# Patient Record
Sex: MALE | Race: BLACK OR AFRICAN AMERICAN | Employment: OTHER | URBAN - METROPOLITAN AREA
[De-identification: names, ages, dates, MRNs, and addresses within clinical notes are randomized per-mention and may not be internally consistent; named-entity substitution may affect disease eponyms.]

---

## 2017-01-03 ENCOUNTER — OFFICE VISIT (OUTPATIENT)
Dept: CARDIOLOGY CLINIC | Age: 72
End: 2017-01-03

## 2017-01-03 ENCOUNTER — ANTI-COAG VISIT (OUTPATIENT)
Dept: CARDIOLOGY CLINIC | Age: 72
End: 2017-01-03

## 2017-01-03 VITALS
HEIGHT: 73 IN | WEIGHT: 223 LBS | DIASTOLIC BLOOD PRESSURE: 90 MMHG | SYSTOLIC BLOOD PRESSURE: 130 MMHG | HEART RATE: 69 BPM | OXYGEN SATURATION: 96 % | BODY MASS INDEX: 29.55 KG/M2

## 2017-01-03 DIAGNOSIS — I48.91 ATRIAL FIBRILLATION, UNSPECIFIED TYPE (HCC): ICD-10-CM

## 2017-01-03 DIAGNOSIS — I42.8 CARDIOMYOPATHY, NONISCHEMIC (HCC): Primary | ICD-10-CM

## 2017-01-03 DIAGNOSIS — C49.21 SOFT TISSUE SARCOMA OF RIGHT THIGH (HCC): ICD-10-CM

## 2017-01-03 DIAGNOSIS — I27.20 PULMONARY HYPERTENSION, MODERATE TO SEVERE (HCC): ICD-10-CM

## 2017-01-03 DIAGNOSIS — Z79.01 LONG TERM CURRENT USE OF ANTICOAGULANT THERAPY: ICD-10-CM

## 2017-01-03 DIAGNOSIS — I48.19 PERSISTENT ATRIAL FIBRILLATION (HCC): ICD-10-CM

## 2017-01-03 DIAGNOSIS — I34.0 NON-RHEUMATIC MITRAL REGURGITATION: ICD-10-CM

## 2017-01-03 DIAGNOSIS — I10 ESSENTIAL HYPERTENSION: ICD-10-CM

## 2017-01-03 LAB
INR BLD: 2.4
PT POC: NORMAL SEC
VALID INTERNAL CONTROL?: YES

## 2017-01-03 NOTE — PROGRESS NOTES
The INR is stable and therapeutic. Continue same dose of coumadin and recheck in 1 month.   Continue Coumadin 2.5mg daily except 1.25 mg on Tuesday (takes Coumadin in the morning)  Verbal order and read back per Ken Jasso NP

## 2017-01-03 NOTE — PROGRESS NOTES
HISTORY OF PRESENT ILLNESS  Jose C Rivas is a 80 y.o. male. ASSESSMENT and PLAN    Mr. Ronan Bolivar has history of nonischemic cardiomyopathy as well as severe pulmonary hypertension with moderate mitral regurgitation. Because of severe LV dysfunction, he has AICD in place for primary prevention of sudden cardiac death. He also has persistent atrial fibrillation. 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 He will have this performed up in [place]. He has lost significant amount of weight. Since October of 2015, he has lost over 30 pounds. He had weighed 193 pounds back in October 2015. His last MUGA scan in November of 2015 showed ejection fraction of 45%. His echocardiogram in September of 2015 showed ejection fraction of 45-50% with pulmonary hypertension with PA pressure of 45 mmHg. Right thigh sarcoma has been successfully removed. His understanding is that the edges were clear.  CAD:   He has no documented history of CAD.  Atrial fibrillation: He remains on Coumadin. His rate is controlled.  BP:   Well-controlled.  HR:    Stable.  CHF:   Currently, there is no evidence of decompensated CHF noted.  Weight:   Stable. I did advise him not to lose any further weight after his sarcoma resection of his right thigh.  Cholesterol:   Target LDL <110.  Anti-platelet:   Remains on Coumadin. I will see him back in 8-9 months. Thank you. Encounter Diagnoses   Name Primary?    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 Rosalba Jensen has no complaints of chest pains, increased shortness of breath or decreased exercise capacity. He denies any orthopnea or PND. He denies any palpitations or dizziness. Review of Systems   Respiratory: Positive for shortness of breath and wheezing. Cardiovascular: Positive for palpitations and leg swelling. Negative for chest pain, orthopnea, claudication and PND. All other systems reviewed and are negative. Physical Exam   Constitutional: He is oriented to person, place, and time. He appears well-developed and well-nourished. HENT:   Head: Normocephalic. Eyes: Conjunctivae are normal.   Neck: Neck supple. No JVD present. No thyromegaly present. Cardiovascular: Normal rate and regular rhythm. Pulmonary/Chest: Breath sounds normal.   Abdominal: Bowel sounds are normal.   Musculoskeletal: He exhibits deformity (Right thigh resection from his sarcoma). Neurological: He is alert and oriented to person, place, and time. Skin: Skin is warm and dry. Nursing note and vitals reviewed. PCP: Neela Church MD    Past Medical History   Diagnosis Date    AICD (automatic cardioverter/defibrillator) present     Atrial fibrillation ([address] 6/8/2010    Cancer ([facility]     Cardiac cath 02/11/2009     Patent coronary arteries. LVEDP 28.  EF 35%. Global hyk. Mod MR.  Cardiac echocardiogram 03/01/2016     LVE. EF 35-40% (prev 45-50% on study of 9/14/15). Diffuse hypk. Indeterminate diastolic fx. Mild MR. Mod AI. Mild TR.  Cardiac MUGA scan 11/02/2015     At most mild LVE. EF 45-46%.  Cardiac nuclear imaging test, abnormal 02/03/2009     Mild basal/mid inferior, inferoapical, inferoseptal infarct w/mild ischemia in RCA (possibly partially artifact). Anterior, anteroseptal, anterapical ischemia w/o infarct. (Mid & distal LAD.)  LVE. EF 29%. Mod global hyk.       Cardiovascular renal duplex 06/07/2010     No evidence of renal artery stenosis >60%. Patent SMA and celiac artery.  HTN (hypertension) 6/8/2010    Hypertensive cardiovascular disease     MR (mitral regurgitation)     Nonischemic cardiomyopathy      3/11 echo EF 25%    Pulmonary hypertension, moderate to severe ([address] 6/8/2010     90-100mmHg; repeat echo in 3/11 showed 70mmHg       Past Surgical History   Procedure Laterality Date    Hx hernia repair  2011    Hx heart valve surgery  2011       Current Outpatient Prescriptions   Medication Sig Dispense Refill    carvedilol (COREG) 25 mg tablet TAKE 1 TABLET BY MOUTH TWICE A DAY WITH MEALS 60 Tab 11    lisinopril (PRINIVIL, ZESTRIL) 20 mg tablet TAKE ONE TABLET BY MOUTH TWICE DAILY 60 Tab 8    magnesium oxide (MAG-OX) 400 mg tablet Take 1 Tab by mouth daily. 60 Tab 6    furosemide (LASIX) 40 mg tablet Take 1 Tab by mouth daily. Or directed 1 Tab 0    potassium chloride (K-DUR, KLOR-CON) 20 mEq tablet Take 1 Tab by mouth daily. Or as directed 1 Tab 0    metolazone (ZAROXOLYN) 2.5 mg tablet Take 1 Tab by mouth as needed. 1 Tab 0    cloNIDine HCl (CATAPRES) 0.2 mg tablet TAKE 1 TABLET BY MOUTH 3 TIMES A  Tab 2    digoxin (LANOXIN) 0.125 mg tablet TAKE 1 TABLET BY MOUTH EVERY DAY 90 Tab 3    dronabinol (MARINOL) 5 mg capsule Take 1 Cap by mouth two (2) times a day. Max Daily Amount: 10 mg. 60 Cap 3    oxyCODONE-acetaminophen (PERCOCET) 5-325 mg per tablet Take 1 Tab by mouth every four (4) hours as needed for Pain. Max Daily Amount: 6 Tabs. 180 Tab 0    warfarin (COUMADIN) 2.5 mg tablet Take 1 Tab by mouth daily. Or as directed by our office 45 Tab 6    sertraline (ZOLOFT) 25 mg tablet Take  by mouth daily.  allopurinol (ZYLOPRIM) 300 mg tablet Take  by mouth daily.  FERROUS SULFATE, DRIED (IRON, DRIED, PO) Take 325 mg by mouth daily.  aspirin delayed-release (ASPIR-81) 81 mg tablet Take 81 mg by mouth daily.          The patient has a family history of    Social History   Substance Use Topics    Smoking status: Never Smoker    Smokeless tobacco: Never Used    Alcohol use Yes      Comment: occasionally. Lab Results   Component Value Date/Time    Cholesterol, total 85 01/23/2016 05:30 AM    HDL Cholesterol 31 01/23/2016 05:30 AM    LDL, calculated 34 01/23/2016 05:30 AM    Triglyceride 100 01/23/2016 05:30 AM    CHOL/HDL Ratio 2.7 01/23/2016 05:30 AM        BP Readings from Last 3 Encounters:   01/03/17 130/90   12/21/16 134/85   11/30/16 (!) 147/95        Pulse Readings from Last 3 Encounters:   01/03/17 69   12/21/16 67   11/30/16 90       Wt Readings from Last 3 Encounters:   01/03/17 101.2 kg (223 lb)   10/18/16 79.9 kg (176 lb 3.2 oz)   10/07/16 93.4 kg (206 lb)         EKG: Not done today.

## 2017-01-03 NOTE — MR AVS SNAPSHOT
Visit Information Date & Time Provider Department Dept. Phone Encounter #  
 1/3/2017  1:00 PM Senait Walsh MD [facility] [phone] Your Appointments 1/4/2017  3:20 PM  
CARELINK with Pacer Hv Csi [facility] ([facility]) Appt Note: Jose Summers[address]ldharmesh [address] [place] [id number] [phone] [phone]  
  
    
 1/31/2017  8:00 AM  
ANTICOAG NURSE with Pt Inr Hv Csi [facility] ([facility]) Appt Note: 4 weeks [place] [id number] [phone] [address] [facility] [address] [address]  
  
    
 8/22/2017  8:20 AM  
Follow Up with Senait Walsh MD  
[facility] ([facility] Appt Note: 6-9 month f/up [place] [id number] [phone] [address] [facility] [address] Upcoming Health Maintenance Date Due Hepatitis C Screening 1945 DTaP/Tdap/Td series (1 - Tdap) 8/21/1967 FOBT Q 1 YEAR AGE 50-75 8/21/1996 ZOSTER VACCINE AGE 60> 8/21/2006 GLAUCOMA SCREENING Q2Y 8/21/2011 MEDICARE YEARLY EXAM 8/21/2011 Allergies as of 1/3/2017  Review Complete On: 1/3/2017 By: Senait Walsh MD  
 No Known Allergies Current Immunizations  Reviewed on 12/21/2016 Name Date Influenza Vaccine 11/9/2016, 10/2/2015 Pneumococcal Conjugate (PCV-13) 9/28/2015 Pneumococcal Polysaccharide (PPSV-23) 8/13/2012 Not reviewed this visit You Were Diagnosed With   
  
 Codes Comments Cardiomyopathy, nonischemic ([facility]    -  Primary ICD-10-CM: I42.9 ICD-9-CM: 425.4 Non-rheumatic mitral regurgitation     ICD-10-CM: I34.0 ICD-9-CM: 424.0 Essential hypertension     ICD-10-CM: I10 
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  
 130/90 (BP 1 Location: Left arm, BP Patient Position: Sitting) 69 6' 1\" (1.854 m) 223 lb (101.2 kg) 96% 29.42 kg/m2 Smoking Status Never Smoker BMI and BSA Data Body Mass Index Body Surface Area  
 29.42 kg/m 2 2.28 m 2 Preferred Pharmacy Pharmacy Name Phone [facility][id number]- [address] Your Updated Medication List  
  
   
This list is accurate as of: 1/3/17  1:54 PM.  Always use your most recent med list.  
  
  
  
  
 allopurinol 300 mg tablet Commonly known as:  Jacquelin Lb Take  by mouth daily. ASPIR-81 81 mg tablet Generic drug:  aspirin delayed-release Take 81 mg by mouth daily. carvedilol 25 mg tablet Commonly known as:  COREG  
TAKE 1 TABLET BY MOUTH TWICE A DAY WITH MEALS  
  
 cloNIDine HCl 0.2 mg tablet Commonly known as:  CATAPRES  
TAKE 1 TABLET BY MOUTH 3 TIMES A DAY  
  
 digoxin 0.125 mg tablet Commonly known as:  LANOXIN  
TAKE 1 TABLET BY MOUTH EVERY DAY  
  
 dronabinol 5 mg capsule Commonly known as:  Lorre Mourning Take 1 Cap by mouth two (2) times a day. Max Daily Amount: 10 mg.  
  
 furosemide 40 mg tablet Commonly known as:  LASIX Take 1 Tab by mouth daily. Or directed IRON (DRIED) PO Take 325 mg by mouth daily. lisinopril 20 mg tablet Commonly known as:  PRINIVIL, ZESTRIL  
TAKE ONE TABLET BY MOUTH TWICE DAILY  
  
 magnesium oxide 400 mg tablet Commonly known as:  MAG-OX Take 1 Tab by mouth daily. metOLazone 2.5 mg tablet Commonly known as:  Yoli Buster Take 1 Tab by mouth as needed. oxyCODONE-acetaminophen 5-325 mg per tablet Commonly known as:  PERCOCET Take 1 Tab by mouth every four (4) hours as needed for Pain. Max Daily Amount: 6 Tabs. potassium chloride 20 mEq tablet Commonly known as:  K-DUR, KLOR-CON Take 1 Tab by mouth daily. Or as directed  
  
 warfarin 2.5 mg tablet Commonly known as:  COUMADIN Take 1 Tab by mouth daily. Or as directed by our office ZOLOFT 25 mg tablet Generic drug:  sertraline Take  by mouth daily. To-Do List   
 01/11/2017 8:30 AM  
  Appointment with [facility] FAST TRACK NURSE at [facility]  
  
 02/01/2017 8:30 AM  
  Appointment with [facility] FAST TRACK NURSE at [facility]  
  
 02/22/2017 8:30 AM  
  Appointment with [facility] FAST TRACK NURSE at [facility] Introducing [facility] SERVICES! [facility] introduces [employer] patient portal. Now you can access parts of your medical record, email your doctor's office, and request medication refills online. 1. In your internet browser, go to [email or web address] 2. Click on the First Time User? Click Here link in the Sign In box. You will see the New Member Sign Up page. 3. Enter your [employer] Access Code exactly as it appears below. You will not need to use this code after youve completed the sign-up process. If you do not sign up before the expiration date, you must request a new code. · [employer] Access Code: [email or web address] Expires: 2/28/2017  9:08 AM 
 
4. Enter the last four digits of your Social Security Number (xxxx) and Date of Birth (mm/dd/yyyy) as indicated and click Submit. You will be taken to the next sign-up page. 5. Create a [employer] ID. This will be your [employer] login ID and cannot be changed, so think of one that is secure and easy to remember. 6. Create a [employer] password. You can change your password at any time. 7. Enter your Password Reset Question and Answer. This can be used at a later time if you forget your password. 8. Enter your e-mail address. You will receive e-mail notification when new information is available in [address] 9. Click Sign Up. You can now view and download portions of your medical record. 10. Click the Download Summary menu link to download a portable copy of your medical information. If you have questions, please visit the Frequently Asked Questions section of the [employer] website. Remember, [employer] is NOT to be used for urgent needs. For medical emergencies, dial 911. Now available from your iPhone and Android! Please provide this summary of care documentation to your next provider. Your primary care clinician is listed as Nicole Wei. If you have any questions after today's visit, please call [phone].

## 2017-01-11 ENCOUNTER — HOSPITAL ENCOUNTER (OUTPATIENT)
Dept: INFUSION THERAPY | Age: 72
Discharge: HOME | End: 2017-01-11
Payer: MEDICARE

## 2017-01-11 VITALS
HEART RATE: 72 BPM | OXYGEN SATURATION: 99 % | SYSTOLIC BLOOD PRESSURE: 129 MMHG | DIASTOLIC BLOOD PRESSURE: 81 MMHG | RESPIRATION RATE: 18 BRPM

## 2017-01-11 LAB
BASO+EOS+MONOS # BLD AUTO: 0.4 K/UL (ref 0–2.3)
BASO+EOS+MONOS # BLD AUTO: 7 % (ref 0.1–17)
DIFFERENTIAL METHOD BLD: ABNORMAL
ERYTHROCYTE [DISTWIDTH] IN BLOOD BY AUTOMATED COUNT: 19.5 % (ref 11.5–14.5)
HCT VFR BLD AUTO: 38.1 % (ref 36–48)
HGB BLD-MCNC: 12.7 G/DL (ref 12–16)
LYMPHOCYTES # BLD AUTO: 18 % (ref 14–44)
LYMPHOCYTES # BLD: 1 K/UL (ref 1.1–5.9)
MCH RBC QN AUTO: 30.2 PG (ref 25–35)
MCHC RBC AUTO-ENTMCNC: 33.3 G/DL (ref 31–37)
MCV RBC AUTO: 90.7 FL (ref 78–102)
NEUTS SEG # BLD: 4.2 K/UL (ref 1.8–9.5)
NEUTS SEG NFR BLD AUTO: 75 % (ref 40–70)
PLATELET # BLD AUTO: 137 K/UL (ref 140–440)
RBC # BLD AUTO: 4.2 M/UL (ref 4.1–5.1)
WBC # BLD AUTO: 5.6 K/UL (ref 4.5–13)

## 2017-01-11 PROCEDURE — 74011250636 HC RX REV CODE- 250/636: Performed by: INTERNAL MEDICINE

## 2017-01-11 PROCEDURE — 85025 COMPLETE CBC W/AUTO DIFF WBC: CPT | Performed by: INTERNAL MEDICINE

## 2017-01-11 PROCEDURE — 36415 COLL VENOUS BLD VENIPUNCTURE: CPT

## 2017-01-11 PROCEDURE — 77030012965 HC NDL HUBR BBMI -A

## 2017-01-11 RX ORDER — SODIUM CHLORIDE 0.9 % (FLUSH) 0.9 %
10-40 SYRINGE (ML) INJECTION AS NEEDED
Status: DISPENSED | OUTPATIENT
Start: 2017-01-11 | End: 2017-01-11

## 2017-01-11 RX ORDER — HEPARIN SODIUM (PORCINE) LOCK FLUSH IV SOLN 100 UNIT/ML 100 UNIT/ML
SOLUTION INTRAVENOUS
Status: DISPENSED
Start: 2017-01-11 | End: 2017-01-11

## 2017-01-11 RX ORDER — HEPARIN SODIUM (PORCINE) LOCK FLUSH IV SOLN 100 UNIT/ML 100 UNIT/ML
500 SOLUTION INTRAVENOUS AS NEEDED
Status: ACTIVE | OUTPATIENT
Start: 2017-01-11 | End: 2017-01-12

## 2017-01-11 RX ADMIN — HEPARIN SODIUM (PORCINE) LOCK FLUSH IV SOLN 100 UNIT/ML 500 UNITS: 100 SOLUTION at 08:36

## 2017-01-11 RX ADMIN — Medication 30 ML: at 08:36

## 2017-01-11 NOTE — PROGRESS NOTES
[facility] Progress Note    Date: 2017    Name: Alexandria Crook    MRN: [id number]         : 1945     [facility] flush/Procrit    Mr. Avelina eHrring to [facility], ambulatory at 0830. Pt was assessed and education was provided. Mr. Jaleel Tracy vitals were reviewed and patient was observed for 5 minutes prior to treatment. Visit Vitals    /81 (BP 1 Location: Left arm, BP Patient Position: Sitting)    Pulse 72    Resp 18    SpO2 99%     Right chest mediport accessed with 20G 1 inch Giron needle. Flushed easily and had brisk blood return. Blood drawn off for CBC after 10 mL of waste. Port flushed with 20 mL NS and 500 units of heparin before de-accessing. No irritation or bleeding. Band-aid applied. Recent Results (from the past 12 hour(s))   CBC WITH 3 PART DIFF    Collection Time: 17  8:41 AM   Result Value Ref Range    WBC 5.6 4.5 - 13.0 K/uL    RBC 4.20 4. 10 - 5.10 M/uL    HGB 12.7 12.0 - 16 g/dL    HCT 38.1 36 - 48 %    MCV 90.7 78 - 102 FL    MCH 30.2 25.0 - 35.0 PG    MCHC 33.3 31 - 37 g/dL    RDW 19.5 (H) 11.5 - 14.5 %    PLATELET 012 (L) 118 - 440 K/uL    NEUTROPHILS 75 (H) 40 - 70 %    MIXED CELLS 7 0.1 - 17 %    LYMPHOCYTES 18 14 - 44 %    ABS. NEUTROPHILS 4.2 1.8 - 9.5 K/UL    ABS. MIXED CELLS 0.4 0.0 - 2.3 K/uL    ABS. LYMPHOCYTES 1.0 (L) 1.1 - 5.9 K/UL    DF AUTOMATED       Results within ordered parameters to HOLD procrit today. Patient arm band removed and shredded. He is to return on 2017 at 0830 for his next procrit appointment.      Ricardo Tidwell RN  2017

## 2017-01-31 ENCOUNTER — ANTI-COAG VISIT (OUTPATIENT)
Dept: CARDIOLOGY CLINIC | Age: 72
End: 2017-01-31

## 2017-01-31 DIAGNOSIS — I48.19 PERSISTENT ATRIAL FIBRILLATION (HCC): ICD-10-CM

## 2017-01-31 DIAGNOSIS — Z79.01 LONG TERM CURRENT USE OF ANTICOAGULANT THERAPY: Primary | ICD-10-CM

## 2017-01-31 LAB
INR BLD: 2.9
PT POC: 34.6 SEC
VALID INTERNAL CONTROL?: YES

## 2017-01-31 NOTE — PROGRESS NOTES
Verbal order and read back per Holden Nguyễn NP  The INR is stable and therapeutic. Continue same dose of coumadin and recheck in 1 month.   Continue Coumadin 2.5mg daily except 1.25 mg on Tuesday  Patient informed of instructions, read back and verbalized understanding Justine Lopez LPN

## 2017-02-01 ENCOUNTER — HOSPITAL ENCOUNTER (OUTPATIENT)
Dept: INFUSION THERAPY | Age: 72
Discharge: HOME | End: 2017-02-01
Payer: MEDICARE

## 2017-02-01 VITALS
RESPIRATION RATE: 18 BRPM | TEMPERATURE: 98.3 F | HEART RATE: 86 BPM | SYSTOLIC BLOOD PRESSURE: 125 MMHG | OXYGEN SATURATION: 99 % | DIASTOLIC BLOOD PRESSURE: 83 MMHG

## 2017-02-01 LAB
BASO+EOS+MONOS # BLD AUTO: 0.5 K/UL (ref 0–2.3)
BASO+EOS+MONOS # BLD AUTO: 9 % (ref 0.1–17)
DIFFERENTIAL METHOD BLD: ABNORMAL
ERYTHROCYTE [DISTWIDTH] IN BLOOD BY AUTOMATED COUNT: 19.2 % (ref 11.5–14.5)
HCT VFR BLD AUTO: 38.5 % (ref 36–48)
HGB BLD-MCNC: 12.8 G/DL (ref 12–16)
LYMPHOCYTES # BLD AUTO: 15 % (ref 14–44)
LYMPHOCYTES # BLD: 0.8 K/UL (ref 1.1–5.9)
MCH RBC QN AUTO: 30 PG (ref 25–35)
MCHC RBC AUTO-ENTMCNC: 33.2 G/DL (ref 31–37)
MCV RBC AUTO: 90.2 FL (ref 78–102)
NEUTS SEG # BLD: 4.3 K/UL (ref 1.8–9.5)
NEUTS SEG NFR BLD AUTO: 77 % (ref 40–70)
PLATELET # BLD AUTO: 143 K/UL (ref 140–440)
RBC # BLD AUTO: 4.27 M/UL (ref 4.1–5.1)
WBC # BLD AUTO: 5.6 K/UL (ref 4.5–13)

## 2017-02-01 PROCEDURE — 85025 COMPLETE CBC W/AUTO DIFF WBC: CPT | Performed by: INTERNAL MEDICINE

## 2017-02-01 PROCEDURE — 74011250636 HC RX REV CODE- 250/636: Performed by: INTERNAL MEDICINE

## 2017-02-01 PROCEDURE — 36591 DRAW BLOOD OFF VENOUS DEVICE: CPT

## 2017-02-01 RX ORDER — HEPARIN SODIUM (PORCINE) LOCK FLUSH IV SOLN 100 UNIT/ML 100 UNIT/ML
SOLUTION INTRAVENOUS
Status: DISPENSED
Start: 2017-02-01 | End: 2017-02-01

## 2017-02-01 RX ORDER — SODIUM CHLORIDE 0.9 % (FLUSH) 0.9 %
10-40 SYRINGE (ML) INJECTION AS NEEDED
Status: DISCONTINUED | OUTPATIENT
Start: 2017-02-01 | End: 2017-02-05 | Stop reason: HOSPADM

## 2017-02-01 RX ORDER — HEPARIN SODIUM (PORCINE) LOCK FLUSH IV SOLN 100 UNIT/ML 100 UNIT/ML
500 SOLUTION INTRAVENOUS AS NEEDED
Status: ACTIVE | OUTPATIENT
Start: 2017-02-01 | End: 2017-02-02

## 2017-02-01 RX ADMIN — HEPARIN SODIUM (PORCINE) LOCK FLUSH IV SOLN 100 UNIT/ML 500 UNITS: 100 SOLUTION at 08:35

## 2017-02-01 RX ADMIN — Medication 20 ML: at 08:35

## 2017-02-01 NOTE — PROGRESS NOTES
[facility] Progress Note    Date: 2017    Name: Garland Russell    MRN: [id number]         : 1945     Procrit    [facility]. Sonal Day to [facility], ambulatory at 0830 accompanied by his wife. Pt was assessed and education was provided. Mr. Angella Stevens vitals were reviewed and patient was observed for 5 minutes prior to treatment. Visit Vitals    /83 (BP 1 Location: Left arm, BP Patient Position: Sitting)    Pulse 86    Temp 98.3 °F (36.8 °C)    Resp 18    SpO2 99%     Per patient request, labs drawn from port today. Right chest mediport accessed with 20G 1 inch Giron needle. Flushed easily and had brisk blood return. Blood drawn off for CBC after 10 mL of waste. Port flushed with 20 mL NS and 500 units of heparin before de-accessing. No irritation or bleeding. Band-aid applied. Recent Results (from the past 12 hour(s))   CBC WITH 3 PART DIFF    Collection Time: 17  8:42 AM   Result Value Ref Range    WBC 5.6 4.5 - 13.0 K/uL    RBC 4.27 4. 10 - 5.10 M/uL    HGB 12.8 12.0 - 16 g/dL    HCT 38.5 36 - 48 %    MCV 90.2 78 - 102 FL    MCH 30.0 25.0 - 35.0 PG    MCHC 33.2 31 - 37 g/dL    RDW 19.2 (H) 11.5 - 14.5 %    PLATELET 626 132 - 239 K/uL    NEUTROPHILS 77 (H) 40 - 70 %    MIXED CELLS 9 0.1 - 17 %    LYMPHOCYTES 15 14 - 44 %    ABS. NEUTROPHILS 4.3 1.8 - 9.5 K/UL    ABS. MIXED CELLS 0.5 0.0 - 2.3 K/uL    ABS. LYMPHOCYTES 0.8 (L) 1.1 - 5.9 K/UL    DF AUTOMATED       Results within ordered parameters to HOLD procrit today. Patient arm band removed and shredded. He is to return on 2017 at 0830 for his next procrit appointment.      Jamie Cabello RN  2017

## 2017-02-02 ENCOUNTER — HOSPITAL ENCOUNTER (OUTPATIENT)
Dept: LAB | Age: 72
Discharge: HOME | End: 2017-02-02
Payer: MEDICARE

## 2017-02-02 ENCOUNTER — OFFICE VISIT (OUTPATIENT)
Dept: CARDIOLOGY CLINIC | Age: 72
End: 2017-02-02

## 2017-02-02 VITALS
WEIGHT: 234 LBS | HEART RATE: 59 BPM | SYSTOLIC BLOOD PRESSURE: 122 MMHG | DIASTOLIC BLOOD PRESSURE: 80 MMHG | HEIGHT: 73 IN | OXYGEN SATURATION: 98 % | BODY MASS INDEX: 31.01 KG/M2

## 2017-02-02 DIAGNOSIS — I42.8 CARDIOMYOPATHY, NONISCHEMIC (HCC): ICD-10-CM

## 2017-02-02 DIAGNOSIS — I10 ESSENTIAL HYPERTENSION: ICD-10-CM

## 2017-02-02 DIAGNOSIS — I50.23 ACUTE ON CHRONIC SYSTOLIC CONGESTIVE HEART FAILURE (HCC): ICD-10-CM

## 2017-02-02 DIAGNOSIS — R06.02 SHORTNESS OF BREATH: Primary | ICD-10-CM

## 2017-02-02 LAB
ANION GAP BLD CALC-SCNC: 9 MMOL/L (ref 3–18)
BNP SERPL-MCNC: ABNORMAL PG/ML (ref 0–900)
BUN SERPL-MCNC: 17 MG/DL (ref 7–18)
BUN/CREAT SERPL: 16 (ref 12–20)
CALCIUM SERPL-MCNC: 8.5 MG/DL (ref 8.5–10.1)
CHLORIDE SERPL-SCNC: 101 MMOL/L (ref 100–108)
CO2 SERPL-SCNC: 29 MMOL/L (ref 21–32)
CREAT SERPL-MCNC: 1.04 MG/DL (ref 0.6–1.3)
GLUCOSE SERPL-MCNC: 103 MG/DL (ref 74–99)
POTASSIUM SERPL-SCNC: 4 MMOL/L (ref 3.5–5.5)
SODIUM SERPL-SCNC: 139 MMOL/L (ref 136–145)

## 2017-02-02 PROCEDURE — 80048 BASIC METABOLIC PNL TOTAL CA: CPT | Performed by: NURSE PRACTITIONER

## 2017-02-02 PROCEDURE — 36415 COLL VENOUS BLD VENIPUNCTURE: CPT | Performed by: NURSE PRACTITIONER

## 2017-02-02 PROCEDURE — 83880 ASSAY OF NATRIURETIC PEPTIDE: CPT | Performed by: NURSE PRACTITIONER

## 2017-02-02 RX ORDER — METOLAZONE 2.5 MG/1
2.5 TABLET ORAL DAILY
Qty: 15 TAB | Refills: 2 | Status: SHIPPED | OUTPATIENT
Start: 2017-02-02 | End: 2017-02-09 | Stop reason: ALTCHOICE

## 2017-02-02 RX ORDER — FUROSEMIDE 40 MG/1
40 TABLET ORAL 2 TIMES DAILY
Qty: 1 TAB | Refills: 0
Start: 2017-02-02 | End: 2017-09-07 | Stop reason: SDUPTHER

## 2017-02-02 RX ORDER — POTASSIUM CHLORIDE 20 MEQ/1
20 TABLET, EXTENDED RELEASE ORAL 2 TIMES DAILY
Qty: 1 TAB | Refills: 0
Start: 2017-02-02 | End: 2017-02-09 | Stop reason: SDUPTHER

## 2017-02-02 NOTE — PATIENT INSTRUCTIONS
Lasix 80mg (all at one time) for 3 days only and then go back to 40mg twice a day  Increase potassium chloride 20meq- take 2 tablets twice a day for 3 days only and then back to 20meq (1 tablet) twice a day  Metolazone 2.5mg - take 1 tablet 30 minutes prior to the lasix 80mg for 3 days only and then as directed only  Follow-up with Martin Gonsalez in one week  BMP and NT pro-BNP today  BMP and NT pro-BNP 1 day prior to returning for follow-up   Weigh daily and record  Limit sodium intake to 2000mg per day  Limit fluid intake to no more than  6, eight ounce glasses of any type of fluids per day  Call if you notice sudden or progressive weight gain (3-5 pounds in 2-3 days), increasing shortness of breath, abdominal bloating, increasing lower extremity edema, inability to lie flat or on your normal number of pillows, having to sit up to catch your breath, fatigue, increased somnolence (sleeping more), poor appetite

## 2017-02-02 NOTE — PROGRESS NOTES
Elena Ma presents today for evaluation of complaints of shortness of breath and lower extremity edema. He was last seen by Dr. Gabriel Espinosa on January 3, 2017. It appears that during that visit, he was doing well. His weight at that time was 223 pounds. In October 2016, I had treated him for decompensated congestive heart failure and he diuresed a total of 54 pounds over several weeks. He was given several doses (total of 6 doses) of metolazone 2.5 mg over a 2 week period of time. His weight during his October 18, 2016 visit was down to 176 pounds and he was very comfortable at that weight. It is unclear whether or not he continued daily weights as it appears that his weight has been increasing progressively as has his lower extremity edema but it does not appear that he mentioned this to Dr. Gabriel Espinosa when he was seen for follow-up in early January. He is a 80year old male with history of atrial fibrillation, pulmonary hypertension, hypertension, and non-ischemic cardiomyopathy (s/p AICD for primary prevention of sudden cardiac death). 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 It was successfully removed in April 2016 and his understanding is that the edges were clear. He states that he has not recovered fully from this as he has not been able to put his full weight on the right leg. When seen by Dr. Gabriel Espinosa on 9/28/16, he exhibited signs of heart failure as evidenced by generalized edema in both upper and lower extremities and scrotal edema. He also complained of dyspnea with any exertion. He was instructed to take Lasix 80mg BID and potassium 40meq daily for 3 days and then return for follow-up. Because of his marked edema, non-invasive duplex studies were requested of the upper and lower extremities and an echo was also requested. The lower extremity duplex study was completed and it was negative for DVT.   The echo was completed on 9/30/16, and it showed that his EF is down to 15%, severe tricuspid regurgitation, and estimated peak pressure was in the range of 80 mmHg to 85 mmHg. He had a limited echo done in March of this year and at that time, his EF was 35-40%. His last full echo was done in Sept. 2015, and his EF at that time was 45-50% and his RVSP was 45-50 mmHg. He had a MUGA scan done on 11/2/15 and it showed an EF of 45 to 46% and no wall motion abnormalities. Today, he states that he feels okay. He complains of increasing shortness of breath and lower extremity edema. He also states that the scrotal edema has returned. He denies chest pain, tightness, heaviness, and palpitations. He denies shortness of breath at rest, admits to SHRESTHA, denies orthopnea and PND. He admits to occasional abdominal bloating. He denies lightheadedness, dizziness, and syncope. He denies claudication. Denies nausea, vomiting, diarrhea, fever, chills. Admits to scrotal edema. PMH:  Past Medical History   Diagnosis Date    AICD (automatic cardioverter/defibrillator) present     Atrial fibrillation ([address] 6/8/2010    Cancer [facility] [address]     Cardiac cath 02/11/2009     Patent coronary arteries. LVEDP 28.  EF 35%. Global hyk. Mod MR.  Cardiac echocardiogram 03/01/2016     LVE. EF 35-40% (prev 45-50% on study of 9/14/15). Diffuse hypk. Indeterminate diastolic fx. Mild MR. Mod AI. Mild TR.  Cardiac MUGA scan 11/02/2015     At most mild LVE. EF 45-46%.  Cardiac nuclear imaging test, abnormal 02/03/2009     Mild basal/mid inferior, inferoapical, inferoseptal infarct w/mild ischemia in RCA (possibly partially artifact). Anterior, anteroseptal, anterapical ischemia w/o infarct. (Mid & distal LAD.)  LVE. EF 29%. Mod global hyk.  Cardiovascular renal duplex 06/07/2010     No evidence of renal artery stenosis >60%. Patent SMA and celiac artery.     HTN (hypertension) 6/8/2010    Hypertensive cardiovascular disease     MR (mitral regurgitation)  Nonischemic cardiomyopathy      3/11 echo EF 25%    Pulmonary hypertension, moderate to severe ([address] 6/8/2010     90-100mmHg; repeat echo in 3/11 showed 70mmHg       PSH:  Past Surgical History   Procedure Laterality Date    Hx hernia repair  2011    Hx heart valve surgery  2011       MEDS:  Current Outpatient Prescriptions   Medication Sig    furosemide (LASIX) 40 mg tablet Take 1 Tab by mouth two (2) times a day. Or directed    potassium chloride (K-DUR, KLOR-CON) 20 mEq tablet Take 1 Tab by mouth two (2) times a day. Or as directed    metOLazone (ZAROXOLYN) 2.5 mg tablet Take 1 Tab by mouth daily. For 3 days and then as directed    carvedilol (COREG) 25 mg tablet TAKE 1 TABLET BY MOUTH TWICE A DAY WITH MEALS    lisinopril (PRINIVIL, ZESTRIL) 20 mg tablet TAKE ONE TABLET BY MOUTH TWICE DAILY    magnesium oxide (MAG-OX) 400 mg tablet Take 1 Tab by mouth daily.  cloNIDine HCl (CATAPRES) 0.2 mg tablet TAKE 1 TABLET BY MOUTH 3 TIMES A DAY    digoxin (LANOXIN) 0.125 mg tablet TAKE 1 TABLET BY MOUTH EVERY DAY    dronabinol (MARINOL) 5 mg capsule Take 1 Cap by mouth two (2) times a day. Max Daily Amount: 10 mg.    oxyCODONE-acetaminophen (PERCOCET) 5-325 mg per tablet Take 1 Tab by mouth every four (4) hours as needed for Pain. Max Daily Amount: 6 Tabs.  warfarin (COUMADIN) 2.5 mg tablet Take 1 Tab by mouth daily. Or as directed by our office    sertraline (ZOLOFT) 25 mg tablet Take  by mouth daily.  allopurinol (ZYLOPRIM) 300 mg tablet Take  by mouth daily.  FERROUS SULFATE, DRIED (IRON, DRIED, PO) Take 325 mg by mouth daily.  aspirin delayed-release (ASPIR-81) 81 mg tablet Take 81 mg by mouth daily. No current facility-administered medications for this visit.       Facility-Administered Medications Ordered in Other Visits   Medication Dose Route Frequency    sodium chloride (NS) flush 10-40 mL  10-40 mL IntraVENous PRN       Allergies and Sensitivities:  No Known Allergies    Family History:  Family History   Problem Relation Age of Onset    Hypertension Mother        Social History:  He  reports that he has never smoked. He has never used smokeless tobacco.  He  reports that he drinks alcohol. Physical:  Visit Vitals    /80    Pulse (!) 59    Ht 6' 1\" (1.854 m)    Wt 106.1 kg (234 lb)    SpO2 98%    BMI 30.87 kg/m2         He is up 11 pounds since his last appointment (on 1/3/17). Exam:  Neck:  Supple, no JVD, no carotid bruits  CV:  Normal S1 and  S2, grade I/VI soft JACQUIE noted, no rubs or gallops noted. Irregularly, irregular rhythm  Lungs:  Clear to ausculation throughout, no wheezes or rales  Abd:  Soft, non-tender, distended with good bowel sounds. No hepatosplenomegaly  :  Scrotal edema present  Extremities:  2+ pitting lower extremity edema      Data:  EKG:  Not done today      LABS:  Lab Results   Component Value Date/Time    Sodium 138 10/17/2016 10:15 AM    Potassium 3.4 10/17/2016 10:15 AM    Chloride 98 10/17/2016 10:15 AM    CO2 33 10/17/2016 10:15 AM    Glucose 104 10/17/2016 10:15 AM    BUN 18 10/17/2016 10:15 AM    Creatinine 0.88 10/17/2016 10:15 AM     Lab Results   Component Value Date/Time    Cholesterol, total 85 01/23/2016 05:30 AM    HDL Cholesterol 31 01/23/2016 05:30 AM    LDL, calculated 34 01/23/2016 05:30 AM    Triglyceride 100 01/23/2016 05:30 AM    CHOL/HDL Ratio 2.7 01/23/2016 05:30 AM     Lab Results   Component Value Date/Time    ALT (SGPT) 27 07/27/2016 10:49 AM       Impression / Plan:  1. Atrial fibrillation, rate controlled  2. Hypertension, blood pressure well-controlled  3. Pulmonary hypertension, RVSP 80-85 mmHg (by echo Sept. 2016)  4. Non-ischemic cardiomyopathy, EF 45% (by MUGA scan), s/p AICD implant   5. 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   Acute on chronic systolic heart failure    Mr. Israel Lane was seen today for evaluation of complaints of shortness of breath and increasing lower extremity edema and scrotal edema. He was seen by Dr. Jimmie Alicea in early January 2017 and it does not appear that he reported any of these symptoms during that visit. His office weight is up 11 pounds since that time. He admits to dyspnea on exertion but no orthopnea or PND. His symptoms are similar to the ones he complained about when I saw him in early October 2016 and for 2 weeks in that month, he was given metolazone 2.5 mg 3 times a week along with Lasix 80 mg daily. He diuresed a total of 54 pounds at that time and he was feeling quite well. On October 18, 2016, his weight was 176 pounds and he was comfortable at this weight. He had no edema, no shortness of breath, and his scrotal edema had resolved. He was instructed to take metolazone 2.5 mg daily for 3 days. He is to take each dose 30 minutes before his morning dose of Lasix 80 mg daily. After the 3 days is complete, he will resume his previous dose of 40 mg of Lasix twice daily. He was also instructed to increase his potassium supplementation to 40 mEq twice daily for 3 days and then afterwards resume 20 mEq twice daily. He will return for follow-up in 1 week with me. I asked that he have a BMP and NT proBNP done today and he will have the same labs done one day prior to returning for follow-up. His blood pressure and heart rate are stable. His lungs are clear but he does exhibit at least 2+ lower extremity edema. Congestive heart failure teaching reinforced today. Advised to limit sodium intake to no more than 2000mg per day and to also watch fluid intake. Advised to weigh daily every morning and record weights. Instructed to call our office if progressive weight gain is noted over a 2 to 3 day period of time, shortness of breath increases, or if abdominal bloating, nausea, fatigue, or increased lower extremity edema is noted.   Patient education material re:  CHF and sodium/fluid restrictions attached to his after visit summary. Greater than 50% of a 40 minute visit spent counseling and answering questions. His last echocardiogram was done in September 2016. His EF had markedly diminished since the limited echo done in March 2016. His EF was down to 15%, from 35-40%. Could possibly be chemotherapy induced. His RSVP is much more elevated at 80-85 mmHg. He will follow-up with Dr. Mary Ann Pierre as scheduled and PRN. He will follow-up in the device clinic and Coumadin clinic as scheduled.         Zack Hobbs MSN, FNP-BC

## 2017-02-02 NOTE — PROGRESS NOTES
1. Have you been to the ER, urgent care clinic since your last visit? Hospitalized since your last visit? No    2. Have you seen or consulted any other health care providers outside of the [address] [place] [address] since your last visit? Include any pap smears or colon screening.  No

## 2017-02-02 NOTE — MR AVS SNAPSHOT
Visit Information Date & Time Provider Department Dept. Phone Encounter #  
 2/2/2017  2:30 PM Gene Woodard NP [facility] [id number] Your Appointments 2/9/2017 11:30 AM  
Follow Up with Gene Woodard NP [facility] ([facility]) Appt Note: 1 week f/u CHF  
 [facility] [address] [address] [id number] [phone] [place] [phone]  
  
    
 3/1/2017  8:00 AM  
ANTICOAG NURSE with Pt Inr Hv Csi [facility] ([facility]) Appt Note: 4 week INR  
 [facility] [address] [address] [id number] [phone] [address] [facility] [address] 4/12/2017  1:20 PM  
CARELINK with Pacer Hv Csi [facility] ([facility] Appt Note: 2; r/s appt. ..sb  
 [facility] [address] [address] [phone] [address] [facility] [address] [address]  
  
    
 8/22/2017  8:20 AM  
Follow Up with Vero Tellez MD  
[facility] ([facility] Appt Note: 6-9 month f/up [facility] [address] [address] [phone] [address] Upcoming Health Maintenance Date Due Hepatitis C Screening 1945 DTaP/Tdap/Td series (1 - Tdap) 8/21/1967 FOBT Q 1 YEAR AGE 50-75 8/21/1996 ZOSTER VACCINE AGE 60> 8/21/2006 GLAUCOMA SCREENING Q2Y 8/21/2011 MEDICARE YEARLY EXAM 8/21/2011 Allergies as of 2/2/2017  Review Complete On: 2/2/2017 By: Gene Woodard NP No Known Allergies Current Immunizations  Reviewed on 12/21/2016 Name Date Influenza Vaccine 11/9/2016, 10/2/2015 Pneumococcal Conjugate (PCV-13) 9/28/2015 Pneumococcal Polysaccharide (PPSV-23) 8/13/2012 Not reviewed this visit You Were Diagnosed With   
  
 Codes Comments Shortness of breath    -  Primary ICD-10-CM: R06.02 
ICD-9-CM: 786.05 Cardiomyopathy, nonischemic ([facility] [address]     ICD-10-CM: I42.9 ICD-9-CM: 425.4 Essential hypertension     ICD-10-CM: I10 
ICD-9-CM: 401.9 Acute on chronic systolic congestive heart failure (HCC)     ICD-10-CM: D45.18 ICD-9-CM: 428.23, 428.0 Vitals BP Pulse Height(growth percentile) Weight(growth percentile) SpO2 BMI  
 122/80 (!) 59 6' 1\" (1.854 m) 234 lb (106.1 kg) 98% 30.87 kg/m2 Smoking Status Never Smoker Vitals History BMI and BSA Data Body Mass Index Body Surface Area  
 30.87 kg/m 2 2.34 m 2 Preferred Pharmacy Pharmacy Name Phone [id number]- [address] Your Updated Medication List  
  
   
This list is accurate as of: 2/2/17  3:23 PM.  Always use your most recent med list.  
  
  
  
  
 allopurinol 300 mg tablet Commonly known as:  Cletaniya Marcus Take  by mouth daily. ASPIR-81 81 mg tablet Generic drug:  aspirin delayed-release Take 81 mg by mouth daily. carvedilol 25 mg tablet Commonly known as:  COREG  
TAKE 1 TABLET BY MOUTH TWICE A DAY WITH MEALS  
  
 cloNIDine HCl 0.2 mg tablet Commonly known as:  CATAPRES  
TAKE 1 TABLET BY MOUTH 3 TIMES A DAY  
  
 digoxin 0.125 mg tablet Commonly known as:  LANOXIN  
TAKE 1 TABLET BY MOUTH EVERY DAY  
  
 dronabinol 5 mg capsule Commonly known as:  Lenon Hunter Take 1 Cap by mouth two (2) times a day. Max Daily Amount: 10 mg.  
  
 furosemide 40 mg tablet Commonly known as:  LASIX Take 1 Tab by mouth two (2) times a day. Or directed IRON (DRIED) PO Take 325 mg by mouth daily. lisinopril 20 mg tablet Commonly known as:  PRINIVIL, ZESTRIL  
TAKE ONE TABLET BY MOUTH TWICE DAILY magnesium oxide 400 mg tablet Commonly known as:  MAG-OX Take 1 Tab by mouth daily. metOLazone 2.5 mg tablet Commonly known as:  Iesha Pierson Take 1 Tab by mouth as needed. oxyCODONE-acetaminophen 5-325 mg per tablet Commonly known as:  PERCOCET Take 1 Tab by mouth every four (4) hours as needed for Pain. Max Daily Amount: 6 Tabs. potassium chloride 20 mEq tablet Commonly known as:  K-DUR, KLOR-CON Take 1 Tab by mouth two (2) times a day. Or as directed  
  
 warfarin 2.5 mg tablet Commonly known as:  COUMADIN Take 1 Tab by mouth daily. Or as directed by our office ZOLOFT 25 mg tablet Generic drug:  sertraline Take  by mouth daily. To-Do List   
 Around 02/02/2017 Lab:  METABOLIC PANEL, BASIC Around 02/02/2017 Lab:  PRO-BNP Around 02/08/2017 Lab:  METABOLIC PANEL, BASIC Around 02/08/2017 Lab:  PRO-BNP   
  
 02/22/2017 8:30 AM  
  Appointment with [facility] FAST TRACK NURSE at [facility] OP INFUSION Patient Instructions Lasix 80mg (all at one time) for 3 days only and then go back to 40mg twice a day Increase potassium chloride 20meq- take 2 tablets twice a day for 3 days only and then back to 20meq (1 tablet) twice a day Metolazone 2.5mg - take 1 tablet 30 minutes prior to the lasix 80mg for 3 days only and then as directed only Follow-up with Lisette Garcia in one week BMP and NT pro-BNP today BMP and NT pro-BNP 1 day prior to returning for follow-up Weigh daily and record Limit sodium intake to 2000mg per day Limit fluid intake to no more than  6, eight ounce glasses of any type of fluids per day Call if you notice sudden or progressive weight gain (3-5 pounds in 2-3 days), increasing shortness of breath, abdominal bloating, increasing lower extremity edema, inability to lie flat or on your normal number of pillows, having to sit up to catch your breath, fatigue, increased somnolence (sleeping more), poor appetite Introducing [facility]! Radha Youngblood introduces [employer] patient portal. Now you can access parts of your medical record, email your doctor's office, and request medication refills online. 1. In your internet browser, go to [email or web address] 2. Click on the First Time User? Click Here link in the Sign In box. You will see the New Member Sign Up page. 3. Enter your [employer] Access Code exactly as it appears below. You will not need to use this code after youve completed the sign-up process. If you do not sign up before the expiration date, you must request a new code. · [employer] Access Code: [email or web address] Expires: 2/28/2017  9:08 AM 
 
4. Enter the last four digits of your Social Security Number (xxxx) and Date of Birth (mm/dd/yyyy) as indicated and click Submit. You will be taken to the next sign-up page. 5. Create a [employer] ID. This will be your [employer] login ID and cannot be changed, so think of one that is secure and easy to remember. 6. Create a [employer] password. You can change your password at any time. 7. Enter your Password Reset Question and Answer. This can be used at a later time if you forget your password. 8. Enter your e-mail address. You will receive e-mail notification when new information is available in [address] 9. Click Sign Up. You can now view and download portions of your medical record. 10. Click the Download Summary menu link to download a portable copy of your medical information. If you have questions, please visit the Frequently Asked Questions section of the [employer] website. Remember, [employer] is NOT to be used for urgent needs. For medical emergencies, dial 911. Now available from your iPhone and Android! Please provide this summary of care documentation to your next provider. Your primary care clinician is listed as Doris Pastor. If you have any questions after today's visit, please call [phone].

## 2017-02-08 ENCOUNTER — HOSPITAL ENCOUNTER (OUTPATIENT)
Dept: LAB | Age: 72
Discharge: HOME | End: 2017-02-08
Payer: MEDICARE

## 2017-02-08 LAB
ANION GAP BLD CALC-SCNC: 6 MMOL/L (ref 3–18)
BNP SERPL-MCNC: ABNORMAL PG/ML (ref 0–900)
BUN SERPL-MCNC: 18 MG/DL (ref 7–18)
BUN/CREAT SERPL: 18 (ref 12–20)
CALCIUM SERPL-MCNC: 8.7 MG/DL (ref 8.5–10.1)
CHLORIDE SERPL-SCNC: 98 MMOL/L (ref 100–108)
CO2 SERPL-SCNC: 35 MMOL/L (ref 21–32)
CREAT SERPL-MCNC: 0.99 MG/DL (ref 0.6–1.3)
GLUCOSE SERPL-MCNC: 113 MG/DL (ref 74–99)
POTASSIUM SERPL-SCNC: 3.6 MMOL/L (ref 3.5–5.5)
SODIUM SERPL-SCNC: 139 MMOL/L (ref 136–145)

## 2017-02-08 PROCEDURE — 83880 ASSAY OF NATRIURETIC PEPTIDE: CPT | Performed by: NURSE PRACTITIONER

## 2017-02-08 PROCEDURE — 80048 BASIC METABOLIC PNL TOTAL CA: CPT | Performed by: NURSE PRACTITIONER

## 2017-02-09 ENCOUNTER — OFFICE VISIT (OUTPATIENT)
Dept: CARDIOLOGY CLINIC | Age: 72
End: 2017-02-09

## 2017-02-09 VITALS
HEART RATE: 67 BPM | DIASTOLIC BLOOD PRESSURE: 78 MMHG | OXYGEN SATURATION: 99 % | BODY MASS INDEX: 28.49 KG/M2 | HEIGHT: 73 IN | WEIGHT: 215 LBS | SYSTOLIC BLOOD PRESSURE: 122 MMHG

## 2017-02-09 DIAGNOSIS — I42.8 CARDIOMYOPATHY, NONISCHEMIC (HCC): ICD-10-CM

## 2017-02-09 DIAGNOSIS — I10 ESSENTIAL HYPERTENSION: ICD-10-CM

## 2017-02-09 DIAGNOSIS — I48.0 PAROXYSMAL ATRIAL FIBRILLATION (HCC): ICD-10-CM

## 2017-02-09 DIAGNOSIS — R06.02 SHORTNESS OF BREATH: ICD-10-CM

## 2017-02-09 DIAGNOSIS — I50.23 ACUTE ON CHRONIC SYSTOLIC CONGESTIVE HEART FAILURE (HCC): Primary | ICD-10-CM

## 2017-02-09 RX ORDER — POTASSIUM CHLORIDE 20 MEQ/1
20 TABLET, EXTENDED RELEASE ORAL 2 TIMES DAILY
Qty: 1 TAB | Refills: 0
Start: 2017-02-09 | End: 2017-03-03 | Stop reason: SDUPTHER

## 2017-02-09 RX ORDER — METOLAZONE 2.5 MG/1
2.5 TABLET ORAL
COMMUNITY
End: 2017-03-03 | Stop reason: SDUPTHER

## 2017-02-09 RX ORDER — POLYETHYLENE GLYCOL 3350 17 G/17G
17 POWDER, FOR SOLUTION ORAL DAILY
COMMUNITY
End: 2018-07-06

## 2017-02-09 NOTE — PROGRESS NOTES
1. Have you been to the ER, urgent care clinic since your last visit? Hospitalized since your last visit? No    2. Have you seen or consulted any other health care providers outside of the [address] [place] [address] since your last visit? Include any pap smears or colon screening.  No

## 2017-02-09 NOTE — PATIENT INSTRUCTIONS
Take Metolazone 2.5mg - 30 minutes prior to morning dose of lasix on Mon-Wed-Fri  On the days that you take metolazone, take 2 tablets of potassium twice a day, on the other days take 1 tablet twice a day  All other medications to remain the same  BMP and NT pro-BNP one day prior to follow-up  Follow-up in one week with Zenobia Rock  Follow-up with Dr. Pruett Her as scheduled and as needed  Weigh daily and record  Limit sodium intake to 2000mg per day  Limit fluid intake to no more than  6, eight ounce glasses of any type of fluids per day  Call if you notice sudden or progressive weight gain (3-5 pounds in 2-3 days), increasing shortness of breath, abdominal bloating, increasing lower extremity edema, inability to lie flat or on your normal number of pillows, having to sit up to catch your breath, fatigue, increased somnolence (sleeping more), poor appetite

## 2017-02-09 NOTE — MR AVS SNAPSHOT
Visit Information Date & Time Provider Department Dept. Phone Encounter #  
 2/9/2017 11:30 AM Ernesto Castelan NP [facility] [id number] Your Appointments 2/16/2017  9:00 AM  
Follow Up with Ernesto Castelan NP [facility] ([address] [place] [address]) Appt Note: 1 week f/u CHF  
 [facility] [address] [address] [id number] [phone] [phone]  
  
    
 3/1/2017  8:00 AM  
ANTICOAG NURSE with Pt Inr Hv Csi [facility] ([address] [place] [address]) Appt Note: 4 week INR  
 [facility] [address] [address] [address] [phone] 2300 [facility] [address] 4/12/2017  1:20 PM  
CARELINK with Pacer Hv Csi [facility] 1 ([address] [place] [address]) Appt Note: 2; r/s appt. ..sb  
 [facility] [address] [address] [address]  
[phone] 2300 [facility] [address] [address]  
  
    
 8/22/2017  8:20 AM  
Follow Up with Dianne Vargas MD  
[facility] [place] [address] ([address] [place] [address]) Appt Note: 6-9 month f/up [facility] [address] [address] [address]  
[phone] 2300 [facility] [address] Upcoming Health Maintenance Date Due Hepatitis C Screening 1945 DTaP/Tdap/Td series (1 - Tdap) 8/21/1967 FOBT Q 1 YEAR AGE 50-75 8/21/1996 ZOSTER VACCINE AGE 60> 8/21/2006 GLAUCOMA SCREENING Q2Y 8/21/2011 MEDICARE YEARLY EXAM 8/21/2011 Allergies as of 2/9/2017  Review Complete On: 2/9/2017 By: Ernesto Castelan NP No Known Allergies Current Immunizations  Reviewed on 12/21/2016 Name Date Influenza Vaccine 11/9/2016, 10/2/2015 Pneumococcal Conjugate (PCV-13) 9/28/2015 Pneumococcal Polysaccharide (PPSV-23) 8/13/2012 Not reviewed this visit You Were Diagnosed With   
  
 Codes Comments Acute on chronic systolic congestive heart failure ([facility] [address]    -  Primary ICD-10-CM: F27.09 ICD-9-CM: 428.23, 428.0 Essential hypertension     ICD-10-CM: I10 
ICD-9-CM: 401.9 Cardiomyopathy, nonischemic ([facility] [address]     ICD-10-CM: I42.9 ICD-9-CM: 425.4 Paroxysmal atrial fibrillation (HCC)     ICD-10-CM: I48.0 ICD-9-CM: 427.31 Shortness of breath     ICD-10-CM: R06.02 
ICD-9-CM: 786.05 Vitals BP Pulse Height(growth percentile) Weight(growth percentile) SpO2 BMI  
 122/78 67 6' 1\" (1.854 m) 215 lb (97.5 kg) 99% 28.37 kg/m2 Smoking Status Never Smoker Vitals History BMI and BSA Data Body Mass Index Body Surface Area  
 28.37 kg/m 2 2.24 m 2 Preferred Pharmacy Pharmacy Name Phone [id number]- [facility], [address] [place] [address] Your Updated Medication List  
  
   
This list is accurate as of: 2/9/17 12:21 PM.  Always use your most recent med list.  
  
  
  
  
 allopurinol 300 mg tablet Commonly known as:  Erendira Segura Take  by mouth daily. ASPIR-81 81 mg tablet Generic drug:  aspirin delayed-release Take 81 mg by mouth daily. carvedilol 25 mg tablet Commonly known as:  COREG  
TAKE 1 TABLET BY MOUTH TWICE A DAY WITH MEALS  
  
 cloNIDine HCl 0.2 mg tablet Commonly known as:  CATAPRES  
TAKE 1 TABLET BY MOUTH 3 TIMES A DAY  
  
 digoxin 0.125 mg tablet Commonly known as:  LANOXIN  
TAKE 1 TABLET BY MOUTH EVERY DAY  
  
 furosemide 40 mg tablet Commonly known as:  LASIX Take 1 Tab by mouth two (2) times a day. Or directed IRON (DRIED) PO Take 325 mg by mouth daily. lisinopril 20 mg tablet Commonly known as:  PRINIVIL, ZESTRIL  
TAKE ONE TABLET BY MOUTH TWICE DAILY  
  
 magnesium oxide 400 mg tablet Commonly known as:  MAG-OX Take 1 Tab by mouth daily. metOLazone 2.5 mg tablet Commonly known as:  Rashi Ivy Take 2.5 mg by mouth every Monday, Wednesday, Friday. Take 30 minutes prior to morning Lasix MIRALAX 17 gram packet Generic drug:  polyethylene glycol Take 17 g by mouth daily. oxyCODONE-acetaminophen 5-325 mg per tablet Commonly known as:  PERCOCET Take 1 Tab by mouth every four (4) hours as needed for Pain. Max Daily Amount: 6 Tabs. potassium chloride 20 mEq tablet Commonly known as:  K-DUR, KLOR-CON Take 1 Tab by mouth two (2) times a day. Or as directed  
  
 warfarin 2.5 mg tablet Commonly known as:  COUMADIN Take 1 Tab by mouth daily. Or as directed by our office ZOLOFT 25 mg tablet Generic drug:  sertraline Take  by mouth daily. To-Do List   
 Around 02/15/2017 Lab:  METABOLIC PANEL, BASIC Around 02/15/2017 Lab:  PRO-BNP   
  
 02/22/2017 8:30 AM  
  [facility] Patient Instructions Take Metolazone 2.5mg - 30 minutes prior to morning dose of lasix on Mon-Wed-Fri On the days that you take metolazone, take 2 tablets of potassium twice a day, on the other days take 1 tablet twice a day All other medications to remain the same BMP and NT pro-BNP one day prior to follow-up Follow-up in one week with Micheline Mina Follow-up with Dr. Leida Hernandez as scheduled and as needed Weigh daily and record Limit sodium intake to 2000mg per day Limit fluid intake to no more than  6, eight ounce glasses of any type of fluids per day Call if you notice sudden or progressive weight gain (3-5 pounds in 2-3 days), increasing shortness of breath, abdominal bloating, increasing lower extremity edema, inability to lie flat or on your normal number of pillows, having to sit up to catch your breath, fatigue, increased somnolence (sleeping more), poor appetite Introducing [facility]! [facility] introduces [employer] patient portal. Now you can access parts of your medical record, email your doctor's office, and request medication refills online. 1. In your internet browser, go to [email or web address] 2. Click on the First Time User? Click Here link in the Sign In box. You will see the New Member Sign Up page. 3. Enter your [employer] Access Code exactly as it appears below. You will not need to use this code after youve completed the sign-up process. If you do not sign up before the expiration date, you must request a new code. · [employer] Access Code: [email or web address] Expires: 2/28/2017  9:08 AM 
 
4. Enter the last four digits of your Social Security Number (xxxx) and Date of Birth (mm/dd/yyyy) as indicated and click Submit. You will be taken to the next sign-up page. 5. Create a [employer] ID. This will be your [employer] login ID and cannot be changed, so think of one that is secure and easy to remember. 6. Create a [employer] password. You can change your password at any time. 7. Enter your Password Reset Question and Answer. This can be used at a later time if you forget your password. 8. Enter your e-mail address. You will receive e-mail notification when new information is available in [address] 9. Click Sign Up. You can now view and download portions of your medical record. 10. Click the Download Summary menu link to download a portable copy of your medical information. If you have questions, please visit the Frequently Asked Questions section of the [employer] website. Remember, [employer] is NOT to be used for urgent needs. For medical emergencies, dial 911. Now available from your iPhone and Android! Please provide this summary of care documentation to your next provider. Your primary care clinician is listed as Marshal Ceja. If you have any questions after today's visit, please call [phone].

## 2017-02-09 NOTE — PROGRESS NOTES
Jean Pierre Ontiveros presents today for follow-up complaints of shortness of breath and lower extremity edema. When seen last week, he was noted to be up 11 pounds since his previous visit with Dr. Ying Lee on January 3, 2017. He was instructed to take metolazone 2.5 mg daily for 3 days. He is to take each dose 30 minutes before his morning dose of Lasix 80 mg daily. After the 3 days, he was to resume his previous dose of 40 mg of Lasix twice daily. He was also instructed to increase his potassium supplementation to 40 mEq twice daily for 3 days and then afterwards resume 20 mEq twice daily. His NT proBNP done on that day was 19,896, BUN 17, creatinine 1.0, potassium 4.0. He was last seen by Dr. Ying Lee on January 3, 2017. It appears that during that visit, he was doing well. His weight at that time was 223 pounds. In October 2016, I had treated him for decompensated congestive heart failure and he diuresed a total of 54 pounds over several weeks. He was given several doses (total of 6 doses) of metolazone 2.5 mg over a 2 week period of time. His weight during his October 18, 2016 visit was down to 176 pounds and he was very comfortable at that weight. It is unclear whether or not he continued daily weights as it appears that his weight has been increasing progressively as has his lower extremity edema but it does not appear that he mentioned this to Dr. Ying Lee when he was seen for follow-up in early January. He is a 80year old male with history of atrial fibrillation, pulmonary hypertension, hypertension, and non-ischemic cardiomyopathy (s/p AICD for primary prevention of sudden cardiac death). 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 It was successfully removed in April 2016 and his understanding is that the edges were clear. He states that he has not recovered fully from this as he has not been able to put his full weight on the right leg.    When seen by  Jimmie Alicea on 9/28/16, he exhibited signs of heart failure as evidenced by generalized edema in both upper and lower extremities and scrotal edema. He also complained of dyspnea with any exertion. He was instructed to take Lasix 80mg BID and potassium 40meq daily for 3 days and then return for follow-up. Because of his marked edema, non-invasive duplex studies were requested of the upper and lower extremities and an echo was also requested. The lower extremity duplex study was completed and it was negative for DVT. The echo was completed on 9/30/16, and it showed that his EF is down to 15%, severe tricuspid regurgitation, and estimated peak pressure was in the range of 80 mmHg to 85 mmHg. He had a limited echo done in March of this year and at that time, his EF was 35-40%. His last full echo was done in Sept. 2015, and his EF at that time was 45-50% and his RVSP was 45-50 mmHg. He had a MUGA scan done on 11/2/15 and it showed an EF of 45 to 46% and no wall motion abnormalities. Today, he states that he is feeling markedly better. The shortness of breath has resolved and the lower extremity edema is improving. He states that the scrotal edema has markedly improved. He denies chest pain, tightness, heaviness, and palpitations. He denies shortness of breath at rest, SHRESTHA, denies orthopnea and PND. He denies abdominal bloating. He denies lightheadedness, dizziness, and syncope. He denies claudication. Denies nausea, vomiting, diarrhea, fever, chills. Admits to scrotal edema. PMH:  Past Medical History   Diagnosis Date    AICD (automatic cardioverter/defibrillator) present     Atrial fibrillation ([address] 6/8/2010    Cancer ([facility] [address]     Cardiac cath 02/11/2009     Patent coronary arteries. LVEDP 28.  EF 35%. Global hyk. Mod MR.  Cardiac echocardiogram 03/01/2016     LVE. EF 35-40% (prev 45-50% on study of 9/14/15). Diffuse hypk. Indeterminate diastolic fx. Mild MR. Mod AI. Mild TR.       Cardiac MUGA scan 11/02/2015     At most mild LVE. EF 45-46%.  Cardiac nuclear imaging test, abnormal 02/03/2009     Mild basal/mid inferior, inferoapical, inferoseptal infarct w/mild ischemia in RCA (possibly partially artifact). Anterior, anteroseptal, anterapical ischemia w/o infarct. (Mid & distal LAD.)  LVE. EF 29%. Mod global hyk.  Cardiovascular renal duplex 06/07/2010     No evidence of renal artery stenosis >60%. Patent SMA and celiac artery.  HTN (hypertension) 6/8/2010    Hypertensive cardiovascular disease     MR (mitral regurgitation)     Nonischemic cardiomyopathy      3/11 echo EF 25%    Pulmonary hypertension, moderate to severe ([address] 6/8/2010     90-100mmHg; repeat echo in 3/11 showed 70mmHg       PSH:  Past Surgical History   Procedure Laterality Date    Hx hernia repair  2011    Hx heart valve surgery  2011       MEDS:  Current Outpatient Prescriptions   Medication Sig    furosemide (LASIX) 40 mg tablet Take 1 Tab by mouth two (2) times a day. Or directed    potassium chloride (K-DUR, KLOR-CON) 20 mEq tablet Take 1 Tab by mouth two (2) times a day. Or as directed    metOLazone (ZAROXOLYN) 2.5 mg tablet Take 1 Tab by mouth daily. For 3 days and then as directed    carvedilol (COREG) 25 mg tablet TAKE 1 TABLET BY MOUTH TWICE A DAY WITH MEALS    lisinopril (PRINIVIL, ZESTRIL) 20 mg tablet TAKE ONE TABLET BY MOUTH TWICE DAILY    magnesium oxide (MAG-OX) 400 mg tablet Take 1 Tab by mouth daily.  cloNIDine HCl (CATAPRES) 0.2 mg tablet TAKE 1 TABLET BY MOUTH 3 TIMES A DAY    digoxin (LANOXIN) 0.125 mg tablet TAKE 1 TABLET BY MOUTH EVERY DAY    dronabinol (MARINOL) 5 mg capsule Take 1 Cap by mouth two (2) times a day. Max Daily Amount: 10 mg.    oxyCODONE-acetaminophen (PERCOCET) 5-325 mg per tablet Take 1 Tab by mouth every four (4) hours as needed for Pain. Max Daily Amount: 6 Tabs.  warfarin (COUMADIN) 2.5 mg tablet Take 1 Tab by mouth daily.  Or as directed by our office    sertraline (ZOLOFT) 25 mg tablet Take  by mouth daily.  allopurinol (ZYLOPRIM) 300 mg tablet Take  by mouth daily.  FERROUS SULFATE, DRIED (IRON, DRIED, PO) Take 325 mg by mouth daily.  aspirin delayed-release (ASPIR-81) 81 mg tablet Take 81 mg by mouth daily. No current facility-administered medications for this visit. Allergies and Sensitivities:  No Known Allergies    Family History:  Family History   Problem Relation Age of Onset    Hypertension Mother        Social History:  He  reports that he has never smoked. He has never used smokeless tobacco.  He  reports that he drinks alcohol. Physical:  Visit Vitals    /78    Pulse 67    Ht 6' 1\" (1.854 m)    Wt 97.5 kg (215 lb)    SpO2 99%    BMI 28.37 kg/m2       He is down 19 pounds since his last appointment (on 2/2/17). Exam:  Neck:  Supple, no JVD, no carotid bruits  CV:  Normal S1 and  S2, grade I/VI soft JACQUIE noted, no rubs or gallops noted. Irregularly, irregular rhythm  Lungs:  Clear to ausculation throughout, no wheezes or rales  Abd:  Soft, non-tender, distended with good bowel sounds. No hepatosplenomegaly  Extremities:  2+ pitting lower extremity edema from his thighs to his feet      Data:  EKG:  Not done today      LABS:  Lab Results   Component Value Date/Time    Sodium 139 02/08/2017 08:26 AM    Potassium 3.6 02/08/2017 08:26 AM    Chloride 98 02/08/2017 08:26 AM    CO2 35 02/08/2017 08:26 AM    Glucose 113 02/08/2017 08:26 AM    BUN 18 02/08/2017 08:26 AM    Creatinine 0.99 02/08/2017 08:26 AM     Lab Results   Component Value Date/Time    Cholesterol, total 85 01/23/2016 05:30 AM    HDL Cholesterol 31 01/23/2016 05:30 AM    LDL, calculated 34 01/23/2016 05:30 AM    Triglyceride 100 01/23/2016 05:30 AM    CHOL/HDL Ratio 2.7 01/23/2016 05:30 AM     Lab Results   Component Value Date/Time    ALT (SGPT) 27 07/27/2016 10:49 AM       Impression / Plan:  1.   Atrial fibrillation, rate controlled and anticoagulated with Coumadin  2. Hypertension, blood pressure well-controlled  3. Pulmonary hypertension, RVSP 80-85 mmHg (by echo Sept. 2016)  4. Non-ischemic cardiomyopathy, EF 45% (by MUGA scan), s/p AICD implant   5. 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 Acute on chronic systolic heart failure, improving    Mr. Gualberto Mora was seen today for follow-up after being instructed to take metolazone 2.5 mg daily for 3 days, 30 minutes prior to taking Lasix 80 mg daily. After the 3 days is complete, he was to resume his previous dose of 40 mg of Lasix twice daily. He was also instructed to increase his potassium supplementation to 40 mEq twice daily for 3 days and then afterwards resume 20 mEq twice daily. He had a BMP and NT proBNP done that day and his NT pro-BNP was 19,896, renal function and electrolytes were stable and within normal limits. He had a repeat BMP and NT pro-BNP done yesterday and the NT pro-BNP was down to 11,413 and renal function and electrolytes remain stable and within normal limits. His blood pressure and heart rate are stable. His lungs are clear and he continues to exhibit at lower extremity edema but this has improved. He still exhibits edema from his thighs to his feet. He states that the abdominal bloating and shortness of breath have resolved. The lower extremity edema and scrotal edema are improving. His weight is down 19 pounds since his last visit. He was instructed to take metolazone 2.5 mg, 30 minutes before his morning dose of Lasix on Mondays Wednesdays and Fridays. On the days that he takes metolazone, he is to take 40 mEq of potassium twice a day. On all other days, he is to take Lasix 40 mg twice daily potassium chloride 20 mEq twice daily. He will return for follow-up with me in 1 week and he was given another lab requisition to have another BMP and NT proBNP done today prior to returning for follow-up.      Congestive heart failure teaching reinforced today. Advised to limit sodium intake to no more than 2000mg per day and to also watch fluid intake. Advised to weigh daily every morning and record weights. Instructed to call our office if progressive weight gain is noted over a 2 to 3 day period of time, shortness of breath increases, or if abdominal bloating, nausea, fatigue, or increased lower extremity edema is noted. His last echocardiogram was done in September 2016. His EF had markedly diminished since the limited echo done in March 2016. His EF was down to 15%, from 35-40%. His RVSP is much more elevated at 80-85 mmHg. He will follow-up with Dr. Mariah Hull as scheduled and PRN. He will follow-up in the device clinic and Coumadin clinic as scheduled.         Cami Keen MSN, FNP-BC

## 2017-02-15 ENCOUNTER — HOSPITAL ENCOUNTER (OUTPATIENT)
Dept: LAB | Age: 72
Discharge: HOME | End: 2017-02-15
Payer: MEDICARE

## 2017-02-15 DIAGNOSIS — R06.02 SHORTNESS OF BREATH: ICD-10-CM

## 2017-02-15 DIAGNOSIS — I10 ESSENTIAL HYPERTENSION: ICD-10-CM

## 2017-02-15 DIAGNOSIS — I50.23 ACUTE ON CHRONIC SYSTOLIC CONGESTIVE HEART FAILURE (HCC): ICD-10-CM

## 2017-02-15 DIAGNOSIS — I42.8 CARDIOMYOPATHY, NONISCHEMIC (HCC): ICD-10-CM

## 2017-02-15 LAB
ANION GAP BLD CALC-SCNC: 8 MMOL/L (ref 3–18)
BNP SERPL-MCNC: ABNORMAL PG/ML (ref 0–900)
BUN SERPL-MCNC: 20 MG/DL (ref 7–18)
BUN/CREAT SERPL: 21 (ref 12–20)
CALCIUM SERPL-MCNC: 9 MG/DL (ref 8.5–10.1)
CHLORIDE SERPL-SCNC: 98 MMOL/L (ref 100–108)
CO2 SERPL-SCNC: 32 MMOL/L (ref 21–32)
CREAT SERPL-MCNC: 0.97 MG/DL (ref 0.6–1.3)
GLUCOSE SERPL-MCNC: 100 MG/DL (ref 74–99)
POTASSIUM SERPL-SCNC: 3.6 MMOL/L (ref 3.5–5.5)
SODIUM SERPL-SCNC: 138 MMOL/L (ref 136–145)

## 2017-02-15 PROCEDURE — 80048 BASIC METABOLIC PNL TOTAL CA: CPT | Performed by: NURSE PRACTITIONER

## 2017-02-15 PROCEDURE — 36415 COLL VENOUS BLD VENIPUNCTURE: CPT | Performed by: NURSE PRACTITIONER

## 2017-02-15 PROCEDURE — 83880 ASSAY OF NATRIURETIC PEPTIDE: CPT | Performed by: NURSE PRACTITIONER

## 2017-02-16 ENCOUNTER — OFFICE VISIT (OUTPATIENT)
Dept: CARDIOLOGY CLINIC | Age: 72
End: 2017-02-16

## 2017-02-16 VITALS
SYSTOLIC BLOOD PRESSURE: 130 MMHG | BODY MASS INDEX: 26.9 KG/M2 | HEIGHT: 73 IN | OXYGEN SATURATION: 98 % | WEIGHT: 203 LBS | HEART RATE: 64 BPM | DIASTOLIC BLOOD PRESSURE: 84 MMHG

## 2017-02-16 DIAGNOSIS — Z79.01 LONG TERM CURRENT USE OF ANTICOAGULANT THERAPY: ICD-10-CM

## 2017-02-16 DIAGNOSIS — I10 ESSENTIAL HYPERTENSION: ICD-10-CM

## 2017-02-16 DIAGNOSIS — I50.23 ACUTE ON CHRONIC SYSTOLIC CONGESTIVE HEART FAILURE (HCC): Primary | ICD-10-CM

## 2017-02-16 DIAGNOSIS — I48.19 PERSISTENT ATRIAL FIBRILLATION (HCC): ICD-10-CM

## 2017-02-16 DIAGNOSIS — R06.02 SHORTNESS OF BREATH: ICD-10-CM

## 2017-02-16 DIAGNOSIS — I42.8 CARDIOMYOPATHY, NONISCHEMIC (HCC): ICD-10-CM

## 2017-02-16 LAB
INR BLD: 2
PT POC: 23.4 SEC
VALID INTERNAL CONTROL?: YES

## 2017-02-16 NOTE — PATIENT INSTRUCTIONS
Continue metolazone 2.5mg, 3 times a week on M-W-F  Continue potassium chloride 40meq twice a day on M-W-F  Continue furosemide 40mg twice a day  Continue potassium chloride 20meq twice a day on all other days  Follow-up with Wilberto Bates in 2 weeks  BMP and NT pro-BNP to be done prior to returning for follow-up  INR to be done with next follow-up  Weigh daily and record  Limit sodium intake to 2000mg per day  Limit fluid intake to no more than  6, eight ounce glasses of any type of fluids per day  Call if you notice sudden or progressive weight gain (3-5 pounds in 2-3 days), increasing shortness of breath, abdominal bloating, increasing lower extremity edema, inability to lie flat or on your normal number of pillows, having to sit up to catch your breath, fatigue, increased somnolence (sleeping more), poor appetite

## 2017-02-16 NOTE — PROGRESS NOTES
1. Have you been to the ER, urgent care clinic since your last visit? Hospitalized since your last visit? No    2. Have you seen or consulted any other health care providers outside of the [address] [place] [address] since your last visit? Include any pap smears or colon screening.  No

## 2017-02-16 NOTE — MR AVS SNAPSHOT
Visit Information Date & Time Provider Department Dept. Phone Encounter #  
 2/16/2017  9:00 AM Coral Calderon NP [facility] [address] [id number] Your Appointments 3/3/2017  9:00 AM  
Follow Up with Coral Calderon NP [facility] ([address] [place] [address]) Appt Note: 2 week f/u CHF; also needs INR  
 [address] [place] [address] [address] [address] [phone] 2300 [facility] [address] 4/12/2017  1:20 PM  
CARELINK with Krista Harding Csi [facility] ([address] [place] [address]) Appt Note: 2; r/s appt. ..sb  
 [place] [address] [address] [phone] 2300 [facility] [address] [address]  
  
    
 8/22/2017  8:20 AM  
Follow Up with Grey Serrano MD  
[facility] ([address] [place] [address]) Appt Note: 6-9 month f/up [place] [address] [address] [address] [phone] 2300 [facility] [address] Upcoming Health Maintenance Date Due Hepatitis C Screening 1945 DTaP/Tdap/Td series (1 - Tdap) 8/21/1967 FOBT Q 1 YEAR AGE 50-75 8/21/1996 ZOSTER VACCINE AGE 60> 8/21/2006 GLAUCOMA SCREENING Q2Y 8/21/2011 MEDICARE YEARLY EXAM 8/21/2011 Allergies as of 2/16/2017  Review Complete On: 2/16/2017 By: Corla Calderon NP No Known Allergies Current Immunizations  Reviewed on 12/21/2016 Name Date Influenza Vaccine 11/9/2016, 10/2/2015 Pneumococcal Conjugate (PCV-13) 9/28/2015 Pneumococcal Polysaccharide (PPSV-23) 8/13/2012 Not reviewed this visit You Were Diagnosed With   
  
 Codes Comments Acute on chronic systolic congestive heart failure ([facility] [address]    -  Primary ICD-10-CM: S86.37 ICD-9-CM: 428.23, 428.0 Persistent atrial fibrillation (HCC)     ICD-10-CM: I48.1 ICD-9-CM: 427.31 Essential hypertension     ICD-10-CM: I10 
ICD-9-CM: 401.9 Cardiomyopathy, nonischemic ([facility] [address]     ICD-10-CM: I42.9 ICD-9-CM: 425.4 Long term current use of anticoagulant therapy     ICD-10-CM: Z79.01 
ICD-9-CM: V58.61 Shortness of breath     ICD-10-CM: R06.02 
ICD-9-CM: 786.05 Vitals BP Height(growth percentile) Weight(growth percentile) SpO2 BMI Smoking Status 130/84 6' 1\" (1.854 m) 203 lb (92.1 kg) 98% 26.78 kg/m2 Never Smoker BMI and BSA Data Body Mass Index Body Surface Area  
 26.78 kg/m 2 2.18 m 2 Preferred Pharmacy Pharmacy Name Phone [id number] Your Updated Medication List  
  
   
This list is accurate as of: 2/16/17  9:15 AM.  Always use your most recent med list.  
  
  
  
  
 allopurinol 300 mg tablet Commonly known as:  Gaylan Elders Take  by mouth daily. ASPIR-81 81 mg tablet Generic drug:  aspirin delayed-release Take 81 mg by mouth daily. carvedilol 25 mg tablet Commonly known as:  COREG  
TAKE 1 TABLET BY MOUTH TWICE A DAY WITH MEALS  
  
 cloNIDine HCl 0.2 mg tablet Commonly known as:  CATAPRES  
TAKE 1 TABLET BY MOUTH 3 TIMES A DAY  
  
 digoxin 0.125 mg tablet Commonly known as:  LANOXIN  
TAKE 1 TABLET BY MOUTH EVERY DAY  
  
 furosemide 40 mg tablet Commonly known as:  LASIX Take 1 Tab by mouth two (2) times a day. Or directed IRON (DRIED) PO Take 325 mg by mouth daily. lisinopril 20 mg tablet Commonly known as:  PRINIVIL, ZESTRIL  
TAKE ONE TABLET BY MOUTH TWICE DAILY  
  
 magnesium oxide 400 mg tablet Commonly known as:  MAG-OX Take 1 Tab by mouth daily. metOLazone 2.5 mg tablet Commonly known as:  Kris Ghee Take 2.5 mg by mouth every Monday, Wednesday, Friday. Take 30 minutes prior to morning Lasix MIRALAX 17 gram packet Generic drug:  polyethylene glycol Take 17 g by mouth daily. oxyCODONE-acetaminophen 5-325 mg per tablet Commonly known as:  PERCOCET Take 1 Tab by mouth every four (4) hours as needed for Pain. Max Daily Amount: 6 Tabs. potassium chloride 20 mEq tablet Commonly known as:  K-DUR, KLOR-CON Take 1 Tab by mouth two (2) times a day. And 2 tabs twice a day on metolazone days (M-W-F)  
  
 warfarin 2.5 mg tablet Commonly known as:  COUMADIN Take 1 Tab by mouth daily. Or as directed by our office ZOLOFT 25 mg tablet Generic drug:  sertraline Take  by mouth daily. Description Continue Coumadin 2.5mg daily except 1.25 mg on Tuesday (takes Coumadin in the morning) February 2017 Details Sun Mon Tue Wed Thu Fri Sat  
     1  
  
  
  
   2  
  
  
  
   3  
  
  
  
   4  
  
  
  
  
  5  
  
  
  
   6  
  
  
  
   7  
  
  
  
   8  
  
  
  
   9  
  
  
  
   10  
  
  
  
   11  
  
  
  
  
  12  
  
  
  
   13  
  
  
  
   14  
  
  
  
   15  
  
  
  
   16  
2.0  
2.5 mg  
See details 17  
  
2.5 mg  
  
   18  
  
2.5 mg  
  
  
  19  
  
2.5 mg  
  
   20  
  
2.5 mg  
  
   21  
  
1.25 mg  
  
   22  
  
2.5 mg  
  
   23  
  
2.5 mg  
  
   24  
  
2.5 mg  
  
   25  
  
2.5 mg  
  
  
  26  
  
2.5 mg  
  
   27  
  
2.5 mg  
  
   28  
  
1.25 mg Date Details 02/16 This INR check INR: 2.0 How to take your warfarin dose To take:  1.25 mg Take 0.5 of a 2.5 mg tablet. To take:  2.5 mg Take 1 of the 2.5 mg tablets. March 2017 [facility] Thu Fri Sat  
     1  
  
2.5 mg  
  
   2  
  
2.5 mg  
  
   3  
  
  
  
   4  
  
  
  
  
  5  
  
  
  
   6  
  
  
  
   7  
  
  
  
   8  
  
  
  
   9  
  
  
  
   10  
  
  
  
   11  
  
  
  
  
  12  
  
  
  
   13  
  
  
  
   14  
  
  
  
   15  
  
  
  
   16  
  
  
  
   17  
  
  
  
   18  
  
  
  
  
  19  
  
  
  
 20  
  
  
  
   21  
  
  
  
   22  
  
  
  
   23  
  
  
  
   24  
  
  
  
   25  
  
  
  
  
  26  
  
  
  
   27  
  
  
  
   28  
  
  
  
   29  
  
  
  
   30  
  
  
  
   31  
  
  
  
   
 Date Details No additional details Date of next INR:  3/2/2017 How to take your warfarin dose To take:  2.5 mg Take 1 of the 2.5 mg tablets. We Performed the Following AMB POC PT/INR [96364 CPT(R)] To-Do List   
 02/22/2017 8:30 AM  
  Appointment with [facility] FAST TRACK NURSE at [facility] OP INFUSION Around 03/02/2017 Lab:  METABOLIC PANEL, BASIC Around 03/02/2017 Lab:  PRO-BNP Patient Instructions Continue metolazone 2.5mg, 3 times a week on M-W-F Continue potassium chloride 40meq twice a day on M-W-F Continue furosemide 40mg twice a day Continue potassium chloride 20meq twice a day on all other days Follow-up with Jeronimo Mercado in 2 weeks BMP and NT pro-BNP to be done prior to returning for follow-up INR to be done with next follow-up Weigh daily and record Limit sodium intake to 2000mg per day Limit fluid intake to no more than  6, eight ounce glasses of any type of fluids per day Call if you notice sudden or progressive weight gain (3-5 pounds in 2-3 days), increasing shortness of breath, abdominal bloating, increasing lower extremity edema, inability to lie flat or on your normal number of pillows, having to sit up to catch your breath, fatigue, increased somnolence (sleeping more), poor appetite Introducing [facility]! Alana Vanessa introduces [employer] patient portal. Now you can access parts of your medical record, email your doctor's office, and request medication refills online. 1. In your internet browser, go to [email or web address] Shareholder. [employer]/Mobile Shareholder 2. Click on the First Time User? Click Here link in the Sign In box. You will see the New Member Sign Up page. 3. Enter your [employer] Access Code exactly as it appears below. You will not need to use this code after youve completed the sign-up process. If you do not sign up before the expiration date, you must request a new code. · [employer] Access Code: [email or web address] Expires: 2/28/2017  9:08 AM 
 
4. Enter the last four digits of your Social Security Number (xxxx) and Date of Birth (mm/dd/yyyy) as indicated and click Submit. You will be taken to the next sign-up page. 5. Create a [employer]ricity ID. This will be your [employer] login ID and cannot be changed, so think of one that is secure and easy to remember. 6. Create a [employer] password. You can change your password at any time. 7. Enter your Password Reset Question and Answer. This can be used at a later time if you forget your password. 8. Enter your e-mail address. You will receive e-mail notification when new information is available in [address] 9. Click Sign Up. You can now view and download portions of your medical record. 10. Click the Download Summary menu link to download a portable copy of your medical information. If you have questions, please visit the Frequently Asked Questions section of the [employer] website. Remember, [employer] is NOT to be used for urgent needs. For medical emergencies, dial 911. Now available from your iPhone and Android! Please provide this summary of care documentation to your next provider. Your primary care clinician is listed as Wilfred Barillas. If you have any questions after today's visit, please call [phone].

## 2017-02-16 NOTE — PROGRESS NOTES
Samantha Mcwilliams presents today for CHF follow-up after being instructed to take metolazone 2.5 mg, 30 minutes before his morning dose of Lasix on Mondays Wednesdays and Fridays. On the days that he takes metolazone, he is to take 40 mEq of potassium twice a day. On all other days, he is to take Lasix 40 mg twice daily potassium chloride 20 mEq twice daily. He was seen last week for CHF follow-up after being given 3 doses of metolazone for acute on chronic systolic heart failure. During that visit, he was down 19 pounds and had marked improvement of his symptoms. He is a 80year old male with history of atrial fibrillation, pulmonary hypertension, hypertension, and non-ischemic cardiomyopathy (s/p AICD for primary prevention of sudden cardiac death). 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 It was successfully removed in April 2016 and his understanding is that the edges were clear. He states that he has not recovered fully from this as he has not been able to put his full weight on the right leg. When seen by Dr. David Escalante on 9/28/16, he exhibited signs of heart failure as evidenced by generalized edema in both upper and lower extremities and scrotal edema. He also complained of dyspnea with any exertion. He was instructed to take Lasix 80mg BID and potassium 40meq daily for 3 days and then return for follow-up. Because of his marked edema, non-invasive duplex studies were requested of the upper and lower extremities and an echo was also requested. The lower extremity duplex study was completed and it was negative for DVT. The echo was completed on 9/30/16, and it showed that his EF is down to 15%, severe tricuspid regurgitation, and estimated peak pressure was in the range of 80 mmHg to 85 mmHg. He had a limited echo done in March of this year and at that time, his EF was 35-40%.   His last full echo was done in Sept. 2015, and his EF at that time was 45-50% and his RVSP was 45-50 mmHg. He had a MUGA scan done on 11/2/15 and it showed an EF of 45 to 46% and no wall motion abnormalities. Today, he states that he continues to feel well. The shortness of breath has resolved and the lower extremity edema continues to improve. He states that the scrotal edema has resolved. He denies chest pain, tightness, heaviness, and palpitations. He denies shortness of breath at rest, SHRESTHA, denies orthopnea and PND. He denies abdominal bloating. He denies lightheadedness, dizziness, and syncope. He denies claudication. Denies nausea, vomiting, diarrhea, fever, chills. Admits to scrotal edema. PMH:  Past Medical History   Diagnosis Date    AICD (automatic cardioverter/defibrillator) present     Atrial fibrillation ([facility] [address] 6/8/2010    Cancer ([facility] [address]     Cardiac cath 02/11/2009     Patent coronary arteries. LVEDP 28.  EF 35%. Global hyk. Mod MR.  Cardiac echocardiogram 09/29/2016     LVE. EF 15% at most.  Indeterminate LV diastolic fx.  RVE. Reduced RV systolic fx. RVSP 80-85 mmHg. LAE.  LUISA. Mod-severe MR. Mild AI. Severe TR.  Cardiac MUGA scan 11/02/2015     At most mild LVE. EF 45-46%.  Cardiac nuclear imaging test, abnormal 02/03/2009     Mild basal/mid inferior, inferoapical, inferoseptal infarct w/mild ischemia in RCA (possibly partially artifact). Anterior, anteroseptal, anterapical ischemia w/o infarct. (Mid & distal LAD.)  LVE. EF 29%. Mod global hyk.  Cardiovascular LE venous duplex 09/29/2016     Tech difficult. No DVT bilaterally.  Cardiovascular renal duplex 06/07/2010     No evidence of renal artery stenosis >60%. Patent SMA and celiac artery.  Cardiovascular UE venous duplex 10/04/2016     No DVT bilaterally.     HTN (hypertension) 6/8/2010    Hypertensive cardiovascular disease     MR (mitral regurgitation)     Nonischemic cardiomyopathy      3/11 echo EF 25%    Pulmonary hypertension, moderate to severe (HCC) 6/8/2010     90-100mmHg; repeat echo in 3/11 showed 70mmHg       PSH:  Past Surgical History   Procedure Laterality Date    Hx hernia repair  2011    Hx heart valve surgery  2011       MEDS:  Current Outpatient Prescriptions   Medication Sig    polyethylene glycol (MIRALAX) 17 gram packet Take 17 g by mouth daily.  metOLazone (ZAROXOLYN) 2.5 mg tablet Take 2.5 mg by mouth every Monday, Wednesday, Friday. Take 30 minutes prior to morning Lasix    potassium chloride (K-DUR, KLOR-CON) 20 mEq tablet Take 1 Tab by mouth two (2) times a day. And 2 tabs twice a day on metolazone days (M-W-F)    furosemide (LASIX) 40 mg tablet Take 1 Tab by mouth two (2) times a day. Or directed    carvedilol (COREG) 25 mg tablet TAKE 1 TABLET BY MOUTH TWICE A DAY WITH MEALS    lisinopril (PRINIVIL, ZESTRIL) 20 mg tablet TAKE ONE TABLET BY MOUTH TWICE DAILY    magnesium oxide (MAG-OX) 400 mg tablet Take 1 Tab by mouth daily.  cloNIDine HCl (CATAPRES) 0.2 mg tablet TAKE 1 TABLET BY MOUTH 3 TIMES A DAY    digoxin (LANOXIN) 0.125 mg tablet TAKE 1 TABLET BY MOUTH EVERY DAY    oxyCODONE-acetaminophen (PERCOCET) 5-325 mg per tablet Take 1 Tab by mouth every four (4) hours as needed for Pain. Max Daily Amount: 6 Tabs.  warfarin (COUMADIN) 2.5 mg tablet Take 1 Tab by mouth daily. Or as directed by our office    sertraline (ZOLOFT) 25 mg tablet Take  by mouth daily.  allopurinol (ZYLOPRIM) 300 mg tablet Take  by mouth daily.  FERROUS SULFATE, DRIED (IRON, DRIED, PO) Take 325 mg by mouth daily.  aspirin delayed-release (ASPIR-81) 81 mg tablet Take 81 mg by mouth daily. No current facility-administered medications for this visit. Allergies and Sensitivities:  No Known Allergies    Family History:  Family History   Problem Relation Age of Onset    Hypertension Mother        Social History:  He  reports that he has never smoked.  He has never used smokeless tobacco.  He  reports that he drinks alcohol. Physical:  Visit Vitals    /84    Pulse 64    Ht 6' 1\" (1.854 m)    Wt 92.1 kg (203 lb)    SpO2 98%    BMI 26.78 kg/m2       He is down 12 pounds since his last appointment (on 2/9/17). Exam:  Neck:  Supple, no JVD, no carotid bruits  CV:  Normal S1 and  S2, grade I/VI soft JACQUIE noted, no rubs or gallops noted. Irregularly, irregular rhythm  Lungs:  Clear to ausculation throughout, no wheezes or rales  Abd:  Soft, non-tender, distended with good bowel sounds. No hepatosplenomegaly  Extremities:  2+ pitting lower extremity edema from his knees to his feet (left greater than right)      Data:  EKG:  Not done today      LABS:  Lab Results   Component Value Date/Time    Sodium 138 02/15/2017 08:05 AM    Potassium 3.6 02/15/2017 08:05 AM    Chloride 98 02/15/2017 08:05 AM    CO2 32 02/15/2017 08:05 AM    Glucose 100 02/15/2017 08:05 AM    BUN 20 02/15/2017 08:05 AM    Creatinine 0.97 02/15/2017 08:05 AM     Lab Results   Component Value Date/Time    Cholesterol, total 85 01/23/2016 05:30 AM    HDL Cholesterol 31 01/23/2016 05:30 AM    LDL, calculated 34 01/23/2016 05:30 AM    Triglyceride 100 01/23/2016 05:30 AM    CHOL/HDL Ratio 2.7 01/23/2016 05:30 AM     Lab Results   Component Value Date/Time    ALT (SGPT) 27 07/27/2016 10:49 AM       Impression / Plan:  1. Atrial fibrillation, rate controlled and anticoagulated with Coumadin  2. Hypertension, blood pressure well-controlled  3. Pulmonary hypertension, RVSP 80-85 mmHg (by echo Sept. 2016)  4. Non-ischemic cardiomyopathy, EF 45% (by MUGA scan), s/p AICD implant   5. 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 Acute on chronic systolic heart failure, continues to improve    Mr. Brenda Diaz was seen today for follow-up after being instructed to take metolazone 2.5 mg, 30 minutes before his morning dose of Lasix on Mondays Wednesdays and Fridays.   On the days that he takes metolazone, he is to take 40 mEq of potassium twice a day.  On all other days, he is to take Lasix 40 mg twice daily potassium chloride 20 mEq twice daily. He had a repeat BMP and NT pro-BNP done yesterday and the NT pro-BNP was 11,500 and renal function and electrolytes remain stable and within normal limits. His blood pressure and heart rate are stable. His lungs are clear and he continues to exhibit at lower extremity edema but this has improved. The edema is from his knees to his feet, left greater than right. He states that the abdominal bloating, shortness of breath, and scrotal edema have resolved. His weight is down 12 pounds since his last visit. He was instructed to continue taking metolazone 2.5 mg, 30 minutes before his morning dose of Lasix on Mondays Wednesdays and Fridays. On the days that he takes metolazone, he is to take 40 mEq of potassium twice a day. On all other days, he is to take Lasix 40 mg twice daily potassium chloride 20 mEq twice daily. He will return for follow-up with me in 2 weeks and he was given another lab requisition to have another BMP and NT proBNP done today prior to returning for follow-up. Congestive heart failure teaching reinforced today. Advised to limit sodium intake to no more than 2000mg per day and to also watch fluid intake. Advised to weigh daily every morning and record weights. Instructed to call our office if progressive weight gain is noted over a 2 to 3 day period of time, shortness of breath increases, or if abdominal bloating, nausea, fatigue, or increased lower extremity edema is noted. His last echocardiogram was done in September 2016. His EF had markedly diminished since the limited echo done in March 2016. His EF was down to 15%, from 35-40%. His RVSP is much more elevated at 80-85 mmHg. He will follow-up with Dr. Rob Ledezma as scheduled and PRN. He will follow-up in the device clinic and Coumadin clinic as scheduled. His INR was checked today and it was 2.0.   No changes were made to his Coumadin dosing regimen. His INR will be rechecked when he returns for follow-up.       Crystal Wellington MSN, FNP-BC

## 2017-02-22 ENCOUNTER — HOSPITAL ENCOUNTER (OUTPATIENT)
Dept: INFUSION THERAPY | Age: 72
Discharge: HOME | End: 2017-02-22
Payer: MEDICARE

## 2017-02-22 VITALS
DIASTOLIC BLOOD PRESSURE: 76 MMHG | HEART RATE: 79 BPM | SYSTOLIC BLOOD PRESSURE: 120 MMHG | RESPIRATION RATE: 18 BRPM | OXYGEN SATURATION: 100 % | TEMPERATURE: 97.9 F

## 2017-02-22 LAB
BASO+EOS+MONOS # BLD AUTO: 0.4 K/UL (ref 0–2.3)
BASO+EOS+MONOS # BLD AUTO: 8 % (ref 0.1–17)
DIFFERENTIAL METHOD BLD: ABNORMAL
ERYTHROCYTE [DISTWIDTH] IN BLOOD BY AUTOMATED COUNT: 18.9 % (ref 11.5–14.5)
HCT VFR BLD AUTO: 35.3 % (ref 36–48)
HGB BLD-MCNC: 11.9 G/DL (ref 12–16)
LYMPHOCYTES # BLD AUTO: 19 % (ref 14–44)
LYMPHOCYTES # BLD: 0.9 K/UL (ref 1.1–5.9)
MCH RBC QN AUTO: 29.8 PG (ref 25–35)
MCHC RBC AUTO-ENTMCNC: 33.7 G/DL (ref 31–37)
MCV RBC AUTO: 88.3 FL (ref 78–102)
NEUTS SEG # BLD: 3.4 K/UL (ref 1.8–9.5)
NEUTS SEG NFR BLD AUTO: 73 % (ref 40–70)
PLATELET # BLD AUTO: 164 K/UL (ref 140–440)
RBC # BLD AUTO: 4 M/UL (ref 4.1–5.1)
WBC # BLD AUTO: 4.7 K/UL (ref 4.5–13)

## 2017-02-22 PROCEDURE — 85025 COMPLETE CBC W/AUTO DIFF WBC: CPT | Performed by: INTERNAL MEDICINE

## 2017-02-22 PROCEDURE — 74011250636 HC RX REV CODE- 250/636

## 2017-02-22 PROCEDURE — 36591 DRAW BLOOD OFF VENOUS DEVICE: CPT

## 2017-02-22 RX ORDER — HEPARIN SODIUM (PORCINE) LOCK FLUSH IV SOLN 100 UNIT/ML 100 UNIT/ML
SOLUTION INTRAVENOUS
Status: COMPLETED
Start: 2017-02-22 | End: 2017-02-22

## 2017-02-22 RX ORDER — SODIUM CHLORIDE 0.9 % (FLUSH) 0.9 %
10-40 SYRINGE (ML) INJECTION AS NEEDED
Status: DISCONTINUED | OUTPATIENT
Start: 2017-02-22 | End: 2017-02-26 | Stop reason: HOSPADM

## 2017-02-22 RX ORDER — HEPARIN SODIUM (PORCINE) LOCK FLUSH IV SOLN 100 UNIT/ML 100 UNIT/ML
500 SOLUTION INTRAVENOUS AS NEEDED
Status: ACTIVE | OUTPATIENT
Start: 2017-02-22 | End: 2017-02-23

## 2017-02-22 RX ADMIN — Medication 20 ML: at 08:31

## 2017-02-22 RX ADMIN — HEPARIN SODIUM (PORCINE) LOCK FLUSH IV SOLN 100 UNIT/ML 500 UNITS: 100 SOLUTION at 08:31

## 2017-02-22 NOTE — PROGRESS NOTES
[facility] OPI Progress Note    Date: 2017    Name: Be Gresham    MRN: [id number]         : 1945     Procrit    Mr. Emma Chou to [facility], ambulatory at [address] [place] [address] accompanied by his wife. Pt was assessed and education was provided. Mr. Maxine Polo vitals were reviewed and patient was observed for 5 minutes prior to treatment. Visit Vitals    /76 (BP 1 Location: Left arm, BP Patient Position: Sitting)    Pulse 79    Temp 97.9 °F (36.6 °C)    Resp 18    SpO2 100%     Per patient request, labs drawn from port today. Right chest mediport accessed with 20G 1 inch Giron needle. Flushed easily and had brisk blood return. Blood drawn off for CBC after 10 mL of waste. Port flushed with 20 mL NS and 500 units of heparin before de-accessing. No irritation or bleeding. Band-aid applied. Recent Results (from the past 12 hour(s))   CBC WITH 3 PART DIFF    Collection Time: 17  8:40 AM   Result Value Ref Range    WBC 4.7 4.5 - 13.0 K/uL    RBC 4.00 (L) 4.10 - 5.10 M/uL    HGB 11.9 (L) 12.0 - 16 g/dL    HCT 35.3 (L) 36 - 48 %    MCV 88.3 78 - 102 FL    MCH 29.8 25.0 - 35.0 PG    MCHC 33.7 31 - 37 g/dL    RDW 18.9 (H) 11.5 - 14.5 %    PLATELET 382 378 - 851 K/uL    NEUTROPHILS 73 (H) 40 - 70 %    MIXED CELLS 8 0.1 - 17 %    LYMPHOCYTES 19 14 - 44 %    ABS. NEUTROPHILS 3.4 1.8 - 9.5 K/UL    ABS. MIXED CELLS 0.4 0.0 - 2.3 K/uL    ABS. LYMPHOCYTES 0.9 (L) 1.1 - 5.9 K/UL    DF AUTOMATED       Results within ordered parameters to HOLD procrit today. Patient arm band removed and shredded. He is to return on 03/15/2017 at 0830 for his next procrit appointment.      Marybeth Ca RN  2017

## 2017-03-02 ENCOUNTER — HOSPITAL ENCOUNTER (OUTPATIENT)
Dept: LAB | Age: 72
Discharge: HOME | End: 2017-03-02
Payer: MEDICARE

## 2017-03-02 DIAGNOSIS — I10 ESSENTIAL HYPERTENSION: ICD-10-CM

## 2017-03-02 DIAGNOSIS — R06.02 SHORTNESS OF BREATH: ICD-10-CM

## 2017-03-02 DIAGNOSIS — I50.23 ACUTE ON CHRONIC SYSTOLIC CONGESTIVE HEART FAILURE (HCC): ICD-10-CM

## 2017-03-02 DIAGNOSIS — I42.8 CARDIOMYOPATHY, NONISCHEMIC (HCC): ICD-10-CM

## 2017-03-02 LAB
ANION GAP BLD CALC-SCNC: 4 MMOL/L (ref 3–18)
BNP SERPL-MCNC: 9316 PG/ML (ref 0–900)
BUN SERPL-MCNC: 32 MG/DL (ref 7–18)
BUN/CREAT SERPL: 32 (ref 12–20)
CALCIUM SERPL-MCNC: 9.4 MG/DL (ref 8.5–10.1)
CHLORIDE SERPL-SCNC: 99 MMOL/L (ref 100–108)
CO2 SERPL-SCNC: 35 MMOL/L (ref 21–32)
CREAT SERPL-MCNC: 1.01 MG/DL (ref 0.6–1.3)
GLUCOSE SERPL-MCNC: 92 MG/DL (ref 74–99)
POTASSIUM SERPL-SCNC: 3.9 MMOL/L (ref 3.5–5.5)
SODIUM SERPL-SCNC: 138 MMOL/L (ref 136–145)

## 2017-03-02 PROCEDURE — 36415 COLL VENOUS BLD VENIPUNCTURE: CPT | Performed by: NURSE PRACTITIONER

## 2017-03-02 PROCEDURE — 83880 ASSAY OF NATRIURETIC PEPTIDE: CPT | Performed by: NURSE PRACTITIONER

## 2017-03-02 PROCEDURE — 80048 BASIC METABOLIC PNL TOTAL CA: CPT | Performed by: NURSE PRACTITIONER

## 2017-03-03 ENCOUNTER — OFFICE VISIT (OUTPATIENT)
Dept: CARDIOLOGY CLINIC | Age: 72
End: 2017-03-03

## 2017-03-03 VITALS
SYSTOLIC BLOOD PRESSURE: 120 MMHG | DIASTOLIC BLOOD PRESSURE: 64 MMHG | BODY MASS INDEX: 23.46 KG/M2 | WEIGHT: 177 LBS | HEART RATE: 66 BPM | HEIGHT: 73 IN | OXYGEN SATURATION: 98 %

## 2017-03-03 DIAGNOSIS — I48.19 PERSISTENT ATRIAL FIBRILLATION (HCC): ICD-10-CM

## 2017-03-03 DIAGNOSIS — I50.22 SYSTOLIC CHF, CHRONIC (HCC): ICD-10-CM

## 2017-03-03 DIAGNOSIS — I42.8 CARDIOMYOPATHY, NONISCHEMIC (HCC): Primary | ICD-10-CM

## 2017-03-03 DIAGNOSIS — I10 ESSENTIAL HYPERTENSION: ICD-10-CM

## 2017-03-03 DIAGNOSIS — Z79.01 LONG TERM CURRENT USE OF ANTICOAGULANT THERAPY: ICD-10-CM

## 2017-03-03 LAB
INR BLD: 2.1
PT POC: 25.6 SEC
VALID INTERNAL CONTROL?: YES

## 2017-03-03 RX ORDER — METOLAZONE 2.5 MG/1
2.5 TABLET ORAL AS NEEDED
Qty: 1 TAB | Refills: 0
Start: 2017-03-03 | End: 2017-07-25 | Stop reason: SDUPTHER

## 2017-03-03 RX ORDER — POTASSIUM CHLORIDE 20 MEQ/1
20 TABLET, EXTENDED RELEASE ORAL 2 TIMES DAILY
Qty: 1 TAB | Refills: 0
Start: 2017-03-03 | End: 2017-07-27 | Stop reason: SDUPTHER

## 2017-03-03 NOTE — PROGRESS NOTES
1. Have you been to the ER, urgent care clinic since your last visit? Hospitalized since your last visit? No    2. Have you seen or consulted any other health care providers outside of the [address] [place] [address] since your last visit? Include any pap smears or colon screening.  No

## 2017-03-03 NOTE — MR AVS SNAPSHOT
Visit Information Date & Time Provider Department Dept. Phone Encounter #  
 3/3/2017  9:00 AM Librado Roque NP [facility] [address] [id number] Your Appointments 3/30/2017  8:00 AM  
ANTICOAG NURSE with Pt Inr Hv [facility] [place] [facility]"" ([facility]) Appt Note: 4 week INR  
 [place] [id number]  
[phone] [address] [facility] [address] 4/12/2017  1:20 PM  
CARELINK with Pacer Hv [facility] [place] [facility]\"" ([facility]) Appt Note: 2; r/s appt. ..sb  
 [place] [phone]  
[phone] [address] [facility] [address] [address]  
  
    
 8/22/2017  8:20 AM  
Follow Up with Abena Hunt MD  
[facility] [place] [facility]"" ([facility]) Appt Note: 6-9 month f/up [place] [phone]  
[phone] [address] [facility] [address] Upcoming Health Maintenance Date Due Hepatitis C Screening 1945 DTaP/Tdap/Td series (1 - Tdap) 8/21/1967 FOBT Q 1 YEAR AGE 50-75 8/21/1996 ZOSTER VACCINE AGE 60> 8/21/2006 GLAUCOMA SCREENING Q2Y 8/21/2011 MEDICARE YEARLY EXAM 8/21/2011 Allergies as of 3/3/2017  Review Complete On: 3/3/2017 By: Librado Roque NP No Known Allergies Current Immunizations  Reviewed on 12/21/2016 Name Date Influenza Vaccine 11/9/2016, 10/2/2015 Pneumococcal Conjugate (PCV-13) 9/28/2015 Pneumococcal Polysaccharide (PPSV-23) 8/13/2012 Not reviewed this visit You Were Diagnosed With   
  
 Codes Comments Cardiomyopathy, nonischemic ([facility] [address]    -  Primary ICD-10-CM: I42.9 ICD-9-CM: 425.4 Systolic CHF, chronic (HCC)     ICD-10-CM: I50.22 ICD-9-CM: 428.22, 428.0 Essential hypertension     ICD-10-CM: I10 
ICD-9-CM: 401.9 Long term current use of anticoagulant therapy     ICD-10-CM: Z79.01 
ICD-9-CM: V58.61 Persistent atrial fibrillation (HCC)     ICD-10-CM: I48.1 ICD-9-CM: 427.31 Vitals Smoking Status Never Smoker Preferred Pharmacy Pharmacy Name Phone [id number] [place], [address] [place] [address] Your Updated Medication List  
  
   
This list is accurate as of: 3/3/17  9:17 AM.  Always use your most recent med list.  
  
  
  
  
 allopurinol 300 mg tablet Commonly known as:  Cherelle Bolus Take  by mouth daily. ASPIR-81 81 mg tablet Generic drug:  aspirin delayed-release Take 81 mg by mouth daily. carvedilol 25 mg tablet Commonly known as:  COREG  
TAKE 1 TABLET BY MOUTH TWICE A DAY WITH MEALS  
  
 cloNIDine HCl 0.2 mg tablet Commonly known as:  CATAPRES  
TAKE 1 TABLET BY MOUTH 3 TIMES A DAY  
  
 digoxin 0.125 mg tablet Commonly known as:  LANOXIN  
TAKE 1 TABLET BY MOUTH EVERY DAY  
  
 furosemide 40 mg tablet Commonly known as:  LASIX Take 1 Tab by mouth two (2) times a day. Or directed IRON (DRIED) PO Take 325 mg by mouth daily. lisinopril 20 mg tablet Commonly known as:  PRINIVIL, ZESTRIL  
TAKE ONE TABLET BY MOUTH TWICE DAILY  
  
 magnesium oxide 400 mg tablet Commonly known as:  MAG-OX Take 1 Tab by mouth daily. metOLazone 2.5 mg tablet Commonly known as:  Aubrey Dries Take 2.5 mg by mouth every Monday, Wednesday, Friday. Take 30 minutes prior to morning Lasix MIRALAX 17 gram packet Generic drug:  polyethylene glycol Take 17 g by mouth daily. oxyCODONE-acetaminophen 5-325 mg per tablet Commonly known as:  PERCOCET Take 1 Tab by mouth every four (4) hours as needed for Pain. Max Daily Amount: 6 Tabs. potassium chloride 20 mEq tablet Commonly known as:  K-BRIAN DUNCAN-CON  
 Take 1 Tab by mouth two (2) times a day. And 2 tabs twice a day on metolazone days (M-W-F)  
  
 warfarin 2.5 mg tablet Commonly known as:  COUMADIN Take 1 Tab by mouth daily. Or as directed by our office ZOLOFT 25 mg tablet Generic drug:  sertraline Take  by mouth daily. Description Continue Coumadin 2.5mg daily except 1.25 mg on Tuesday (takes Coumadin in the morning) March 2017 Details Arlyss Feast Tue Wed Thu Fri Sat  
     1  
  
  
  
   2  
  
  
  
   3  
2.1  
2.5 mg  
See details 4  
  
2.5 mg  
  
  
  5  
  
2.5 mg  
  
   6  
  
2.5 mg  
  
   7  
  
1.25 mg  
  
   8  
  
2.5 mg  
  
   9  
  
2.5 mg  
  
   10  
  
2.5 mg  
  
   11  
  
2.5 mg  
  
  
  12  
  
2.5 mg  
  
   13  
  
2.5 mg  
  
   14  
  
1.25 mg  
  
   15  
  
2.5 mg  
  
   16  
  
2.5 mg  
  
   17  
  
2.5 mg  
  
   18  
  
2.5 mg  
  
  
  19  
  
2.5 mg  
  
   20  
  
2.5 mg  
  
   21  
  
1.25 mg  
  
   22  
  
2.5 mg  
  
   23  
  
2.5 mg  
  
   24  
  
2.5 mg  
  
   25  
  
2.5 mg  
  
  
  26  
  
2.5 mg  
  
   27  
  
2.5 mg  
  
   28  
  
1.25 mg  
  
   29  
  
2.5 mg  
  
   30  
  
2.5 mg  
  
   31 Date Details 03/03 This INR check INR: 2.1 Date of next INR:  3/30/2017 How to take your warfarin dose To take:  1.25 mg Take 0.5 of a 2.5 mg tablet. To take:  2.5 mg Take 1 of the 2.5 mg tablets. We Performed the Following AMB POC PT/INR [29613 CPT(R)] To-Do List   
 03/15/2017 8:30 AM  
  Appointment with [facility] NURSE at [facility] ([phone]) Around 03/17/2017   Lab:  METABOLIC PANEL, BASIC   
  
 04/05/2017 8:30 AM  
  Appointment with [facility] TRACK NURSE at [facility] ([phone])  
  
 04/26/2017 8:30 AM  
  Appointment with [facility] TR[facility] NURSE at [facility] ([phone])  
  
 05/17/2017 8:30 AM  
 Appointment with [facility] TRACK NURSE at [facility] ([phone]) Patient Instructions Hold metolazone for now Weigh daily and record. If weight progressively increases over a 3 day period of time, please call the office for instructions regarding metolazone. Continue lasix 40mg twice a day Continue potassium 20meq twice a day. Will use increased dose of potassium when metolazone is used. Coumadin as directed on calendar Protime as scheduled Follow-up with Dr. Christopher Kapadia as scheduled and as needed BMP in 2 weeks Introducing [facility]! [facility] introduces [email or web address] patient portal. Now you can access parts of your medical record, email your doctor's office, and request medication refills online. 1. In your internet browser, go to [email or web address] 2. Click on the First Time User? Click Here link in the Sign In box. You will see the New Member Sign Up page. 3. Enter your [email or web address] Access Code exactly as it appears below. You will not need to use this code after youve completed the sign-up process. If you do not sign up before the expiration date, you must request a new code. · [email or web address] Access Code: [email or web address] Expires: 5/31/2017  7:49 AM 
 
4. Enter the last four digits of your Social Security Number (xxxx) and Date of Birth (mm/dd/yyyy) as indicated and click Submit. You will be taken to the next sign-up page. 5. Create a [email or web address] ID. This will be your [email or web address] login ID and cannot be changed, so think of one that is secure and easy to remember. 6. Create a [email or web address] password. You can change your password at any time. 7. Enter your Password Reset Question and Answer. This can be used at a later time if you forget your password. 8. Enter your e-mail address. You will receive e-mail notification when new information is available in [address] 9. Click Sign Up. You can now view and download portions of your medical record. 10. Click the Download Summary menu link to download a portable copy of your medical information. If you have questions, please visit the Frequently Asked Questions section of the [employer]Who website. Remember, [employer]Who is NOT to be used for urgent needs. For medical emergencies, dial 911. Now available from your iPhone and Android! Please provide this summary of care documentation to your next provider. Your primary care clinician is listed as Wilfred Barillas. If you have any questions after today's visit, please call [phone].

## 2017-03-03 NOTE — PROGRESS NOTES
Spero Curling presents today for a 2 week CHF follow-up after being instructed to continue taking metolazone 2.5 mg, 30 minutes before his morning dose of Lasix on Mondays Wednesdays and Fridays. On the days that he takes metolazone, he is to take 40 mEq of potassium twice a day. On all other days, he is to take Lasix 40 mg twice daily potassium chloride 20 mEq twice daily. During his last visit, he was noted to have progressive improvement in his CHF symptoms. His NT pro-BNP was 11,500 at that time. He is a 80year old male with history of atrial fibrillation, pulmonary hypertension, hypertension, and non-ischemic cardiomyopathy (s/p AICD for primary prevention of sudden cardiac death). 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 It was successfully removed in April 2016 and his understanding is that the edges were clear. He states that he has not recovered fully from this as he has not been able to put his full weight on the right leg. When seen by Dr. Moises Duffy on 9/28/16, he exhibited signs of heart failure as evidenced by generalized edema in both upper and lower extremities and scrotal edema. He also complained of dyspnea with any exertion. He was instructed to take Lasix 80mg BID and potassium 40meq daily for 3 days and then return for follow-up. Because of his marked edema, non-invasive duplex studies were requested of the upper and lower extremities and an echo was also requested. The lower extremity duplex study was completed and it was negative for DVT. The echo was completed on 9/30/16, and it showed that his EF is down to 15%, severe tricuspid regurgitation, and estimated peak pressure was in the range of 80 mmHg to 85 mmHg. He had a limited echo done in March of this year and at that time, his EF was 35-40%. His last full echo was done in Sept. 2015, and his EF at that time was 45-50% and his RVSP was 45-50 mmHg.   He had a MUGA scan done on 11/2/15 and it showed an EF of 45 to 46% and no wall motion abnormalities. Today, he states that he feels \"great. \"  The shortness of breath and the lower extremity edema have resolved. He denies chest pain, tightness, heaviness, and palpitations. He denies shortness of breath at rest, SHRESTHA, denies orthopnea and PND. He denies abdominal bloating. He denies lightheadedness, dizziness, and syncope. He denies claudication. Denies nausea, vomiting, diarrhea, fever, chills. PMH:  Past Medical History:   Diagnosis Date    AICD (automatic cardioverter/defibrillator) present     Atrial fibrillation ([facility] [address] 6/8/2010    Cancer ([facility] [address]     Cardiac cath 02/11/2009    Patent coronary arteries. LVEDP 28.  EF 35%. Global hyk. Mod MR.  Cardiac echocardiogram 09/29/2016    LVE. EF 15% at most.  Indeterminate LV diastolic fx.  RVE. Reduced RV systolic fx. RVSP 80-85 mmHg. LAE.  LUISA. Mod-severe MR. Mild AI. Severe TR.  Cardiac MUGA scan 11/02/2015    At most mild LVE. EF 45-46%.  Cardiac nuclear imaging test, abnormal 02/03/2009    Mild basal/mid inferior, inferoapical, inferoseptal infarct w/mild ischemia in RCA (possibly partially artifact). Anterior, anteroseptal, anterapical ischemia w/o infarct. (Mid & distal LAD.)  LVE. EF 29%. Mod global hyk.  Cardiovascular LE venous duplex 09/29/2016    Tech difficult. No DVT bilaterally.  Cardiovascular renal duplex 06/07/2010    No evidence of renal artery stenosis >60%. Patent SMA and celiac artery.  Cardiovascular UE venous duplex 10/04/2016    No DVT bilaterally.     HTN (hypertension) 6/8/2010    Hypertensive cardiovascular disease     MR (mitral regurgitation)     Nonischemic cardiomyopathy     3/11 echo EF 25%    Pulmonary hypertension, moderate to severe ([address] 6/8/2010    90-100mmHg; repeat echo in 3/11 showed 70mmHg       PSH:  Past Surgical History:   Procedure Laterality Date    HX HEART VALVE SURGERY  2011    HX HERNIA REPAIR  2011       MEDS:  Current Outpatient Prescriptions   Medication Sig    polyethylene glycol (MIRALAX) 17 gram packet Take 17 g by mouth daily.  metOLazone (ZAROXOLYN) 2.5 mg tablet Take 2.5 mg by mouth every Monday, Wednesday, Friday. Take 30 minutes prior to morning Lasix    potassium chloride (K-DUR, KLOR-CON) 20 mEq tablet Take 1 Tab by mouth two (2) times a day. And 2 tabs twice a day on metolazone days (M-W-F)    furosemide (LASIX) 40 mg tablet Take 1 Tab by mouth two (2) times a day. Or directed    carvedilol (COREG) 25 mg tablet TAKE 1 TABLET BY MOUTH TWICE A DAY WITH MEALS    lisinopril (PRINIVIL, ZESTRIL) 20 mg tablet TAKE ONE TABLET BY MOUTH TWICE DAILY    magnesium oxide (MAG-OX) 400 mg tablet Take 1 Tab by mouth daily.  cloNIDine HCl (CATAPRES) 0.2 mg tablet TAKE 1 TABLET BY MOUTH 3 TIMES A DAY    digoxin (LANOXIN) 0.125 mg tablet TAKE 1 TABLET BY MOUTH EVERY DAY    oxyCODONE-acetaminophen (PERCOCET) 5-325 mg per tablet Take 1 Tab by mouth every four (4) hours as needed for Pain. Max Daily Amount: 6 Tabs.  warfarin (COUMADIN) 2.5 mg tablet Take 1 Tab by mouth daily. Or as directed by our office    sertraline (ZOLOFT) 25 mg tablet Take  by mouth daily.  allopurinol (ZYLOPRIM) 300 mg tablet Take  by mouth daily.  FERROUS SULFATE, DRIED (IRON, DRIED, PO) Take 325 mg by mouth daily.  aspirin delayed-release (ASPIR-81) 81 mg tablet Take 81 mg by mouth daily. No current facility-administered medications for this visit. Allergies and Sensitivities:  No Known Allergies    Family History:  Family History   Problem Relation Age of Onset    Hypertension Mother        Social History:  He  reports that he has never smoked. He has never used smokeless tobacco.  He  reports that he drinks alcohol.       Physical:  Visit Vitals    /64    Pulse 66    Ht 6' 1\" (1.854 m)    Wt 80.3 kg (177 lb)    SpO2 98%    BMI 23.35 kg/m2       He is down 26 pounds since his last appointment (on 2/16/17). Exam:  Neck:  Supple, no JVD, no carotid bruits  CV:  Normal S1 and  S2, grade I/VI soft JACQUIE noted, no rubs or gallops noted. Irregularly, irregular rhythm  Lungs:  Clear to ausculation throughout, no wheezes or rales  Abd:  Soft, non-tender, distended with good bowel sounds. No hepatosplenomegaly  Extremities:  trace lower extremity edema       Data:  EKG:  Not done today      LABS:  Lab Results   Component Value Date/Time    Sodium 138 03/02/2017 08:00 AM    Potassium 3.9 03/02/2017 08:00 AM    Chloride 99 03/02/2017 08:00 AM    CO2 35 03/02/2017 08:00 AM    Glucose 92 03/02/2017 08:00 AM    BUN 32 03/02/2017 08:00 AM    Creatinine 1.01 03/02/2017 08:00 AM     Lab Results   Component Value Date/Time    Cholesterol, total 85 01/23/2016 05:30 AM    HDL Cholesterol 31 01/23/2016 05:30 AM    LDL, calculated 34 01/23/2016 05:30 AM    Triglyceride 100 01/23/2016 05:30 AM    CHOL/HDL Ratio 2.7 01/23/2016 05:30 AM     Lab Results   Component Value Date/Time    ALT (SGPT) 27 07/27/2016 10:49 AM       Impression / Plan:  1. Atrial fibrillation, rate controlled and anticoagulated with Coumadin  2. Hypertension, blood pressure well-controlled  3. Pulmonary hypertension, RVSP 80-85 mmHg (by echo Sept. 2016)  4. Non-ischemic cardiomyopathy, EF 45% (by MUGA scan), s/p AICD implant   5. 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 Chronic systolic heart failure, now compensated    Mr. Rosalba Jensen was seen today for follow-up after being instructed to continue taking metolazone 2.5 mg, 30 minutes before his morning dose of Lasix on Mondays, Wednesdays, and Fridays over the past 2 weeks. On the days that he takes metolazone, he is to take 40 mEq of potassium twice a day. On all other days, he is to take Lasix 40 mg twice daily potassium chloride 20 mEq twice daily.    He had a repeat BMP and NT pro-BNP done yesterday and the NT pro-BNP was down to 9316 (from 11,500 2 weeks ago).  BUN was slightly elevated at 32 and creatinine was 1.0. Results were reviewed with him and his wife. His blood pressure and heart rate are stable. His lungs are clear and the lower extremity edema is now trace. He states that the abdominal bloating, shortness of breath, and scrotal edema have resolved. His weight is down 26 pounds since his last visit. He was instructed to was instructed to hold the metolazone and increased dose of potassium (that he takes on metolazone days) for now. We will use the metolazone as needed only. He was instructed to remain on lasix 40mg BID and potassium 20meq BID. He was given another lab requisition to have another BMP done in 2 weeks. Congestive heart failure teaching reinforced today. Advised to limit sodium intake to no more than 2000mg per day and to also watch fluid intake. Advised to weigh daily every morning and record weights. Instructed to call our office if progressive weight gain is noted over a 2 to 3 day period of time, shortness of breath increases, or if abdominal bloating, nausea, fatigue, or increased lower extremity edema is noted. The importance of daily weights stressed and the importance of reporting progressive weight gain immediately. His last echocardiogram was done in September 2016. His EF had markedly diminished since the limited echo done in March 2016. His EF was down to 15%, from 35-40%. His RVSP is much more elevated at 80-85 mmHg. He will follow-up with Dr. Danish Mane as scheduled and PRN. He will follow-up in the device clinic and Coumadin clinic as scheduled. His INR was checked today and it was 2.1. No changes were made to his Coumadin dosing regimen. His INR will be rechecked in 1 month.       Garland Sahu MSN, FNP-BC

## 2017-03-03 NOTE — PATIENT INSTRUCTIONS
Hold metolazone for now  Weigh daily and record. If weight progressively increases over a 3 day period of time, please call the office for instructions regarding metolazone. Continue lasix 40mg twice a day  Continue potassium 20meq twice a day. Will use increased dose of potassium when metolazone is used.   Coumadin as directed on calendar  Protime as scheduled  Follow-up with Dr. David Escalante as scheduled and as needed  BMP in 2 weeks

## 2017-03-15 ENCOUNTER — HOSPITAL ENCOUNTER (OUTPATIENT)
Dept: INFUSION THERAPY | Age: 72
Discharge: HOME | End: 2017-03-15
Payer: MEDICARE

## 2017-03-15 VITALS
SYSTOLIC BLOOD PRESSURE: 132 MMHG | TEMPERATURE: 97.9 F | RESPIRATION RATE: 16 BRPM | OXYGEN SATURATION: 100 % | DIASTOLIC BLOOD PRESSURE: 78 MMHG | HEART RATE: 62 BPM

## 2017-03-15 LAB
BASO+EOS+MONOS # BLD AUTO: 0.6 K/UL (ref 0–2.3)
BASO+EOS+MONOS # BLD AUTO: 9 % (ref 0.1–17)
DIFFERENTIAL METHOD BLD: ABNORMAL
ERYTHROCYTE [DISTWIDTH] IN BLOOD BY AUTOMATED COUNT: 20.2 % (ref 11.5–14.5)
HCT VFR BLD AUTO: 32.3 % (ref 36–48)
HGB BLD-MCNC: 11 G/DL (ref 12–16)
LYMPHOCYTES # BLD AUTO: 15 % (ref 14–44)
LYMPHOCYTES # BLD: 1 K/UL (ref 1.1–5.9)
MCH RBC QN AUTO: 30.3 PG (ref 25–35)
MCHC RBC AUTO-ENTMCNC: 34.1 G/DL (ref 31–37)
MCV RBC AUTO: 89 FL (ref 78–102)
NEUTS SEG # BLD: 5.3 K/UL (ref 1.8–9.5)
NEUTS SEG NFR BLD AUTO: 76 % (ref 40–70)
PLATELET # BLD AUTO: 143 K/UL (ref 140–440)
RBC # BLD AUTO: 3.63 M/UL (ref 4.1–5.1)
WBC # BLD AUTO: 6.9 K/UL (ref 4.5–13)

## 2017-03-15 PROCEDURE — 77030012965 HC NDL HUBR BBMI -A

## 2017-03-15 PROCEDURE — 85025 COMPLETE CBC W/AUTO DIFF WBC: CPT | Performed by: INTERNAL MEDICINE

## 2017-03-15 PROCEDURE — 36591 DRAW BLOOD OFF VENOUS DEVICE: CPT

## 2017-03-15 RX ORDER — LANOLIN ALCOHOL/MO/W.PET/CERES
CREAM (GRAM) TOPICAL
Qty: 90 TAB | Refills: 3 | Status: SHIPPED | OUTPATIENT
Start: 2017-03-15

## 2017-03-15 RX ORDER — HEPARIN SODIUM (PORCINE) LOCK FLUSH IV SOLN 100 UNIT/ML 100 UNIT/ML
500 SOLUTION INTRAVENOUS AS NEEDED
Status: ACTIVE | OUTPATIENT
Start: 2017-03-15 | End: 2017-03-16

## 2017-03-15 RX ORDER — HEPARIN SODIUM (PORCINE) LOCK FLUSH IV SOLN 100 UNIT/ML 100 UNIT/ML
SOLUTION INTRAVENOUS
Status: DISPENSED
Start: 2017-03-15 | End: 2017-03-15

## 2017-03-15 RX ORDER — SODIUM CHLORIDE 0.9 % (FLUSH) 0.9 %
10-40 SYRINGE (ML) INJECTION AS NEEDED
Status: DISCONTINUED | OUTPATIENT
Start: 2017-03-15 | End: 2017-03-19 | Stop reason: HOSPADM

## 2017-03-15 RX ADMIN — Medication 20 ML: at 08:51

## 2017-03-15 NOTE — PROGRESS NOTES
[facility] Progress Note    Date: March 15, 2017    Name: Lourdes Saucedo    MRN: [id number]         : 1945      Mr. Jesenia Agosto was assessed and education was provided. Mr. Nicholas Weir vitals were reviewed and patient was observed for 5 minutes prior to treatment. Visit Vitals    /78 (BP 1 Location: Left arm, BP Patient Position: At rest;Sitting)    Pulse 62    Temp 97.9 °F (36.6 °C)    Resp 16    SpO2 100%       Lab results were obtained and reviewed. Recent Results (from the past 12 hour(s))   CBC WITH 3 PART DIFF    Collection Time: 03/15/17  9:10 AM   Result Value Ref Range    WBC 6.9 4.5 - 13.0 K/uL    RBC 3.63 (L) 4.10 - 5.10 M/uL    HGB 11.0 (L) 12.0 - 16 g/dL    HCT 32.3 (L) 36 - 48 %    MCV 89.0 78 - 102 FL    MCH 30.3 25.0 - 35.0 PG    MCHC 34.1 31 - 37 g/dL    RDW 20.2 (H) 11.5 - 14.5 %    PLATELET 487 918 - 398 K/uL    NEUTROPHILS 76 (H) 40 - 70 %    MIXED CELLS 9 0.1 - 17 %    LYMPHOCYTES 15 14 - 44 %    ABS. NEUTROPHILS 5.3 1.8 - 9.5 K/UL    ABS. MIXED CELLS 0.6 0.0 - 2.3 K/uL    ABS. LYMPHOCYTES 1.0 (L) 1.1 - 5.9 K/UL    DF AUTOMATED         Monthly port flush done with brisk blood return obtained. CBC drawn from port. Procrit held for H&H 11.0/32. 3. Mr. Jesenia Agosto tolerated well, and had no complaints. Patient armband removed and shredded. Mr. Jesenia Agosto was discharged from [place] [address] in stable condition at [facility] 10. He is to return on 17 at 0830 for his next appointment for CBC/Procrit and monthly port flush.     Nader Aviles RN  March 15, 2017  9:20 AM

## 2017-03-17 ENCOUNTER — HOSPITAL ENCOUNTER (OUTPATIENT)
Dept: LAB | Age: 72
Discharge: HOME | End: 2017-03-17
Payer: MEDICARE

## 2017-03-17 DIAGNOSIS — I10 ESSENTIAL HYPERTENSION: ICD-10-CM

## 2017-03-17 DIAGNOSIS — I50.22 SYSTOLIC CHF, CHRONIC (HCC): ICD-10-CM

## 2017-03-17 DIAGNOSIS — I42.8 CARDIOMYOPATHY, NONISCHEMIC (HCC): ICD-10-CM

## 2017-03-17 LAB
ANION GAP BLD CALC-SCNC: 7 MMOL/L (ref 3–18)
BUN SERPL-MCNC: 23 MG/DL (ref 7–18)
BUN/CREAT SERPL: 22 (ref 12–20)
CALCIUM SERPL-MCNC: 8.7 MG/DL (ref 8.5–10.1)
CHLORIDE SERPL-SCNC: 102 MMOL/L (ref 100–108)
CO2 SERPL-SCNC: 31 MMOL/L (ref 21–32)
CREAT SERPL-MCNC: 1.03 MG/DL (ref 0.6–1.3)
GLUCOSE SERPL-MCNC: 104 MG/DL (ref 74–99)
POTASSIUM SERPL-SCNC: 3.9 MMOL/L (ref 3.5–5.5)
SODIUM SERPL-SCNC: 140 MMOL/L (ref 136–145)

## 2017-03-17 PROCEDURE — 80048 BASIC METABOLIC PNL TOTAL CA: CPT | Performed by: NURSE PRACTITIONER

## 2017-03-17 PROCEDURE — 36415 COLL VENOUS BLD VENIPUNCTURE: CPT | Performed by: NURSE PRACTITIONER

## 2017-03-30 ENCOUNTER — ANTI-COAG VISIT (OUTPATIENT)
Dept: CARDIOLOGY CLINIC | Age: 72
End: 2017-03-30

## 2017-03-30 DIAGNOSIS — I48.19 PERSISTENT ATRIAL FIBRILLATION (HCC): ICD-10-CM

## 2017-03-30 DIAGNOSIS — Z79.01 LONG TERM CURRENT USE OF ANTICOAGULANT THERAPY: Primary | ICD-10-CM

## 2017-03-30 LAB
INR BLD: 1.8
PT POC: 21.7 SEC
VALID INTERNAL CONTROL?: YES

## 2017-03-30 NOTE — PROGRESS NOTES
Verbal order and read back per Jeronimo Mercado NP  The INR is below the therapeutic range. Please make the following adjustments to Coumadin dosing: Take 5 mg X 1 then Continue Coumadin 2.5mg daily except 1.25 mg on Tuesday  Repeat the INR in 1 month. Patient informed of instructions, read back and verbalized understanding. Dosing calendar also provided. Sky Reynolds LPN    Patient interested in INR home monitoring.  I will contact [facility] to initiate process Sky Reynolds LPN

## 2017-03-31 RX ORDER — WARFARIN 2.5 MG/1
TABLET ORAL
Qty: 45 TAB | Refills: 0 | Status: SHIPPED | OUTPATIENT
Start: 2017-03-31 | End: 2017-05-15 | Stop reason: SDUPTHER

## 2017-04-05 ENCOUNTER — HOSPITAL ENCOUNTER (OUTPATIENT)
Dept: INFUSION THERAPY | Age: 72
Discharge: HOME | End: 2017-04-05
Payer: MEDICARE

## 2017-04-05 VITALS
SYSTOLIC BLOOD PRESSURE: 119 MMHG | DIASTOLIC BLOOD PRESSURE: 70 MMHG | OXYGEN SATURATION: 98 % | TEMPERATURE: 99 F | RESPIRATION RATE: 16 BRPM | HEART RATE: 74 BPM

## 2017-04-05 LAB
BASO+EOS+MONOS # BLD AUTO: 0.6 K/UL (ref 0–2.3)
BASO+EOS+MONOS # BLD AUTO: 9 % (ref 0.1–17)
DIFFERENTIAL METHOD BLD: ABNORMAL
ERYTHROCYTE [DISTWIDTH] IN BLOOD BY AUTOMATED COUNT: 19.5 % (ref 11.5–14.5)
HCT VFR BLD AUTO: 32.7 % (ref 36–48)
HGB BLD-MCNC: 11 G/DL (ref 12–16)
LYMPHOCYTES # BLD AUTO: 21 % (ref 14–44)
LYMPHOCYTES # BLD: 1.3 K/UL (ref 1.1–5.9)
MCH RBC QN AUTO: 30.5 PG (ref 25–35)
MCHC RBC AUTO-ENTMCNC: 33.6 G/DL (ref 31–37)
MCV RBC AUTO: 90.6 FL (ref 78–102)
NEUTS SEG # BLD: 4.2 K/UL (ref 1.8–9.5)
NEUTS SEG NFR BLD AUTO: 70 % (ref 40–70)
PLATELET # BLD AUTO: 160 K/UL (ref 140–440)
RBC # BLD AUTO: 3.61 M/UL (ref 4.1–5.1)
WBC # BLD AUTO: 6.1 K/UL (ref 4.5–13)

## 2017-04-05 PROCEDURE — 36591 DRAW BLOOD OFF VENOUS DEVICE: CPT

## 2017-04-05 PROCEDURE — 77030012965 HC NDL HUBR BBMI -A

## 2017-04-05 PROCEDURE — 85025 COMPLETE CBC W/AUTO DIFF WBC: CPT | Performed by: INTERNAL MEDICINE

## 2017-04-05 PROCEDURE — 74011250636 HC RX REV CODE- 250/636

## 2017-04-05 RX ORDER — HEPARIN SODIUM (PORCINE) LOCK FLUSH IV SOLN 100 UNIT/ML 100 UNIT/ML
SOLUTION INTRAVENOUS
Status: COMPLETED
Start: 2017-04-05 | End: 2017-04-05

## 2017-04-05 RX ORDER — SODIUM CHLORIDE 0.9 % (FLUSH) 0.9 %
10-40 SYRINGE (ML) INJECTION AS NEEDED
Status: DISCONTINUED | OUTPATIENT
Start: 2017-04-05 | End: 2017-04-09 | Stop reason: HOSPADM

## 2017-04-05 RX ORDER — HEPARIN SODIUM (PORCINE) LOCK FLUSH IV SOLN 100 UNIT/ML 100 UNIT/ML
500 SOLUTION INTRAVENOUS AS NEEDED
Status: ACTIVE | OUTPATIENT
Start: 2017-04-05 | End: 2017-04-06

## 2017-04-05 RX ADMIN — Medication 20 ML: at 08:55

## 2017-04-05 RX ADMIN — HEPARIN SODIUM (PORCINE) LOCK FLUSH IV SOLN 100 UNIT/ML 500 UNITS: 100 SOLUTION at 08:56

## 2017-04-05 RX ADMIN — Medication 10 ML: at 08:45

## 2017-04-05 NOTE — PROGRESS NOTES
[facility] Progress Note    Date: 2017    Name: Veronica Barajas    MRN: [id number]         : 1945     Procrit    Mr. Nettie García to [facility], ambulatory at 0830 accompanied by his wife. Pt was assessed and education was provided. Mr. Margret Washington vitals were reviewed and patient was observed for 5 minutes prior to treatment. Visit Vitals    /70 (BP 1 Location: Left arm, BP Patient Position: Sitting)    Pulse 74    Temp 99 °F (37.2 °C)    Resp 16    SpO2 98%     Labs drawn from port today. Right chest mediport accessed with 20G 1 inch Giron needle. Flushed easily and had brisk blood return. Blood drawn off for CBC after 10 mL of waste. Port flushed with 20 mL NS and 500 units of heparin before de-accessing. No irritation or bleeding. Band-aid applied. Recent Results (from the past 12 hour(s))   CBC WITH 3 PART DIFF    Collection Time: 17  8:54 AM   Result Value Ref Range    WBC 6.1 4.5 - 13.0 K/uL    RBC 3.61 (L) 4.10 - 5.10 M/uL    HGB 11.0 (L) 12.0 - 16 g/dL    HCT 32.7 (L) 36 - 48 %    MCV 90.6 78 - 102 FL    MCH 30.5 25.0 - 35.0 PG    MCHC 33.6 31 - 37 g/dL    RDW 19.5 (H) 11.5 - 14.5 %    PLATELET 071 086 - 039 K/uL    NEUTROPHILS 70 40 - 70 %    MIXED CELLS 9 0.1 - 17 %    LYMPHOCYTES 21 14 - 44 %    ABS. NEUTROPHILS 4.2 1.8 - 9.5 K/UL    ABS. MIXED CELLS 0.6 0.0 - 2.3 K/uL    ABS. LYMPHOCYTES 1.3 1.1 - 5.9 K/UL    DF AUTOMATED         HGB= 11.0 and HCT- 32.7. Results within ordered parameters to HOLD procrit today. Patient arm band removed and shredded. He is to return on 2017 at 0830 for his next procrit appointment.      Karina Dangelo RN  2017

## 2017-04-12 ENCOUNTER — OFFICE VISIT (OUTPATIENT)
Dept: CARDIOLOGY CLINIC | Age: 72
End: 2017-04-12

## 2017-04-12 DIAGNOSIS — Z95.810 AUTOMATIC IMPLANTABLE CARDIOVERTER-DEFIBRILLATOR IN SITU: ICD-10-CM

## 2017-04-12 DIAGNOSIS — I42.8 CARDIOMYOPATHY, NONISCHEMIC (HCC): ICD-10-CM

## 2017-04-12 DIAGNOSIS — I48.91 ATRIAL FIBRILLATION, UNSPECIFIED TYPE (HCC): Primary | ICD-10-CM

## 2017-04-13 ENCOUNTER — ANTI-COAG VISIT (OUTPATIENT)
Dept: CARDIOLOGY CLINIC | Age: 72
End: 2017-04-13

## 2017-04-13 ENCOUNTER — TELEPHONE (OUTPATIENT)
Dept: CARDIOLOGY CLINIC | Age: 72
End: 2017-04-13

## 2017-04-13 DIAGNOSIS — Z79.01 LONG TERM CURRENT USE OF ANTICOAGULANT THERAPY: Primary | ICD-10-CM

## 2017-04-13 DIAGNOSIS — I48.19 PERSISTENT ATRIAL FIBRILLATION (HCC): ICD-10-CM

## 2017-04-13 LAB
INR BLD: 1.9
PT POC: 22.4 SEC
VALID INTERNAL CONTROL?: YES

## 2017-04-13 RX ORDER — CIPROFLOXACIN 500 MG/1
TABLET ORAL 2 TIMES DAILY
COMMUNITY
End: 2017-05-01 | Stop reason: ALTCHOICE

## 2017-04-13 RX ORDER — PHENAZOPYRIDINE HYDROCHLORIDE 100 MG/1
TABLET, FILM COATED ORAL
COMMUNITY
End: 2017-05-01 | Stop reason: ALTCHOICE

## 2017-04-13 NOTE — PROGRESS NOTES
Verbal order and read back per Magda Qureshi NP  The INR is slightly below the therapeutic range. Please make the following adjustments to Coumadin dosing: Continue Coumadin 2.5mg daily except 1.25 mg on Tuesday  Repeat the INR in 2 weeks. PCP's office was contacted for appointment, patient leaving this office and headed to Dr. Izquierdo Pap office for appointment.    Patient informed of instructions, read back and verbalized understanding YAIMA Louis LPN

## 2017-04-13 NOTE — TELEPHONE ENCOUNTER
Patient called to report that he was started on Ciprofloxacin 500 mg twice a day and phenazopyridine daily for 2 days. Verbal order and read back per Nicole Stephens NP Change Coumadin to 1.25 mg on Friday and Sun, 2.5 mg other days while on Cipro. Recheck INR on Monday. Patient informed of instructions, read back and verbalized understanding Tesfaye Frye LPN

## 2017-04-17 ENCOUNTER — ANTI-COAG VISIT (OUTPATIENT)
Dept: CARDIOLOGY CLINIC | Age: 72
End: 2017-04-17

## 2017-04-17 DIAGNOSIS — I48.19 PERSISTENT ATRIAL FIBRILLATION (HCC): ICD-10-CM

## 2017-04-17 DIAGNOSIS — Z79.01 LONG TERM CURRENT USE OF ANTICOAGULANT THERAPY: Primary | ICD-10-CM

## 2017-04-17 LAB
INR BLD: 1.8
PT POC: 21.6 SEC
VALID INTERNAL CONTROL?: YES

## 2017-04-17 NOTE — PROGRESS NOTES
Verbal order and read back per Maty Betancourt NP  The INR is below the therapeutic range. Please make the following adjustments to Coumadin dosing:Resume normal dose except 1.25 mg on Thursday this week. Recheck INR in 2 weeks. Patient informed of instructions, read back and verbalized understanding. Dosing calendar also provided.  Alka Tee LPN

## 2017-04-26 ENCOUNTER — HOSPITAL ENCOUNTER (OUTPATIENT)
Dept: INFUSION THERAPY | Age: 72
Discharge: HOME | End: 2017-04-26
Payer: MEDICARE

## 2017-04-26 VITALS
DIASTOLIC BLOOD PRESSURE: 69 MMHG | SYSTOLIC BLOOD PRESSURE: 111 MMHG | OXYGEN SATURATION: 97 % | RESPIRATION RATE: 16 BRPM | TEMPERATURE: 98.6 F

## 2017-04-26 LAB
BASO+EOS+MONOS # BLD AUTO: 0.3 K/UL (ref 0–2.3)
BASO+EOS+MONOS # BLD AUTO: 5 % (ref 0.1–17)
DIFFERENTIAL METHOD BLD: ABNORMAL
ERYTHROCYTE [DISTWIDTH] IN BLOOD BY AUTOMATED COUNT: 18.6 % (ref 11.5–14.5)
HCT VFR BLD AUTO: 34.5 % (ref 36–48)
HGB BLD-MCNC: 11.7 G/DL (ref 12–16)
LYMPHOCYTES # BLD AUTO: 22 % (ref 14–44)
LYMPHOCYTES # BLD: 1.3 K/UL (ref 1.1–5.9)
MCH RBC QN AUTO: 30.7 PG (ref 25–35)
MCHC RBC AUTO-ENTMCNC: 33.9 G/DL (ref 31–37)
MCV RBC AUTO: 90.6 FL (ref 78–102)
NEUTS SEG # BLD: 4.6 K/UL (ref 1.8–9.5)
NEUTS SEG NFR BLD AUTO: 73 % (ref 40–70)
PLATELET # BLD AUTO: 155 K/UL (ref 140–440)
RBC # BLD AUTO: 3.81 M/UL (ref 4.1–5.1)
WBC # BLD AUTO: 6.2 K/UL (ref 4.5–13)

## 2017-04-26 PROCEDURE — 77030012965 HC NDL HUBR BBMI -A

## 2017-04-26 PROCEDURE — 74011250636 HC RX REV CODE- 250/636

## 2017-04-26 PROCEDURE — 85025 COMPLETE CBC W/AUTO DIFF WBC: CPT | Performed by: INTERNAL MEDICINE

## 2017-04-26 PROCEDURE — 36591 DRAW BLOOD OFF VENOUS DEVICE: CPT

## 2017-04-26 RX ORDER — HEPARIN SODIUM (PORCINE) LOCK FLUSH IV SOLN 100 UNIT/ML 100 UNIT/ML
500 SOLUTION INTRAVENOUS AS NEEDED
Status: ACTIVE | OUTPATIENT
Start: 2017-04-26 | End: 2017-04-27

## 2017-04-26 RX ORDER — SODIUM CHLORIDE 0.9 % (FLUSH) 0.9 %
10-40 SYRINGE (ML) INJECTION AS NEEDED
Status: DISPENSED | OUTPATIENT
Start: 2017-04-26 | End: 2017-04-26

## 2017-04-26 RX ORDER — HEPARIN SODIUM (PORCINE) LOCK FLUSH IV SOLN 100 UNIT/ML 100 UNIT/ML
SOLUTION INTRAVENOUS
Status: COMPLETED
Start: 2017-04-26 | End: 2017-04-26

## 2017-04-26 RX ADMIN — Medication 20 ML: at 08:41

## 2017-04-26 RX ADMIN — HEPARIN SODIUM (PORCINE) LOCK FLUSH IV SOLN 100 UNIT/ML 500 UNITS: 100 SOLUTION at 08:40

## 2017-05-01 ENCOUNTER — ANTI-COAG VISIT (OUTPATIENT)
Dept: CARDIOLOGY CLINIC | Age: 72
End: 2017-05-01

## 2017-05-01 DIAGNOSIS — I48.91 ATRIAL FIBRILLATION, UNSPECIFIED TYPE (HCC): ICD-10-CM

## 2017-05-01 DIAGNOSIS — Z79.01 LONG TERM CURRENT USE OF ANTICOAGULANT THERAPY: Primary | ICD-10-CM

## 2017-05-01 LAB
INR BLD: 1.8
PT POC: 21.6 SEC
VALID INTERNAL CONTROL?: YES

## 2017-05-01 NOTE — PROGRESS NOTES
Verbal order and read back per Maty Betancourt NP  The INR is stable and therapeutic.  Continue same dose of coumadin and recheck in 2 weeks  Change Coumadin to 2.5 mg daily  Patient informed of instructions, read back and verbalized understanding Alka Tee LPN

## 2017-05-15 ENCOUNTER — ANTI-COAG VISIT (OUTPATIENT)
Dept: CARDIOLOGY CLINIC | Age: 72
End: 2017-05-15

## 2017-05-15 DIAGNOSIS — Z79.01 LONG TERM CURRENT USE OF ANTICOAGULANT THERAPY: Primary | ICD-10-CM

## 2017-05-15 DIAGNOSIS — I48.91 ATRIAL FIBRILLATION, UNSPECIFIED TYPE (HCC): ICD-10-CM

## 2017-05-15 LAB
INR BLD: 1.6
PT POC: 19.7 SEC
VALID INTERNAL CONTROL?: YES

## 2017-05-15 RX ORDER — WARFARIN 2.5 MG/1
TABLET ORAL
Qty: 45 TAB | Refills: 0 | Status: SHIPPED | OUTPATIENT
Start: 2017-05-15 | End: 2017-06-23 | Stop reason: SDUPTHER

## 2017-05-15 NOTE — PROGRESS NOTES
Verbal order and read back per Oleg Elaine NP  The INR is below the therapeutic range. Please make the following adjustments to Coumadin dosing: Take 5 mg X 1 then Change Coumadin to 2.5 mg daily except 3.75 mg on Mon  Repeat the INR in 2 weeks. Patient informed of instructions, read back and verbalized understanding. Dosing calendar also provided.  Florentino Nicolas LPN

## 2017-05-17 ENCOUNTER — HOSPITAL ENCOUNTER (OUTPATIENT)
Dept: INFUSION THERAPY | Age: 72
Discharge: HOME | End: 2017-05-17
Payer: MEDICARE

## 2017-05-17 VITALS
RESPIRATION RATE: 16 BRPM | OXYGEN SATURATION: 98 % | DIASTOLIC BLOOD PRESSURE: 72 MMHG | TEMPERATURE: 98.5 F | SYSTOLIC BLOOD PRESSURE: 125 MMHG

## 2017-05-17 LAB
BASO+EOS+MONOS # BLD AUTO: 0.3 K/UL (ref 0–2.3)
BASO+EOS+MONOS # BLD AUTO: 6 % (ref 0.1–17)
DIFFERENTIAL METHOD BLD: ABNORMAL
ERYTHROCYTE [DISTWIDTH] IN BLOOD BY AUTOMATED COUNT: 17.8 % (ref 11.5–14.5)
HCT VFR BLD AUTO: 35.4 % (ref 36–48)
HGB BLD-MCNC: 11.7 G/DL (ref 12–16)
LYMPHOCYTES # BLD AUTO: 26 % (ref 14–44)
LYMPHOCYTES # BLD: 1.5 K/UL (ref 1.1–5.9)
MCH RBC QN AUTO: 30.1 PG (ref 25–35)
MCHC RBC AUTO-ENTMCNC: 33.1 G/DL (ref 31–37)
MCV RBC AUTO: 91 FL (ref 78–102)
NEUTS SEG # BLD: 3.9 K/UL (ref 1.8–9.5)
NEUTS SEG NFR BLD AUTO: 69 % (ref 40–70)
PLATELET # BLD AUTO: 144 K/UL (ref 140–440)
RBC # BLD AUTO: 3.89 M/UL (ref 4.1–5.1)
WBC # BLD AUTO: 5.7 K/UL (ref 4.5–13)

## 2017-05-17 PROCEDURE — 85025 COMPLETE CBC W/AUTO DIFF WBC: CPT | Performed by: INTERNAL MEDICINE

## 2017-05-17 PROCEDURE — 96523 IRRIG DRUG DELIVERY DEVICE: CPT

## 2017-05-17 PROCEDURE — 77030012965 HC NDL HUBR BBMI -A

## 2017-05-17 RX ORDER — HEPARIN SODIUM (PORCINE) LOCK FLUSH IV SOLN 100 UNIT/ML 100 UNIT/ML
SOLUTION INTRAVENOUS
Status: DISPENSED
Start: 2017-05-17 | End: 2017-05-17

## 2017-05-17 RX ORDER — HEPARIN SODIUM (PORCINE) LOCK FLUSH IV SOLN 100 UNIT/ML 100 UNIT/ML
500 SOLUTION INTRAVENOUS AS NEEDED
Status: ACTIVE | OUTPATIENT
Start: 2017-05-17 | End: 2017-05-18

## 2017-05-17 RX ORDER — SODIUM CHLORIDE 0.9 % (FLUSH) 0.9 %
10-40 SYRINGE (ML) INJECTION AS NEEDED
Status: DISCONTINUED | OUTPATIENT
Start: 2017-05-17 | End: 2017-05-21 | Stop reason: HOSPADM

## 2017-05-17 NOTE — PROGRESS NOTES
[facility] Progress Note    Date: May 17, 2017    Name: Nabeel Leon    MRN: [id number]         : 1945     Procrit    Mr. Valente Johnson to [facility], ambulatory at [address] [place] [address] accompanied by his wife. Pt was assessed and education was provided. Mr. Maribel Carrera vitals were reviewed and patient was observed for 5 minutes prior to treatment. Visit Vitals    /72 (BP 1 Location: Left arm, BP Patient Position: Sitting)    Temp 98.5 °F (36.9 °C)    Resp 16    SpO2 98%     Per patient request, labs drawn from port today. Right chest mediport accessed with 20G 1 inch Giron needle. Flushed easily and had brisk blood return. Blood drawn off for CBC after 10 mL of waste. Port flushed with 20 mL NS and 500 units of heparin before de-accessing. No irritation or bleeding. Band-aid applied. Recent Results (from the past 12 hour(s))   CBC WITH 3 PART DIFF    Collection Time: 17  8:34 AM   Result Value Ref Range    WBC 5.7 4.5 - 13.0 K/uL    RBC 3.89 (L) 4.10 - 5.10 M/uL    HGB 11.7 (L) 12.0 - 16 g/dL    HCT 35.4 (L) 36 - 48 %    MCV 91.0 78 - 102 FL    MCH 30.1 25.0 - 35.0 PG    MCHC 33.1 31 - 37 g/dL    RDW 17.8 (H) 11.5 - 14.5 %    PLATELET 398 971 - 561 K/uL    NEUTROPHILS 69 40 - 70 %    MIXED CELLS 6 0.1 - 17 %    LYMPHOCYTES 26 14 - 44 %    ABS. NEUTROPHILS 3.9 1.8 - 9.5 K/UL    ABS. MIXED CELLS 0.3 0.0 - 2.3 K/uL    ABS. LYMPHOCYTES 1.5 1.1 - 5.9 K/UL    DF AUTOMATED         Results within ordered parameters to HOLD procrit today. Patient arm band removed and shredded. He is to return on 2017 at 0830 for his next procrit appointment.      Allyson Eddy RN  May 17, 2017

## 2017-05-31 ENCOUNTER — ANTI-COAG VISIT (OUTPATIENT)
Dept: CARDIOLOGY CLINIC | Age: 72
End: 2017-05-31

## 2017-05-31 DIAGNOSIS — I48.91 ATRIAL FIBRILLATION, UNSPECIFIED TYPE (HCC): ICD-10-CM

## 2017-05-31 DIAGNOSIS — Z79.01 LONG TERM CURRENT USE OF ANTICOAGULANT THERAPY: Primary | ICD-10-CM

## 2017-05-31 LAB
INR BLD: 2.2
PT POC: 25.9 SECONDS
VALID INTERNAL CONTROL?: YES

## 2017-06-05 ENCOUNTER — TELEPHONE (OUTPATIENT)
Dept: CARDIOLOGY CLINIC | Age: 72
End: 2017-06-05

## 2017-06-05 NOTE — TELEPHONE ENCOUNTER
Pt called wanting to know if he could pressure wash his home. Per [employer] he can pressure wash his home.

## 2017-06-07 ENCOUNTER — HOSPITAL ENCOUNTER (OUTPATIENT)
Dept: INFUSION THERAPY | Age: 72
Discharge: HOME | End: 2017-06-07
Payer: MEDICARE

## 2017-06-07 VITALS
TEMPERATURE: 98.2 F | OXYGEN SATURATION: 99 % | DIASTOLIC BLOOD PRESSURE: 79 MMHG | SYSTOLIC BLOOD PRESSURE: 139 MMHG | HEART RATE: 69 BPM | RESPIRATION RATE: 18 BRPM

## 2017-06-07 LAB
BASO+EOS+MONOS # BLD AUTO: 0.7 K/UL (ref 0–2.3)
BASO+EOS+MONOS # BLD AUTO: 12 % (ref 0.1–17)
DIFFERENTIAL METHOD BLD: ABNORMAL
ERYTHROCYTE [DISTWIDTH] IN BLOOD BY AUTOMATED COUNT: 16.8 % (ref 11.5–14.5)
HCT VFR BLD AUTO: 37.3 % (ref 36–48)
HGB BLD-MCNC: 12.6 G/DL (ref 12–16)
LYMPHOCYTES # BLD AUTO: 24 % (ref 14–44)
LYMPHOCYTES # BLD: 1.4 K/UL (ref 1.1–5.9)
MCH RBC QN AUTO: 30.8 PG (ref 25–35)
MCHC RBC AUTO-ENTMCNC: 33.8 G/DL (ref 31–37)
MCV RBC AUTO: 91.2 FL (ref 78–102)
NEUTS SEG # BLD: 3.7 K/UL (ref 1.8–9.5)
NEUTS SEG NFR BLD AUTO: 64 % (ref 40–70)
PLATELET # BLD AUTO: 157 K/UL (ref 140–440)
RBC # BLD AUTO: 4.09 M/UL (ref 4.1–5.1)
WBC # BLD AUTO: 5.8 K/UL (ref 4.5–13)

## 2017-06-07 PROCEDURE — 74011250636 HC RX REV CODE- 250/636

## 2017-06-07 PROCEDURE — 77030012965 HC NDL HUBR BBMI -A

## 2017-06-07 PROCEDURE — 36591 DRAW BLOOD OFF VENOUS DEVICE: CPT

## 2017-06-07 PROCEDURE — 85025 COMPLETE CBC W/AUTO DIFF WBC: CPT | Performed by: INTERNAL MEDICINE

## 2017-06-07 RX ORDER — HEPARIN SODIUM (PORCINE) LOCK FLUSH IV SOLN 100 UNIT/ML 100 UNIT/ML
500 SOLUTION INTRAVENOUS AS NEEDED
Status: ACTIVE | OUTPATIENT
Start: 2017-06-07 | End: 2017-06-08

## 2017-06-07 RX ORDER — SODIUM CHLORIDE 0.9 % (FLUSH) 0.9 %
10-40 SYRINGE (ML) INJECTION AS NEEDED
Status: DISPENSED | OUTPATIENT
Start: 2017-06-07 | End: 2017-06-07

## 2017-06-07 RX ORDER — HEPARIN SODIUM (PORCINE) LOCK FLUSH IV SOLN 100 UNIT/ML 100 UNIT/ML
SOLUTION INTRAVENOUS
Status: COMPLETED
Start: 2017-06-07 | End: 2017-06-07

## 2017-06-07 RX ADMIN — HEPARIN SODIUM (PORCINE) LOCK FLUSH IV SOLN 100 UNIT/ML 500 UNITS: 100 SOLUTION at 08:29

## 2017-06-07 RX ADMIN — Medication 20 ML: at 08:29

## 2017-06-12 RX ORDER — CLONIDINE HYDROCHLORIDE 0.2 MG/1
TABLET ORAL
Qty: 270 TAB | Refills: 2 | Status: SHIPPED | OUTPATIENT
Start: 2017-06-12 | End: 2017-06-14 | Stop reason: SDUPTHER

## 2017-06-14 RX ORDER — CLONIDINE HYDROCHLORIDE 0.2 MG/1
TABLET ORAL
Qty: 270 TAB | Refills: 2 | Status: SHIPPED | OUTPATIENT
Start: 2017-06-14 | End: 2017-10-17 | Stop reason: SDUPTHER

## 2017-06-21 ENCOUNTER — ANTI-COAG VISIT (OUTPATIENT)
Dept: CARDIOLOGY CLINIC | Age: 72
End: 2017-06-21

## 2017-06-21 DIAGNOSIS — Z79.01 LONG TERM CURRENT USE OF ANTICOAGULANT THERAPY: Primary | ICD-10-CM

## 2017-06-21 DIAGNOSIS — I48.91 ATRIAL FIBRILLATION, UNSPECIFIED TYPE (HCC): ICD-10-CM

## 2017-06-21 LAB
INR BLD: 2
PT POC: 24 SECONDS
VALID INTERNAL CONTROL?: YES

## 2017-06-26 RX ORDER — WARFARIN 2.5 MG/1
TABLET ORAL
Qty: 45 TAB | Refills: 0 | Status: SHIPPED | OUTPATIENT
Start: 2017-06-26 | End: 2017-08-22 | Stop reason: SDUPTHER

## 2017-06-26 RX ORDER — WARFARIN 2.5 MG/1
TABLET ORAL
Qty: 45 TAB | Refills: 0 | Status: SHIPPED | OUTPATIENT
Start: 2017-06-26 | End: 2018-07-15 | Stop reason: SDUPTHER

## 2017-06-27 RX ORDER — HEPARIN SODIUM (PORCINE) LOCK FLUSH IV SOLN 100 UNIT/ML 100 UNIT/ML
500 SOLUTION INTRAVENOUS AS NEEDED
Status: DISCONTINUED | OUTPATIENT
Start: 2017-06-28 | End: 2017-07-01 | Stop reason: HOSPADM

## 2017-06-27 RX ORDER — SODIUM CHLORIDE 0.9 % (FLUSH) 0.9 %
10-40 SYRINGE (ML) INJECTION AS NEEDED
Status: DISCONTINUED | OUTPATIENT
Start: 2017-06-28 | End: 2017-07-01 | Stop reason: HOSPADM

## 2017-06-28 ENCOUNTER — HOSPITAL ENCOUNTER (OUTPATIENT)
Dept: INFUSION THERAPY | Age: 72
Discharge: HOME | End: 2017-06-28
Payer: MEDICARE

## 2017-06-28 VITALS
TEMPERATURE: 98 F | SYSTOLIC BLOOD PRESSURE: 130 MMHG | DIASTOLIC BLOOD PRESSURE: 75 MMHG | HEART RATE: 76 BPM | RESPIRATION RATE: 16 BRPM

## 2017-06-28 LAB
BASO+EOS+MONOS # BLD AUTO: 0.7 K/UL (ref 0–2.3)
BASO+EOS+MONOS # BLD AUTO: 10 % (ref 0.1–17)
DIFFERENTIAL METHOD BLD: ABNORMAL
ERYTHROCYTE [DISTWIDTH] IN BLOOD BY AUTOMATED COUNT: 16.8 % (ref 11.5–14.5)
HCT VFR BLD AUTO: 37.6 % (ref 36–48)
HGB BLD-MCNC: 12.5 G/DL (ref 12–16)
LYMPHOCYTES # BLD AUTO: 20 % (ref 14–44)
LYMPHOCYTES # BLD: 1.5 K/UL (ref 1.1–5.9)
MCH RBC QN AUTO: 30.5 PG (ref 25–35)
MCHC RBC AUTO-ENTMCNC: 33.2 G/DL (ref 31–37)
MCV RBC AUTO: 91.7 FL (ref 78–102)
NEUTS SEG # BLD: 5.1 K/UL (ref 1.8–9.5)
NEUTS SEG NFR BLD AUTO: 70 % (ref 40–70)
PLATELET # BLD AUTO: 137 K/UL (ref 140–440)
RBC # BLD AUTO: 4.1 M/UL (ref 4.1–5.1)
WBC # BLD AUTO: 7.3 K/UL (ref 4.5–13)

## 2017-06-28 PROCEDURE — 74011250636 HC RX REV CODE- 250/636

## 2017-06-28 PROCEDURE — 85025 COMPLETE CBC W/AUTO DIFF WBC: CPT | Performed by: INTERNAL MEDICINE

## 2017-06-28 PROCEDURE — 36591 DRAW BLOOD OFF VENOUS DEVICE: CPT

## 2017-06-28 PROCEDURE — 77030012965 HC NDL HUBR BBMI -A

## 2017-06-28 RX ORDER — HEPARIN SODIUM (PORCINE) LOCK FLUSH IV SOLN 100 UNIT/ML 100 UNIT/ML
SOLUTION INTRAVENOUS
Status: COMPLETED
Start: 2017-06-28 | End: 2017-06-28

## 2017-06-28 RX ADMIN — HEPARIN SODIUM (PORCINE) LOCK FLUSH IV SOLN 100 UNIT/ML 500 UNITS: 100 SOLUTION at 09:15

## 2017-06-28 RX ADMIN — Medication 30 ML: at 09:15

## 2017-07-13 RX ORDER — DIGOXIN 125 MCG
TABLET ORAL
Qty: 90 TAB | Refills: 3 | Status: SHIPPED | OUTPATIENT
Start: 2017-07-13 | End: 2018-07-10 | Stop reason: SDUPTHER

## 2017-07-19 ENCOUNTER — HOSPITAL ENCOUNTER (OUTPATIENT)
Dept: INFUSION THERAPY | Age: 72
Discharge: HOME | End: 2017-07-19
Payer: MEDICARE

## 2017-07-19 ENCOUNTER — OFFICE VISIT (OUTPATIENT)
Dept: CARDIOLOGY CLINIC | Age: 72
End: 2017-07-19

## 2017-07-19 ENCOUNTER — ANTI-COAG VISIT (OUTPATIENT)
Dept: CARDIOLOGY CLINIC | Age: 72
End: 2017-07-19

## 2017-07-19 VITALS
DIASTOLIC BLOOD PRESSURE: 80 MMHG | OXYGEN SATURATION: 98 % | RESPIRATION RATE: 18 BRPM | HEART RATE: 80 BPM | SYSTOLIC BLOOD PRESSURE: 132 MMHG | TEMPERATURE: 97.6 F

## 2017-07-19 DIAGNOSIS — I48.91 ATRIAL FIBRILLATION, UNSPECIFIED TYPE (HCC): ICD-10-CM

## 2017-07-19 DIAGNOSIS — Z95.810 AICD (AUTOMATIC CARDIOVERTER/DEFIBRILLATOR) PRESENT: ICD-10-CM

## 2017-07-19 DIAGNOSIS — Z79.01 LONG TERM CURRENT USE OF ANTICOAGULANT THERAPY: Primary | ICD-10-CM

## 2017-07-19 DIAGNOSIS — I42.8 CARDIOMYOPATHY, NONISCHEMIC (HCC): Primary | ICD-10-CM

## 2017-07-19 LAB
BASO+EOS+MONOS # BLD AUTO: 0.7 K/UL (ref 0–2.3)
BASO+EOS+MONOS # BLD AUTO: 11 % (ref 0.1–17)
DIFFERENTIAL METHOD BLD: ABNORMAL
ERYTHROCYTE [DISTWIDTH] IN BLOOD BY AUTOMATED COUNT: 16.5 % (ref 11.5–14.5)
HCT VFR BLD AUTO: 40.3 % (ref 36–48)
HGB BLD-MCNC: 13.7 G/DL (ref 12–16)
INR BLD: 3.5
LYMPHOCYTES # BLD AUTO: 24 % (ref 14–44)
LYMPHOCYTES # BLD: 1.5 K/UL (ref 1.1–5.9)
MCH RBC QN AUTO: 30.9 PG (ref 25–35)
MCHC RBC AUTO-ENTMCNC: 34 G/DL (ref 31–37)
MCV RBC AUTO: 91 FL (ref 78–102)
NEUTS SEG # BLD: 4.1 K/UL (ref 1.8–9.5)
NEUTS SEG NFR BLD AUTO: 65 % (ref 40–70)
PLATELET # BLD AUTO: 157 K/UL (ref 140–440)
PT POC: 41.5 SECONDS
RBC # BLD AUTO: 4.43 M/UL (ref 4.1–5.1)
VALID INTERNAL CONTROL?: YES
WBC # BLD AUTO: 6.3 K/UL (ref 4.5–13)

## 2017-07-19 PROCEDURE — 36591 DRAW BLOOD OFF VENOUS DEVICE: CPT

## 2017-07-19 PROCEDURE — 74011250636 HC RX REV CODE- 250/636

## 2017-07-19 PROCEDURE — 85025 COMPLETE CBC W/AUTO DIFF WBC: CPT | Performed by: INTERNAL MEDICINE

## 2017-07-19 RX ORDER — HEPARIN SODIUM (PORCINE) LOCK FLUSH IV SOLN 100 UNIT/ML 100 UNIT/ML
SOLUTION INTRAVENOUS
Status: COMPLETED
Start: 2017-07-19 | End: 2017-07-19

## 2017-07-19 RX ORDER — SODIUM CHLORIDE 0.9 % (FLUSH) 0.9 %
10-40 SYRINGE (ML) INJECTION AS NEEDED
Status: DISCONTINUED | OUTPATIENT
Start: 2017-07-19 | End: 2017-07-22 | Stop reason: HOSPADM

## 2017-07-19 RX ORDER — HEPARIN SODIUM (PORCINE) LOCK FLUSH IV SOLN 100 UNIT/ML 100 UNIT/ML
500 SOLUTION INTRAVENOUS AS NEEDED
Status: ACTIVE | OUTPATIENT
Start: 2017-07-19 | End: 2017-07-20

## 2017-07-19 RX ADMIN — HEPARIN SODIUM (PORCINE) LOCK FLUSH IV SOLN 100 UNIT/ML 500 UNITS: 100 SOLUTION at 08:24

## 2017-07-19 RX ADMIN — Medication 30 ML: at 08:24

## 2017-07-19 NOTE — PROGRESS NOTES
[address] [facility] Progress Note    Date: 2017    Name: Kandy Sewell    MRN: [id number]         : 1945     Procrit    Mr. Teja Kunz to [address], ambulatory at [address] [place] [address] accompanied by his wife. Pt was assessed and education was provided. Mr. Damaris Ernandez vitals were reviewed and patient was observed for 5 minutes prior to treatment. Visit Vitals    /80 (BP 1 Location: Left arm, BP Patient Position: Sitting)    Pulse 80    Temp 97.6 °F (36.4 °C)    Resp 18    SpO2 98%     Right chest mediport accessed with 20G 1 inch Giron needle. Flushed easily and had brisk blood return. Blood drawn off for CBC after 10 mL of waste. Port flushed with 20 mL NS and 500 units of heparin before de-accessing. No irritation or bleeding. Band-aid applied. Recent Results (from the past 12 hour(s))   CBC WITH 3 PART DIFF    Collection Time: 17  8:20 AM   Result Value Ref Range    WBC 6.3 4.5 - 13.0 K/uL    RBC 4.43 4.10 - 5.10 M/uL    HGB 13.7 12.0 - 16 g/dL    HCT 40.3 36 - 48 %    MCV 91.0 78 - 102 FL    MCH 30.9 25.0 - 35.0 PG    MCHC 34.0 31 - 37 g/dL    RDW 16.5 (H) 11.5 - 14.5 %    PLATELET 816 194 - 314 K/uL    NEUTROPHILS 65 40 - 70 %    MIXED CELLS 11 0.1 - 17 %    LYMPHOCYTES 24 14 - 44 %    ABS. NEUTROPHILS 4.1 1.8 - 9.5 K/UL    ABS. MIXED CELLS 0.7 0.0 - 2.3 K/uL    ABS. LYMPHOCYTES 1.5 1.1 - 5.9 K/UL    DF AUTOMATED       Results within ordered parameters to HOLD procrit today. Patient arm band removed and shredded. He is to return on 2017 at 0800 for his next procrit appointment.      Jayjay Segura RN  2017

## 2017-07-19 NOTE — PROGRESS NOTES
The INR is above the therapeutic range. Ask the patient about bleeding complications.   Please make the following adjustments to Coumadin dosing: hold today then continue Coumadin to 2.5 mg daily except 3.75 mg on Mon  Repeat the INR in 5 weeks with appt

## 2017-07-25 RX ORDER — METOLAZONE 2.5 MG/1
2.5 TABLET ORAL AS NEEDED
Qty: 15 TAB | Refills: 1 | Status: SHIPPED | OUTPATIENT
Start: 2017-07-25 | End: 2017-11-22 | Stop reason: SDUPTHER

## 2017-07-27 RX ORDER — POTASSIUM CHLORIDE 20 MEQ/1
20 TABLET, EXTENDED RELEASE ORAL 2 TIMES DAILY
Qty: 180 TAB | Refills: 3 | Status: SHIPPED | OUTPATIENT
Start: 2017-07-27 | End: 2018-07-24 | Stop reason: SDUPTHER

## 2017-07-31 NOTE — PROGRESS NOTES
I have personally seen and evaluated the device findings. Interrogation reviewed and I agree with assessment.     Amy Laurent

## 2017-08-09 ENCOUNTER — HOSPITAL ENCOUNTER (OUTPATIENT)
Dept: INFUSION THERAPY | Age: 72
Discharge: HOME | End: 2017-08-09
Payer: MEDICARE

## 2017-08-09 VITALS
HEART RATE: 83 BPM | OXYGEN SATURATION: 99 % | TEMPERATURE: 98.5 F | DIASTOLIC BLOOD PRESSURE: 75 MMHG | SYSTOLIC BLOOD PRESSURE: 129 MMHG | RESPIRATION RATE: 18 BRPM

## 2017-08-09 LAB
BASO+EOS+MONOS # BLD AUTO: 0.9 K/UL (ref 0–2.3)
BASO+EOS+MONOS # BLD AUTO: 12 % (ref 0.1–17)
DIFFERENTIAL METHOD BLD: ABNORMAL
ERYTHROCYTE [DISTWIDTH] IN BLOOD BY AUTOMATED COUNT: 16.3 % (ref 11.5–14.5)
HCT VFR BLD AUTO: 39.7 % (ref 36–48)
HGB BLD-MCNC: 13.5 G/DL (ref 12–16)
LYMPHOCYTES # BLD AUTO: 23 % (ref 14–44)
LYMPHOCYTES # BLD: 1.6 K/UL (ref 1.1–5.9)
MCH RBC QN AUTO: 30.9 PG (ref 25–35)
MCHC RBC AUTO-ENTMCNC: 34 G/DL (ref 31–37)
MCV RBC AUTO: 90.8 FL (ref 78–102)
NEUTS SEG # BLD: 4.7 K/UL (ref 1.8–9.5)
NEUTS SEG NFR BLD AUTO: 65 % (ref 40–70)
PLATELET # BLD AUTO: 158 K/UL (ref 140–440)
RBC # BLD AUTO: 4.37 M/UL (ref 4.1–5.1)
WBC # BLD AUTO: 7.2 K/UL (ref 4.5–13)

## 2017-08-09 PROCEDURE — 36415 COLL VENOUS BLD VENIPUNCTURE: CPT

## 2017-08-09 PROCEDURE — 85025 COMPLETE CBC W/AUTO DIFF WBC: CPT | Performed by: INTERNAL MEDICINE

## 2017-08-09 PROCEDURE — 74011250636 HC RX REV CODE- 250/636: Performed by: INTERNAL MEDICINE

## 2017-08-09 RX ORDER — HEPARIN SODIUM (PORCINE) LOCK FLUSH IV SOLN 100 UNIT/ML 100 UNIT/ML
500 SOLUTION INTRAVENOUS AS NEEDED
Status: DISPENSED | OUTPATIENT
Start: 2017-08-09 | End: 2017-08-10

## 2017-08-09 RX ORDER — SODIUM CHLORIDE 0.9 % (FLUSH) 0.9 %
10-40 SYRINGE (ML) INJECTION AS NEEDED
Status: DISCONTINUED | OUTPATIENT
Start: 2017-08-09 | End: 2017-08-13 | Stop reason: HOSPADM

## 2017-08-09 RX ADMIN — Medication 20 ML: at 08:13

## 2017-08-09 RX ADMIN — HEPARIN SODIUM (PORCINE) LOCK FLUSH IV SOLN 100 UNIT/ML 500 UNITS: 100 SOLUTION at 08:13

## 2017-08-09 NOTE — PROGRESS NOTES
[facility] Progress Note    Date: 2017    Name: Frances Patricia    MRN: [id number]         : 1945     Procrit    Mr. Yung Kojo to [facility], ambulatory at [address] [place] [address] accompanied by his wife. Pt was assessed and education was provided. Mr. Katy Tovar vitals were reviewed and patient was observed for 5 minutes prior to treatment. Visit Vitals    /75 (BP 1 Location: Left arm, BP Patient Position: At rest)    Pulse 83    Temp 98.5 °F (36.9 °C)    Resp 18    SpO2 99%     Right chest mediport accessed with 20G 1 inch Giron needle. Flushed easily and had brisk blood return. Blood drawn off for CBC after 10 mL of waste. Port flushed with 20 mL NS and 500 units of heparin before de-accessing. No irritation or bleeding. Band-aid applied. Recent Results (from the past 12 hour(s))   CBC WITH 3 PART DIFF    Collection Time: 17  8:20 AM   Result Value Ref Range    WBC 7.2 4.5 - 13.0 K/uL    RBC 4.37 4.10 - 5.10 M/uL    HGB 13.5 12.0 - 16 g/dL    HCT 39.7 36 - 48 %    MCV 90.8 78 - 102 FL    MCH 30.9 25.0 - 35.0 PG    MCHC 34.0 31 - 37 g/dL    RDW 16.3 (H) 11.5 - 14.5 %    PLATELET 212 719 - 632 K/uL    NEUTROPHILS 65 40 - 70 %    MIXED CELLS 12 0.1 - 17 %    LYMPHOCYTES 23 14 - 44 %    ABS. NEUTROPHILS 4.7 1.8 - 9.5 K/UL    ABS. MIXED CELLS 0.9 0.0 - 2.3 K/uL    ABS. LYMPHOCYTES 1.6 1.1 - 5.9 K/UL    DF AUTOMATED       Results within ordered parameters to HOLD procrit today. Patient arm band removed and shredded. He is to return on 2017 at 0800 for his next procrit appointment.      Sharron Arzate RN  2017

## 2017-08-22 ENCOUNTER — OFFICE VISIT (OUTPATIENT)
Dept: CARDIOLOGY CLINIC | Age: 72
End: 2017-08-22

## 2017-08-22 ENCOUNTER — ANTI-COAG VISIT (OUTPATIENT)
Dept: CARDIOLOGY CLINIC | Age: 72
End: 2017-08-22

## 2017-08-22 VITALS
HEART RATE: 55 BPM | DIASTOLIC BLOOD PRESSURE: 74 MMHG | OXYGEN SATURATION: 97 % | RESPIRATION RATE: 16 BRPM | WEIGHT: 194 LBS | HEIGHT: 73 IN | SYSTOLIC BLOOD PRESSURE: 128 MMHG | BODY MASS INDEX: 25.71 KG/M2

## 2017-08-22 DIAGNOSIS — I42.8 CARDIOMYOPATHY, NONISCHEMIC (HCC): Primary | ICD-10-CM

## 2017-08-22 DIAGNOSIS — I10 ESSENTIAL HYPERTENSION: ICD-10-CM

## 2017-08-22 DIAGNOSIS — I48.91 ATRIAL FIBRILLATION, UNSPECIFIED TYPE (HCC): ICD-10-CM

## 2017-08-22 DIAGNOSIS — Z79.01 LONG TERM CURRENT USE OF ANTICOAGULANT THERAPY: Primary | ICD-10-CM

## 2017-08-22 DIAGNOSIS — Z95.810 AUTOMATIC IMPLANTABLE CARDIOVERTER-DEFIBRILLATOR IN SITU: ICD-10-CM

## 2017-08-22 DIAGNOSIS — C49.21 SOFT TISSUE SARCOMA OF RIGHT THIGH (HCC): ICD-10-CM

## 2017-08-22 DIAGNOSIS — I34.0 NON-RHEUMATIC MITRAL REGURGITATION: ICD-10-CM

## 2017-08-22 LAB
INR BLD: 2.4
PT POC: 29.1 SECONDS
VALID INTERNAL CONTROL?: YES

## 2017-08-22 NOTE — PATIENT INSTRUCTIONS
Continue current medications.   If you have any further questions or concerns, please contact our office. [phone]

## 2017-08-22 NOTE — MR AVS SNAPSHOT
Visit Information Date & Time Provider Department Dept. Phone Encounter #  
 8/22/2017  8:20 AM Piper Fry MD [facility] [address] [id number] Your Appointments 9/20/2017  8:10 AM  
ANTICOAG NURSE with Pt Inr [facility] ([facility] [address]) Appt Note: 4 week INR  
 [place] [address] [address] [address] [phone] Umer Montgomeryca  
  
    
 10/26/2017 10:15 AM  
PROCEDURE with Pacer [facility] ([facility] [address]) Appt Note: 1 year check [facility] [address] [address] [address] [phone] [address] [address]  
  
    
 5/22/2018  8:40 AM  
Follow Up with Piper Fry MD  
[facility] ([address] [place] [address] Road) Appt Note: 9 month follow up [facility] [address] [address] [id number] [phone] 2300 [facility] [address] Upcoming Health Maintenance Date Due Hepatitis C Screening 1945 DTaP/Tdap/Td series (1 - Tdap) 8/21/1967 FOBT Q 1 YEAR AGE 50-75 8/21/1996 ZOSTER VACCINE AGE 60> 6/21/2006 GLAUCOMA SCREENING Q2Y 8/21/2011 MEDICARE YEARLY EXAM 8/21/2011 INFLUENZA AGE 9 TO ADULT 8/1/2017 Allergies as of 8/22/2017  Review Complete On: 8/9/2017 By: Obed Sandoval RN No Known Allergies Current Immunizations  Reviewed on 7/19/2017 Name Date Influenza Vaccine 11/9/2016, 10/2/2015, 9/28/2015 12:00 AM  
 Pneumococcal Conjugate (PCV-13) 9/28/2015 Pneumococcal Polysaccharide (PPSV-23) 8/13/2012 Not reviewed this visit You Were Diagnosed With   
  
 Codes Comments Atrial fibrillation, unspecified type ([facility] [address]    -  Primary ICD-10-CM: I48.91 
ICD-9-CM: 427.31 Vitals BP Pulse Resp Height(growth percentile) Weight(growth percentile) SpO2  
 128/74 (BP 1 Location: Right arm, BP Patient Position: Sitting) (!) 55 16 6' 0.99\" (1.854 m) 194 lb (88 kg) 97% BMI Smoking Status 25.6 kg/m2 Never Smoker BMI and BSA Data Body Mass Index Body Surface Area  
 25.6 kg/m 2 2.13 m 2 Preferred Pharmacy Pharmacy Name Phone [id number]- [address] Your Updated Medication List  
  
   
This list is accurate as of: 8/22/17  8:50 AM.  Always use your most recent med list.  
  
  
  
  
 allopurinol 300 mg tablet Commonly known as:  Ardeen Shaw Take  by mouth daily. ASPIR-81 81 mg tablet Generic drug:  aspirin delayed-release Take 81 mg by mouth daily. carvedilol 25 mg tablet Commonly known as:  COREG  
TAKE 1 TABLET BY MOUTH TWICE A DAY WITH MEALS  
  
 cloNIDine HCl 0.2 mg tablet Commonly known as:  CATAPRES  
TAKE 1 TABLET BY MOUTH 3 TIMES A DAY  
  
 digoxin 0.125 mg tablet Commonly known as:  LANOXIN  
TAKE 1 TABLET BY MOUTH EVERY DAY  
  
 furosemide 40 mg tablet Commonly known as:  LASIX Take 1 Tab by mouth two (2) times a day. Or directed IRON (DRIED) PO Take 325 mg by mouth daily. lisinopril 20 mg tablet Commonly known as:  PRINIVIL, ZESTRIL  
TAKE ONE TABLET BY MOUTH TWICE DAILY  
  
 magnesium oxide 400 mg tablet Commonly known as:  MAG-OX  
TAKE 1 TABLET BY MOUTH DAILY. metOLazone 2.5 mg tablet Commonly known as:  Luvenia Bunde Take 1 Tab by mouth as needed. Take 30 minutes prior to morning Lasix as directed only MIRALAX 17 gram packet Generic drug:  polyethylene glycol Take 17 g by mouth daily. oxyCODONE-acetaminophen 5-325 mg per tablet Commonly known as:  PERCOCET Take 1 Tab by mouth every four (4) hours as needed for Pain. Max Daily Amount: 6 Tabs. potassium chloride 20 mEq tablet Commonly known as:  K-DUR, KLOR-CON Take 1 Tab by mouth two (2) times a day. warfarin 2.5 mg tablet Commonly known as:  COUMADIN  
TAKE ONE TABLET BY MOUTH ONCE DAILY OR  AS  DIRECTED  BY  YOUR  DOCTOR  
  
 ZOLOFT 25 mg tablet Generic drug:  sertraline Take  by mouth daily. We Performed the Following AMB POC EKG ROUTINE W/ 12 LEADS, INTER & REP [38411 CPT(R)] To-Do List   
 08/30/2017 8:00 AM  
  Appointment with [facility] FA[facility] TRACK NURSE at [facility] ([phone])  
  
 09/20/2017 8:00 AM  
  Appointment with [facility] FAST TRACK NURSE at [facility] ([phone]) Patient Instructions Continue current medications. If you have any further questions or concerns, please contact our office. [phone] Introducing [facility]! Lizeth Franklin introduces [employer] patient portal. Now you can access parts of your medical record, email your doctor's office, and request medication refills online. 1. In your internet browser, go to [email or web address] 2. Click on the First Time User? Click Here link in the Sign In box. You will see the New Member Sign Up page. 3. Enter your [employer] Access Code exactly as it appears below. You will not need to use this code after youve completed the sign-up process. If you do not sign up before the expiration date, you must request a new code. · [employer] Access Code: [email or web address] Expires: 9/4/2017  7:53 AM 
 
4. Enter the last four digits of your Social Security Number (xxxx) and Date of Birth (mm/dd/yyyy) as indicated and click Submit. You will be taken to the next sign-up page. 5. Create a [employer] ID. This will be your [employer] login ID and cannot be changed, so think of one that is secure and easy to remember. 6. Create a [employer] password. You can change your password at any time. 7. Enter your Password Reset Question and Answer.  This can be used at a later time if you forget your password. 8. Enter your e-mail address. You will receive e-mail notification when new information is available in [address] 9. Click Sign Up. You can now view and download portions of your medical record. 10. Click the Download Summary menu link to download a portable copy of your medical information. If you have questions, please visit the Frequently Asked Questions section of the [employer] website. Remember, [employer] is NOT to be used for urgent needs. For medical emergencies, dial 911. Now available from your iPhone and Android! Please provide this summary of care documentation to your next provider. Your primary care clinician is listed as Ashley Haley. If you have any questions after today's visit, please call [phone].

## 2017-08-22 NOTE — PROGRESS NOTES
HISTORY OF PRESENT ILLNESS  Xander Ivy is a 71 y.o. male. ASSESSMENT and PLAN    Mr. Riya Gross has history of nonischemic cardiomyopathy as well as severe pulmonary hypertension with moderate mitral regurgitation. Because of severe LV dysfunction, he has AICD in place for primary prevention of sudden cardiac death. He also has persistent atrial fibrillation. 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 His understanding is that the edges were clear. He had lost significant amount of weight, over 30 pounds. He had weighed 193 pounds back in October 2015. He is weight is now back to baseline at 194 pounds. His last MUGA scan in November of 2015 showed ejection fraction of 45%. His echocardiogram in September of 2015 showed ejection fraction of 45-50% with pulmonary hypertension with PA pressure of 45 mmHg.  CAD:   He has no documented history of CAD.  NIDCM: His EF has remained 45-50%.  CAF: He is ventricular paced.  BP:   Well-controlled.  HR:    Ventricular paced.  CHF:   There is no evidence of decompensated CHF noted.  Weight:   His weight is 194 pounds. This is his baseline.  Cholesterol:   Target LDL <110.  Anti-platelet:   Remains on Coumadin. I will see him back in 8-9 months. Thank you. Encounter Diagnoses   Name Primary?    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 Dionne Estes has no complaints of chest pains, increased shortness of breath or decreased exercise capacity. He has regained some of the weight that he had lost previously. His weight is back to baseline. He is in good spirits. He is accompanied by his wife. His diet has returned to baseline. He denies any orthopnea or PND. He denies any palpitations or dizziness. Review of Systems   Respiratory: Negative for shortness of breath. Cardiovascular: Negative for chest pain, palpitations, orthopnea, claudication, leg swelling and PND. All other systems reviewed and are negative. Physical Exam   Constitutional: He is oriented to person, place, and time. He appears well-developed and well-nourished. HENT:   Head: Normocephalic. Eyes: Conjunctivae are normal.   Neck: Neck supple. No JVD present. Carotid bruit is not present. No thyromegaly present. Cardiovascular: Regular rhythm. Bradycardia present. Pulmonary/Chest: Breath sounds normal.   Abdominal: Bowel sounds are normal.   Musculoskeletal: He exhibits no edema. Neurological: He is alert and oriented to person, place, and time. Skin: Skin is warm and dry. Nursing note and vitals reviewed. PCP: Jonny Corral MD    Past Medical History:   Diagnosis Date    AICD (automatic cardioverter/defibrillator) present     Atrial fibrillation ([facility] [address] 6/8/2010    Cancer ([facility] [address]     Cardiac cath 02/11/2009    Patent coronary arteries. LVEDP 28.  EF 35%. Global hyk. Mod MR.  Cardiac echocardiogram 09/29/2016    LVE. EF 15% at most.  Indeterminate LV diastolic fx.  RVE. Reduced RV systolic fx. RVSP 80-85 mmHg. LAE.  LUISA. Mod-severe MR. Mild AI. Severe TR.  Cardiac MUGA scan 11/02/2015    At most mild LVE. EF 45-46%.  Cardiac nuclear imaging test, abnormal 02/03/2009    Mild basal/mid inferior, inferoapical, inferoseptal infarct w/mild ischemia in RCA (possibly partially artifact). Anterior, anteroseptal, anterapical ischemia w/o infarct. (Mid & distal LAD.)  LVE. EF 29%. Mod global hyk.  Cardiovascular LE venous duplex 09/29/2016    Tech difficult. No DVT bilaterally.  Cardiovascular renal duplex 06/07/2010    No evidence of renal artery stenosis >60%. Patent SMA and celiac artery.  Cardiovascular UE venous duplex 10/04/2016    No DVT bilaterally.  HTN (hypertension) 6/8/2010    Hypertensive cardiovascular disease     MR (mitral regurgitation)     Nonischemic cardiomyopathy     3/11 echo EF 25%    Pulmonary hypertension, moderate to severe ([address] 6/8/2010    90-100mmHg; repeat echo in 3/11 showed 70mmHg       Past Surgical History:   Procedure Laterality Date    HX HEART VALVE SURGERY  2011    HX HERNIA REPAIR  2011       Current Outpatient Prescriptions   Medication Sig Dispense Refill    potassium chloride (K-DUR, KLOR-CON) 20 mEq tablet Take 1 Tab by mouth two (2) times a day. (Patient taking differently: Take 20 mEq by mouth two (2) times a day. 2 tabs extra on the days on Metalazone) 180 Tab 3    metOLazone (ZAROXOLYN) 2.5 mg tablet Take 1 Tab by mouth as needed. Take 30 minutes prior to morning Lasix as directed only (Patient taking differently: Take 2.5 mg by mouth as needed. Take 30 minutes prior to morning Lasix as directed only. Takes it Tyrell and Mon (as of 8/22/17)) 15 Tab 1    digoxin (LANOXIN) 0.125 mg tablet TAKE 1 TABLET BY MOUTH EVERY DAY 90 Tab 3    warfarin (COUMADIN) 2.5 mg tablet TAKE ONE TABLET BY MOUTH ONCE DAILY OR  AS  DIRECTED  BY  YOUR  DOCTOR 45 Tab 0    cloNIDine HCl (CATAPRES) 0.2 mg tablet TAKE 1 TABLET BY MOUTH 3 TIMES A  Tab 2    magnesium oxide (MAG-OX) 400 mg tablet TAKE 1 TABLET BY MOUTH DAILY. 90 Tab 3    polyethylene glycol (MIRALAX) 17 gram packet Take 17 g by mouth daily.  furosemide (LASIX) 40 mg tablet Take 1 Tab by mouth two (2) times a day.  Or directed 1 Tab 0    carvedilol (COREG) 25 mg tablet TAKE 1 TABLET BY MOUTH TWICE A DAY WITH MEALS 60 Tab 11    lisinopril (PRINIVIL, ZESTRIL) 20 mg tablet TAKE ONE TABLET BY MOUTH TWICE DAILY 60 Tab 8    sertraline (ZOLOFT) 25 mg tablet Take  by mouth daily.  allopurinol (ZYLOPRIM) 300 mg tablet Take  by mouth daily.  FERROUS SULFATE, DRIED (IRON, DRIED, PO) Take 325 mg by mouth daily.  aspirin delayed-release (ASPIR-81) 81 mg tablet Take 81 mg by mouth daily.  oxyCODONE-acetaminophen (PERCOCET) 5-325 mg per tablet Take 1 Tab by mouth every four (4) hours as needed for Pain. Max Daily Amount: 6 Tabs. 180 Tab 0       The patient has a family history of    Social History   Substance Use Topics    Smoking status: Never Smoker    Smokeless tobacco: Never Used    Alcohol use Yes      Comment: occasionally. Lab Results   Component Value Date/Time    Cholesterol, total 85 01/23/2016 05:30 AM    HDL Cholesterol 31 01/23/2016 05:30 AM    LDL, calculated 34 01/23/2016 05:30 AM    Triglyceride 100 01/23/2016 05:30 AM    CHOL/HDL Ratio 2.7 01/23/2016 05:30 AM        BP Readings from Last 3 Encounters:   08/22/17 128/74   08/09/17 129/75   07/19/17 132/80        Pulse Readings from Last 3 Encounters:   08/22/17 (!) 55   08/09/17 83   07/19/17 80       Wt Readings from Last 3 Encounters:   08/22/17 88 kg (194 lb)   03/03/17 80.3 kg (177 lb)   02/16/17 92.1 kg (203 lb)         EKG: unchanged from previous tracings, ventricular paced, rare PVC.

## 2017-08-22 NOTE — PROGRESS NOTES
Renard Puente is a 71 y.o. male    Chief Complaint   Patient presents with    Irregular Heart Beat     follow up. states no new symptoms, states been feeling good. States he is complaint with medication regiment. Patient don't need refills currently         1. Have you been to the ER, urgent care clinic since your last visit? Hospitalized since your last visit? no  2. Have you seen or consulted any other health care providers outside of the [address] [place] [address] since your last visit? Include any pap smears or colon screening.  no

## 2017-08-22 NOTE — PROGRESS NOTES
Verbal order and read back per Lauren Jarvis NP  The INR is stable and therapeutic. Continue same dose of coumadin and recheck in 1 month.   Continue Coumadin to 2.5 mg daily except 3.75 mg on Mon  Patient informed of instructions, read back and verbalized understanding Cristy Arnold LPN

## 2017-08-30 ENCOUNTER — HOSPITAL ENCOUNTER (OUTPATIENT)
Dept: INFUSION THERAPY | Age: 72
Discharge: HOME | End: 2017-08-30
Payer: MEDICARE

## 2017-08-30 VITALS
HEART RATE: 56 BPM | OXYGEN SATURATION: 100 % | RESPIRATION RATE: 18 BRPM | DIASTOLIC BLOOD PRESSURE: 82 MMHG | TEMPERATURE: 97.9 F | SYSTOLIC BLOOD PRESSURE: 123 MMHG

## 2017-08-30 LAB
BASO+EOS+MONOS # BLD AUTO: 0.2 K/UL (ref 0–2.3)
BASO+EOS+MONOS # BLD AUTO: 3 % (ref 0.1–17)
DIFFERENTIAL METHOD BLD: ABNORMAL
ERYTHROCYTE [DISTWIDTH] IN BLOOD BY AUTOMATED COUNT: 16 % (ref 11.5–14.5)
HCT VFR BLD AUTO: 39.4 % (ref 36–48)
HGB BLD-MCNC: 13.3 G/DL (ref 12–16)
LYMPHOCYTES # BLD: 1.4 K/UL (ref 1.1–5.9)
LYMPHOCYTES NFR BLD: 23 % (ref 14–44)
MCH RBC QN AUTO: 30.9 PG (ref 25–35)
MCHC RBC AUTO-ENTMCNC: 33.8 G/DL (ref 31–37)
MCV RBC AUTO: 91.4 FL (ref 78–102)
NEUTS SEG # BLD: 4.6 K/UL (ref 1.8–9.5)
NEUTS SEG NFR BLD: 74 % (ref 40–70)
PLATELET # BLD AUTO: 158 K/UL (ref 140–440)
RBC # BLD AUTO: 4.31 M/UL (ref 4.1–5.1)
WBC # BLD AUTO: 6.2 K/UL (ref 4.5–13)

## 2017-08-30 PROCEDURE — 77030012965 HC NDL HUBR BBMI -A

## 2017-08-30 PROCEDURE — 85025 COMPLETE CBC W/AUTO DIFF WBC: CPT | Performed by: INTERNAL MEDICINE

## 2017-08-30 PROCEDURE — 74011250636 HC RX REV CODE- 250/636

## 2017-08-30 PROCEDURE — 36591 DRAW BLOOD OFF VENOUS DEVICE: CPT

## 2017-08-30 RX ORDER — HEPARIN SODIUM (PORCINE) LOCK FLUSH IV SOLN 100 UNIT/ML 100 UNIT/ML
500 SOLUTION INTRAVENOUS AS NEEDED
Status: DISCONTINUED | OUTPATIENT
Start: 2017-08-30 | End: 2017-09-03 | Stop reason: HOSPADM

## 2017-08-30 RX ORDER — SODIUM CHLORIDE 0.9 % (FLUSH) 0.9 %
10-40 SYRINGE (ML) INJECTION AS NEEDED
Status: DISCONTINUED | OUTPATIENT
Start: 2017-08-30 | End: 2017-09-03 | Stop reason: HOSPADM

## 2017-08-30 RX ORDER — HEPARIN SODIUM (PORCINE) LOCK FLUSH IV SOLN 100 UNIT/ML 100 UNIT/ML
SOLUTION INTRAVENOUS
Status: COMPLETED
Start: 2017-08-30 | End: 2017-08-30

## 2017-08-30 RX ADMIN — Medication 30 ML: at 08:15

## 2017-08-30 RX ADMIN — HEPARIN SODIUM (PORCINE) LOCK FLUSH IV SOLN 100 UNIT/ML 500 UNITS: 100 SOLUTION at 08:16

## 2017-08-30 NOTE — PROGRESS NOTES
[facility] Progress Note    Date: 2017    Name: Nabeel Leon    MRN: [id number]         : 1945      Mr. Valente Johnson arrived to [facility] at 56. No complaints or concerns voiced    Mr. Valente Johnson was assessed and education was provided. Mr. Maribel Carrera vitals were reviewed. Visit Vitals    /82 (BP 1 Location: Left arm, BP Patient Position: Sitting)    Pulse (!) 56    Temp 97.9 °F (36.6 °C)    Resp 18    SpO2 100%       Patient's RIGHT upper chest port accessed. Blood return visualized. Blood sample obtained for cbc. Flushed with normal saline and heparin per protocol. De-accessed. Site s signs of infection or tenderness. Band-aid applied to site. CBC WITH 3 PART DIFF    Collection Time: 17  8:15 AM   Result Value Ref Range    WBC 6.2 4.5 - 13.0 K/uL    RBC 4.31 4.10 - 5.10 M/uL    HGB 13.3 12.0 - 16 g/dL    HCT 39.4 36 - 48 %    MCV 91.4 78 - 102 FL    MCH 30.9 25.0 - 35.0 PG    MCHC 33.8 31 - 37 g/dL    RDW 16.0 (H) 11.5 - 14.5 %    PLATELET 893 744 - 289 K/uL    NEUTROPHILS 74 (H) 40 - 70 %    MIXED CELLS 3 0.1 - 17 %    LYMPHOCYTES 23 14 - 44 %    ABS. NEUTROPHILS 4.6 1.8 - 9.5 K/UL    ABS. MIXED CELLS 0.2 0.0 - 2.3 K/uL    ABS. LYMPHOCYTES 1.4 1.1 - 5.9 K/UL    DF AUTOMATED         Procrit held per parameters      Mr. Valente Johnson tolerated well without complaints. Mr. Valenet Johnson was discharged from [place] [address] in stable condition at 0830. He is to return on 17 at 0800 for his next appointment.     Sofia Toledo RN  2017  9:40 AM

## 2017-09-07 RX ORDER — FUROSEMIDE 40 MG/1
40 TABLET ORAL 2 TIMES DAILY
Qty: 60 TAB | Refills: 6 | Status: SHIPPED | OUTPATIENT
Start: 2017-09-07 | End: 2018-04-10 | Stop reason: SDUPTHER

## 2017-09-13 RX ORDER — LISINOPRIL 20 MG/1
TABLET ORAL
Qty: 60 TAB | Refills: 8 | Status: SHIPPED | OUTPATIENT
Start: 2017-09-13 | End: 2018-02-27 | Stop reason: SDUPTHER

## 2017-09-20 ENCOUNTER — HOSPITAL ENCOUNTER (OUTPATIENT)
Dept: INFUSION THERAPY | Age: 72
Discharge: HOME | End: 2017-09-20
Payer: MEDICARE

## 2017-09-20 VITALS
OXYGEN SATURATION: 98 % | SYSTOLIC BLOOD PRESSURE: 109 MMHG | HEART RATE: 96 BPM | TEMPERATURE: 98.4 F | DIASTOLIC BLOOD PRESSURE: 64 MMHG | RESPIRATION RATE: 18 BRPM

## 2017-09-20 LAB
BASOPHILS # BLD: 0 K/UL (ref 0–0.06)
BASOPHILS NFR BLD: 0 % (ref 0–2)
DIFFERENTIAL METHOD BLD: ABNORMAL
EOSINOPHIL # BLD: 0.2 K/UL (ref 0–0.4)
EOSINOPHIL NFR BLD: 3 % (ref 0–5)
ERYTHROCYTE [DISTWIDTH] IN BLOOD BY AUTOMATED COUNT: 15.7 % (ref 11.6–14.5)
HCT VFR BLD AUTO: 39 % (ref 36–48)
HGB BLD-MCNC: 13.2 G/DL (ref 13–16)
LYMPHOCYTES # BLD: 1.6 K/UL (ref 0.9–3.6)
LYMPHOCYTES NFR BLD: 23 % (ref 21–52)
MCH RBC QN AUTO: 30.8 PG (ref 24–34)
MCHC RBC AUTO-ENTMCNC: 33.8 G/DL (ref 31–37)
MCV RBC AUTO: 91.1 FL (ref 74–97)
MONOCYTES # BLD: 0.7 K/UL (ref 0.05–1.2)
MONOCYTES NFR BLD: 10 % (ref 3–10)
NEUTS SEG # BLD: 4.6 K/UL (ref 1.8–8)
NEUTS SEG NFR BLD: 64 % (ref 40–73)
PLATELET # BLD AUTO: 166 K/UL (ref 135–420)
PMV BLD AUTO: 11.8 FL (ref 9.2–11.8)
RBC # BLD AUTO: 4.28 M/UL (ref 4.7–5.5)
WBC # BLD AUTO: 7.1 K/UL (ref 4.6–13.2)

## 2017-09-20 PROCEDURE — 85025 COMPLETE CBC W/AUTO DIFF WBC: CPT | Performed by: INTERNAL MEDICINE

## 2017-09-20 PROCEDURE — 77030012965 HC NDL HUBR BBMI -A

## 2017-09-20 PROCEDURE — 74011250636 HC RX REV CODE- 250/636

## 2017-09-20 PROCEDURE — 85025 COMPLETE CBC W/AUTO DIFF WBC: CPT | Performed by: NURSE PRACTITIONER

## 2017-09-20 PROCEDURE — 36591 DRAW BLOOD OFF VENOUS DEVICE: CPT

## 2017-09-20 RX ORDER — HEPARIN SODIUM (PORCINE) LOCK FLUSH IV SOLN 100 UNIT/ML 100 UNIT/ML
500 SOLUTION INTRAVENOUS AS NEEDED
Status: ACTIVE | OUTPATIENT
Start: 2017-09-20 | End: 2017-09-21

## 2017-09-20 RX ORDER — SODIUM CHLORIDE 0.9 % (FLUSH) 0.9 %
10-40 SYRINGE (ML) INJECTION AS NEEDED
Status: DISCONTINUED | OUTPATIENT
Start: 2017-09-20 | End: 2017-09-24 | Stop reason: HOSPADM

## 2017-09-20 RX ORDER — HEPARIN SODIUM (PORCINE) LOCK FLUSH IV SOLN 100 UNIT/ML 100 UNIT/ML
SOLUTION INTRAVENOUS
Status: COMPLETED
Start: 2017-09-20 | End: 2017-09-20

## 2017-09-20 RX ADMIN — HEPARIN SODIUM (PORCINE) LOCK FLUSH IV SOLN 100 UNIT/ML 500 UNITS: 100 SOLUTION at 08:20

## 2017-09-20 RX ADMIN — Medication 30 ML: at 08:20

## 2017-09-20 NOTE — PROGRESS NOTES
[facility] Progress Note    Date: 2017    Name: Dajuan Ernandez    MRN: [id number]         : 1945      Mr. Rocío Hart arrived to [facility] at 65. Mr. Rocío Hart was assessed and education was provided. Mr. Alethea Garland vitals were reviewed. Visit Vitals    /64 (BP 1 Location: Left arm, BP Patient Position: Sitting)    Pulse 96    Temp 98.4 °F (36.9 °C)    Resp 18    SpO2 98%       Pt was observed for 5 minutes after obtaining vital signs prior to initiating treatment. Pt refusing to have blood drawn peripherally (order states q6 weeks w/ last port flush being 3 weeks ago). Patient's right upper chest port accessed. Blood drawn for labs per order (CBC). Flushed with normal saline and heparin per order. De-accessed and band-aid applied to site. Mr. Rocío Hart tolerated well without complaints. Preliminary lab results obtained and reviewed. (Scanned into media tab). Hgb 13.6 and Hct 39.6. Procrit held at this time per order. Mr. Rocío Hart was discharged from [place] [address] in stable condition at [address] [facility]. He is to return on 10/11/17 at 0800 for his next appointment.     Suze Hernandez RN  2017

## 2017-09-21 ENCOUNTER — ANTI-COAG VISIT (OUTPATIENT)
Dept: CARDIOLOGY CLINIC | Age: 72
End: 2017-09-21

## 2017-09-21 DIAGNOSIS — I48.91 ATRIAL FIBRILLATION, UNSPECIFIED TYPE (HCC): ICD-10-CM

## 2017-09-21 DIAGNOSIS — Z79.01 LONG TERM CURRENT USE OF ANTICOAGULANT THERAPY: Primary | ICD-10-CM

## 2017-09-21 LAB
INR BLD: 2.5
PT POC: NORMAL SECONDS
VALID INTERNAL CONTROL?: YES

## 2017-09-21 NOTE — PROGRESS NOTES
The INR is stable and therapeutic. Continue Coumadin to 2.5 mg daily except 3.75 mg on Mon and recheck in 4 weeks. Verbal order and read back per Dr Gilmer Ruelas.

## 2017-10-11 ENCOUNTER — HOSPITAL ENCOUNTER (OUTPATIENT)
Dept: INFUSION THERAPY | Age: 72
Discharge: HOME | End: 2017-10-11
Payer: MEDICARE

## 2017-10-11 VITALS
SYSTOLIC BLOOD PRESSURE: 116 MMHG | OXYGEN SATURATION: 99 % | RESPIRATION RATE: 18 BRPM | TEMPERATURE: 98.6 F | DIASTOLIC BLOOD PRESSURE: 68 MMHG | HEART RATE: 79 BPM

## 2017-10-11 LAB
BASOPHILS # BLD: 0 K/UL (ref 0–0.1)
BASOPHILS NFR BLD: 0 % (ref 0–2)
DIFFERENTIAL METHOD BLD: ABNORMAL
EOSINOPHIL # BLD: 0.2 K/UL (ref 0–0.4)
EOSINOPHIL NFR BLD: 3 % (ref 0–5)
ERYTHROCYTE [DISTWIDTH] IN BLOOD BY AUTOMATED COUNT: 15.4 % (ref 11.6–14.5)
HCT VFR BLD AUTO: 38.6 % (ref 36–48)
HGB BLD-MCNC: 13.4 G/DL (ref 13–16)
LYMPHOCYTES # BLD: 1.8 K/UL (ref 0.9–3.6)
LYMPHOCYTES NFR BLD: 26 % (ref 21–52)
MCH RBC QN AUTO: 31.2 PG (ref 24–34)
MCHC RBC AUTO-ENTMCNC: 34.7 G/DL (ref 31–37)
MCV RBC AUTO: 90 FL (ref 74–97)
MONOCYTES # BLD: 0.5 K/UL (ref 0.05–1.2)
MONOCYTES NFR BLD: 8 % (ref 3–10)
NEUTS SEG # BLD: 4.3 K/UL (ref 1.8–8)
NEUTS SEG NFR BLD: 63 % (ref 40–73)
PLATELET # BLD AUTO: 173 K/UL (ref 135–420)
PMV BLD AUTO: 11.8 FL (ref 9.2–11.8)
RBC # BLD AUTO: 4.29 M/UL (ref 4.7–5.5)
WBC # BLD AUTO: 6.8 K/UL (ref 4.6–13.2)

## 2017-10-11 PROCEDURE — 74011250636 HC RX REV CODE- 250/636

## 2017-10-11 PROCEDURE — 36591 DRAW BLOOD OFF VENOUS DEVICE: CPT

## 2017-10-11 PROCEDURE — 77030012965 HC NDL HUBR BBMI -A

## 2017-10-11 PROCEDURE — 85025 COMPLETE CBC W/AUTO DIFF WBC: CPT | Performed by: INTERNAL MEDICINE

## 2017-10-11 RX ORDER — SODIUM CHLORIDE 0.9 % (FLUSH) 0.9 %
10-40 SYRINGE (ML) INJECTION AS NEEDED
Status: DISCONTINUED | OUTPATIENT
Start: 2017-10-11 | End: 2017-10-15 | Stop reason: HOSPADM

## 2017-10-11 RX ORDER — HEPARIN SODIUM (PORCINE) LOCK FLUSH IV SOLN 100 UNIT/ML 100 UNIT/ML
SOLUTION INTRAVENOUS
Status: COMPLETED
Start: 2017-10-11 | End: 2017-10-11

## 2017-10-11 RX ORDER — HEPARIN SODIUM (PORCINE) LOCK FLUSH IV SOLN 100 UNIT/ML 100 UNIT/ML
500 SOLUTION INTRAVENOUS AS NEEDED
Status: ACTIVE | OUTPATIENT
Start: 2017-10-11 | End: 2017-10-12

## 2017-10-11 RX ADMIN — HEPARIN SODIUM (PORCINE) LOCK FLUSH IV SOLN 100 UNIT/ML 500 UNITS: 100 SOLUTION at 08:36

## 2017-10-17 RX ORDER — CLONIDINE HYDROCHLORIDE 0.2 MG/1
TABLET ORAL
Qty: 270 TAB | Refills: 2 | Status: SHIPPED | OUTPATIENT
Start: 2017-10-17 | End: 2018-07-31 | Stop reason: SDUPTHER

## 2017-10-19 ENCOUNTER — ANTI-COAG VISIT (OUTPATIENT)
Dept: CARDIOLOGY CLINIC | Age: 72
End: 2017-10-19

## 2017-10-19 DIAGNOSIS — Z79.01 LONG TERM CURRENT USE OF ANTICOAGULANT THERAPY: Primary | ICD-10-CM

## 2017-10-19 DIAGNOSIS — I48.91 ATRIAL FIBRILLATION, UNSPECIFIED TYPE (HCC): ICD-10-CM

## 2017-10-19 LAB
INR BLD: 2.8
PT POC: 33.5 SECONDS
VALID INTERNAL CONTROL?: YES

## 2017-10-19 NOTE — PROGRESS NOTES
The INR is stable and therapeutic.    Continue Coumadin to 2.5 mg daily except 3.75 mg on Mon  Recheck INR in 4 weeks

## 2017-11-01 ENCOUNTER — HOSPITAL ENCOUNTER (OUTPATIENT)
Dept: INFUSION THERAPY | Age: 72
Discharge: HOME | End: 2017-11-01
Payer: MEDICARE

## 2017-11-01 VITALS
HEART RATE: 72 BPM | OXYGEN SATURATION: 95 % | TEMPERATURE: 98.6 F | RESPIRATION RATE: 18 BRPM | SYSTOLIC BLOOD PRESSURE: 132 MMHG | DIASTOLIC BLOOD PRESSURE: 78 MMHG

## 2017-11-01 LAB
BASO+EOS+MONOS # BLD AUTO: 0.9 K/UL (ref 0–2.3)
BASO+EOS+MONOS # BLD AUTO: 11 % (ref 0.1–17)
DIFFERENTIAL METHOD BLD: ABNORMAL
ERYTHROCYTE [DISTWIDTH] IN BLOOD BY AUTOMATED COUNT: 15.9 % (ref 11.5–14.5)
HCT VFR BLD AUTO: 40.3 % (ref 36–48)
HGB BLD-MCNC: 13.6 G/DL (ref 12–16)
LYMPHOCYTES # BLD: 1.7 K/UL (ref 1.1–5.9)
LYMPHOCYTES NFR BLD: 22 % (ref 14–44)
MCH RBC QN AUTO: 31.9 PG (ref 25–35)
MCHC RBC AUTO-ENTMCNC: 33.7 G/DL (ref 31–37)
MCV RBC AUTO: 94.6 FL (ref 78–102)
NEUTS SEG # BLD: 5.3 K/UL (ref 1.8–9.5)
NEUTS SEG NFR BLD: 67 % (ref 40–70)
PLATELET # BLD AUTO: 152 K/UL (ref 140–440)
RBC # BLD AUTO: 4.26 M/UL (ref 4.1–5.1)
WBC # BLD AUTO: 7.9 K/UL (ref 4.5–13)

## 2017-11-01 PROCEDURE — 85025 COMPLETE CBC W/AUTO DIFF WBC: CPT | Performed by: INTERNAL MEDICINE

## 2017-11-01 PROCEDURE — 36415 COLL VENOUS BLD VENIPUNCTURE: CPT

## 2017-11-01 RX ORDER — SODIUM CHLORIDE 0.9 % (FLUSH) 0.9 %
10-40 SYRINGE (ML) INJECTION AS NEEDED
Status: DISCONTINUED | OUTPATIENT
Start: 2017-11-01 | End: 2017-11-01

## 2017-11-01 RX ORDER — HEPARIN SODIUM (PORCINE) LOCK FLUSH IV SOLN 100 UNIT/ML 100 UNIT/ML
SOLUTION INTRAVENOUS
Status: DISCONTINUED
Start: 2017-11-01 | End: 2017-11-01

## 2017-11-01 RX ORDER — HEPARIN SODIUM (PORCINE) LOCK FLUSH IV SOLN 100 UNIT/ML 100 UNIT/ML
500 SOLUTION INTRAVENOUS AS NEEDED
Status: DISCONTINUED | OUTPATIENT
Start: 2017-11-01 | End: 2017-11-01

## 2017-11-01 NOTE — PROGRESS NOTES
[facility] Progress Note    Date: 2017    Name: Alexandria Crook    MRN: [id number]         : 1945     Procrit    Mr. Avelina Herring to [facility], ambulatory at [address] [place] [address]. Pt was assessed and education was provided. Mr. Jaleel Tracy vitals were reviewed and patient was observed for 5 minutes prior to treatment. Visit Vitals    /78 (BP 1 Location: Left arm, BP Patient Position: Sitting)    Pulse 72    Temp 98.6 °F (37 °C)    Resp 18    SpO2 95%     Peripheral stick to left ac x 1 attempt for CBC. Patient tolerated well. Gauze and tape applied to site. Recent Results (from the past 12 hour(s))   CBC WITH 3 PART DIFF    Collection Time: 17  8:20 AM   Result Value Ref Range    WBC 7.9 4.5 - 13.0 K/uL    RBC 4.26 4.10 - 5.10 M/uL    HGB 13.6 12.0 - 16 g/dL    HCT 40.3 36 - 48 %    MCV 94.6 78 - 102 FL    MCH 31.9 25.0 - 35.0 PG    MCHC 33.7 31 - 37 g/dL    RDW 15.9 (H) 11.5 - 14.5 %    PLATELET 670 759 - 347 K/uL    NEUTROPHILS 67 40 - 70 %    MIXED CELLS 11 0.1 - 17 %    LYMPHOCYTES 22 14 - 44 %    ABS. NEUTROPHILS 5.3 1.8 - 9.5 K/UL    ABS. MIXED CELLS 0.9 0.0 - 2.3 K/uL    ABS. LYMPHOCYTES 1.7 1.1 - 5.9 K/UL    DF AUTOMATED       Results within ordered parameters to HOLD procrit today. Patient arm band removed and shredded. Patient was discharged from [facility] at 0830. He is to return on 2017 at 0800 for his next procrit/port flush appointment.      Kiana Alvarez RN  2017

## 2017-11-16 ENCOUNTER — ANTI-COAG VISIT (OUTPATIENT)
Dept: CARDIOLOGY CLINIC | Age: 72
End: 2017-11-16

## 2017-11-16 DIAGNOSIS — Z79.01 LONG TERM CURRENT USE OF ANTICOAGULANT THERAPY: ICD-10-CM

## 2017-11-16 DIAGNOSIS — I48.91 ATRIAL FIBRILLATION, UNSPECIFIED TYPE (HCC): Primary | ICD-10-CM

## 2017-11-16 LAB
INR BLD: 2.2
PT POC: 26.8 SECONDS
VALID INTERNAL CONTROL?: YES

## 2017-11-16 NOTE — PROGRESS NOTES
Verbal order and read back per Holden Nguyễn NP  The INR is stable and therapeutic. Continue same dose of coumadin and recheck in 1 month.   Continue Coumadin to 2.5 mg daily except 3.75 mg on Mon  Patient informed of instructions, read back and verbalized understanding Justine Lopez LPN

## 2017-11-21 ENCOUNTER — APPOINTMENT (OUTPATIENT)
Dept: INFUSION THERAPY | Age: 72
End: 2017-11-21
Payer: MEDICARE

## 2017-11-21 RX ORDER — SODIUM CHLORIDE 0.9 % (FLUSH) 0.9 %
10-40 SYRINGE (ML) INJECTION AS NEEDED
Status: DISCONTINUED | OUTPATIENT
Start: 2017-11-22 | End: 2017-11-26 | Stop reason: HOSPADM

## 2017-11-21 RX ORDER — CARVEDILOL 25 MG/1
25 TABLET ORAL 2 TIMES DAILY WITH MEALS
Qty: 180 TAB | Refills: 3 | Status: SHIPPED | OUTPATIENT
Start: 2017-11-21 | End: 2018-07-27 | Stop reason: SDUPTHER

## 2017-11-21 RX ORDER — HEPARIN SODIUM (PORCINE) LOCK FLUSH IV SOLN 100 UNIT/ML 100 UNIT/ML
500 SOLUTION INTRAVENOUS AS NEEDED
Status: DISCONTINUED | OUTPATIENT
Start: 2017-11-22 | End: 2017-11-26 | Stop reason: HOSPADM

## 2017-11-22 ENCOUNTER — HOSPITAL ENCOUNTER (OUTPATIENT)
Dept: INFUSION THERAPY | Age: 72
Discharge: HOME | End: 2017-11-22
Payer: MEDICARE

## 2017-11-22 VITALS
HEART RATE: 72 BPM | RESPIRATION RATE: 18 BRPM | DIASTOLIC BLOOD PRESSURE: 78 MMHG | OXYGEN SATURATION: 100 % | TEMPERATURE: 98.1 F | SYSTOLIC BLOOD PRESSURE: 131 MMHG

## 2017-11-22 LAB
BASO+EOS+MONOS # BLD AUTO: 0.5 K/UL (ref 0–2.3)
BASO+EOS+MONOS # BLD AUTO: 7 % (ref 0.1–17)
DIFFERENTIAL METHOD BLD: ABNORMAL
ERYTHROCYTE [DISTWIDTH] IN BLOOD BY AUTOMATED COUNT: 15.8 % (ref 11.5–14.5)
HCT VFR BLD AUTO: 40.1 % (ref 36–48)
HGB BLD-MCNC: 13.5 G/DL (ref 12–16)
LYMPHOCYTES # BLD: 2.1 K/UL (ref 1.1–5.9)
LYMPHOCYTES NFR BLD: 25 % (ref 14–44)
MCH RBC QN AUTO: 31.8 PG (ref 25–35)
MCHC RBC AUTO-ENTMCNC: 33.7 G/DL (ref 31–37)
MCV RBC AUTO: 94.6 FL (ref 78–102)
NEUTS SEG # BLD: 5.5 K/UL (ref 1.8–9.5)
NEUTS SEG NFR BLD: 68 % (ref 40–70)
PLATELET # BLD AUTO: 159 K/UL (ref 140–440)
RBC # BLD AUTO: 4.24 M/UL (ref 4.1–5.1)
WBC # BLD AUTO: 8.1 K/UL (ref 4.5–13)

## 2017-11-22 PROCEDURE — 36416 COLLJ CAPILLARY BLOOD SPEC: CPT

## 2017-11-22 PROCEDURE — 77030012965 HC NDL HUBR BBMI -A

## 2017-11-22 PROCEDURE — 36591 DRAW BLOOD OFF VENOUS DEVICE: CPT

## 2017-11-22 PROCEDURE — 74011250636 HC RX REV CODE- 250/636

## 2017-11-22 PROCEDURE — 85025 COMPLETE CBC W/AUTO DIFF WBC: CPT | Performed by: INTERNAL MEDICINE

## 2017-11-22 RX ORDER — METOLAZONE 2.5 MG/1
2.5 TABLET ORAL AS NEEDED
Qty: 15 TAB | Refills: 1 | Status: SHIPPED | OUTPATIENT
Start: 2017-11-22 | End: 2018-02-10 | Stop reason: SDUPTHER

## 2017-11-22 RX ORDER — HEPARIN SODIUM (PORCINE) LOCK FLUSH IV SOLN 100 UNIT/ML 100 UNIT/ML
SOLUTION INTRAVENOUS
Status: COMPLETED
Start: 2017-11-22 | End: 2017-11-22

## 2017-11-22 RX ADMIN — HEPARIN SODIUM (PORCINE) LOCK FLUSH IV SOLN 100 UNIT/ML 500 UNITS: 100 SOLUTION at 08:09

## 2017-11-22 RX ADMIN — Medication 30 ML: at 08:08

## 2017-11-22 NOTE — PROGRESS NOTES
[facility] Progress Note    Date: 2017    Name: Arianna Radford    MRN: [id number]         : 1945      Mr. Jumana Ness arrived to [facility] at 0800. No complaints or concerns voiced    Mr. Jumana Ness was assessed and education was provided. Mr. Antonella Manning vitals were reviewed. Visit Vitals    /78 (BP 1 Location: Left arm, BP Patient Position: Sitting)    Pulse 72    Temp 98.1 °F (36.7 °C)    Resp 18    SpO2 100%       Patient's RIGHT upper chest port accessed. Blood return visualized. Blood sample obtained for cbc. Flushed with normal saline and heparin per protocol. De-accessed. Site s signs of infection or tenderness. Band-aid applied to site. CBC WITH 3 PART DIFF    Collection Time: 17  8:06 AM   Result Value Ref Range    WBC 8.1 4.5 - 13.0 K/uL    RBC 4.24 4.10 - 5.10 M/uL    HGB 13.5 12.0 - 16 g/dL    HCT 40.1 36 - 48 %    MCV 94.6 78 - 102 FL    MCH 31.8 25.0 - 35.0 PG    MCHC 33.7 31 - 37 g/dL    RDW 15.8 (H) 11.5 - 14.5 %    PLATELET 900 500 - 054 K/uL    NEUTROPHILS 68 40 - 70 %    MIXED CELLS 7 0.1 - 17 %    LYMPHOCYTES 25 14 - 44 %    ABS. NEUTROPHILS 5.5 1.8 - 9.5 K/UL    ABS. MIXED CELLS 0.5 0.0 - 2.3 K/uL    ABS. LYMPHOCYTES 2.1 1.1 - 5.9 K/UL    DF AUTOMATED         Procrit held per parameters      Mr. Jumana Ness tolerated well without complaints. Mr. Jumana Ness was discharged from [place] [address] in stable condition at [address] [place] [address]. He is to return on 17 at 0830 for his next appointment.     Jase Finch RN  2017  9:40 AM

## 2017-11-29 ENCOUNTER — TELEPHONE (OUTPATIENT)
Dept: ONCOLOGY | Age: 72
End: 2017-11-29

## 2017-12-13 ENCOUNTER — HOSPITAL ENCOUNTER (OUTPATIENT)
Dept: INFUSION THERAPY | Age: 72
Discharge: HOME | End: 2017-12-13
Payer: MEDICARE

## 2017-12-13 VITALS
RESPIRATION RATE: 18 BRPM | SYSTOLIC BLOOD PRESSURE: 135 MMHG | TEMPERATURE: 98.2 F | DIASTOLIC BLOOD PRESSURE: 77 MMHG | OXYGEN SATURATION: 98 % | HEART RATE: 70 BPM

## 2017-12-13 LAB
BASO+EOS+MONOS # BLD AUTO: 0.5 K/UL (ref 0–2.3)
BASO+EOS+MONOS # BLD AUTO: 5 % (ref 0.1–17)
DIFFERENTIAL METHOD BLD: ABNORMAL
ERYTHROCYTE [DISTWIDTH] IN BLOOD BY AUTOMATED COUNT: 15.4 % (ref 11.5–14.5)
HCT VFR BLD AUTO: 42.2 % (ref 36–48)
HGB BLD-MCNC: 13.9 G/DL (ref 12–16)
LYMPHOCYTES # BLD: 2 K/UL (ref 1.1–5.9)
LYMPHOCYTES NFR BLD: 22 % (ref 14–44)
MCH RBC QN AUTO: 32 PG (ref 25–35)
MCHC RBC AUTO-ENTMCNC: 32.9 G/DL (ref 31–37)
MCV RBC AUTO: 97 FL (ref 78–102)
NEUTS SEG # BLD: 6.9 K/UL (ref 1.8–9.5)
NEUTS SEG NFR BLD: 74 % (ref 40–70)
PLATELET # BLD AUTO: 156 K/UL (ref 140–440)
RBC # BLD AUTO: 4.35 M/UL (ref 4.1–5.1)
WBC # BLD AUTO: 9.4 K/UL (ref 4.5–13)

## 2017-12-13 PROCEDURE — 85025 COMPLETE CBC W/AUTO DIFF WBC: CPT | Performed by: INTERNAL MEDICINE

## 2017-12-13 PROCEDURE — 36415 COLL VENOUS BLD VENIPUNCTURE: CPT

## 2017-12-13 RX ORDER — HEPARIN SODIUM (PORCINE) LOCK FLUSH IV SOLN 100 UNIT/ML 100 UNIT/ML
SOLUTION INTRAVENOUS
Status: DISPENSED
Start: 2017-12-13 | End: 2017-12-13

## 2017-12-13 RX ORDER — SODIUM CHLORIDE 0.9 % (FLUSH) 0.9 %
10-40 SYRINGE (ML) INJECTION AS NEEDED
Status: DISCONTINUED | OUTPATIENT
Start: 2017-12-13 | End: 2017-12-17 | Stop reason: HOSPADM

## 2017-12-13 RX ORDER — HEPARIN SODIUM (PORCINE) LOCK FLUSH IV SOLN 100 UNIT/ML 100 UNIT/ML
500 SOLUTION INTRAVENOUS AS NEEDED
Status: ACTIVE | OUTPATIENT
Start: 2017-12-13 | End: 2017-12-14

## 2017-12-13 NOTE — PROGRESS NOTES
[facility] Progress Note    Date: 2017    Name: Lashonda Hernandez    MRN: [id number]         : 1945     Procrit    Mr. Asuncion Tee to [facility], ambulatory at 0830. Pt was assessed and education was provided. Mr. Andrea Yeager vitals were reviewed and patient was observed for 5 minutes prior to treatment. Visit Vitals    /77 (BP 1 Location: Left arm, BP Patient Position: Sitting)    Pulse 70    Temp 98.2 °F (36.8 °C)    Resp 18    SpO2 98%     Blood drawn from the left AC x 1 attempt and sent to lab for CBC. Gauze and tape applied. Recent Results (from the past 12 hour(s))   CBC WITH 3 PART DIFF    Collection Time: 17  8:36 AM   Result Value Ref Range    WBC 9.4 4.5 - 13.0 K/uL    RBC 4.35 4.10 - 5.10 M/uL    HGB 13.9 12.0 - 16 g/dL    HCT 42.2 36 - 48 %    MCV 97.0 78 - 102 FL    MCH 32.0 25.0 - 35.0 PG    MCHC 32.9 31 - 37 g/dL    RDW 15.4 (H) 11.5 - 14.5 %    PLATELET 708 431 - 298 K/uL    NEUTROPHILS 74 (H) 40 - 70 %    MIXED CELLS 5 0.1 - 17 %    LYMPHOCYTES 22 14 - 44 %    ABS. NEUTROPHILS 6.9 1.8 - 9.5 K/UL    ABS. MIXED CELLS 0.5 0.0 - 2.3 K/uL    ABS. LYMPHOCYTES 2.0 1.1 - 5.9 K/UL    DF AUTOMATED       Results within ordered parameters to HOLD procrit today. Patient arm band removed and shredded. He is to return on 2017 at 0830 for his next procrit appointment.      Jim Le RN  2017

## 2017-12-14 ENCOUNTER — ANTI-COAG VISIT (OUTPATIENT)
Dept: CARDIOLOGY CLINIC | Age: 72
End: 2017-12-14

## 2017-12-14 DIAGNOSIS — Z79.01 LONG TERM CURRENT USE OF ANTICOAGULANT THERAPY: ICD-10-CM

## 2017-12-14 DIAGNOSIS — I48.91 ATRIAL FIBRILLATION, UNSPECIFIED TYPE (HCC): Primary | ICD-10-CM

## 2017-12-14 LAB
INR BLD: 2.5
PT POC: 29.9 SECONDS
VALID INTERNAL CONTROL?: YES

## 2017-12-14 NOTE — PROGRESS NOTES
Verbal order and read back per Paulie Ruffin NP  The INR is stable and therapeutic. Continue same dose of coumadin and recheck in 1 month.   Continue Coumadin to 2.5 mg daily except 3.75 mg on Mon  Patient informed of instructions, read back and verbalized understanding Jodi Easton LPN

## 2018-01-03 ENCOUNTER — HOSPITAL ENCOUNTER (OUTPATIENT)
Dept: INFUSION THERAPY | Age: 73
End: 2018-01-03
Payer: MEDICARE

## 2018-01-10 ENCOUNTER — HOSPITAL ENCOUNTER (OUTPATIENT)
Dept: INFUSION THERAPY | Age: 73
End: 2018-01-10
Payer: MEDICARE

## 2018-01-15 ENCOUNTER — ANTI-COAG VISIT (OUTPATIENT)
Dept: CARDIOLOGY CLINIC | Age: 73
End: 2018-01-15

## 2018-01-15 DIAGNOSIS — I48.91 ATRIAL FIBRILLATION, UNSPECIFIED TYPE (HCC): ICD-10-CM

## 2018-01-15 DIAGNOSIS — Z79.01 LONG TERM CURRENT USE OF ANTICOAGULANT THERAPY: Primary | ICD-10-CM

## 2018-01-15 LAB
INR BLD: 2.6
PT POC: 30.9 SECONDS
VALID INTERNAL CONTROL?: YES

## 2018-01-15 NOTE — PROGRESS NOTES
Verbal order and read back per Dr. Hardin Sessions  The INR is stable and therapeutic. Continue same dose of coumadin and recheck in 1 month.   Continue Coumadin to 2.5 mg daily except 3.75 mg on Mon  Patient informed of instructions, read back and verbalized understanding Marta Ho LPN

## 2018-01-25 ENCOUNTER — HOSPITAL ENCOUNTER (OUTPATIENT)
Dept: INFUSION THERAPY | Age: 73
Discharge: HOME | End: 2018-01-25
Payer: MEDICARE

## 2018-01-25 VITALS
DIASTOLIC BLOOD PRESSURE: 78 MMHG | SYSTOLIC BLOOD PRESSURE: 122 MMHG | OXYGEN SATURATION: 99 % | RESPIRATION RATE: 18 BRPM | HEART RATE: 68 BPM | TEMPERATURE: 98.3 F

## 2018-01-25 LAB
BASO+EOS+MONOS # BLD AUTO: 0.8 K/UL (ref 0–2.3)
BASO+EOS+MONOS # BLD AUTO: 10 % (ref 0.1–17)
DIFFERENTIAL METHOD BLD: ABNORMAL
ERYTHROCYTE [DISTWIDTH] IN BLOOD BY AUTOMATED COUNT: 14.4 % (ref 11.5–14.5)
HCT VFR BLD AUTO: 43.2 % (ref 36–48)
HGB BLD-MCNC: 14.6 G/DL (ref 12–16)
LYMPHOCYTES # BLD: 1.4 K/UL (ref 1.1–5.9)
LYMPHOCYTES NFR BLD: 18 % (ref 14–44)
MCH RBC QN AUTO: 31.2 PG (ref 25–35)
MCHC RBC AUTO-ENTMCNC: 33.8 G/DL (ref 31–37)
MCV RBC AUTO: 92.3 FL (ref 78–102)
NEUTS SEG # BLD: 5.9 K/UL (ref 1.8–9.5)
NEUTS SEG NFR BLD: 72 % (ref 40–70)
PLATELET # BLD AUTO: 155 K/UL (ref 140–440)
RBC # BLD AUTO: 4.68 M/UL (ref 4.1–5.1)
WBC # BLD AUTO: 8.1 K/UL (ref 4.5–13)

## 2018-01-25 PROCEDURE — 74011250636 HC RX REV CODE- 250/636

## 2018-01-25 PROCEDURE — 36591 DRAW BLOOD OFF VENOUS DEVICE: CPT

## 2018-01-25 PROCEDURE — 77030012965 HC NDL HUBR BBMI -A

## 2018-01-25 PROCEDURE — 85025 COMPLETE CBC W/AUTO DIFF WBC: CPT | Performed by: INTERNAL MEDICINE

## 2018-01-25 RX ORDER — SODIUM CHLORIDE 0.9 % (FLUSH) 0.9 %
10-40 SYRINGE (ML) INJECTION AS NEEDED
Status: DISCONTINUED | OUTPATIENT
Start: 2018-01-25 | End: 2018-01-29 | Stop reason: HOSPADM

## 2018-01-25 RX ORDER — HEPARIN SODIUM (PORCINE) LOCK FLUSH IV SOLN 100 UNIT/ML 100 UNIT/ML
500 SOLUTION INTRAVENOUS AS NEEDED
Status: DISCONTINUED | OUTPATIENT
Start: 2018-01-25 | End: 2018-01-29 | Stop reason: HOSPADM

## 2018-01-25 RX ORDER — HEPARIN SODIUM (PORCINE) LOCK FLUSH IV SOLN 100 UNIT/ML 100 UNIT/ML
SOLUTION INTRAVENOUS
Status: COMPLETED
Start: 2018-01-25 | End: 2018-01-25

## 2018-01-25 RX ADMIN — Medication 30 ML: at 10:10

## 2018-01-25 RX ADMIN — HEPARIN SODIUM (PORCINE) LOCK FLUSH IV SOLN 100 UNIT/ML 500 UNITS: 100 SOLUTION at 10:12

## 2018-01-31 ENCOUNTER — APPOINTMENT (OUTPATIENT)
Dept: INFUSION THERAPY | Age: 73
End: 2018-01-31
Payer: MEDICARE

## 2018-02-10 RX ORDER — METOLAZONE 2.5 MG/1
TABLET ORAL
Qty: 15 TAB | Refills: 1 | Status: SHIPPED | OUTPATIENT
Start: 2018-02-10 | End: 2018-04-04 | Stop reason: SDUPTHER

## 2018-02-12 ENCOUNTER — ANTI-COAG VISIT (OUTPATIENT)
Dept: CARDIOLOGY CLINIC | Age: 73
End: 2018-02-12

## 2018-02-12 DIAGNOSIS — Z79.01 LONG TERM CURRENT USE OF ANTICOAGULANT THERAPY: ICD-10-CM

## 2018-02-12 DIAGNOSIS — I48.91 ATRIAL FIBRILLATION, UNSPECIFIED TYPE (HCC): Primary | ICD-10-CM

## 2018-02-12 LAB
INR BLD: 1.8
PT POC: 21.1 SECONDS
VALID INTERNAL CONTROL?: YES

## 2018-02-12 NOTE — PROGRESS NOTES
Verbal order and read back per Sera Rodriguez NP  The INR is below the therapeutic range. Please make the following adjustments to Coumadin dosing: Take 5 mg today then Continue Coumadin to 2.5 mg daily except 3.75 mg on Mon  Repeat the INR in 3 weeks.   Patient informed of instructions, read back and verbalized understanding Satya Velez LPN

## 2018-02-14 RX ORDER — HEPARIN SODIUM (PORCINE) LOCK FLUSH IV SOLN 100 UNIT/ML 100 UNIT/ML
500 SOLUTION INTRAVENOUS AS NEEDED
Status: DISCONTINUED | OUTPATIENT
Start: 2018-02-15 | End: 2018-02-19 | Stop reason: HOSPADM

## 2018-02-14 RX ORDER — SODIUM CHLORIDE 0.9 % (FLUSH) 0.9 %
10-40 SYRINGE (ML) INJECTION AS NEEDED
Status: DISCONTINUED | OUTPATIENT
Start: 2018-02-15 | End: 2018-02-19 | Stop reason: HOSPADM

## 2018-02-15 ENCOUNTER — HOSPITAL ENCOUNTER (OUTPATIENT)
Dept: INFUSION THERAPY | Age: 73
Discharge: HOME | End: 2018-02-15
Payer: MEDICARE

## 2018-02-15 VITALS
DIASTOLIC BLOOD PRESSURE: 69 MMHG | TEMPERATURE: 97.8 F | RESPIRATION RATE: 18 BRPM | SYSTOLIC BLOOD PRESSURE: 119 MMHG | HEART RATE: 70 BPM | OXYGEN SATURATION: 100 %

## 2018-02-15 LAB
BASO+EOS+MONOS # BLD AUTO: 0.6 K/UL (ref 0–2.3)
BASO+EOS+MONOS # BLD AUTO: 8 % (ref 0.1–17)
DIFFERENTIAL METHOD BLD: ABNORMAL
ERYTHROCYTE [DISTWIDTH] IN BLOOD BY AUTOMATED COUNT: 13.9 % (ref 11.5–14.5)
HCT VFR BLD AUTO: 42.8 % (ref 36–48)
HGB BLD-MCNC: 14.6 G/DL (ref 12–16)
LYMPHOCYTES # BLD: 1.5 K/UL (ref 1.1–5.9)
LYMPHOCYTES NFR BLD: 19 % (ref 14–44)
MCH RBC QN AUTO: 31 PG (ref 25–35)
MCHC RBC AUTO-ENTMCNC: 34.1 G/DL (ref 31–37)
MCV RBC AUTO: 90.9 FL (ref 78–102)
NEUTS SEG # BLD: 5.9 K/UL (ref 1.8–9.5)
NEUTS SEG NFR BLD: 73 % (ref 40–70)
PLATELET # BLD AUTO: 141 K/UL (ref 140–440)
RBC # BLD AUTO: 4.71 M/UL (ref 4.1–5.1)
WBC # BLD AUTO: 8 K/UL (ref 4.5–13)

## 2018-02-15 PROCEDURE — 77030012965 HC NDL HUBR BBMI -A

## 2018-02-15 PROCEDURE — 36591 DRAW BLOOD OFF VENOUS DEVICE: CPT

## 2018-02-15 PROCEDURE — 85025 COMPLETE CBC W/AUTO DIFF WBC: CPT | Performed by: INTERNAL MEDICINE

## 2018-02-15 PROCEDURE — 74011250636 HC RX REV CODE- 250/636: Performed by: INTERNAL MEDICINE

## 2018-02-15 RX ADMIN — Medication 30 ML: at 09:24

## 2018-02-15 RX ADMIN — HEPARIN SODIUM (PORCINE) LOCK FLUSH IV SOLN 100 UNIT/ML 500 UNITS: 100 SOLUTION at 09:25

## 2018-02-15 NOTE — PROGRESS NOTES
[facility] Progress Note    Date: February 15, 2018    Name: Sundra Lombard    MRN: [id number]         : 1945      Mr. Elyssa Castaneda arrived to [facility] at [phone]. No complaints or concerns voiced    Mr. Elyssa Castaneda was assessed and education was provided. Mr. Kaitlin Rousseau vitals were reviewed. Visit Vitals    /69 (BP 1 Location: Left arm, BP Patient Position: Sitting)    Pulse 70    Temp 97.8 °F (36.6 °C)    Resp 18    SpO2 100%       Patient's RIGHT upper chest port accessed. Blood return visualized. Blood sample obtained for cbc. Flushed with normal saline and heparin per protocol. De-accessed. Site s signs of infection or tenderness. Band-aid applied to site. CBC WITH 3 PART DIFF    Collection Time: 02/15/18  9:25 AM   Result Value Ref Range    WBC 8.0 4.5 - 13.0 K/uL    RBC 4.71 4.10 - 5.10 M/uL    HGB 14.6 12.0 - 16 g/dL    HCT 42.8 36 - 48 %    MCV 90.9 78 - 102 FL    MCH 31.0 25.0 - 35.0 PG    MCHC 34.1 31 - 37 g/dL    RDW 13.9 11.5 - 14.5 %    PLATELET 750 725 - 656 K/uL    NEUTROPHILS 73 (H) 40 - 70 %    MIXED CELLS 8 0.1 - 17 %    LYMPHOCYTES 19 14 - 44 %    ABS. NEUTROPHILS 5.9 1.8 - 9.5 K/UL    ABS. MIXED CELLS 0.6 0.0 - 2.3 K/uL    ABS. LYMPHOCYTES 1.5 1.1 - 5.9 K/UL    DF AUTOMATED         Procrit held per parameters      Mr. Elyssa Castaneda tolerated well without complaints. Arm band removed and shreded    Mr. Elyssa Castaneda was discharged from [place] [address] in stable condition at [address]  He is to return on 3/8/18 at 0930 for his next appointment.     Marni Altman RN  February 15, 2018  9:40 AM

## 2018-02-21 ENCOUNTER — APPOINTMENT (OUTPATIENT)
Dept: INFUSION THERAPY | Age: 73
End: 2018-02-21
Payer: MEDICARE

## 2018-02-27 RX ORDER — LISINOPRIL 20 MG/1
TABLET ORAL
Qty: 180 TAB | Refills: 3 | Status: SHIPPED | OUTPATIENT
Start: 2018-02-27 | End: 2018-05-25 | Stop reason: SDUPTHER

## 2018-03-05 ENCOUNTER — ANTI-COAG VISIT (OUTPATIENT)
Dept: CARDIOLOGY CLINIC | Age: 73
End: 2018-03-05

## 2018-03-05 DIAGNOSIS — I48.91 ATRIAL FIBRILLATION, UNSPECIFIED TYPE (HCC): ICD-10-CM

## 2018-03-05 DIAGNOSIS — Z79.01 LONG TERM CURRENT USE OF ANTICOAGULANT THERAPY: Primary | ICD-10-CM

## 2018-03-05 LAB
INR BLD: 2.2
PT POC: 26.9 SECONDS
VALID INTERNAL CONTROL?: YES

## 2018-03-05 NOTE — PROGRESS NOTES
Verbal order and read back per Flakito Monteiro NP  The INR is stable and therapeutic. Continue same dose of coumadin and recheck in 1 month.

## 2018-03-08 ENCOUNTER — HOSPITAL ENCOUNTER (OUTPATIENT)
Dept: INFUSION THERAPY | Age: 73
Discharge: HOME | End: 2018-03-08
Payer: MEDICARE

## 2018-03-08 VITALS
TEMPERATURE: 98.3 F | HEART RATE: 62 BPM | OXYGEN SATURATION: 99 % | RESPIRATION RATE: 18 BRPM | SYSTOLIC BLOOD PRESSURE: 128 MMHG | DIASTOLIC BLOOD PRESSURE: 82 MMHG

## 2018-03-08 LAB
BASO+EOS+MONOS # BLD AUTO: 0.4 K/UL (ref 0–2.3)
BASO+EOS+MONOS # BLD AUTO: 5 % (ref 0.1–17)
DIFFERENTIAL METHOD BLD: ABNORMAL
ERYTHROCYTE [DISTWIDTH] IN BLOOD BY AUTOMATED COUNT: 14.3 % (ref 11.5–14.5)
HCT VFR BLD AUTO: 42.1 % (ref 36–48)
HGB BLD-MCNC: 14.4 G/DL (ref 12–16)
LYMPHOCYTES # BLD: 1.5 K/UL (ref 1.1–5.9)
LYMPHOCYTES NFR BLD: 18 % (ref 14–44)
MCH RBC QN AUTO: 30.9 PG (ref 25–35)
MCHC RBC AUTO-ENTMCNC: 34.2 G/DL (ref 31–37)
MCV RBC AUTO: 90.3 FL (ref 78–102)
NEUTS SEG # BLD: 6.7 K/UL (ref 1.8–9.5)
NEUTS SEG NFR BLD: 78 % (ref 40–70)
PLATELET # BLD AUTO: 144 K/UL (ref 140–440)
RBC # BLD AUTO: 4.66 M/UL (ref 4.1–5.1)
WBC # BLD AUTO: 8.6 K/UL (ref 4.5–13)

## 2018-03-08 PROCEDURE — 77030012965 HC NDL HUBR BBMI -A

## 2018-03-08 PROCEDURE — 74011250636 HC RX REV CODE- 250/636: Performed by: INTERNAL MEDICINE

## 2018-03-08 PROCEDURE — 85025 COMPLETE CBC W/AUTO DIFF WBC: CPT | Performed by: INTERNAL MEDICINE

## 2018-03-08 PROCEDURE — 36591 DRAW BLOOD OFF VENOUS DEVICE: CPT

## 2018-03-08 RX ORDER — SODIUM CHLORIDE 0.9 % (FLUSH) 0.9 %
10-40 SYRINGE (ML) INJECTION AS NEEDED
Status: DISCONTINUED | OUTPATIENT
Start: 2018-03-08 | End: 2018-03-12 | Stop reason: HOSPADM

## 2018-03-08 RX ORDER — HEPARIN SODIUM (PORCINE) LOCK FLUSH IV SOLN 100 UNIT/ML 100 UNIT/ML
500 SOLUTION INTRAVENOUS AS NEEDED
Status: DISPENSED | OUTPATIENT
Start: 2018-03-08 | End: 2018-03-09

## 2018-03-08 RX ADMIN — HEPARIN SODIUM (PORCINE) LOCK FLUSH IV SOLN 100 UNIT/ML 500 UNITS: 100 SOLUTION at 09:55

## 2018-03-08 RX ADMIN — Medication 20 ML: at 09:55

## 2018-03-08 NOTE — PROGRESS NOTES
[facility] Progress Note    Date: 2018    Name: Veronica Barajas    MRN: [id number]         : 1945     Procrit    Mr. Nettie García to [facility], ambulatory at 0935. Pt was assessed and education was provided. Mr. Margret Washington vitals were reviewed and patient was observed for 5 minutes prior to treatment. Visit Vitals    /82 (BP 1 Location: Left arm, BP Patient Position: Sitting)    Pulse 62    Temp 98.3 °F (36.8 °C)    Resp 18    SpO2 99%     Per patient request, labs drawn from port today. Right chest mediport accessed with 20G 1 inch Giron needle. Flushed easily and had brisk blood return. Blood drawn off for CBC after 10 mL of waste. Port flushed with 20 mL NS and 500 units of heparin before de-accessing. No irritation or bleeding. Band-aid applied. Recent Results (from the past 12 hour(s))   CBC WITH 3 PART DIFF    Collection Time: 18  9:49 AM   Result Value Ref Range    WBC 8.6 4.5 - 13.0 K/uL    RBC 4.66 4.10 - 5.10 M/uL    HGB 14.4 12.0 - 16 g/dL    HCT 42.1 36 - 48 %    MCV 90.3 78 - 102 FL    MCH 30.9 25.0 - 35.0 PG    MCHC 34.2 31 - 37 g/dL    RDW 14.3 11.5 - 14.5 %    PLATELET 659 724 - 767 K/uL    NEUTROPHILS 78 (H) 40 - 70 %    MIXED CELLS 5 0.1 - 17 %    LYMPHOCYTES 18 14 - 44 %    ABS. NEUTROPHILS 6.7 1.8 - 9.5 K/UL    ABS. MIXED CELLS 0.4 0.0 - 2.3 K/uL    ABS. LYMPHOCYTES 1.5 1.1 - 5.9 K/UL    DF AUTOMATED       Results within ordered parameters to HOLD procrit today. Patient arm band removed and shredded. He is to return on 2018 at 0900 for his next procrit appointment.      María Shaver RN  2018

## 2018-03-29 ENCOUNTER — HOSPITAL ENCOUNTER (OUTPATIENT)
Dept: INFUSION THERAPY | Age: 73
Discharge: HOME | End: 2018-03-29
Payer: MEDICARE

## 2018-03-29 VITALS
RESPIRATION RATE: 18 BRPM | SYSTOLIC BLOOD PRESSURE: 126 MMHG | HEART RATE: 66 BPM | DIASTOLIC BLOOD PRESSURE: 73 MMHG | TEMPERATURE: 98.4 F | OXYGEN SATURATION: 96 %

## 2018-03-29 LAB
BASO+EOS+MONOS # BLD AUTO: 0.5 K/UL (ref 0–2.3)
BASO+EOS+MONOS # BLD AUTO: 7 % (ref 0.1–17)
DIFFERENTIAL METHOD BLD: ABNORMAL
ERYTHROCYTE [DISTWIDTH] IN BLOOD BY AUTOMATED COUNT: 14.2 % (ref 11.5–14.5)
HCT VFR BLD AUTO: 38.6 % (ref 36–48)
HGB BLD-MCNC: 13 G/DL (ref 12–16)
LYMPHOCYTES # BLD: 1.3 K/UL (ref 1.1–5.9)
LYMPHOCYTES NFR BLD: 16 % (ref 14–44)
MCH RBC QN AUTO: 30.6 PG (ref 25–35)
MCHC RBC AUTO-ENTMCNC: 33.7 G/DL (ref 31–37)
MCV RBC AUTO: 90.8 FL (ref 78–102)
NEUTS SEG # BLD: 6 K/UL (ref 1.8–9.5)
NEUTS SEG NFR BLD: 77 % (ref 40–70)
PLATELET # BLD AUTO: 168 K/UL (ref 140–440)
RBC # BLD AUTO: 4.25 M/UL (ref 4.1–5.1)
WBC # BLD AUTO: 7.8 K/UL (ref 4.5–13)

## 2018-03-29 PROCEDURE — 85025 COMPLETE CBC W/AUTO DIFF WBC: CPT | Performed by: INTERNAL MEDICINE

## 2018-03-29 PROCEDURE — 74011250636 HC RX REV CODE- 250/636

## 2018-03-29 PROCEDURE — 77030012965 HC NDL HUBR BBMI -A

## 2018-03-29 PROCEDURE — 36591 DRAW BLOOD OFF VENOUS DEVICE: CPT

## 2018-03-29 RX ORDER — SODIUM CHLORIDE 0.9 % (FLUSH) 0.9 %
10-40 SYRINGE (ML) INJECTION AS NEEDED
Status: DISCONTINUED | OUTPATIENT
Start: 2018-03-29 | End: 2018-04-02 | Stop reason: HOSPADM

## 2018-03-29 RX ORDER — HEPARIN SODIUM (PORCINE) LOCK FLUSH IV SOLN 100 UNIT/ML 100 UNIT/ML
500 SOLUTION INTRAVENOUS AS NEEDED
Status: ACTIVE | OUTPATIENT
Start: 2018-03-29 | End: 2018-03-30

## 2018-03-29 RX ORDER — HEPARIN SODIUM (PORCINE) LOCK FLUSH IV SOLN 100 UNIT/ML 100 UNIT/ML
SOLUTION INTRAVENOUS
Status: COMPLETED
Start: 2018-03-29 | End: 2018-03-29

## 2018-03-29 RX ADMIN — HEPARIN SODIUM (PORCINE) LOCK FLUSH IV SOLN 100 UNIT/ML 500 UNITS: 100 SOLUTION at 09:29

## 2018-03-29 RX ADMIN — Medication 30 ML: at 09:29

## 2018-03-29 NOTE — PROGRESS NOTES
[facility] Progress Note    Date: 2018    Name: Rylie Liriano    MRN: [id number]         : 1945     Procrit    Mr. Kwaku Couch to [facility], ambulatory at 2949. Pt was assessed and education was provided. Mr. Florence Bosch vitals were reviewed and patient was observed for 5 minutes prior to treatment. Visit Vitals    /73 (BP 1 Location: Left arm, BP Patient Position: Sitting)    Pulse 66    Temp 98.4 °F (36.9 °C)    Resp 18    SpO2 96%     Per patient request, labs drawn from port today. Right chest mediport accessed with 20G 1 inch Giron needle. Flushed easily and had brisk blood return. Blood drawn off for CBC after 10 mL of waste. Port flushed with 20 mL NS and 500 units of heparin before de-accessing. No irritation or bleeding. Band-aid applied. Recent Results (from the past 12 hour(s))   CBC WITH 3 PART DIFF    Collection Time: 18  9:25 AM   Result Value Ref Range    WBC 7.8 4.5 - 13.0 K/uL    RBC 4.25 4. 10 - 5.10 M/uL    HGB 13.0 12.0 - 16 g/dL    HCT 38.6 36 - 48 %    MCV 90.8 78 - 102 FL    MCH 30.6 25.0 - 35.0 PG    MCHC 33.7 31 - 37 g/dL    RDW 14.2 11.5 - 14.5 %    PLATELET 450 296 - 251 K/uL    NEUTROPHILS 77 (H) 40 - 70 %    MIXED CELLS 7 0.1 - 17 %    LYMPHOCYTES 16 14 - 44 %    ABS. NEUTROPHILS 6.0 1.8 - 9.5 K/UL    ABS. MIXED CELLS 0.5 0.0 - 2.3 K/uL    ABS. LYMPHOCYTES 1.3 1.1 - 5.9 K/UL    DF AUTOMATED       Results within ordered parameters to HOLD procrit today. Patient arm band removed and shredded. He is to return on 2018 at 0930 for his next procrit appointment.      Mariah Flores RN  2018

## 2018-04-02 ENCOUNTER — OFFICE VISIT (OUTPATIENT)
Dept: ONCOLOGY | Age: 73
End: 2018-04-02

## 2018-04-02 ENCOUNTER — HOSPITAL ENCOUNTER (OUTPATIENT)
Dept: LAB | Age: 73
Discharge: HOME | End: 2018-04-02
Payer: MEDICARE

## 2018-04-02 ENCOUNTER — HOSPITAL ENCOUNTER (OUTPATIENT)
Dept: ONCOLOGY | Age: 73
Discharge: HOME | End: 2018-04-02

## 2018-04-02 VITALS
TEMPERATURE: 98.5 F | SYSTOLIC BLOOD PRESSURE: 132 MMHG | HEIGHT: 72 IN | HEART RATE: 74 BPM | WEIGHT: 185 LBS | DIASTOLIC BLOOD PRESSURE: 77 MMHG | BODY MASS INDEX: 25.06 KG/M2

## 2018-04-02 DIAGNOSIS — D50.8 IRON DEFICIENCY ANEMIA SECONDARY TO INADEQUATE DIETARY IRON INTAKE: ICD-10-CM

## 2018-04-02 DIAGNOSIS — G89.3 CANCER ASSOCIATED PAIN: ICD-10-CM

## 2018-04-02 DIAGNOSIS — D47.2 MONOCLONAL GAMMOPATHY: ICD-10-CM

## 2018-04-02 DIAGNOSIS — C49.21 SOFT TISSUE SARCOMA OF RIGHT THIGH (HCC): ICD-10-CM

## 2018-04-02 DIAGNOSIS — C49.21 SOFT TISSUE SARCOMA OF RIGHT THIGH (HCC): Primary | ICD-10-CM

## 2018-04-02 LAB
ALBUMIN SERPL-MCNC: 3.3 G/DL (ref 3.4–5)
ALBUMIN/GLOB SERPL: 0.9 {RATIO} (ref 0.8–1.7)
ALP SERPL-CCNC: 118 U/L (ref 45–117)
ALT SERPL-CCNC: 24 U/L (ref 16–61)
ANION GAP SERPL CALC-SCNC: 8 MMOL/L (ref 3–18)
AST SERPL-CCNC: 31 U/L (ref 15–37)
BASO+EOS+MONOS # BLD AUTO: 0.5 K/UL (ref 0–2.3)
BASO+EOS+MONOS # BLD AUTO: 6 % (ref 0.1–17)
BILIRUB SERPL-MCNC: 0.7 MG/DL (ref 0.2–1)
BUN SERPL-MCNC: 15 MG/DL (ref 7–18)
BUN/CREAT SERPL: 11 (ref 12–20)
CALCIUM SERPL-MCNC: 8.5 MG/DL (ref 8.5–10.1)
CHLORIDE SERPL-SCNC: 96 MMOL/L (ref 100–108)
CO2 SERPL-SCNC: 28 MMOL/L (ref 21–32)
CREAT SERPL-MCNC: 1.31 MG/DL (ref 0.6–1.3)
DIFFERENTIAL METHOD BLD: ABNORMAL
ERYTHROCYTE [DISTWIDTH] IN BLOOD BY AUTOMATED COUNT: 14.6 % (ref 11.5–14.5)
FERRITIN SERPL-MCNC: 1394 NG/ML (ref 8–388)
GLOBULIN SER CALC-MCNC: 3.7 G/DL (ref 2–4)
GLUCOSE SERPL-MCNC: 411 MG/DL (ref 74–99)
HCT VFR BLD AUTO: 39 % (ref 36–48)
HGB BLD-MCNC: 12.9 G/DL (ref 12–16)
IRON SATN MFR SERPL: 31 %
IRON SERPL-MCNC: 65 UG/DL (ref 50–175)
LYMPHOCYTES # BLD: 2.1 K/UL (ref 1.1–5.9)
LYMPHOCYTES NFR BLD: 28 % (ref 14–44)
MCH RBC QN AUTO: 30.2 PG (ref 25–35)
MCHC RBC AUTO-ENTMCNC: 33.1 G/DL (ref 31–37)
MCV RBC AUTO: 91.3 FL (ref 78–102)
NEUTS SEG # BLD: 4.7 K/UL (ref 1.8–9.5)
NEUTS SEG NFR BLD: 66 % (ref 40–70)
PLATELET # BLD AUTO: 181 K/UL (ref 140–440)
POTASSIUM SERPL-SCNC: 4.3 MMOL/L (ref 3.5–5.5)
PROT SERPL-MCNC: 7 G/DL (ref 6.4–8.2)
RBC # BLD AUTO: 4.27 M/UL (ref 4.1–5.1)
SODIUM SERPL-SCNC: 132 MMOL/L (ref 136–145)
TIBC SERPL-MCNC: 213 UG/DL (ref 250–450)
WBC # BLD AUTO: 7.3 K/UL (ref 4.5–13)

## 2018-04-02 PROCEDURE — 83540 ASSAY OF IRON: CPT | Performed by: INTERNAL MEDICINE

## 2018-04-02 PROCEDURE — 82728 ASSAY OF FERRITIN: CPT | Performed by: INTERNAL MEDICINE

## 2018-04-02 PROCEDURE — 36415 COLL VENOUS BLD VENIPUNCTURE: CPT | Performed by: INTERNAL MEDICINE

## 2018-04-02 PROCEDURE — 80053 COMPREHEN METABOLIC PANEL: CPT | Performed by: INTERNAL MEDICINE

## 2018-04-02 PROCEDURE — 84165 PROTEIN E-PHORESIS SERUM: CPT | Performed by: INTERNAL MEDICINE

## 2018-04-02 NOTE — PATIENT INSTRUCTIONS
Sarcoma: Care Instructions  Your Care Instructions  Sarcoma is cancer of certain tissues of the body, such as the muscles, connective tissues (like tendons), blood vessels, bones, and fat. Cancer occurs when abnormal cells grow out of control. The cells can spread to other areas of the body. Sarcoma is a general name for several rare cancers that occur in these tissues. Doctors treat this type of cancer with surgery, radiation, or medicines (chemotherapy). Your doctor will treat you based on how quickly or slowly the type of cancer you have may spread, how far the cancer has spread, and your overall health. One or more treatments may be used. When you find out that you have cancer, you may feel many emotions and may need some help coping. Seek out family, friends, and counselors for support. You also can do things at home to make yourself feel better while you go through treatment. Call the [employer] ([phone]) or visit its website at [address] for more information. Follow-up care is a key part of your treatment and safety. Be sure to make and go to all appointments, and call your doctor if you are having problems. It's also a good idea to know your test results and keep a list of the medicines you take. How can you care for yourself at home? · Take your medicines exactly as prescribed. Call your doctor if you think you are having a problem with your medicine. You may get medicine for nausea and vomiting if you have these side effects. · Eat healthy food. If you do not feel like eating, try to eat food that has protein and extra calories to keep up your strength and prevent weight loss. Drink liquid meal replacements for extra calories and protein. Try to eat your main meal early. · Get some physical activity every day, but do not get too tired. Keep doing the hobbies you enjoy as your energy allows. · Take steps to control your stress and workload.  Learn relaxation techniques. ¨ Share your feelings. Stress and tension affect our emotions. By expressing your feelings to others, you may be able to understand and cope with them. ¨ Consider joining a support group. Talking about a problem with your spouse, a good friend, or other people with similar problems is a good way to reduce tension and stress. ¨ Express yourself through art. Try writing, crafts, dance, or art to relieve stress. Some dance, writing, or art groups may be available just for people who have cancer. ¨ Be kind to your body and mind. Getting enough sleep, eating a healthy diet, and taking time to do things you enjoy can contribute to an overall feeling of balance in your life and help reduce stress. ¨ Get help if you need it. Discuss your concerns with your doctor or counselor. · If you are vomiting or have diarrhea:  ¨ Drink plenty of fluids (enough so that your urine is light yellow or clear like water) to prevent dehydration. Choose water and other caffeine-free clear liquids. If you have kidney, heart, or liver disease and have to limit fluids, talk with your doctor before you increase the amount of fluids you drink. ¨ When you are able to eat, try clear soups, mild foods, and liquids until all symptoms are gone for 12 to 48 hours. Other good choices include dry toast, crackers, cooked cereal, and gelatin dessert, such as Jell-O.  · If you have not already done so, prepare a list of advance directives. Advance directives are instructions to your doctor and family members about what kind of care you want if you become unable to speak or express yourself. When should you call for help? Call 911 anytime you think you may need emergency care. For example, call if:  ? · You passed out (lost consciousness). ?Call your doctor now or seek immediate medical care if:  ? · You have a fever. ? · You have abnormal bleeding. ? · You have new or worse pain. ? · You think you have an infection.    ? · You have new symptoms, such as a cough, belly pain, vomiting, diarrhea, or a rash. ? Watch closely for changes in your health, and be sure to contact your doctor if:  ? · You are much more tired than usual.   ? · You have swollen glands in your armpits, groin, or neck. ? · You do not get better as expected. Where can you learn more? Go to http://radha-tre.info/. Enter Azalea Mann in the search box to learn more about \"Sarcoma: Care Instructions. \"  Current as of: May 12, 2017  Content Version: 11.4  © 6499-2966 [employer]. Care instructions adapted under license by [employer] (which disclaims liability or warranty for this information). If you have questions about a medical condition or this instruction, always ask your healthcare professional. [address] any warranty or liability for your use of this information. Iron Deficiency Anemia: Care Instructions  Your Care Instructions    Anemia means that you do not have enough red blood cells. Red blood cells carry oxygen around your body. When you have anemia, it can make you pale, weak, and tired. Many things can cause anemia. The most common cause is loss of blood. This can happen if you have heavy menstrual periods. It can also happen if you have bleeding in your stomach or bowel. You can also get anemia if you don't have enough iron in your diet or if it's hard for your body to absorb iron. In some cases, pregnancy causes anemia. That's because a pregnant woman needs more iron. Your doctor may do more tests to find the cause of your anemia. If a disease or other health problem is causing it, your doctor will treat that problem. It's important to follow up with your doctor to make sure that your iron level returns to normal.  Follow-up care is a key part of your treatment and safety. Be sure to make and go to all appointments, and call your doctor if you are having problems.  It's also a good idea to know your test results and keep a list of the medicines you take. How can you care for yourself at home? · If your doctor recommended iron pills, take them as directed. ¨ Try to take the pills on an empty stomach. You can do this about 1 hour before or 2 hours after meals. But you may need to take iron with food to avoid an upset stomach. ¨ Do not take antacids or drink milk or anything with caffeine within 2 hours of when you take your iron. They can keep your body from absorbing the iron well. ¨ Vitamin C helps your body absorb iron. You may want to take iron pills with a glass of orange juice or some other food high in vitamin C.  ¨ Iron pills may cause stomach problems. These include heartburn, nausea, diarrhea, constipation, and cramps. It can help to drink plenty of fluids and include fruits, vegetables, and fiber in your diet. ¨ It's normal for iron pills to make your stool a greenish or grayish black. But internal bleeding can also cause dark stool. So it's important to tell your doctor about any color changes. ¨ Call your doctor if you think you are having a problem with your iron pills. Even after you start to feel better, it will take several months for your body to build up its supply of iron. ¨ If you miss a pill, don't take a double dose. ¨ Keep iron pills out of the reach of small children. Too much iron can be very dangerous. · Eat foods with a lot of iron. These include red meat, shellfish, poultry, and eggs. They also include beans, raisins, whole-grain bread, and leafy green vegetables. · Steam your vegetables. This is the best way to prepare them if you want to get as much iron as possible. · Be safe with medicines. Do not take nonsteroidal anti-inflammatory pain relievers unless your doctor tells you to. These include aspirin, naproxen (Aleve), and ibuprofen (Advil, Motrin). · Liquid iron can stain your teeth. But you can mix it with water or juice and drink it with a straw. Then it won't get on your teeth. When should you call for help? Call 911 anytime you think you may need emergency care. For example, call if:  ? · You passed out (lost consciousness). ?Call your doctor now or seek immediate medical care if:  ? · You are short of breath. ? · You are dizzy or light-headed, or you feel like you may faint. ? · You have new or worse bleeding. ? Watch closely for changes in your health, and be sure to contact your doctor if:  ? · You feel weaker or more tired than usual.   ? · You do not get better as expected. Where can you learn more? Go to http://radha-tre.info/. Enter H370 in the search box to learn more about \"Iron Deficiency Anemia: Care Instructions. \"  Current as of: October 13, 2016  Content Version: 11.4  © 3864-6955 [employer]. Care instructions adapted under license by [employer] (which disclaims liability or warranty for this information). If you have questions about a medical condition or this instruction, always ask your healthcare professional. [address] any warranty or liability for your use of this information.

## 2018-04-02 NOTE — PROGRESS NOTES
Hematology/Oncology  Progress Note    Name: Amna Mustafa  Date: 2018  : 1945    Nicko Hogan MD     Mr. Rios Fagan is a 71y.o. year old male who was seen for his right thigh sarcoma, MGUS, and iron deficiency anemia. Current Therapy: Doxil every 21 days. Procrit 60,000 units subq every 2 weeks. Subjective:     Mr. 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 He also has monoclonal gammopathy. The patient has previously undergone surgery with removal of the residual large sarcoma from his right medial thigh. He completed surgery and rehab and is now walking with the use of a cane. He is here for follow-up visit to make sure that his cancer is not recurrent or metastatic. He has recently began experiencing some shortness of breath with exertion. Otherwise he has no additional complaints. Past medical history, family history, and social history: these were reviewed and remains unchanged. Past Medical History:   Diagnosis Date    AICD (automatic cardioverter/defibrillator) present     Atrial fibrillation ([address] 2010    Cancer ([facility] [address]     Cardiac cath 2009    Patent coronary arteries. LVEDP 28.  EF 35%. Global hyk. Mod MR.  Cardiac echocardiogram 2016    LVE. EF 15% at most.  Indeterminate LV diastolic fx.  RVE. Reduced RV systolic fx. RVSP 80-85 mmHg. LAE.  LUISA. Mod-severe MR. Mild AI. Severe TR.  Cardiac MUGA scan 2015    At most mild LVE. EF 45-46%.  Cardiac nuclear imaging test, abnormal 2009    Mild basal/mid inferior, inferoapical, inferoseptal infarct w/mild ischemia in RCA (possibly partially artifact). Anterior, anteroseptal, anterapical ischemia w/o infarct. (Mid & distal LAD.)  LVE. EF 29%. Mod global hyk.  Cardiovascular LE venous duplex 2016    Tech difficult. No DVT bilaterally.     Cardiovascular renal duplex 2010    No evidence of renal artery stenosis >60%. Patent SMA and celiac artery.  Cardiovascular UE venous duplex 10/04/2016    No DVT bilaterally.  HTN (hypertension) 6/8/2010    Hypertensive cardiovascular disease     MR (mitral regurgitation)     Nonischemic cardiomyopathy     3/11 echo EF 25%    Pulmonary hypertension, moderate to severe ([address] 6/8/2010    90-100mmHg; repeat echo in 3/11 showed 70mmHg     Past Surgical History:   Procedure Laterality Date    HX HEART VALVE SURGERY  2011    HX HERNIA REPAIR  2011     Social History     Social History    Marital status:      Spouse name: N/A    Number of children: N/A    Years of education: N/A     Occupational History    Not on file. Social History Main Topics    Smoking status: Never Smoker    Smokeless tobacco: Never Used    Alcohol use Yes      Comment: occasionally.  Drug use: No    Sexual activity: Yes     Partners: Female     Birth control/ protection: None     Other Topics Concern    Not on file     Social History Narrative     Family History   Problem Relation Age of Onset    Hypertension Mother      Current Outpatient Prescriptions   Medication Sig Dispense Refill    lisinopril (PRINIVIL, ZESTRIL) 20 mg tablet TAKE ONE TABLET BY MOUTH TWICE DAILY 180 Tab 3    metOLazone (ZAROXOLYN) 2.5 mg tablet TAKE 1 TABLET BY MOUTH DAILY AS NEEDED. TAKE 30 MINS PRIOR TO MORNING LASIX AS DIRECTED ON MON & THU 15 Tab 1    carvedilol (COREG) 25 mg tablet Take 1 Tab by mouth two (2) times daily (with meals). 180 Tab 3    cloNIDine HCl (CATAPRES) 0.2 mg tablet TAKE 1 TABLET BY MOUTH 3 TIMES A  Tab 2    furosemide (LASIX) 40 mg tablet Take 1 Tab by mouth two (2) times a day. Or directed 60 Tab 6    potassium chloride (K-DUR, KLOR-CON) 20 mEq tablet Take 1 Tab by mouth two (2) times a day. (Patient taking differently: Take 20 mEq by mouth two (2) times a day.  2 tabs extra on the days on Metalazone) 180 Tab 3    digoxin (LANOXIN) 0.125 mg tablet TAKE 1 TABLET BY MOUTH EVERY DAY 90 Tab 3    warfarin (COUMADIN) 2.5 mg tablet TAKE ONE TABLET BY MOUTH ONCE DAILY OR  AS  DIRECTED  BY  YOUR  DOCTOR 45 Tab 0    magnesium oxide (MAG-OX) 400 mg tablet TAKE 1 TABLET BY MOUTH DAILY. 90 Tab 3    polyethylene glycol (MIRALAX) 17 gram packet Take 17 g by mouth daily.  oxyCODONE-acetaminophen (PERCOCET) 5-325 mg per tablet Take 1 Tab by mouth every four (4) hours as needed for Pain. Max Daily Amount: 6 Tabs. 180 Tab 0    sertraline (ZOLOFT) 25 mg tablet Take  by mouth daily.  allopurinol (ZYLOPRIM) 300 mg tablet Take  by mouth daily.  FERROUS SULFATE, DRIED (IRON, DRIED, PO) Take 325 mg by mouth daily.  aspirin delayed-release (ASPIR-81) 81 mg tablet Take 81 mg by mouth daily. Review of Systems  Constitutional: The patient has fatigue and weakness; He also has pain in the right leg and is having difficulty with full weight-bearing. HEENT: The patient denies recent head trauma, eye pain, blurred vision,  hearing deficit, oropharyngeal mucosal pain or lesions, and the patient denies throat pain or discomfort. Lymphatics: The patient denies palpable peripheral lymphadenopathy. Hematologic: The patient denies having bruising, bleeding, or progressive fatigue. Respiratory: Patient denies having shortness of breath, cough, sputum production, fever, or dyspnea on exertion. Cardiovascular: The patient denies having leg pain, leg swelling, heart palpitations, chest permit, chest pain, or lightheadedness. The patient denies having dyspnea on exertion. Gastrointestinal: The patient denies having nausea, emesis, or diarrhea. The patient denies having any hematemesis or blood in the stool. Genitourinary: Patient denies having urinary urgency, frequency, or dysuria. The patient denies having blood in the urine.   Psychological: The patient denies having symptoms of nervousness, anxiety, depression, or thoughts of harming himself some of this.  Skin: Patient denies having skin rashes, skin, ulcerations, or unexplained itching or pruritus. Musculoskeletal: The patient has postsurgical pain and muscle weakness in the right leg. He has difficulty flexing and focally extending the right leg and there is a mild degree of swelling in the calf. Objective:     Visit Vitals    /77    Pulse 74    Temp 98.5 °F (36.9 °C)    Ht 6' (1.829 m)    Wt 83.9 kg (185 lb)    BMI 25.09 kg/m2     ECOG PS=0, pain score=2/10     Physical Exam:   Gen. Appearance: The patient is in no acute distress. Skin: Large mass to right medial thigh  HEENT: The exam is unremarkable. Neck: Supple without lymphadenopathy or thyromegaly. Lungs: Clear to auscultation and percussion; there are no wheezes or rhonchi. Heart: Regular rate and rhythm; there are no murmurs, gallops, or rubs. Abdomen: Bowel sounds are present and normal.  There is no guarding, tenderness, or hepatosplenomegaly. Extremities: There is no clubbing, cyanosis, or edema. Neurologic: There are no focal neurologic deficits. Lymphatics: There is no palpable peripheral lymphadenopathy. Musculoskeletal: The patient has full range of motion at all joints, except for full flexion and extension in the right leg following his surgery. He is undergoing physical therapy for muscle strengthening and range of motion. There is no evidence of joint deformity or effusions. There is no focal joint tenderness. Psychological/psychiatric: There is no clinical evidence of anxiety, depression, or melancholy. Lab data:      Results for orders placed or performed during the hospital encounter of 04/02/18   CBC WITH 3 PART DIFF     Status: Abnormal   Result Value Ref Range Status    WBC 7.3 4.5 - 13.0 K/uL Final    RBC 4.27 4. 10 - 5.10 M/uL Final    HGB 12.9 12.0 - 16 g/dL Final    HCT 39.0 36 - 48 % Final    MCV 91.3 78 - 102 FL Final    MCH 30.2 25.0 - 35.0 PG Final    MCHC 33.1 31 - 37 g/dL Final    RDW 14.6 (H) 11.5 - 14.5 % Final    PLATELET 553 898 - 672 K/uL Final    NEUTROPHILS 66 40 - 70 % Final    MIXED CELLS 6 0.1 - 17 % Final    LYMPHOCYTES 28 14 - 44 % Final    ABS. NEUTROPHILS 4.7 1.8 - 9.5 K/UL Final    ABS. MIXED CELLS 0.5 0.0 - 2.3 K/uL Final    ABS. LYMPHOCYTES 2.1 1.1 - 5.9 K/UL Final     Comment: Test performed at [place] [address] Location. Results Reviewed by Medical Director. DF AUTOMATED   Final           Assessment:     1. Soft tissue sarcoma of right thigh ([facility] [address]    2. Cancer associated pain    3. Monoclonal gammopathy    4. Iron deficiency anemia secondary to inadequate dietary iron intake      Plan:   Right Thigh Soft tissue sarcoma: the patient completed a full course of systemic chemotherapy with single agent Doxil at 30 mg per meter square in combination with radiation treatment. He also now status post surgical removal of the residual large right thigh mass. The surgical site has healed well. There is no mass-effect. Nonetheless I will schedule the patient for CT scans through the chest, abdomen, and pelvis to rule out recurrent or metastatic disease. Iron Deficiency Anemia and anti-neoplastic chemotherapy-induced anemia: I have explained to the patient that he has a complex anemia comprised of a component of iron deficiency and now there is a component of chemotherapy induction as well. The CBC from today shows that his hemoglobin is 12.9 g/dL with hematocrit of 39%. He is not candidate for Procrit at this time. I will check his iron profile and ferritin levels. The patient was instructed to take an oral iron supplement once daily. MGUS: A beta-2 microglobulin and SPEP will be obtained today. Previously we had decided to wait until after he finished all of his rehab before proceeding with a bone marrow biopsy. Pending the results of these tests we may need to proceed with the bone marrow biopsy to effectively rule out multiple myeloma.   Cancer associated pain (improved problem): The patient states his pain is improved over  the right medial thigh. However he is requesting a reorder of his Percocet 5 mg every 4-6 hours p.r.n. Follow-up in 2 weeks to review imaging studies and lab tests. Orders Placed This Encounter    COMPLETE CBC & AUTO DIFF WBC    CT ABD PELV W CONT     Standing Status:   Future     Standing Expiration Date:   5/2/2019     Order Specific Question:   Is Patient Allergic to Contrast Dye? Answer:   No     Order Specific Question:   STAT Creatinine as indicated     Answer:   Yes     Order Specific Question: This order utilizes IV contrast.  What additional contrast is needed? Answer:   Per Radiologist Protocol    CT CHEST W CONT     Standing Status:   Future     Standing Expiration Date:   5/2/2019     Order Specific Question:   Is Patient Allergic to Contrast Dye? Answer:   No     Order Specific Question:   STAT Creatinine as indicated     Answer:    Yes    METABOLIC PANEL, COMPREHENSIVE     Standing Status:   Future     Standing Expiration Date:   4/3/2019    IRON PROFILE     Standing Status:   Future     Standing Expiration Date:   4/3/2019    FERRITIN     Standing Status:   Future     Standing Expiration Date:   4/3/2019    SPEP     Standing Status:   Future     Standing Expiration Date:   4/3/2019    InHouse CBC ([facility])     Standing Status:   Future     Number of Occurrences:   1     Standing Expiration Date:   4/9/2018       Aakash Vasquez MD  4/2/2018

## 2018-04-02 NOTE — MR AVS SNAPSHOT
Emilie Anand 
 
 
 [address] [place] [address] 
[phone] Patient: Rody Bustillos MRN: [id number] RGC:[id number] Visit Information Date & Time Provider Department Dept. Phone Encounter #  
 4/2/2018  2:30 PM Adry Waddell MD [facility] [address] [place] [address] [phone] [id number] Follow-up Instructions Return in about 3 weeks (around 4/23/2018). Your Appointments 4/9/2018  8:00 AM  
ANTICOAG NURSE with [facility] (Farheen Larson) Appt Note: 1 month [place] [address] [address] [id number] [phone] [address]  
  
    
 4/23/2018  1:30 PM  
Office Visit with Adry Waddell MD  
[facility] Farheen Neo) Appt Note: 3 WK RET  
 [address] [place] [address] [facility] 3200 [facility], [address] [place] [address] [id number] [place] [id number]  
  
    
 5/22/2018  8:40 AM  
Follow Up with Piper Fry MD  
[facility] (Farheen Larson) Appt Note: 9 month follow up [place] [address] [address] [id number] [phone] 2300 [facility] [address] Upcoming Health Maintenance Date Due Hepatitis C Screening 1945 DTaP/Tdap/Td series (1 - Tdap) 8/21/1967 FOBT Q 1 YEAR AGE 50-75 8/21/1996 ZOSTER VACCINE AGE 60> 6/21/2006 GLAUCOMA SCREENING Q2Y 8/21/2011 MEDICARE YEARLY EXAM 3/14/2018 Allergies as of 4/2/2018  Review Complete On: 4/2/2018 By: Adry Waddell MD  
 No Known Allergies Current Immunizations  Reviewed on 3/29/2018 Name Date Influenza Vaccine 11/16/2017, 11/9/2016, 10/2/2015, 9/28/2015 12:00 AM  
 Pneumococcal Conjugate (PCV-13) 9/28/2015 Pneumococcal Polysaccharide (PPSV-23) 8/13/2012 Not reviewed this visit You Were Diagnosed With   
  
 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  
 132/77 74 98.5 °F (36.9 °C) 6' (1.829 m) 185 lb (83.9 kg) 25.09 kg/m2 Smoking Status Never Smoker BMI and BSA Data Body Mass Index Body Surface Area 25.09 kg/m 2 2.06 m 2 Preferred Pharmacy Pharmacy Name Phone [facility] [address] [phone] Your Updated Medication List  
  
   
This list is accurate as of 4/2/18  3:25 PM.  Always use your most recent med list.  
  
  
  
  
 allopurinol 300 mg tablet Commonly known as:  Lata Dawkins Take  by mouth daily. ASPIR-81 81 mg tablet Generic drug:  aspirin delayed-release Take 81 mg by mouth daily. carvedilol 25 mg tablet Commonly known as:  Cam Fritter Take 1 Tab by mouth two (2) times daily (with meals). cloNIDine HCl 0.2 mg tablet Commonly known as:  CATAPRES  
TAKE 1 TABLET BY MOUTH 3 TIMES A DAY  
  
 digoxin 0.125 mg tablet Commonly known as:  LANOXIN  
TAKE 1 TABLET BY MOUTH EVERY DAY  
  
 furosemide 40 mg tablet Commonly known as:  LASIX Take 1 Tab by mouth two (2) times a day. Or directed IRON (DRIED) PO Take 325 mg by mouth daily. lisinopril 20 mg tablet Commonly known as:  PRINIVIL, ZESTRIL  
TAKE ONE TABLET BY MOUTH TWICE DAILY  
  
 magnesium oxide 400 mg tablet Commonly known as:  MAG-OX  
TAKE 1 TABLET BY MOUTH DAILY. metOLazone 2.5 mg tablet Commonly known as:  ZAROXOLYN  
TAKE 1 TABLET BY MOUTH DAILY AS NEEDED. TAKE 30 MINS PRIOR TO MORNING LASIX AS DIRECTED ON MON & THU MIRALAX 17 gram packet Generic drug:  polyethylene glycol Take 17 g by mouth daily. oxyCODONE-acetaminophen 5-325 mg per tablet Commonly known as:  PERCOCET Take 1 Tab by mouth every four (4) hours as needed for Pain. Max Daily Amount: 6 Tabs. potassium chloride 20 mEq tablet Commonly known as:  K-DUR, KLOR-CON Take 1 Tab by mouth two (2) times a day. warfarin 2.5 mg tablet Commonly known as:  COUMADIN  
TAKE ONE TABLET BY MOUTH ONCE DAILY OR  AS  DIRECTED  BY  YOUR  DOCTOR  
  
 ZOLOFT 25 mg tablet Generic drug:  sertraline Take  by mouth daily. We Performed the Following COMPLETE CBC & AUTO DIFF WBC [08362 CPT(R)] Follow-up Instructions Return in about 3 weeks (around 4/23/2018). To-Do List   
 04/02/2018 Lab:  CBC WITH 3 PART DIFF   
  
 04/19/2018 9:30 AM  
  Appointment with [facility] TRACK NURSE at [facility] ([phone])  
  
 05/10/2018 9:00 AM  
  Appointment with [facility] FA[facility] TRACK NURSE at [facility] ([phone]) Patient Instructions Sarcoma: Care Instructions Your Care Instructions Sarcoma is cancer of certain tissues of the body, such as the muscles, connective tissues (like tendons), blood vessels, bones, and fat. Cancer occurs when abnormal cells grow out of control. The cells can spread to other areas of the body. Sarcoma is a general name for several rare cancers that occur in these tissues. Doctors treat this type of cancer with surgery, radiation, or medicines (chemotherapy). Your doctor will treat you based on how quickly or slowly the type of cancer you have may spread, how far the cancer has spread, and your overall health. One or more treatments may be used.  
When you find out that you have cancer, you may feel many emotions and may need some help coping. Seek out family, friends, and counselors for support. You also can do things at home to make yourself feel better while you go through treatment. Call the [facility] ([phone]) or visit its website at [address] [email or web address]. [employer] for more information. Follow-up care is a key part of your treatment and safety. Be sure to make and go to all appointments, and call your doctor if you are having problems. It's also a good idea to know your test results and keep a list of the medicines you take. How can you care for yourself at home? · Take your medicines exactly as prescribed. Call your doctor if you think you are having a problem with your medicine. You may get medicine for nausea and vomiting if you have these side effects. · Eat healthy food. If you do not feel like eating, try to eat food that has protein and extra calories to keep up your strength and prevent weight loss. Drink liquid meal replacements for extra calories and protein. Try to eat your main meal early. · Get some physical activity every day, but do not get too tired. Keep doing the hobbies you enjoy as your energy allows. · Take steps to control your stress and workload. Learn relaxation techniques. ¨ Share your feelings. Stress and tension affect our emotions. By expressing your feelings to others, you may be able to understand and cope with them. ¨ Consider joining a support group. Talking about a problem with your spouse, a good friend, or other people with similar problems is a good way to reduce tension and stress. ¨ Express yourself through art. Try writing, crafts, dance, or art to relieve stress. Some dance, writing, or art groups may be available just for people who have cancer. ¨ Be kind to your body and mind. Getting enough sleep, eating a healthy diet, and taking time to do things you enjoy can contribute to an overall feeling of balance in your life and help reduce stress. ¨ Get help if you need it. Discuss your concerns with your doctor or counselor. · If you are vomiting or have diarrhea: ¨ Drink plenty of fluids (enough so that your urine is light yellow or clear like water) to prevent dehydration. Choose water and other caffeine-free clear liquids. If you have kidney, heart, or liver disease and have to limit fluids, talk with your doctor before you increase the amount of fluids you drink. ¨ When you are able to eat, try clear soups, mild foods, and liquids until all symptoms are gone for 12 to 48 hours. Other good choices include dry toast, crackers, cooked cereal, and gelatin dessert, such as Jell-O. · If you have not already done so, prepare a list of advance directives. Advance directives are instructions to your doctor and family members about what kind of care you want if you become unable to speak or express yourself. When should you call for help? Call 911 anytime you think you may need emergency care. For example, call if: 
? · You passed out (lost consciousness). ?Call your doctor now or seek immediate medical care if: 
? · You have a fever. ? · You have abnormal bleeding. ? · You have new or worse pain. ? · You think you have an infection. ? · You have new symptoms, such as a cough, belly pain, vomiting, diarrhea, or a rash. ? Watch closely for changes in your health, and be sure to contact your doctor if: 
? · You are much more tired than usual.  
? · You have swollen glands in your armpits, groin, or neck. ? · You do not get better as expected. Where can you learn more? Go to http://radha-tre.info/. Enter Fleet Messenger in the search box to learn more about \"Sarcoma: Care Instructions. \" Current as of: May 12, 2017 Content Version: 11.4 © 8357-3801 [employer]. [employer].  Care instructions adapted under license by [employer]works (which disclaims liability or warranty for this information). If you have questions about a medical condition or this instruction, always ask your healthcare professional. [address] any warranty or liability for your use of this information. Iron Deficiency Anemia: Care Instructions Your Care Instructions Anemia means that you do not have enough red blood cells. Red blood cells carry oxygen around your body. When you have anemia, it can make you pale, weak, and tired. Many things can cause anemia. The most common cause is loss of blood. This can happen if you have heavy menstrual periods. It can also happen if you have bleeding in your stomach or bowel. You can also get anemia if you don't have enough iron in your diet or if it's hard for your body to absorb iron. In some cases, pregnancy causes anemia. That's because a pregnant woman needs more iron. Your doctor may do more tests to find the cause of your anemia. If a disease or other health problem is causing it, your doctor will treat that problem. It's important to follow up with your doctor to make sure that your iron level returns to normal. 
Follow-up care is a key part of your treatment and safety. Be sure to make and go to all appointments, and call your doctor if you are having problems. It's also a good idea to know your test results and keep a list of the medicines you take. How can you care for yourself at home? · If your doctor recommended iron pills, take them as directed. ¨ Try to take the pills on an empty stomach. You can do this about 1 hour before or 2 hours after meals. But you may need to take iron with food to avoid an upset stomach. ¨ Do not take antacids or drink milk or anything with caffeine within 2 hours of when you take your iron. They can keep your body from absorbing the iron well. ¨ Vitamin C helps your body absorb iron.  You may want to take iron pills with a glass of orange juice or some other food high in vitamin C. 
 ¨ Iron pills may cause stomach problems. These include heartburn, nausea, diarrhea, constipation, and cramps. It can help to drink plenty of fluids and include fruits, vegetables, and fiber in your diet. ¨ It's normal for iron pills to make your stool a greenish or grayish black. But internal bleeding can also cause dark stool. So it's important to tell your doctor about any color changes. ¨ Call your doctor if you think you are having a problem with your iron pills. Even after you start to feel better, it will take several months for your body to build up its supply of iron. ¨ If you miss a pill, don't take a double dose. ¨ Keep iron pills out of the reach of small children. Too much iron can be very dangerous. · Eat foods with a lot of iron. These include red meat, shellfish, poultry, and eggs. They also include beans, raisins, whole-grain bread, and leafy green vegetables. · Steam your vegetables. This is the best way to prepare them if you want to get as much iron as possible. · Be safe with medicines. Do not take nonsteroidal anti-inflammatory pain relievers unless your doctor tells you to. These include aspirin, naproxen (Aleve), and ibuprofen (Advil, Motrin). · Liquid iron can stain your teeth. But you can mix it with water or juice and drink it with a straw. Then it won't get on your teeth. When should you call for help? Call 911 anytime you think you may need emergency care. For example, call if: 
? · You passed out (lost consciousness). ?Call your doctor now or seek immediate medical care if: 
? · You are short of breath. ? · You are dizzy or light-headed, or you feel like you may faint. ? · You have new or worse bleeding. ? Watch closely for changes in your health, and be sure to contact your doctor if: 
? · You feel weaker or more tired than usual.  
? · You do not get better as expected. Where can you learn more? Go to http://radha-tre.info/. Enter S871 in the search box to learn more about \"Iron Deficiency Anemia: Care Instructions. \" Current as of: October 13, 2016 Content Version: 11.4 © 1163-1503 [employer]. Care instructions adapted under license by firstSTREET [employer] (which disclaims liability or warranty for this information). If you have questions about a medical condition or this instruction, always ask your healthcare professional. [address] any warranty or liability for your use of this information. Introducing [facility]! [place] introduces [employer] patient portal. Now you can access parts of your medical record, email your doctor's office, and request medication refills online. 1. In your internet browser, go to [email or web address] 2. Click on the First Time User? Click Here link in the Sign In box. You will see the New Member Sign Up page. 3. Enter your [employer] Access Code exactly as it appears below. You will not need to use this code after youve completed the sign-up process. If you do not sign up before the expiration date, you must request a new code. · [employer] Access Code: [email or web address] Expires: 6/20/2018 11:25 AM 
 
4. Enter the last four digits of your Social Security Number (xxxx) and Date of Birth (mm/dd/yyyy) as indicated and click Submit. You will be taken to the next sign-up page. 5. Create a [employer] ID. This will be your [employer] login ID and cannot be changed, so think of one that is secure and easy to remember. 6. Create a [employer] password. You can change your password at any time. 7. Enter your Password Reset Question and Answer. This can be used at a later time if you forget your password. 8. Enter your e-mail address. You will receive e-mail notification when new information is available in [address] 9. Click Sign Up. You can now view and download portions of your medical record. 10. Click the Download Summary menu link to download a portable copy of your medical information. If you have questions, please visit the Frequently Asked Questions section of the [employer] website. Remember, [employer] is NOT to be used for urgent needs. For medical emergencies, dial 911. Now available from your iPhone and Android! Please provide this summary of care documentation to your next provider. Your primary care clinician is listed as Mila Barthel. If you have any questions after today's visit, please call [phone].

## 2018-04-04 ENCOUNTER — DOCUMENTATION ONLY (OUTPATIENT)
Dept: ONCOLOGY | Age: 73
End: 2018-04-04

## 2018-04-04 LAB
ALBUMIN SERPL ELPH-MCNC: 3.2 G/DL (ref 2.9–4.4)
ALBUMIN/GLOB SERPL: 0.9 {RATIO} (ref 0.7–1.7)
ALPHA1 GLOB SERPL ELPH-MCNC: 0.2 G/DL (ref 0–0.4)
ALPHA2 GLOB SERPL ELPH-MCNC: 1.2 G/DL (ref 0.4–1)
B-GLOBULIN SERPL ELPH-MCNC: 0.8 G/DL (ref 0.7–1.3)
GAMMA GLOB SERPL ELPH-MCNC: 1.3 G/DL (ref 0.4–1.8)
GLOBULIN SER CALC-MCNC: 3.6 G/DL (ref 2.2–3.9)
M PROTEIN SERPL ELPH-MCNC: ABNORMAL G/DL
PROT SERPL-MCNC: 6.8 G/DL (ref 6–8.5)

## 2018-04-04 RX ORDER — METOLAZONE 2.5 MG/1
TABLET ORAL
Qty: 15 TAB | Refills: 1 | Status: SHIPPED | OUTPATIENT
Start: 2018-04-04 | End: 2018-07-31 | Stop reason: SDUPTHER

## 2018-04-04 NOTE — PROGRESS NOTES
Left message for the patient to call our office. The call is regarding the patients elevated blood glucose of 411 on 4/2. We should confirm that the patient did not have any recent steroid treatment. In addition, assess the patient for symptoms of hyperglycemia ( weakness, palpitations,polyuria, polydypsia) and notify the patient to contact her PCP for follow-up or go to the nearest ER if needed.

## 2018-04-09 ENCOUNTER — ANTI-COAG VISIT (OUTPATIENT)
Dept: CARDIOLOGY CLINIC | Age: 73
End: 2018-04-09

## 2018-04-09 DIAGNOSIS — I48.91 ATRIAL FIBRILLATION, UNSPECIFIED TYPE (HCC): ICD-10-CM

## 2018-04-09 DIAGNOSIS — Z79.01 LONG TERM CURRENT USE OF ANTICOAGULANT THERAPY: Primary | ICD-10-CM

## 2018-04-09 LAB
INR BLD: 1.9
PT POC: 22.9 SECONDS
VALID INTERNAL CONTROL?: YES

## 2018-04-09 NOTE — PROGRESS NOTES
Verbal order and read back per Kajal Dixon NP  Patient is not with therapeutic range  Continue Coumadin to 2.5 mg daily except 3.75 mg on Mon  Recheck in 4 weeks  This has been fully explained to the patient, who indicates understanding.

## 2018-04-10 RX ORDER — FUROSEMIDE 40 MG/1
40 TABLET ORAL 2 TIMES DAILY
Qty: 60 TAB | Refills: 6 | Status: SHIPPED | OUTPATIENT
Start: 2018-04-10 | End: 2018-09-19 | Stop reason: SDUPTHER

## 2018-04-12 ENCOUNTER — HOSPITAL ENCOUNTER (OUTPATIENT)
Dept: CT IMAGING | Age: 73
Discharge: HOME | End: 2018-04-12
Attending: INTERNAL MEDICINE
Payer: MEDICARE

## 2018-04-12 DIAGNOSIS — C49.21 SOFT TISSUE SARCOMA OF RIGHT THIGH (HCC): ICD-10-CM

## 2018-04-12 PROCEDURE — 74011636320 HC RX REV CODE- 636/320: Performed by: INTERNAL MEDICINE

## 2018-04-12 PROCEDURE — 71260 CT THORAX DX C+: CPT

## 2018-04-12 RX ADMIN — IOPAMIDOL 100 ML: 612 INJECTION, SOLUTION INTRAVENOUS at 09:02

## 2018-04-17 ENCOUNTER — TELEPHONE (OUTPATIENT)
Dept: ONCOLOGY | Age: 73
End: 2018-04-17

## 2018-04-17 NOTE — TELEPHONE ENCOUNTER
Дмитрий Castillo called again and wanted me to add this info:  She needs updated orders for procrit and for port flush  [facility]  said no need to call her, just fax the updated orders.

## 2018-04-18 ENCOUNTER — DOCUMENTATION ONLY (OUTPATIENT)
Dept: ONCOLOGY | Age: 73
End: 2018-04-18

## 2018-04-19 ENCOUNTER — HOSPITAL ENCOUNTER (OUTPATIENT)
Dept: INFUSION THERAPY | Age: 73
Discharge: HOME | End: 2018-04-19
Payer: MEDICARE

## 2018-04-19 VITALS
TEMPERATURE: 97.9 F | DIASTOLIC BLOOD PRESSURE: 72 MMHG | SYSTOLIC BLOOD PRESSURE: 117 MMHG | HEART RATE: 62 BPM | RESPIRATION RATE: 18 BRPM | OXYGEN SATURATION: 98 %

## 2018-04-19 LAB
BASO+EOS+MONOS # BLD AUTO: 0.4 K/UL (ref 0–2.3)
BASO+EOS+MONOS # BLD AUTO: 5 % (ref 0.1–17)
DIFFERENTIAL METHOD BLD: ABNORMAL
ERYTHROCYTE [DISTWIDTH] IN BLOOD BY AUTOMATED COUNT: 14.4 % (ref 11.5–14.5)
HCT VFR BLD AUTO: 40.5 % (ref 36–48)
HGB BLD-MCNC: 13.4 G/DL (ref 12–16)
LYMPHOCYTES # BLD: 1.5 K/UL (ref 1.1–5.9)
LYMPHOCYTES NFR BLD: 19 % (ref 14–44)
MCH RBC QN AUTO: 30 PG (ref 25–35)
MCHC RBC AUTO-ENTMCNC: 33.1 G/DL (ref 31–37)
MCV RBC AUTO: 90.8 FL (ref 78–102)
NEUTS SEG # BLD: 6.1 K/UL (ref 1.8–9.5)
NEUTS SEG NFR BLD: 76 % (ref 40–70)
PLATELET # BLD AUTO: 176 K/UL (ref 140–440)
RBC # BLD AUTO: 4.46 M/UL (ref 4.1–5.1)
WBC # BLD AUTO: 8 K/UL (ref 4.5–13)

## 2018-04-19 PROCEDURE — 85025 COMPLETE CBC W/AUTO DIFF WBC: CPT | Performed by: INTERNAL MEDICINE

## 2018-04-19 PROCEDURE — 36591 DRAW BLOOD OFF VENOUS DEVICE: CPT

## 2018-04-19 PROCEDURE — 74011250636 HC RX REV CODE- 250/636

## 2018-04-19 PROCEDURE — 77030012965 HC NDL HUBR BBMI -A

## 2018-04-19 RX ORDER — HEPARIN SODIUM (PORCINE) LOCK FLUSH IV SOLN 100 UNIT/ML 100 UNIT/ML
SOLUTION INTRAVENOUS
Status: COMPLETED
Start: 2018-04-19 | End: 2018-04-19

## 2018-04-19 RX ORDER — SODIUM CHLORIDE 0.9 % (FLUSH) 0.9 %
10-40 SYRINGE (ML) INJECTION AS NEEDED
Status: DISCONTINUED | OUTPATIENT
Start: 2018-04-19 | End: 2018-04-23 | Stop reason: HOSPADM

## 2018-04-19 RX ORDER — HEPARIN SODIUM (PORCINE) LOCK FLUSH IV SOLN 100 UNIT/ML 100 UNIT/ML
500 SOLUTION INTRAVENOUS AS NEEDED
Status: ACTIVE | OUTPATIENT
Start: 2018-04-19 | End: 2018-04-20

## 2018-04-19 RX ADMIN — Medication 20 ML: at 09:39

## 2018-04-19 RX ADMIN — HEPARIN SODIUM (PORCINE) LOCK FLUSH IV SOLN 100 UNIT/ML 500 UNITS: 100 SOLUTION at 09:39

## 2018-04-19 NOTE — PROGRESS NOTES
[facility] Progress Note    Date: 2018    Name: Lashonda Hernandez    MRN: [id number]         : 1945     Procrit    Mr. Asuncion Tee to [facility], ambulatory at 0930. Pt was assessed and education was provided. Mr. Andrea Yeager vitals were reviewed and patient was observed for 5 minutes prior to treatment. Visit Vitals    /72 (BP 1 Location: Left arm, BP Patient Position: Sitting)    Pulse 62    Temp 97.9 °F (36.6 °C)    Resp 18    SpO2 98%     Per patient request, labs drawn from port today. Right chest mediport accessed with 20G 1 inch Giron needle. Flushed easily and had brisk blood return. Blood drawn off for CBC after 10 mL of waste. Port flushed with 20 mL NS and 500 units of heparin before de-accessing. No irritation or bleeding. Band-aid applied. Results within ordered parameters to HOLD procrit today. Recent Results (from the past 12 hour(s))   CBC WITH 3 PART DIFF    Collection Time: 18  9:44 AM   Result Value Ref Range    WBC 8.0 4.5 - 13.0 K/uL    RBC 4.46 4.10 - 5.10 M/uL    HGB 13.4 12.0 - 16 g/dL    HCT 40.5 36 - 48 %    MCV 90.8 78 - 102 FL    MCH 30.0 25.0 - 35.0 PG    MCHC 33.1 31 - 37 g/dL    RDW 14.4 11.5 - 14.5 %    PLATELET 239 527 - 104 K/uL    NEUTROPHILS 76 (H) 40 - 70 %    MIXED CELLS 5 0.1 - 17 %    LYMPHOCYTES 19 14 - 44 %    ABS. NEUTROPHILS 6.1 1.8 - 9.5 K/UL    ABS. MIXED CELLS 0.4 0.0 - 2.3 K/uL    ABS. LYMPHOCYTES 1.5 1.1 - 5.9 K/UL    DF AUTOMATED       Patient arm band removed and shredded. He is to return on 05/10/2018 at 0900 for his next procrit appointment.      Jim Le RN  2018

## 2018-04-23 ENCOUNTER — OFFICE VISIT (OUTPATIENT)
Dept: ONCOLOGY | Age: 73
End: 2018-04-23

## 2018-04-23 ENCOUNTER — HOSPITAL ENCOUNTER (OUTPATIENT)
Dept: ONCOLOGY | Age: 73
Discharge: HOME | End: 2018-04-23

## 2018-04-23 ENCOUNTER — OFFICE VISIT (OUTPATIENT)
Dept: CARDIOLOGY CLINIC | Age: 73
End: 2018-04-23

## 2018-04-23 VITALS
TEMPERATURE: 97.1 F | SYSTOLIC BLOOD PRESSURE: 137 MMHG | BODY MASS INDEX: 24.5 KG/M2 | WEIGHT: 180.9 LBS | HEIGHT: 72 IN | HEART RATE: 66 BPM | DIASTOLIC BLOOD PRESSURE: 78 MMHG

## 2018-04-23 DIAGNOSIS — C49.21 SOFT TISSUE SARCOMA OF RIGHT THIGH (HCC): Primary | ICD-10-CM

## 2018-04-23 DIAGNOSIS — I42.8 CARDIOMYOPATHY, NONISCHEMIC (HCC): Primary | ICD-10-CM

## 2018-04-23 DIAGNOSIS — Z45.02 AICD AT END OF BATTERY LIFE: ICD-10-CM

## 2018-04-23 DIAGNOSIS — C49.21 SOFT TISSUE SARCOMA OF RIGHT THIGH (HCC): ICD-10-CM

## 2018-04-23 LAB
BASO+EOS+MONOS # BLD AUTO: 0.5 K/UL (ref 0–2.3)
BASO+EOS+MONOS # BLD AUTO: 5 % (ref 0.1–17)
DIFFERENTIAL METHOD BLD: ABNORMAL
ERYTHROCYTE [DISTWIDTH] IN BLOOD BY AUTOMATED COUNT: 14 % (ref 11.5–14.5)
HCT VFR BLD AUTO: 41.4 % (ref 36–48)
HGB BLD-MCNC: 13.7 G/DL (ref 12–16)
LYMPHOCYTES # BLD: 1.7 K/UL (ref 1.1–5.9)
LYMPHOCYTES NFR BLD: 16 % (ref 14–44)
MCH RBC QN AUTO: 30.1 PG (ref 25–35)
MCHC RBC AUTO-ENTMCNC: 33.1 G/DL (ref 31–37)
MCV RBC AUTO: 91 FL (ref 78–102)
NEUTS SEG # BLD: 8.3 K/UL (ref 1.8–9.5)
NEUTS SEG NFR BLD: 79 % (ref 40–70)
PLATELET # BLD AUTO: 195 K/UL (ref 140–440)
RBC # BLD AUTO: 4.55 M/UL (ref 4.1–5.1)
WBC # BLD AUTO: 10.5 K/UL (ref 4.5–13)

## 2018-04-23 NOTE — PROGRESS NOTES
Hematology/medical oncology progress note    4/23/2018  Tenisha Enrique  YOB: 1945    PCP: Dr. 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 Mr. Amberly Serrano returns to clinic today to review the results of his recent CT scans. I have explained to the patient that in the right inguinal area there is an enlarging lymph node that has remained relatively stable but has shown a slight increase in size since his last imaging study. He also has evidence of scar tissue from his prior surgical site. However there was no evidence of metastatic disease. We will continue to monitor the patient at 2-3 month intervals with imaging studies. I will plan to repeat his CT scan in about 2-3 months and if there is progression of the lymphadenopathy then we will consider a PET scan for further assessment. The patient had his questions answered to his satisfaction. Total time 30 minutes, greater than 50% of the time was in counseling and coordination of care. Milad Redd MD, [address]

## 2018-04-23 NOTE — MR AVS SNAPSHOT
[address] [place] [address] [address] [place] [address] 
[phone] Patient: Christiano Arellano MRN: [id number] Visit Information Date & Time Provider Department Dept. Phone Encounter #  
 4/23/2018  1:30 PM Dilshad Silver MD [address] Oncology [phone] [id number] Follow-up Instructions Return in about 3 months (around 7/23/2018). Your Appointments 4/24/2018 10:40 AM  
Follow Up with Queta Sawyer MD  
[facility]"" ([facility]) Appt Note: .h and p for gen change [place] [address] [address] [address] [phone] [phone]  
  
    
 5/7/2018  8:00 AM  
ANTICOAG NURSE with Pt In[facility]\"" ([facility]) Appt Note: 4 week follow up INR  
 [place] [address] [address] [address] [phone] [address] [facility] [address] [address]  
  
    
 5/22/2018  8:40 AM  
Follow Up with Queta Sawyer MD  
[facility]\"" ([facility]) Appt Note: 9 month follow up [place] [address] [address] [address] [phone]  
  
    
 7/23/2018 10:30 AM  
Office Visit with Dilshad Silver MD  
[facility]) Appt Note: 3 MO RET  
 [address] [place], [place] [address] [facility] [address] [facility], [address] [place] [address] Upcoming Health Maintenance Date Due Hepatitis C Screening 1945 DTaP/Tdap/Td series (1 - Tdap) 8/21/1967 FOBT Q 1 YEAR AGE 50-75 8/21/1996 ZOSTER VACCINE AGE 60> 6/21/2006 GLAUCOMA SCREENING Q2Y 8/21/2011 MEDICARE YEARLY EXAM 3/14/2018 Allergies as of 4/23/2018  Review Complete On: 4/23/2018 By: William Cadena Tulio Hagan MD  
 No Known Allergies Current Immunizations  Reviewed on 3/29/2018 Name Date Influenza Vaccine 11/16/2017, 11/9/2016, 10/2/2015, 9/28/2015 12:00 AM  
 Pneumococcal Conjugate (PCV-13) 9/28/2015 Pneumococcal Polysaccharide (PPSV-23) 8/13/2012 Not reviewed this visit You Were Diagnosed With   
  
 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  
 137/78 66 97.1 °F (36.2 °C) 6' (1.829 m) 180 lb 14.4 oz (82.1 kg) 24.53 kg/m2 Smoking Status Never Smoker BMI and BSA Data Body Mass Index Body Surface Area 24.53 kg/m 2 2.04 m 2 Preferred Pharmacy Pharmacy Name Phone [id number] [address] Your Updated Medication List  
  
   
This list is accurate as of 4/23/18  1:42 PM.  Always use your most recent med list.  
  
  
  
  
 allopurinol 300 mg tablet Commonly known as:  Acquanetta Highman Take  by mouth daily. ASPIR-81 81 mg tablet Generic drug:  aspirin delayed-release Take 81 mg by mouth daily. carvedilol 25 mg tablet Commonly known as:  [place] Fritter Take 1 Tab by mouth two (2) times daily (with meals). cloNIDine HCl 0.2 mg tablet Commonly known as:  CATAPRES  
TAKE 1 TABLET BY MOUTH 3 TIMES A DAY  
  
 digoxin 0.125 mg tablet Commonly known as:  LANOXIN  
TAKE 1 TABLET BY MOUTH EVERY DAY  
  
 furosemide 40 mg tablet Commonly known as:  LASIX Take 1 Tab by mouth two (2) times a day. Or directed IRON (DRIED) PO Take 325 mg by mouth daily. lisinopril 20 mg tablet Commonly known as:  PRINIVIL, ZESTRIL  
TAKE ONE TABLET BY MOUTH TWICE DAILY  
  
 magnesium oxide 400 mg tablet Commonly known as:  MAG-OX  
TAKE 1 TABLET BY MOUTH DAILY. metOLazone 2.5 mg tablet Commonly known as:  Jaspal Garber  
 TAKE 1 TABLET BY MOUTH 30 MINS PRIOR TO MORNING LASIX AS DIRECTED ON MON & THU MIRALAX 17 gram packet Generic drug:  polyethylene glycol Take 17 g by mouth daily. oxyCODONE-acetaminophen 5-325 mg per tablet Commonly known as:  PERCOCET Take 1 Tab by mouth every four (4) hours as needed for Pain. Max Daily Amount: 6 Tabs. potassium chloride 20 mEq tablet Commonly known as:  K-DUR, KLOR-CON Take 1 Tab by mouth two (2) times a day. warfarin 2.5 mg tablet Commonly known as:  COUMADIN  
TAKE ONE TABLET BY MOUTH ONCE DAILY OR  AS  DIRECTED  BY  YOUR  DOCTOR  
  
 ZOLOFT 25 mg tablet Generic drug:  sertraline Take  by mouth daily. We Performed the Following COMPLETE CBC & AUTO DIFF WBC [42686 CPT(R)] Follow-up Instructions Return in about 3 months (around 7/23/2018). To-Do List   
 04/23/2018 Lab:  CBC WITH 3 PART DIFF   
  
 05/10/2018 9:00 AM  
  Appointment with [facility] FAST TRACK NURSE at [facility] OP INFUSION ([phone]) Patient Instructions Sarcoma: Care Instructions Your Care Instructions Sarcoma is cancer of certain tissues of the body, such as the muscles, connective tissues (like tendons), blood vessels, bones, and fat. Cancer occurs when abnormal cells grow out of control. The cells can spread to other areas of the body. Sarcoma is a general name for several rare cancers that occur in these tissues. Doctors treat this type of cancer with surgery, radiation, or medicines (chemotherapy). Your doctor will treat you based on how quickly or slowly the type of cancer you have may spread, how far the cancer has spread, and your overall health. One or more treatments may be used. When you find out that you have cancer, you may feel many emotions and may need some help coping. Seek out family, friends, and counselors for support.  You also can do things at home to make yourself feel better while you go through treatment. Call the Prabha Lara ([phone]) or visit its website at [employer] [employer] for more information. Follow-up care is a key part of your treatment and safety. Be sure to make and go to all appointments, and call your doctor if you are having problems. It's also a good idea to know your test results and keep a list of the medicines you take. How can you care for yourself at home? · Take your medicines exactly as prescribed. Call your doctor if you think you are having a problem with your medicine. You may get medicine for nausea and vomiting if you have these side effects. · Eat healthy food. If you do not feel like eating, try to eat food that has protein and extra calories to keep up your strength and prevent weight loss. Drink liquid meal replacements for extra calories and protein. Try to eat your main meal early. · Get some physical activity every day, but do not get too tired. Keep doing the hobbies you enjoy as your energy allows. · Take steps to control your stress and workload. Learn relaxation techniques. ¨ Share your feelings. Stress and tension affect our emotions. By expressing your feelings to others, you may be able to understand and cope with them. ¨ Consider joining a support group. Talking about a problem with your spouse, a good friend, or other people with similar problems is a good way to reduce tension and stress. ¨ Express yourself through art. Try writing, crafts, dance, or art to relieve stress. Some dance, writing, or art groups may be available just for people who have cancer. ¨ Be kind to your body and mind. Getting enough sleep, eating a healthy diet, and taking time to do things you enjoy can contribute to an overall feeling of balance in your life and help reduce stress. ¨ Get help if you need it. Discuss your concerns with your doctor or counselor. · If you are vomiting or have diarrhea: ¨ Drink plenty of fluids (enough so that your urine is light yellow or clear like water) to prevent dehydration. Choose water and other caffeine-free clear liquids. If you have kidney, heart, or liver disease and have to limit fluids, talk with your doctor before you increase the amount of fluids you drink. ¨ When you are able to eat, try clear soups, mild foods, and liquids until all symptoms are gone for 12 to 48 hours. Other good choices include dry toast, crackers, cooked cereal, and gelatin dessert, such as Jell-O. · If you have not already done so, prepare a list of advance directives. Advance directives are instructions to your doctor and family members about what kind of care you want if you become unable to speak or express yourself. When should you call for help? Call 911 anytime you think you may need emergency care. For example, call if: 
? · You passed out (lost consciousness). ?Call your doctor now or seek immediate medical care if: 
? · You have a fever. ? · You have abnormal bleeding. ? · You have new or worse pain. ? · You think you have an infection. ? · You have new symptoms, such as a cough, belly pain, vomiting, diarrhea, or a rash. ? Watch closely for changes in your health, and be sure to contact your doctor if: 
? · You are much more tired than usual.  
? · You have swollen glands in your armpits, groin, or neck. ? · You do not get better as expected. Where can you learn more? Go to http://radha-tre.info/. Enter Steve Pastor in the search box to learn more about \"Sarcoma: Care Instructions. \" Current as of: May 12, 2017 Content Version: 11.4 © 8984-6556 [employer]. Care instructions adapted under license by [employer] (which disclaims liability or warranty for this information).  If you have questions about a medical condition or this instruction, always ask your healthcare professional. [place] [employer] disclaims any warranty or liability for your use of this information. Introducing [facility] & HEALTH SERVICES! [facility] introduces Farfetch patient portal. Now you can access parts of your medical record, email your doctor's office, and request medication refills online. 1. In your internet browser, go to [email or web address] 2. Click on the First Time User? Click Here link in the Sign In box. You will see the New Member Sign Up page. 3. Enter your Farfetch Access Code exactly as it appears below. You will not need to use this code after youve completed the sign-up process. If you do not sign up before the expiration date, you must request a new code. · Farfetch Access Code: [email or web address] Expires: 6/20/2018 11:25 AM 
 
4. Enter the last four digits of your Social Security Number (xxxx) and Date of Birth (mm/dd/yyyy) as indicated and click Submit. You will be taken to the next sign-up page. 5. Create a Farfetch ID. This will be your Farfetch login ID and cannot be changed, so think of one that is secure and easy to remember. 6. Create a Farfetch password. You can change your password at any time. 7. Enter your Password Reset Question and Answer. This can be used at a later time if you forget your password. 8. Enter your e-mail address. You will receive e-mail notification when new information is available in [address] 9. Click Sign Up. You can now view and download portions of your medical record. 10. Click the Download Summary menu link to download a portable copy of your medical information. If you have questions, please visit the Frequently Asked Questions section of the Farfetch website. Remember, Farfetch is NOT to be used for urgent needs. For medical emergencies, dial 911. Now available from your iPhone and Android! Please provide this summary of care documentation to your next provider. Your primary care clinician is listed as Maria A Salomon. If you have any questions after today's visit, please call [phone].

## 2018-04-23 NOTE — PATIENT INSTRUCTIONS
Sarcoma: Care Instructions  Your Care Instructions  Sarcoma is cancer of certain tissues of the body, such as the muscles, connective tissues (like tendons), blood vessels, bones, and fat. Cancer occurs when abnormal cells grow out of control. The cells can spread to other areas of the body. Sarcoma is a general name for several rare cancers that occur in these tissues. Doctors treat this type of cancer with surgery, radiation, or medicines (chemotherapy). Your doctor will treat you based on how quickly or slowly the type of cancer you have may spread, how far the cancer has spread, and your overall health. One or more treatments may be used. When you find out that you have cancer, you may feel many emotions and may need some help coping. Seek out family, friends, and counselors for support. You also can do things at home to make yourself feel better while you go through treatment. Call the [employer] [facility] L[facility] ([phone]) or visit its website at [employer] for more information. Follow-up care is a key part of your treatment and safety. Be sure to make and go to all appointments, and call your doctor if you are having problems. It's also a good idea to know your test results and keep a list of the medicines you take. How can you care for yourself at home? · Take your medicines exactly as prescribed. Call your doctor if you think you are having a problem with your medicine. You may get medicine for nausea and vomiting if you have these side effects. · Eat healthy food. If you do not feel like eating, try to eat food that has protein and extra calories to keep up your strength and prevent weight loss. Drink liquid meal replacements for extra calories and protein. Try to eat your main meal early. · Get some physical activity every day, but do not get too tired. Keep doing the hobbies you enjoy as your energy allows. · Take steps to control your stress and workload.  Learn relaxation techniques. ¨ Share your feelings. Stress and tension affect our emotions. By expressing your feelings to others, you may be able to understand and cope with them. ¨ Consider joining a support group. Talking about a problem with your spouse, a good friend, or other people with similar problems is a good way to reduce tension and stress. ¨ Express yourself through art. Try writing, crafts, dance, or art to relieve stress. Some dance, writing, or art groups may be available just for people who have cancer. ¨ Be kind to your body and mind. Getting enough sleep, eating a healthy diet, and taking time to do things you enjoy can contribute to an overall feeling of balance in your life and help reduce stress. ¨ Get help if you need it. Discuss your concerns with your doctor or counselor. · If you are vomiting or have diarrhea:  ¨ Drink plenty of fluids (enough so that your urine is light yellow or clear like water) to prevent dehydration. Choose water and other caffeine-free clear liquids. If you have kidney, heart, or liver disease and have to limit fluids, talk with your doctor before you increase the amount of fluids you drink. ¨ When you are able to eat, try clear soups, mild foods, and liquids until all symptoms are gone for 12 to 48 hours. Other good choices include dry toast, crackers, cooked cereal, and gelatin dessert, such as Jell-O.  · If you have not already done so, prepare a list of advance directives. Advance directives are instructions to your doctor and family members about what kind of care you want if you become unable to speak or express yourself. When should you call for help? Call 911 anytime you think you may need emergency care. For example, call if:  ? · You passed out (lost consciousness). ?Call your doctor now or seek immediate medical care if:  ? · You have a fever. ? · You have abnormal bleeding. ? · You have new or worse pain. ? · You think you have an infection.    ? · You have new symptoms, such as a cough, belly pain, vomiting, diarrhea, or a rash. ? Watch closely for changes in your health, and be sure to contact your doctor if:  ? · You are much more tired than usual.   ? · You have swollen glands in your armpits, groin, or neck. ? · You do not get better as expected. Where can you learn more? Go to http://radha-tre.info/. Enter Osiel Gonzalez in the search box to learn more about \"Sarcoma: Care Instructions. \"  Current as of: May 12, 2017  Content Version: 11.4  © 6849-7595 [employer]. Care instructions adapted under license by [employer] (which disclaims liability or warranty for this information). If you have questions about a medical condition or this instruction, always ask your healthcare professional. [address] any warranty or liability for your use of this information.

## 2018-04-24 ENCOUNTER — OFFICE VISIT (OUTPATIENT)
Dept: CARDIOLOGY CLINIC | Age: 73
End: 2018-04-24

## 2018-04-24 VITALS
HEIGHT: 72 IN | SYSTOLIC BLOOD PRESSURE: 108 MMHG | HEART RATE: 67 BPM | WEIGHT: 178 LBS | OXYGEN SATURATION: 97 % | DIASTOLIC BLOOD PRESSURE: 82 MMHG | BODY MASS INDEX: 24.11 KG/M2

## 2018-04-24 DIAGNOSIS — I34.0 NON-RHEUMATIC MITRAL REGURGITATION: ICD-10-CM

## 2018-04-24 DIAGNOSIS — I42.8 CARDIOMYOPATHY, NONISCHEMIC (HCC): Primary | ICD-10-CM

## 2018-04-24 DIAGNOSIS — I10 ESSENTIAL HYPERTENSION: ICD-10-CM

## 2018-04-24 DIAGNOSIS — I48.91 ATRIAL FIBRILLATION, UNSPECIFIED TYPE (HCC): ICD-10-CM

## 2018-04-24 NOTE — PROGRESS NOTES
HISTORY OF PRESENT ILLNESS  Rody Bustillos is a 71 y.o. male. ASSESSMENT and PLAN    Mr. Javier Castaneda has history of nonischemic cardiomyopathy as well as severe pulmonary hypertension with moderate mitral regurgitation. Because of severe LV dysfunction, he has AICD in place for primary prevention of sudden cardiac death. He also has persistent atrial fibrillation. 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 His understanding is that the edges were clear. He had lost significant amount of weight, over 30 pounds. He had weighed 193 pounds back in October 2015. He is weight is now back to baseline at 194 pounds. His last MUGA scan in November of 2015 showed ejection fraction of 45%. His echocardiogram in September of 2015 showed ejection fraction of 45-50% with pulmonary hypertension with PA pressure of 45 mmHg.  CAD:   He has no documented history of CAD.  Persistent AF: He remains on Coumadin, rate controlled.  NIDCM: Repeat echocardiogram will be checked to see if his worsening fatigue is partially due to his cardiomyopathy.  BP:   Well-controlled.  HR:    Stable.  CHF:   Currently, there is no evidence of decompensated CHF noted.  Weight:   His weight today is 178 pounds. Unfortunately, he has lost 16 pounds over the last 8 months. He was accompanied by his wife. I have encouraged him to regain that weight. His increased fatigue and shortness of breath may be from significant weight loss over the last 8 months. I will defer the evaluation to his oncologist and primary care physician.  Cholesterol:   Target LDL <110.  Anti-platelet:   Remains on Coumadin. I will see him back in 8-9 months. Thank you. Encounter Diagnoses   Name Primary?     Cardiomyopathy, nonischemic (HCC) Yes    Non-rheumatic mitral regurgitation     Essential hypertension     [facility], [facility]      current treatment plan is effective, no change in therapy  lab results and schedule of future lab studies reviewed with patient  reviewed diet, exercise and weight control  use of aspirin to prevent MI and TIA's discussed      HPI  Today, Mr. Erendira Boo has no complaints of chest pains. He has baseline dyspnea on exertion which is without significant change. However, both he and his wife have noted that his exercise capacity is diminished and he has increased fatigue. He has lost significant weight since his last visit without really trying. He states that he has no appetite. Unfortunately, he has lost 16 pounds in last 8 months. He denies any orthopnea or PND. He denies any palpitation sensation, or dizziness. Review of Systems   Constitutional: Positive for malaise/fatigue and weight loss. Respiratory: Positive for shortness of breath. Cardiovascular: Negative for chest pain, palpitations, orthopnea, claudication, leg swelling and PND. All other systems reviewed and are negative. Physical Exam   Constitutional: He is oriented to person, place, and time. He appears well-developed. He appears cachectic. HENT:   Head: Normocephalic. Eyes: Conjunctivae are normal.   Neck: Neck supple. No JVD present. Carotid bruit is not present. No thyromegaly present. Cardiovascular: Normal rate. An irregularly irregular rhythm present. Pulmonary/Chest: Breath sounds normal.   Abdominal: Bowel sounds are normal.   Musculoskeletal: He exhibits no edema. Neurological: He is alert and oriented to person, place, and time. Skin: Skin is warm and dry. Nursing note and vitals reviewed. PCP: Gisel Almaraz MD    Past Medical History:   Diagnosis Date    AICD (automatic cardioverter/defibrillator) present     Atrial fibrillation ([address] 6/8/2010    [facility]     Cardiac cath 02/11/2009    Patent coronary arteries. LVEDP 28.  EF 35%. Global hyk. Mod MR.  Cardiac echocardiogram 09/29/2016    LVE.   EF 15% at most.  Indeterminate LV diastolic fx.  RVE. Reduced RV systolic fx. RVSP 80-85 mmHg. LAE.  LUISA. Mod-severe MR. Mild AI. Severe TR.  Cardiac MUGA scan 11/02/2015    At most mild LVE. EF 45-46%.  Cardiac nuclear imaging test, abnormal 02/03/2009    Mild basal/mid inferior, inferoapical, inferoseptal infarct w/mild ischemia in RCA (possibly partially artifact). Anterior, anteroseptal, anterapical ischemia w/o infarct. (Mid & distal LAD.)  LVE. EF 29%. Mod global hyk.  Cardiovascular LE venous duplex 09/29/2016    Tech difficult. No DVT bilaterally.  Cardiovascular renal duplex 06/07/2010    No evidence of renal artery stenosis >60%. Patent SMA and celiac artery.  Cardiovascular UE venous duplex 10/04/2016    No DVT bilaterally.  HTN (hypertension) 6/8/2010    Hypertensive cardiovascular disease     MR (mitral regurgitation)     Nonischemic cardiomyopathy     3/11 echo EF 25%    Pulmonary hypertension, moderate to severe ([address] 6/8/2010    90-100mmHg; repeat echo in 3/11 showed 70mmHg       Past Surgical History:   Procedure Laterality Date    HX HEART VALVE SURGERY  2011    HX HERNIA REPAIR  2011       Current Outpatient Prescriptions   Medication Sig Dispense Refill    furosemide (LASIX) 40 mg tablet Take 1 Tab by mouth two (2) times a day. Or directed 60 Tab 6    metOLazone (ZAROXOLYN) 2.5 mg tablet TAKE 1 TABLET BY MOUTH 30 MINS PRIOR TO MORNING LASIX AS DIRECTED ON MON & THU 15 Tab 1    lisinopril (PRINIVIL, ZESTRIL) 20 mg tablet TAKE ONE TABLET BY MOUTH TWICE DAILY 180 Tab 3    carvedilol (COREG) 25 mg tablet Take 1 Tab by mouth two (2) times daily (with meals). 180 Tab 3    cloNIDine HCl (CATAPRES) 0.2 mg tablet TAKE 1 TABLET BY MOUTH 3 TIMES A  Tab 2    potassium chloride (K-DUR, KLOR-CON) 20 mEq tablet Take 1 Tab by mouth two (2) times a day. (Patient taking differently: Take 20 mEq by mouth two (2) times a day.  2 tabs extra on the days on Metalazone) 180 Tab 3    digoxin (LANOXIN) 0.125 mg tablet TAKE 1 TABLET BY MOUTH EVERY DAY 90 Tab 3    warfarin (COUMADIN) 2.5 mg tablet TAKE ONE TABLET BY MOUTH ONCE DAILY OR  AS  DIRECTED  BY  YOUR  DOCTOR 45 Tab 0    magnesium oxide (MAG-OX) 400 mg tablet TAKE 1 TABLET BY MOUTH DAILY. 90 Tab 3    polyethylene glycol (MIRALAX) 17 gram packet Take 17 g by mouth daily.  oxyCODONE-acetaminophen (PERCOCET) 5-325 mg per tablet Take 1 Tab by mouth every four (4) hours as needed for Pain. Max Daily Amount: 6 Tabs. 180 Tab 0    sertraline (ZOLOFT) 25 mg tablet Take  by mouth daily.  allopurinol (ZYLOPRIM) 300 mg tablet Take  by mouth daily.  FERROUS SULFATE, DRIED (IRON, DRIED, PO) Take 325 mg by mouth daily.  aspirin delayed-release (ASPIR-81) 81 mg tablet Take 81 mg by mouth daily. The patient has a family history of    Social History   Substance Use Topics    Smoking status: Never Smoker    Smokeless tobacco: Never Used    Alcohol use Yes      Comment: occasionally. Lab Results   Component Value Date/Time    Cholesterol, total 85 01/23/2016 05:30 AM    HDL Cholesterol 31 (L) 01/23/2016 05:30 AM    LDL, calculated 34 01/23/2016 05:30 AM    Triglyceride 100 01/23/2016 05:30 AM    CHOL/HDL Ratio 2.7 01/23/2016 05:30 AM        BP Readings from Last 3 Encounters:   04/24/18 108/82   04/23/18 137/78   04/19/18 117/72        Pulse Readings from Last 3 Encounters:   04/24/18 67   04/23/18 66   04/19/18 62       Wt Readings from Last 3 Encounters:   04/24/18 80.7 kg (178 lb)   04/23/18 82.1 kg (180 lb 14.4 oz)   04/02/18 83.9 kg (185 lb)         EKG: unchanged from previous tracings, atrial fibrillation, rate around 75 with about 50% electronic ventricular pacemaker.

## 2018-04-24 NOTE — MR AVS SNAPSHOT
[address] [address] [address] [id number] [phone] Patient: Christiano Arellano MRN: [id number] TZY:8/09/1545 Visit Information Date & Time Provider Department Dept. Phone Encounter #  
 4/24/2018 10:40 AM Queta Sawyer MD [facility] [address] [id number] Your Appointments 5/7/2018  8:00 AM  
ANTICOAG NURSE with Pt Inr Hv C[facility] ([address] [place] [address] Road) Appt Note: 4 week follow up INR  
 [place] [address] [address] [address]  
[phone] 2300 [facility] [address] [address]  
  
    
 5/22/2018  8:40 AM  
Follow Up with Queta Sawyer MD  
[facility] ([address] [place] [address] Road) Appt Note: 9 month follow up [place] [address] [address] [id number] [phone] [facility]  
  
    
 7/23/2018 10:30 AM  
Office Visit with Dilshad Silver MD  
[facility] [address]) Appt Note: 3 MO RET  
 [address] [place] [address], [address] [place] [address] Upcoming Health Maintenance Date Due Hepatitis C Screening 1945 DTaP/Tdap/Td series (1 - Tdap) 8/21/1967 FOBT Q 1 YEAR AGE 50-75 8/21/1996 ZOSTER VACCINE AGE 60> 6/21/2006 GLAUCOMA SCREENING Q2Y 8/21/2011 MEDICARE YEARLY EXAM 3/14/2018 Allergies as of 4/24/2018  Review Complete On: 4/24/2018 By: Queta Sawyer MD  
 No Known Allergies Current Immunizations  Reviewed on 3/29/2018 Name Date Influenza Vaccine 11/16/2017, 11/9/2016, 10/2/2015, 9/28/2015 12:00 AM  
 Pneumococcal Conjugate (PCV-13) 9/28/2015 Pneumococcal Polysaccharide (PPSV-23) 8/13/2012 Not reviewed this visit You Were Diagnosed With   
  
 Codes Comments Cardiomyopathy, nonischemic ([facility]    -  Primary ICD-10-CM: I42.8 ICD-9-CM: 425.4 Non-rheumatic mitral regurgitation     ICD-10-CM: I34.0 ICD-9-CM: 424.0 Essential hypertension     ICD-10-CM: I10 
ICD-9-CM: 401.9 Atrial fibrillation, unspecified type ([facility]     ICD-10-CM: I48.91 
ICD-9-CM: 427.31 Vitals BP Pulse Height(growth percentile) Weight(growth percentile) SpO2 BMI  
 108/82 67 6' (1.829 m) 178 lb (80.7 kg) 97% 24.14 kg/m2 Smoking Status Never Smoker Vitals History BMI and BSA Data Body Mass Index Body Surface Area  
 24.14 kg/m 2 2.02 m 2 Preferred Pharmacy Pharmacy Name Phone [id number], [address] [place] [address] Your Updated Medication List  
  
   
This list is accurate as of 4/24/18 10:57 AM.  Always use your most recent med list.  
  
  
  
  
 allopurinol 300 mg tablet Commonly known as:  Phineas Quince Take  by mouth daily. ASPIR-81 81 mg tablet Generic drug:  aspirin delayed-release Take 81 mg by mouth daily. carvedilol 25 mg tablet Commonly known as:  Cr Sebastien Take 1 Tab by mouth two (2) times daily (with meals). cloNIDine HCl 0.2 mg tablet Commonly known as:  CATAPRES  
TAKE 1 TABLET BY MOUTH 3 TIMES A DAY  
  
 digoxin 0.125 mg tablet Commonly known as:  LANOXIN  
TAKE 1 TABLET BY MOUTH EVERY DAY  
  
 furosemide 40 mg tablet Commonly known as:  LASIX Take 1 Tab by mouth two (2) times a day. Or directed IRON (DRIED) PO Take 325 mg by mouth daily. lisinopril 20 mg tablet Commonly known as:  PRINIVIL, ZESTRIL  
TAKE ONE TABLET BY MOUTH TWICE DAILY  
  
 magnesium oxide 400 mg tablet Commonly known as:  MAG-OX  
TAKE 1 TABLET BY MOUTH DAILY. metOLazone 2.5 mg tablet Commonly known as:  ZAROXOLYN  
TAKE 1 TABLET BY MOUTH 30 MINS PRIOR TO MORNING LASIX AS DIRECTED ON MON & THU MIRALAX 17 gram packet Generic drug:  polyethylene glycol Take 17 g by mouth daily. oxyCODONE-acetaminophen 5-325 mg per tablet Commonly known as:  PERCOCET Take 1 Tab by mouth every four (4) hours as needed for Pain. Max Daily Amount: 6 Tabs. potassium chloride 20 mEq tablet Commonly known as:  K-DUR, KLOR-CON Take 1 Tab by mouth two (2) times a day. warfarin 2.5 mg tablet Commonly known as:  COUMADIN  
TAKE ONE TABLET BY MOUTH ONCE DAILY OR  AS  DIRECTED  BY  YOUR  DOCTOR  
  
 ZOLOFT 25 mg tablet Generic drug:  sertraline Take  by mouth daily. We Performed the Following AMB POC EKG ROUTINE W/ 12 LEADS, INTER & REP [07908 CPT(R)] To-Do List   
 05/10/2018 9:00 AM  
  Appointment with [facility] FAST TRACK NURSE at [facility] OP INFUSION ([phone]) Patient Instructions [facility] Patient  EP Instructions 1. You are scheduled to have a AICD generator change  on  May 7, 2018 , at [address]. Please check in at 0815. 
 
2. Please go to [facility] and park in the outpatient parking lot that is located around to the back of the hospital and enter through the [facility] building. Once you enter through the [facility] check in with the  there. The  will either give you directions or assist you in getting to the cath holding area. 3.  []       You are not to eat or drink anything after midnight the morning of the  
            procedure. 4. Please continue to take your medications with a small sip of water on the morning of the procedure with the following exceptions:  Hold Coumadin 5 days prior to procedure. Hold Lasix and metolazone the morning of the procedure. 5. If you are diabetic, do not take your insulin/sugar pill the morning of the procedure.  
 
6. We encourage families to wait in the waiting room on the first floor while the procedure is being done. The Doctor will come out and talk with you as soon as the procedure is over. 7. There is the possibility that you may spend the night in the hospital, depending on the results of the procedure. This will be determined after the procedure is done. 8.   If you or your family have any questions, please call our office Monday-Friday 9:00am  
      -4:30 pm , at [phone], and ask to speak to one of the nurses. Introducing [facility]! [employer] introduces [employer] patient portal. Now you can access parts of your medical record, email your doctor's office, and request medication refills online. 1. In your internet browser, go to [email or web address] 2. Click on the First Time User? Click Here link in the Sign In box. You will see the New Member Sign Up page. 3. Enter your [employer] Access Code exactly as it appears below. You will not need to use this code after youve completed the sign-up process. If you do not sign up before the expiration date, you must request a new code. · [employer] Access Code: [email or web address] Expires: 6/20/2018 11:25 AM 
 
4. Enter the last four digits of your Social Security Number (xxxx) and Date of Birth (mm/dd/yyyy) as indicated and click Submit. You will be taken to the next sign-up page. 5. Create a [employer] ID. This will be your [employer] login ID and cannot be changed, so think of one that is secure and easy to remember. 6. Create a [employer] password. You can change your password at any time. 7. Enter your Password Reset Question and Answer. This can be used at a later time if you forget your password. 8. Enter your e-mail address. You will receive e-mail notification when new information is available in [address] 9. Click Sign Up. You can now view and download portions of your medical record. 10. Click the Download Summary menu link to download a portable copy of your medical information. If you have questions, please visit the Frequently Asked Questions section of the [employer] website. Remember, [employer] is NOT to be used for urgent needs. For medical emergencies, dial 911. Now available from your iPhone and Android! Please provide this summary of care documentation to your next provider. Your primary care clinician is listed as Maria Esther Parnell. If you have any questions after today's visit, please call [phone].

## 2018-04-24 NOTE — PROGRESS NOTES
1. Have you been to the ER, urgent care clinic since your last visit? Hospitalized since your last visit? No     2. Have you seen or consulted any other health care providers outside of the [address] [place] [address] since your last visit? Include any pap smears or colon screening.   no

## 2018-04-24 NOTE — PATIENT INSTRUCTIONS
DR. [facility]          Patient  EP Instructions                  1. You are scheduled to have a AICD generator change  on  May 7, 2018 , at [address]. Please check in at 0815.    2. Please go to DR[facility] and park in the outpatient parking lot that is located around to the back of the hospital and enter through the [facility] building. Once you enter through the [facility] check in with the  there. The  will either give you directions or assist you in getting to the cath holding area. 3.  []       You are not to eat or drink anything after midnight the morning of the               procedure. 4. Please continue to take your medications with a small sip of water on the morning of the procedure with the following exceptions:  Hold Coumadin 5 days prior to procedure. Hold Lasix and metolazone the morning of the procedure. 5. If you are diabetic, do not take your insulin/sugar pill the morning of the procedure. 6. We encourage families to wait in the waiting room on the first floor while the procedure is being done. The Doctor will come out and talk with you as soon as the procedure is over. 7. There is the possibility that you may spend the night in the hospital, depending on the results of the procedure. This will be determined after the procedure is done. 8.   If you or your family have any questions, please call our office Monday-Friday 9:00am         -4:30 pm , at [phone], and ask to speak to one of the nurses.

## 2018-04-30 ENCOUNTER — HOSPITAL ENCOUNTER (OUTPATIENT)
Dept: LAB | Age: 73
Discharge: HOME | End: 2018-04-30
Payer: MEDICARE

## 2018-04-30 ENCOUNTER — TELEPHONE (OUTPATIENT)
Dept: CARDIOLOGY CLINIC | Age: 73
End: 2018-04-30

## 2018-04-30 DIAGNOSIS — I48.91 ATRIAL FIBRILLATION, UNSPECIFIED TYPE (HCC): ICD-10-CM

## 2018-04-30 DIAGNOSIS — I42.8 CARDIOMYOPATHY, NONISCHEMIC (HCC): ICD-10-CM

## 2018-04-30 LAB
ALBUMIN SERPL-MCNC: 3.7 G/DL (ref 3.4–5)
ALBUMIN/GLOB SERPL: 0.9 {RATIO} (ref 0.8–1.7)
ALP SERPL-CCNC: 114 U/L (ref 45–117)
ALT SERPL-CCNC: 23 U/L (ref 16–61)
ANION GAP SERPL CALC-SCNC: 8 MMOL/L (ref 3–18)
AST SERPL-CCNC: 24 U/L (ref 15–37)
BASOPHILS # BLD: 0 K/UL (ref 0–0.06)
BASOPHILS NFR BLD: 0 % (ref 0–2)
BILIRUB SERPL-MCNC: 1.1 MG/DL (ref 0.2–1)
BUN SERPL-MCNC: 22 MG/DL (ref 7–18)
BUN/CREAT SERPL: 14 (ref 12–20)
CALCIUM SERPL-MCNC: 9.3 MG/DL (ref 8.5–10.1)
CHLORIDE SERPL-SCNC: 94 MMOL/L (ref 100–108)
CO2 SERPL-SCNC: 31 MMOL/L (ref 21–32)
CREAT SERPL-MCNC: 1.53 MG/DL (ref 0.6–1.3)
DIFFERENTIAL METHOD BLD: ABNORMAL
EOSINOPHIL # BLD: 0.1 K/UL (ref 0–0.4)
EOSINOPHIL NFR BLD: 1 % (ref 0–5)
ERYTHROCYTE [DISTWIDTH] IN BLOOD BY AUTOMATED COUNT: 14.3 % (ref 11.6–14.5)
GLOBULIN SER CALC-MCNC: 3.9 G/DL (ref 2–4)
GLUCOSE SERPL-MCNC: 645 MG/DL (ref 74–99)
HCT VFR BLD AUTO: 40.9 % (ref 36–48)
HGB BLD-MCNC: 13.8 G/DL (ref 13–16)
INR PPP: 1.6 (ref 0.8–1.2)
LYMPHOCYTES # BLD: 1.9 K/UL (ref 0.9–3.6)
LYMPHOCYTES NFR BLD: 20 % (ref 21–52)
MCH RBC QN AUTO: 29.9 PG (ref 24–34)
MCHC RBC AUTO-ENTMCNC: 33.7 G/DL (ref 31–37)
MCV RBC AUTO: 88.7 FL (ref 74–97)
MONOCYTES # BLD: 0.8 K/UL (ref 0.05–1.2)
MONOCYTES NFR BLD: 8 % (ref 3–10)
NEUTS SEG # BLD: 6.6 K/UL (ref 1.8–8)
NEUTS SEG NFR BLD: 71 % (ref 40–73)
PLATELET # BLD AUTO: 229 K/UL (ref 135–420)
PMV BLD AUTO: 11.9 FL (ref 9.2–11.8)
POTASSIUM SERPL-SCNC: 4.2 MMOL/L (ref 3.5–5.5)
PROT SERPL-MCNC: 7.6 G/DL (ref 6.4–8.2)
PROTHROMBIN TIME: 18.7 SEC (ref 11.5–15.2)
RBC # BLD AUTO: 4.61 M/UL (ref 4.7–5.5)
SODIUM SERPL-SCNC: 133 MMOL/L (ref 136–145)
WBC # BLD AUTO: 9.4 K/UL (ref 4.6–13.2)

## 2018-04-30 PROCEDURE — 36415 COLL VENOUS BLD VENIPUNCTURE: CPT | Performed by: INTERNAL MEDICINE

## 2018-04-30 PROCEDURE — 85025 COMPLETE CBC W/AUTO DIFF WBC: CPT | Performed by: INTERNAL MEDICINE

## 2018-04-30 PROCEDURE — 85610 PROTHROMBIN TIME: CPT | Performed by: INTERNAL MEDICINE

## 2018-04-30 PROCEDURE — 80053 COMPREHEN METABOLIC PANEL: CPT | Performed by: INTERNAL MEDICINE

## 2018-05-04 ENCOUNTER — DOCUMENTATION ONLY (OUTPATIENT)
Dept: CARDIOLOGY CLINIC | Age: 73
End: 2018-05-04

## 2018-05-04 NOTE — PROGRESS NOTES
Was informed by [facility] that she sent the patient to the ER. She said to leave procedure scheduled as is for now . Nusrat Raya Patient should be discharged prior to that date. Hannah Caal  Male, 71 y.o., 1945  Weight:   80.7 kg (178 lb)  Phone:   [phone]  PCP:   Daylin Lin MD  MRN:   [id number]  MyChart:   Pending  Next Appt (With Me)  None  Next Appt (Any Provider)  05/07/2018    Message  Received: 2 days ago       Che Lau sent to Angella Yuan; Alton Mercado RN       Cc: Carlyn Telles. Gertrude Webber just talked to Abundio Myrick . .. Someone called to let us know that patient is [facility]. He is scheduled for an AICD Gen Change on Monday the 7th. Just want to make you aware . ..  Not sure if he is released prior would he have to reschedule procedure.

## 2018-05-05 NOTE — H&P
Please see full H&P. Plan AICD generator changeout. All risks, benefits, alternatives discussed. Plan moderate sedation if anesthesia not available. Risks included but not limited to pain, infection, bleeding, deep venous thrombosis with chronic swelling of arm, anesthesia reactions, pneumothorax, hemothorax, emergent open heart surgery, and death. All questions answered. 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 If not MRI compatible device, I discussed inability to have future MRI scans but CT scans are acceptable. Diagnosis:  Primary cardiologist Dr. Eric Mg  -[employer]tr[employer] single chamber AICD initially placed for primary prevention and heart failure, now SARA, last device placed 2011, 6947 lead. Has had 9 shocks since implant with h/o VT. 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 male.     ASSESSMENT and PLAN     Mr. Mckenzie Jamison has history of nonischemic cardiomyopathy as well as severe pulmonary hypertension with moderate mitral regurgitation. Because of severe LV dysfunction, he has AICD in place for primary prevention of sudden cardiac death. He also has persistent atrial fibrillation.   In 2016, his right thigh sarcoma was successfully removed in [place]. His understanding is that the edges were clear. He had lost significant amount of weight, over 30 pounds. He had weighed 193 pounds back in October 2015. [facility] is weight is now back to baseline at 194 pounds.    His last MUGA scan in November of 2015 showed ejection fraction of 45%. His echocardiogram in September of 2015 showed ejection fraction of 45-50% with pulmonary hypertension with PA pressure of 45 mmHg.     · CAD:   He has no documented history of CAD. · Persistent AF: He remains on Coumadin, rate controlled. · NIDCM: Repeat echocardiogram will be checked to see if his worsening fatigue is partially due to his cardiomyopathy. · BP:   Well-controlled. · HR:    Stable. · CHF:   Currently, there is no evidence of decompensated CHF noted. · Weight:   His weight today is 178 pounds. Unfortunately, he has lost 16 pounds over the last 8 months. He was accompanied by his wife. I have encouraged him to regain that weight. His increased fatigue and shortness of breath may be from significant weight loss over the last 8 months. I will defer the evaluation to his oncologist and primary care physician. · Cholesterol:   Target LDL <110. · Anti-platelet:   Remains on Coumadin.       I will see him back in 8-9 months. Thank you.          Encounter Diagnoses   Name Primary?  Cardiomyopathy, nonischemic (HCC) Yes    Non-rheumatic mitral regurgitation      Essential hypertension      Atrial fibrillation, unspecified type ([facility]        current treatment plan is effective, no change in therapy  lab results and schedule of future lab studies reviewed with patient  reviewed diet, exercise and weight control  use of aspirin to prevent MI and TIA's discussed        HPI  Today, Mr. Ellie Ramey has no complaints of chest pains. He has baseline dyspnea on exertion which is without significant change. However, both he and his wife have noted that his exercise capacity is diminished and he has increased fatigue. He has lost significant weight since his last visit without really trying. He states that he has no appetite. Unfortunately, he has lost 16 pounds in last 8 months. He denies any orthopnea or PND. He denies any palpitation sensation, or dizziness.     Review of Systems   Constitutional: Positive for malaise/fatigue and weight loss. Respiratory: Positive for shortness of breath. Cardiovascular: Negative for chest pain, palpitations, orthopnea, claudication, leg swelling and PND. All other systems reviewed and are negative.        Physical Exam   Constitutional: He is oriented to person, place, and time. He appears well-developed. He appears cachectic. HENT:   Head: Normocephalic. Eyes: Conjunctivae are normal.   Neck: Neck supple. No JVD present. Carotid bruit is not present. No thyromegaly present. Cardiovascular: Normal rate. An irregularly irregular rhythm present. Pulmonary/Chest: Breath sounds normal.   Abdominal: Bowel sounds are normal.   Musculoskeletal: He exhibits no edema. Neurological: He is alert and oriented to person, place, and time. Skin: Skin is warm and dry. Nursing note and vitals reviewed.        PCP: Joaquina Escobar MD          Past Medical History:   Diagnosis Date    AICD (automatic cardioverter/defibrillator) present      Atrial fibrillation ([facility] 6/8/2010    Cancer [facility])      Cardiac cath 02/11/2009     Patent coronary arteries. LVEDP 28.  EF 35%. Global hyk. Mod MR.  Cardiac echocardiogram 09/29/2016     LVE. EF 15% at most.  Indeterminate LV diastolic fx.  RVE. Reduced RV systolic fx. RVSP 80-85 mmHg. LAE.  LUISA. Mod-severe MR. Mild AI. Severe TR.  Cardiac MUGA scan 11/02/2015     At most mild LVE. EF 45-46%.  Cardiac nuclear imaging test, abnormal 02/03/2009     Mild basal/mid inferior, inferoapical, inferoseptal infarct w/mild ischemia in RCA (possibly partially artifact). Anterior, anteroseptal, anterapical ischemia w/o infarct. (Mid & distal LAD.)  LVE. EF 29%. Mod global hyk.  Cardiovascular LE venous duplex 09/29/2016     Tech difficult. No DVT bilaterally.  Cardiovascular renal duplex 06/07/2010     No evidence of renal artery stenosis >60%. Patent SMA and celiac artery.  Cardiovascular UE venous duplex 10/04/2016     No DVT bilaterally.  HTN (hypertension) 6/8/2010    Hypertensive cardiovascular disease      MR (mitral regurgitation)      Nonischemic cardiomyopathy       3/11 echo EF 25%    Pulmonary hypertension, moderate to severe ([address] 6/8/2010     90-100mmHg; repeat echo in 3/11 showed 70mmHg         Past Surgical History:   Procedure Laterality Date    HX HEART VALVE SURGERY   2011    HX HERNIA REPAIR   2011                Current Outpatient Prescriptions   Medication Sig Dispense Refill    furosemide (LASIX) 40 mg tablet Take 1 Tab by mouth two (2) times a day. Or directed 60 Tab 6    metOLazone (ZAROXOLYN) 2.5 mg tablet TAKE 1 TABLET BY MOUTH 30 MINS PRIOR TO MORNING LASIX AS DIRECTED ON MON & THU 15 Tab 1    lisinopril (PRINIVIL, ZESTRIL) 20 mg tablet TAKE ONE TABLET BY MOUTH TWICE DAILY 180 Tab 3    carvedilol (COREG) 25 mg tablet Take 1 Tab by mouth two (2) times daily (with meals). 180 Tab 3    cloNIDine HCl (CATAPRES) 0.2 mg tablet TAKE 1 TABLET BY MOUTH 3 TIMES A  Tab 2    potassium chloride (K-DUR, KLOR-CON) 20 mEq tablet Take 1 Tab by mouth two (2) times a day. (Patient taking differently: Take 20 mEq by mouth two (2) times a day. 2 tabs extra on the days on Metalazone) 180 Tab 3    digoxin (LANOXIN) 0.125 mg tablet TAKE 1 TABLET BY MOUTH EVERY DAY 90 Tab 3    warfarin (COUMADIN) 2.5 mg tablet TAKE ONE TABLET BY MOUTH ONCE DAILY OR  AS  DIRECTED  BY  YOUR  DOCTOR 45 Tab 0    magnesium oxide (MAG-OX) 400 mg tablet TAKE 1 TABLET BY MOUTH DAILY.  90 Tab 3    polyethylene glycol (MIRALAX) 17 gram packet Take 17 g by mouth daily.        oxyCODONE-acetaminophen (PERCOCET) 5-325 mg per tablet Take 1 Tab by mouth every four (4) hours as needed for Pain. Max Daily Amount: 6 Tabs.  180 Tab 0    sertraline (ZOLOFT) 25 mg tablet Take  by mouth daily.        allopurinol (ZYLOPRIM) 300 mg tablet Take  by mouth daily.          FERROUS SULFATE, DRIED (IRON, DRIED, PO) Take 325 mg by mouth daily.        aspirin delayed-release (ASPIR-81) 81 mg tablet Take 81 mg by mouth daily.             The patient has a family history of             Social History    Substance Use Topics     Smoking status: Never Smoker     Smokeless tobacco: Never Used     Alcohol use Yes         Comment: occasionally.                Lab Results   Component Value Date/Time     Cholesterol, total 85 01/23/2016 05:30 AM     HDL Cholesterol 31 (L) 01/23/2016 05:30 AM     LDL, calculated 34 01/23/2016 05:30 AM     Triglyceride 100 01/23/2016 05:30 AM     CHOL/HDL Ratio 2.7 01/23/2016 05:30 AM             BP Readings from Last 3 Encounters:   04/24/18 108/82   04/23/18 137/78   04/19/18 117/72             Pulse Readings from Last 3 Encounters:   04/24/18 67   04/23/18 66   04/19/18 62             Wt Readings from Last 3 Encounters:   04/24/18 80.7 kg (178 lb)   04/23/18 82.1 kg (180 lb 14.4 oz)   04/02/18 83.9 kg (185 lb)            EKG: unchanged from previous tracings, atrial fibrillation, rate around 75 with about 50% electronic ventricular pacemaker.         Electronically signed by Andria Parish MD at 04/24/18 1521

## 2018-05-07 ENCOUNTER — ANESTHESIA (OUTPATIENT)
Dept: CARDIAC CATH/INVASIVE PROCEDURES | Age: 73
End: 2018-05-07
Payer: MEDICARE

## 2018-05-07 ENCOUNTER — ANESTHESIA EVENT (OUTPATIENT)
Dept: CARDIAC CATH/INVASIVE PROCEDURES | Age: 73
End: 2018-05-07
Payer: MEDICARE

## 2018-05-07 ENCOUNTER — HOSPITAL ENCOUNTER (OUTPATIENT)
Dept: CARDIAC CATH/INVASIVE PROCEDURES | Age: 73
Discharge: HOME | End: 2018-05-07
Attending: INTERNAL MEDICINE | Admitting: INTERNAL MEDICINE
Payer: MEDICARE

## 2018-05-07 VITALS
OXYGEN SATURATION: 100 % | HEART RATE: 52 BPM | BODY MASS INDEX: 23.86 KG/M2 | RESPIRATION RATE: 20 BRPM | WEIGHT: 180 LBS | SYSTOLIC BLOOD PRESSURE: 99 MMHG | TEMPERATURE: 98.3 F | HEIGHT: 73 IN | DIASTOLIC BLOOD PRESSURE: 60 MMHG

## 2018-05-07 DIAGNOSIS — Z45.02 AICD AT END OF BATTERY LIFE: Primary | ICD-10-CM

## 2018-05-07 LAB
ANION GAP SERPL CALC-SCNC: 7 MMOL/L (ref 3–18)
BUN SERPL-MCNC: 22 MG/DL (ref 7–18)
BUN/CREAT SERPL: 16 (ref 12–20)
CALCIUM SERPL-MCNC: 9.4 MG/DL (ref 8.5–10.1)
CHLORIDE SERPL-SCNC: 97 MMOL/L (ref 100–108)
CO2 SERPL-SCNC: 30 MMOL/L (ref 21–32)
CREAT SERPL-MCNC: 1.36 MG/DL (ref 0.6–1.3)
ERYTHROCYTE [DISTWIDTH] IN BLOOD BY AUTOMATED COUNT: 14.4 % (ref 11.6–14.5)
GLUCOSE SERPL-MCNC: 245 MG/DL (ref 74–99)
HCT VFR BLD AUTO: 38.4 % (ref 36–48)
HGB BLD-MCNC: 13.1 G/DL (ref 13–16)
INR PPP: 1.4 (ref 0.8–1.2)
MCH RBC QN AUTO: 29.8 PG (ref 24–34)
MCHC RBC AUTO-ENTMCNC: 34.1 G/DL (ref 31–37)
MCV RBC AUTO: 87.5 FL (ref 74–97)
PLATELET # BLD AUTO: 213 K/UL (ref 135–420)
PMV BLD AUTO: 10.6 FL (ref 9.2–11.8)
POTASSIUM SERPL-SCNC: 3.6 MMOL/L (ref 3.5–5.5)
PROTHROMBIN TIME: 16.7 SEC (ref 11.5–15.2)
RBC # BLD AUTO: 4.39 M/UL (ref 4.7–5.5)
SODIUM SERPL-SCNC: 134 MMOL/L (ref 136–145)
WBC # BLD AUTO: 7.2 K/UL (ref 4.6–13.2)

## 2018-05-07 PROCEDURE — 80048 BASIC METABOLIC PNL TOTAL CA: CPT | Performed by: INTERNAL MEDICINE

## 2018-05-07 PROCEDURE — 74011250636 HC RX REV CODE- 250/636: Performed by: INTERNAL MEDICINE

## 2018-05-07 PROCEDURE — 74011000250 HC RX REV CODE- 250: Performed by: INTERNAL MEDICINE

## 2018-05-07 PROCEDURE — 74011250636 HC RX REV CODE- 250/636

## 2018-05-07 PROCEDURE — 85610 PROTHROMBIN TIME: CPT | Performed by: INTERNAL MEDICINE

## 2018-05-07 PROCEDURE — 76060000032 HC ANESTHESIA 0.5 TO 1 HR

## 2018-05-07 PROCEDURE — 85027 COMPLETE CBC AUTOMATED: CPT | Performed by: INTERNAL MEDICINE

## 2018-05-07 PROCEDURE — 77030018673 EP STUDY

## 2018-05-07 RX ORDER — INSULIN LISPRO 100 [IU]/ML
5 INJECTION, SOLUTION INTRAVENOUS; SUBCUTANEOUS 3 TIMES DAILY
COMMUNITY

## 2018-05-07 RX ORDER — HEPARIN SODIUM 1000 [USP'U]/ML
5000 INJECTION, SOLUTION INTRAVENOUS; SUBCUTANEOUS ONCE
Status: DISCONTINUED | OUTPATIENT
Start: 2018-05-07 | End: 2018-05-07 | Stop reason: HOSPADM

## 2018-05-07 RX ORDER — CEFAZOLIN SODIUM 2 G/50ML
2 SOLUTION INTRAVENOUS
Status: COMPLETED | OUTPATIENT
Start: 2018-05-07 | End: 2018-05-07

## 2018-05-07 RX ORDER — PROPOFOL 10 MG/ML
INJECTION, EMULSION INTRAVENOUS
Status: DISCONTINUED | OUTPATIENT
Start: 2018-05-07 | End: 2018-05-07 | Stop reason: HOSPADM

## 2018-05-07 RX ORDER — INSULIN GLARGINE 100 [IU]/ML
20 INJECTION, SOLUTION SUBCUTANEOUS
COMMUNITY

## 2018-05-07 RX ORDER — GENTAMICIN SULFATE 40 MG/ML
80 INJECTION, SOLUTION INTRAMUSCULAR; INTRAVENOUS ONCE
Status: DISCONTINUED | OUTPATIENT
Start: 2018-05-07 | End: 2018-05-07 | Stop reason: HOSPADM

## 2018-05-07 RX ORDER — OXYCODONE AND ACETAMINOPHEN 5; 325 MG/1; MG/1
1 TABLET ORAL
Qty: 15 TAB | Refills: 0 | Status: SHIPPED | OUTPATIENT
Start: 2018-05-07 | End: 2018-07-06

## 2018-05-07 RX ORDER — LIDOCAINE HYDROCHLORIDE 10 MG/ML
30 INJECTION, SOLUTION EPIDURAL; INFILTRATION; INTRACAUDAL; PERINEURAL
Status: DISCONTINUED | OUTPATIENT
Start: 2018-05-07 | End: 2018-05-07 | Stop reason: HOSPADM

## 2018-05-07 RX ADMIN — LIDOCAINE HYDROCHLORIDE 40 ML: 10 INJECTION, SOLUTION EPIDURAL; INFILTRATION; INTRACAUDAL; PERINEURAL at 10:17

## 2018-05-07 RX ADMIN — CEFAZOLIN SODIUM 2 G: 2 SOLUTION INTRAVENOUS at 09:53

## 2018-05-07 RX ADMIN — PROPOFOL 25 MCG/KG/MIN: 10 INJECTION, EMULSION INTRAVENOUS at 10:13

## 2018-05-07 RX ADMIN — GENTAMICIN SULFATE 80 MG: 40 INJECTION, SOLUTION INTRAMUSCULAR; INTRAVENOUS at 10:26

## 2018-05-07 NOTE — IP AVS SNAPSHOT
303 [facility] 
 
 
 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 
[phone] Enrike Fonseca MD Go in 7 days For wound re-check [facility] [address] [facility] [address] [place] [address] 
[phone] Your Scheduled Appointments Thursday May 10, 2018  9:00 AM EDT INFUSION 30 with HBV FAST TRACK NURSE HBV OP INFUSION (Nigel Pat) [address]  
[phone] Note: Patient must have a  if they take medication(s) that impairs their ability to operate a motor vehicle Thursday May 10, 2018  3:30 PM EDT  
ECHO with HBV- IE33 MACHINE (WT ) HBV NON-INVASIVE CARD ([place]) [address] [address] [id number] [place] [id number] [phone] Age Limit for ALL Heart procedures @ all [facility] facilities: 18 yrs and older only. Under the age of 25, refer to [facility] ([phone]). Wt Limit: 350lbs. This study requires patient to bring a written physician's order or MD office may fax the order to Central Scheduling at [phone]. Patient needs to bring a current list of all medications. No preparation is required for this study. Patients should report 15 minutes prior to their appointment time to the [facility] Pat [address] [place] [address]/[address]. [address] Juan 42 210 / 221 Oswald Ansari, [address] Tuesday May 22, 2018  8:40 AM EDT Follow Up with Danuta Cintron MD  
[facility] ([address]) [place] [address] [address] [address] [phone] Discharge Orders None A check beatrice indicates which time of day the medication should be taken. My Medications CHANGE how you take these medications Instructions Each Dose to Equal  
 Morning Noon Evening Bedtime * oxyCODONE-acetaminophen 5-325 mg per tablet Commonly known as:  PERCOCET What changed:  Another medication with the same name was added. Make sure you understand how and when to take each. Your last dose was: Your next dose is: Take 1 Tab by mouth every four (4) hours as needed for Pain. Max Daily Amount: 6 Tabs. 1 Tab * oxyCODONE-acetaminophen 5-325 mg per tablet Commonly known as:  PERCOCET What changed: You were already taking a medication with the same name, and this prescription was added. Make sure you understand how and when to take each. Your last dose was: Your next dose is: Take 1 Tab by mouth every six (6) hours as needed for Pain. Max Daily Amount: 4 Tabs. 1 Tab  
    
   
   
   
  
 potassium chloride 20 mEq tablet Commonly known as:  K-DUR, KLOR-CON What changed:  additional instructions Your last dose was: Your next dose is: Take 1 Tab by mouth two (2) times a day. 20 mEq * Notice: This list has 2 medication(s) that are the same as other medications prescribed for you. Read the directions carefully, and ask your doctor or other care provider to review them with you. CONTINUE taking these medications  Instructions Each Dose to Equal  
 Morning Noon Evening Bedtime  
 allopurinol 300 mg tablet Commonly known as:  Patience Guajardo Your last dose was: Your next dose is: Take  by mouth daily. ASPIR-81 81 mg tablet Generic drug:  aspirin delayed-release Your last dose was: Your next dose is: Take 81 mg by mouth daily. 81 mg  
    
   
   
   
  
 carvedilol 25 mg tablet Commonly known as:  Carrautumn Peabody Your last dose was: Your next dose is: Take 1 Tab by mouth two (2) times daily (with meals). 25 mg  
    
   
   
   
  
 cloNIDine HCl 0.2 mg tablet Commonly known as:  CATAPRES Your last dose was: Your next dose is: TAKE 1 TABLET BY MOUTH 3 TIMES A DAY  
     
   
   
   
  
 digoxin 0.125 mg tablet Commonly known as:  LANOXIN Your last dose was: Your next dose is: TAKE 1 TABLET BY MOUTH EVERY DAY  
     
   
   
   
  
 furosemide 40 mg tablet Commonly known as:  LASIX Your last dose was: Your next dose is: Take 1 Tab by mouth two (2) times a day. Or directed 40 mg HumaLOG U-100 Insulin 100 unit/mL injection Generic drug:  insulin lispro Your last dose was: Your next dose is:    
   
   
 by SubCUTAneous route. IRON (DRIED) PO Your last dose was: Your next dose is: Take 325 mg by mouth daily. 325 mg  
    
   
   
   
  
 LANTUS U-100 INSULIN 100 unit/mL injection Generic drug:  insulin glargine Your last dose was: Your next dose is:    
   
   
 15 Units by SubCUTAneous route nightly. 15 Units  
    
   
   
   
  
 lisinopril 20 mg tablet Commonly known as:  Robert Villa Your last dose was: Your next dose is: TAKE ONE TABLET BY MOUTH TWICE DAILY  
     
   
   
   
  
 magnesium oxide 400 mg tablet Commonly known as:  MAG-OX Your last dose was: Your next dose is: TAKE 1 TABLET BY MOUTH DAILY. metOLazone 2.5 mg tablet Commonly known as:  Tylor Castano Your last dose was: Your next dose is: TAKE 1 TABLET BY MOUTH 30 MINS PRIOR TO MORNING LASIX AS DIRECTED ON MON & THU MIRALAX 17 gram packet Generic drug:  polyethylene glycol Your last dose was: Your next dose is: Take 17 g by mouth daily. 17 g  
    
   
   
   
  
 warfarin 2.5 mg tablet Commonly known as:  COUMADIN Your last dose was: Your next dose is: TAKE ONE TABLET BY MOUTH ONCE DAILY OR  AS  DIRECTED  BY  YOUR  DOCTOR  
     
   
   
   
  
 ZOLOFT 25 mg tablet Generic drug:  sertraline Your last dose was: Your next dose is: Take  by mouth daily. Where to Get Your Medications Information on where to get these meds will be given to you by the nurse or doctor. ! Ask your nurse or doctor about these medications  
  oxyCODONE-acetaminophen 5-325 mg per tablet Opioid Education Prescription Opioids: What You Need to Know: 
 
Prescription opioids can be used to help relieve moderate-to-severe pain and are often prescribed following a surgery or injury, or for certain health conditions. These medications can be an important part of treatment but also come with serious risks. Opioids are strong pain medicines. Examples include hydrocodone, oxycodone, fentanyl, and morphine. Heroin is an example of an illegal opioid. It is important to work with your health care provider to make sure you are getting the safest, most effective care. WHAT ARE THE RISKS AND SIDE EFFECTS OF OPIOID USE?  
Prescription opioids carry serious risks of addiction and overdose, especially with prolonged use. An opioid overdose, often marked by slow breathing, can cause sudden death. The use of prescription opioids can have a number of side effects as well, even when taken as directed. · Tolerance-meaning you might need to take more of a medication for the same pain relief · Physical dependence-meaning you have symptoms of withdrawal when the medication is stopped. Withdrawal symptoms can include nausea, sweating, chills, diarrhea, stomach cramps, and muscle aches. Withdrawal can last up to several weeks, depending on which drug you took and how long you took it. · Increased sensitivity to pain · Constipation · Nausea, vomiting, and dry mouth · Sleepiness and dizziness · Confusion · Depression · Low levels of testosterone that can result in lower sex drive, energy, and strength · Itching and sweating RISKS ARE GREATER WITH:      
· History of drug misuse, substance use disorder, or overdose · Mental health conditions (such as depression or anxiety) · Sleep apnea · Older age (72 years or older) · Pregnancy Avoid alcohol while taking prescription opioids. Also, unless specifically advised by your health care provider, medications to avoid include: · Benzodiazepines (such as Xanax or Valium) · Muscle relaxants (such as Soma or Flexeril) · Hypnotics (such as Ambien or Lunesta) · Other prescription opioids KNOW YOUR OPTIONS Talk to your health care provider about ways to manage your pain that don't involve prescription opioids. Some of these options may actually work better and have fewer risks and side effects. Options may include: 
· Pain relievers such as acetaminophen, ibuprofen, and naproxen · Some medications that are also used for depression or seizures · Physical therapy and exercise · Counseling to help patients learn how to cope better with triggers of pain and stress. · Application of heat or cold compress · Massage therapy · Relaxation techniques Be Informed Make sure you know the name of your medication, how much and how often to take it, and its potential risks & side effects. IF YOU ARE PRESCRIBED OPIOIDS FOR PAIN: 
· Never take opioids in greater amounts or more often than prescribed. Remember the goal is not to be pain-free but to manage your pain at a tolerable level. · Follow up with your primary care provider to: · Work together to create a plan on how to manage your pain. · Talk about ways to help manage your pain that don't involve prescription opioids. · Talk about any and all concerns and side effects. · Help prevent misuse and abuse. · Never sell or share prescription opioids · Help prevent misuse and abuse. · Store prescription opioids in a secure place and out of reach of others (this may include visitors, children, friends, and family). · Safely dispose of unused/unwanted prescription opioids: Find your community drug take-back program or your pharmacy mail-back program, or flush them down the toilet, following guidance from the Food and Drug Administration (www.fda.gov/Drugs/ResourcesForYou). · Visit www.cdc.gov/drugoverdose to learn about the risks of opioid abuse and overdose. · If you believe you may be struggling with addiction, tell your health care provider and ask for guidance or call [employer] at 1-513-724-WNIC. Discharge Instructions RESUME COUMADIN TOMORROW (5/8) Disposition: 
Will need follow-up with device/wound check in 7-10 days in my office. Please contact office at [phone] to confirm appointment. Main Office:   
[address], [address] [facility]  Restrictions: For affected arm:  No lifting greater than 10 lbs or lifting elbow above shoulder for 4 weeks. Keep incision clean and dry for a total of 72 hours after procedure. Remove dressing in 24 hours if not already removed. Please remove the steristrips (small white adhesive strips over wound) after 7 days if they have not already fallen off. No hot tubs or pools for 2 weeks. OK to shower with \"pat\" dry incision after 72 hours. No driving ideally for 2 weeks due to concern for airbag. ACO Transitions of Care Introducing [employer] 508 Jamilah Orozco offers a voluntary care coordination program to provide high quality service and care to [facility] fee-for-service beneficiaries. Min Almanza was designed to help you enhance your health and well-being through the following services: ? Transitions of Care  support for individuals who are transitioning from one care setting to another (example: Hospital to home). ? Chronic and Complex Care Coordination  support for individuals and caregivers of those with serious or chronic illnesses or with more than one chronic (ongoing) condition and those who take a number of different medications. If you meet specific medical criteria, a [facility] [address] may call you directly to coordinate your care with your primary care physician and your other care providers. For questions about the [facility] programs, please, contact your physicians office. For general questions or additional information about Accountable Care Organizations: 
Please visit www.medicare.gov/acos. html or call 1-800-MEDICARE (6-556.435.5761) TTY users should call 8-166.696.4931. Introducing [facility]! Carol Santos introduces Hi-G-Tek patient portal. Now you can access parts of your medical record, email your doctor's office, and request medication refills online. 1. In your internet browser, go to [email or web address]. [email or web address] 2. Click on the First Time User? Click Here link in the Sign In box. You will see the New Member Sign Up page. 3. Enter your Cognitum Access Code exactly as it appears below. You will not need to use this code after youve completed the sign-up process. If you do not sign up before the expiration date, you must request a new code. · Cognitum Access Code: [email or web address] Expires: 6/20/2018 11:25 AM 
 
4. Enter the last four digits of your Social Security Number (xxxx) and Date of Birth (mm/dd/yyyy) as indicated and click Submit. You will be taken to the next sign-up page. 5. Create a Wavebornt ID. This will be your Cognitum login ID and cannot be changed, so think of one that is secure and easy to remember. 6. Create a Cognitum password. You can change your password at any time. 7. Enter your Password Reset Question and Answer. This can be used at a later time if you forget your password. 8. Enter your e-mail address. You will receive e-mail notification when new information is available in [address] 9. Click Sign Up. You can now view and download portions of your medical record. 10. Click the Download Summary menu link to download a portable copy of your medical information. If you have questions, please visit the Frequently Asked Questions section of the Cognitum website. Remember, Cognitum is NOT to be used for urgent needs. For medical emergencies, dial 911. Now available from your iPhone and Android! Introducing Akbar Yoder As a [facility] patient, I wanted to make you aware of our electronic visit tool called Akbar Yoder. [facility] 24/7 allows you to connect within minutes with a medical provider 24 hours a day, seven days a week via a mobile device or tablet or logging into a secure website from your computer. You can access Akbar Yoder from anywhere in the [place].  
 
A virtual visit might be right for you when you have a simple condition and feel like you just dont want to get out of bed, or cant get away from work for an appointment, when your regular [facility] provider is not available (evenings, weekends or holidays), or when youre out of town and need minor care. Electronic visits cost only $49 and if the [facility] 24/7 provider determines a prescription is needed to treat your condition, one can be electronically transmitted to a nearby pharmacy*. Please take a moment to enroll today if you have not already done so. The enrollment process is free and takes just a few minutes. To enroll, please download the S3Bubble/Flashtalking adelfo to your tablet or phone, or visit [email or web address] to enroll on your computer. And, as an [address] [place] [address] patient with a [employer] account, the results of your visits will be scanned into your electronic medical record and your primary care provider will be able to view the scanned results. We urge you to continue to see your regular [facility] provider for your ongoing medical care. And while your primary care provider may not be the one available when you seek a Folloze virtual visit, the peace of mind you get from getting a real diagnosis real time can be priceless. For more information on Folloze, view our Frequently Asked Questions (FAQs) at [email or web address]. Sincerely, 
 
Isabella Wilcox MD 
Chief Medical Officer [facility] Jamilah Orozco *:  certain medications cannot be prescribed via Folloze Providers Seen During Your Hospitalization Provider Specialty Primary office phone Kya Mejia MD Cardiology [phone] Your Primary Care Physician (PCP) Primary Care Physician Office Phone Office Fax  
 [address] [phone] You are allergic to the following No active allergies Recent Documentation Height Weight BMI Smoking Status 1.854 m 81.6 kg 23.75 kg/m2 Never Smoker Emergency Contacts Name Discharge Info Relation Home Work Mobile Taylor Medrano DISCHARGE CAREGIVER [3] Spouse [3] [phone] [address] CAREGIVER [3] Son [22] [phone] Patient Belongings The following personal items are in your possession at time of discharge: 
     Visual Aid: None Please provide this summary of care documentation to your next provider. Signatures-by signing, you are acknowledging that this After Visit Summary has been reviewed with you and you have received a copy. Patient Signature:  ____________________________________________________________ Date:  ____________________________________________________________  
  
[facility] Provider Signature:  ____________________________________________________________ Date:  ____________________________________________________________

## 2018-05-07 NOTE — ROUTINE PROCESS
TRANSFER - IN REPORT:    Verbal report received from 715 Norris Uribe RN (name) on Darene Slider  being received from [facility] for routine progression of care      Report consisted of patients Situation, Background, Assessment and   Recommendations(SBAR). Information from the following report(s) SBAR, Kardex, Procedure Summary and MAR was reviewed with the receiving nurse. Opportunity for questions and clarification was provided. Assessment completed upon patients arrival to unit and care assumed.

## 2018-05-07 NOTE — ANESTHESIA PREPROCEDURE EVALUATION
Anesthetic History   No history of anesthetic complications            Review of Systems / Medical History  Patient summary reviewed, nursing notes reviewed and pertinent labs reviewed    Pulmonary          Shortness of breath         Neuro/Psych              Cardiovascular    Hypertension: well controlled  Valvular problems/murmurs: tricuspid insufficiency and mitral insufficiency    CHF: dyspnea on exertion  Dysrhythmias : atrial fibrillation  Pacemaker, past MI and CAD    Exercise tolerance: <4 METS: Moderate to severe pulmonary HTN  Cardiomyopathy EF 25%     GI/Hepatic/Renal                Endo/Other        Cancer     Other Findings   Comments: Documentation of current medication  Current medications obtained, documented and obtained? YES      Risk Factors for Postoperative nausea/vomiting:       History of postoperative nausea/vomiting? NO       Female? NO       Motion sickness? NO       Intended opioid administration for postoperative analgesia? YES      Smoking Abstinence:  Current Smoker? NO  Elective Surgery? NO  Seen preoperatively by anesthesiologist or proxy prior to day of surgery? YES  Pt abstained from smoking 24 hours prior to anesthesia?  N/A    Preventive care/screening for High Blood Pressure:  Aged 18 years and older: YES  Screened for high blood pressure: YES  Patients with high blood pressure referred to primary care provider   for BP management: YES               Physical Exam    Airway  Mallampati: II  TM Distance: 4 - 6 cm  Neck ROM: normal range of motion   Mouth opening: Normal     Cardiovascular    Rhythm: irregular  Rate: normal         Dental    Dentition: Full upper dentures and Full lower dentures     Pulmonary  Breath sounds clear to auscultation               Abdominal  GI exam deferred       Other Findings            Anesthetic Plan    ASA: 4  Anesthesia type: MAC          Induction: Intravenous  Anesthetic plan and risks discussed with: Patient

## 2018-05-07 NOTE — IP AVS SNAPSHOT
[address] Patient: Karine Roy MRN: [id number]  A check beatrice indicates which time of day the medication should be taken. My Medications CHANGE how you take these medications Instructions Each Dose to Equal  
 Morning Noon Evening Bedtime * oxyCODONE-acetaminophen 5-325 mg per tablet Commonly known as:  PERCOCET What changed:  Another medication with the same name was added. Make sure you understand how and when to take each. Your last dose was: Your next dose is: Take 1 Tab by mouth every four (4) hours as needed for Pain. Max Daily Amount: 6 Tabs. 1 Tab * oxyCODONE-acetaminophen 5-325 mg per tablet Commonly known as:  PERCOCET What changed: You were already taking a medication with the same name, and this prescription was added. Make sure you understand how and when to take each. Your last dose was: Your next dose is: Take 1 Tab by mouth every six (6) hours as needed for Pain. Max Daily Amount: 4 Tabs. 1 Tab  
    
   
   
   
  
 potassium chloride 20 mEq tablet Commonly known as:  K-DUR, KLOR-CON What changed:  additional instructions Your last dose was: Your next dose is: Take 1 Tab by mouth two (2) times a day. 20 mEq * Notice: This list has 2 medication(s) that are the same as other medications prescribed for you. Read the directions carefully, and ask your doctor or other care provider to review them with you. CONTINUE taking these medications Instructions Each Dose to Equal  
 Morning Noon Evening Bedtime  
 allopurinol 300 mg tablet Commonly known as:  Yael Roman Your last dose was: Your next dose is: Take  by mouth daily. ASPIR-81 81 mg tablet Generic drug:  aspirin delayed-release Your last dose was: Your next dose is: Take 81 mg by mouth daily. 81 mg  
    
   
   
   
  
 carvedilol 25 mg tablet Commonly known as:  Jl Garcia Your last dose was: Your next dose is: Take 1 Tab by mouth two (2) times daily (with meals). 25 mg  
    
   
   
   
  
 cloNIDine HCl 0.2 mg tablet Commonly known as:  CATAPRES Your last dose was: Your next dose is: TAKE 1 TABLET BY MOUTH 3 TIMES A DAY  
     
   
   
   
  
 digoxin 0.125 mg tablet Commonly known as:  LANOXIN Your last dose was: Your next dose is: TAKE 1 TABLET BY MOUTH EVERY DAY  
     
   
   
   
  
 furosemide 40 mg tablet Commonly known as:  LASIX Your last dose was: Your next dose is: Take 1 Tab by mouth two (2) times a day. Or directed 40 mg HumaLOG U-100 Insulin 100 unit/mL injection Generic drug:  insulin lispro Your last dose was: Your next dose is:    
   
   
 by SubCUTAneous route. IRON (DRIED) PO Your last dose was: Your next dose is: Take 325 mg by mouth daily. 325 mg  
    
   
   
   
  
 LANTUS U-100 INSULIN 100 unit/mL injection Generic drug:  insulin glargine Your last dose was: Your next dose is:    
   
   
 15 Units by SubCUTAneous route nightly. 15 Units  
    
   
   
   
  
 lisinopril 20 mg tablet Commonly known as:  Katina Rodriguez Your last dose was: Your next dose is: TAKE ONE TABLET BY MOUTH TWICE DAILY  
     
   
   
   
  
 magnesium oxide 400 mg tablet Commonly known as:  MAG-OX Your last dose was: Your next dose is: TAKE 1 TABLET BY MOUTH DAILY. metOLazone 2.5 mg tablet Commonly known as:  Christina Covarrubias Your last dose was: Your next dose is: TAKE 1 TABLET BY MOUTH 30 MINS PRIOR TO MORNING LASIX AS DIRECTED ON MON & THU MIRALAX 17 gram packet Generic drug:  polyethylene glycol Your last dose was: Your next dose is: Take 17 g by mouth daily. 17 g  
    
   
   
   
  
 warfarin 2.5 mg tablet Commonly known as:  COUMADIN Your last dose was: Your next dose is: TAKE ONE TABLET BY MOUTH ONCE DAILY OR  AS  DIRECTED  BY  YOUR  DOCTOR  
     
   
   
   
  
 ZOLOFT 25 mg tablet Generic drug:  sertraline Your last dose was: Your next dose is: Take  by mouth daily. Where to Get Your Medications Information on where to get these meds will be given to you by the nurse or doctor. ! Ask your nurse or doctor about these medications  
  oxyCODONE-acetaminophen 5-325 mg per tablet

## 2018-05-07 NOTE — ANESTHESIA POSTPROCEDURE EVALUATION
Post-Anesthesia Evaluation and Assessment    Patient: [facility] MRN: [id number]  SSN: [id number]    YOB: 1945  Age: 71 y.o. Sex: male       Cardiovascular Function/Vital Signs  Visit Vitals    /59    Pulse (!) 58    Temp 36.8 °C (98.3 °F)    Resp 30    Ht 6' 1\" (1.854 m)    Wt 81.6 kg (180 lb)    SpO2 100%    BMI 23.75 kg/m2       Patient is status post MAC, general - backup anesthesia for * No procedures listed *. Nausea/Vomiting: None    Postoperative hydration reviewed and adequate. Pain:  Pain Scale 1: Numeric (0 - 10) (05/07/18 5111)  Pain Intensity 1: 0 (05/07/18 5602)   Managed    Neurological Status: At baseline    Mental Status and Level of Consciousness: Alert and oriented     Pulmonary Status:   O2 Device: Room air (05/07/18 1049)   Adequate oxygenation and airway patent    Complications related to anesthesia: None    Post-anesthesia assessment completed.  No concerns    Signed By: Elen Coulter CRNA     May 7, 2018

## 2018-05-07 NOTE — PROCEDURES
PROCEDURES   - Generator changeout of single chamber Medtronic automatic implantable cardioverter defibrillator. Initially placed for primary prevention but has had shocks/VT since then. - MAC sedation by anesthesia. Diagnosis:  Primary cardiologist Dr. Jackie Negron  -Medtronic single chamber AICD initially placed for primary prevention and heart failure, now SARA, last device placed 2011, 6947 lead. Has had 9 shocks since implant with h/o VT. -Chronic class II systolic heart failure  -h/o nonischemic cardiomyopathy, last MUGA 2015 with improvement in EF to 45%, however echo Sept 2016 with EF 15%  -Chronic atrial fibrillation on Coumadin  -h/o severe pulmonary HTN, PAP 80-85 mmHg by echo Sept 2016  -h/o right thigh sarcoma s/p resection in 2016 in [place], no recurrence  -h/o HTN  -Chronic stage II kidney disease  - msec by EKG 4/24/18  -Chronic ACEI/BB x 3 months  -Life expectancy > 1 year    PROCEDURE: After informed consent was obtained, the patient was brought to the electrophysiology lab in a fasting, nonsedated state. The left chest was prepped and draped in the usual sterile fashion. Local lidocaine was infiltrated just below the left clavicle. A 4-cm transverse incision was created in the left chest above the old device. Using electrocautery and blunt dissection, the device was exposed and removed from the pocket. The lead was detached. Hemostasis was confirmed. The pocket was washed with antibiotic solution and the generator was attached to the lead placed in the pocket and sutured in place. The pocket was closed with 2-0 Vicryl in 2 deep layers. Skin was closed with 4-0 monocryl. Steri-Strips and a dressing were applied. No DFT testing was performed, as previously discussed. DEVICE DETAILS:  See separate scanned report. IMPRESSION:   -Successful generator changeout of single chamber AICD.     -Plan to discharge home later today.  -Resume Coumadin tomorrow.  -Wound check in my office in 7-10 days, then follow-up Dr. Claire Richardson in 6 weeks. Thank you kindly for allowing me to participate in this patient's care.   Please do not hesitate to contact me if any questions.      -----------------------------------------------------------------------------------------------------------  AICD Core Measures  Beta-blocker Therapy:   [   ]  Not indicated   [   ]  Intolerant due to [   ]  Allergy/reaction  [   ]  Hypotension   [   ]  Lung disease   [x   ]  Already prescribed  ACEI/ARB Therapy:   [   ]  Not indicated   [   ]  Intolerant due to [   ]  Allergy/reaction  [   ]  Hypotension   [   ]  Renal disease   [x   ]  Already prescribed  Indication:  [x   ]  Device already in place and changeout due:    [  x ]  Previous VT    [   ]  Primary prevention   [   ]  Previous EP study with inducible VT  [   ]  Secondary prevention with documented spontaneous or inducible cardiac arrest/VT          Primary prevention for:   [   ]  High risk for VT/VF due to long QT/HCM or other inherited familial condition   [   ]  CAD w/prior MI and inducible VT/VF during EP study   [   ]  Prior MI with class II/III heart failure and EF <= 35%   [   ]  Nonischemic cardiomyopathy with EF < 35% and heart failure class II/III

## 2018-05-10 ENCOUNTER — TELEPHONE (OUTPATIENT)
Dept: CARDIOLOGY CLINIC | Age: 73
End: 2018-05-10

## 2018-05-10 ENCOUNTER — HOSPITAL ENCOUNTER (OUTPATIENT)
Dept: NON INVASIVE DIAGNOSTICS | Age: 73
Discharge: HOME | End: 2018-05-10
Attending: INTERNAL MEDICINE
Payer: MEDICARE

## 2018-05-10 ENCOUNTER — HOSPITAL ENCOUNTER (OUTPATIENT)
Dept: INFUSION THERAPY | Age: 73
Discharge: HOME | End: 2018-05-10
Payer: MEDICARE

## 2018-05-10 VITALS
HEART RATE: 69 BPM | SYSTOLIC BLOOD PRESSURE: 120 MMHG | RESPIRATION RATE: 18 BRPM | DIASTOLIC BLOOD PRESSURE: 66 MMHG | TEMPERATURE: 98 F | OXYGEN SATURATION: 99 %

## 2018-05-10 DIAGNOSIS — I48.91 ATRIAL FIBRILLATION, UNSPECIFIED TYPE (HCC): ICD-10-CM

## 2018-05-10 DIAGNOSIS — I42.8 CARDIOMYOPATHY, NONISCHEMIC (HCC): ICD-10-CM

## 2018-05-10 LAB
BASO+EOS+MONOS # BLD AUTO: 0.4 K/UL (ref 0–2.3)
BASO+EOS+MONOS # BLD AUTO: 6 % (ref 0.1–17)
DIFFERENTIAL METHOD BLD: ABNORMAL
ERYTHROCYTE [DISTWIDTH] IN BLOOD BY AUTOMATED COUNT: 14.4 % (ref 11.5–14.5)
HCT VFR BLD AUTO: 37.3 % (ref 36–48)
HGB BLD-MCNC: 12.2 G/DL (ref 12–16)
LYMPHOCYTES # BLD: 1.5 K/UL (ref 1.1–5.9)
LYMPHOCYTES NFR BLD: 21 % (ref 14–44)
MCH RBC QN AUTO: 30.2 PG (ref 25–35)
MCHC RBC AUTO-ENTMCNC: 32.7 G/DL (ref 31–37)
MCV RBC AUTO: 92.3 FL (ref 78–102)
NEUTS SEG # BLD: 5.2 K/UL (ref 1.8–9.5)
NEUTS SEG NFR BLD: 73 % (ref 40–70)
PLATELET # BLD AUTO: 181 K/UL (ref 140–440)
RBC # BLD AUTO: 4.04 M/UL (ref 4.1–5.1)
WBC # BLD AUTO: 7.1 K/UL (ref 4.5–13)

## 2018-05-10 PROCEDURE — 85025 COMPLETE CBC W/AUTO DIFF WBC: CPT | Performed by: INTERNAL MEDICINE

## 2018-05-10 PROCEDURE — 74011250636 HC RX REV CODE- 250/636

## 2018-05-10 PROCEDURE — 93306 TTE W/DOPPLER COMPLETE: CPT

## 2018-05-10 PROCEDURE — 77030012965 HC NDL HUBR BBMI -A

## 2018-05-10 PROCEDURE — 36591 DRAW BLOOD OFF VENOUS DEVICE: CPT

## 2018-05-10 RX ORDER — HEPARIN 100 UNIT/ML
SYRINGE INTRAVENOUS
Status: COMPLETED
Start: 2018-05-10 | End: 2018-05-10

## 2018-05-10 RX ORDER — HEPARIN 100 UNIT/ML
500 SYRINGE INTRAVENOUS ONCE
Status: COMPLETED | OUTPATIENT
Start: 2018-05-10 | End: 2018-05-10

## 2018-05-10 RX ORDER — SODIUM CHLORIDE 0.9 % (FLUSH) 0.9 %
10-40 SYRINGE (ML) INJECTION AS NEEDED
Status: DISCONTINUED | OUTPATIENT
Start: 2018-05-10 | End: 2018-05-14 | Stop reason: HOSPADM

## 2018-05-10 RX ADMIN — HEPARIN 500 UNITS: 100 SYRINGE at 09:15

## 2018-05-10 RX ADMIN — SODIUM CHLORIDE, PRESERVATIVE FREE 500 UNITS: 5 INJECTION INTRAVENOUS at 09:15

## 2018-05-10 RX ADMIN — Medication 20 ML: at 09:15

## 2018-05-10 NOTE — TELEPHONE ENCOUNTER
LMOM for patient to contact office to get wound check appointment scheduled on 5/17/18. González Wolf  Male, 71 y.o., 1945  Weight:   81.6 kg (180 lb)  Phone:   [phone]  PCP:   Goran Linder MD  MRN:   [id number]  MyChart:   Pending  Next Appt (With Me)  None  Next Appt (Any Provider)  Today    Message  Received: 3 days ago       Tim Del Toro MD sent to Kale Mckenzie aicd gen change.  Wound check our office 7-10 days, thx

## 2018-05-10 NOTE — PROGRESS NOTES
[facility] Progress Note    Date: May 10, 2018    Name: Kale Quinteros    MRN: [id number]         : 1945     Procrit    Mr. Moise Mccrary to [facility], ambulatory at 6827. Pt was assessed and education was provided. Mr. Marita Gonzalez vitals were reviewed and patient was observed for 5 minutes prior to treatment. Visit Vitals    /66 (BP 1 Location: Left arm, BP Patient Position: Sitting)    Pulse 69    Temp 98 °F (36.7 °C)    Resp 18    SpO2 99%     Per patient request, labs drawn from port today. Right chest mediport accessed with 20G 1 inch Giron needle. Flushed easily and had brisk blood return. Blood drawn off for CBC after 10 mL of waste. Port flushed with 20 mL NS and 500 units of heparin before de-accessing. No irritation or bleeding. Band-aid applied. Results within ordered parameters to HOLD procrit today. Recent Results (from the past 12 hour(s))   CBC WITH 3 PART DIFF    Collection Time: 05/10/18  9:15 AM   Result Value Ref Range    WBC 7.1 4.5 - 13.0 K/uL    RBC 4.04 (L) 4.10 - 5.10 M/uL    HGB 12.2 12.0 - 16 g/dL    HCT 37.3 36 - 48 %    MCV 92.3 78 - 102 FL    MCH 30.2 25.0 - 35.0 PG    MCHC 32.7 31 - 37 g/dL    RDW 14.4 11.5 - 14.5 %    PLATELET 740 864 - 062 K/uL    NEUTROPHILS 73 (H) 40 - 70 %    MIXED CELLS 6 0.1 - 17 %    LYMPHOCYTES 21 14 - 44 %    ABS. NEUTROPHILS 5.2 1.8 - 9.5 K/UL    ABS. MIXED CELLS 0.4 0.0 - 2.3 K/uL    ABS. LYMPHOCYTES 1.5 1.1 - 5.9 K/UL    DF AUTOMATED       Patient arm band removed and shredded. He is to return on 2018 at 1000 for his next procrit appointment.      Jenn Sifuentes RN  May 10, 2018

## 2018-05-14 NOTE — PROGRESS NOTES
Per your last note \" NIDCM: Repeat echocardiogram will be checked to see if his worsening fatigue is partially due to his cardiomyopathy.

## 2018-05-17 ENCOUNTER — OFFICE VISIT (OUTPATIENT)
Dept: CARDIOLOGY CLINIC | Age: 73
End: 2018-05-17

## 2018-05-17 DIAGNOSIS — Z95.810 AICD (AUTOMATIC CARDIOVERTER/DEFIBRILLATOR) PRESENT: ICD-10-CM

## 2018-05-17 DIAGNOSIS — I42.8 CARDIOMYOPATHY, NONISCHEMIC (HCC): Primary | ICD-10-CM

## 2018-05-23 NOTE — PROGRESS NOTES
I have personally seen and evaluated the device findings. Interrogation reviewed and I agree with assessment.     Abiodun Menendez

## 2018-05-24 ENCOUNTER — OFFICE VISIT (OUTPATIENT)
Dept: CARDIOLOGY CLINIC | Age: 73
End: 2018-05-24

## 2018-05-24 ENCOUNTER — HOSPITAL ENCOUNTER (OUTPATIENT)
Dept: INFUSION THERAPY | Age: 73
Discharge: HOME | End: 2018-05-24
Payer: MEDICARE

## 2018-05-24 ENCOUNTER — ANTI-COAG VISIT (OUTPATIENT)
Dept: CARDIOLOGY CLINIC | Age: 73
End: 2018-05-24

## 2018-05-24 VITALS
TEMPERATURE: 98 F | HEART RATE: 59 BPM | DIASTOLIC BLOOD PRESSURE: 59 MMHG | SYSTOLIC BLOOD PRESSURE: 104 MMHG | RESPIRATION RATE: 18 BRPM | OXYGEN SATURATION: 98 %

## 2018-05-24 DIAGNOSIS — Z79.01 LONG TERM CURRENT USE OF ANTICOAGULANT THERAPY: Primary | ICD-10-CM

## 2018-05-24 DIAGNOSIS — Z79.01 LONG TERM (CURRENT) USE OF ANTICOAGULANTS: ICD-10-CM

## 2018-05-24 DIAGNOSIS — I48.91 ATRIAL FIBRILLATION, UNSPECIFIED TYPE (HCC): ICD-10-CM

## 2018-05-24 DIAGNOSIS — I42.8 CARDIOMYOPATHY, NONISCHEMIC (HCC): Primary | ICD-10-CM

## 2018-05-24 LAB
BASO+EOS+MONOS # BLD AUTO: 0.7 K/UL (ref 0–2.3)
BASO+EOS+MONOS # BLD AUTO: 9 % (ref 0.1–17)
DIFFERENTIAL METHOD BLD: NORMAL
ERYTHROCYTE [DISTWIDTH] IN BLOOD BY AUTOMATED COUNT: 13.9 % (ref 11.5–14.5)
HCT VFR BLD AUTO: 37.7 % (ref 36–48)
HGB BLD-MCNC: 12.2 G/DL (ref 12–16)
INR BLD: 2.1
LYMPHOCYTES # BLD: 1.7 K/UL (ref 1.1–5.9)
LYMPHOCYTES NFR BLD: 23 % (ref 14–44)
MCH RBC QN AUTO: 29.8 PG (ref 25–35)
MCHC RBC AUTO-ENTMCNC: 32.4 G/DL (ref 31–37)
MCV RBC AUTO: 92 FL (ref 78–102)
NEUTS SEG # BLD: 5 K/UL (ref 1.8–9.5)
NEUTS SEG NFR BLD: 68 % (ref 40–70)
PLATELET # BLD AUTO: 170 K/UL (ref 140–440)
PT POC: NORMAL SECONDS
RBC # BLD AUTO: 4.1 M/UL (ref 4.1–5.1)
VALID INTERNAL CONTROL?: YES
WBC # BLD AUTO: 7.4 K/UL (ref 4.5–13)

## 2018-05-24 PROCEDURE — 36415 COLL VENOUS BLD VENIPUNCTURE: CPT

## 2018-05-24 PROCEDURE — 85025 COMPLETE CBC W/AUTO DIFF WBC: CPT | Performed by: INTERNAL MEDICINE

## 2018-05-24 NOTE — PROGRESS NOTES
[facility] Progress Note    Date: May 24, 2018    Name: Remy Parra    MRN: [id number]         : 1945     Procrit    Mr. Paulina Reinoso to [facility], ambulatory at 0945. Pt was assessed and education was provided. Mr. Ash Gamboa vitals were reviewed and patient was observed for 5 minutes prior to treatment. Visit Vitals    /59 (BP 1 Location: Left arm, BP Patient Position: Sitting)    Pulse (!) 59    Temp 98 °F (36.7 °C)    Resp 18    SpO2 98%     Per patient request, labs drawn off via the left [facility] x1 attempt and sent to the lab for CBC. Gauze and coban applied to the site. Recent Results (from the past 12 hour(s))   AMB POC PT/INR    Collection Time: 18  9:18 AM   Result Value Ref Range    VALID INTERNAL CONTROL POC Yes     Prothrombin time (POC)  seconds    INR POC 2.1    CBC WITH 3 PART DIFF    Collection Time: 18  9:54 AM   Result Value Ref Range    WBC 7.4 4.5 - 13.0 K/uL    RBC 4.10 4. 10 - 5.10 M/uL    HGB 12.2 12.0 - 16 g/dL    HCT 37.7 36 - 48 %    MCV 92.0 78 - 102 FL    MCH 29.8 25.0 - 35.0 PG    MCHC 32.4 31 - 37 g/dL    RDW 13.9 11.5 - 14.5 %    PLATELET 656 353 - 451 K/uL    NEUTROPHILS 68 40 - 70 %    MIXED CELLS 9 0.1 - 17 %    LYMPHOCYTES 23 14 - 44 %    ABS. NEUTROPHILS 5.0 1.8 - 9.5 K/UL    ABS. MIXED CELLS 0.7 0.0 - 2.3 K/uL    ABS. LYMPHOCYTES 1.7 1.1 - 5.9 K/UL    DF AUTOMATED         HBG= 12.2 and HCT= 37.7    Results within ordered parameters to HOLD procrit today. Patient arm band removed and shredded. Patient tolerated the lab draw well and was discharged in stable condition. He is to return on 2018 at 1000 for his next procrit appointment.      Shilo Bolivar RN  May 24, 2018

## 2018-05-24 NOTE — PROGRESS NOTES
The INR is stable and therapeutic. Continue same dose of coumadin and recheck in 1 month. Continue Coumadin to 2.5 mg daily except 3.75 mg on Mon  Verbal order and read back per Merna River NP    Patient and wife given instructions with read back. They verbalized understanding.

## 2018-05-25 RX ORDER — LISINOPRIL 20 MG/1
TABLET ORAL
Qty: 180 TAB | Refills: 3 | Status: SHIPPED | OUTPATIENT
Start: 2018-05-25 | End: 2018-07-27 | Stop reason: SDUPTHER

## 2018-05-25 NOTE — PROGRESS NOTES
I have personally seen and evaluated the device findings. Interrogation reviewed and I agree with assessment.     Lani Martinez

## 2018-06-07 ENCOUNTER — HOSPITAL ENCOUNTER (OUTPATIENT)
Dept: INFUSION THERAPY | Age: 73
Discharge: HOME | End: 2018-06-07
Payer: MEDICARE

## 2018-06-07 VITALS
HEART RATE: 73 BPM | TEMPERATURE: 98.5 F | RESPIRATION RATE: 18 BRPM | SYSTOLIC BLOOD PRESSURE: 116 MMHG | DIASTOLIC BLOOD PRESSURE: 66 MMHG | OXYGEN SATURATION: 98 %

## 2018-06-07 LAB
BASO+EOS+MONOS # BLD AUTO: 0.7 K/UL (ref 0–2.3)
BASO+EOS+MONOS # BLD AUTO: 9 % (ref 0.1–17)
DIFFERENTIAL METHOD BLD: ABNORMAL
ERYTHROCYTE [DISTWIDTH] IN BLOOD BY AUTOMATED COUNT: 14.1 % (ref 11.5–14.5)
HCT VFR BLD AUTO: 36.9 % (ref 36–48)
HGB BLD-MCNC: 12.2 G/DL (ref 12–16)
LYMPHOCYTES # BLD: 1.8 K/UL (ref 1.1–5.9)
LYMPHOCYTES NFR BLD: 24 % (ref 14–44)
MCH RBC QN AUTO: 30.1 PG (ref 25–35)
MCHC RBC AUTO-ENTMCNC: 33.1 G/DL (ref 31–37)
MCV RBC AUTO: 91.1 FL (ref 78–102)
NEUTS SEG # BLD: 4.9 K/UL (ref 1.8–9.5)
NEUTS SEG NFR BLD: 67 % (ref 40–70)
PLATELET # BLD AUTO: 182 K/UL (ref 140–440)
RBC # BLD AUTO: 4.05 M/UL (ref 4.1–5.1)
WBC # BLD AUTO: 7.4 K/UL (ref 4.5–13)

## 2018-06-07 PROCEDURE — 77030012965 HC NDL HUBR BBMI -A

## 2018-06-07 PROCEDURE — 74011250636 HC RX REV CODE- 250/636

## 2018-06-07 PROCEDURE — 36591 DRAW BLOOD OFF VENOUS DEVICE: CPT

## 2018-06-07 PROCEDURE — 85025 COMPLETE CBC W/AUTO DIFF WBC: CPT | Performed by: INTERNAL MEDICINE

## 2018-06-07 RX ORDER — SODIUM CHLORIDE 0.9 % (FLUSH) 0.9 %
10-40 SYRINGE (ML) INJECTION AS NEEDED
Status: DISCONTINUED | OUTPATIENT
Start: 2018-06-07 | End: 2018-06-11 | Stop reason: HOSPADM

## 2018-06-07 RX ORDER — HEPARIN 100 UNIT/ML
500 SYRINGE INTRAVENOUS ONCE
Status: ACTIVE | OUTPATIENT
Start: 2018-06-07 | End: 2018-06-07

## 2018-06-07 RX ORDER — HEPARIN 100 UNIT/ML
SYRINGE INTRAVENOUS
Status: COMPLETED
Start: 2018-06-07 | End: 2018-06-07

## 2018-06-07 RX ADMIN — SODIUM CHLORIDE, PRESERVATIVE FREE 500 UNITS: 5 INJECTION INTRAVENOUS at 10:21

## 2018-06-07 RX ADMIN — Medication 30 ML: at 10:20

## 2018-06-07 NOTE — PROGRESS NOTES
[facility] Progress Note    Date: 2018    Name: Dajuan Ernandez    MRN: [id number]         : 1945    Procrit q 2 weeks      Mr. Rocío Hart arrived to [facility] at 1010. No complaints or concerns voiced    Mr. Rocío Hart was assessed and education was provided. Mr. Alethea Garland vitals were reviewed. Visit Vitals    /66 (BP 1 Location: Left arm, BP Patient Position: Sitting)    Pulse 73    Temp 98.5 °F (36.9 °C)    Resp 18    SpO2 98%       Patient has a port and requested blood be drawn from same. Port flush was done 4 weeks ago, and not due, but port will be accessed today per patient request for cbc blood draw    Patient's RIGHT upper chest port accessed. Blood return visualized. Blood sample obtained for cbc. Flushed with normal saline and heparin per protocol. De-accessed. Site s signs of infection or tenderness. Band-aid applied to site. CBC WITH 3 PART DIFF    Collection Time: 18 10:20 AM   Result Value Ref Range    WBC 7.4 4.5 - 13.0 K/uL    RBC 4.05 (L) 4.10 - 5.10 M/uL    HGB 12.2 12.0 - 16 g/dL    HCT 36.9 36 - 48 %    MCV 91.1 78 - 102 FL    MCH 30.1 25.0 - 35.0 PG    MCHC 33.1 31 - 37 g/dL    RDW 14.1 11.5 - 14.5 %    PLATELET 825 856 - 330 K/uL    NEUTROPHILS 67 40 - 70 %    MIXED CELLS 9 0.1 - 17 %    LYMPHOCYTES 24 14 - 44 %    ABS. NEUTROPHILS 4.9 1.8 - 9.5 K/UL    ABS. MIXED CELLS 0.7 0.0 - 2.3 K/uL    ABS. LYMPHOCYTES 1.8 1.1 - 5.9 K/UL    DF AUTOMATED         Procrit held per parameters      Mr. Rocío Hart tolerated well without complaints. Arm band removed and shreded    Mr. Rocío Hart was discharged from [place] [address] in stable condition at 1030  He is to return on 18 at 1000 for his next procrit appointment.     Chana Lynn RN  2018  9:40 AM

## 2018-06-08 ENCOUNTER — TELEPHONE (OUTPATIENT)
Dept: CARDIOLOGY CLINIC | Age: 73
End: 2018-06-08

## 2018-06-08 NOTE — TELEPHONE ENCOUNTER
----- Message from [id number] Mercy Echeverria MD sent at 6/7/2018  2:20 PM EDT -----  Let the patient know that his echocardiogram is improved. His EF increased from 15% to 35%. His valvular insufficiency has also improved.  ----- Message -----     From: Elena Robbins RN     Sent: 5/14/2018   7:19 AM       To: [id number] Mercy Echeverria MD    Per your last note \" NIDCM: Repeat echocardiogram will be checked to see if his worsening fatigue is partially due to his cardiomyopathy.

## 2018-06-08 NOTE — TELEPHONE ENCOUNTER
Called patient and informed in Echo has improved and EF function increased form 15% to 35%, his valvular insufficiency has improved. This has been fully explained to the patient, who indicates understanding.

## 2018-06-21 ENCOUNTER — HOSPITAL ENCOUNTER (OUTPATIENT)
Dept: INFUSION THERAPY | Age: 73
Discharge: HOME | End: 2018-06-21
Payer: MEDICARE

## 2018-06-21 VITALS
HEART RATE: 64 BPM | TEMPERATURE: 98.3 F | DIASTOLIC BLOOD PRESSURE: 83 MMHG | SYSTOLIC BLOOD PRESSURE: 145 MMHG | OXYGEN SATURATION: 99 % | RESPIRATION RATE: 18 BRPM

## 2018-06-21 LAB
BASO+EOS+MONOS # BLD AUTO: 0.7 K/UL (ref 0–2.3)
BASO+EOS+MONOS # BLD AUTO: 9 % (ref 0.1–17)
DIFFERENTIAL METHOD BLD: NORMAL
ERYTHROCYTE [DISTWIDTH] IN BLOOD BY AUTOMATED COUNT: 13.6 % (ref 11.5–14.5)
HCT VFR BLD AUTO: 40.3 % (ref 36–48)
HGB BLD-MCNC: 12.9 G/DL (ref 12–16)
LYMPHOCYTES # BLD: 1.7 K/UL (ref 1.1–5.9)
LYMPHOCYTES NFR BLD: 23 % (ref 14–44)
MCH RBC QN AUTO: 29.3 PG (ref 25–35)
MCHC RBC AUTO-ENTMCNC: 32 G/DL (ref 31–37)
MCV RBC AUTO: 91.4 FL (ref 78–102)
NEUTS SEG # BLD: 5 K/UL (ref 1.8–9.5)
NEUTS SEG NFR BLD: 68 % (ref 40–70)
PLATELET # BLD AUTO: 191 K/UL (ref 140–440)
RBC # BLD AUTO: 4.41 M/UL (ref 4.1–5.1)
WBC # BLD AUTO: 7.4 K/UL (ref 4.5–13)

## 2018-06-21 PROCEDURE — 85025 COMPLETE CBC W/AUTO DIFF WBC: CPT | Performed by: INTERNAL MEDICINE

## 2018-06-21 PROCEDURE — 36415 COLL VENOUS BLD VENIPUNCTURE: CPT

## 2018-06-21 NOTE — PROGRESS NOTES
[facility] Progress Note    Date: 2018    Name: Kale Quinteros    MRN: [id number]         : 1945    Procrit Q 2 weeks      Mr. Moise Mccrary arrived to [facility] at [phone]. No complaints or concerns voiced    Mr. Moise Mccrary was assessed and education was provided. Mr. Marita Gonzalez vitals were reviewed. Visit Vitals    /83 (BP 1 Location: Left arm, BP Patient Position: Sitting)    Pulse 64    Temp 98.3 °F (36.8 °C)    Resp 18    SpO2 99%       Blood sample obtained by morales-puncture stick from left AC for cbc x 1 stick        CBC WITH 3 PART DIFF    Collection Time: 18  9:00 AM   Result Value Ref Range    WBC 7.4 4.5 - 13.0 K/uL    RBC 4.41 4.10 - 5.10 M/uL    HGB 12.9 12.0 - 16 g/dL    HCT 40.3 36 - 48 %    MCV 91.4 78 - 102 FL    MCH 29.3 25.0 - 35.0 PG    MCHC 32.0 31 - 37 g/dL    RDW 13.6 11.5 - 14.5 %    PLATELET 999 847 - 341 K/uL    NEUTROPHILS 68 40 - 70 %    MIXED CELLS 9 0.1 - 17 %    LYMPHOCYTES 23 14 - 44 %    ABS. NEUTROPHILS 5.0 1.8 - 9.5 K/UL    ABS. MIXED CELLS 0.7 0.0 - 2.3 K/uL    ABS. LYMPHOCYTES 1.7 1.1 - 5.9 K/UL    DF AUTOMATED         Procrit held per parameters      Mr. Moise Mccrary tolerated well without complaints. Mr. Moise Mccrary was discharged from [place] [address] in stable condition at 0910. He is to return on 17 at 1000 am for his next appointment.     Sharif Segura RN  2018  9:40 AM

## 2018-06-25 ENCOUNTER — ANTI-COAG VISIT (OUTPATIENT)
Dept: CARDIOLOGY CLINIC | Age: 73
End: 2018-06-25

## 2018-06-25 DIAGNOSIS — I48.91 ATRIAL FIBRILLATION, UNSPECIFIED TYPE (HCC): Primary | ICD-10-CM

## 2018-06-25 DIAGNOSIS — Z79.01 LONG TERM CURRENT USE OF ANTICOAGULANT THERAPY: ICD-10-CM

## 2018-06-25 LAB
INR BLD: 2.4
PT POC: 28.9 SECONDS
VALID INTERNAL CONTROL?: YES

## 2018-06-25 NOTE — PROGRESS NOTES
Verbal order and read back per Dr Humberto Lopez  Patient is within therapeutic range   Continue Coumadin to 2.5 mg daily except 3.75 mg on Mon  Recheck 4 weeks  This has been fully explained to the patient, who indicates understanding.

## 2018-07-06 ENCOUNTER — HOSPITAL ENCOUNTER (OUTPATIENT)
Dept: INFUSION THERAPY | Age: 73
Discharge: HOME | End: 2018-07-06
Payer: MEDICARE

## 2018-07-06 VITALS
SYSTOLIC BLOOD PRESSURE: 132 MMHG | HEART RATE: 66 BPM | DIASTOLIC BLOOD PRESSURE: 76 MMHG | TEMPERATURE: 98.4 F | RESPIRATION RATE: 18 BRPM | OXYGEN SATURATION: 100 %

## 2018-07-06 LAB
BASO+EOS+MONOS # BLD AUTO: 0.5 K/UL (ref 0–2.3)
BASO+EOS+MONOS # BLD AUTO: 7 % (ref 0.1–17)
DIFFERENTIAL METHOD BLD: ABNORMAL
ERYTHROCYTE [DISTWIDTH] IN BLOOD BY AUTOMATED COUNT: 14.2 % (ref 11.5–14.5)
HCT VFR BLD AUTO: 40.6 % (ref 36–48)
HGB BLD-MCNC: 13.5 G/DL (ref 12–16)
LYMPHOCYTES # BLD: 1.8 K/UL (ref 1.1–5.9)
LYMPHOCYTES NFR BLD: 21 % (ref 14–44)
MCH RBC QN AUTO: 30 PG (ref 25–35)
MCHC RBC AUTO-ENTMCNC: 33.3 G/DL (ref 31–37)
MCV RBC AUTO: 90.2 FL (ref 78–102)
NEUTS SEG # BLD: 6.1 K/UL (ref 1.8–9.5)
NEUTS SEG NFR BLD: 72 % (ref 40–70)
PLATELET # BLD AUTO: 159 K/UL (ref 140–440)
RBC # BLD AUTO: 4.5 M/UL (ref 4.1–5.1)
WBC # BLD AUTO: 8.4 K/UL (ref 4.5–13)

## 2018-07-06 PROCEDURE — 85025 COMPLETE CBC W/AUTO DIFF WBC: CPT | Performed by: INTERNAL MEDICINE

## 2018-07-06 PROCEDURE — 36415 COLL VENOUS BLD VENIPUNCTURE: CPT

## 2018-07-06 NOTE — PROGRESS NOTES
[facility] Progress Note    Date: 2018    Name: Nabeel Leon    MRN: [id number]         : 1945     Procrit    Mr. Valente Johnson to [facility], ambulatory at 10:15. Pt was assessed and education was provided. Mr. Maribel Carrera vitals were reviewed and patient was observed for 5 minutes prior to treatment. Visit Vitals    /76 (BP 1 Location: Left arm, BP Patient Position: Sitting)    Pulse 66    Temp 98.4 °F (36.9 °C)    Resp 18    SpO2 100%     Per patient request, labs drawn off via the left [facility] x1 attempt and sent to the lab for CBC. Gauze and coban applied to the site. HBG= 13.5 and HCT= 40.6    Results within ordered parameters to HOLD procrit today. HGB greater than or equal to 10, and HCT greater than or equal to 30. Patient arm band removed and shredded. Patient tolerated the lab draw well and was discharged in stable condition. He is to return on 2018 at 10:35am for his next procrit appointment and Port flush.      Talita Bryant RN  2018

## 2018-07-10 RX ORDER — DIGOXIN 125 MCG
TABLET ORAL
Qty: 90 TAB | Refills: 3 | Status: SHIPPED | OUTPATIENT
Start: 2018-07-10 | End: 2018-08-09 | Stop reason: SDUPTHER

## 2018-07-15 RX ORDER — WARFARIN 2.5 MG/1
TABLET ORAL
Qty: 45 TAB | Refills: 30 | Status: SHIPPED | OUTPATIENT
Start: 2018-07-15 | End: 2019-08-05 | Stop reason: SDUPTHER

## 2018-07-19 ENCOUNTER — APPOINTMENT (OUTPATIENT)
Dept: INFUSION THERAPY | Age: 73
End: 2018-07-19
Payer: MEDICARE

## 2018-07-20 ENCOUNTER — HOSPITAL ENCOUNTER (OUTPATIENT)
Dept: INFUSION THERAPY | Age: 73
Discharge: HOME | End: 2018-07-20
Payer: MEDICARE

## 2018-07-20 VITALS
TEMPERATURE: 97.9 F | DIASTOLIC BLOOD PRESSURE: 77 MMHG | OXYGEN SATURATION: 98 % | HEART RATE: 74 BPM | RESPIRATION RATE: 18 BRPM | SYSTOLIC BLOOD PRESSURE: 137 MMHG

## 2018-07-20 LAB
BASO+EOS+MONOS # BLD AUTO: 0.6 K/UL (ref 0–2.3)
BASO+EOS+MONOS # BLD AUTO: 7 % (ref 0.1–17)
DIFFERENTIAL METHOD BLD: ABNORMAL
ERYTHROCYTE [DISTWIDTH] IN BLOOD BY AUTOMATED COUNT: 14.8 % (ref 11.5–14.5)
HCT VFR BLD AUTO: 39.6 % (ref 36–48)
HGB BLD-MCNC: 12.9 G/DL (ref 12–16)
LYMPHOCYTES # BLD: 1.8 K/UL (ref 1.1–5.9)
LYMPHOCYTES NFR BLD: 22 % (ref 14–44)
MCH RBC QN AUTO: 29.4 PG (ref 25–35)
MCHC RBC AUTO-ENTMCNC: 32.6 G/DL (ref 31–37)
MCV RBC AUTO: 90.2 FL (ref 78–102)
NEUTS SEG # BLD: 5.8 K/UL (ref 1.8–9.5)
NEUTS SEG NFR BLD: 71 % (ref 40–70)
PLATELET # BLD AUTO: 171 K/UL (ref 140–440)
RBC # BLD AUTO: 4.39 M/UL (ref 4.1–5.1)
WBC # BLD AUTO: 8.2 K/UL (ref 4.5–13)

## 2018-07-20 PROCEDURE — 74011250636 HC RX REV CODE- 250/636: Performed by: FAMILY MEDICINE

## 2018-07-20 PROCEDURE — 85025 COMPLETE CBC W/AUTO DIFF WBC: CPT | Performed by: FAMILY MEDICINE

## 2018-07-20 PROCEDURE — 74011250636 HC RX REV CODE- 250/636

## 2018-07-20 PROCEDURE — 36415 COLL VENOUS BLD VENIPUNCTURE: CPT

## 2018-07-20 PROCEDURE — 77030012965 HC NDL HUBR BBMI -A

## 2018-07-20 RX ORDER — HEPARIN SODIUM (PORCINE) LOCK FLUSH IV SOLN 100 UNIT/ML 100 UNIT/ML
500 SOLUTION INTRAVENOUS AS NEEDED
Status: DISPENSED | OUTPATIENT
Start: 2018-07-20 | End: 2018-07-21

## 2018-07-20 RX ORDER — SODIUM CHLORIDE 0.9 % (FLUSH) 0.9 %
10-40 SYRINGE (ML) INJECTION AS NEEDED
Status: DISCONTINUED | OUTPATIENT
Start: 2018-07-20 | End: 2018-07-24 | Stop reason: HOSPADM

## 2018-07-20 RX ORDER — HEPARIN 100 UNIT/ML
SYRINGE INTRAVENOUS
Status: COMPLETED
Start: 2018-07-20 | End: 2018-07-20

## 2018-07-20 RX ADMIN — HEPARIN SODIUM (PORCINE) LOCK FLUSH IV SOLN 100 UNIT/ML 500 UNITS: 100 SOLUTION at 10:27

## 2018-07-20 RX ADMIN — SODIUM CHLORIDE, PRESERVATIVE FREE 500 UNITS: 5 INJECTION INTRAVENOUS at 10:26

## 2018-07-20 RX ADMIN — Medication 20 ML: at 10:24

## 2018-07-23 ENCOUNTER — HOSPITAL ENCOUNTER (OUTPATIENT)
Dept: LAB | Age: 73
Discharge: HOME | End: 2018-07-23
Payer: MEDICARE

## 2018-07-23 ENCOUNTER — ANTI-COAG VISIT (OUTPATIENT)
Dept: CARDIOLOGY CLINIC | Age: 73
End: 2018-07-23

## 2018-07-23 ENCOUNTER — OFFICE VISIT (OUTPATIENT)
Dept: ONCOLOGY | Age: 73
End: 2018-07-23

## 2018-07-23 ENCOUNTER — HOSPITAL ENCOUNTER (OUTPATIENT)
Dept: ONCOLOGY | Age: 73
Discharge: HOME | End: 2018-07-23

## 2018-07-23 VITALS
SYSTOLIC BLOOD PRESSURE: 125 MMHG | BODY MASS INDEX: 25.86 KG/M2 | DIASTOLIC BLOOD PRESSURE: 75 MMHG | TEMPERATURE: 97.7 F | HEART RATE: 85 BPM | RESPIRATION RATE: 18 BRPM | WEIGHT: 196 LBS

## 2018-07-23 DIAGNOSIS — D47.2 MONOCLONAL GAMMOPATHY: ICD-10-CM

## 2018-07-23 DIAGNOSIS — C49.21 SOFT TISSUE SARCOMA OF RIGHT THIGH (HCC): ICD-10-CM

## 2018-07-23 DIAGNOSIS — C76.51 MALIGNANT NEOPLASM OF RIGHT LOWER EXTREMITY (HCC): Primary | ICD-10-CM

## 2018-07-23 DIAGNOSIS — I48.91 ATRIAL FIBRILLATION, UNSPECIFIED TYPE (HCC): ICD-10-CM

## 2018-07-23 DIAGNOSIS — D50.8 IRON DEFICIENCY ANEMIA SECONDARY TO INADEQUATE DIETARY IRON INTAKE: ICD-10-CM

## 2018-07-23 DIAGNOSIS — Z79.01 LONG TERM CURRENT USE OF ANTICOAGULANT THERAPY: Primary | ICD-10-CM

## 2018-07-23 LAB
ALBUMIN SERPL-MCNC: 3.7 G/DL (ref 3.4–5)
ALBUMIN/GLOB SERPL: 0.9 {RATIO} (ref 0.8–1.7)
ALP SERPL-CCNC: 110 U/L (ref 45–117)
ALT SERPL-CCNC: 27 U/L (ref 16–61)
ANION GAP SERPL CALC-SCNC: 6 MMOL/L (ref 3–18)
AST SERPL-CCNC: 36 U/L (ref 15–37)
BASO+EOS+MONOS # BLD AUTO: 0.5 K/UL (ref 0–2.3)
BASO+EOS+MONOS # BLD AUTO: 7 % (ref 0.1–17)
BILIRUB SERPL-MCNC: 0.8 MG/DL (ref 0.2–1)
BUN SERPL-MCNC: 20 MG/DL (ref 7–18)
BUN/CREAT SERPL: 20 (ref 12–20)
CALCIUM SERPL-MCNC: 8.7 MG/DL (ref 8.5–10.1)
CHLORIDE SERPL-SCNC: 102 MMOL/L (ref 100–108)
CO2 SERPL-SCNC: 33 MMOL/L (ref 21–32)
CREAT SERPL-MCNC: 1.02 MG/DL (ref 0.6–1.3)
DIFFERENTIAL METHOD BLD: ABNORMAL
ERYTHROCYTE [DISTWIDTH] IN BLOOD BY AUTOMATED COUNT: 15.1 % (ref 11.5–14.5)
GLOBULIN SER CALC-MCNC: 3.9 G/DL (ref 2–4)
GLUCOSE SERPL-MCNC: 98 MG/DL (ref 74–99)
HCT VFR BLD AUTO: 39.2 % (ref 36–48)
HGB BLD-MCNC: 12.9 G/DL (ref 12–16)
INR BLD: 2.4
LYMPHOCYTES # BLD: 2 K/UL (ref 1.1–5.9)
LYMPHOCYTES NFR BLD: 26 % (ref 14–44)
MCH RBC QN AUTO: 29.6 PG (ref 25–35)
MCHC RBC AUTO-ENTMCNC: 32.9 G/DL (ref 31–37)
MCV RBC AUTO: 89.9 FL (ref 78–102)
NEUTS SEG # BLD: 5.2 K/UL (ref 1.8–9.5)
NEUTS SEG NFR BLD: 67 % (ref 40–70)
PLATELET # BLD AUTO: 169 K/UL (ref 140–440)
POTASSIUM SERPL-SCNC: 4.3 MMOL/L (ref 3.5–5.5)
PROT SERPL-MCNC: 7.6 G/DL (ref 6.4–8.2)
PT POC: NORMAL SECONDS
RBC # BLD AUTO: 4.36 M/UL (ref 4.1–5.1)
SODIUM SERPL-SCNC: 141 MMOL/L (ref 136–145)
VALID INTERNAL CONTROL?: YES
WBC # BLD AUTO: 7.7 K/UL (ref 4.5–13)

## 2018-07-23 PROCEDURE — 80053 COMPREHEN METABOLIC PANEL: CPT | Performed by: INTERNAL MEDICINE

## 2018-07-23 PROCEDURE — 36415 COLL VENOUS BLD VENIPUNCTURE: CPT | Performed by: INTERNAL MEDICINE

## 2018-07-23 NOTE — PROGRESS NOTES
The INR is stable and therapeutic. Continue Coumadin to 2.5 mg daily except 3.75 mg on Mon and recheck in 4 weeks. Verbal order and read back per Dr Rashaad Langley.

## 2018-07-23 NOTE — PROGRESS NOTES
Hematology/Oncology  Progress Note    Name: Angelic Acuña  Date: 2018  : 1945    Joy Garrett MD     Mr. Stephani Edmond is a 71y.o. year old male who was seen for his right thigh sarcoma, MGUS, and iron deficiency anemia. Current Therapy: Doxil every 21 days. Procrit 60,000 units subq every 2 weeks. Subjective:     Mr. 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 He also has monoclonal gammopathy. The patient has previously undergone surgery with removal of the residual large sarcoma from his right medial thigh. He completed surgery and rehab and is now walking with the use of a cane. He is here for follow-up visit to make sure that his cancer is not recurrent or metastatic. He continues to complain of having a poor appetite and some occasional pain. He understands that his prior iron deficiency anemia has corrected with the use of iron supplementation. Today, the patient has no additional complaints or concerns to report. Past medical history, family history, and social history: these were reviewed and remains unchanged. Past Medical History:   Diagnosis Date    AICD (automatic cardioverter/defibrillator) present     Atrial fibrillation ([address] 2010    Cancer ([facility] [address]     Cardiac cath 2009    Patent coronary arteries. LVEDP 28.  EF 35%. Global hyk. Mod MR.  Cardiac echocardiogram 2016    LVE. EF 15% at most.  Indeterminate LV diastolic fx.  RVE. Reduced RV systolic fx. RVSP 80-85 mmHg. LAE.  LUISA. Mod-severe MR. Mild AI. Severe TR.  Cardiac MUGA scan 2015    At most mild LVE. EF 45-46%.  Cardiac nuclear imaging test, abnormal 2009    Mild basal/mid inferior, inferoapical, inferoseptal infarct w/mild ischemia in RCA (possibly partially artifact). Anterior, anteroseptal, anterapical ischemia w/o infarct. (Mid & distal LAD.)  LVE. EF 29%. Mod global hyk.       Cardiovascular LE venous duplex 09/29/2016    Tech difficult. No DVT bilaterally.  Cardiovascular renal duplex 06/07/2010    No evidence of renal artery stenosis >60%. Patent SMA and celiac artery.  Cardiovascular UE venous duplex 10/04/2016    No DVT bilaterally.  Diabetes ([address]     HTN (hypertension) 6/8/2010    Hypertensive cardiovascular disease     MR (mitral regurgitation)     Nonischemic cardiomyopathy     3/11 echo EF 25%    Pulmonary hypertension, moderate to severe ([address] 6/8/2010    90-100mmHg; repeat echo in 3/11 showed 70mmHg     Past Surgical History:   Procedure Laterality Date    HX HEART VALVE SURGERY  2011    HX HERNIA REPAIR  2011     Social History     Social History    Marital status:      Spouse name: N/A    Number of children: N/A    Years of education: N/A     Occupational History    Not on file. Social History Main Topics    Smoking status: Never Smoker    Smokeless tobacco: Never Used    Alcohol use Yes      Comment: occasionally.  Drug use: No    Sexual activity: Yes     Partners: Female     Birth control/ protection: None     Other Topics Concern    Not on file     Social History Narrative     Family History   Problem Relation Age of Onset    Hypertension Mother      Current Outpatient Prescriptions   Medication Sig Dispense Refill    warfarin (COUMADIN) 2.5 mg tablet TAKE ONE TABLET BY MOUTH ONCE DAILY OR  AS  DIRECTED  BY  YOUR  DOCTOR. 45 Tab 30    digoxin (LANOXIN) 0.125 mg tablet TAKE 1 TABLET BY MOUTH EVERY DAY 90 Tab 3    lisinopril (PRINIVIL, ZESTRIL) 20 mg tablet TAKE ONE TABLET BY MOUTH TWICE DAILY 180 Tab 3    insulin lispro (HUMALOG U-100 INSULIN) 100 unit/mL injection by SubCUTAneous route.  insulin glargine (LANTUS U-100 INSULIN) 100 unit/mL injection 15 Units by SubCUTAneous route nightly.  furosemide (LASIX) 40 mg tablet Take 1 Tab by mouth two (2) times a day.  Or directed 60 Tab 6    metOLazone (ZAROXOLYN) 2.5 mg tablet TAKE 1 TABLET BY MOUTH 30 MINS PRIOR TO MORNING LASIX AS DIRECTED ON MON & THU 15 Tab 1    carvedilol (COREG) 25 mg tablet Take 1 Tab by mouth two (2) times daily (with meals). 180 Tab 3    cloNIDine HCl (CATAPRES) 0.2 mg tablet TAKE 1 TABLET BY MOUTH 3 TIMES A  Tab 2    potassium chloride (K-DUR, KLOR-CON) 20 mEq tablet Take 1 Tab by mouth two (2) times a day. (Patient taking differently: Take 20 mEq by mouth two (2) times a day. 2 tabs extra on the days on Metalazone) 180 Tab 3    magnesium oxide (MAG-OX) 400 mg tablet TAKE 1 TABLET BY MOUTH DAILY. 90 Tab 3    sertraline (ZOLOFT) 25 mg tablet Take  by mouth daily.  allopurinol (ZYLOPRIM) 300 mg tablet Take  by mouth daily.  FERROUS SULFATE, DRIED (IRON, DRIED, PO) Take 325 mg by mouth daily.  aspirin delayed-release (ASPIR-81) 81 mg tablet Take 81 mg by mouth daily. Facility-Administered Medications Ordered in Other Visits   Medication Dose Route Frequency Provider Last Rate Last Dose    sodium chloride (NS) flush 10-40 mL  10-40 mL IntraVENous PRN Agustina Lin, DO   20 mL at 07/20/18 1024       Review of Systems  Constitutional: The patient has fatigue and weakness; He also has pain in the right leg and is having difficulty with full weight-bearing. HEENT: The patient denies recent head trauma, eye pain, blurred vision,  hearing deficit, oropharyngeal mucosal pain or lesions, and the patient denies throat pain or discomfort. Lymphatics: The patient denies palpable peripheral lymphadenopathy. Hematologic: The patient denies having bruising, bleeding, or progressive fatigue. Respiratory: Patient denies having shortness of breath, cough, sputum production, fever, or dyspnea on exertion. Cardiovascular: The patient denies having leg pain, leg swelling, heart palpitations, chest permit, chest pain, or lightheadedness. The patient denies having dyspnea on exertion.   Gastrointestinal: The patient denies having nausea, emesis, or diarrhea. The patient denies having any hematemesis or blood in the stool. Genitourinary: Patient denies having urinary urgency, frequency, or dysuria. The patient denies having blood in the urine. Psychological: The patient denies having symptoms of nervousness, anxiety, depression, or thoughts of harming himself some of this. Skin: Patient denies having skin rashes, skin, ulcerations, or unexplained itching or pruritus. Musculoskeletal: The patient has postsurgical pain and muscle weakness in the right leg. He has difficulty flexing and focally extending the right leg and there is a mild degree of swelling in the calf. Objective:     Visit Vitals    /75 (BP 1 Location: Left arm, BP Patient Position: Sitting)    Pulse 85    Temp 97.7 °F (36.5 °C) (Oral)    Resp 18    Wt 88.9 kg (196 lb)    BMI 25.86 kg/m2     ECOG PS=0, pain score=2/10     Physical Exam:   Gen. Appearance: The patient is in no acute distress. Skin: Large mass to right medial thigh  HEENT: The exam is unremarkable. Neck: Supple without lymphadenopathy or thyromegaly. Lungs: Clear to auscultation and percussion; there are no wheezes or rhonchi. Heart: Regular rate and rhythm; there are no murmurs, gallops, or rubs. Abdomen: Bowel sounds are present and normal.  There is no guarding, tenderness, or hepatosplenomegaly. Extremities: There is no clubbing, cyanosis, or edema. Neurologic: There are no focal neurologic deficits. Lymphatics: There is no palpable peripheral lymphadenopathy. Musculoskeletal: The patient has full range of motion at all joints, except for full flexion and extension in the right leg following his surgery. He is undergoing physical therapy for muscle strengthening and range of motion. There is no evidence of joint deformity or effusions. There is no focal joint tenderness. Psychological/psychiatric: There is no clinical evidence of anxiety, depression, or melancholy.     Lab data: Results for orders placed or performed during the hospital encounter of 07/23/18   CBC WITH 3 PART DIFF     Status: Abnormal   Result Value Ref Range Status    WBC 7.7 4.5 - 13.0 K/uL Final    RBC 4.36 4.10 - 5.10 M/uL Final    HGB 12.9 12.0 - 16 g/dL Final    HCT 39.2 36 - 48 % Final    MCV 89.9 78 - 102 FL Final    MCH 29.6 25.0 - 35.0 PG Final    MCHC 32.9 31 - 37 g/dL Final    RDW 15.1 (H) 11.5 - 14.5 % Final    PLATELET 430 198 - 003 K/uL Final    NEUTROPHILS 67 40 - 70 % Final    MIXED CELLS 7 0.1 - 17 % Final    LYMPHOCYTES 26 14 - 44 % Final    ABS. NEUTROPHILS 5.2 1.8 - 9.5 K/UL Final    ABS. MIXED CELLS 0.5 0.0 - 2.3 K/uL Final    ABS. LYMPHOCYTES 2.0 1.1 - 5.9 K/UL Final     Comment: Test performed at [address]. Results Reviewed by Medical Director. DF AUTOMATED   Final           Assessment:     1. Soft tissue sarcoma of right thigh ([facility] [address]    2. Iron deficiency anemia secondary to inadequate dietary iron intake    3. Monoclonal gammopathy      Plan:   Right Thigh Soft tissue sarcoma: the patient completed a full course of systemic chemotherapy with single agent Doxil at 30 mg per meter square in combination with radiation treatment. He also now status post surgical removal of the residual large right thigh mass. The surgical site has healed well. There is no mass-effect. Nonetheless I will schedule the patient for CT scans through the chest, abdomen, and pelvis to rule out recurrent or metastatic disease. A bone scan will also be scheduled at this time. Iron Deficiency Anemia and anti-neoplastic chemotherapy-induced anemia: I have explained to the patient that he has a complex anemia comprised of a component of iron deficiency and now there is a component of chemotherapy induction as well. The CBC from today shows that his hemoglobin is 12.9 g/dL with hematocrit of 39.2%. He is not candidate for Procrit at this time. I will check his iron profile and ferritin levels. The patient was instructed to take an oral iron supplement once daily. MGUS: A beta-2 microglobulin and SPEP will be obtained today. Previously we had decided to wait until after he finished all of his rehab before proceeding with a bone marrow biopsy. Pending the results of these tests we may need to proceed with the bone marrow biopsy to effectively rule out multiple myeloma. Cancer associated pain (improved problem): The patient states his pain is improved over  the right medial thigh. However he is requesting a reorder of his Percocet 5 mg every 4-6 hours p.r.n. Follow-up in 2 weeks to review imaging studies and lab tests. Orders Placed This Encounter    COMPLETE CBC & AUTO DIFF WBC    NM BONE SCAN [facility] BODY     Standing Status:   Future     Standing Expiration Date:   8/23/2019    CT CHEST ABD PELV W WO CONT     Standing Status:   Future     Standing Expiration Date:   7/24/2019     Order Specific Question:   Is Patient Allergic to Contrast Dye? Answer:   No     Order Specific Question:   STAT Creatinine as indicated     Answer:   Yes     Order Specific Question: This order utilizes IV contrast.  What additional contrast is needed?      Answer:   Per Radiologist Protocol    InHouse CBC ([facility])     Standing Status:   Future     Number of Occurrences:   1     Standing Expiration Date:   5/41/1480    METABOLIC PANEL, COMPREHENSIVE     Standing Status:   Future     Standing Expiration Date:   7/24/2019       Shama Ngo MD  7/23/2018

## 2018-07-23 NOTE — PATIENT INSTRUCTIONS
Sarcoma: Care Instructions Your Care Instructions Sarcoma is cancer of certain tissues of the body, such as the muscles, connective tissues (like tendons), blood vessels, bones, and fat. Cancer occurs when abnormal cells grow out of control. The cells can spread to other areas of the body. Sarcoma is a general name for several rare cancers that occur in these tissues. Doctors treat this type of cancer with surgery, radiation, or medicines (chemotherapy). Your doctor will treat you based on how quickly or slowly the type of cancer you have may spread, how far the cancer has spread, and your overall health. One or more treatments may be used. When you find out that you have cancer, you may feel many emotions and may need some help coping. Seek out family, friends, and counselors for support. You also can do things at home to make yourself feel better while you go through treatment. Call the [employer] [facility] [facility] ([phone]) or visit its website at [email or web address] for more information. Follow-up care is a key part of your treatment and safety. Be sure to make and go to all appointments, and call your doctor if you are having problems. It's also a good idea to know your test results and keep a list of the medicines you take. How can you care for yourself at home? · Take your medicines exactly as prescribed. Call your doctor if you think you are having a problem with your medicine. You may get medicine for nausea and vomiting if you have these side effects. · Eat healthy food. If you do not feel like eating, try to eat food that has protein and extra calories to keep up your strength and prevent weight loss. Drink liquid meal replacements for extra calories and protein. Try to eat your main meal early. · Get some physical activity every day, but do not get too tired. Keep doing the hobbies you enjoy as your energy allows. · Take steps to control your stress and workload.  Learn relaxation techniques. ¨ Share your feelings. Stress and tension affect our emotions. By expressing your feelings to others, you may be able to understand and cope with them. ¨ Consider joining a support group. Talking about a problem with your spouse, a good friend, or other people with similar problems is a good way to reduce tension and stress. ¨ Express yourself through art. Try writing, crafts, dance, or art to relieve stress. Some dance, writing, or art groups may be available just for people who have cancer. ¨ Be kind to your body and mind. Getting enough sleep, eating a healthy diet, and taking time to do things you enjoy can contribute to an overall feeling of balance in your life and help reduce stress. ¨ Get help if you need it. Discuss your concerns with your doctor or counselor. · If you are vomiting or have diarrhea: ¨ Drink plenty of fluids (enough so that your urine is light yellow or clear like water) to prevent dehydration. Choose water and other caffeine-free clear liquids. If you have kidney, heart, or liver disease and have to limit fluids, talk with your doctor before you increase the amount of fluids you drink. ¨ When you are able to eat, try clear soups, mild foods, and liquids until all symptoms are gone for 12 to 48 hours. Other good choices include dry toast, crackers, cooked cereal, and gelatin dessert, such as Jell-O. · If you have not already done so, prepare a list of advance directives. Advance directives are instructions to your doctor and family members about what kind of care you want if you become unable to speak or express yourself. When should you call for help? Call 911 anytime you think you may need emergency care.  For example, call if: 
  · You passed out (lost consciousness).  
 Call your doctor now or seek immediate medical care if: 
  · You have a fever.  
  · You have abnormal bleeding.  
  · You have new or worse pain.  
  · You think you have an infection.  
  · You have new symptoms, such as a cough, belly pain, vomiting, diarrhea, or a rash.  
 Watch closely for changes in your health, and be sure to contact your doctor if: 
  · You are much more tired than usual.  
  · You have swollen glands in your armpits, groin, or neck.  
  · You do not get better as expected. Where can you learn more? Go to http://radha-tre.info/. Enter Tracey Maldonado in the search box to learn more about \"Sarcoma: Care Instructions. \" Current as of: May 12, 2017 Content Version: 11.7 © 5832-3787 [employer]. Care instructions adapted under license by [employer] (which disclaims liability or warranty for this information). If you have questions about a medical condition or this instruction, always ask your healthcare professional. [address] any warranty or liability for your use of this information.

## 2018-07-23 NOTE — MR AVS SNAPSHOT
[address] 132 200 [facility] 
[phone] Patient: Ronita Holter MRN: [id number] GCF:1[birth year]03[birth year] Visit Information Date & Time Provider Department Dept. Phone Encounter #  
 7/23/2018 10:30 AM Raine Ramos MD [facility] Oncology [phone] [id number] Follow-up Instructions Return in about 2 weeks (around 8/6/2018). Your Appointments 8/8/2018  9:15 AM  
Office Visit with Raine Ramos MD  
[facility]  Oncology [address]) Appt Note: 2 WK RET  
 [address] 207, [address]  
  
    
 8/15/2018  1:20 PM  
CARELINK with 725 [facility] ([facility]) Appt Note: 3 mo carelink medrontic device ck [place] [phone]  
[phone] [facility] 53 [address]  
  
    
 8/27/2018  8:00 AM  
ANTICOAG NURSE with Pt Inr Hv Csi [facility] ([address]) Appt Note: 1 month INR check [place] [phone]  
[phone] [address] Upcoming Health Maintenance Date Due Hepatitis C Screening 1945 DTaP/Tdap/Td series (1 - Tdap) 8/21/1967 FOBT Q 1 YEAR AGE 50-75 8/21/1996 ZOSTER VACCINE AGE 60> 6/21/2006 GLAUCOMA SCREENING Q2Y 8/21/2011 MEDICARE YEARLY EXAM 3/14/2018 Influenza Age 5 to Adult 8/1/2018 Allergies as of 7/23/2018  Review Complete On: 7/23/2018 By: Raine Ramos MD  
 No Known Allergies Current Immunizations  Reviewed on 7/20/2018 Name Date  Influenza Vaccine 11/16/2017, 11/9/2016, 10/2/2015, 9/28/2015 12:00 AM  
 Pneumococcal Conjugate (PCV-13) 9/28/2015 Pneumococcal Polysaccharide (PPSV-23) 8/13/2012 Not reviewed this visit You Were Diagnosed With   
  
 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 3 Vitals BP Pulse Temp Resp Weight(growth percentile) BMI  
 125/75 (BP 1 Location: Left arm, BP Patient Position: Sitting) 85 97.7 °F (36.5 °C) (Oral) 18 196 lb (88.9 kg) 25.86 kg/m2 Smoking Status Never Smoker BMI and BSA Data Body Mass Index Body Surface Area  
 25.86 kg/m 2 2.14 m 2 Preferred Pharmacy Pharmacy Name Phone [phone] Your Updated Medication List  
  
   
This list is accurate as of 7/23/18 10:39 AM.  Always use your most recent med list.  
  
  
  
  
 allopurinol 300 mg tablet Commonly known as:  Gonzella Cotta Take  by mouth daily. ASPIR-81 81 mg tablet Generic drug:  aspirin delayed-release Take 81 mg by mouth daily. carvedilol 25 mg tablet Commonly known as:  Anamaria Oswald Take 1 Tab by mouth two (2) times daily (with meals). cloNIDine HCl 0.2 mg tablet Commonly known as:  CATAPRES  
TAKE 1 TABLET BY MOUTH 3 TIMES A DAY  
  
 digoxin 0.125 mg tablet Commonly known as:  LANOXIN  
TAKE 1 TABLET BY MOUTH EVERY DAY  
  
 furosemide 40 mg tablet Commonly known as:  LASIX Take 1 Tab by mouth two (2) times a day. Or directed HumaLOG U-100 Insulin 100 unit/mL injection Generic drug:  insulin lispro  
by SubCUTAneous route. IRON (DRIED) PO Take 325 mg by mouth daily. LANTUS U-100 INSULIN 100 unit/mL injection Generic drug:  insulin glargine 15 Units by SubCUTAneous route nightly. lisinopril 20 mg tablet Commonly known as:  PRINIVIL, ZESTRIL  
TAKE ONE TABLET BY MOUTH TWICE DAILY  
  
 magnesium oxide 400 mg tablet Commonly known as:  MAG-OX  
TAKE 1 TABLET BY MOUTH DAILY. metOLazone 2.5 mg tablet Commonly known as:  ZAROXOLYN  
TAKE 1 TABLET BY MOUTH 30 MINS PRIOR TO MORNING LASIX AS DIRECTED ON MON & THU potassium chloride 20 mEq tablet Commonly known as:  K-DUR, KLOR-CON Take 1 Tab by mouth two (2) times a day. warfarin 2.5 mg tablet Commonly known as:  COUMADIN  
TAKE ONE TABLET BY MOUTH ONCE DAILY OR  AS  DIRECTED  BY  YOUR  DOCTOR. ZOLOFT 25 mg tablet Generic drug:  sertraline Take  by mouth daily. We Performed the Following COMPLETE CBC & AUTO DIFF WBC [65697 CPT(R)] Follow-up Instructions Return in about 2 weeks (around 8/6/2018). To-Do List   
 07/23/2018 Lab:  CBC WITH 3 PART DIFF   
  
 08/03/2018 9:30 AM  
  Appointment with [facility] FA[facility] TRACK NURSE at [facility] ([phone])  
  
 08/17/2018 10:00 AM  
  Appointment with [facility] FAST TRACK NURSE at [facility] ([phone])  
  
 08/31/2018 10:00 AM  
  Appointment with [facility] FAST TRACK NURSE at [facility] ([phone]) Patient Instructions Sarcoma: Care Instructions Your Care Instructions Sarcoma is cancer of certain tissues of the body, such as the muscles, connective tissues (like tendons), blood vessels, bones, and fat. Cancer occurs when abnormal cells grow out of control. The cells can spread to other areas of the body. Sarcoma is a general name for several rare cancers that occur in these tissues. Doctors treat this type of cancer with surgery, radiation, or medicines (chemotherapy). Your doctor will treat you based on how quickly or slowly the type of cancer you have may spread, how far the cancer has spread, and your overall health. One or more treatments may be used.  
When you find out that you have cancer, you may feel many emotions and may need some help coping. Seek out family, friends, and counselors for support. You also can do things at home to make yourself feel better while you go through treatment. Call the [facility] ([phone]) or visit its website at [address] for more information. Follow-up care is a key part of your treatment and safety. Be sure to make and go to all appointments, and call your doctor if you are having problems. It's also a good idea to know your test results and keep a list of the medicines you take. How can you care for yourself at home? · Take your medicines exactly as prescribed. Call your doctor if you think you are having a problem with your medicine. You may get medicine for nausea and vomiting if you have these side effects. · Eat healthy food. If you do not feel like eating, try to eat food that has protein and extra calories to keep up your strength and prevent weight loss. Drink liquid meal replacements for extra calories and protein. Try to eat your main meal early. · Get some physical activity every day, but do not get too tired. Keep doing the hobbies you enjoy as your energy allows. · Take steps to control your stress and workload. Learn relaxation techniques. ¨ Share your feelings. Stress and tension affect our emotions. By expressing your feelings to others, you may be able to understand and cope with them. ¨ Consider joining a support group. Talking about a problem with your spouse, a good friend, or other people with similar problems is a good way to reduce tension and stress. ¨ Express yourself through art. Try writing, crafts, dance, or art to relieve stress. Some dance, writing, or art groups may be available just for people who have cancer. ¨ Be kind to your body and mind. Getting enough sleep, eating a healthy diet, and taking time to do things you enjoy can contribute to an overall feeling of balance in your life and help reduce stress. ¨ Get help if you need it. Discuss your concerns with your doctor or counselor. · If you are vomiting or have diarrhea: ¨ Drink plenty of fluids (enough so that your urine is light yellow or clear like water) to prevent dehydration. Choose water and other caffeine-free clear liquids. If you have kidney, heart, or liver disease and have to limit fluids, talk with your doctor before you increase the amount of fluids you drink. ¨ When you are able to eat, try clear soups, mild foods, and liquids until all symptoms are gone for 12 to 48 hours. Other good choices include dry toast, crackers, cooked cereal, and gelatin dessert, such as Jell-O. · If you have not already done so, prepare a list of advance directives. Advance directives are instructions to your doctor and family members about what kind of care you want if you become unable to speak or express yourself. When should you call for help? Call 911 anytime you think you may need emergency care. For example, call if: 
  · You passed out (lost consciousness).  
 Call your doctor now or seek immediate medical care if: 
  · You have a fever.  
  · You have abnormal bleeding.  
  · You have new or worse pain.  
  · You think you have an infection.  
  · You have new symptoms, such as a cough, belly pain, vomiting, diarrhea, or a rash.  
 Watch closely for changes in your health, and be sure to contact your doctor if: 
  · You are much more tired than usual.  
  · You have swollen glands in your armpits, groin, or neck.  
  · You do not get better as expected. Where can you learn more? Go to http://radha-tre.info/. Enter Don Ruiz in the search box to learn more about \"Sarcoma: Care Instructions. \" Current as of: May 12, 2017 Content Version: 11.7 © 6703-1327 [employer].  Care instructions adapted under license by [employer] (which disclaims liability or warranty for this information). If you have questions about a medical condition or this instruction, always ask your healthcare professional. [address] any warranty or liability for your use of this information. Introducing [facility] SERVICES! [employer] introduces Popcorn5 patient portal. Now you can access parts of your medical record, email your doctor's office, and request medication refills online. 1. In your internet browser, go to [email or web address] 2. Click on the First Time User? Click Here link in the Sign In box. You will see the New Member Sign Up page. 3. Enter your Popcorn5 Access Code exactly as it appears below. You will not need to use this code after youve completed the sign-up process. If you do not sign up before the expiration date, you must request a new code. · Popcorn5 Access Code: [email or web address] Expires: 9/19/2018  8:50 AM 
 
4. Enter the last four digits of your Social Security Number (xxxx) and Date of Birth (mm/dd/yyyy) as indicated and click Submit. You will be taken to the next sign-up page. 5. Create a Popcorn5 ID. This will be your Popcorn5 login ID and cannot be changed, so think of one that is secure and easy to remember. 6. Create a Popcorn5 password. You can change your password at any time. 7. Enter your Password Reset Question and Answer. This can be used at a later time if you forget your password. 8. Enter your e-mail address. You will receive e-mail notification when new information is available in [address] 9. Click Sign Up. You can now view and download portions of your medical record. 10. Click the Download Summary menu link to download a portable copy of your medical information. If you have questions, please visit the Frequently Asked Questions section of the Popcorn5 website. Remember, Popcorn5 is NOT to be used for urgent needs. For medical emergencies, dial 911. Now available from your iPhone and Android! Please provide this summary of care documentation to your next provider. Your primary care clinician is listed as Daryl Burgos. If you have any questions after today's visit, please call [phone].

## 2018-07-24 RX ORDER — POTASSIUM CHLORIDE 20 MEQ/1
20 TABLET, EXTENDED RELEASE ORAL 2 TIMES DAILY
Qty: 210 TAB | Refills: 3 | Status: SHIPPED | OUTPATIENT
Start: 2018-07-24 | End: 2018-07-27 | Stop reason: SDUPTHER

## 2018-07-27 RX ORDER — CARVEDILOL 25 MG/1
25 TABLET ORAL 2 TIMES DAILY WITH MEALS
Qty: 180 TAB | Refills: 3 | Status: SHIPPED | OUTPATIENT
Start: 2018-07-27 | End: 2018-07-31 | Stop reason: SDUPTHER

## 2018-07-27 RX ORDER — LISINOPRIL 20 MG/1
TABLET ORAL
Qty: 180 TAB | Refills: 3 | Status: SHIPPED | OUTPATIENT
Start: 2018-07-27 | End: 2019-07-25 | Stop reason: SDUPTHER

## 2018-07-27 RX ORDER — POTASSIUM CHLORIDE 20 MEQ/1
20 TABLET, EXTENDED RELEASE ORAL 2 TIMES DAILY
Qty: 210 TAB | Refills: 3 | Status: SHIPPED | OUTPATIENT
Start: 2018-07-27 | End: 2019-07-25 | Stop reason: SDUPTHER

## 2018-07-31 RX ORDER — METOLAZONE 2.5 MG/1
TABLET ORAL
Qty: 15 TAB | Refills: 4 | Status: SHIPPED | OUTPATIENT
Start: 2018-07-31 | End: 2018-08-20 | Stop reason: SDUPTHER

## 2018-07-31 RX ORDER — CARVEDILOL 25 MG/1
25 TABLET ORAL 2 TIMES DAILY WITH MEALS
Qty: 180 TAB | Refills: 3 | Status: SHIPPED | OUTPATIENT
Start: 2018-07-31 | End: 2019-07-25 | Stop reason: SDUPTHER

## 2018-07-31 RX ORDER — CLONIDINE HYDROCHLORIDE 0.2 MG/1
TABLET ORAL
Qty: 270 TAB | Refills: 2 | Status: SHIPPED | OUTPATIENT
Start: 2018-07-31 | End: 2018-08-20 | Stop reason: SDUPTHER

## 2018-08-03 ENCOUNTER — HOSPITAL ENCOUNTER (OUTPATIENT)
Dept: INFUSION THERAPY | Age: 73
Discharge: HOME | End: 2018-08-03
Payer: MEDICARE

## 2018-08-03 VITALS
RESPIRATION RATE: 18 BRPM | OXYGEN SATURATION: 99 % | SYSTOLIC BLOOD PRESSURE: 124 MMHG | DIASTOLIC BLOOD PRESSURE: 74 MMHG | TEMPERATURE: 98 F | HEART RATE: 79 BPM

## 2018-08-03 LAB
BASO+EOS+MONOS # BLD AUTO: 0.7 K/UL (ref 0–2.3)
BASO+EOS+MONOS # BLD AUTO: 8 % (ref 0.1–17)
DIFFERENTIAL METHOD BLD: ABNORMAL
ERYTHROCYTE [DISTWIDTH] IN BLOOD BY AUTOMATED COUNT: 15.4 % (ref 11.5–14.5)
HCT VFR BLD AUTO: 39.8 % (ref 36–48)
HGB BLD-MCNC: 13 G/DL (ref 12–16)
LYMPHOCYTES # BLD: 1.9 K/UL (ref 1.1–5.9)
LYMPHOCYTES NFR BLD: 23 % (ref 14–44)
MCH RBC QN AUTO: 29.4 PG (ref 25–35)
MCHC RBC AUTO-ENTMCNC: 32.7 G/DL (ref 31–37)
MCV RBC AUTO: 90 FL (ref 78–102)
NEUTS SEG # BLD: 5.8 K/UL (ref 1.8–9.5)
NEUTS SEG NFR BLD: 69 % (ref 40–70)
PLATELET # BLD AUTO: 166 K/UL (ref 140–440)
RBC # BLD AUTO: 4.42 M/UL (ref 4.1–5.1)
WBC # BLD AUTO: 8.4 K/UL (ref 4.5–13)

## 2018-08-03 PROCEDURE — 85025 COMPLETE CBC W/AUTO DIFF WBC: CPT | Performed by: INTERNAL MEDICINE

## 2018-08-03 NOTE — PROGRESS NOTES
[facility] Progress Note Date: August 3, 2018 Name: Aura Du MRN: [id number] : 1945 Mr. Rosalinda Cadena arrived to [facility] at 1000. Mr. Rosalinda Cadena was assessed and education was provided. Mr. Malina Mcdonald vitals were reviewed. Visit Vitals  /74 (BP 1 Location: Left arm, BP Patient Position: At rest;Sitting)  Pulse 79  Temp 98 °F (36.7 °C)  Resp 18  SpO2 99% CBC x1 attempt to right AC. Mr. Rosalinda Cadena tolerated well without complaints. Recent Results (from the past 12 hour(s)) CBC WITH 3 PART DIFF Collection Time: 18 10:02 AM  
Result Value Ref Range WBC 8.4 4.5 - 13.0 K/uL  
 RBC 4.42 4.10 - 5.10 M/uL  
 HGB 13.0 12.0 - 16 g/dL HCT 39.8 36 - 48 % MCV 90.0 78 - 102 FL  
 MCH 29.4 25.0 - 35.0 PG  
 MCHC 32.7 31 - 37 g/dL  
 RDW 15.4 (H) 11.5 - 14.5 % PLATELET 746 865 - 080 K/uL NEUTROPHILS 69 40 - 70 % MIXED CELLS 8 0.1 - 17 % LYMPHOCYTES 23 14 - 44 % ABS. NEUTROPHILS 5.8 1.8 - 9.5 K/UL  
 ABS. MIXED CELLS 0.7 0.0 - 2.3 K/uL  
 ABS. LYMPHOCYTES 1.9 1.1 - 5.9 K/UL  
 DF AUTOMATED HBG= 13.0 and HCT= 39.8. Results within ordered parameters to HOLD procrit today. HGB greater than or equal to 10, and HCT greater than or equal to 30. Patient arm band removed and shredded. Patient tolerated the lab draw well and was discharged in stable condition. He is to return on 2018 at 1000 for his next procrit appointment and Port flush. Triny Lockhart RN August 3, 2018 
 
0820

## 2018-08-09 RX ORDER — DIGOXIN 125 MCG
TABLET ORAL
Qty: 90 TAB | Refills: 3 | Status: SHIPPED | OUTPATIENT
Start: 2018-08-09 | End: 2019-07-25 | Stop reason: SDUPTHER

## 2018-08-14 ENCOUNTER — DOCUMENTATION ONLY (OUTPATIENT)
Dept: ONCOLOGY | Age: 73
End: 2018-08-14

## 2018-08-14 NOTE — PROGRESS NOTES
New orders were placed on 8/10/18.  Gave Dr. Andrea Cons a reminder to sigh off on them today @ [phone]

## 2018-08-15 ENCOUNTER — HOSPITAL ENCOUNTER (OUTPATIENT)
Dept: ONCOLOGY | Age: 73
Discharge: HOME | End: 2018-08-15

## 2018-08-15 ENCOUNTER — OFFICE VISIT (OUTPATIENT)
Dept: ONCOLOGY | Age: 73
End: 2018-08-15

## 2018-08-15 ENCOUNTER — OFFICE VISIT (OUTPATIENT)
Dept: CARDIOLOGY CLINIC | Age: 73
End: 2018-08-15

## 2018-08-15 DIAGNOSIS — Z95.810 AICD (AUTOMATIC CARDIOVERTER/DEFIBRILLATOR) PRESENT: ICD-10-CM

## 2018-08-15 DIAGNOSIS — C49.21 SOFT TISSUE SARCOMA OF RIGHT THIGH (HCC): Primary | ICD-10-CM

## 2018-08-15 DIAGNOSIS — C49.21 SOFT TISSUE SARCOMA OF RIGHT THIGH (HCC): ICD-10-CM

## 2018-08-15 DIAGNOSIS — I42.8 CARDIOMYOPATHY, NONISCHEMIC (HCC): Primary | ICD-10-CM

## 2018-08-17 ENCOUNTER — APPOINTMENT (OUTPATIENT)
Dept: INFUSION THERAPY | Age: 73
End: 2018-08-17
Payer: MEDICARE

## 2018-08-20 PROBLEM — C76.51: Status: ACTIVE | Noted: 2018-08-20

## 2018-08-21 ENCOUNTER — HOSPITAL ENCOUNTER (OUTPATIENT)
Dept: CT IMAGING | Age: 73
Discharge: HOME | End: 2018-08-21
Attending: INTERNAL MEDICINE
Payer: MEDICARE

## 2018-08-21 ENCOUNTER — APPOINTMENT (OUTPATIENT)
Dept: CT IMAGING | Age: 73
End: 2018-08-21
Attending: INTERNAL MEDICINE
Payer: MEDICARE

## 2018-08-21 ENCOUNTER — HOSPITAL ENCOUNTER (OUTPATIENT)
Dept: NUCLEAR MEDICINE | Age: 73
Discharge: HOME | End: 2018-08-21
Attending: INTERNAL MEDICINE
Payer: MEDICARE

## 2018-08-21 DIAGNOSIS — C49.21 SOFT TISSUE SARCOMA OF RIGHT THIGH (HCC): ICD-10-CM

## 2018-08-21 DIAGNOSIS — C76.51 MALIGNANT NEOPLASM OF RIGHT LOWER EXTREMITY (HCC): ICD-10-CM

## 2018-08-21 LAB — CREAT UR-MCNC: 1.2 MG/DL (ref 0.6–1.3)

## 2018-08-21 PROCEDURE — 74011636320 HC RX REV CODE- 636/320: Performed by: INTERNAL MEDICINE

## 2018-08-21 PROCEDURE — 82565 ASSAY OF CREATININE: CPT

## 2018-08-21 PROCEDURE — 74177 CT ABD & PELVIS W/CONTRAST: CPT

## 2018-08-21 PROCEDURE — 78306 BONE IMAGING WHOLE BODY: CPT

## 2018-08-21 RX ADMIN — IOPAMIDOL 100 ML: 612 INJECTION, SOLUTION INTRAVENOUS at 08:54

## 2018-08-22 RX ORDER — METOLAZONE 2.5 MG/1
TABLET ORAL
Qty: 15 TAB | Refills: 4 | Status: SHIPPED | OUTPATIENT
Start: 2018-08-22 | End: 2019-09-09 | Stop reason: SDUPTHER

## 2018-08-22 RX ORDER — CLONIDINE HYDROCHLORIDE 0.2 MG/1
TABLET ORAL
Qty: 270 TAB | Refills: 2 | Status: SHIPPED | OUTPATIENT
Start: 2018-08-22 | End: 2019-12-11 | Stop reason: SDUPTHER

## 2018-08-27 ENCOUNTER — ANTI-COAG VISIT (OUTPATIENT)
Dept: CARDIOLOGY CLINIC | Age: 73
End: 2018-08-27

## 2018-08-27 ENCOUNTER — HOSPITAL ENCOUNTER (OUTPATIENT)
Dept: ONCOLOGY | Age: 73
Discharge: HOME | End: 2018-08-27

## 2018-08-27 ENCOUNTER — OFFICE VISIT (OUTPATIENT)
Dept: ONCOLOGY | Age: 73
End: 2018-08-27

## 2018-08-27 VITALS
WEIGHT: 198 LBS | HEIGHT: 73 IN | TEMPERATURE: 98.6 F | BODY MASS INDEX: 26.24 KG/M2 | DIASTOLIC BLOOD PRESSURE: 76 MMHG | RESPIRATION RATE: 16 BRPM | SYSTOLIC BLOOD PRESSURE: 125 MMHG | HEART RATE: 83 BPM

## 2018-08-27 DIAGNOSIS — I48.91 ATRIAL FIBRILLATION, UNSPECIFIED TYPE (HCC): ICD-10-CM

## 2018-08-27 DIAGNOSIS — Z79.01 LONG TERM CURRENT USE OF ANTICOAGULANT THERAPY: Primary | ICD-10-CM

## 2018-08-27 DIAGNOSIS — C49.21 SOFT TISSUE SARCOMA OF RIGHT THIGH (HCC): ICD-10-CM

## 2018-08-27 DIAGNOSIS — C49.21 SOFT TISSUE SARCOMA OF RIGHT THIGH (HCC): Primary | ICD-10-CM

## 2018-08-27 LAB
INR BLD: 2
PT POC: 24.5 SECONDS
VALID INTERNAL CONTROL?: YES

## 2018-08-27 NOTE — MR AVS SNAPSHOT
[address] 082 200 [facility] 
[phone] Patient: Justin Parkinson MRN: [id number] YIO:0/76/9972 Visit Information Date & Time Provider Department Dept. Phone Encounter #  
 8/27/2018  9:30 AM Earnstine Bence, MD [facility] Oncology [phone] [id number] Your Appointments 8/27/2018  9:30 AM  
Office Visit with Earnstine Bence, MD  
[facility]  Oncology [facility]) Appt Note: 2 WK RET; 2 WK RET; TESTING SCHEDULED FOR FUTURE DATE; 2 WK RET-SCAN RESULTS DONE 08/21/18  
 [address], [address] [place] [address] 200 [facility] 9/17/2018  9:15 AM  
Office Visit with Earnstine Bence, MD  
[facility]  Oncology [facility]) Appt Note: 3 WK RET  
 [address] 399 200 [facility]  
[phone]  
  
    
 9/24/2018  8:15 AM  
ANTICOAG NURSE with Pt Inr Hv Csi [facility] ([facility]) Appt Note: 4 week [address] [place] [address] [phone] [address] [address]  
  
    
 1/22/2019  8:40 AM  
Follow Up with Tamra Weinstein MD  
Card[facility] ([facility]) Appt Note: 9 mo f/u  
 [address] [address] [address] [address] [phone] 2300 [facility] [address] Upcoming Health Maintenance Date Due Hepatitis C Screening 1945 DTaP/Tdap/Td series (1 - Tdap) 8/21/1967 FOBT Q 1 YEAR AGE 50-75 8/21/1996 ZOSTER VACCINE AGE 60> 6/21/2006 GLAUCOMA SCREENING Q2Y 8/21/2011 MEDICARE YEARLY EXAM 3/14/2018 Influenza Age 5 to Adult 8/1/2018 Allergies as of 8/27/2018  Review Complete On: 8/27/2018 By: Anurag Lamb No Known Allergies Current Immunizations  Reviewed on 7/20/2018 Name Date Influenza Vaccine 11/16/2017, 11/9/2016, 10/2/2015, 9/28/2015 12:00 AM  
 Pneumococcal Conjugate (PCV-13) 9/28/2015 Pneumococcal Polysaccharide (PPSV-23) 8/13/2012 Not reviewed this visit You Were Diagnosed With   
  
 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  
 26.12 kg/m 2 2.15 m 2 Preferred Pharmacy Pharmacy Name Phone 305 [facility], [address] [place] [address] [address] [place] [address] Your Updated Medication List  
  
   
This list is accurate as of 8/27/18  9:27 AM.  Always use your most recent med list.  
  
  
  
  
 allopurinol 300 mg tablet Commonly known as:  Lonni Santos Take  by mouth daily. ASPIR-81 81 mg tablet Generic drug:  aspirin delayed-release Take 81 mg by mouth daily. carvedilol 25 mg tablet Commonly known as:  Juline Balzarine Take 1 Tab by mouth two (2) times daily (with meals). cloNIDine HCl 0.2 mg tablet Commonly known as:  CATAPRES  
TAKE 1 TABLET BY MOUTH 3 TIMES A DAY  
  
 digoxin 0.125 mg tablet Commonly known as:  LANOXIN  
TAKE 1 TABLET BY MOUTH EVERY DAY  
  
 furosemide 40 mg tablet Commonly known as:  LASIX Take 1 Tab by mouth two (2) times a day. Or directed HumaLOG U-100 Insulin 100 unit/mL injection Generic drug:  insulin lispro  
by SubCUTAneous route. IRON (DRIED) PO Take 325 mg by mouth daily. LANTUS U-100 INSULIN 100 unit/mL injection Generic drug:  insulin glargine 15 Units by SubCUTAneous route nightly. lisinopril 20 mg tablet Commonly known as:  PRINIVIL, ZESTRIL  
TAKE ONE TABLET BY MOUTH TWICE DAILY  
  
 magnesium oxide 400 mg tablet Commonly known as:  MAG-OX  
TAKE 1 TABLET BY MOUTH DAILY. metOLazone 2.5 mg tablet Commonly known as:  ZAROXOLYN  
TAKE 1 TABLET BY MOUTH 30 MINS PRIOR TO MORNING LASIX AS DIRECTED ON MON & THU potassium chloride 20 mEq tablet Commonly known as:  K-DUR, KLOR-CON Take 1 Tab by mouth two (2) times a day. 2 tabs extra on the days on Metalazone  
  
 warfarin 2.5 mg tablet Commonly known as:  COUMADIN  
TAKE ONE TABLET BY MOUTH ONCE DAILY OR  AS  DIRECTED  BY  YOUR  DOCTOR. ZOLOFT 25 mg tablet Generic drug:  sertraline Take  by mouth daily. We Performed the Following COMPLETE CBC & AUTO DIFF WBC [73925 CPT(R)] To-Do List   
 08/27/2018 Lab:  CBC WITH 3 PART DIFF   
  
 08/31/2018 10:00 AM  
  Appointment with [facility] FAST TRACK NURSE at [facility] ([phone]) Please provide this summary of care documentation to your next provider. Your primary care clinician is listed as Tootie Garcia. If you have any questions after today's visit, please call [phone].

## 2018-08-27 NOTE — PATIENT INSTRUCTIONS
Sarcoma: Care Instructions  Your Care Instructions  Sarcoma is cancer of certain tissues of the body, such as the muscles, connective tissues (like tendons), blood vessels, bones, and fat. Cancer occurs when abnormal cells grow out of control. The cells can spread to other areas of the body. Sarcoma is a general name for several rare cancers that occur in these tissues. Doctors treat this type of cancer with surgery, radiation, or medicines (chemotherapy). Your doctor will treat you based on how quickly or slowly the type of cancer you have may spread, how far the cancer has spread, and your overall health. One or more treatments may be used. When you find out that you have cancer, you may feel many emotions and may need some help coping. Seek out family, friends, and counselors for support. You also can do things at home to make yourself feel better while you go through treatment. Call the [facility] ([phone]) or visit its website at [address] for more information. Follow-up care is a key part of your treatment and safety. Be sure to make and go to all appointments, and call your doctor if you are having problems. It's also a good idea to know your test results and keep a list of the medicines you take. How can you care for yourself at home? · Take your medicines exactly as prescribed. Call your doctor if you think you are having a problem with your medicine. You may get medicine for nausea and vomiting if you have these side effects. · Eat healthy food. If you do not feel like eating, try to eat food that has protein and extra calories to keep up your strength and prevent weight loss. Drink liquid meal replacements for extra calories and protein. Try to eat your main meal early. · Get some physical activity every day, but do not get too tired. Keep doing the hobbies you enjoy as your energy allows. · Take steps to control your stress and workload.  Learn relaxation techniques. ¨ Share your feelings. Stress and tension affect our emotions. By expressing your feelings to others, you may be able to understand and cope with them. ¨ Consider joining a support group. Talking about a problem with your spouse, a good friend, or other people with similar problems is a good way to reduce tension and stress. ¨ Express yourself through art. Try writing, crafts, dance, or art to relieve stress. Some dance, writing, or art groups may be available just for people who have cancer. ¨ Be kind to your body and mind. Getting enough sleep, eating a healthy diet, and taking time to do things you enjoy can contribute to an overall feeling of balance in your life and help reduce stress. ¨ Get help if you need it. Discuss your concerns with your doctor or counselor. · If you are vomiting or have diarrhea:  ¨ Drink plenty of fluids (enough so that your urine is light yellow or clear like water) to prevent dehydration. Choose water and other caffeine-free clear liquids. If you have kidney, heart, or liver disease and have to limit fluids, talk with your doctor before you increase the amount of fluids you drink. ¨ When you are able to eat, try clear soups, mild foods, and liquids until all symptoms are gone for 12 to 48 hours. Other good choices include dry toast, crackers, cooked cereal, and gelatin dessert, such as Jell-O.  · If you have not already done so, prepare a list of advance directives. Advance directives are instructions to your doctor and family members about what kind of care you want if you become unable to speak or express yourself. When should you call for help? Call 911 anytime you think you may need emergency care.  For example, call if:    · You passed out (lost consciousness).    Call your doctor now or seek immediate medical care if:    · You have a fever.     · You have abnormal bleeding.     · You have new or worse pain.     · You think you have an infection.     · You have new symptoms, such as a cough, belly pain, vomiting, diarrhea, or a rash.    Watch closely for changes in your health, and be sure to contact your doctor if:    · You are much more tired than usual.     · You have swollen glands in your armpits, groin, or neck.     · You do not get better as expected. Where can you learn more? Go to http://radha-ter.info/. Enter Michelle King in the search box to learn more about \"Sarcoma: Care Instructions. \"  Current as of: May 12, 2017  Content Version: 11.7  © 7450-4159 [employer]. Care instructions adapted under license by [employer] (which disclaims liability or warranty for this information). If you have questions about a medical condition or this instruction, always ask your healthcare professional. [address] any warranty or liability for your use of this information.

## 2018-08-27 NOTE — PROGRESS NOTES
Hematology/medical oncology progress note    8/27/2018  Audie Diallo  YOB: 1945    PCP: Dr. Adam Farmer    Diagnosis: Soft tissue sarcoma, right    Mr. 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 A recent whole-body bone scan completed on 8/21/2018 showed no convincing evidence of bone metastasis. It did show some right thigh hazy soft tissue activity at the right lower extremity which may be related to postsurgical changes or recurrent disease. Radiologist recommended MRI for further evaluation. On 8/21/2018 CT scans of the chest, abdomen, and pelvis revealed stable external iliac common femoral junction lymph nodes and stable retroperitoneal lymph nodes. There was no evidence of lung, intra-abdominal, or pelvic metastasis otherwise. However the superior borders of the large soft tissue density mass in the superior medial right thigh is in the vicinity of the previously known soft tissue sarcoma. Therefore MRI is indicated for further evaluation to rule out postsurgical changes versus recurrent disease. I have explained these findings to the patient and we will schedule the MRI for further assessment as recommended by the radiologist.  The patient had his questions answered to their satisfaction. Total time 30 minutes, greater than 50% of the time was in counseling and coordination of care. Milad Flanagan MD, [address]

## 2018-08-27 NOTE — PROGRESS NOTES
Verbal order and read back per Alethea Carreon DO  Patient is within therapeutic range  Continue Coumadin to 2.5 mg daily except 3.75 mg on Mon  Recheck 4 weeks  This has been fully explained to the patient, who indicates understanding.

## 2018-08-31 ENCOUNTER — HOSPITAL ENCOUNTER (OUTPATIENT)
Dept: INFUSION THERAPY | Age: 73
Discharge: HOME | End: 2018-08-31
Payer: MEDICARE

## 2018-08-31 VITALS
SYSTOLIC BLOOD PRESSURE: 134 MMHG | DIASTOLIC BLOOD PRESSURE: 79 MMHG | HEART RATE: 82 BPM | RESPIRATION RATE: 18 BRPM | TEMPERATURE: 98.2 F | OXYGEN SATURATION: 99 %

## 2018-08-31 PROCEDURE — 74011250636 HC RX REV CODE- 250/636

## 2018-08-31 PROCEDURE — 77030012965 HC NDL HUBR BBMI -A

## 2018-08-31 PROCEDURE — 96523 IRRIG DRUG DELIVERY DEVICE: CPT

## 2018-08-31 RX ORDER — HEPARIN 100 UNIT/ML
SYRINGE INTRAVENOUS
Status: COMPLETED
Start: 2018-08-31 | End: 2018-08-31

## 2018-08-31 RX ORDER — SODIUM CHLORIDE 0.9 % (FLUSH) 0.9 %
10-40 SYRINGE (ML) INJECTION AS NEEDED
Status: DISCONTINUED | OUTPATIENT
Start: 2018-08-31 | End: 2018-09-04 | Stop reason: HOSPADM

## 2018-08-31 RX ORDER — HEPARIN 100 UNIT/ML
500 SYRINGE INTRAVENOUS AS NEEDED
Status: DISCONTINUED | OUTPATIENT
Start: 2018-08-31 | End: 2018-09-04 | Stop reason: HOSPADM

## 2018-08-31 RX ADMIN — Medication 20 ML: at 10:10

## 2018-08-31 RX ADMIN — HEPARIN 500 UNITS: 100 SYRINGE at 10:10

## 2018-08-31 RX ADMIN — SODIUM CHLORIDE, PRESERVATIVE FREE 500 UNITS: 5 INJECTION INTRAVENOUS at 10:10

## 2018-08-31 NOTE — PROGRESS NOTES
[facility] OPI Progress Note Date: 2018 Name: Be Gresham MRN: [id number] : 1945 Procrit Mr. Emma Chou to [facility], ambulatory at 1000. Pt was assessed and education was provided. Mr. Maxine Polo vitals were reviewed and patient was observed for 5 minutes prior to treatment. Visit Vitals  /79 (BP 1 Location: Left arm, BP Patient Position: At rest;Sitting)  Pulse 82  Temp 98.2 °F (36.8 °C)  Resp 18  SpO2 99% Right chest mediport accessed with 20G 1.5 inch Giron needle. Flushed easily and had brisk blood return. Blood drawn off for CBC after 10 mL of waste. Port flushed with 20 mL NS and 500 units of heparin before de-accessing. No irritation or bleeding. Band-aid applied. Patient arm band removed and shredded. He is to return on 10/12/2018 at 1000 for his next procrit appointment. Dana Muñoz RN 2018 
1029

## 2018-09-10 NOTE — PROGRESS NOTES
I have personally seen and evaluated the device findings. Interrogation reviewed and I agree with assessment. Kelsey Velasquez

## 2018-09-17 ENCOUNTER — OFFICE VISIT (OUTPATIENT)
Dept: ONCOLOGY | Age: 73
End: 2018-09-17

## 2018-09-17 ENCOUNTER — HOSPITAL ENCOUNTER (OUTPATIENT)
Dept: ONCOLOGY | Age: 73
Discharge: HOME | End: 2018-09-17

## 2018-09-17 DIAGNOSIS — C49.21 SOFT TISSUE SARCOMA OF RIGHT THIGH (HCC): ICD-10-CM

## 2018-09-17 DIAGNOSIS — C49.21 SOFT TISSUE SARCOMA OF RIGHT THIGH (HCC): Primary | ICD-10-CM

## 2018-09-19 RX ORDER — FUROSEMIDE 40 MG/1
40 TABLET ORAL 2 TIMES DAILY
Qty: 180 TAB | Refills: 3 | Status: SHIPPED | OUTPATIENT
Start: 2018-09-19 | End: 2019-09-16 | Stop reason: SDUPTHER

## 2018-09-20 ENCOUNTER — HOSPITAL ENCOUNTER (OUTPATIENT)
Dept: MRI IMAGING | Age: 73
Discharge: HOME | End: 2018-09-20
Attending: INTERNAL MEDICINE
Payer: MEDICARE

## 2018-09-20 DIAGNOSIS — C49.21 SOFT TISSUE SARCOMA OF RIGHT THIGH (HCC): ICD-10-CM

## 2018-09-20 PROCEDURE — 74011636320 HC RX REV CODE- 636/320: Performed by: INTERNAL MEDICINE

## 2018-09-20 PROCEDURE — 73720 MRI LWR EXTREMITY W/O&W/DYE: CPT

## 2018-09-20 PROCEDURE — A9575 INJ GADOTERATE MEGLUMI 0.1ML: HCPCS | Performed by: INTERNAL MEDICINE

## 2018-09-20 RX ADMIN — GADOTERATE MEGLUMINE 17 ML: 376.9 INJECTION INTRAVENOUS at 13:24

## 2018-09-24 ENCOUNTER — ANTI-COAG VISIT (OUTPATIENT)
Dept: CARDIOLOGY CLINIC | Age: 73
End: 2018-09-24

## 2018-09-24 DIAGNOSIS — I48.91 ATRIAL FIBRILLATION, UNSPECIFIED TYPE (HCC): ICD-10-CM

## 2018-09-24 DIAGNOSIS — Z79.01 LONG TERM CURRENT USE OF ANTICOAGULANT THERAPY: Primary | ICD-10-CM

## 2018-09-24 LAB
INR BLD: 2.2
PT POC: 26.8 SECONDS
VALID INTERNAL CONTROL?: YES

## 2018-09-24 NOTE — PROGRESS NOTES
Verbal order and read back per Joretta Favre, DO Patient is within therapeutic range Continue Coumadin to 2.5 mg daily except 3.75 mg on Mon Recheck 4 weeks This has been fully explained to the patient, who indicates understanding.

## 2018-10-03 ENCOUNTER — HOSPITAL ENCOUNTER (OUTPATIENT)
Dept: ONCOLOGY | Age: 73
Discharge: HOME | End: 2018-10-03

## 2018-10-03 ENCOUNTER — OFFICE VISIT (OUTPATIENT)
Dept: ONCOLOGY | Age: 73
End: 2018-10-03

## 2018-10-03 VITALS
DIASTOLIC BLOOD PRESSURE: 72 MMHG | HEART RATE: 76 BPM | RESPIRATION RATE: 16 BRPM | HEIGHT: 73 IN | TEMPERATURE: 98.2 F | WEIGHT: 197.4 LBS | OXYGEN SATURATION: 100 % | BODY MASS INDEX: 26.16 KG/M2 | SYSTOLIC BLOOD PRESSURE: 130 MMHG

## 2018-10-03 DIAGNOSIS — C49.21 SOFT TISSUE SARCOMA OF RIGHT THIGH (HCC): Primary | ICD-10-CM

## 2018-10-03 DIAGNOSIS — C49.21 SOFT TISSUE SARCOMA OF RIGHT THIGH (HCC): ICD-10-CM

## 2018-10-03 DIAGNOSIS — C76.51 MALIGNANT NEOPLASM OF RIGHT LOWER EXTREMITY (HCC): ICD-10-CM

## 2018-10-03 NOTE — MR AVS SNAPSHOT
[address] [facility] [address] 
 
 
 [address], [address] [facility] [address] 
[phone] Patient: Norma Whatley MRN: [id number] JJX:[id number]/61/1841 Visit Information Date & Time Provider Department Dept. Phone Encounter #  
 10/3/2018  9:45 AM Priscilla Dumont MD [facility] Oncology [phone] [id number] Your Appointments 10/22/2018  8:15 AM  
ANTICOAG NURSE with Pt Inr Hv [facility] ([facility]) Appt Note: 4 week [address] [id number] [address] [address] [phone] Owusu Jenise  
  
    
 10/24/2018  9:45 AM  
Office Visit with Priscilla Dumont MD  
[address]) Appt Note: 3 WK RET  
 [address], [address]49 [facility] [address] [place] [address], [address] [place] [address] 200 [facility]  
  
    
 11/21/2018  3:40 PM  
CARELINK with [address] [facility] ([facility]) Appt Note: [address] [address] [address] [address] [phone] 2300 [facility] [address] [address]  
  
    
 1/22/2019  8:40 AM  
Follow Up with Rcoío Barney MD  
[facility] ([facility]) Appt Note: 9 mo f/u  
 [address] [address] [address] [address] [phone] 2300 [facility] [address] Upcoming Health Maintenance Date Due Hepatitis C Screening 1945 DTaP/Tdap/Td series (1 - Tdap) 8/21/1967 Shingrix Vaccine Age 50> (1 of 2) 8/21/1996 FOBT Q 1 YEAR AGE 50-75 8/21/1996 GLAUCOMA SCREENING Q2Y 8/21/2011 MEDICARE YEARLY EXAM 3/14/2018 Influenza Age 5 to Adult 8/1/2018 Allergies as of 10/3/2018  Review Complete On: 8/27/2018 By: Octavio Baez Patience Li MD  
 No Known Allergies Current Immunizations  Reviewed on 7/20/2018 Name Date Influenza Vaccine 11/16/2017, 11/9/2016, 10/2/2015, 9/28/2015 12:00 AM  
 Pneumococcal Conjugate (PCV-13) 9/28/2015 Pneumococcal Polysaccharide (PPSV-23) 8/13/2012 Not reviewed this visit You Were Diagnosed With   
  
 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  
  
   
This list is accurate as of 10/3/18  9:47 AM.  Always use your most recent med list.  
  
  
  
  
 allopurinol 300 mg tablet Commonly known as:  Yobany Armand Take  by mouth daily. ASPIR-81 81 mg tablet Generic drug:  aspirin delayed-release Take 81 mg by mouth daily. carvedilol 25 mg tablet Commonly known as:  Cezar Sella Take 1 Tab by mouth two (2) times daily (with meals). cloNIDine HCl 0.2 mg tablet Commonly known as:  CATAPRES  
TAKE 1 TABLET BY MOUTH 3 TIMES A DAY  
  
 digoxin 0.125 mg tablet Commonly known as:  LANOXIN  
TAKE 1 TABLET BY MOUTH EVERY DAY  
  
 furosemide 40 mg tablet Commonly known as:  LASIX Take 1 Tab by mouth two (2) times a day. Or directed HumaLOG U-100 Insulin 100 unit/mL injection Generic drug:  insulin lispro  
by SubCUTAneous route. IRON (DRIED) PO Take 325 mg by mouth daily. LANTUS U-100 INSULIN 100 unit/mL injection Generic drug:  insulin glargine 15 Units by SubCUTAneous route nightly. lisinopril 20 mg tablet Commonly known as:  PRINIVIL, ZESTRIL  
TAKE ONE TABLET BY MOUTH TWICE DAILY  
  
 magnesium oxide 400 mg tablet Commonly known as:  MAG-OX  
TAKE 1 TABLET BY MOUTH DAILY. metOLazone 2.5 mg tablet Commonly known as:  ZAROXOLYN  
TAKE 1 TABLET BY MOUTH 30 MINS PRIOR TO MORNING LASIX AS DIRECTED ON MON & THU potassium chloride 20 mEq tablet Commonly known as:  K-DUR, KLOR-CON Take 1 Tab by mouth two (2) times a day. 2 tabs extra on the days on Metalazone  
  
 warfarin 2.5 mg tablet Commonly known as:  COUMADIN  
TAKE ONE TABLET BY MOUTH ONCE DAILY OR  AS  DIRECTED  BY  YOUR  DOCTOR. ZOLOFT 25 mg tablet Generic drug:  sertraline Take  by mouth daily. We Performed the Following COMPLETE CBC & AUTO DIFF WBC [73443 CPT(R)] To-Do List   
 10/03/2018 Lab:  CBC WITH 3 PART DIFF   
  
 10/12/2018 10:00 AM  
  Appointment with [facility] FAST TRACK NURSE at [facility]FUSION ([phone]) Please provide this summary of care documentation to your next provider. Your primary care clinician is listed as Katlyn Madsen. If you have any questions after today's visit, please call [phone].

## 2018-10-03 NOTE — PROGRESS NOTES
Hematology/medical oncology progress note 10/3/2018 Pratibha Jaime YOB: 1945 PCP: Dr. 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 Recently he presented with what appeared to be recurrence of his disease. The patient underwent a CT scan with contrast to the chest, abdomen, and pelvis on 8/21/2018. The chest CT showed no abnormality in the chest.  CT scans of the abdomen shows several stable minimally minimally prominent retroperitoneal lymph nodes with the largest measuring 1.4 x 1 cm. He also had enlarged right external iliac common femoral artery junction nodes measuring 2.2 x 1.5 cm which are not significantly changed. There is a stable oval hypodense mass in the right internal jugular region measuring 3.1 x 1 cm. The imaging study showed within the superior medial right upper thigh there was a soft tissue density measuring 6.6 x 5 cm in size concerning for recurrence of soft tissue sarcoma. The radiologist recommended an MRI of the right lower extremity for further assessment. Therefore, I am recommending that we schedule the patient for the MRI and will plan additional treatment options once the results of this test is available. The patient had his questions answered to his satisfaction. Total time 30 minutes, greater than 50% of the time was in counseling and coordination of care. Milad Ferguson MD, [address]

## 2018-10-03 NOTE — PATIENT INSTRUCTIONS
Sarcoma: Care Instructions Your Care Instructions Sarcoma is cancer of certain tissues of the body, such as the muscles, connective tissues (like tendons), blood vessels, bones, and fat. Cancer occurs when abnormal cells grow out of control. The cells can spread to other areas of the body. Sarcoma is a general name for several rare cancers that occur in these tissues. Doctors treat this type of cancer with surgery, radiation, or medicines (chemotherapy). Your doctor will treat you based on how quickly or slowly the type of cancer you have may spread, how far the cancer has spread, and your overall health. One or more treatments may be used. When you find out that you have cancer, you may feel many emotions and may need some help coping. Seek out family, friends, and counselors for support. You also can do things at home to make yourself feel better while you go through treatment. Call the [employer] [facility] ([phone]) or visit its website at [employer] for more information. Follow-up care is a key part of your treatment and safety. Be sure to make and go to all appointments, and call your doctor if you are having problems. It's also a good idea to know your test results and keep a list of the medicines you take. How can you care for yourself at home? · Take your medicines exactly as prescribed. Call your doctor if you think you are having a problem with your medicine. You may get medicine for nausea and vomiting if you have these side effects. · Eat healthy food. If you do not feel like eating, try to eat food that has protein and extra calories to keep up your strength and prevent weight loss. Drink liquid meal replacements for extra calories and protein. Try to eat your main meal early. · Get some physical activity every day, but do not get too tired. Keep doing the hobbies you enjoy as your energy allows. · Take steps to control your stress and workload. Learn relaxation techniques. ¨ Share your feelings. Stress and tension affect our emotions. By expressing your feelings to others, you may be able to understand and cope with them. ¨ Consider joining a support group. Talking about a problem with your spouse, a good friend, or other people with similar problems is a good way to reduce tension and stress. ¨ Express yourself through art. Try writing, crafts, dance, or art to relieve stress. Some dance, writing, or art groups may be available just for people who have cancer. ¨ Be kind to your body and mind. Getting enough sleep, eating a healthy diet, and taking time to do things you enjoy can contribute to an overall feeling of balance in your life and help reduce stress. ¨ Get help if you need it. Discuss your concerns with your doctor or counselor. · If you are vomiting or have diarrhea: ¨ Drink plenty of fluids (enough so that your urine is light yellow or clear like water) to prevent dehydration. Choose water and other caffeine-free clear liquids. If you have kidney, heart, or liver disease and have to limit fluids, talk with your doctor before you increase the amount of fluids you drink. ¨ When you are able to eat, try clear soups, mild foods, and liquids until all symptoms are gone for 12 to 48 hours. Other good choices include dry toast, crackers, cooked cereal, and gelatin dessert, such as Jell-O. · If you have not already done so, prepare a list of advance directives. Advance directives are instructions to your doctor and family members about what kind of care you want if you become unable to speak or express yourself. When should you call for help? Call 911 anytime you think you may need emergency care. For example, call if: 
  · You passed out (lost consciousness).  
 Call your doctor now or seek immediate medical care if: 
  · You have a fever.  
  · You have abnormal bleeding.  
  · You have new or worse pain.  
  · You think you have an infection.   · You have new symptoms, such as a cough, belly pain, vomiting, diarrhea, or a rash.  
 Watch closely for changes in your health, and be sure to contact your doctor if: 
  · You are much more tired than usual.  
  · You have swollen glands in your armpits, groin, or neck.  
  · You do not get better as expected. Where can you learn more? Go to http://radha-tre.info/. Enter Josymaryann Nathaly in the search box to learn more about \"Sarcoma: Care Instructions. \" Current as of: May 12, 2017 Content Version: 11.7 © 3794-5671 [employer]. Care instructions adapted under license by [employer] (which disclaims liability or warranty for this information). If you have questions about a medical condition or this instruction, always ask your healthcare professional. [address] any warranty or liability for your use of this information.

## 2018-10-12 ENCOUNTER — HOSPITAL ENCOUNTER (OUTPATIENT)
Dept: INFUSION THERAPY | Age: 73
Discharge: HOME | End: 2018-10-12
Payer: MEDICARE

## 2018-10-12 VITALS
OXYGEN SATURATION: 98 % | TEMPERATURE: 97.7 F | DIASTOLIC BLOOD PRESSURE: 72 MMHG | SYSTOLIC BLOOD PRESSURE: 129 MMHG | RESPIRATION RATE: 20 BRPM | HEART RATE: 71 BPM

## 2018-10-12 PROCEDURE — 77030012965 HC NDL HUBR BBMI -A

## 2018-10-12 PROCEDURE — 74011250636 HC RX REV CODE- 250/636

## 2018-10-12 PROCEDURE — 96523 IRRIG DRUG DELIVERY DEVICE: CPT

## 2018-10-12 RX ORDER — HEPARIN 100 UNIT/ML
500 SYRINGE INTRAVENOUS ONCE
Status: ACTIVE | OUTPATIENT
Start: 2018-10-12 | End: 2018-10-12

## 2018-10-12 RX ORDER — SODIUM CHLORIDE 0.9 % (FLUSH) 0.9 %
10-40 SYRINGE (ML) INJECTION AS NEEDED
Status: DISCONTINUED | OUTPATIENT
Start: 2018-10-12 | End: 2018-10-16 | Stop reason: HOSPADM

## 2018-10-12 RX ORDER — HEPARIN 100 UNIT/ML
SYRINGE INTRAVENOUS
Status: COMPLETED
Start: 2018-10-12 | End: 2018-10-12

## 2018-10-12 RX ADMIN — Medication 30 ML: at 10:16

## 2018-10-12 RX ADMIN — HEPARIN 500 UNITS: 100 SYRINGE at 10:17

## 2018-10-12 NOTE — PROGRESS NOTES
[facility] OPI Progress Note Date: 2018 Name: Macarena Medina MRN: [id number] : 1945 Procrit Mr. Gibbs Shall to [facility], ambulatory at 1000. Pt was assessed and education was provided. Mr. Posey Cooks vitals were reviewed and patient was observed for 5 minutes prior to treatment. Visit Vitals  /72 (BP 1 Location: Left leg, BP Patient Position: Sitting)  Pulse 71  Temp 97.7 °F (36.5 °C)  Resp 20  SpO2 98% Right chest mediport accessed with 20G 1 inch Giron needle. Flushed easily and had brisk blood return. Port flushed with 20 mL NS and 500 units of heparin before de-accessing. No irritation or bleeding. Band-aid applied. Patient arm band removed and shredded. He is to return on 2018 at 1000 for his next port flush appointment. Aleisha Field RN 2018

## 2018-10-17 ENCOUNTER — TELEPHONE (OUTPATIENT)
Dept: ONCOLOGY | Age: 73
End: 2018-10-17

## 2018-10-17 NOTE — TELEPHONE ENCOUNTER
I see in Dr. Benito Thomas last office note 10/03/2018 recommending schedule patient for MRI.  Patient is supposed to return on Oct. 24.

## 2018-10-22 ENCOUNTER — ANTI-COAG VISIT (OUTPATIENT)
Dept: CARDIOLOGY CLINIC | Age: 73
End: 2018-10-22

## 2018-10-22 DIAGNOSIS — I48.91 ATRIAL FIBRILLATION, UNSPECIFIED TYPE (HCC): ICD-10-CM

## 2018-10-22 DIAGNOSIS — Z79.01 LONG TERM CURRENT USE OF ANTICOAGULANT THERAPY: Primary | ICD-10-CM

## 2018-10-22 LAB
INR BLD: 1.9
PT POC: 22.6 SECONDS
VALID INTERNAL CONTROL?: YES

## 2018-10-22 NOTE — PROGRESS NOTES
Verbal order and read back per Sergio Rene DO Patient is not with therapeutic range Continue Coumadin to 2.5 mg daily except 3.75 mg on Mon Recheck 4 weeks This has been fully explained to the patient, who indicates understanding. Extra greens this past week per patient

## 2018-10-24 ENCOUNTER — OFFICE VISIT (OUTPATIENT)
Dept: ONCOLOGY | Age: 73
End: 2018-10-24

## 2018-10-24 ENCOUNTER — HOSPITAL ENCOUNTER (OUTPATIENT)
Dept: ONCOLOGY | Age: 73
Discharge: HOME | End: 2018-10-24

## 2018-10-24 VITALS
RESPIRATION RATE: 18 BRPM | WEIGHT: 200 LBS | TEMPERATURE: 97.9 F | BODY MASS INDEX: 26.51 KG/M2 | HEART RATE: 82 BPM | SYSTOLIC BLOOD PRESSURE: 146 MMHG | HEIGHT: 73 IN | DIASTOLIC BLOOD PRESSURE: 83 MMHG

## 2018-10-24 DIAGNOSIS — C49.20: ICD-10-CM

## 2018-10-24 DIAGNOSIS — C49.20: Primary | ICD-10-CM

## 2018-10-24 LAB
BASO+EOS+MONOS # BLD AUTO: 0.4 K/UL (ref 0–2.3)
BASO+EOS+MONOS # BLD AUTO: 6 % (ref 0.1–17)
DIFFERENTIAL METHOD BLD: ABNORMAL
ERYTHROCYTE [DISTWIDTH] IN BLOOD BY AUTOMATED COUNT: 16.3 % (ref 11.5–14.5)
HCT VFR BLD AUTO: 40.4 % (ref 36–48)
HGB BLD-MCNC: 13.3 G/DL (ref 12–16)
LYMPHOCYTES # BLD: 1.7 K/UL (ref 1.1–5.9)
LYMPHOCYTES NFR BLD: 23 % (ref 14–44)
MCH RBC QN AUTO: 30.1 PG (ref 25–35)
MCHC RBC AUTO-ENTMCNC: 32.9 G/DL (ref 31–37)
MCV RBC AUTO: 91.4 FL (ref 78–102)
NEUTS SEG # BLD: 5.2 K/UL (ref 1.8–9.5)
NEUTS SEG NFR BLD: 71 % (ref 40–70)
PLATELET # BLD AUTO: 168 K/UL (ref 140–440)
RBC # BLD AUTO: 4.42 M/UL (ref 4.1–5.1)
WBC # BLD AUTO: 7.3 K/UL (ref 4.5–13)

## 2018-10-24 NOTE — PROGRESS NOTES
Hematology/medical oncology progress note 10/24/2018 Cody Hernandez YOB: 1945 PCP: Dr. 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 Recently he presented with what appeared to be recurrence of his disease. The patient underwent a CT scan with contrast to the chest, abdomen, and pelvis on 8/21/2018. The chest CT showed no abnormality in the chest.  CT scans of the abdomen shows several stable minimally minimally prominent retroperitoneal lymph nodes with the largest measuring 1.4 x 1 cm. He also had enlarged right external iliac common femoral artery junction nodes measuring 2.2 x 1.5 cm which are not significantly changed. There is a stable oval hypodense mass in the right internal jugular region measuring 3.1 x 1 cm. The imaging study showed within the superior medial right upper thigh there was a soft tissue density measuring 6.6 x 5 cm in size concerning for recurrence of soft tissue sarcoma. The radiologist recommended an MRI of the right lower extremity for further assessment. However, on review the patient had an MRI done on 9/21/2018. The radiologist went back and reviewed assess this test and often an addendum which is dated 10/8/2018. The radiologist now feels that the heterogeneous mass with thick wall in the right upper thigh medial compartment has imaging characteristics suggestive of a chronic hematoma. There was no definite suspicious enhancement although the presence of intrinsic T1 signal limits evaluation. Based on this I will have the patient return in 2 months to see how well he is doing. If there is progression of this then we will consider additional imaging.   No therapeutic intervention is warranted at this time. The patient had his questions answered to his satisfaction. Total time 30 minutes, greater than 50% of the time was in counseling and coordination of care. Milad Rodríguez MD, [place]

## 2018-10-24 NOTE — PATIENT INSTRUCTIONS
Sarcoma: Care Instructions Your Care Instructions Sarcoma is cancer of certain tissues of the body, such as the muscles, connective tissues (like tendons), blood vessels, bones, and fat. Cancer occurs when abnormal cells grow out of control. The cells can spread to other areas of the body. Sarcoma is a general name for several rare cancers that occur in these tissues. Doctors treat this type of cancer with surgery, radiation, or medicines (chemotherapy). Your doctor will treat you based on how quickly or slowly the type of cancer you have may spread, how far the cancer has spread, and your overall health. One or more treatments may be used. When you find out that you have cancer, you may feel many emotions and may need some help coping. Seek out family, friends, and counselors for support. You also can do things at home to make yourself feel better while you go through treatment. Call the [employer] [facility] (1-583.330.8316) or visit its website at [employer] for more information. Follow-up care is a key part of your treatment and safety. Be sure to make and go to all appointments, and call your doctor if you are having problems. It's also a good idea to know your test results and keep a list of the medicines you take. How can you care for yourself at home? · Take your medicines exactly as prescribed. Call your doctor if you think you are having a problem with your medicine. You may get medicine for nausea and vomiting if you have these side effects. · Eat healthy food. If you do not feel like eating, try to eat food that has protein and extra calories to keep up your strength and prevent weight loss. Drink liquid meal replacements for extra calories and protein. Try to eat your main meal early. · Get some physical activity every day, but do not get too tired. Keep doing the hobbies you enjoy as your energy allows. · Take steps to control your stress and workload. Learn relaxation techniques. ? Share your feelings. Stress and tension affect our emotions. By expressing your feelings to others, you may be able to understand and cope with them. ? Consider joining a support group. Talking about a problem with your spouse, a good friend, or other people with similar problems is a good way to reduce tension and stress. ? Express yourself through art. Try writing, crafts, dance, or art to relieve stress. Some dance, writing, or art groups may be available just for people who have cancer. ? Be kind to your body and mind. Getting enough sleep, eating a healthy diet, and taking time to do things you enjoy can contribute to an overall feeling of balance in your life and help reduce stress. ? Get help if you need it. Discuss your concerns with your doctor or counselor. · If you are vomiting or have diarrhea: ? Drink plenty of fluids (enough so that your urine is light yellow or clear like water) to prevent dehydration. Choose water and other caffeine-free clear liquids. If you have kidney, heart, or liver disease and have to limit fluids, talk with your doctor before you increase the amount of fluids you drink. ? When you are able to eat, try clear soups, mild foods, and liquids until all symptoms are gone for 12 to 48 hours. Other good choices include dry toast, crackers, cooked cereal, and gelatin dessert, such as Jell-O. · If you have not already done so, prepare a list of advance directives. Advance directives are instructions to your doctor and family members about what kind of care you want if you become unable to speak or express yourself. When should you call for help? Call 911 anytime you think you may need emergency care. For example, call if: 
  · You passed out (lost consciousness).  
 Call your doctor now or seek immediate medical care if: 
  · You have a fever.  
  · You have abnormal bleeding.  
  · You have new or worse pain.  
  · You think you have an infection.   · You have new symptoms, such as a cough, belly pain, vomiting, diarrhea, or a rash.  
 Watch closely for changes in your health, and be sure to contact your doctor if: 
  · You are much more tired than usual.  
  · You have swollen glands in your armpits, groin, or neck.  
  · You do not get better as expected. Where can you learn more? Go to http://radha-tre.info/. Enter Fatmata Lance in the search box to learn more about \"Sarcoma: Care Instructions. \" Current as of: March 28, 2018 Content Version: 11.8 © 7590-5678 [employer]. Care instructions adapted under license by [employer] (which disclaims liability or warranty for this information). If you have questions about a medical condition or this instruction, always ask your healthcare professional. [address] any warranty or liability for your use of this information.

## 2018-11-19 ENCOUNTER — ANTI-COAG VISIT (OUTPATIENT)
Dept: CARDIOLOGY CLINIC | Age: 73
End: 2018-11-19

## 2018-11-19 DIAGNOSIS — Z79.01 LONG TERM CURRENT USE OF ANTICOAGULANT THERAPY: Primary | ICD-10-CM

## 2018-11-19 DIAGNOSIS — I48.91 ATRIAL FIBRILLATION, UNSPECIFIED TYPE (HCC): ICD-10-CM

## 2018-11-19 LAB
INR BLD: 2.3
PT POC: 27.2 SECONDS
VALID INTERNAL CONTROL?: YES

## 2018-11-19 NOTE — PROGRESS NOTES
Verbal order and read back per Mary Wilkins NP Patient is within therapeutic range Continue Coumadin to 2.5 mg daily except 3.75 mg on Mon Recheck 4 weeks This has been fully explained to the patient, who indicates understanding.

## 2018-11-21 ENCOUNTER — OFFICE VISIT (OUTPATIENT)
Dept: CARDIOLOGY CLINIC | Age: 73
End: 2018-11-21

## 2018-11-21 DIAGNOSIS — Z95.810 AICD (AUTOMATIC CARDIOVERTER/DEFIBRILLATOR) PRESENT: ICD-10-CM

## 2018-11-21 DIAGNOSIS — I42.8 CARDIOMYOPATHY, NONISCHEMIC (HCC): Primary | ICD-10-CM

## 2018-11-23 ENCOUNTER — APPOINTMENT (OUTPATIENT)
Dept: INFUSION THERAPY | Age: 73
End: 2018-11-23
Payer: MEDICARE

## 2018-11-23 ENCOUNTER — HOSPITAL ENCOUNTER (OUTPATIENT)
Dept: INFUSION THERAPY | Age: 73
Discharge: HOME | End: 2018-11-23
Payer: MEDICARE

## 2018-11-23 VITALS
HEART RATE: 59 BPM | TEMPERATURE: 97.6 F | SYSTOLIC BLOOD PRESSURE: 115 MMHG | DIASTOLIC BLOOD PRESSURE: 66 MMHG | OXYGEN SATURATION: 100 % | RESPIRATION RATE: 20 BRPM

## 2018-11-23 PROCEDURE — 77030012965 HC NDL HUBR BBMI -A

## 2018-11-23 PROCEDURE — 96523 IRRIG DRUG DELIVERY DEVICE: CPT

## 2018-11-23 PROCEDURE — 74011250636 HC RX REV CODE- 250/636: Performed by: INTERNAL MEDICINE

## 2018-11-23 RX ORDER — HEPARIN 100 UNIT/ML
500 SYRINGE INTRAVENOUS AS NEEDED
Status: DISCONTINUED | OUTPATIENT
Start: 2018-11-23 | End: 2018-11-27 | Stop reason: HOSPADM

## 2018-11-23 RX ORDER — SODIUM CHLORIDE 0.9 % (FLUSH) 0.9 %
10-40 SYRINGE (ML) INJECTION AS NEEDED
Status: DISCONTINUED | OUTPATIENT
Start: 2018-11-23 | End: 2018-11-27 | Stop reason: HOSPADM

## 2018-11-23 RX ADMIN — SODIUM CHLORIDE, PRESERVATIVE FREE 500 UNITS: 5 INJECTION INTRAVENOUS at 10:15

## 2018-11-23 RX ADMIN — Medication 20 ML: at 10:15

## 2018-11-23 NOTE — PROGRESS NOTES
[facility] Progress Note Date: 2018 Name: Dajuan Ernandez MRN: [id number] : 1945 Q6 Week Port Flush Mr. Alford Grade to [facility], ambulatory at 1010. Pt was assessed and education was provided. Mr. Alethea Garland vitals were reviewed and patient was observed for 5 minutes prior to treatment. Visit Vitals /66 (BP 1 Location: Right arm, BP Patient Position: Sitting) Pulse (!) 59 Temp 97.6 °F (36.4 °C) Resp 20 SpO2 100% Right chest mediport accessed with 20G 1 inch Giron needle. Flushed easily and had brisk blood return. Port flushed with 20 mL NS and 500 units of heparin before de-accessing. No irritation or bleeding. Band-aid applied. Patient arm band removed and shredded. He is to return on 2018 at 1000 for his next port flush appointment. Aniceto Barba RN 2018 
1025

## 2018-11-27 ENCOUNTER — HOSPITAL ENCOUNTER (OUTPATIENT)
Dept: INFUSION THERAPY | Age: 73
End: 2018-11-27
Payer: MEDICARE

## 2018-12-06 NOTE — PROGRESS NOTES
I have personally seen and evaluated the device findings. Interrogation reviewed and I agree with assessment. Christiano Augustine

## 2018-12-17 ENCOUNTER — ANTI-COAG VISIT (OUTPATIENT)
Dept: CARDIOLOGY CLINIC | Age: 73
End: 2018-12-17

## 2018-12-17 DIAGNOSIS — I48.91 ATRIAL FIBRILLATION, UNSPECIFIED TYPE (HCC): ICD-10-CM

## 2018-12-17 DIAGNOSIS — Z79.01 LONG TERM CURRENT USE OF ANTICOAGULANT THERAPY: Primary | ICD-10-CM

## 2018-12-17 LAB
INR BLD: 2.8
PT POC: 33.7 SECONDS
VALID INTERNAL CONTROL?: YES

## 2018-12-17 NOTE — PROGRESS NOTES
Verbal order and read back per PT INR HV CSI  The INR is stable and therapeutic. Continue same dose of coumadin ( Continue Coumadin to 2.5 mg daily except 3.75 mg on Mon)  Recheck in 1 month.

## 2019-01-07 ENCOUNTER — HOSPITAL ENCOUNTER (OUTPATIENT)
Dept: LAB | Age: 74
Discharge: HOME | End: 2019-01-07
Payer: MEDICARE

## 2019-01-07 ENCOUNTER — HOSPITAL ENCOUNTER (OUTPATIENT)
Dept: ONCOLOGY | Age: 74
Discharge: HOME | End: 2019-01-07

## 2019-01-07 ENCOUNTER — OFFICE VISIT (OUTPATIENT)
Dept: ONCOLOGY | Age: 74
End: 2019-01-07

## 2019-01-07 VITALS
DIASTOLIC BLOOD PRESSURE: 84 MMHG | HEIGHT: 73 IN | HEART RATE: 82 BPM | SYSTOLIC BLOOD PRESSURE: 143 MMHG | RESPIRATION RATE: 18 BRPM | TEMPERATURE: 97.8 F | OXYGEN SATURATION: 99 % | BODY MASS INDEX: 27.7 KG/M2 | WEIGHT: 209 LBS

## 2019-01-07 DIAGNOSIS — C49.20: Primary | ICD-10-CM

## 2019-01-07 DIAGNOSIS — G89.3 CANCER ASSOCIATED PAIN: ICD-10-CM

## 2019-01-07 DIAGNOSIS — C49.20: ICD-10-CM

## 2019-01-07 DIAGNOSIS — D47.2 MONOCLONAL GAMMOPATHY: ICD-10-CM

## 2019-01-07 DIAGNOSIS — D50.8 IRON DEFICIENCY ANEMIA SECONDARY TO INADEQUATE DIETARY IRON INTAKE: ICD-10-CM

## 2019-01-07 DIAGNOSIS — D69.6 THROMBOCYTOPENIA (HCC): ICD-10-CM

## 2019-01-07 LAB
ALBUMIN SERPL-MCNC: 3.8 G/DL (ref 3.4–5)
ALBUMIN/GLOB SERPL: 1 {RATIO} (ref 0.8–1.7)
ALP SERPL-CCNC: 94 U/L (ref 45–117)
ALT SERPL-CCNC: 32 U/L (ref 16–61)
ANION GAP SERPL CALC-SCNC: 6 MMOL/L (ref 3–18)
AST SERPL-CCNC: 33 U/L (ref 15–37)
BASO+EOS+MONOS # BLD AUTO: 0.5 K/UL (ref 0–2.3)
BASO+EOS+MONOS NFR BLD AUTO: 8 % (ref 0.1–17)
BILIRUB SERPL-MCNC: 0.7 MG/DL (ref 0.2–1)
BUN SERPL-MCNC: 29 MG/DL (ref 7–18)
BUN/CREAT SERPL: 21 (ref 12–20)
CALCIUM SERPL-MCNC: 8.4 MG/DL (ref 8.5–10.1)
CHLORIDE SERPL-SCNC: 104 MMOL/L (ref 100–108)
CO2 SERPL-SCNC: 30 MMOL/L (ref 21–32)
CREAT SERPL-MCNC: 1.38 MG/DL (ref 0.6–1.3)
DIFFERENTIAL METHOD BLD: ABNORMAL
ERYTHROCYTE [DISTWIDTH] IN BLOOD BY AUTOMATED COUNT: 15.8 % (ref 11.5–14.5)
FERRITIN SERPL-MCNC: 876 NG/ML (ref 8–388)
GLOBULIN SER CALC-MCNC: 3.7 G/DL (ref 2–4)
GLUCOSE SERPL-MCNC: 157 MG/DL (ref 74–99)
HCT VFR BLD AUTO: 40.5 % (ref 36–48)
HGB BLD-MCNC: 13.4 G/DL (ref 12–16)
IRON SATN MFR SERPL: 31 %
IRON SERPL-MCNC: 89 UG/DL (ref 50–175)
LYMPHOCYTES # BLD: 1.5 K/UL (ref 1.1–5.9)
LYMPHOCYTES NFR BLD: 21 % (ref 14–44)
MCH RBC QN AUTO: 30.7 PG (ref 25–35)
MCHC RBC AUTO-ENTMCNC: 33.1 G/DL (ref 31–37)
MCV RBC AUTO: 92.7 FL (ref 78–102)
NEUTS SEG # BLD: 5.2 K/UL (ref 1.8–9.5)
NEUTS SEG NFR BLD: 71 % (ref 40–70)
PLATELET # BLD AUTO: 138 K/UL (ref 140–440)
POTASSIUM SERPL-SCNC: 3.7 MMOL/L (ref 3.5–5.5)
PROT SERPL-MCNC: 7.5 G/DL (ref 6.4–8.2)
RBC # BLD AUTO: 4.37 M/UL (ref 4.1–5.1)
SODIUM SERPL-SCNC: 140 MMOL/L (ref 136–145)
TIBC SERPL-MCNC: 287 UG/DL (ref 250–450)
WBC # BLD AUTO: 7.2 K/UL (ref 4.5–13)

## 2019-01-07 PROCEDURE — 82728 ASSAY OF FERRITIN: CPT

## 2019-01-07 PROCEDURE — 80053 COMPREHEN METABOLIC PANEL: CPT

## 2019-01-07 PROCEDURE — 84165 PROTEIN E-PHORESIS SERUM: CPT

## 2019-01-07 PROCEDURE — 83540 ASSAY OF IRON: CPT

## 2019-01-07 PROCEDURE — 36415 COLL VENOUS BLD VENIPUNCTURE: CPT

## 2019-01-07 NOTE — PROGRESS NOTES
Hematology/Oncology  Progress Note Name: Rashel Dunbar Date: 2019 : 1945 Ronald Bauer MD  
 
Mr. Sole Velasquez is a 68 y.o. 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 Current therapy: Active surveillance; the patient has previously undergone systemic chemotherapy with 4 cycles of Doxil followed by external beam radiation therapy to the soft tissue sarcoma involving the right medial upper thigh. He also completed surgery for removal of the residual mass. Subjective:  
 
Mr. Sole Velasquez is a 71-year-old -American man with history of soft tissue sarcoma. He is here for follow-up. Today he has no new complaints. He denies having any new swelling or discomfort in right leg. He continues to ambulate with the use of a cane. He has no complaints of pain or discomfort, at this visit. Past medical history, family history, and social history: these were reviewed and remains unchanged. Past Medical History:  
Diagnosis Date  AICD (automatic cardioverter/defibrillator) present  Atrial fibrillation ([address] 2010  Cancer ([facility]  Cardiac cath 2009 Patent coronary arteries. LVEDP 28.  EF 35%. Global hyk. Mod MR.  Cardiac echocardiogram 2016 LVE. EF 15% at most.  Indeterminate LV diastolic fx.  RVE. Reduced RV systolic fx. RVSP 80-85 mmHg. LAE.  LUISA. Mod-severe MR. Mild AI. Severe TR.  Cardiac MUGA scan 2015 At most mild LVE. EF 45-46%.  Cardiac nuclear imaging test, abnormal 2009 Mild basal/mid inferior, inferoapical, inferoseptal infarct w/mild ischemia in RCA (possibly partially artifact). Anterior, anteroseptal, anterapical ischemia w/o infarct. (Mid & distal LAD.)  LVE. EF 29%. Mod global hyk.  Cardiovascular LE venous duplex 2016 Tech difficult. No DVT bilaterally.  Cardiovascular renal duplex 06/07/2010 No evidence of renal artery stenosis >60%. Patent SMA and celiac artery.  Cardiovascular UE venous duplex 10/04/2016 No DVT bilaterally.  Diabetes ([facility] [address]  HTN (hypertension) 6/8/2010  Hypertensive cardiovascular disease  MR (mitral regurgitation)  Nonischemic cardiomyopathy 3/11 echo EF 25%  Pulmonary hypertension, moderate to severe ([facility] [address] 6/8/2010  
 90-100mmHg; repeat echo in 3/11 showed 70mmHg Past Surgical History:  
Procedure Laterality Date [address] VALVE SURGERY  2011  HX HERNIA REPAIR  2011 Social History Socioeconomic History  Marital status:  Spouse name: Not on file  Number of children: Not on file  Years of education: Not on file  Highest education level: Not on file Social Needs  Financial resource strain: Not on file  Food insecurity - worry: Not on file  Food insecurity - inability: Not on file  Transportation needs - medical: Not on file  Transportation needs - non-medical: Not on file Occupational History  Not on file Tobacco Use  Smoking status: Never Smoker  Smokeless tobacco: Never Used Substance and Sexual Activity  Alcohol use: Yes Comment: occasionally.  Drug use: No  
 Sexual activity: Yes  
  Partners: Female Birth control/protection: None Other Topics Concern  Not on file Social History Narrative  Not on file Family History Problem Relation Age of Onset  Hypertension Mother Current Outpatient Medications Medication Sig Dispense Refill  furosemide (LASIX) 40 mg tablet Take 1 Tab by mouth two (2) times a day. Or directed 180 Tab 3  cloNIDine HCl (CATAPRES) 0.2 mg tablet TAKE 1 TABLET BY MOUTH 3 TIMES A  Tab 2  
 metOLazone (ZAROXOLYN) 2.5 mg tablet TAKE 1 TABLET BY MOUTH 30 MINS PRIOR TO MORNING LASIX AS DIRECTED ON MON & THU 15 Tab 4  digoxin (LANOXIN) 0.125 mg tablet TAKE 1 TABLET BY MOUTH EVERY DAY 90 Tab 3  carvedilol (COREG) 25 mg tablet Take 1 Tab by mouth two (2) times daily (with meals). 180 Tab 3  
 lisinopril (PRINIVIL, ZESTRIL) 20 mg tablet TAKE ONE TABLET BY MOUTH TWICE DAILY 180 Tab 3  potassium chloride (K-DUR, KLOR-CON) 20 mEq tablet Take 1 Tab by mouth two (2) times a day. 2 tabs extra on the days on Metalazone 210 Tab 3  warfarin (COUMADIN) 2.5 mg tablet TAKE ONE TABLET BY MOUTH ONCE DAILY OR  AS  DIRECTED  BY  YOUR  DOCTOR. 45 Tab 30  
 insulin lispro (HUMALOG U-100 INSULIN) 100 unit/mL injection by SubCUTAneous route.  insulin glargine (LANTUS U-100 INSULIN) 100 unit/mL injection 15 Units by SubCUTAneous route nightly.  magnesium oxide (MAG-OX) 400 mg tablet TAKE 1 TABLET BY MOUTH DAILY. 90 Tab 3  
 sertraline (ZOLOFT) 25 mg tablet Take  by mouth daily.  allopurinol (ZYLOPRIM) 300 mg tablet Take  by mouth daily.  FERROUS SULFATE, DRIED (IRON, DRIED, PO) Take 325 mg by mouth daily.  aspirin delayed-release (ASPIR-81) 81 mg tablet Take 81 mg by mouth daily. Review of Systems Constitutional: The patient has no acute distress or discomfort. HEENT: The patient denies recent head trauma, eye pain, blurred vision,  hearing deficit, oropharyngeal mucosal pain or lesions, and the patient denies throat pain or discomfort. Lymphatics: The patient denies palpable peripheral lymphadenopathy. Hematologic: The patient denies having bruising, bleeding, or progressive fatigue. Respiratory: Patient denies having shortness of breath, cough, sputum production, fever, or dyspnea on exertion. Cardiovascular: The patient denies having leg pain, leg swelling, heart palpitations, chest permit, chest pain, or lightheadedness. The patient denies having dyspnea on exertion. Gastrointestinal: The patient denies having nausea, emesis, or diarrhea. The patient denies having any hematemesis or blood in the stool. Genitourinary: Patient denies having urinary urgency, frequency, or dysuria. The patient denies having blood in the urine. Psychological: The patient denies having symptoms of nervousness, anxiety, depression, or thoughts of harming self. Skin: Patient denies having skin rashes, skin, ulcerations, or unexplained itching or pruritus. Musculoskeletal: The patient denies having pain in the joints or bones. Objective:  
 
Visit Vitals /84 Pulse 82 Temp 97.8 °F (36.6 °C) (Oral) Resp 18 Ht 6' 1\" (1.854 m) Wt 94.8 kg (209 lb) SpO2 99% BMI 27.57 kg/m² ECOG PS=0;  pain score=0/10 Physical Exam:  
Gen. Appearance: The patient is in no acute distress. Skin: There is no bruise or rash. HEENT: The exam is unremarkable. Neck: Supple without lymphadenopathy or thyromegaly. Lungs: Clear to auscultation and percussion; there are no wheezes or rhonchi. Heart: Regular rate and rhythm; there are no murmurs, gallops, or rubs. Abdomen: Bowel sounds are present and normal.  There is no guarding, tenderness, or hepatosplenomegaly. Extremities: There is no clubbing, cyanosis, or edema. Neurologic: There are no focal neurologic deficits. Lymphatics: There is no palpable peripheral lymphadenopathy. Musculoskeletal: The patient has full range of motion at all joints. There is no evidence of joint deformity or effusions. There is no focal joint tenderness. Psychological/psychiatric: There is no clinical evidence of anxiety, depression, or melancholy. Lab data: 
   
Results for orders placed or performed during the hospital encounter of 01/07/19 CBC WITH 3 PART DIFF     Status: Abnormal  
Result Value Ref Range Status  WBC 7.2 4.5 - 13.0 K/uL Final  
 RBC 4.37 4.10 - 5.10 M/uL Final  
 HGB 13.4 12.0 - 16 g/dL Final  
 HCT 40.5 36 - 48 % Final  
 MCV 92.7 78 - 102 FL Final  
 MCH 30.7 25.0 - 35.0 PG Final  
 MCHC 33.1 31 - 37 g/dL Final  
 RDW 15.8 (H) 11.5 - 14.5 % Final  
 PLATELET 914 (L) 065 - 440 K/uL Final  
 NEUTROPHILS 71 (H) 40 - 70 % Final  
 MIXED CELLS 8 0.1 - 17 % Final  
 LYMPHOCYTES 21 14 - 44 % Final  
 ABS. NEUTROPHILS 5.2 1.8 - 9.5 K/UL Final  
 ABS. MIXED CELLS 0.5 0.0 - 2.3 K/uL Final  
 ABS. LYMPHOCYTES 1.5 1.1 - 5.9 K/UL Final  
  Comment: Test performed at [place] [id number] Location. Results Reviewed by Medical Director. DF AUTOMATED   Final  
 
   
Assessment:  
 
1. Soft tissue sarcoma of lower extremity, unspecified laterality ([facility] [address] 2. Monoclonal gammopathy 3. Iron deficiency anemia secondary to inadequate dietary iron intake 4. Cancer associated pain 5. [facility] Plan:  
Soft tissue sarcoma in the right lower extremity: Clinically there is no evidence of disease recurrence. At his last clinic visit we discussed the findings on the most recent imaging study which showed no evidence of disease recurrence or progression. Monoclonal gammopathy of undetermined significance: From 4/4/2018 the SPEP showed no evidence of a residual monoclonal paraprotein level. I will continue to monitor this at 3-month intervals. Iron deficiency anemia: The patient's hemoglobin remains corrected at 13.4 g/dL with hematocrit of 40.5%. Cancer associated pain: Currently the patient reports that he is not having any pain or discomfort. Mild thrombocytopenia (new problem): The platelet count today is 138,000. I have explained to the patient that this is very mild thrombocytopenia and that no systemic therapy is warranted. He previously received some radiation therapy to the right leg which is probably contributing to the mild thrombocytopenia. In any event, therapeutic intervention will only be recommended if the platelet counts declines below 30,000. Follow-up in 3 months Orders Placed This Encounter  COMPLETE CBC & AUTO DIFF WBC  InHouse CBC ([employer]) Standing Status:   Future Number of Occurrences:   1 Standing Expiration Date:   1/14/2019  METABOLIC PANEL, COMPREHENSIVE Standing Status:   Future Standing Expiration Date:   1/8/2020  
 IRON PROFILE Standing Status:   Future Standing Expiration Date:   1/8/2020  SPEP Standing Status:   Future Standing Expiration Date:   1/8/2020  FERRITIN Standing Status:   Future Standing Expiration Date:   1/8/2020 Adry Waddell MD 
1/7/2019 Please note: This document has been produced using voice recognition software. Unrecognized errors in transcription may be present.

## 2019-01-07 NOTE — PATIENT INSTRUCTIONS
Learning About Monoclonal Gammopathy of Unknown Significance (MGUS) What is MGUS? Monoclonal gammopathy of unknown significance (MGUS) is a blood condition. The blood is made of many kinds of cells, including red blood cells, platelets, and white blood cells. With MGUS, a type of white blood cell called a plasma cell makes too much of the \"M\" (for \"monoclonal\") protein in the blood. Most people with MGUS are fine for many years. MGUS seldom causes symptoms or major health problems. In rare cases, MGUS may turn into multiple myeloma. This is a cancer of plasma cells in the blood that can cause bone weakness. Some people with MGUS may also have a higher risk for osteoporosis, in which bones become thin and weak. MGUS is sometimes seen in younger people, but is more common in people as they get older. It's seen most often in those over the age of 79. How is MGUS diagnosed? MGUS is often found by chance when blood tests are done for other reasons. If you have high levels of a certain protein in your blood, your doctor may order more tests. Blood tests can help identify the protein. The protein is sometimes found in the urine, so you may get a urine test too. Other tests may be done if your doctor thinks you might have a medical problem. In some cases, a bone marrow biopsy may be done to rule out a problem like multiple myeloma. How is it treated? Most people with MGUS don't need any treatment. But you may need regular physical exams, blood tests, and urine tests to make sure that MGUS isn't progressing to a medical problem. Follow-up care is a key part of your treatment and safety. Be sure to make and go to all appointments, and call your doctor if you are having problems. It's also a good idea to know your test results and keep a list of the medicines you take. Where can you learn more? Go to http://radha-tre.info/. Enter A917 in the search box to learn more about \"Learning About Monoclonal Gammopathy of Unknown Significance (MGUS). \" 
Current as of: May 7, 2018 Content Version: 11.8 © 0410-6794 [employer]. Care instructions adapted under license by [employer] (which disclaims liability or warranty for this information). If you have questions about a medical condition or this instruction, always ask your healthcare professional. [address] any warranty or liability for your use of this information. Sarcoma: Care Instructions Your Care Instructions Sarcoma is cancer of certain tissues of the body, such as the muscles, connective tissues (like tendons), blood vessels, bones, and fat. Cancer occurs when abnormal cells grow out of control. The cells can spread to other areas of the body. Sarcoma is a general name for several rare cancers that occur in these tissues. Doctors treat this type of cancer with surgery, radiation, or medicines (chemotherapy). Your doctor will treat you based on how quickly or slowly the type of cancer you have may spread, how far the cancer has spread, and your overall health. One or more treatments may be used. When you find out that you have cancer, you may feel many emotions and may need some help coping. Seek out family, friends, and counselors for support. You also can do things at home to make yourself feel better while you go through treatment. Call the [employer] [facility] (6-289.463.1371) or visit its website at [address] [employer] for more information. Follow-up care is a key part of your treatment and safety. Be sure to make and go to all appointments, and call your doctor if you are having problems. It's also a good idea to know your test results and keep a list of the medicines you take. How can you care for yourself at home? · Take your medicines exactly as prescribed.  Call your doctor if you think you are having a problem with your medicine. You may get medicine for nausea and vomiting if you have these side effects. · Eat healthy food. If you do not feel like eating, try to eat food that has protein and extra calories to keep up your strength and prevent weight loss. Drink liquid meal replacements for extra calories and protein. Try to eat your main meal early. · Get some physical activity every day, but do not get too tired. Keep doing the hobbies you enjoy as your energy allows. · Take steps to control your stress and workload. Learn relaxation techniques. ? Share your feelings. Stress and tension affect our emotions. By expressing your feelings to others, you may be able to understand and cope with them. ? Consider joining a support group. Talking about a problem with your spouse, a good friend, or other people with similar problems is a good way to reduce tension and stress. ? Express yourself through art. Try writing, crafts, dance, or art to relieve stress. Some dance, writing, or art groups may be available just for people who have cancer. ? Be kind to your body and mind. Getting enough sleep, eating a healthy diet, and taking time to do things you enjoy can contribute to an overall feeling of balance in your life and help reduce stress. ? Get help if you need it. Discuss your concerns with your doctor or counselor. · If you are vomiting or have diarrhea: ? Drink plenty of fluids (enough so that your urine is light yellow or clear like water) to prevent dehydration. Choose water and other caffeine-free clear liquids. If you have kidney, heart, or liver disease and have to limit fluids, talk with your doctor before you increase the amount of fluids you drink. ? When you are able to eat, try clear soups, mild foods, and liquids until all symptoms are gone for 12 to 48 hours. Other good choices include dry toast, crackers, cooked cereal, and gelatin dessert, such as Jell-O. · If you have not already done so, prepare a list of advance directives. Advance directives are instructions to your doctor and family members about what kind of care you want if you become unable to speak or express yourself. When should you call for help? Call 911 anytime you think you may need emergency care. For example, call if: 
  · You passed out (lost consciousness).  
 Call your doctor now or seek immediate medical care if: 
  · You have a fever.  
  · You have abnormal bleeding.  
  · You have new or worse pain.  
  · You think you have an infection.  
  · You have new symptoms, such as a cough, belly pain, vomiting, diarrhea, or a rash.  
 Watch closely for changes in your health, and be sure to contact your doctor if: 
  · You are much more tired than usual.  
  · You have swollen glands in your armpits, groin, or neck.  
  · You do not get better as expected. Where can you learn more? Go to http://radha-tre.info/. Enter Osiel Gonzalez in the search box to learn more about \"Sarcoma: Care Instructions. \" Current as of: March 28, 2018 Content Version: 11.8 © 2447-3987 [employer]. Care instructions adapted under license by [employer] (which disclaims liability or warranty for this information). If you have questions about a medical condition or this instruction, always ask your healthcare professional. [address] any warranty or liability for your use of this information.

## 2019-01-08 ENCOUNTER — HOSPITAL ENCOUNTER (OUTPATIENT)
Dept: INFUSION THERAPY | Age: 74
Discharge: HOME | End: 2019-01-08
Payer: MEDICARE

## 2019-01-08 VITALS
RESPIRATION RATE: 20 BRPM | SYSTOLIC BLOOD PRESSURE: 123 MMHG | OXYGEN SATURATION: 97 % | TEMPERATURE: 97.7 F | DIASTOLIC BLOOD PRESSURE: 66 MMHG | HEART RATE: 77 BPM

## 2019-01-08 PROCEDURE — 77030012965 HC NDL HUBR BBMI -A

## 2019-01-08 PROCEDURE — 74011250636 HC RX REV CODE- 250/636

## 2019-01-08 PROCEDURE — 96523 IRRIG DRUG DELIVERY DEVICE: CPT

## 2019-01-08 RX ORDER — SODIUM CHLORIDE 0.9 % (FLUSH) 0.9 %
10-40 SYRINGE (ML) INJECTION AS NEEDED
Status: DISCONTINUED | OUTPATIENT
Start: 2019-01-08 | End: 2019-01-12 | Stop reason: HOSPADM

## 2019-01-08 RX ORDER — HEPARIN 100 UNIT/ML
500 SYRINGE INTRAVENOUS AS NEEDED
Status: DISCONTINUED | OUTPATIENT
Start: 2019-01-08 | End: 2019-01-12 | Stop reason: HOSPADM

## 2019-01-08 RX ORDER — HEPARIN 100 UNIT/ML
SYRINGE INTRAVENOUS
Status: COMPLETED
Start: 2019-01-08 | End: 2019-01-08

## 2019-01-08 RX ORDER — ATORVASTATIN CALCIUM 20 MG/1
20 TABLET, FILM COATED ORAL DAILY
COMMUNITY

## 2019-01-08 RX ADMIN — SODIUM CHLORIDE, PRESERVATIVE FREE 500 UNITS: 5 INJECTION INTRAVENOUS at 10:13

## 2019-01-08 RX ADMIN — Medication 20 ML: at 10:13

## 2019-01-08 RX ADMIN — HEPARIN 500 UNITS: 100 SYRINGE at 10:13

## 2019-01-09 LAB
ALBUMIN SERPL ELPH-MCNC: 3.6 G/DL (ref 2.9–4.4)
ALBUMIN/GLOB SERPL: 1 {RATIO} (ref 0.7–1.7)
ALPHA1 GLOB SERPL ELPH-MCNC: 0.2 G/DL (ref 0–0.4)
ALPHA2 GLOB SERPL ELPH-MCNC: 0.8 G/DL (ref 0.4–1)
B-GLOBULIN SERPL ELPH-MCNC: 1.1 G/DL (ref 0.7–1.3)
GAMMA GLOB SERPL ELPH-MCNC: 1.6 G/DL (ref 0.4–1.8)
GLOBULIN SER CALC-MCNC: 3.7 G/DL (ref 2.2–3.9)
M PROTEIN SERPL ELPH-MCNC: NORMAL G/DL
PROT SERPL-MCNC: 7.3 G/DL (ref 6–8.5)

## 2019-01-20 NOTE — PROGRESS NOTES
Lizzette Hairston presents today for a 9 month check-up. He was last seen by Dr. Christina Hernadez in April 2018. He is a 68year old male with history of atrial fibrillation, pulmonary hypertension, hypertension, and non-ischemic cardiomyopathy (s/p AICD for primary prevention of sudden cardiac death). Device generator change was done on May 7, 2018. 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 It was successfully removed in April 2016 and his understanding is that the edges were clear. His last echo was completed in May 2018 and it showed an EF 35%, moderate diffuse hypokinesis, and estimated peak pressure was 42 mmHg. Since his last visit, he states that he has been doing well. He has not had any problems with volume overload. His weight is up 27 pounds but he states that he has been trying to gain weight as he was \"too thin\" during his last visit. He denies chest pain, tightness, heaviness, and palpitations. He denies shortness of breath at rest, SHRESTHA, denies orthopnea and PND. He denies abdominal bloating. He denies lightheadedness, dizziness, and syncope. He denies claudication. Denies nausea, vomiting, diarrhea, fever, chills. According to Dr. Ariza Ed last office note, there has been no recurrence of his sarcoma. PMH: 
Past Medical History:  
Diagnosis Date  AICD (automatic cardioverter/defibrillator) present  Atrial fibrillation ([address] 6/8/2010  Cancer [address] [address]  Cardiac cath 02/11/2009 Patent coronary arteries. LVEDP 28.  EF 35%. Global hyk. Mod MR.  Cardiac echocardiogram 09/29/2016 LVE. EF 15% at most.  Indeterminate LV diastolic fx.  RVE. Reduced RV systolic fx. RVSP 80-85 mmHg. LAE.  LUISA. Mod-severe MR. Mild AI. Severe TR.  Cardiac MUGA scan 11/02/2015 At most mild LVE. EF 45-46%.  Cardiac nuclear imaging test, abnormal 02/03/2009  Mild basal/mid inferior, inferoapical, inferoseptal infarct w/mild ischemia in RCA (possibly partially artifact). Anterior, anteroseptal, anterapical ischemia w/o infarct. (Mid & distal LAD.)  LVE. EF 29%. Mod global hyk.  Cardiovascular LE venous duplex 09/29/2016 Tech difficult. No DVT bilaterally.  Cardiovascular renal duplex 06/07/2010 No evidence of renal artery stenosis >60%. Patent SMA and celiac artery.  Cardiovascular UE venous duplex 10/04/2016 No DVT bilaterally.  Diabetes ([address]  HTN (hypertension) 6/8/2010  Hypertensive cardiovascular disease  MR (mitral regurgitation)  Nonischemic cardiomyopathy 3/11 echo EF 25%  Pulmonary hypertension, moderate to severe ([address] 6/8/2010  
 90-100mmHg; repeat echo in 3/11 showed 70mmHg PSH: 
Past Surgical History:  
Procedure Laterality Date [address] VALVE SURGERY  2011  HX HERNIA REPAIR  2011 MEDS: 
Current Outpatient Medications Medication Sig  
 atorvastatin (LIPITOR) 20 mg tablet Take 20 mg by mouth daily.  furosemide (LASIX) 40 mg tablet Take 1 Tab by mouth two (2) times a day. Or directed  cloNIDine HCl (CATAPRES) 0.2 mg tablet TAKE 1 TABLET BY MOUTH 3 TIMES A DAY  metOLazone (ZAROXOLYN) 2.5 mg tablet TAKE 1 TABLET BY MOUTH 30 MINS PRIOR TO MORNING LASIX AS DIRECTED ON MON & THU  digoxin (LANOXIN) 0.125 mg tablet TAKE 1 TABLET BY MOUTH EVERY DAY  carvedilol (COREG) 25 mg tablet Take 1 Tab by mouth two (2) times daily (with meals).  lisinopril (PRINIVIL, ZESTRIL) 20 mg tablet TAKE ONE TABLET BY MOUTH TWICE DAILY  potassium chloride (K-DUR, KLOR-CON) 20 mEq tablet Take 1 Tab by mouth two (2) times a day. 2 tabs extra on the days on Metalazone  warfarin (COUMADIN) 2.5 mg tablet TAKE ONE TABLET BY MOUTH ONCE DAILY OR  AS  DIRECTED  BY  YOUR  DOCTOR.  insulin lispro (HUMALOG U-100 INSULIN) 100 unit/mL injection by SubCUTAneous route.   
 insulin glargine (LANTUS U-100 INSULIN) 100 unit/mL injection 15 Units by SubCUTAneous route nightly.  magnesium oxide (MAG-OX) 400 mg tablet TAKE 1 TABLET BY MOUTH DAILY.  sertraline (ZOLOFT) 25 mg tablet Take  by mouth daily.  allopurinol (ZYLOPRIM) 300 mg tablet Take  by mouth daily.  FERROUS SULFATE, DRIED (IRON, DRIED, PO) Take 325 mg by mouth daily.  aspirin delayed-release (ASPIR-81) 81 mg tablet Take 81 mg by mouth daily. No current facility-administered medications for this visit. Allergies and Sensitivities: 
No Known Allergies Family History: 
Family History Problem Relation Age of Onset  Hypertension Mother Social History: He  reports that  has never smoked. he has never used smokeless tobacco.  He  reports that he drinks alcohol. Physical: 
Visit Vitals /80 Pulse 74 Ht 6' 1\" (1.854 m) Wt 93 kg (205 lb) SpO2 98% BMI 27.05 kg/m² He is up 27 pounds since his last appointment. Exam: 
Neck:  Supple, no JVD, no carotid bruits CV:  Normal S1 and  S2, grade I/VI soft JACQUIE noted, no rubs or gallops noted. Lungs:  Clear to ausculation throughout, no wheezes or rales Abd:  Soft, non-tender, distended with good bowel sounds. No hepatosplenomegaly Extremities:  trace lower extremity edema Data: 
EKG:   Read by Naresh Rodriguez MD - A-V paced rhythm LABS: 
Lab Results Component Value Date/Time Sodium 140 01/07/2019 09:30 AM  
 Potassium 3.7 01/07/2019 09:30 AM  
 Chloride 104 01/07/2019 09:30 AM  
 CO2 30 01/07/2019 09:30 AM  
 Glucose 157 (H) 01/07/2019 09:30 AM  
 BUN 29 (H) 01/07/2019 09:30 AM  
 Creatinine 1.38 (H) 01/07/2019 09:30 AM  
 
Lab Results Component Value Date/Time Cholesterol, total 85 01/23/2016 05:30 AM  
 HDL Cholesterol 31 (L) 01/23/2016 05:30 AM  
 LDL, calculated 34 01/23/2016 05:30 AM  
 Triglyceride 100 01/23/2016 05:30 AM  
 CHOL/HDL Ratio 2.7 01/23/2016 05:30 AM  
 
Lab Results Component Value Date/Time  ALT (SGPT) 32 01/07/2019 09:30 AM  
 
 
 Impression / Plan: 1. Atrial fibrillation, rate controlled and anticoagulated with Coumadin 2. Hypertension, blood pressure well-controlled 3. Pulmonary hypertension, RVSP 80-85 mmHg (by echo Sept. 2016) 4. Non-ischemic cardiomyopathy, EF 45% (by MUGA scan), s/p AICD implant 5. 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 Chronic systolic heart failure, now compensated Mr. Jorge Luis Ross was seen today for follow-up. He reports that he has been feeling well and has had no problems with volume overload. He has not noticed any lower extremity edema, shortness of breath, or abdominal bloating. His twice a week metolazone \"has been working well. \" He has gained weight \"on purpose\" as he states that he was \"too thin\" during his last appointment. His appetite is good. He is compliant with his medications and no changes were made today. His INR was 1.7 and he was instructed to take 5mg today only and then resume his normal dosing schedule and return to the Coumadin clinic in one month. Congestive heart failure teaching reinforced today. Advised to limit sodium intake to no more than 2000mg per day and to also watch fluid intake. Advised to weigh daily every morning and record weights. Instructed to call our office if progressive weight gain is noted over a 2 to 3 day period of time, shortness of breath increases, or if abdominal bloating, nausea, fatigue, or increased lower extremity edema is noted. The importance of daily weights stressed and the importance of reporting progressive weight gain immediately. He will follow-up with Dr. Courtney Luna as scheduled and PRN. He has a [facility] transmission scheduled for late February 2019. Chantel Bertrand MSN, FNP-BC Please note:  Portions of this chart were created with Dragon medical speech to text program.  Unrecognized errors may be present.

## 2019-01-22 ENCOUNTER — OFFICE VISIT (OUTPATIENT)
Dept: CARDIOLOGY CLINIC | Age: 74
End: 2019-01-22

## 2019-01-22 VITALS
HEIGHT: 73 IN | DIASTOLIC BLOOD PRESSURE: 80 MMHG | WEIGHT: 205 LBS | HEART RATE: 74 BPM | OXYGEN SATURATION: 98 % | SYSTOLIC BLOOD PRESSURE: 136 MMHG | BODY MASS INDEX: 27.17 KG/M2

## 2019-01-22 DIAGNOSIS — Z79.01 LONG TERM CURRENT USE OF ANTICOAGULANT THERAPY: ICD-10-CM

## 2019-01-22 DIAGNOSIS — I10 ESSENTIAL HYPERTENSION: ICD-10-CM

## 2019-01-22 DIAGNOSIS — I42.8 CARDIOMYOPATHY, NONISCHEMIC (HCC): ICD-10-CM

## 2019-01-22 DIAGNOSIS — Z95.810 AUTOMATIC IMPLANTABLE CARDIOVERTER-DEFIBRILLATOR IN SITU: ICD-10-CM

## 2019-01-22 DIAGNOSIS — I48.91 ATRIAL FIBRILLATION, UNSPECIFIED TYPE (HCC): Primary | ICD-10-CM

## 2019-01-22 LAB
INR BLD: 1.7
PT POC: 20.3 SECONDS
VALID INTERNAL CONTROL?: YES

## 2019-02-18 ENCOUNTER — ANTI-COAG VISIT (OUTPATIENT)
Dept: CARDIOLOGY CLINIC | Age: 74
End: 2019-02-18

## 2019-02-18 DIAGNOSIS — Z79.01 LONG TERM CURRENT USE OF ANTICOAGULANT THERAPY: Primary | ICD-10-CM

## 2019-02-18 DIAGNOSIS — I48.91 ATRIAL FIBRILLATION, UNSPECIFIED TYPE (HCC): ICD-10-CM

## 2019-02-18 LAB
INR BLD: 2.8
PT POC: 33.1 SECONDS
VALID INTERNAL CONTROL?: YES

## 2019-02-18 NOTE — PROGRESS NOTES
Verbal order and read back per Carlo Wei The INR is stable and therapeutic. Continue same dose of coumadin (Continue Coumadin to 2.5 mg daily except 3.75 mg on Mon) Recheck in 1 month.

## 2019-02-18 NOTE — PATIENT INSTRUCTIONS
Verbal order and read back per Jacinto Section The INR is stable and therapeutic. Continue same dose of coumadin (Continue Coumadin to 2.5 mg daily except 3.75 mg on Mon) Recheck in 1 month.

## 2019-02-19 ENCOUNTER — HOSPITAL ENCOUNTER (OUTPATIENT)
Dept: INFUSION THERAPY | Age: 74
Discharge: HOME | End: 2019-02-19
Payer: MEDICARE

## 2019-02-19 VITALS
TEMPERATURE: 97.7 F | SYSTOLIC BLOOD PRESSURE: 171 MMHG | HEART RATE: 92 BPM | OXYGEN SATURATION: 100 % | RESPIRATION RATE: 22 BRPM | DIASTOLIC BLOOD PRESSURE: 73 MMHG

## 2019-02-19 PROCEDURE — 96523 IRRIG DRUG DELIVERY DEVICE: CPT

## 2019-02-19 PROCEDURE — 74011250636 HC RX REV CODE- 250/636

## 2019-02-19 PROCEDURE — 77030012965 HC NDL HUBR BBMI -A

## 2019-02-19 RX ORDER — HEPARIN 100 UNIT/ML
SYRINGE INTRAVENOUS
Status: COMPLETED
Start: 2019-02-19 | End: 2019-02-19

## 2019-02-19 RX ORDER — SODIUM CHLORIDE 0.9 % (FLUSH) 0.9 %
10-40 SYRINGE (ML) INJECTION AS NEEDED
Status: DISCONTINUED | OUTPATIENT
Start: 2019-02-19 | End: 2019-02-23 | Stop reason: HOSPADM

## 2019-02-19 RX ORDER — HEPARIN 100 UNIT/ML
500 SYRINGE INTRAVENOUS AS NEEDED
Status: DISCONTINUED | OUTPATIENT
Start: 2019-02-19 | End: 2019-02-23 | Stop reason: HOSPADM

## 2019-02-19 RX ADMIN — SODIUM CHLORIDE, PRESERVATIVE FREE 500 UNITS: 5 INJECTION INTRAVENOUS at 14:10

## 2019-02-19 RX ADMIN — HEPARIN 500 UNITS: 100 SYRINGE at 14:10

## 2019-02-19 RX ADMIN — Medication 20 ML: at 14:09

## 2019-02-19 NOTE — PROGRESS NOTES
[facility] Progress Note Date: 2019 Name: Kary Whitlock MRN: [id number] : 1945 Q6 Week [place]ush Mr. Jesica Colon to [facility], ambulatory at 1400. Pt was assessed and education was provided. Mr. Ghislaine Caballero vitals were reviewed and patient was observed for 5 minutes prior to treatment. Visit Vitals /73 (BP 1 Location: Left arm, BP Patient Position: Sitting) Pulse 92 Temp 97.7 °F (36.5 °C) Resp 22 SpO2 100% Right chest mediport accessed with 20G 1 inch Giron needle. Flushed easily and had brisk blood return. Port flushed with 20 mL NS and 500 units of heparin before de-accessing. No irritation or bleeding. Band-aid applied. Patient arm band removed and shredded. He is to return on 2019 at 0830 for his next port flush appointment. Juan Antonio Zimmerman RN 2019 Monse Caballero

## 2019-02-27 ENCOUNTER — OFFICE VISIT (OUTPATIENT)
Dept: CARDIOLOGY CLINIC | Age: 74
End: 2019-02-27

## 2019-02-27 DIAGNOSIS — Z95.810 AUTOMATIC IMPLANTABLE CARDIOVERTER-DEFIBRILLATOR IN SITU: ICD-10-CM

## 2019-02-27 DIAGNOSIS — I42.8 CARDIOMYOPATHY, NONISCHEMIC (HCC): Primary | ICD-10-CM

## 2019-02-28 NOTE — PROGRESS NOTES
I have personally seen and evaluated the device findings. Interrogation reviewed and I agree with assessment. Gilmer Ruelas

## 2019-03-18 ENCOUNTER — ANTI-COAG VISIT (OUTPATIENT)
Dept: CARDIOLOGY CLINIC | Age: 74
End: 2019-03-18

## 2019-03-18 DIAGNOSIS — Z79.01 LONG TERM CURRENT USE OF ANTICOAGULANT THERAPY: Primary | ICD-10-CM

## 2019-03-18 DIAGNOSIS — I48.91 ATRIAL FIBRILLATION, UNSPECIFIED TYPE (HCC): ICD-10-CM

## 2019-03-18 LAB
INR BLD: 2.8
PT POC: 33.6 SECONDS
VALID INTERNAL CONTROL?: YES

## 2019-03-18 NOTE — PROGRESS NOTES
Verbal order and read back per Anjel Koo     Continue Coumadin to 2.5 mg daily except 3.75 mg on Mon

## 2019-03-25 RX ORDER — CLONIDINE HYDROCHLORIDE 0.2 MG/1
TABLET ORAL
Qty: 270 TAB | Refills: 2 | Status: SHIPPED | OUTPATIENT
Start: 2019-03-25 | End: 2019-04-02 | Stop reason: SDUPTHER

## 2019-04-02 ENCOUNTER — OFFICE VISIT (OUTPATIENT)
Dept: CARDIOLOGY CLINIC | Age: 74
End: 2019-04-02

## 2019-04-02 ENCOUNTER — CLINICAL SUPPORT (OUTPATIENT)
Dept: CARDIOLOGY CLINIC | Age: 74
End: 2019-04-02

## 2019-04-02 VITALS
OXYGEN SATURATION: 97 % | DIASTOLIC BLOOD PRESSURE: 88 MMHG | HEIGHT: 73 IN | HEART RATE: 63 BPM | WEIGHT: 211 LBS | SYSTOLIC BLOOD PRESSURE: 162 MMHG | BODY MASS INDEX: 27.96 KG/M2

## 2019-04-02 DIAGNOSIS — I42.8 CARDIOMYOPATHY, NONISCHEMIC (HCC): Primary | ICD-10-CM

## 2019-04-02 DIAGNOSIS — Z95.810 AUTOMATIC IMPLANTABLE CARDIOVERTER-DEFIBRILLATOR IN SITU: ICD-10-CM

## 2019-04-02 NOTE — PROGRESS NOTES
HISTORY OF PRESENT ILLNESS Stacy Dominguez is a 68 y.o. male. ASSESSMENT and PLAN Mr. Kasie Duffy has history of nonischemic cardiomyopathy as well as severe pulmonary hypertension with moderate mitral regurgitation. Because of severe LV dysfunction, he has AICD in place for primary prevention of sudden cardiac death. He also has persistent atrial fibrillation. In 2016, his right thigh sarcoma was successfully removed in [facility]. His understanding is that the edges were clear. He had lost significant amount of weight, over 30 pounds. He had weighed 193 pounds back in October 2015. Angy Zaldivar is weight is now back to baseline at 194 pounds.   
His last MUGA scan in November of 2015 showed ejection fraction of 45%. His echocardiogram in September of 2015 showed ejection fraction of 45-50% with pulmonary hypertension with PA pressure of 45 mmHg. He had AICD generator change in May 2018. ? CAD:   He has no documented history of CAD. ? CAF:   Rate controlled on Coumadin. ? NIDCM: There is no evidence of decompensated CHF currently. He has AICD in place. His last generator change was May 2018. 
? BP:   Mildly elevated. I did not make any changes at this time. He has put on some weight which is excellent. His current weight is acceptable. His blood pressure medication will likely need to be further adjusted as he stabilizes weight. 
? HR:    Stable. ? CHF:   There is no evidence of decompensated CHF noted. ? Weight:   His weight today is 211 pounds. His previous weight was 178 pounds on April 2018. His current weight is acceptable for his height. ? Cholesterol:   Target LDL <110. · Anti-platelet:   Remains on Coumadin.   
  
I will see him back in 8-9 months. Thank you. Encounter Diagnoses Name Primary?  Cardiomyopathy, nonischemic (HCC) Yes  
 
current treatment plan is effective, no change in therapy 
lab results and schedule of future lab studies reviewed with patient reviewed diet, exercise and weight control 
use of aspirin to prevent MI and TIA's discussed HPI Today, Mr. Brenda Diaz has no complaints of chest pains, increased shortness of breath or decreased exercise capacity. He is in good spirits. Since his last visit in April 2018, he has put on about 30 pounds. He had to gain some weight after his sarcoma surgery and weight loss. His ideal weight is likely around 200 pounds. He denies any changes in his exercise capacity. He denies any palpitations, dizziness or jerry syncope. He denies any orthopnea or PND. Review of Systems Respiratory: Negative for shortness of breath. Cardiovascular: Negative for chest pain, palpitations, orthopnea, claudication, leg swelling and PND. All other systems reviewed and are negative. Physical Exam  
Constitutional: He is oriented to person, place, and time. He appears well-developed and well-nourished. HENT:  
Head: Normocephalic. Eyes: Conjunctivae are normal.  
Neck: Neck supple. No JVD present. Carotid bruit is not present. No thyromegaly present. Cardiovascular: Normal rate. An irregularly irregular rhythm present. Pulmonary/Chest: Breath sounds normal.  
Abdominal: Bowel sounds are normal.  
Musculoskeletal: He exhibits no edema. Neurological: He is alert and oriented to person, place, and time. Skin: Skin is warm and dry. Nursing note and vitals reviewed. PCP: Alex Jones MD 
 
Past Medical History:  
Diagnosis Date  AICD (automatic cardioverter/defibrillator) present  Atrial fibrillation ([facility] [address] 6/8/2010  Cancer ([facility] [address]  Cardiac cath 02/11/2009 Patent coronary arteries. LVEDP 28.  EF 35%. Global hyk. Mod MR.  Cardiac echocardiogram 09/29/2016 LVE. EF 15% at most.  Indeterminate LV diastolic fx.  RVE. Reduced RV systolic fx. RVSP 80-85 mmHg. LAE.  LUISA. Mod-severe MR. Mild AI. Severe TR.  Cardiac MUGA scan 11/02/2015 At most mild LVE. EF 45-46%.  Cardiac nuclear imaging test, abnormal 02/03/2009 Mild basal/mid inferior, inferoapical, inferoseptal infarct w/mild ischemia in RCA (possibly partially artifact). Anterior, anteroseptal, anterapical ischemia w/o infarct. (Mid & distal LAD.)  LVE. EF 29%. Mod global hyk.  Cardiovascular LE venous duplex 09/29/2016 Tech difficult. No DVT bilaterally.  Cardiovascular renal duplex 06/07/2010 No evidence of renal artery stenosis >60%. Patent SMA and celiac artery.  Cardiovascular UE venous duplex 10/04/2016 No DVT bilaterally.  Diabetes ([address]  HTN (hypertension) 6/8/2010  Hypertensive cardiovascular disease  MR (mitral regurgitation)  Nonischemic cardiomyopathy 3/11 echo EF 25%  Pulmonary hypertension, moderate to severe ([address] 6/8/2010  
 90-100mmHg; repeat echo in 3/11 showed 70mmHg Past Surgical History:  
Procedure Laterality Date [address] VALVE SURGERY  2011  HX HERNIA REPAIR  2011 Current Outpatient Medications Medication Sig Dispense Refill  atorvastatin (LIPITOR) 20 mg tablet Take 20 mg by mouth daily.  furosemide (LASIX) 40 mg tablet Take 1 Tab by mouth two (2) times a day. Or directed 180 Tab 3  cloNIDine HCl (CATAPRES) 0.2 mg tablet TAKE 1 TABLET BY MOUTH 3 TIMES A  Tab 2  
 metOLazone (ZAROXOLYN) 2.5 mg tablet TAKE 1 TABLET BY MOUTH 30 MINS PRIOR TO MORNING LASIX AS DIRECTED ON MON & THU 15 Tab 4  
 digoxin (LANOXIN) 0.125 mg tablet TAKE 1 TABLET BY MOUTH EVERY DAY 90 Tab 3  carvedilol (COREG) 25 mg tablet Take 1 Tab by mouth two (2) times daily (with meals). 180 Tab 3  
 lisinopril (PRINIVIL, ZESTRIL) 20 mg tablet TAKE ONE TABLET BY MOUTH TWICE DAILY 180 Tab 3  potassium chloride (K-DUR, KLOR-CON) 20 mEq tablet Take 1 Tab by mouth two (2) times a day.  2 tabs extra on the days on Metalazone 210 Tab 3  
  warfarin (COUMADIN) 2.5 mg tablet TAKE ONE TABLET BY MOUTH ONCE DAILY OR  AS  DIRECTED  BY  YOUR  DOCTOR. 45 Tab 30  
 insulin lispro (HUMALOG U-100 INSULIN) 100 unit/mL injection by SubCUTAneous route.  insulin glargine (LANTUS U-100 INSULIN) 100 unit/mL injection 15 Units by SubCUTAneous route nightly.  magnesium oxide (MAG-OX) 400 mg tablet TAKE 1 TABLET BY MOUTH DAILY. 90 Tab 3  
 sertraline (ZOLOFT) 25 mg tablet Take  by mouth daily.  allopurinol (ZYLOPRIM) 300 mg tablet Take  by mouth daily.  FERROUS SULFATE, DRIED (IRON, DRIED, PO) Take 325 mg by mouth daily.  aspirin delayed-release (ASPIR-81) 81 mg tablet Take 81 mg by mouth daily. Facility-Administered Medications Ordered in Other Visits Medication Dose Route Frequency Provider Last Rate Last Dose  sodium chloride (NS) flush 10-40 mL  10-40 mL IntraVENous PRN Adele Wu MD      
 heparin (porcine) pf 500 Units  500 Units Ben Rodriguez MD      
 
 
The patient has a family history of 
 
Social History Tobacco Use  Smoking status: Never Smoker  Smokeless tobacco: Never Used Substance Use Topics  Alcohol use: Yes Comment: occasionally.  Drug use: No  
 
 
Lab Results Component Value Date/Time Cholesterol, total 85 01/23/2016 05:30 AM  
 HDL Cholesterol 31 (L) 01/23/2016 05:30 AM  
 LDL, calculated 34 01/23/2016 05:30 AM  
 Triglyceride 100 01/23/2016 05:30 AM  
 CHOL/HDL Ratio 2.7 01/23/2016 05:30 AM  
  
 
BP Readings from Last 3 Encounters:  
04/02/19 162/88  
02/19/19 171/73  
01/22/19 136/80 Pulse Readings from Last 3 Encounters:  
04/02/19 63  
02/19/19 92  
01/22/19 74 Wt Readings from Last 3 Encounters:  
04/02/19 95.7 kg (211 lb)  
01/22/19 93 kg (205 lb) 01/07/19 94.8 kg (209 lb) EKG: unchanged from previous tracings, nonspecific ST and T waves changes, atrial fibrillation, rate 63, occasional ventricular paced complexes.

## 2019-04-02 NOTE — PROGRESS NOTES
Jeremy Guerrero presents today for Chief Complaint Patient presents with  Irregular Heart Beat  
  1 year follow up Jeremy Guerrero preferred language for health care discussion is english/other. Is someone accompanying this pt? no 
 
Is the patient using any DME equipment during [facility]? yes Depression Screening: 
3 most recent PHQ Screens 4/2/2019 Little interest or pleasure in doing things Not at all Feeling down, depressed, irritable, or hopeless Not at all Total Score PHQ 2 0 Learning Assessment: 
Learning Assessment 4/2/2019 PRIMARY LEARNER Patient HIGHEST LEVEL OF EDUCATION - PRIMARY LEARNER  -  
CO-LEARNER CAREGIVER -  
PRIMARY LANGUAGE ENGLISH  NEED -  
LEARNER PREFERENCE PRIMARY DEMONSTRATION  
LEARNING SPECIAL TOPICS -  
ANSWERED BY patient RELATIONSHIP SELF Abuse Screening: 
Abuse Screening Questionnaire 4/2/2019 Do you ever feel afraid of your partner? Mikal March Are you in a relationship with someone who physically or mentally threatens you? Mikal March Is it safe for you to go home? [facility] Fall Risk Fall Risk Assessment, last 12 mths 4/2/2019 Able to walk? Yes Fall in past 12 months? No  
 
 
Pt currently taking Anticoagulant or Antiplatelet therapy? yes Coordination of Care: 1. Have you been to the ER, urgent care clinic since your last visit? Hospitalized since your last visit? no 
 
2. Have you seen or consulted any other health care providers outside of the [address] [place] [address] since your last visit? Include any pap smears or colon screening. no 
 
Has patient had recent lab work or cardiac testing? Yes, lab work. Patient verified medications verbally

## 2019-04-02 NOTE — PROGRESS NOTES
I have personally seen and evaluated the device findings. Interrogation reviewed and I agree with assessment. Ari Bustos

## 2019-04-08 ENCOUNTER — HOSPITAL ENCOUNTER (OUTPATIENT)
Dept: ONCOLOGY | Age: 74
Discharge: HOME | End: 2019-04-08

## 2019-04-08 ENCOUNTER — OFFICE VISIT (OUTPATIENT)
Dept: ONCOLOGY | Age: 74
End: 2019-04-08

## 2019-04-08 ENCOUNTER — HOSPITAL ENCOUNTER (OUTPATIENT)
Dept: LAB | Age: 74
Discharge: HOME | End: 2019-04-08
Payer: MEDICARE

## 2019-04-08 VITALS
OXYGEN SATURATION: 100 % | DIASTOLIC BLOOD PRESSURE: 83 MMHG | TEMPERATURE: 97.8 F | WEIGHT: 211 LBS | HEART RATE: 81 BPM | SYSTOLIC BLOOD PRESSURE: 142 MMHG | HEIGHT: 73 IN | BODY MASS INDEX: 27.96 KG/M2 | RESPIRATION RATE: 18 BRPM

## 2019-04-08 DIAGNOSIS — D69.6 THROMBOCYTOPENIA (HCC): ICD-10-CM

## 2019-04-08 DIAGNOSIS — D50.8 IRON DEFICIENCY ANEMIA SECONDARY TO INADEQUATE DIETARY IRON INTAKE: ICD-10-CM

## 2019-04-08 DIAGNOSIS — D47.2 MONOCLONAL GAMMOPATHY: Primary | ICD-10-CM

## 2019-04-08 DIAGNOSIS — D47.2 MONOCLONAL GAMMOPATHY: ICD-10-CM

## 2019-04-08 DIAGNOSIS — G89.3 CANCER ASSOCIATED PAIN: ICD-10-CM

## 2019-04-08 DIAGNOSIS — C49.21 SOFT TISSUE SARCOMA OF RIGHT THIGH (HCC): ICD-10-CM

## 2019-04-08 LAB
BASO+EOS+MONOS # BLD AUTO: 0.6 K/UL (ref 0–2.3)
BASO+EOS+MONOS NFR BLD AUTO: 8 % (ref 0.1–17)
DIFFERENTIAL METHOD BLD: ABNORMAL
ERYTHROCYTE [DISTWIDTH] IN BLOOD BY AUTOMATED COUNT: 16 % (ref 11.5–14.5)
FERRITIN SERPL-MCNC: 804 NG/ML (ref 8–388)
HCT VFR BLD AUTO: 38.4 % (ref 36–48)
HGB BLD-MCNC: 12.4 G/DL (ref 12–16)
IRON SATN MFR SERPL: 43 %
IRON SERPL-MCNC: 118 UG/DL (ref 50–175)
LYMPHOCYTES # BLD: 1.5 K/UL (ref 1.1–5.9)
LYMPHOCYTES NFR BLD: 19 % (ref 14–44)
MCH RBC QN AUTO: 30.1 PG (ref 25–35)
MCHC RBC AUTO-ENTMCNC: 32.3 G/DL (ref 31–37)
MCV RBC AUTO: 93.2 FL (ref 78–102)
NEUTS SEG # BLD: 5.5 K/UL (ref 1.8–9.5)
NEUTS SEG NFR BLD: 73 % (ref 40–70)
PLATELET # BLD AUTO: 137 K/UL (ref 140–440)
RBC # BLD AUTO: 4.12 M/UL (ref 4.1–5.1)
TIBC SERPL-MCNC: 275 UG/DL (ref 250–450)
WBC # BLD AUTO: 7.6 K/UL (ref 4.5–13)

## 2019-04-08 PROCEDURE — 36415 COLL VENOUS BLD VENIPUNCTURE: CPT

## 2019-04-08 PROCEDURE — 82728 ASSAY OF FERRITIN: CPT

## 2019-04-08 PROCEDURE — 83540 ASSAY OF IRON: CPT

## 2019-04-08 PROCEDURE — 84155 ASSAY OF PROTEIN SERUM: CPT

## 2019-04-08 NOTE — PROGRESS NOTES
Hematology/Oncology  Progress Note Name: Yin Tyler Date: 2019 : 1945 Matthew Cuellar MD  
 
Mr. Ashley Reed is a 68 y.o. 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 Current therapy: Active surveillance; the patient has previously undergone systemic chemotherapy with 4 cycles of Doxil followed by external beam radiation therapy to the soft tissue sarcoma involving the right medial upper thigh. He also completed surgery for removal of the residual mass. Subjective:  
 
Mr. Ashley Reed is a 80-year-old -American man with history of soft tissue sarcoma. He is here for follow-up. Today he has no new complaints. He denies having any new swelling or discomfort in right leg. He continues to ambulate with the use of a cane. He has no complaints of pain or discomfort, at this visit. Today the patient is requesting to have his Mediport removed. Past medical history, family history, and social history: these were reviewed and remains unchanged. Past Medical History:  
Diagnosis Date  AICD (automatic cardioverter/defibrillator) present  Atrial fibrillation ([address] 2010  Cancer ([facility] [address]  Cardiac cath 2009 Patent coronary arteries. LVEDP 28.  EF 35%. Global hyk. Mod MR.  Cardiac echocardiogram 2016 LVE. EF 15% at most.  Indeterminate LV diastolic fx.  RVE. Reduced RV systolic fx. RVSP 80-85 mmHg. LAE.  LUISA. Mod-severe MR. Mild AI. Severe TR.  Cardiac MUGA scan 2015 At most mild LVE. EF 45-46%.  Cardiac nuclear imaging test, abnormal 2009 Mild basal/mid inferior, inferoapical, inferoseptal infarct w/mild ischemia in RCA (possibly partially artifact). Anterior, anteroseptal, anterapical ischemia w/o infarct. (Mid & distal LAD.)  LVE. EF 29%. Mod global hyk.  Cardiovascular LE venous duplex 2016 Tech difficult. No DVT bilaterally.  Cardiovascular renal duplex 06/07/2010 No evidence of renal artery stenosis >60%. Patent SMA and celiac artery.  Cardiovascular UE venous duplex 10/04/2016 No DVT bilaterally.  Diabetes ([address]  HTN (hypertension) 6/8/2010  Hypertensive cardiovascular disease  MR (mitral regurgitation)  Nonischemic cardiomyopathy 3/11 echo EF 25%  Pulmonary hypertension, moderate to severe ([address] 6/8/2010  
 90-100mmHg; repeat echo in 3/11 showed 70mmHg Past Surgical History:  
Procedure Laterality Date [address] VALVE SURGERY  2011  HX HERNIA REPAIR  2011 Social History Socioeconomic History  Marital status:  Spouse name: Not on file  Number of children: Not on file  Years of education: Not on file  Highest education level: Not on file Occupational History  Not on file Social Needs  Financial resource strain: Not on file  Food insecurity:  
  Worry: Not on file Inability: Not on file  Transportation needs:  
  Medical: Not on file Non-medical: Not on file Tobacco Use  Smoking status: Never Smoker  Smokeless tobacco: Never Used Substance and Sexual Activity  Alcohol use: Yes Comment: occasionally.  Drug use: No  
 Sexual activity: Yes  
  Partners: Female Birth control/protection: None Lifestyle  Physical activity:  
  Days per week: Not on file Minutes per session: Not on file  Stress: Not on file Relationships  Social connections:  
  Talks on phone: Not on file Gets together: Not on file Attends Hindu service: Not on file Active member of club or organization: Not on file Attends meetings of clubs or organizations: Not on file Relationship status: Not on file  Intimate partner violence:  
  Fear of current or ex partner: Not on file Emotionally abused: Not on file Physically abused: Not on file Forced sexual activity: Not on file Other Topics Concern  Not on file Social History Narrative  Not on file Family History Problem Relation Age of Onset  Hypertension Mother Current Outpatient Medications Medication Sig Dispense Refill  atorvastatin (LIPITOR) 20 mg tablet Take 20 mg by mouth daily.  furosemide (LASIX) 40 mg tablet Take 1 Tab by mouth two (2) times a day. Or directed 180 Tab 3  cloNIDine HCl (CATAPRES) 0.2 mg tablet TAKE 1 TABLET BY MOUTH 3 TIMES A  Tab 2  
 metOLazone (ZAROXOLYN) 2.5 mg tablet TAKE 1 TABLET BY MOUTH 30 MINS PRIOR TO MORNING LASIX AS DIRECTED ON MON & THU 15 Tab 4  
 digoxin (LANOXIN) 0.125 mg tablet TAKE 1 TABLET BY MOUTH EVERY DAY 90 Tab 3  carvedilol (COREG) 25 mg tablet Take 1 Tab by mouth two (2) times daily (with meals). 180 Tab 3  
 lisinopril (PRINIVIL, ZESTRIL) 20 mg tablet TAKE ONE TABLET BY MOUTH TWICE DAILY 180 Tab 3  potassium chloride (K-DUR, KLOR-CON) 20 mEq tablet Take 1 Tab by mouth two (2) times a day. 2 tabs extra on the days on Metalazone 210 Tab 3  warfarin (COUMADIN) 2.5 mg tablet TAKE ONE TABLET BY MOUTH ONCE DAILY OR  AS  DIRECTED  BY  YOUR  DOCTOR. 45 Tab 30  
 insulin lispro (HUMALOG U-100 INSULIN) 100 unit/mL injection by SubCUTAneous route.  insulin glargine (LANTUS U-100 INSULIN) 100 unit/mL injection 15 Units by SubCUTAneous route nightly.  magnesium oxide (MAG-OX) 400 mg tablet TAKE 1 TABLET BY MOUTH DAILY. 90 Tab 3  
 sertraline (ZOLOFT) 25 mg tablet Take  by mouth daily.  allopurinol (ZYLOPRIM) 300 mg tablet Take  by mouth daily.  FERROUS SULFATE, DRIED (IRON, DRIED, PO) Take 325 mg by mouth daily.  aspirin delayed-release (ASPIR-81) 81 mg tablet Take 81 mg by mouth daily. Facility-Administered Medications Ordered in Other Visits Medication Dose Route Frequency Provider Last Rate Last Dose  sodium chloride (NS) flush 10-40 mL  10-40 mL IntraVENous PRN Don Bradley MD      
 
 
Review of Systems Constitutional: The patient has no acute distress or discomfort. HEENT: The patient denies recent head trauma, eye pain, blurred vision,  hearing deficit, oropharyngeal mucosal pain or lesions, and the patient denies throat pain or discomfort. Lymphatics: The patient denies palpable peripheral lymphadenopathy. Hematologic: The patient denies having bruising, bleeding, or progressive fatigue. Respiratory: Patient denies having shortness of breath, cough, sputum production, fever, or dyspnea on exertion. Cardiovascular: The patient denies having leg pain, leg swelling, heart palpitations, chest permit, chest pain, or lightheadedness. The patient denies having dyspnea on exertion. Gastrointestinal: The patient denies having nausea, emesis, or diarrhea. The patient denies having any hematemesis or blood in the stool. Genitourinary: Patient denies having urinary urgency, frequency, or dysuria. The patient denies having blood in the urine. Psychological: The patient denies having symptoms of nervousness, anxiety, depression, or thoughts of harming self. Skin: Patient denies having skin rashes, skin, ulcerations, or unexplained itching or pruritus. Musculoskeletal: The patient denies having pain in the joints or bones. Objective:  
 
Visit Vitals /83 Pulse 81 Temp 97.8 °F (36.6 °C) (Oral) Resp 18 Ht 6' 1\" (1.854 m) Wt 95.7 kg (211 lb) SpO2 100% BMI 27.84 kg/m² ECOG PS=0;  pain score=0/10 Physical Exam:  
Gen. Appearance: The patient is in no acute distress. Skin: There is no bruise or rash. HEENT: The exam is unremarkable. Neck: Supple without lymphadenopathy or thyromegaly. Lungs: Clear to auscultation and percussion; there are no wheezes or rhonchi. Heart: Regular rate and rhythm; there are no murmurs, gallops, or rubs. Abdomen:  Bowel sounds are present and normal.  There is no guarding, tenderness, or hepatosplenomegaly. Extremities: There is no clubbing, cyanosis, or edema. Neurologic: There are no focal neurologic deficits. Lymphatics: There is no palpable peripheral lymphadenopathy. Musculoskeletal: The patient has full range of motion at all joints. There is no evidence of joint deformity or effusions. There is no focal joint tenderness. Psychological/psychiatric: There is no clinical evidence of anxiety, depression, or melancholy. Lab data: 
   
Results for orders placed or performed during the hospital encounter of 04/08/19 CBC WITH 3 PART DIFF     Status: Abnormal  
Result Value Ref Range Status WBC 7.6 4.5 - 13.0 K/uL Final  
 RBC 4.12 4.10 - 5.10 M/uL Final  
 HGB 12.4 12.0 - 16 g/dL Final  
 HCT 38.4 36 - 48 % Final  
 MCV 93.2 78 - 102 FL Final  
 MCH 30.1 25.0 - 35.0 PG Final  
 MCHC 32.3 31 - 37 g/dL Final  
 RDW 16.0 (H) 11.5 - 14.5 % Final  
 PLATELET 853 (L) 592 - 440 K/uL Final  
 NEUTROPHILS 73 (H) 40 - 70 % Final  
 MIXED CELLS 8 0.1 - 17 % Final  
 LYMPHOCYTES 19 14 - 44 % Final  
 ABS. NEUTROPHILS 5.5 1.8 - 9.5 K/UL Final  
 ABS. MIXED CELLS 0.6 0.0 - 2.3 K/uL Final  
 ABS. LYMPHOCYTES 1.5 1.1 - 5.9 K/UL Final  
  Comment: Test performed at [address] [place] [address] or Outpatient Infusion Center Location. Reviewed by Medical Director. DF AUTOMATED   Final  
 
   
Assessment:  
 
1. Monoclonal gammopathy 2. Iron deficiency anemia secondary to inadequate dietary iron intake 3. Cancer associated pain 4. Thrombocytopenia ([address] 5. Soft tissue sarcoma of right thigh ([address] Plan:  
Soft tissue sarcoma in the right lower extremity: Clinically there is no evidence of disease recurrence. At his previous clinic visit we discussed the findings on the most recent imaging study which showed no evidence of disease recurrence or progression.   At this time, I will refer the patient to the interventional radiologist to have his Mediport removed. I have explained to the patient that the  will call him and set up the appointment with the interventional radiologist for port removal. 
 
Monoclonal gammopathy of undetermined significance: From 1/7/2019 the SPEP showed no evidence of a residual monoclonal paraprotein level. I will continue to monitor this at 3-month intervals. Iron deficiency anemia: The patient's hemoglobin remains corrected at 12.4 g/dL with hematocrit of 38.4%. Cancer associated pain: Currently the patient reports that he is not having any pain or discomfort. Mild thrombocytopenia: The platelet count today is 137,000. I have explained to the patient that this is very mild thrombocytopenia and that no systemic therapy is warranted. He previously received some radiation therapy to the right leg which is probably contributing to the mild thrombocytopenia. In any event, therapeutic intervention will only be recommended if the platelet counts declines below 30,000. Follow-up in 3 months Orders Placed This Encounter  COMPLETE CBC & AUTO DIFF WBC  InHouse CBC ([employer]) Standing Status:   Future Number of Occurrences:   1 Standing Expiration Date:   4/15/2019  SPEP Standing Status:   Future Standing Expiration Date:   4/8/2020  
 IRON PROFILE Standing Status:   Future Standing Expiration Date:   4/8/2020  FERRITIN Standing Status:   Future Standing Expiration Date:   4/8/2020 Anthony Iniguez MD 
4/8/2019 Please note: This document has been produced using voice recognition software. Unrecognized errors in transcription may be present.

## 2019-04-10 LAB
ALBUMIN SERPL ELPH-MCNC: 3.6 G/DL (ref 2.9–4.4)
ALBUMIN/GLOB SERPL: 1.1 {RATIO} (ref 0.7–1.7)
ALPHA1 GLOB SERPL ELPH-MCNC: 0.2 G/DL (ref 0–0.4)
ALPHA2 GLOB SERPL ELPH-MCNC: 0.7 G/DL (ref 0.4–1)
B-GLOBULIN SERPL ELPH-MCNC: 0.9 G/DL (ref 0.7–1.3)
GAMMA GLOB SERPL ELPH-MCNC: 1.4 G/DL (ref 0.4–1.8)
GLOBULIN SER CALC-MCNC: 3.3 G/DL (ref 2.2–3.9)
M PROTEIN SERPL ELPH-MCNC: NORMAL G/DL
PROT SERPL-MCNC: 6.9 G/DL (ref 6–8.5)

## 2019-04-12 ENCOUNTER — HOSPITAL ENCOUNTER (OUTPATIENT)
Dept: INFUSION THERAPY | Age: 74
End: 2019-04-12

## 2019-04-17 ENCOUNTER — HOSPITAL ENCOUNTER (OUTPATIENT)
Dept: INTERVENTIONAL RADIOLOGY/VASCULAR | Age: 74
Discharge: HOME | End: 2019-04-17
Attending: INTERNAL MEDICINE | Admitting: RADIOLOGY

## 2019-04-17 DIAGNOSIS — C49.21 SOFT TISSUE SARCOMA OF RIGHT THIGH (HCC): ICD-10-CM

## 2019-04-19 ENCOUNTER — HOSPITAL ENCOUNTER (OUTPATIENT)
Dept: INTERVENTIONAL RADIOLOGY/VASCULAR | Age: 74
Discharge: HOME | End: 2019-04-19
Attending: RADIOLOGY | Admitting: RADIOLOGY
Payer: MEDICARE

## 2019-04-19 ENCOUNTER — APPOINTMENT (OUTPATIENT)
Dept: INTERVENTIONAL RADIOLOGY/VASCULAR | Age: 74
End: 2019-04-19
Attending: INTERNAL MEDICINE

## 2019-04-19 VITALS
HEART RATE: 52 BPM | OXYGEN SATURATION: 99 % | WEIGHT: 207 LBS | TEMPERATURE: 97.8 F | BODY MASS INDEX: 27.43 KG/M2 | SYSTOLIC BLOOD PRESSURE: 140 MMHG | DIASTOLIC BLOOD PRESSURE: 71 MMHG | RESPIRATION RATE: 18 BRPM | HEIGHT: 73 IN

## 2019-04-19 LAB
ANION GAP SERPL CALC-SCNC: 6 MMOL/L (ref 3–18)
APTT PPP: 41.6 SEC (ref 23–36.4)
BUN SERPL-MCNC: 28 MG/DL (ref 7–18)
BUN/CREAT SERPL: 21 (ref 12–20)
CALCIUM SERPL-MCNC: 9.1 MG/DL (ref 8.5–10.1)
CHLORIDE SERPL-SCNC: 98 MMOL/L (ref 100–108)
CO2 SERPL-SCNC: 32 MMOL/L (ref 21–32)
CREAT SERPL-MCNC: 1.35 MG/DL (ref 0.6–1.3)
ERYTHROCYTE [DISTWIDTH] IN BLOOD BY AUTOMATED COUNT: 16.1 % (ref 11.6–14.5)
GLUCOSE SERPL-MCNC: 172 MG/DL (ref 74–99)
HCT VFR BLD AUTO: 40 % (ref 36–48)
HGB BLD-MCNC: 13.5 G/DL (ref 13–16)
INR PPP: 1.7 (ref 0.8–1.2)
MCH RBC QN AUTO: 30.7 PG (ref 24–34)
MCHC RBC AUTO-ENTMCNC: 33.8 G/DL (ref 31–37)
MCV RBC AUTO: 90.9 FL (ref 74–97)
PLATELET # BLD AUTO: 149 K/UL (ref 135–420)
PMV BLD AUTO: 12.2 FL (ref 9.2–11.8)
POTASSIUM SERPL-SCNC: 3.3 MMOL/L (ref 3.5–5.5)
PROTHROMBIN TIME: 20.2 SEC (ref 11.5–15.2)
RBC # BLD AUTO: 4.4 M/UL (ref 4.7–5.5)
SODIUM SERPL-SCNC: 136 MMOL/L (ref 136–145)
WBC # BLD AUTO: 8.4 K/UL (ref 4.6–13.2)

## 2019-04-19 PROCEDURE — 74011250636 HC RX REV CODE- 250/636: Performed by: RADIOLOGY

## 2019-04-19 PROCEDURE — 74011250636 HC RX REV CODE- 250/636: Performed by: INTERNAL MEDICINE

## 2019-04-19 PROCEDURE — 85027 COMPLETE CBC AUTOMATED: CPT

## 2019-04-19 PROCEDURE — 77030022017 HC DRSG HEMO QCLOT ZMED -A

## 2019-04-19 PROCEDURE — 80048 BASIC METABOLIC PNL TOTAL CA: CPT

## 2019-04-19 PROCEDURE — 74011000250 HC RX REV CODE- 250: Performed by: RADIOLOGY

## 2019-04-19 PROCEDURE — 85730 THROMBOPLASTIN TIME PARTIAL: CPT

## 2019-04-19 PROCEDURE — 85610 PROTHROMBIN TIME: CPT

## 2019-04-19 PROCEDURE — 36590 REMOVAL TUNNELED CV CATH: CPT

## 2019-04-19 RX ORDER — FLUMAZENIL 0.1 MG/ML
0.2 INJECTION INTRAVENOUS
Status: DISCONTINUED | OUTPATIENT
Start: 2019-04-19 | End: 2019-04-19 | Stop reason: HOSPADM

## 2019-04-19 RX ORDER — SODIUM CHLORIDE 9 MG/ML
20 INJECTION, SOLUTION INTRAVENOUS CONTINUOUS
Status: DISCONTINUED | OUTPATIENT
Start: 2019-04-19 | End: 2019-04-19 | Stop reason: HOSPADM

## 2019-04-19 RX ORDER — MIDAZOLAM HYDROCHLORIDE 1 MG/ML
1 INJECTION, SOLUTION INTRAMUSCULAR; INTRAVENOUS
Status: DISCONTINUED | OUTPATIENT
Start: 2019-04-19 | End: 2019-04-19

## 2019-04-19 RX ORDER — NALOXONE HYDROCHLORIDE 0.4 MG/ML
0.1 INJECTION, SOLUTION INTRAMUSCULAR; INTRAVENOUS; SUBCUTANEOUS
Status: DISCONTINUED | OUTPATIENT
Start: 2019-04-19 | End: 2019-04-19 | Stop reason: HOSPADM

## 2019-04-19 RX ORDER — FENTANYL CITRATE 50 UG/ML
50 INJECTION, SOLUTION INTRAMUSCULAR; INTRAVENOUS
Status: DISCONTINUED | OUTPATIENT
Start: 2019-04-19 | End: 2019-04-19

## 2019-04-19 RX ORDER — CEFAZOLIN SODIUM 2 G/50ML
2 SOLUTION INTRAVENOUS ONCE
Status: COMPLETED | OUTPATIENT
Start: 2019-04-19 | End: 2019-04-19

## 2019-04-19 RX ORDER — SODIUM CHLORIDE 0.9 % (FLUSH) 0.9 %
5-40 SYRINGE (ML) INJECTION EVERY 8 HOURS
Status: DISCONTINUED | OUTPATIENT
Start: 2019-04-19 | End: 2019-04-19 | Stop reason: HOSPADM

## 2019-04-19 RX ORDER — SODIUM CHLORIDE 0.9 % (FLUSH) 0.9 %
5-40 SYRINGE (ML) INJECTION AS NEEDED
Status: DISCONTINUED | OUTPATIENT
Start: 2019-04-19 | End: 2019-04-19 | Stop reason: HOSPADM

## 2019-04-19 RX ADMIN — LIDOCAINE HYDROCHLORIDE 300 MG: 10; .005 INJECTION, SOLUTION EPIDURAL; INFILTRATION; INTRACAUDAL; PERINEURAL at 08:59

## 2019-04-19 RX ADMIN — SODIUM CHLORIDE 20 ML/HR: 900 INJECTION, SOLUTION INTRAVENOUS at 08:44

## 2019-04-19 RX ADMIN — CEFAZOLIN 2 G: 10 INJECTION, POWDER, FOR SOLUTION INTRAVENOUS at 08:45

## 2019-04-19 RX ADMIN — FENTANYL CITRATE 50 MCG: 50 INJECTION INTRAMUSCULAR; INTRAVENOUS at 08:55

## 2019-04-19 RX ADMIN — MIDAZOLAM HYDROCHLORIDE 1 MG: 2 INJECTION, SOLUTION INTRAMUSCULAR; INTRAVENOUS at 08:55

## 2019-04-19 NOTE — PROGRESS NOTES
TRANSFER - OUT REPORT: 
 
Verbal report given to Noar(name) on Cynthia Fernandez  being transferred to [facility](unit) for routine post - op Report consisted of patients Situation, Background, Assessment and  
Recommendations(SBAR). Information from the following report(s) SBAR, Kardex and MAR was reviewed with the receiving nurse. Lines:  
Venous Access Device medi-port Upper chest (subclavicular area, right (Active) Peripheral IV 04/19/19 Right Antecubital (Active) Site Assessment Clean, dry, & intact 4/19/2019  7:50 AM  
Phlebitis Assessment 0 4/19/2019  7:50 AM  
Infiltration Assessment 0 4/19/2019  7:50 AM  
Dressing Status Clean, dry, & intact 4/19/2019  7:50 AM  
Dressing Type Transparent 4/19/2019  7:50 AM  
Hub Color/Line Status Pink;Flushed 4/19/2019  7:50 AM  
  
 
Opportunity for questions and clarification was provided. Patient transported with: 
 Zazzy

## 2019-04-19 NOTE — H&P
OUTPATIENT HISTORY AND PHYSICAL Today 4/19/2019 Indication/Symptoms:   Regina Lai is a 68 y.o. male with a history of soft tissue sarcoma of the right thigh s/p chemotherapy who presents for an image-guided right chest mediport removal with moderate sedation. Patient last took Coumadin on Tuesday and has been NPO since midnight. Current Meds:   
Prior to Admission medications Medication Sig Start Date End Date Taking? Authorizing Provider  
atorvastatin (LIPITOR) 20 mg tablet Take 20 mg by mouth daily. Yes Provider, Historical  
furosemide (LASIX) 40 mg tablet Take 1 Tab by mouth two (2) times a day. Or directed 9/19/18  Yes Karissa Smoker P, NP  
cloNIDine HCl (CATAPRES) 0.2 mg tablet TAKE 1 TABLET BY MOUTH 3 TIMES A DAY 8/22/18  Yes Nader Cagle NP  
digoxin (LANOXIN) 0.125 mg tablet TAKE 1 TABLET BY MOUTH EVERY DAY 8/9/18  Yes Nader Cagle NP  
carvedilol (COREG) 25 mg tablet Take 1 Tab by mouth two (2) times daily (with meals). 7/31/18  Yes Epi Granger MD  
lisinopril (PRINIVIL, ZESTRIL) 20 mg tablet TAKE ONE TABLET BY MOUTH TWICE DAILY 7/27/18  Yes Clerance Smoker P, NP  
potassium chloride (K-DUR, KLOR-CON) 20 mEq tablet Take 1 Tab by mouth two (2) times a day. 2 tabs extra on the days on Metalazone 7/27/18  Yes Karissa Smoker, NP  
insulin lispro (HUMALOG U-100 INSULIN) 100 unit/mL injection by SubCUTAneous route. Yes Provider, Historical  
insulin glargine (LANTUS U-100 INSULIN) 100 unit/mL injection 15 Units by SubCUTAneous route nightly. Yes Provider, Historical  
magnesium oxide (MAG-OX) 400 mg tablet TAKE 1 TABLET BY MOUTH DAILY. 3/15/17  Yes Nader Cagle NP  
sertraline (ZOLOFT) 25 mg tablet Take  by mouth daily. Yes Provider, Historical  
allopurinol (ZYLOPRIM) 300 mg tablet Take  by mouth daily.      Yes Provider, Historical  
 FERROUS SULFATE, DRIED (IRON, DRIED, PO) Take 325 mg by mouth daily. Yes Provider, Historical  
metOLazone (ZAROXOLYN) 2.5 mg tablet TAKE 1 TABLET BY MOUTH 30 MINS PRIOR TO MORNING LASIX AS DIRECTED ON MON & THU 8/22/18   Anibla Cagle P, NP  
warfarin (COUMADIN) 2.5 mg tablet TAKE ONE TABLET BY MOUTH ONCE DAILY OR  AS  DIRECTED  BY  YOUR  DOCTOR. 7/15/18   Epi Gtz MD  
aspirin delayed-release (ASPIR-81) 81 mg tablet Take 81 mg by mouth daily. Provider, Historical  
 
 
Allergies:  
 No Known Allergies Comorbid Conditions:   
Past Medical History:  
Diagnosis Date  AICD (automatic cardioverter/defibrillator) present  Atrial fibrillation ([address] 6/8/2010  Cancer ([address]  Cardiac cath 02/11/2009 Patent coronary arteries. LVEDP 28.  EF 35%. Global hyk. Mod MR.  Cardiac echocardiogram 09/29/2016 LVE. EF 15% at most.  Indeterminate LV diastolic fx.  RVE. Reduced RV systolic fx. RVSP 80-85 mmHg. LAE.  LUISA. Mod-severe MR. Mild AI. Severe TR.  Cardiac MUGA scan 11/02/2015 At most mild LVE. EF 45-46%.  Cardiac nuclear imaging test, abnormal 02/03/2009 Mild basal/mid inferior, inferoapical, inferoseptal infarct w/mild ischemia in RCA (possibly partially artifact). Anterior, anteroseptal, anterapical ischemia w/o infarct. (Mid & distal LAD.)  LVE. EF 29%. Mod global hyk.  Cardiovascular LE venous duplex 09/29/2016 Tech difficult. No DVT bilaterally.  Cardiovascular renal duplex 06/07/2010 No evidence of renal artery stenosis >60%. Patent SMA and celiac artery.  Cardiovascular UE venous duplex 10/04/2016 No DVT bilaterally.  Diabetes ([address]  HTN (hypertension) 6/8/2010  Hypertensive cardiovascular disease  MR (mitral regurgitation)  Nonischemic cardiomyopathy 3/11 echo EF 25%  Pulmonary hypertension, moderate to severe ([address] 6/8/2010  
 90-100mmHg; repeat echo in 3/11 showed 70mmHg Past Surgical History:  
Procedure Laterality Date Nury [address] VALVE SURGERY  2011  HX HERNIA REPAIR  2011 Data:   
Visit Vitals /70 (BP 1 Location: Left arm, BP Patient Position: Head of bed elevated (Comment degrees)) Pulse (!) 52 Temp 97.8 °F (36.6 °C) Resp 23 Ht 6' 1\" (1.854 m) Wt 93.9 kg (207 lb) SpO2 98% BMI 27.31 kg/m²  
: 
Recent Labs 04/19/19 
0730  Recent Labs 04/19/19 
0730 INR 1.7* APTT 41.6* The H & P and/or progress notes and any available imaging were reviewed. The risks, indications and possible alternatives to the procedure, including doing nothing, were discussed and informed consent was obtained. Physical Exam: 
  
 Mental status:   Alert and oriented. Examination specific to the procedure proposed to be performed and any co morbid conditions:   Mallampati classification 2 ,  ASA 3 Heart:   Regular rate. Lungs:   Normal respiratory effort. Symmetrical rise and fall of chest 
 
The patient is an appropriate candidate to undergo the planned procedure and sedation.  
 
Ran Fernandezma

## 2019-04-19 NOTE — PROCEDURES
Interventional Radiology Brief Procedure Note    Patient: Stacy Dominguez MRN: [id number]  SSN: [id number]    YOB: 1945  Age: 68 y.o. Sex: male      Date of Procedure: 4/19/2019    Procedure(s): Mediport removal    Performed By: DAMIAN Gr    Anesthesia: Moderate Sedation    Estimated Blood Loss: Less than 10ml    Specimens: None    Findings:     - Written and verbal informed consent was obtained. - Time Out was performed. - Permanent image storage performed on PACS. - Image-guided right chest Mediport removal performed using maximum barrier precautions and sterile technique. - Please refer to report on PACS for full details. Follow Up: To be seen in follow-up with IR in 7-10 days for Mediport removal site check.      Signed By: Ilana Sanchez, [id number]     April 19, 2019

## 2019-04-19 NOTE — ROUTINE PROCESS
A+Ox4, denied any complaints, ambulatory with cane without any difficulty. Right upper chest dressing intact, no bleeding or swelling. Pain:0/10. Discharge information reviewed. Escorted to car, tot his wife for transport.

## 2019-04-19 NOTE — PROGRESS NOTES
0815: Assumed care of pt. Pt resting in stretcher in NAD. Pt awaiting ordered procedure 2633: Pt taken to IR for ordered procedure

## 2019-04-19 NOTE — DISCHARGE INSTRUCTIONS
DRAIN/PORT/CATHETER REMOVAL  DISCHARGE INSTRUCTIONS    General Information:     Your doctor has ordered for us to remove your drain, port, or catheter. This could be that you do not need it anymore, it is not doing its job, your physician has decided on another plan for your treatment and/or it may need replacing. Home Care Instructions: You can resume your regular diet and medication regimen. Do not drink alcohol, drive, or make any important legal decisions in the next 24 hours. Do not lift anything heavier than a gallon of milk, or do anything strenuous for the next 24 hours. You will notice a dressing over the site of the removal. This dressing should stay in place until the site is healed. The dressing should be changed at least every 48 hours. You should change the dressing sooner if it becomes soiled or wet. The nurse who discharges you to home should review with you any wound care instructions. Resume your normal level of activity slowly. You may shower after 24 hours, but do not take a bath, swim or immerse yourself in water until the site has healed, and keep the dressing dry with plastic wrap while showering. The site may ooze for a couple of days. This drainage should lessen with each passing day. Call If:     You should call your Physician and/or the Radiology Nurse if you have any bleeding other than a small spot on your bandage. Call if you have any signs of infection, fever, or increased pain at the site of the tube. Call if the oozing increases, if it changes color, or begins to have an odor. Follow-Up Instructions: Please see your ordering doctor as he/she has requested. To Reach Us: Interventional Radiology Lab : [phone]            DISCHARGE SUMMARY from Nurse:    Please resume taking your home medications as prescribed.     PATIENT INSTRUCTIONS:    After general anesthesia or intravenous sedation, for 24 hours or while taking prescription Narcotics:  · Limit your activities  · Do not drive and operate hazardous machinery  · Do not make important personal or business decisions  · Do  not drink alcoholic beverages  · If you have not urinated within 8 hours after discharge, please contact your surgeon on call. Report the following to your surgeon:  · Excessive pain, swelling, redness or odor of or around the surgical area  · Temperature over 100.5  · Nausea and vomiting lasting longer than 4 hours or if unable to take medications  · Any signs of decreased circulation or nerve impairment to extremity: change in color, persistent  numbness, tingling, coldness or increase pain  · Any questions    What to do at Home:  Recommended activity: Activity as tolerated and no driving for today. If you experience any of the following symptoms bleeding,swelling,acute pain or numbness, fever, please follow up with Dr. Gregg Harrison MD.    *  Please give a list of your current medications to your Primary Care Provider. *  Please update this list whenever your medications are discontinued, doses are      changed, or new medications (including over-the-counter products) are added. *  Please carry medication information at all times in case of emergency situations. These are general instructions for a healthy lifestyle:    No smoking/ No tobacco products/ Avoid exposure to second hand smoke  Surgeon General's Warning:  Quitting smoking now greatly reduces serious risk to your health. Obesity, smoking, and sedentary lifestyle greatly increases your risk for illness    A healthy diet, regular physical exercise & weight monitoring are important for maintaining a healthy lifestyle    You may be retaining fluid if you have a history of heart failure or if you experience any of the following symptoms:  Weight gain of 3 pounds or more overnight or 5 pounds in a week, increased swelling in our hands or feet or shortness of breath while lying flat in bed.   Please call your doctor as soon as you notice any of these symptoms; do not wait until your next office visit. Recognize signs and symptoms of STROKE:    F-face looks uneven    A-arms unable to move or move unevenly    S-speech slurred or non-existent    T-time-call 911 as soon as signs and symptoms begin-DO NOT go       Back to bed or wait to see if you get better-TIME IS BRAIN. Warning Signs of HEART ATTACK     Call 911 if you have these symptoms:   Chest discomfort. Most heart attacks involve discomfort in the center of the chest that lasts more than a few minutes, or that goes away and comes back. It can feel like uncomfortable pressure, squeezing, fullness, or pain.  Discomfort in other areas of the upper body. Symptoms can include pain or discomfort in one or both arms, the back, neck, jaw, or stomach.  Shortness of breath with or without chest discomfort.  Other signs may include breaking out in a cold sweat, nausea, or lightheadedness. Don't wait more than five minutes to call 911 - MINUTES MATTER! Fast action can save your life. Calling 911 is almost always the fastest way to get lifesaving treatment. Emergency Medical Services staff can begin treatment when they arrive -- up to an hour sooner than if someone gets to the hospital by car. The discharge information has been reviewed with the patient. The patient verbalized understanding. Discharge medications reviewed with the patient and appropriate educational materials and side effects teaching were provided. Patient armband removed and shredded  MyChart Activation    Thank you for requesting access to [facility]. Please follow the instructions below to securely access and download your online medical record. [facility] allows you to send messages to your doctor, view your test results, renew your prescriptions, schedule appointments, and more. How Do I Sign Up? 1. In your internet browser, go to [email or web address].   2. Click on the First Time User? Click Here link in the Sign In box. You will see the New Member Sign Up page. 3. Enter your [employer]ot Access Code exactly as it appears below. You will not need to use this code after youve completed the sign-up process. If you do not sign up before the expiration date, you must request a new code. MyChart Access Code: Activation code not generated  Current Cellfire Status: Patient Declined (This is the date your MyChart access code will )    4. Enter the last four digits of your Social Security Number (xxxx) and Date of Birth (mm/dd/yyyy) as indicated and click Submit. You will be taken to the next sign-up page. 5. Create a Cotendot ID. This will be your Cellfire login ID and cannot be changed, so think of one that is secure and easy to remember. 6. Create a Cellfire password. You can change your password at any time. 7. Enter your Password Reset Question and Answer. This can be used at a later time if you forget your password. 8. Enter your e-mail address. You will receive e-mail notification when new information is available in [address] 9. Click Sign Up. You can now view and download portions of your medical record. 10. Click the Download Summary menu link to download a portable copy of your medical information. Additional Information    If you have questions, please visit the Frequently Asked Questions section of the [employer] website at [email or web address] Remember, Cell[facility] is NOT to be used for urgent needs.  For medical emergencies, dial 911.    ___________________________________________________________________________________________________________________________________

## 2019-04-22 ENCOUNTER — ANTI-COAG VISIT (OUTPATIENT)
Dept: CARDIOLOGY CLINIC | Age: 74
End: 2019-04-22

## 2019-04-22 DIAGNOSIS — Z79.01 LONG TERM CURRENT USE OF ANTICOAGULANT THERAPY: Primary | ICD-10-CM

## 2019-04-22 DIAGNOSIS — I48.91 ATRIAL FIBRILLATION, UNSPECIFIED TYPE (HCC): ICD-10-CM

## 2019-04-22 LAB
INR BLD: 1.9
PT POC: NORMAL SECONDS
VALID INTERNAL CONTROL?: YES

## 2019-04-22 NOTE — PROGRESS NOTES
Verbal order and read back per Paulie Ruffin NP Continue Coumadin to 2.5 mg daily except 3.75 mg on Mon

## 2019-04-29 ENCOUNTER — HOSPITAL ENCOUNTER (OUTPATIENT)
Dept: INTERVENTIONAL RADIOLOGY/VASCULAR | Age: 74
Discharge: HOME | End: 2019-04-29
Attending: RADIOLOGY
Payer: MEDICARE

## 2019-04-29 PROCEDURE — 99211 OFF/OP EST MAY X REQ PHY/QHP: CPT

## 2019-05-14 ENCOUNTER — APPOINTMENT (OUTPATIENT)
Dept: INFUSION THERAPY | Age: 74
End: 2019-05-14

## 2019-05-20 ENCOUNTER — TELEPHONE (OUTPATIENT)
Dept: CARDIOLOGY CLINIC | Age: 74
End: 2019-05-20

## 2019-05-20 DIAGNOSIS — I48.19 PERSISTENT ATRIAL FIBRILLATION (HCC): Primary | ICD-10-CM

## 2019-05-20 NOTE — TELEPHONE ENCOUNTER
Patient had PT/INR appointment today 5/20 but we needed to r/s due to staffing issues nurse out of the office. ... I spoke to patient he is going to have lab drawn at [facility] lab tomorrow 5/21 order has been placed by Ana Arellano NP and once we get result we will call patient with Coumadin dose instructions and schedule next INR appt. ... This has been fully explained to the patient, who indicates understanding. Josiah Rousseau

## 2019-05-21 ENCOUNTER — HOSPITAL ENCOUNTER (OUTPATIENT)
Dept: LAB | Age: 74
Discharge: HOME | End: 2019-05-21
Payer: MEDICARE

## 2019-05-21 DIAGNOSIS — I48.19 PERSISTENT ATRIAL FIBRILLATION (HCC): ICD-10-CM

## 2019-05-21 LAB
INR PPP: 2.5 (ref 0.8–1.2)
PROTHROMBIN TIME: 26.7 SEC (ref 11.5–15.2)

## 2019-05-21 PROCEDURE — 36415 COLL VENOUS BLD VENIPUNCTURE: CPT

## 2019-05-21 PROCEDURE — 85610 PROTHROMBIN TIME: CPT

## 2019-05-23 ENCOUNTER — TELEPHONE ANTICOAG (OUTPATIENT)
Dept: CARDIOLOGY CLINIC | Age: 74
End: 2019-05-23

## 2019-05-23 DIAGNOSIS — I48.91 ATRIAL FIBRILLATION, UNSPECIFIED TYPE (HCC): ICD-10-CM

## 2019-05-23 DIAGNOSIS — Z79.01 LONG TERM CURRENT USE OF ANTICOAGULANT THERAPY: Primary | ICD-10-CM

## 2019-05-23 NOTE — PROGRESS NOTES
Verbal order and read back per Nicolette Canseco NP  Patient is within therapeutic range  Continue Coumadin to 2.5 mg daily except 3.75 mg on Mon  Recheck 4 weeks  This has been fully explained to the patient, who indicates understanding.

## 2019-05-24 ENCOUNTER — APPOINTMENT (OUTPATIENT)
Dept: INFUSION THERAPY | Age: 74
End: 2019-05-24

## 2019-05-28 NOTE — DISCHARGE INSTRUCTIONS
RESUME COUMADIN TOMORROW (5/8)    Disposition:  Will need follow-up with device/wound check in 7-10 days in my office. Please contact office at [phone] to confirm appointment. Main Office:    [address] [place] [address]    Restrictions: For affected arm:  No lifting greater than 10 lbs or lifting elbow above shoulder for 4 weeks. Keep incision clean and dry for a total of 72 hours after procedure. Remove dressing in 24 hours if not already removed. Please remove the steristrips (small white adhesive strips over wound) after 7 days if they have not already fallen off. No hot tubs or pools for 2 weeks. OK to shower with \"pat\" dry incision after 72 hours. No driving ideally for 2 weeks due to concern for airbag. That is her negative test for diabetes

## 2019-06-19 ENCOUNTER — ANTI-COAG VISIT (OUTPATIENT)
Dept: CARDIOLOGY CLINIC | Age: 74
End: 2019-06-19

## 2019-06-19 DIAGNOSIS — Z79.01 LONG TERM CURRENT USE OF ANTICOAGULANT THERAPY: Primary | ICD-10-CM

## 2019-06-19 DIAGNOSIS — I48.91 ATRIAL FIBRILLATION, UNSPECIFIED TYPE (HCC): ICD-10-CM

## 2019-06-19 LAB
INR BLD: 2.2
PT POC: 25.9 SECONDS
VALID INTERNAL CONTROL?: YES

## 2019-06-19 NOTE — PROGRESS NOTES
The INR is stable and therapeutic.    Continue Coumadin to 2.5 mg daily except 3.75 mg on Mon  Recheck INR in 4.5 weeks

## 2019-07-03 ENCOUNTER — OFFICE VISIT (OUTPATIENT)
Dept: CARDIOLOGY CLINIC | Age: 74
End: 2019-07-03

## 2019-07-03 DIAGNOSIS — Z95.810 AUTOMATIC IMPLANTABLE CARDIOVERTER-DEFIBRILLATOR IN SITU: ICD-10-CM

## 2019-07-03 DIAGNOSIS — I42.8 CARDIOMYOPATHY, NONISCHEMIC (HCC): Primary | ICD-10-CM

## 2019-07-08 ENCOUNTER — HOSPITAL ENCOUNTER (OUTPATIENT)
Dept: ONCOLOGY | Age: 74
Discharge: HOME | End: 2019-07-08

## 2019-07-08 ENCOUNTER — OFFICE VISIT (OUTPATIENT)
Dept: ONCOLOGY | Age: 74
End: 2019-07-08

## 2019-07-08 ENCOUNTER — HOSPITAL ENCOUNTER (OUTPATIENT)
Dept: LAB | Age: 74
Discharge: HOME | End: 2019-07-08
Payer: MEDICARE

## 2019-07-08 VITALS
OXYGEN SATURATION: 98 % | DIASTOLIC BLOOD PRESSURE: 79 MMHG | HEIGHT: 73 IN | RESPIRATION RATE: 18 BRPM | SYSTOLIC BLOOD PRESSURE: 147 MMHG | TEMPERATURE: 98.4 F | BODY MASS INDEX: 28.1 KG/M2 | WEIGHT: 212 LBS | HEART RATE: 43 BPM

## 2019-07-08 DIAGNOSIS — D69.6 THROMBOCYTOPENIA (HCC): ICD-10-CM

## 2019-07-08 DIAGNOSIS — D47.2 MONOCLONAL GAMMOPATHY: ICD-10-CM

## 2019-07-08 DIAGNOSIS — D50.8 IRON DEFICIENCY ANEMIA SECONDARY TO INADEQUATE DIETARY IRON INTAKE: ICD-10-CM

## 2019-07-08 DIAGNOSIS — C49.20: ICD-10-CM

## 2019-07-08 DIAGNOSIS — D47.2 MONOCLONAL GAMMOPATHY: Primary | ICD-10-CM

## 2019-07-08 LAB
ALBUMIN SERPL-MCNC: 3.7 G/DL (ref 3.4–5)
ALBUMIN/GLOB SERPL: 1 {RATIO} (ref 0.8–1.7)
ALP SERPL-CCNC: 163 U/L (ref 45–117)
ALT SERPL-CCNC: 31 U/L (ref 16–61)
ANION GAP SERPL CALC-SCNC: 6 MMOL/L (ref 3–18)
AST SERPL-CCNC: 31 U/L (ref 15–37)
BASO+EOS+MONOS # BLD AUTO: 0.6 K/UL (ref 0–2.3)
BASO+EOS+MONOS NFR BLD AUTO: 8 % (ref 0.1–17)
BILIRUB SERPL-MCNC: 1 MG/DL (ref 0.2–1)
BUN SERPL-MCNC: 25 MG/DL (ref 7–18)
BUN/CREAT SERPL: 19 (ref 12–20)
CALCIUM SERPL-MCNC: 8.6 MG/DL (ref 8.5–10.1)
CHLORIDE SERPL-SCNC: 101 MMOL/L (ref 100–108)
CO2 SERPL-SCNC: 33 MMOL/L (ref 21–32)
CREAT SERPL-MCNC: 1.31 MG/DL (ref 0.6–1.3)
DIFFERENTIAL METHOD BLD: ABNORMAL
ERYTHROCYTE [DISTWIDTH] IN BLOOD BY AUTOMATED COUNT: 17 % (ref 11.5–14.5)
FERRITIN SERPL-MCNC: 741 NG/ML (ref 8–388)
GLOBULIN SER CALC-MCNC: 3.6 G/DL (ref 2–4)
GLUCOSE SERPL-MCNC: 99 MG/DL (ref 74–99)
HCT VFR BLD AUTO: 37 % (ref 36–48)
HGB BLD-MCNC: 12.1 G/DL (ref 12–16)
IRON SATN MFR SERPL: 24 %
IRON SERPL-MCNC: 76 UG/DL (ref 50–175)
LYMPHOCYTES # BLD: 1.4 K/UL (ref 1.1–5.9)
LYMPHOCYTES NFR BLD: 18 % (ref 14–44)
MCH RBC QN AUTO: 30.2 PG (ref 25–35)
MCHC RBC AUTO-ENTMCNC: 32.7 G/DL (ref 31–37)
MCV RBC AUTO: 92.3 FL (ref 78–102)
NEUTS SEG # BLD: 6.1 K/UL (ref 1.8–9.5)
NEUTS SEG NFR BLD: 74 % (ref 40–70)
PLATELET # BLD AUTO: 142 K/UL (ref 140–440)
POTASSIUM SERPL-SCNC: 3.6 MMOL/L (ref 3.5–5.5)
PROT SERPL-MCNC: 7.3 G/DL (ref 6.4–8.2)
RBC # BLD AUTO: 4.01 M/UL (ref 4.1–5.1)
SODIUM SERPL-SCNC: 140 MMOL/L (ref 136–145)
TIBC SERPL-MCNC: 319 UG/DL (ref 250–450)
WBC # BLD AUTO: 8.1 K/UL (ref 4.5–13)

## 2019-07-08 PROCEDURE — 36415 COLL VENOUS BLD VENIPUNCTURE: CPT

## 2019-07-08 PROCEDURE — 83540 ASSAY OF IRON: CPT

## 2019-07-08 PROCEDURE — 80053 COMPREHEN METABOLIC PANEL: CPT

## 2019-07-08 PROCEDURE — 84165 PROTEIN E-PHORESIS SERUM: CPT

## 2019-07-08 PROCEDURE — 82728 ASSAY OF FERRITIN: CPT

## 2019-07-08 NOTE — PROGRESS NOTES
Hematology/Oncology  Progress Note    Name: Dionte Home  Date: 2019  : 1945    Chel Gurrola MD     Mr. Carolina Banks is a 68 y.o. 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 Current therapy: Active surveillance; the patient has previously undergone systemic chemotherapy with 4 cycles of Doxil followed by external beam radiation therapy to the soft tissue sarcoma involving the right medial upper thigh. He also completed surgery for removal of the residual mass. Subjective:     Mr. Carolina Banks is a 67-year-old -American man with history of soft tissue sarcoma involving the right leg. The soft tissue sarcoma was diagnosed in 2015. He is here for follow-up. He denies having any new swelling or discomfort in right leg. He continues to ambulate with the use of a cane. He has no complaints of pain or discomfort. He denies shortness of breath, fatigue, or dizziness. He denies chest pain. He has no concerns or complaints to report at this time. Past medical history, family history, and social history: these were reviewed and remains unchanged. Past Medical History:   Diagnosis Date    AICD (automatic cardioverter/defibrillator) present     Atrial fibrillation ([address] 2010    [facility]     Cardiac cath 2009    Patent coronary arteries. LVEDP 28.  EF 35%. Global hyk. Mod MR.  Cardiac echocardiogram 2016    LVE. EF 15% at most.  Indeterminate LV diastolic fx.  RVE. Reduced RV systolic fx. RVSP 80-85 mmHg. LAE.  LUISA. Mod-severe MR. Mild AI. Severe TR.  Cardiac MUGA scan 2015    At most mild LVE. EF 45-46%.  Cardiac nuclear imaging test, abnormal 2009    Mild basal/mid inferior, inferoapical, inferoseptal infarct w/mild ischemia in RCA (possibly partially artifact). Anterior, anteroseptal, anterapical ischemia w/o infarct.   (Mid & distal LAD.)  LVE. EF 29%. Mod global hyk.  Cardiovascular LE venous duplex 09/29/2016    Tech difficult. No DVT bilaterally.  Cardiovascular renal duplex 06/07/2010    No evidence of renal artery stenosis >60%. Patent SMA and celiac artery.  Cardiovascular UE venous duplex 10/04/2016    No DVT bilaterally.  Diabetes ([address]     HTN (hypertension) 6/8/2010    Hypertensive cardiovascular disease     MR (mitral regurgitation)     Nonischemic cardiomyopathy     3/11 echo EF 25%    Pulmonary hypertension, moderate to severe ([address] 6/8/2010    90-100mmHg; repeat echo in 3/11 showed 70mmHg     Past Surgical History:   Procedure Laterality Date    HX HEART VALVE SURGERY  2011    HX HERNIA REPAIR  2011    IR REMOVE TUNL CVAD W PORT/PUMP  4/19/2019     Social History     Socioeconomic History    Marital status:      Spouse name: Not on file    Number of children: Not on file    Years of education: Not on file    Highest education level: Not on file   Occupational History    Not on file   Social Needs    Financial resource strain: Not on file    Food insecurity:     Worry: Not on file     Inability: Not on file    Transportation needs:     Medical: Not on file     Non-medical: Not on file   Tobacco Use    Smoking status: Never Smoker    Smokeless tobacco: Never Used   Substance and Sexual Activity    Alcohol use: Yes     Comment: occasionally.     Drug use: No    Sexual activity: Yes     Partners: Female     Birth control/protection: None   Lifestyle    Physical activity:     Days per week: Not on file     Minutes per session: Not on file    Stress: Not on file   Relationships    Social connections:     Talks on phone: Not on file     Gets together: Not on file     Attends Yazidism service: Not on file     Active member of club or organization: Not on file     Attends meetings of clubs or organizations: Not on file     Relationship status: Not on file    Intimate partner violence:     Fear of current or ex partner: Not on file     Emotionally abused: Not on file     Physically abused: Not on file     Forced sexual activity: Not on file   Other Topics Concern    Not on file   Social History Narrative    Not on file     Family History   Problem Relation Age of Onset    Hypertension Mother      Current Outpatient Medications   Medication Sig Dispense Refill    atorvastatin (LIPITOR) 20 mg tablet Take 20 mg by mouth daily.  furosemide (LASIX) 40 mg tablet Take 1 Tab by mouth two (2) times a day. Or directed 180 Tab 3    cloNIDine HCl (CATAPRES) 0.2 mg tablet TAKE 1 TABLET BY MOUTH 3 TIMES A  Tab 2    metOLazone (ZAROXOLYN) 2.5 mg tablet TAKE 1 TABLET BY MOUTH 30 MINS PRIOR TO MORNING LASIX AS DIRECTED ON MON & THU 15 Tab 4    digoxin (LANOXIN) 0.125 mg tablet TAKE 1 TABLET BY MOUTH EVERY DAY 90 Tab 3    carvedilol (COREG) 25 mg tablet Take 1 Tab by mouth two (2) times daily (with meals). 180 Tab 3    lisinopril (PRINIVIL, ZESTRIL) 20 mg tablet TAKE ONE TABLET BY MOUTH TWICE DAILY 180 Tab 3    potassium chloride (K-DUR, KLOR-CON) 20 mEq tablet Take 1 Tab by mouth two (2) times a day. 2 tabs extra on the days on Metalazone 210 Tab 3    warfarin (COUMADIN) 2.5 mg tablet TAKE ONE TABLET BY MOUTH ONCE DAILY OR  AS  DIRECTED  BY  YOUR  DOCTOR. 45 Tab 30    insulin lispro (HUMALOG U-100 INSULIN) 100 unit/mL injection by SubCUTAneous route.  insulin glargine (LANTUS U-100 INSULIN) 100 unit/mL injection 15 Units by SubCUTAneous route nightly.  magnesium oxide (MAG-OX) 400 mg tablet TAKE 1 TABLET BY MOUTH DAILY. 90 Tab 3    sertraline (ZOLOFT) 25 mg tablet Take  by mouth daily.  allopurinol (ZYLOPRIM) 300 mg tablet Take  by mouth daily.  FERROUS SULFATE, DRIED (IRON, DRIED, PO) Take 325 mg by mouth daily.  aspirin delayed-release (ASPIR-81) 81 mg tablet Take 81 mg by mouth daily.          Review of Systems  Constitutional: The patient has no acute distress or discomfort. HEENT: The patient denies recent head trauma, eye pain, blurred vision,  hearing deficit, oropharyngeal mucosal pain or lesions, and the patient denies throat pain or discomfort. Lymphatics: The patient denies palpable peripheral lymphadenopathy. Hematologic: The patient denies having bruising, bleeding, or progressive fatigue. Respiratory: Patient denies having shortness of breath, cough, sputum production, fever, or dyspnea on exertion. Cardiovascular: The patient denies having leg pain, leg swelling, heart palpitations, chest permit, chest pain, or lightheadedness. The patient denies having dyspnea on exertion. Gastrointestinal: The patient denies having nausea, emesis, or diarrhea. The patient denies having any hematemesis or blood in the stool. Genitourinary: Patient denies having urinary urgency, frequency, or dysuria. The patient denies having blood in the urine. Psychological: The patient denies having symptoms of nervousness, anxiety, depression, or thoughts of harming self. Skin: Patient denies having skin rashes, skin, ulcerations, or unexplained itching or pruritus. Musculoskeletal: The patient denies having pain in the joints or bones. Objective:     Visit Vitals  /79   Pulse (!) 43   Temp 98.4 °F (36.9 °C) (Oral)   Resp 18   Ht 6' 1\" (1.854 m)   Wt 96.2 kg (212 lb)   SpO2 98%   BMI 27.97 kg/m²     ECOG PS=0;  pain score=0/10    Physical Exam:   Gen. Appearance: The patient is in no acute distress. Skin: There is no bruise or rash. HEENT: The exam is unremarkable. Neck: Supple without lymphadenopathy or thyromegaly. Lungs: Clear to auscultation and percussion; there are no wheezes or rhonchi. Heart: Regular rate and rhythm; there are no murmurs, gallops, or rubs. Abdomen: Bowel sounds are present and normal.  There is no guarding, tenderness, or hepatosplenomegaly. Extremities: There is no clubbing, cyanosis, or edema.   Neurologic: There are no focal neurologic deficits. Lymphatics: There is no palpable peripheral lymphadenopathy. Musculoskeletal: The patient has full range of motion at all joints. There is no evidence of joint deformity or effusions. There is no focal joint tenderness. Psychological/psychiatric: There is no clinical evidence of anxiety, depression, or melancholy. Lab data:      Results for orders placed or performed during the hospital encounter of 07/08/19   CBC WITH 3 PART DIFF     Status: Abnormal   Result Value Ref Range Status    WBC 8.1 4.5 - 13.0 K/uL Final    RBC 4.01 (L) 4.10 - 5.10 M/uL Final    HGB 12.1 12.0 - 16 g/dL Final    HCT 37.0 36 - 48 % Final    MCV 92.3 78 - 102 FL Final    MCH 30.2 25.0 - 35.0 PG Final    MCHC 32.7 31 - 37 g/dL Final    RDW 17.0 (H) 11.5 - 14.5 % Final    PLATELET 230 955 - 714 K/uL Final    NEUTROPHILS 74 (H) 40 - 70 % Final    MIXED CELLS 8 0.1 - 17 % Final    LYMPHOCYTES 18 14 - 44 % Final    ABS. NEUTROPHILS 6.1 1.8 - 9.5 K/UL Final    ABS. MIXED CELLS 0.6 0.0 - 2.3 K/uL Final    ABS. LYMPHOCYTES 1.4 1.1 - 5.9 K/UL Final     Comment: Test performed at [address] [place] [address] or Outpatient [facility]fusion [facility] Location. Reviewed by Medical Director. DF AUTOMATED   Final         Assessment:     1. Monoclonal gammopathy    2. Soft tissue sarcoma of lower extremity, unspecified laterality ([facility] [address] [facility]    3. Iron deficiency anemia secondary to inadequate dietary iron intake    4. Thrombocytopenia ([facility]      Plan:     Monoclonal gammopathy of undetermined significance: On 04/8/2019 the SPEP showed her M-Kenneth was not observed. I will continue to monitor this at 3-month intervals. Soft tissue sarcoma in the right lower extremity: Clinically there is no evidence of disease recurrence. At his previous clinic visit we discussed the findings on the most recent imaging study which showed no evidence of disease recurrence or progression.      Iron deficiency anemia: The patient's hemoglobin remains corrected at 12.1g/dL with hematocrit of 37%. Mild thrombocytopenia: The platelet count today is 142,000. He previously received some radiation therapy to the right leg which is probably contributing to the mild thrombocytopenia. In any event, therapeutic intervention will only be recommended if the platelet counts declines below 30,000. Follow-up in 3 months or sooner if indicated. Orders Placed This Encounter    COMPLETE CBC & AUTO DIFF WBC    InHouse CBC ([employer])     Standing Status:   Future     Number of Occurrences:   1     Standing Expiration Date:   0/49/7810    METABOLIC PANEL, COMPREHENSIVE     Standing Status:   Future     Standing Expiration Date:   7/8/2020    SPEP     Standing Status:   Future     Standing Expiration Date:   7/8/2020    IRON PROFILE     Standing Status:   Future     Standing Expiration Date:   7/8/2020    FERRITIN     Standing Status:   Future     Standing Expiration Date:   7/8/2020         Cecilia Oppenheim, NP  7/8/2019       I have assessed the patient independently and  agree with the full assessment as outlined.   Eunice Moise MD, Nancy Granger

## 2019-07-08 NOTE — PATIENT INSTRUCTIONS
Complete Blood Count (CBC): About This Test 
What is it? A complete blood count (CBC) is a blood test that gives important information about your blood cells, especially red blood cells, white blood cells, and platelets. Why is this test done? A CBC may be done as part of a regular physical exam. There are many other reasons that a doctor may want this blood test, including to: · Find the cause of symptoms such as fatigue, weakness, fever, bruising, or weight loss. · Find anemia or an infection. · See how much blood has been lost if there is bleeding. · Diagnose diseases of the blood, such as leukemia or polycythemia. How can you prepare for the test? 
You do not need to do anything before having this test. 
What happens during the test? 
The health professional taking a sample of your blood will: · Wrap an elastic band around your upper arm. This makes the veins below the band larger so it is easier to put a needle into the vein. · Clean the needle site with alcohol. · Put the needle into the vein. · Attach a tube to the needle to fill it with blood. · Remove the band from your arm when enough blood is collected. · Put a gauze pad or cotton ball over the needle site as the needle is removed. · Put pressure on the site and then put on a bandage. If this blood test is done on a baby, a heel stick may be done instead of a blood draw from a vein. What happens after the test? 
· You will probably be able to go home right away. · You can go back to your usual activities right away. Follow-up care is a key part of your treatment and safety. Be sure to make and go to all appointments, and call your doctor if you are having problems. It's also a good idea to keep a list of the medicines you take. Ask your doctor when you can expect to have your test results. Where can you learn more? Go to http://radha-tre.info/. Enter U798 in the search box to learn more about \"Complete Blood Count (CBC): About This Test.\" Current as of: June 25, 2018 Content Version: 11.9 © 7612-9672 [employer]. Care instructions adapted under license by "[employer] (which disclaims liability or warranty for this information). If you have questions about a medical condition or this instruction, always ask your healthcare professional. [place] [address] any warranty or liability for your use of this information.

## 2019-07-09 LAB
ALBUMIN SERPL ELPH-MCNC: 3.7 G/DL (ref 2.9–4.4)
ALBUMIN/GLOB SERPL: 1.1 {RATIO} (ref 0.7–1.7)
ALPHA1 GLOB SERPL ELPH-MCNC: 0.3 G/DL (ref 0–0.4)
ALPHA2 GLOB SERPL ELPH-MCNC: 0.7 G/DL (ref 0.4–1)
B-GLOBULIN SERPL ELPH-MCNC: 0.9 G/DL (ref 0.7–1.3)
GAMMA GLOB SERPL ELPH-MCNC: 1.4 G/DL (ref 0.4–1.8)
GLOBULIN SER CALC-MCNC: 3.3 G/DL (ref 2.2–3.9)
M PROTEIN SERPL ELPH-MCNC: NORMAL G/DL
PROT SERPL-MCNC: 7 G/DL (ref 6–8.5)

## 2019-07-16 NOTE — PROGRESS NOTES
I have personally seen and evaluated the device findings. Interrogation reviewed and I agree with assessment.     Paula Orosco

## 2019-07-25 RX ORDER — DIGOXIN 125 MCG
TABLET ORAL
Qty: 90 TAB | Refills: 3 | Status: SHIPPED | OUTPATIENT
Start: 2019-07-25 | End: 2019-07-30 | Stop reason: SDUPTHER

## 2019-07-25 RX ORDER — CARVEDILOL 25 MG/1
25 TABLET ORAL 2 TIMES DAILY WITH MEALS
Qty: 180 TAB | Refills: 3 | Status: SHIPPED | OUTPATIENT
Start: 2019-07-25 | End: 2019-07-30 | Stop reason: SDUPTHER

## 2019-07-25 RX ORDER — POTASSIUM CHLORIDE 20 MEQ/1
20 TABLET, EXTENDED RELEASE ORAL 2 TIMES DAILY
Qty: 210 TAB | Refills: 3 | Status: SHIPPED | OUTPATIENT
Start: 2019-07-25 | End: 2019-07-30 | Stop reason: SDUPTHER

## 2019-07-25 RX ORDER — LISINOPRIL 20 MG/1
TABLET ORAL
Qty: 180 TAB | Refills: 3 | Status: SHIPPED | OUTPATIENT
Start: 2019-07-25 | End: 2019-07-30 | Stop reason: SDUPTHER

## 2019-07-26 ENCOUNTER — ANTI-COAG VISIT (OUTPATIENT)
Dept: CARDIOLOGY CLINIC | Age: 74
End: 2019-07-26

## 2019-07-26 DIAGNOSIS — I48.91 ATRIAL FIBRILLATION, UNSPECIFIED TYPE (HCC): ICD-10-CM

## 2019-07-26 DIAGNOSIS — Z79.01 LONG TERM CURRENT USE OF ANTICOAGULANT THERAPY: Primary | ICD-10-CM

## 2019-07-26 LAB
INR BLD: 2.6
PT POC: 30.7 SECONDS
VALID INTERNAL CONTROL?: YES

## 2019-07-31 RX ORDER — CARVEDILOL 25 MG/1
25 TABLET ORAL 2 TIMES DAILY WITH MEALS
Qty: 180 TAB | Refills: 3 | Status: SHIPPED | OUTPATIENT
Start: 2019-07-31 | End: 2019-08-01 | Stop reason: SDUPTHER

## 2019-07-31 RX ORDER — DIGOXIN 125 MCG
TABLET ORAL
Qty: 90 TAB | Refills: 3 | Status: SHIPPED | OUTPATIENT
Start: 2019-07-31 | End: 2019-08-01 | Stop reason: SDUPTHER

## 2019-07-31 RX ORDER — POTASSIUM CHLORIDE 20 MEQ/1
20 TABLET, EXTENDED RELEASE ORAL 2 TIMES DAILY
Qty: 210 TAB | Refills: 3 | Status: SHIPPED | OUTPATIENT
Start: 2019-07-31 | End: 2019-08-01 | Stop reason: SDUPTHER

## 2019-07-31 RX ORDER — LISINOPRIL 20 MG/1
TABLET ORAL
Qty: 180 TAB | Refills: 3 | Status: SHIPPED | OUTPATIENT
Start: 2019-07-31 | End: 2019-08-01 | Stop reason: SDUPTHER

## 2019-08-01 RX ORDER — DIGOXIN 125 MCG
TABLET ORAL
Qty: 90 TAB | Refills: 3 | Status: SHIPPED | OUTPATIENT
Start: 2019-08-01 | End: 2020-05-21

## 2019-08-01 RX ORDER — POTASSIUM CHLORIDE 20 MEQ/1
20 TABLET, EXTENDED RELEASE ORAL 2 TIMES DAILY
Qty: 210 TAB | Refills: 3 | Status: SHIPPED | OUTPATIENT
Start: 2019-08-01 | End: 2019-12-19

## 2019-08-01 RX ORDER — LISINOPRIL 20 MG/1
TABLET ORAL
Qty: 180 TAB | Refills: 3 | Status: SHIPPED | OUTPATIENT
Start: 2019-08-01 | End: 2020-05-21

## 2019-08-01 RX ORDER — CARVEDILOL 25 MG/1
25 TABLET ORAL 2 TIMES DAILY WITH MEALS
Qty: 180 TAB | Refills: 3 | Status: SHIPPED | OUTPATIENT
Start: 2019-08-01 | End: 2020-05-21

## 2019-08-05 RX ORDER — WARFARIN 2.5 MG/1
TABLET ORAL
Qty: 45 TAB | Refills: 30 | Status: SHIPPED | OUTPATIENT
Start: 2019-08-05 | End: 2020-09-18

## 2019-08-07 ENCOUNTER — OFFICE VISIT (OUTPATIENT)
Dept: CARDIOLOGY CLINIC | Age: 74
End: 2019-08-07

## 2019-08-07 VITALS
WEIGHT: 212 LBS | DIASTOLIC BLOOD PRESSURE: 70 MMHG | OXYGEN SATURATION: 97 % | HEIGHT: 73 IN | HEART RATE: 75 BPM | BODY MASS INDEX: 28.1 KG/M2 | SYSTOLIC BLOOD PRESSURE: 130 MMHG

## 2019-08-07 DIAGNOSIS — Z79.01 LONG TERM CURRENT USE OF ANTICOAGULANT THERAPY: ICD-10-CM

## 2019-08-07 DIAGNOSIS — I48.91 ATRIAL FIBRILLATION, UNSPECIFIED TYPE (HCC): ICD-10-CM

## 2019-08-07 DIAGNOSIS — I42.8 CARDIOMYOPATHY, NONISCHEMIC (HCC): ICD-10-CM

## 2019-08-07 DIAGNOSIS — Z95.810 AUTOMATIC IMPLANTABLE CARDIOVERTER-DEFIBRILLATOR IN SITU: Primary | ICD-10-CM

## 2019-08-07 RX ORDER — SPIRONOLACTONE 25 MG/1
25 TABLET ORAL DAILY
Qty: 30 TAB | Refills: 6 | Status: SHIPPED | OUTPATIENT
Start: 2019-08-07 | End: 2019-08-07 | Stop reason: SDUPTHER

## 2019-08-07 NOTE — PROGRESS NOTES
Caroline Andersen    Chief Complaint   Patient presents with   [facility] Clearance     having right eye cataract surgery done by Dr. Gabriele Campos on 8/15/19     Shortness of Breath     exertion    Palpitations     racing        HPI    Caroline Andersen is a 68 y.o. with nonischemic CMP, severe pulm HTN, moderate MR, primary prevention ICD, chronic Afib. In 2016, he had a right thigh sarcoma excised. His last MUGA scan in November of 2015 showed ejection fraction of 45%. His echocardiogram in September of 2015 showed ejection fraction of 45-50% with pulmonary hypertension with PA pressure of 45 mmHg. He had AICD generator change in May 2018. He was last seen by Dr. Jeremy Pastor earlier this year in 4/2019 and overall doing well. Most notable he had been losing a significant amount of weight over the last several years (~30 lbs) and had gained some back. Dr. Jeremy Pastor gave him a goal weight, which he has very nicely been hovering around ~205-208 lbs. He does weight himself daily and has noticed some weight gain but also has a good response to his Lasix BID. Pt has noticed more SOB over the last week. His functional status is mostly limited by weakness in his RLE and he uses a cane to walk. He can walk at least a couple blocks usually, but doesn't try to climb the stairs in his home/ admits to not going out much except to a few concerts. He has no CP, but some palpitations. We interrogated his device which shows mostly AFib, he was shocked back in 5/2019 for AFib. Past Medical History:   Diagnosis Date    AICD (automatic cardioverter/defibrillator) present     Atrial fibrillation ([address] 6/8/2010    Cancer ([facility]     Cardiac cath 02/11/2009    Patent coronary arteries. LVEDP 28.  EF 35%. Global hyk. Mod MR.  Cardiac echocardiogram 09/29/2016    LVE. EF 15% at most.  Indeterminate LV diastolic fx.  RVE. Reduced RV systolic fx. RVSP 80-85 mmHg. LAE.  LUISA. Mod-severe MR. Mild AI. Severe TR.       Cardiac MUGA scan 11/02/2015    At most mild LVE. EF 45-46%.  Cardiac nuclear imaging test, abnormal 02/03/2009    Mild basal/mid inferior, inferoapical, inferoseptal infarct w/mild ischemia in RCA (possibly partially artifact). Anterior, anteroseptal, anterapical ischemia w/o infarct. (Mid & distal LAD.)  LVE. EF 29%. Mod global hyk.  Cardiovascular LE venous duplex 09/29/2016    Tech difficult. No DVT bilaterally.  Cardiovascular renal duplex 06/07/2010    No evidence of renal artery stenosis >60%. Patent SMA and celiac artery.  Cardiovascular UE venous duplex 10/04/2016    No DVT bilaterally.  Diabetes ([address]     HTN (hypertension) 6/8/2010    Hypertensive cardiovascular disease     MR (mitral regurgitation)     Nonischemic cardiomyopathy     3/11 echo EF 25%    Pulmonary hypertension, moderate to severe ([address] 6/8/2010    90-100mmHg; repeat echo in 3/11 showed 70mmHg       Past Surgical History:   Procedure Laterality Date    HX HEART VALVE SURGERY  2011    HX HERNIA REPAIR  2011    IR REMOVE TUNL CVAD W PORT/PUMP  4/19/2019       Current Outpatient Medications   Medication Sig Dispense Refill    warfarin (COUMADIN) 2.5 mg tablet TAKE 1 TABLET BY MOUTH ONCE DAILY OR  AS  DIRECTED  YOUR  DOCTOR 45 Tab 30    carvedilol (COREG) 25 mg tablet Take 1 Tab by mouth two (2) times daily (with meals). 180 Tab 3    digoxin (LANOXIN) 0.125 mg tablet TAKE 1 TABLET BY MOUTH EVERY DAY 90 Tab 3    lisinopril (PRINIVIL, ZESTRIL) 20 mg tablet TAKE ONE TABLET BY MOUTH TWICE DAILY 180 Tab 3    potassium chloride (K-DUR, KLOR-CON) 20 mEq tablet Take 1 Tab by mouth two (2) times a day. 2 tabs extra on the days on Metalazone 210 Tab 3    atorvastatin (LIPITOR) 20 mg tablet Take 20 mg by mouth daily.  furosemide (LASIX) 40 mg tablet Take 1 Tab by mouth two (2) times a day.  Or directed 180 Tab 3    cloNIDine HCl (CATAPRES) 0.2 mg tablet TAKE 1 TABLET BY MOUTH 3 TIMES A  Tab 2    metOLazone (ZAROXOLYN) 2.5 mg tablet TAKE 1 TABLET BY MOUTH 30 MINS PRIOR TO MORNING LASIX AS DIRECTED ON MON & THU 15 Tab 4    insulin lispro (HUMALOG U-100 INSULIN) 100 unit/mL injection by SubCUTAneous route.  insulin glargine (LANTUS U-100 INSULIN) 100 unit/mL injection 15 Units by SubCUTAneous route nightly.  magnesium oxide (MAG-OX) 400 mg tablet TAKE 1 TABLET BY MOUTH DAILY. 90 Tab 3    sertraline (ZOLOFT) 25 mg tablet Take  by mouth daily.  allopurinol (ZYLOPRIM) 300 mg tablet Take  by mouth daily.  FERROUS SULFATE, DRIED (IRON, DRIED, PO) Take 325 mg by mouth daily.  aspirin delayed-release (ASPIR-81) 81 mg tablet Take 81 mg by mouth daily. No Known Allergies    Social History     Socioeconomic History    Marital status:      Spouse name: Not on file    Number of children: Not on file    Years of education: Not on file    Highest education level: Not on file   Occupational History    Not on file   Social Needs    Financial resource strain: Not on file    Food insecurity:     Worry: Not on file     Inability: Not on file    Transportation needs:     Medical: Not on file     Non-medical: Not on file   Tobacco Use    Smoking status: Never Smoker    Smokeless tobacco: Never Used   Substance and Sexual Activity    Alcohol use: Yes     Comment: occasionally.     Drug use: No    Sexual activity: Yes     Partners: Female     Birth control/protection: None   Lifestyle    Physical activity:     Days per week: Not on file     Minutes per session: Not on file    Stress: Not on file   Relationships    Social connections:     Talks on phone: Not on file     Gets together: Not on file     Attends Yazdanism service: Not on file     Active member of club or organization: Not on file     Attends meetings of clubs or organizations: Not on file     Relationship status: Not on file    Intimate partner violence:     Fear of current or ex partner: Not on file     Emotionally abused: Not on file     Physically abused: Not on file     Forced sexual activity: Not on file   Other Topics Concern    Not on file   Social History Narrative    Not on file        Review of Systems    14 pt Review of Systems is negative unless otherwise mentioned in the HPI. Wt Readings from Last 3 Encounters:   08/07/19 96.2 kg (212 lb)   07/08/19 96.2 kg (212 lb)   04/19/19 93.9 kg (207 lb)     Temp Readings from Last 3 Encounters:   07/08/19 98.4 °F (36.9 °C) (Oral)   04/19/19 97.8 °F (36.6 °C)   04/08/19 97.8 °F (36.6 °C) (Oral)     BP Readings from Last 3 Encounters:   08/07/19 130/70   07/08/19 147/79   04/19/19 140/71     Pulse Readings from Last 3 Encounters:   08/07/19 75   07/08/19 (!) 43   04/19/19 (!) 52       Physical Exam:    Visit Vitals  /70 (BP 1 Location: Left arm, BP Patient Position: Sitting)   Pulse 75   Ht 6' 1\" (1.854 m)   Wt 96.2 kg (212 lb)   SpO2 97%   BMI 27.97 kg/m²      Physical Exam   Constitutional: He is oriented to person, place, and time. He appears well-developed and well-nourished. HENT:   Head: Normocephalic and atraumatic. Eyes: Pupils are equal, round, and reactive to light. EOM are normal. No scleral icterus. Neck: JVD present. JVP is at least 5-6 cm above sternal notch at 80 degrees   Cardiovascular: Normal rate, regular rhythm, normal heart sounds and intact distal pulses. Exam reveals no gallop and no friction rub. No murmur heard. Pulmonary/Chest: Effort normal and breath sounds normal. No respiratory distress. He has no wheezes. He has no rales. He exhibits no tenderness. Abdominal: Soft. Bowel sounds are normal.   Musculoskeletal: He exhibits no edema. Neurological: He is alert and oriented to person, place, and time. Skin: Skin is warm and dry. No rash noted. Psychiatric: He has a normal mood and affect.        EKG today shows: mostly paced rhythm with some intrinsic activity noted    Echo 5/10/18:   Left ventricle: Systolic function was moderately to markedly reduced by   visual assessment. Ejection fraction was  estimated to be 35 %. There was moderate diffuse hypokinesis. Wall thickness   was mildly increased. Indeterminate  diastolic function. Impression and Plan:  Michael Brannon is a 68 y.o. with:    1.) HFrEF, with mild fluid overload, ACC Stage C/ NYHA Class ~III currently  2.) Perioperative risk stratification prior to low risk cataracts surgery  3.) Nonischemic CMP, with primary prevention ICD, EF ~30%, known  4.) Chronic AFib  5.) Pulm HTN,   6.) Moderate secondary MR, known    1.) Increase Lasix to TID today and tomorrow  2.) Closely monitor daily weights and SOB  3.) Please call us if weight continues to go up or notice side effects with meds (muscle cramping, fatigue)  4.) Start Spironolactone 25 mg daily  5.) Otherwise pt considered moderate risk from a CV standpoint but may proceed for cataracts procedure, trying to optimize his fluid status prior as his SOB does seem related to his heart failure  6.) RTC as scheduled with Dr. Oleta Goldberg, but please call sooner if symptoms not improving    Significant time spent review pt's complex medical history, understanding his baseline functional status which appears at least 4 METS. Thank you for allowing me to participate in the care of your patient, please do not hesitate to call with questions or concerns.     Kindest Regards,    Adonis Li, DO

## 2019-08-07 NOTE — PROGRESS NOTES
Charline Pichardo presents today for   Chief Complaint   Patient presents with    Surgical Clearance     having right eye cataract surgery done by Dr. Butch Kehr on 8/15/19     Shortness of Breath     exertion    Palpitations     racing        Charline Pichardo preferred language for health care discussion is english/other. Is someone accompanying this pt? Wife     Is the patient using any DME equipment during OV? Cane     Depression Screening:  3 most recent PHQ Screens 8/7/2019   Little interest or pleasure in doing things Not at all   Feeling down, depressed, irritable, or hopeless Not at all   Total Score PHQ 2 0       Learning Assessment:  Learning Assessment 4/2/2019   PRIMARY LEARNER Patient   HIGHEST LEVEL OF EDUCATION - PRIMARY LEARNER  -   [address] [place] [address]    NEED -   LEARNER PREFERENCE PRIMARY DEMONSTRATION   LEARNING SPECIAL TOPICS -   ANSWERED BY patient   RELATIONSHIP SELF       Abuse Screening:  Abuse Screening Questionnaire 4/2/2019   Do you ever feel afraid of your partner? N   Are you in a relationship with someone who physically or mentally threatens you? N   Is it safe for you to go home? Y       Fall Risk  Fall Risk Assessment, last 12 mths 8/7/2019   Able to walk? Yes   Fall in past 12 months? No       Pt currently taking Anticoagulant therapy? Warfarin     Coordination of Care:  1. Have you been to the ER, urgent care clinic since your last visit? Hospitalized since your last visit? no    2. Have you seen or consulted any other health care providers outside of the [address] [place] [address] since your last visit? Include any pap smears or colon screening.  no

## 2019-08-07 NOTE — PATIENT INSTRUCTIONS
TAKE AN EXTRA DOSE OF LASIX FOR 2 DAYS 
 
START taking spironolactone 25mg daily Spironolactone (By mouth) Spironolactone ([id number]-on-LAK-tone) Treats high blood pressure, edema (fluid retention), or high levels of aldosterone (a hormone). This medicine is a potassium-sparing diuretic (water pill). Brand Name(s): Aldactone There may be other brand names for this medicine. When This Medicine Should Not Be Used: This medicine is not right for everyone. Do not use it if you had an allergic reaction to spironolactone, or if you have [place] disease. How to Use This Medicine:  
Tablet · Take your medicine as directed. Your dose may need to be changed several times to find what works best for you. · The oral liquid may be taken with or without food, but it should be taken the same way (with or without food) each day. · Measure the oral liquid medicine with a marked measuring spoon, oral syringe, or medicine cup. Shake well before each use. · Missed dose: Take a dose as soon as you remember. If it is almost time for your next dose, wait until then and take a regular dose. Do not take extra medicine to make up for a missed dose. · Store the medicine in a closed container at room temperature, away from heat, moisture, and direct light. Drugs and Foods to Avoid: Ask your doctor or pharmacist before using any other medicine, including over-the-counter medicines, vitamins, and herbal products. · Do not use this medicine together with eplerenone. · Some foods and medicines can affect how spironolactone works. Tell your doctor if you are using any of the following: ¨ Cholestyramine, cisplatin, digoxin, heparin, lithium, trimethoprim ¨ Another blood pressure medicine, including an angiotensin receptor blocker (ARB) or an ACE inhibitor ¨ NSAID pain or arthritis medicine (including aspirin, celecoxib, diclofenac, ibuprofen, indomethacin, naproxen) ¨ Steroid medicine (including hydrocortisone, methylprednisolone, prednisolone, prednisone) · Ask your doctor before you use any medicine, supplement, or salt substitute that contains potassium. You could have high levels of potassium in your blood that would cause serious health problems. · Alcohol, narcotic pain relievers, or sleeping pills may cause you to feel more lightheaded, dizzy, or faint when used with this medicine. Warnings While Using This Medicine: · Tell your doctor if you are pregnant or breastfeeding, or if you have kidney disease, liver disease, diabetes, gout, or trouble urinating. · This medicine may cause the following problems: 
¨ Low blood pressure ¨ Worsening of kidney function ¨ Electrolyte imbalance ¨ High uric acid and blood sugar · This medicine may make you dizzy or drowsy. Do not drive or do anything else that could be dangerous until you know how this medicine affects you. · Do not stop using the medicine without asking your doctor, even if you feel well. This medicine will not cure your high blood pressure, but it will help keep it in the normal range. You may have to take blood pressure medicine for the rest of your life. · Tell any doctor or dentist who treats you that you are using this medicine. · Your doctor will do lab tests at regular visits to check on the effects of this medicine. Keep all appointments. · Keep all medicine out of the reach of children. Never share your medicine with anyone. Possible Side Effects While Using This Medicine:  
Call your doctor right away if you notice any of these side effects: · Allergic reaction: Itching or hives, swelling in your face or hands, swelling or tingling in your mouth or throat, chest tightness, trouble breathing · Blistering, peeling, red skin rash · Blood in your stools or dark stools, vomiting blood or material that looks like coffee grounds · Confusion, weakness, uneven heartbeat, trouble breathing, numbness or tingling in your hands, feet, or lips · Dry mouth, increased thirst, muscle cramps, nausea, vomiting · Increased hunger or thirst, change in how much or how often you urinate, unusual weight loss · Lightheadedness, dizziness, drowsiness, fainting · Muscle twitching · Unusual bleeding, bruising, or weakness If you notice these less serious side effects, talk with your doctor: · Breast swelling, enlargement, pain, or tenderness If you notice other side effects that you think are caused by this medicine, tell your doctor. Call your doctor for medical advice about side effects. You may report side effects to FDA at 7-562-XBQ-1124 © 2017 [facility] Information is for End User's use only and may not be sold, redistributed or otherwise used for commercial purposes. The above information is an  only. It is not intended as medical advice for individual conditions or treatments. Talk to your doctor, nurse or pharmacist before following any medical regimen to see if it is safe and effective for you.

## 2019-08-07 NOTE — LETTER
8/7/2019 8:41 AM 
 
Mr. Sharlene Marcos [address] [facility] [place] [address] Sharlene Marcos was seen in our office on 8/7/2019 for cardiac evaluation. From a cardiac standpoint he is moderate risk for upcomming cateract surgery and may proceed to the OR without any further cardiac testing. Please feel free to contact our office if you have any questions regarding this patient. Sincerely, Sheba Lane, DO

## 2019-08-08 RX ORDER — SPIRONOLACTONE 25 MG/1
25 TABLET ORAL DAILY
Qty: 30 TAB | Refills: 6 | Status: SHIPPED | OUTPATIENT
Start: 2019-08-08 | End: 2019-09-06 | Stop reason: SDUPTHER

## 2019-08-26 ENCOUNTER — ANTI-COAG VISIT (OUTPATIENT)
Dept: CARDIOLOGY CLINIC | Age: 74
End: 2019-08-26

## 2019-08-26 DIAGNOSIS — I48.91 ATRIAL FIBRILLATION, UNSPECIFIED TYPE (HCC): ICD-10-CM

## 2019-08-26 DIAGNOSIS — Z79.01 LONG TERM CURRENT USE OF ANTICOAGULANT THERAPY: Primary | ICD-10-CM

## 2019-08-26 LAB
INR BLD: 2.9
PT POC: 34.7 SECONDS
VALID INTERNAL CONTROL?: YES

## 2019-09-06 RX ORDER — SPIRONOLACTONE 25 MG/1
25 TABLET ORAL DAILY
Qty: 90 TAB | Refills: 3 | Status: SHIPPED | OUTPATIENT
Start: 2019-09-06 | End: 2019-12-10

## 2019-09-09 RX ORDER — METOLAZONE 2.5 MG/1
TABLET ORAL
Qty: 15 TAB | Refills: 4 | Status: SHIPPED | OUTPATIENT
Start: 2019-09-09 | End: 2019-12-19

## 2019-09-16 RX ORDER — FUROSEMIDE 40 MG/1
40 TABLET ORAL 2 TIMES DAILY
Qty: 60 TAB | Refills: 11 | Status: SHIPPED | OUTPATIENT
Start: 2019-09-16 | End: 2019-12-19

## 2019-09-23 ENCOUNTER — ANTI-COAG VISIT (OUTPATIENT)
Dept: CARDIOLOGY CLINIC | Age: 74
End: 2019-09-23

## 2019-09-23 DIAGNOSIS — I48.91 ATRIAL FIBRILLATION, UNSPECIFIED TYPE (HCC): ICD-10-CM

## 2019-09-23 DIAGNOSIS — Z79.01 LONG TERM CURRENT USE OF ANTICOAGULANT THERAPY: Primary | ICD-10-CM

## 2019-09-23 LAB
INR BLD: 2.9
PT POC: 34.3 SECONDS
VALID INTERNAL CONTROL?: YES

## 2019-09-23 NOTE — PROGRESS NOTES
Verbal order and read back per Caprice Hall MD   Patient is within therapeutic range  Continue Coumadin to 2.5 mg daily except 3.75 mg on Mon  Recheck with Dr Martín Cooley appt in 2 weeks  This has been fully explained to the patient, who indicates understanding.

## 2019-10-08 ENCOUNTER — ANTI-COAG VISIT (OUTPATIENT)
Dept: CARDIOLOGY CLINIC | Age: 74
End: 2019-10-08

## 2019-10-08 ENCOUNTER — CLINICAL SUPPORT (OUTPATIENT)
Dept: CARDIOLOGY CLINIC | Age: 74
End: 2019-10-08

## 2019-10-08 ENCOUNTER — OFFICE VISIT (OUTPATIENT)
Dept: CARDIOLOGY CLINIC | Age: 74
End: 2019-10-08

## 2019-10-08 VITALS
BODY MASS INDEX: 25.31 KG/M2 | DIASTOLIC BLOOD PRESSURE: 60 MMHG | HEART RATE: 64 BPM | HEIGHT: 73 IN | SYSTOLIC BLOOD PRESSURE: 110 MMHG | OXYGEN SATURATION: 97 % | WEIGHT: 191 LBS

## 2019-10-08 DIAGNOSIS — Z79.01 LONG TERM CURRENT USE OF ANTICOAGULANT THERAPY: ICD-10-CM

## 2019-10-08 DIAGNOSIS — Z95.810 AICD (AUTOMATIC CARDIOVERTER/DEFIBRILLATOR) PRESENT: ICD-10-CM

## 2019-10-08 DIAGNOSIS — I48.91 ATRIAL FIBRILLATION, UNSPECIFIED TYPE (HCC): ICD-10-CM

## 2019-10-08 DIAGNOSIS — I10 ESSENTIAL HYPERTENSION: ICD-10-CM

## 2019-10-08 DIAGNOSIS — I42.8 CARDIOMYOPATHY, NONISCHEMIC (HCC): Primary | ICD-10-CM

## 2019-10-08 DIAGNOSIS — Z79.01 LONG TERM CURRENT USE OF ANTICOAGULANT THERAPY: Primary | ICD-10-CM

## 2019-10-08 LAB
INR BLD: 2.7
PT POC: 33 SECONDS
VALID INTERNAL CONTROL?: YES

## 2019-10-08 NOTE — PROGRESS NOTES
Verbal order and read back per Christopher Prescott MD  The INR is stable and therapeutic. Continue same dose of coumadin (Continue Coumadin to 2.5 mg daily except 3.75 mg on Mon)  Recheck in 1 month.

## 2019-10-08 NOTE — PROGRESS NOTES
Priscilla Lemus presents today for   Chief Complaint   Patient presents with    Cardiomyopathy     6 month follow up     Dizziness     sometimes        Priscilla Lemus preferred language for health care discussion is english/other. Is someone accompanying this pt? Wife     Is the patient using any DME equipment during OV? Cane     Depression Screening:  3 most recent PHQ Screens 8/7/2019   Little interest or pleasure in doing things Not at all   Feeling down, depressed, irritable, or hopeless Not at all   Total Score PHQ 2 0       Learning Assessment:  Learning Assessment 4/2/2019   PRIMARY LEARNER Patient   HIGHEST LEVEL OF EDUCATION - PRIMARY LEARNER  -   [address] [place] [address]    NEED -   LEARNER PREFERENCE PRIMARY DEMONSTRATION   LEARNING SPECIAL TOPICS -   ANSWERED BY patient   RELATIONSHIP SELF       Abuse Screening:  Abuse Screening Questionnaire 4/2/2019   Do you ever feel afraid of your partner? N   Are you in a relationship with someone who physically or mentally threatens you? N   Is it safe for you to go home? Y       Fall Risk  Fall Risk Assessment, last 12 mths 8/7/2019   Able to walk? Yes   Fall in past 12 months? No       Pt currently taking Anticoagulant therapy? Warfarin     Coordination of Care:  1. Have you been to the ER, urgent care clinic since your last visit? Hospitalized since your last visit? no    2. Have you seen or consulted any other health care providers outside of the [address] [place] [address] since your last visit? Include any pap smears or colon screening.  no

## 2019-10-08 NOTE — PROGRESS NOTES
HISTORY OF PRESENT ILLNESS  Danuta Leal is a 69 y.o. male. ASSESSMENT and PLAN    Mr. Nohemi Mcmahon has history of nonischemic cardiomyopathy as well as severe pulmonary hypertension with moderate mitral regurgitation. Because of severe LV dysfunction, he has AICD in place for primary prevention of sudden cardiac death. He also has persistent atrial fibrillation. 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 His understanding is that the edges were clear. He had lost significant amount of weight, over 30 pounds. He had weighed 193 pounds back in October 2015. Stefany Mancini is weight is now back to baseline at 194 pounds.    His last MUGA scan in November of 2015 showed ejection fraction of 45%. His echocardiogram in September of 2015 showed ejection fraction of 45-50% with pulmonary hypertension with PA pressure of 45 mmHg. He had AICD generator change in May 2018. · CAD:    He has no documented history of CAD. · CAF: He remains on Coumadin. However, his electrocardiogram today shows sinus rhythm with first-degree heart block. · NID CM: There is no evidence of decompensated CHF noted. He has single lead AICD in place. He did have what appears to be ventricular tachycardia in early part of September which was successfully cardioverted with 1 delivered shock. He felt that but did not think that it was painful. · BP:   Well controlled. · HR:    Stable. · CHF:    There is no evidence of decompensated CHF noted as noted above. · Weight:    His weight today is 191 pounds. He has lost about 20 pounds since his last visit with me in April 2019. He was given additional Lasix around August 2019 as well as initiated on spironolactone. As noted above, with his VT episode, his electrolytes will be rechecked. · Cholesterol:   Target LDL <110. · Anti-platelet:   Remains on Coumadin. I will see him back in 6 months. Thank you. Encounter Diagnoses   Name Primary?     Cardiomyopathy, nonischemic ([address] Yes    Long term current use of anticoagulant therapy     Atrial fibrillation, unspecified type ([address]     AICD (automatic cardioverter/defibrillator) present     Essential hypertension      the following changes in treatment are made: Because of the shock delivered by single lead AICD, and recent change in diuretic regimen, will check electrolytes including potassium and magnesium and adjust medication as needed. lab results and schedule of future lab studies reviewed with patient  reviewed diet, exercise and weight control  cardiovascular risk and specific lipid/LDL goals reviewed  use of aspirin to prevent MI and TIA's discussed        HPI  Today, Mr. Sai Mix has no complaints of chest pains, increased shortness of breath or decreased exercise capacity. Occasionally, he feels orthostatic dizziness. He was seen in August 2019 with decompensated CHF. He was given additional Lasix for few days and was initiated on spironolactone. It has lost about 20 pounds since that time. He denies any orthopnea or PND. Around December 7, he had defibrillator discharge. When he was interrogated today, it revealed likely ventricular tachycardia event. He was successfully cardioverted. He states that he knew that the defibrillator had gone off. However, he did not feel that it was painful. He did not have preemptive chest pains, shortness of breath, dizziness. He denies sense palpitations. Review of Systems   Respiratory: Negative for shortness of breath. Cardiovascular: Negative for chest pain, palpitations, orthopnea, claudication, leg swelling and PND. Neurological: Positive for dizziness. All other systems reviewed and are negative. Physical Exam   Constitutional: He is oriented to person, place, and time. He appears well-developed and well-nourished. HENT:   Head: Normocephalic. Eyes: Conjunctivae are normal.   Neck: Neck supple. No JVD present.  Carotid bruit is not present. No thyromegaly present. Cardiovascular: Normal rate and regular rhythm. Pulmonary/Chest: Breath sounds normal.   Abdominal: Bowel sounds are normal.   Musculoskeletal: He exhibits no edema. Neurological: He is alert and oriented to person, place, and time. Skin: Skin is warm and dry. Nursing note and vitals reviewed. PCP: Temo Dykes MD    Past Medical History:   Diagnosis Date    AICD (automatic cardioverter/defibrillator) present     Atrial fibrillation ([address] 6/8/2010    [facility]     Cardiac cath 02/11/2009    Patent coronary arteries. LVEDP 28.  EF 35%. Global hyk. Mod MR.  Cardiac echocardiogram 09/29/2016    LVE. EF 15% at most.  Indeterminate LV diastolic fx.  RVE. Reduced RV systolic fx. RVSP 80-85 mmHg. LAE.  LUISA. Mod-severe MR. Mild AI. Severe TR.  Cardiac MUGA scan 11/02/2015    At most mild LVE. EF 45-46%.  Cardiac nuclear imaging test, abnormal 02/03/2009    Mild basal/mid inferior, inferoapical, inferoseptal infarct w/mild ischemia in RCA (possibly partially artifact). Anterior, anteroseptal, anterapical ischemia w/o infarct. (Mid & distal LAD.)  LVE. EF 29%. Mod global hyk.  Cardiovascular LE venous duplex 09/29/2016    Tech difficult. No DVT bilaterally.  Cardiovascular renal duplex 06/07/2010    No evidence of renal artery stenosis >60%. Patent SMA and celiac artery.  Cardiovascular UE venous duplex 10/04/2016    No DVT bilaterally.     Diabetes ([address]     HTN (hypertension) 6/8/2010    Hypertensive cardiovascular disease     MR (mitral regurgitation)     Nonischemic cardiomyopathy     3/11 echo EF 25%    Pulmonary hypertension, moderate to severe ([address] 6/8/2010    90-100mmHg; repeat echo in 3/11 showed 70mmHg       Past Surgical History:   Procedure Laterality Date    HX HEART VALVE SURGERY  2011    HX HERNIA REPAIR  2011    IR REMOVE TUNL CVAD W PORT/PUMP  4/19/2019       Current Outpatient Medications Medication Sig Dispense Refill    furosemide (LASIX) 40 mg tablet TAKE 1 TAB BY MOUTH TWO (2) TIMES A DAY. OR DIRECTED 60 Tab 11    metOLazone (ZAROXOLYN) 2.5 mg tablet TAKE 1 TABLET BY MOUTH 30 MINS PRIOR TO MORNING LASIX AS DIRECTED ON MON & THU 15 Tab 4    spironolactone (ALDACTONE) 25 mg tablet Take 1 Tab by mouth daily. 90 Tab 3    warfarin (COUMADIN) 2.5 mg tablet TAKE 1 TABLET BY MOUTH ONCE DAILY OR  AS  DIRECTED  YOUR  DOCTOR 45 Tab 30    carvedilol (COREG) 25 mg tablet Take 1 Tab by mouth two (2) times daily (with meals). 180 Tab 3    digoxin (LANOXIN) 0.125 mg tablet TAKE 1 TABLET BY MOUTH EVERY DAY 90 Tab 3    lisinopril (PRINIVIL, ZESTRIL) 20 mg tablet TAKE ONE TABLET BY MOUTH TWICE DAILY 180 Tab 3    potassium chloride (K-DUR, KLOR-CON) 20 mEq tablet Take 1 Tab by mouth two (2) times a day. 2 tabs extra on the days on Metalazone 210 Tab 3    atorvastatin (LIPITOR) 20 mg tablet Take 20 mg by mouth daily.  cloNIDine HCl (CATAPRES) 0.2 mg tablet TAKE 1 TABLET BY MOUTH 3 TIMES A  Tab 2    insulin lispro (HUMALOG U-100 INSULIN) 100 unit/mL injection by SubCUTAneous route.  insulin glargine (LANTUS U-100 INSULIN) 100 unit/mL injection 15 Units by SubCUTAneous route nightly.  magnesium oxide (MAG-OX) 400 mg tablet TAKE 1 TABLET BY MOUTH DAILY. 90 Tab 3    sertraline (ZOLOFT) 25 mg tablet Take  by mouth daily.  allopurinol (ZYLOPRIM) 300 mg tablet Take  by mouth daily.  FERROUS SULFATE, DRIED (IRON, DRIED, PO) Take 325 mg by mouth daily.  aspirin delayed-release (ASPIR-81) 81 mg tablet Take 81 mg by mouth daily. The patient has a family history of    Social History     Tobacco Use    Smoking status: Never Smoker    Smokeless tobacco: Never Used   Substance Use Topics    Alcohol use: Yes     Comment: occasionally.     Drug use: No       Lab Results   Component Value Date/Time    Cholesterol, total 85 01/23/2016 05:30 AM    HDL Cholesterol 31 (L) 01/23/2016 05:30 AM    LDL, calculated 34 01/23/2016 05:30 AM    Triglyceride 100 01/23/2016 05:30 AM    CHOL/HDL Ratio 2.7 01/23/2016 05:30 AM        BP Readings from Last 3 Encounters:   10/08/19 110/60   08/07/19 130/70   07/08/19 147/79        Pulse Readings from Last 3 Encounters:   10/08/19 64   08/07/19 75   07/08/19 (!) 43       Wt Readings from Last 3 Encounters:   10/08/19 86.6 kg (191 lb)   08/07/19 96.2 kg (212 lb)   07/08/19 96.2 kg (212 lb)         EKG: normal sinus rhythm, 1st degree AV block, T wave inversion noted in inferolateral leads, 2, 3, aVF, V3-V6.

## 2019-10-08 NOTE — PROGRESS NOTES
I have personally seen and evaluated the device findings. Interrogation reviewed and I agree with assessment.     Yashira Jaime

## 2019-10-14 ENCOUNTER — TELEPHONE (OUTPATIENT)
Dept: CARDIOLOGY CLINIC | Age: 74
End: 2019-10-14

## 2019-10-14 ENCOUNTER — HOSPITAL ENCOUNTER (OUTPATIENT)
Dept: LAB | Age: 74
Discharge: HOME | End: 2019-10-14
Payer: MEDICARE

## 2019-10-14 ENCOUNTER — OFFICE VISIT (OUTPATIENT)
Dept: ONCOLOGY | Age: 74
End: 2019-10-14

## 2019-10-14 VITALS
DIASTOLIC BLOOD PRESSURE: 59 MMHG | OXYGEN SATURATION: 99 % | HEIGHT: 73 IN | RESPIRATION RATE: 18 BRPM | SYSTOLIC BLOOD PRESSURE: 79 MMHG | WEIGHT: 192 LBS | HEART RATE: 69 BPM | BODY MASS INDEX: 25.45 KG/M2

## 2019-10-14 DIAGNOSIS — C49.21 SOFT TISSUE SARCOMA OF RIGHT THIGH (HCC): ICD-10-CM

## 2019-10-14 DIAGNOSIS — I10 ESSENTIAL HYPERTENSION: ICD-10-CM

## 2019-10-14 DIAGNOSIS — E11.59 HYPERTENSION ASSOCIATED WITH DIABETES (HCC): Primary | ICD-10-CM

## 2019-10-14 DIAGNOSIS — D47.2 MONOCLONAL GAMMOPATHY: Primary | ICD-10-CM

## 2019-10-14 DIAGNOSIS — I15.2 HYPERTENSION ASSOCIATED WITH DIABETES (HCC): Primary | ICD-10-CM

## 2019-10-14 DIAGNOSIS — D69.6 THROMBOCYTOPENIA (HCC): ICD-10-CM

## 2019-10-14 DIAGNOSIS — I42.8 CARDIOMYOPATHY, NONISCHEMIC (HCC): ICD-10-CM

## 2019-10-14 DIAGNOSIS — D50.8 IRON DEFICIENCY ANEMIA SECONDARY TO INADEQUATE DIETARY IRON INTAKE: ICD-10-CM

## 2019-10-14 DIAGNOSIS — G89.3 CANCER ASSOCIATED PAIN: ICD-10-CM

## 2019-10-14 PROBLEM — E78.00 TYPE 2 DIABETES MELLITUS WITH HYPERCHOLESTEROLEMIA (HCC): Status: ACTIVE | Noted: 2018-04-30

## 2019-10-14 PROBLEM — E11.69 TYPE 2 DIABETES MELLITUS WITH HYPERCHOLESTEROLEMIA (HCC): Status: ACTIVE | Noted: 2018-04-30

## 2019-10-14 PROBLEM — M1A.9XX1 CHRONIC GOUT WITH TOPHUS: Status: ACTIVE | Noted: 2018-09-16

## 2019-10-14 PROBLEM — E78.00 HYPERCHOLESTEROLEMIA: Status: ACTIVE | Noted: 2018-12-31

## 2019-10-14 LAB
ANION GAP SERPL CALC-SCNC: 5 MMOL/L (ref 3–18)
BUN SERPL-MCNC: 80 MG/DL (ref 7–18)
BUN/CREAT SERPL: 37 (ref 12–20)
CALCIUM SERPL-MCNC: 9.2 MG/DL (ref 8.5–10.1)
CHLORIDE SERPL-SCNC: 102 MMOL/L (ref 100–111)
CO2 SERPL-SCNC: 26 MMOL/L (ref 21–32)
CREAT SERPL-MCNC: 2.17 MG/DL (ref 0.6–1.3)
GLUCOSE SERPL-MCNC: 154 MG/DL (ref 74–99)
MAGNESIUM SERPL-MCNC: 2.6 MG/DL (ref 1.6–2.6)
POTASSIUM SERPL-SCNC: 6.3 MMOL/L (ref 3.5–5.5)
SODIUM SERPL-SCNC: 133 MMOL/L (ref 136–145)

## 2019-10-14 PROCEDURE — 80048 BASIC METABOLIC PNL TOTAL CA: CPT

## 2019-10-14 PROCEDURE — 36415 COLL VENOUS BLD VENIPUNCTURE: CPT

## 2019-10-14 PROCEDURE — 83735 ASSAY OF MAGNESIUM: CPT

## 2019-10-14 NOTE — TELEPHONE ENCOUNTER
Received call from lab for critical potassium level of 6.3. Reviewed patient lab work with Dr. Verna Stinson in absence of Dr. Tori Zurita. Verbal order and read back per Gordo Orr MD 
Stop Aldactone Stop Potassium Stop Metolazone HOLD Lasix for 5 days Repeat Blood for in 3-4 days This has been fully explained to the patient, who indicates understanding.

## 2019-10-14 NOTE — PATIENT INSTRUCTIONS
Learning About Monoclonal Gammopathy of Unknown Significance (MGUS) What is MGUS? Monoclonal gammopathy of unknown significance (MGUS) is a blood condition. The blood is made of many kinds of cells, including red blood cells, platelets, and white blood cells. With MGUS, a type of white blood cell called a plasma cell makes too much of the \"M\" (for \"monoclonal\") protein in the blood. Most people with MGUS are fine for many years. MGUS seldom causes symptoms or major health problems. In rare cases, MGUS may turn into multiple myeloma. This is a cancer of plasma cells in the blood that can cause bone weakness. Some people with MGUS may also have a higher risk for osteoporosis, in which bones become thin and weak. MGUS is sometimes seen in younger people, but is more common in people as they get older. It's seen most often in those over the age of 79. How is MGUS diagnosed? MGUS is often found by chance when blood tests are done for other reasons. If you have high levels of a certain protein in your blood, your doctor may order more tests. Blood tests can help identify the protein. The protein is sometimes found in the urine, so you may get a urine test too. Other tests may be done if your doctor thinks you might have a medical problem. In some cases, a bone marrow biopsy may be done to rule out a problem like multiple myeloma. How is it treated? Most people with MGUS don't need any treatment. But you may need regular physical exams, blood tests, and urine tests to make sure that MGUS isn't progressing to a medical problem. Follow-up care is a key part of your treatment and safety. Be sure to make and go to all appointments, and call your doctor if you are having problems. It's also a good idea to know your test results and keep a list of the medicines you take. Where can you learn more? Go to http://radha-tre.info/. Enter A917 in the search box to learn more about \"Learning About Monoclonal Gammopathy of Unknown Significance (MGUS). \" 
Current as of: March 28, 2019 Content Version: 12.2 © 0041-9456 [employer]. Care instructions adapted under license by [employer] (which disclaims liability or warranty for this information). If you have questions about a medical condition or this instruction, always ask your healthcare professional. [place] [address] any warranty or liability for your use of this information.

## 2019-10-16 NOTE — TELEPHONE ENCOUNTER
Reviewed this information with Dr. Flora Mccormick as advise by Dr. George Sy. Verbal order and read back per Renata Doherty MD 
Have patient hold lasix for 3 days instead of 5 days. Then decrease Lasix to 40mg once daily. This has been fully explained to the patient, who indicates understanding. negative

## 2019-10-18 ENCOUNTER — HOSPITAL ENCOUNTER (OUTPATIENT)
Dept: LAB | Age: 74
Discharge: HOME | End: 2019-10-18
Payer: MEDICARE

## 2019-10-18 DIAGNOSIS — I15.2 HYPERTENSION ASSOCIATED WITH DIABETES (HCC): ICD-10-CM

## 2019-10-18 DIAGNOSIS — E11.59 HYPERTENSION ASSOCIATED WITH DIABETES (HCC): ICD-10-CM

## 2019-10-18 LAB
ANION GAP SERPL CALC-SCNC: 3 MMOL/L (ref 3–18)
BUN SERPL-MCNC: 41 MG/DL (ref 7–18)
BUN/CREAT SERPL: 32 (ref 12–20)
CALCIUM SERPL-MCNC: 9.1 MG/DL (ref 8.5–10.1)
CHLORIDE SERPL-SCNC: 107 MMOL/L (ref 100–111)
CO2 SERPL-SCNC: 29 MMOL/L (ref 21–32)
CREAT SERPL-MCNC: 1.28 MG/DL (ref 0.6–1.3)
GLUCOSE SERPL-MCNC: 159 MG/DL (ref 74–99)
POTASSIUM SERPL-SCNC: 4.4 MMOL/L (ref 3.5–5.5)
SODIUM SERPL-SCNC: 139 MMOL/L (ref 136–145)

## 2019-10-18 PROCEDURE — 36415 COLL VENOUS BLD VENIPUNCTURE: CPT

## 2019-10-18 PROCEDURE — 80048 BASIC METABOLIC PNL TOTAL CA: CPT

## 2019-10-23 ENCOUNTER — TELEPHONE (OUTPATIENT)
Dept: CARDIOLOGY CLINIC | Age: 74
End: 2019-10-23

## 2019-10-23 DIAGNOSIS — I10 ESSENTIAL HYPERTENSION: Primary | ICD-10-CM

## 2019-10-23 NOTE — TELEPHONE ENCOUNTER
Reviewed patient recheck blood work for potassium levels. 
  
Verbal order and read back per Shwetha Perdomo MD 
Restart Potassium 20meq once daily. Recheck blood work 3-4 days after restarting. 
  
This has been fully explained to the patient, who indicates understanding.

## 2019-10-29 ENCOUNTER — HOSPITAL ENCOUNTER (OUTPATIENT)
Dept: LAB | Age: 74
Discharge: HOME | End: 2019-10-29
Payer: MEDICARE

## 2019-10-29 DIAGNOSIS — I10 ESSENTIAL HYPERTENSION: ICD-10-CM

## 2019-10-29 LAB
ANION GAP SERPL CALC-SCNC: 4 MMOL/L (ref 3–18)
BUN SERPL-MCNC: 17 MG/DL (ref 7–18)
BUN/CREAT SERPL: 17 (ref 12–20)
CALCIUM SERPL-MCNC: 8.5 MG/DL (ref 8.5–10.1)
CHLORIDE SERPL-SCNC: 106 MMOL/L (ref 100–111)
CO2 SERPL-SCNC: 30 MMOL/L (ref 21–32)
CREAT SERPL-MCNC: 0.99 MG/DL (ref 0.6–1.3)
GLUCOSE SERPL-MCNC: 139 MG/DL (ref 74–99)
POTASSIUM SERPL-SCNC: 4.5 MMOL/L (ref 3.5–5.5)
SODIUM SERPL-SCNC: 140 MMOL/L (ref 136–145)

## 2019-10-29 PROCEDURE — 36415 COLL VENOUS BLD VENIPUNCTURE: CPT

## 2019-10-29 PROCEDURE — 80048 BASIC METABOLIC PNL TOTAL CA: CPT

## 2019-11-06 ENCOUNTER — ANTI-COAG VISIT (OUTPATIENT)
Dept: CARDIOLOGY CLINIC | Age: 74
End: 2019-11-06

## 2019-11-06 DIAGNOSIS — I48.91 ATRIAL FIBRILLATION, UNSPECIFIED TYPE (HCC): ICD-10-CM

## 2019-11-06 DIAGNOSIS — Z79.01 LONG TERM CURRENT USE OF ANTICOAGULANT THERAPY: Primary | ICD-10-CM

## 2019-11-06 LAB
INR BLD: 2.2
PT POC: 26.4 SECONDS
VALID INTERNAL CONTROL?: YES

## 2019-11-06 NOTE — PROGRESS NOTES
Chief Complaint   Patient presents with    Anticoagulation     Continue Coumadin to 2.5 mg daily except 3.75 mg on Mon

## 2019-11-08 ENCOUNTER — TELEPHONE (OUTPATIENT)
Dept: CARDIOLOGY CLINIC | Age: 74
End: 2019-11-08

## 2019-11-08 NOTE — TELEPHONE ENCOUNTER
Patient called to report weight gain and swelling. Weight today is 210lb. Last office weight on 10-14-19 was 192. He reports swelling in his legs, right leg mostly and SOB on exertion. Discussed with Dr. Farhan Guadarrama. Verbal order and read back per Jose Connor, DO Take lasix 40mg BID and continue to weigh daily. Call office Monday with weight if it has not gone down. This has been fully explained to the patient, who indicates understanding.

## 2019-11-19 ENCOUNTER — TELEPHONE (OUTPATIENT)
Dept: CARDIOLOGY CLINIC | Age: 74
End: 2019-11-19

## 2019-11-19 DIAGNOSIS — E11.59 HYPERTENSION ASSOCIATED WITH DIABETES (HCC): Primary | ICD-10-CM

## 2019-11-19 DIAGNOSIS — I15.2 HYPERTENSION ASSOCIATED WITH DIABETES (HCC): Primary | ICD-10-CM

## 2019-11-19 NOTE — TELEPHONE ENCOUNTER
Patient called in stating since he called on 11/8/2019 he has gained 5 more pounds accompanied with sob he is now weighing 215. Reviewed this with Dr. Wang Madera Verbal order and read back per Madhu Sanchez MD 
Increase Lasix to 80mg twice daily. BMP tomorrow 11/20/2019. Lm on  for patient to give office a call.

## 2019-11-19 NOTE — TELEPHONE ENCOUNTER
Patient called and instruction were given. This has been fully explained to the patient, who indicates understanding.

## 2019-11-20 ENCOUNTER — HOSPITAL ENCOUNTER (OUTPATIENT)
Dept: LAB | Age: 74
Discharge: HOME | End: 2019-11-20
Payer: MEDICARE

## 2019-11-20 DIAGNOSIS — I15.2 HYPERTENSION ASSOCIATED WITH DIABETES (HCC): ICD-10-CM

## 2019-11-20 DIAGNOSIS — E11.59 HYPERTENSION ASSOCIATED WITH DIABETES (HCC): ICD-10-CM

## 2019-11-20 LAB
ANION GAP SERPL CALC-SCNC: 7 MMOL/L (ref 3–18)
BUN SERPL-MCNC: 20 MG/DL (ref 7–18)
BUN/CREAT SERPL: 18 (ref 12–20)
CALCIUM SERPL-MCNC: 8.3 MG/DL (ref 8.5–10.1)
CHLORIDE SERPL-SCNC: 101 MMOL/L (ref 100–111)
CO2 SERPL-SCNC: 31 MMOL/L (ref 21–32)
CREAT SERPL-MCNC: 1.11 MG/DL (ref 0.6–1.3)
GLUCOSE SERPL-MCNC: 122 MG/DL (ref 74–99)
POTASSIUM SERPL-SCNC: 3.7 MMOL/L (ref 3.5–5.5)
SODIUM SERPL-SCNC: 139 MMOL/L (ref 136–145)

## 2019-11-20 PROCEDURE — 80048 BASIC METABOLIC PNL TOTAL CA: CPT

## 2019-11-20 PROCEDURE — 36415 COLL VENOUS BLD VENIPUNCTURE: CPT

## 2019-12-02 ENCOUNTER — ANTI-COAG VISIT (OUTPATIENT)
Dept: CARDIOLOGY CLINIC | Age: 74
End: 2019-12-02

## 2019-12-02 DIAGNOSIS — Z79.01 LONG TERM CURRENT USE OF ANTICOAGULANT THERAPY: Primary | ICD-10-CM

## 2019-12-02 DIAGNOSIS — I48.91 ATRIAL FIBRILLATION, UNSPECIFIED TYPE (HCC): ICD-10-CM

## 2019-12-02 LAB
INR BLD: 2.4
PT POC: 29.4 SECONDS
VALID INTERNAL CONTROL?: YES

## 2019-12-02 NOTE — PROGRESS NOTES
Verbal order and read back per Jaylen Davis NP  The INR is stable and therapeutic. Continue same dose of coumadin (Continue Coumadin to 2.5 mg daily except 3.75 mg on Mon)  Recheck in 1 month.

## 2019-12-02 NOTE — PATIENT INSTRUCTIONS
Verbal order and read back per Suyapa Kam NP The INR is stable and therapeutic. Continue same dose of coumadin (Continue Coumadin to 2.5 mg daily except 3.75 mg on Mon) Recheck in 1 month.

## 2019-12-02 NOTE — PROGRESS NOTES
Reason labs were ordered\" Patient called in stating since he called on 11/8/2019 he has gained 5 more pounds accompanied with sob he is now weighing 215. Reviewed this with Dr. Seble Snider     Verbal order and read back per Keira Matos MD  Increase Lasix to 80mg twice daily.   BMP tomorrow 11/20/2019.

## 2019-12-10 ENCOUNTER — TELEPHONE (OUTPATIENT)
Dept: CARDIOLOGY CLINIC | Age: 74
End: 2019-12-10

## 2019-12-10 NOTE — TELEPHONE ENCOUNTER
Patient called in concerning weights since increasing Lasix to 80mg in the am and 80mg in the evening, patient weight has not gone down pass 215lbs since starting increase lasix on 11/19/19, he is sob as well. Reviewed patient lab work bmp with and following Dr. Flora Mccormick. Verbal order and read back per Rachel Pierre MD 
Restart  Metolazone 2.5mg Mon, Wed, Fri only BMP in 1 week follow up with Ellyn Watkins. This has been fully explained to the patient, who indicates understanding.

## 2019-12-11 RX ORDER — CLONIDINE HYDROCHLORIDE 0.2 MG/1
TABLET ORAL
Qty: 270 TAB | Refills: 2 | Status: SHIPPED | OUTPATIENT
Start: 2019-12-11 | End: 2020-08-05

## 2019-12-15 NOTE — PROGRESS NOTES
Rylie Liriano presents today for a check-up. He was last seen by Dr. Wang Madera on October 8, 2019. On December 10, 2019, he called the office with concerns regarding increasing weight even with taking Lasix 80 mg twice a day. Instructions were given over the phone for him to begin taking metolazone 2.5 mg on Mondays Wednesdays and Fridays only and to have a BMP done with follow-up with me in 1 week. Since he began taking the metolazone, he reports that his home weight initially was 210 pounds on December 11, 2019 and today, December 19, 2019 his weight is down to 190 pounds. His lower extremity edema has resolved as has his complaints of shortness of breath. A few months ago when seen in the office by Dr. Kathy Williamson for preop clearance for cataract surgery, he was started on Spironolactone and his Lasix dose was briefly increased as he exhibited signs of heart failure at that time. When seen by Dr. Wang Madera in October, labs were ordered and it showed that his potassium level had gone up to 6.3 and his Spironolactone, Lasix, metolazone, potassium were held and he was instructed to hold his Lasix for 3 days. When he restarted his Lasix it was only at 40 mg daily. Since that time, his Lasix has been increased and most recently he has been taking 80 mg twice a day along with potassium chloride 20 mEq once a day and metolazone 3 times a week. He is a 69year old male with history of atrial fibrillation, pulmonary hypertension, hypertension, and non-ischemic cardiomyopathy (s/p AICD for primary prevention of sudden cardiac death). Device generator change was done on May 7, 2018. 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 It was successfully removed in April 2016 and his understanding is that the edges were clear. His last echo was completed in May 2018 and it showed an EF 35%, moderate diffuse hypokinesis, and estimated peak pressure was 42 mmHg.       Today, he states that he is feeling much better. His shortness of breath and lower extremity edema have resolved. He denies chest pain, tightness, heaviness, and palpitations. He denies shortness of breath at rest, SHRESTHA, denies orthopnea and PND. He denies abdominal bloating. He denies lightheadedness, dizziness, and syncope. He denies claudication. Denies nausea, vomiting, diarrhea, fever, chills. PMH:  Past Medical History:   Diagnosis Date    AICD (automatic cardioverter/defibrillator) present     Atrial fibrillation ([address] 6/8/2010    Cancer ([address]     Cardiac cath 02/11/2009    Patent coronary arteries. LVEDP 28.  EF 35%. Global hyk. Mod MR.  Cardiac echocardiogram 09/29/2016    LVE. EF 15% at most.  Indeterminate LV diastolic fx.  RVE. Reduced RV systolic fx. RVSP 80-85 mmHg. LAE.  LUISA. Mod-severe MR. Mild AI. Severe TR.  Cardiac MUGA scan 11/02/2015    At most mild LVE. EF 45-46%.  Cardiac nuclear imaging test, abnormal 02/03/2009    Mild basal/mid inferior, inferoapical, inferoseptal infarct w/mild ischemia in RCA (possibly partially artifact). Anterior, anteroseptal, anterapical ischemia w/o infarct. (Mid & distal LAD.)  LVE. EF 29%. Mod global hyk.  Cardiovascular LE venous duplex 09/29/2016    Tech difficult. No DVT bilaterally.  Cardiovascular renal duplex 06/07/2010    No evidence of renal artery stenosis >60%. Patent SMA and celiac artery.  Cardiovascular UE venous duplex 10/04/2016    No DVT bilaterally.     Diabetes ([address]     HTN (hypertension) 6/8/2010    Hypertensive cardiovascular disease     MR (mitral regurgitation)     Nonischemic cardiomyopathy     3/11 echo EF 25%    Pulmonary hypertension, moderate to severe ([address] 6/8/2010    90-100mmHg; repeat echo in 3/11 showed 70mmHg       PSH:  Past Surgical History:   Procedure Laterality Date    HX HEART VALVE SURGERY  2011    HX HERNIA REPAIR  2011    IR REMOVE TUNL CVAD W PORT/PUMP  4/19/2019 MEDS:  Current Outpatient Medications   Medication Sig    cloNIDine HCl (CATAPRES) 0.2 mg tablet TAKE 1 TABLET BY MOUTH 3  TIMES A DAY    furosemide (LASIX) 40 mg tablet TAKE 1 TAB BY MOUTH TWO (2) TIMES A DAY. OR DIRECTED (Patient taking differently: Take 80 mg by mouth two (2) times a day. Or directed)    metOLazone (ZAROXOLYN) 2.5 mg tablet TAKE 1 TABLET BY MOUTH 30 MINS PRIOR TO MORNING LASIX AS DIRECTED ON MON & THU    warfarin (COUMADIN) 2.5 mg tablet TAKE 1 TABLET BY MOUTH ONCE DAILY OR  AS  DIRECTED  YOUR  DOCTOR    carvedilol (COREG) 25 mg tablet Take 1 Tab by mouth two (2) times daily (with meals).  digoxin (LANOXIN) 0.125 mg tablet TAKE 1 TABLET BY MOUTH EVERY DAY    lisinopril (PRINIVIL, ZESTRIL) 20 mg tablet TAKE ONE TABLET BY MOUTH TWICE DAILY    potassium chloride (K-DUR, KLOR-CON) 20 mEq tablet Take 1 Tab by mouth two (2) times a day. 2 tabs extra on the days on Metalazone (Patient taking differently: Take 20 mEq by mouth daily. 2 tabs extra on the days on Metalazone)    atorvastatin (LIPITOR) 20 mg tablet Take 20 mg by mouth daily.  insulin lispro (HUMALOG U-100 INSULIN) 100 unit/mL injection by SubCUTAneous route.  insulin glargine (LANTUS U-100 INSULIN) 100 unit/mL injection 15 Units by SubCUTAneous route nightly.  magnesium oxide (MAG-OX) 400 mg tablet TAKE 1 TABLET BY MOUTH DAILY.  sertraline (ZOLOFT) 25 mg tablet Take  by mouth daily.  allopurinol (ZYLOPRIM) 300 mg tablet Take  by mouth daily.  FERROUS SULFATE, DRIED (IRON, DRIED, PO) Take 325 mg by mouth daily.  aspirin delayed-release (ASPIR-81) 81 mg tablet Take 81 mg by mouth daily. No current facility-administered medications for this visit. Allergies and Sensitivities:  No Known Allergies    Family History:  Family History   Problem Relation Age of Onset    Hypertension Mother        Social History:  He  reports that he has never smoked.  He has never used smokeless tobacco.  He reports current alcohol use. Physical:  Visit Vitals  /58 (BP 1 Location: Left arm, BP Patient Position: Sitting)   Pulse (!) 51   Ht 6' 1\" (1.854 m)   Wt 88.9 kg (196 lb)   SpO2 99%   BMI 25.86 kg/m²         He is up 5 pounds since his last appointment. Exam:  Neck:  Supple, no JVD, no carotid bruits  CV:  Normal S1 and  S2, grade I/VI soft JACQUIE noted, no rubs or gallops noted. Lungs:  Clear to ausculation throughout, no wheezes or rales  Abd:  Soft, non-tender, distended with good bowel sounds. No hepatosplenomegaly  Extremities:  no lower extremity edema       Data:  EKG:         LABS:  Lab Results   Component Value Date/Time    Sodium 139 11/20/2019 07:43 AM    Potassium 3.7 11/20/2019 07:43 AM    Chloride 101 11/20/2019 07:43 AM    CO2 31 11/20/2019 07:43 AM    Glucose 122 (H) 11/20/2019 07:43 AM    BUN 20 (H) 11/20/2019 07:43 AM    Creatinine 1.11 11/20/2019 07:43 AM     Lab Results   Component Value Date/Time    Cholesterol, total 85 01/23/2016 05:30 AM    HDL Cholesterol 31 (L) 01/23/2016 05:30 AM    LDL, calculated 34 01/23/2016 05:30 AM    Triglyceride 100 01/23/2016 05:30 AM    CHOL/HDL Ratio 2.7 01/23/2016 05:30 AM     Lab Results   Component Value Date/Time    ALT (SGPT) 31 07/08/2019 10:34 AM       Impression / Plan:  1. Atrial fibrillation, rate controlled and anticoagulated with Coumadin  2. Hypertension, blood pressure well-controlled  3. Pulmonary hypertension, RVSP 80-85 mmHg (by echo Sept. 2016)  4. Non-ischemic cardiomyopathy, EF 45% (by MUGA scan), s/p AICD implant   5. 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 Chronic systolic heart failure, now compensated    Mr. Asuncion Tee was seen today for follow-up. When he called the office on December 10, 2019, he reported continuing weight gain and lower extremity edema as well as shortness of breath. At that time, he was taking Lasix 80 mg twice a day and potassium 20 mEq once a day.   He was restarted on metolazone 2.5 mg to be taken on Mondays, Wednesdays, and Fridays. Since he began taking the metolazone, his symptoms have resolved and his weight is down 20 pounds according to his home scale. According to our scale, he is up 5 pounds since his last visit. He offers no cardiac complaints today and does not exhibit any signs of volume overload. While reviewing the events over the past several months with the medication changes, he told me that at the time that the Spironolactone was added he was taking potassium 20 mEq twice a day. At this time, the following changes will be made to his medication regimen:  He was instructed to discontinue his Lasix and he will begin taking Bumex 2 mg once a day. His potassium will be placed on hold and he will be restarted on Spironolactone 25 mg once a day. He was instructed to hold his metolazone for now and we will use this on an as-needed basis. He was given a lab slip for a BMP and NT proBNP to be done today. He was also given another lab slip for a repeat BMP to be done on December 30, 2019. Pending the results of the first set of labs, adjustments will be made at that time. Congestive heart failure teaching reinforced today. Advised to limit sodium intake to no more than 2000mg per day and to also watch fluid intake. Advised to weigh daily every morning and record weights. Instructed to call our office if progressive weight gain is noted over a 2 to 3 day period of time, shortness of breath increases, or if abdominal bloating, nausea, fatigue, or increased lower extremity edema is noted. The importance of daily weights stressed and the importance of reporting progressive weight gain immediately. He will follow-up with Dr. Roma Malave as scheduled and PRN. Eric Sandoval MSN, FNP-BC    Please note:  Portions of this chart were created with Dragon medical speech to text program.  Unrecognized errors may be present.

## 2019-12-18 DIAGNOSIS — I10 ESSENTIAL HYPERTENSION: Primary | ICD-10-CM

## 2019-12-19 ENCOUNTER — OFFICE VISIT (OUTPATIENT)
Dept: CARDIOLOGY CLINIC | Age: 74
End: 2019-12-19

## 2019-12-19 ENCOUNTER — HOSPITAL ENCOUNTER (OUTPATIENT)
Dept: LAB | Age: 74
Discharge: HOME | End: 2019-12-19
Payer: MEDICARE

## 2019-12-19 VITALS
SYSTOLIC BLOOD PRESSURE: 102 MMHG | HEIGHT: 73 IN | OXYGEN SATURATION: 99 % | BODY MASS INDEX: 25.98 KG/M2 | DIASTOLIC BLOOD PRESSURE: 58 MMHG | HEART RATE: 51 BPM | WEIGHT: 196 LBS

## 2019-12-19 DIAGNOSIS — I10 ESSENTIAL HYPERTENSION: Primary | ICD-10-CM

## 2019-12-19 DIAGNOSIS — I42.8 CARDIOMYOPATHY, NONISCHEMIC (HCC): ICD-10-CM

## 2019-12-19 DIAGNOSIS — R06.02 SHORTNESS OF BREATH: ICD-10-CM

## 2019-12-19 DIAGNOSIS — I10 ESSENTIAL HYPERTENSION: ICD-10-CM

## 2019-12-19 LAB
ANION GAP SERPL CALC-SCNC: 7 MMOL/L (ref 3–18)
BNP SERPL-MCNC: 4114 PG/ML (ref 0–900)
BUN SERPL-MCNC: 58 MG/DL (ref 7–18)
BUN/CREAT SERPL: 33 (ref 12–20)
CALCIUM SERPL-MCNC: 9.3 MG/DL (ref 8.5–10.1)
CHLORIDE SERPL-SCNC: 96 MMOL/L (ref 100–111)
CO2 SERPL-SCNC: 37 MMOL/L (ref 21–32)
CREAT SERPL-MCNC: 1.74 MG/DL (ref 0.6–1.3)
GLUCOSE SERPL-MCNC: 112 MG/DL (ref 74–99)
POTASSIUM SERPL-SCNC: 3.5 MMOL/L (ref 3.5–5.5)
SODIUM SERPL-SCNC: 140 MMOL/L (ref 136–145)

## 2019-12-19 PROCEDURE — 80048 BASIC METABOLIC PNL TOTAL CA: CPT

## 2019-12-19 PROCEDURE — 36415 COLL VENOUS BLD VENIPUNCTURE: CPT

## 2019-12-19 PROCEDURE — 83880 ASSAY OF NATRIURETIC PEPTIDE: CPT

## 2019-12-19 RX ORDER — METOLAZONE 2.5 MG/1
2.5 TABLET ORAL AS NEEDED
Qty: 1 TAB | Refills: 0
Start: 2019-12-19 | End: 2020-02-17

## 2019-12-19 RX ORDER — BUMETANIDE 2 MG/1
2 TABLET ORAL DAILY
Qty: 45 TAB | Refills: 4 | Status: SHIPPED | OUTPATIENT
Start: 2019-12-19 | End: 2020-07-22

## 2019-12-19 RX ORDER — SPIRONOLACTONE 25 MG/1
TABLET ORAL DAILY
COMMUNITY
End: 2020-08-28

## 2019-12-19 NOTE — PATIENT INSTRUCTIONS
Stop Lasix Begin Bumex (bumetanide) 2mg once a day Restart aldactone (spironolactone) 25mg once a day Stop potassium for now Stop metolazone (special fluid pill). We will use this only as needed and as directed BMP, NT pro-BNP today Repeat BMP on 12/30/19 Weigh daily and record Limit sodium intake to 2000mg per day Limit fluid intake to no more than 6-7, eight ounce glasses of any type of fluids per day (total of 48 to 56 ounces per day) Call if you notice sudden or progressive weight gain (3-5 pounds in 2-3 days), increasing shortness of breath, abdominal bloating, increasing lower extremity edema, inability to lie flat or on your normal number of pillows, having to sit up to catch your breath, fatigue, increased somnolence (sleeping more), poor appetite

## 2020-01-03 DIAGNOSIS — I48.91 ATRIAL FIBRILLATION, UNSPECIFIED TYPE (HCC): ICD-10-CM

## 2020-01-03 DIAGNOSIS — Z79.01 LONG TERM CURRENT USE OF ANTICOAGULANT THERAPY: Primary | ICD-10-CM

## 2020-01-06 ENCOUNTER — HOSPITAL ENCOUNTER (OUTPATIENT)
Dept: LAB | Age: 75
Discharge: HOME | End: 2020-01-06
Payer: MEDICARE

## 2020-01-06 DIAGNOSIS — Z79.01 LONG TERM CURRENT USE OF ANTICOAGULANT THERAPY: ICD-10-CM

## 2020-01-06 DIAGNOSIS — I10 ESSENTIAL HYPERTENSION: ICD-10-CM

## 2020-01-06 DIAGNOSIS — I48.91 ATRIAL FIBRILLATION, UNSPECIFIED TYPE (HCC): ICD-10-CM

## 2020-01-06 LAB
ANION GAP SERPL CALC-SCNC: 6 MMOL/L (ref 3–18)
BUN SERPL-MCNC: 24 MG/DL (ref 7–18)
BUN/CREAT SERPL: 18 (ref 12–20)
CALCIUM SERPL-MCNC: 8.8 MG/DL (ref 8.5–10.1)
CHLORIDE SERPL-SCNC: 104 MMOL/L (ref 100–111)
CO2 SERPL-SCNC: 31 MMOL/L (ref 21–32)
CREAT SERPL-MCNC: 1.3 MG/DL (ref 0.6–1.3)
GLUCOSE SERPL-MCNC: 132 MG/DL (ref 74–99)
INR PPP: 3 (ref 0.8–1.2)
INR, EXTERNAL: 3
POTASSIUM SERPL-SCNC: 4 MMOL/L (ref 3.5–5.5)
PROTHROMBIN TIME: 30.4 SEC (ref 11.5–15.2)
SODIUM SERPL-SCNC: 141 MMOL/L (ref 136–145)

## 2020-01-06 PROCEDURE — 36415 COLL VENOUS BLD VENIPUNCTURE: CPT

## 2020-01-06 PROCEDURE — 80048 BASIC METABOLIC PNL TOTAL CA: CPT

## 2020-01-06 PROCEDURE — 85610 PROTHROMBIN TIME: CPT

## 2020-01-09 ENCOUNTER — TELEPHONE ANTICOAG (OUTPATIENT)
Dept: CARDIOLOGY CLINIC | Age: 75
End: 2020-01-09

## 2020-01-09 ENCOUNTER — CLINICAL SUPPORT (OUTPATIENT)
Dept: CARDIOLOGY CLINIC | Age: 75
End: 2020-01-09

## 2020-01-09 DIAGNOSIS — Z95.810 AICD (AUTOMATIC CARDIOVERTER/DEFIBRILLATOR) PRESENT: ICD-10-CM

## 2020-01-09 DIAGNOSIS — I48.91 ATRIAL FIBRILLATION, UNSPECIFIED TYPE (HCC): ICD-10-CM

## 2020-01-09 DIAGNOSIS — Z79.01 LONG TERM CURRENT USE OF ANTICOAGULANT THERAPY: Primary | ICD-10-CM

## 2020-01-09 DIAGNOSIS — I42.8 CARDIOMYOPATHY, NONISCHEMIC (HCC): Primary | ICD-10-CM

## 2020-01-09 NOTE — PROGRESS NOTES
Verbal order and read back per Garima Purdy NP  INR with therapeutic range- 3.0. Continue Coumadin to 2.5 mg daily except 3.75 mg on Mon. Recheck in 1 month. Patient made aware of dosing instructions and to recheck in 1 month. No questions.

## 2020-01-10 NOTE — PROGRESS NOTES
I have personally seen and evaluated the device findings. Interrogation reviewed and I agree with assessment.     Irena Ren

## 2020-01-22 ENCOUNTER — HOSPITAL ENCOUNTER (OUTPATIENT)
Dept: LAB | Age: 75
Discharge: HOME | End: 2020-01-22
Payer: MEDICARE

## 2020-01-22 ENCOUNTER — OFFICE VISIT (OUTPATIENT)
Dept: ONCOLOGY | Age: 75
End: 2020-01-22

## 2020-01-22 ENCOUNTER — HOSPITAL ENCOUNTER (OUTPATIENT)
Dept: ONCOLOGY | Age: 75
Discharge: HOME | End: 2020-01-22

## 2020-01-22 VITALS
DIASTOLIC BLOOD PRESSURE: 84 MMHG | SYSTOLIC BLOOD PRESSURE: 128 MMHG | RESPIRATION RATE: 18 BRPM | HEART RATE: 58 BPM | OXYGEN SATURATION: 99 % | BODY MASS INDEX: 26.77 KG/M2 | WEIGHT: 202 LBS | HEIGHT: 73 IN

## 2020-01-22 DIAGNOSIS — C49.20: ICD-10-CM

## 2020-01-22 DIAGNOSIS — D69.6 THROMBOCYTOPENIA (HCC): ICD-10-CM

## 2020-01-22 DIAGNOSIS — D47.2 MONOCLONAL GAMMOPATHY: ICD-10-CM

## 2020-01-22 DIAGNOSIS — D50.8 IRON DEFICIENCY ANEMIA SECONDARY TO INADEQUATE DIETARY IRON INTAKE: ICD-10-CM

## 2020-01-22 DIAGNOSIS — G89.3 CANCER ASSOCIATED PAIN: ICD-10-CM

## 2020-01-22 DIAGNOSIS — D50.8 IRON DEFICIENCY ANEMIA SECONDARY TO INADEQUATE DIETARY IRON INTAKE: Primary | ICD-10-CM

## 2020-01-22 DIAGNOSIS — C49.21 SOFT TISSUE SARCOMA OF RIGHT THIGH (HCC): ICD-10-CM

## 2020-01-22 LAB
ALBUMIN SERPL-MCNC: 3.7 G/DL (ref 3.4–5)
ALBUMIN/GLOB SERPL: 1 {RATIO} (ref 0.8–1.7)
ALP SERPL-CCNC: 280 U/L (ref 45–117)
ALT SERPL-CCNC: 23 U/L (ref 16–61)
ANION GAP SERPL CALC-SCNC: 2 MMOL/L (ref 3–18)
AST SERPL-CCNC: 27 U/L (ref 10–38)
BASO+EOS+MONOS # BLD AUTO: 0.5 K/UL (ref 0–2.3)
BASO+EOS+MONOS NFR BLD AUTO: 7 % (ref 0.1–17)
BILIRUB SERPL-MCNC: 1.4 MG/DL (ref 0.2–1)
BUN SERPL-MCNC: 24 MG/DL (ref 7–18)
BUN/CREAT SERPL: 20 (ref 12–20)
CALCIUM SERPL-MCNC: 9.1 MG/DL (ref 8.5–10.1)
CHLORIDE SERPL-SCNC: 103 MMOL/L (ref 100–111)
CO2 SERPL-SCNC: 33 MMOL/L (ref 21–32)
CREAT SERPL-MCNC: 1.23 MG/DL (ref 0.6–1.3)
DIFFERENTIAL METHOD BLD: ABNORMAL
ERYTHROCYTE [DISTWIDTH] IN BLOOD BY AUTOMATED COUNT: 17.7 % (ref 11.5–14.5)
FERRITIN SERPL-MCNC: 691 NG/ML (ref 8–388)
GLOBULIN SER CALC-MCNC: 3.7 G/DL (ref 2–4)
GLUCOSE SERPL-MCNC: 153 MG/DL (ref 74–99)
HCT VFR BLD AUTO: 37.3 % (ref 36–48)
HGB BLD-MCNC: 12.2 G/DL (ref 12–16)
IRON SATN MFR SERPL: 22 %
IRON SERPL-MCNC: 65 UG/DL (ref 50–175)
LYMPHOCYTES # BLD: 1.3 K/UL (ref 1.1–5.9)
LYMPHOCYTES NFR BLD: 18 % (ref 14–44)
MCH RBC QN AUTO: 30 PG (ref 25–35)
MCHC RBC AUTO-ENTMCNC: 32.7 G/DL (ref 31–37)
MCV RBC AUTO: 91.6 FL (ref 78–102)
NEUTS SEG # BLD: 5.2 K/UL (ref 1.8–9.5)
NEUTS SEG NFR BLD: 75 % (ref 40–70)
PLATELET # BLD AUTO: 134 K/UL (ref 140–440)
POTASSIUM SERPL-SCNC: 4 MMOL/L (ref 3.5–5.5)
PROT SERPL-MCNC: 7.4 G/DL (ref 6.4–8.2)
RBC # BLD AUTO: 4.07 M/UL (ref 4.1–5.1)
SODIUM SERPL-SCNC: 138 MMOL/L (ref 136–145)
TIBC SERPL-MCNC: 293 UG/DL (ref 250–450)
WBC # BLD AUTO: 7 K/UL (ref 4.5–13)

## 2020-01-22 PROCEDURE — 84165 PROTEIN E-PHORESIS SERUM: CPT

## 2020-01-22 PROCEDURE — 36415 COLL VENOUS BLD VENIPUNCTURE: CPT

## 2020-01-22 PROCEDURE — 82728 ASSAY OF FERRITIN: CPT

## 2020-01-22 PROCEDURE — 80053 COMPREHEN METABOLIC PANEL: CPT

## 2020-01-22 PROCEDURE — 83540 ASSAY OF IRON: CPT

## 2020-01-22 NOTE — PATIENT INSTRUCTIONS
Iron Deficiency Anemia: Care Instructions Your Care Instructions Anemia means that you do not have enough red blood cells. Red blood cells carry oxygen around your body. When you have anemia, it can make you pale, weak, and tired. Many things can cause anemia. The most common cause is loss of blood. This can happen if you have heavy menstrual periods. It can also happen if you have bleeding in your stomach or bowel. You can also get anemia if you don't have enough iron in your diet or if it's hard for your body to absorb iron. In some cases, pregnancy causes anemia. That's because a pregnant woman needs more iron. Your doctor may do more tests to find the cause of your anemia. If a disease or other health problem is causing it, your doctor will treat that problem. It's important to follow up with your doctor to make sure that your iron level returns to normal. 
Follow-up care is a key part of your treatment and safety. Be sure to make and go to all appointments, and call your doctor if you are having problems. It's also a good idea to know your test results and keep a list of the medicines you take. How can you care for yourself at home? · If your doctor recommended iron pills, take them as directed. ? Try to take the pills on an empty stomach. You can do this about 1 hour before or 2 hours after meals. But you may need to take iron with food to avoid an upset stomach. ? Do not take antacids or drink milk or anything with caffeine within 2 hours of when you take your iron. They can keep your body from absorbing the iron well. ? Vitamin C helps your body absorb iron. You may want to take iron pills with a glass of orange juice or some other food high in vitamin C. 
? Iron pills may cause stomach problems. These include heartburn, nausea, diarrhea, constipation, and cramps. It can help to drink plenty of fluids and include fruits, vegetables, and fiber in your diet. ? It's normal for iron pills to make your stool a greenish or grayish black. But internal bleeding can also cause dark stool. So it's important to tell your doctor about any color changes. ? Call your doctor if you think you are having a problem with your iron pills. Even after you start to feel better, it will take several months for your body to build up its supply of iron. ? If you miss a pill, don't take a double dose. ? Keep iron pills out of the reach of small children. Too much iron can be very dangerous. · Eat foods with a lot of iron. These include red meat, shellfish, poultry, and eggs. They also include beans, raisins, whole-grain bread, and leafy green vegetables. · Steam your vegetables. This is the best way to prepare them if you want to get as much iron as possible. · Be safe with medicines. Do not take nonsteroidal anti-inflammatory pain relievers unless your doctor tells you to. These include aspirin, naproxen (Aleve), and ibuprofen (Advil, Motrin). · Liquid iron can stain your teeth. But you can mix it with water or juice and drink it with a straw. Then it won't get on your teeth. When should you call for help? Call 911 anytime you think you may need emergency care. For example, call if: 
  · You passed out (lost consciousness).  
 Call your doctor now or seek immediate medical care if: 
  · You are short of breath.  
  · You are dizzy or light-headed, or you feel like you may faint.  
  · You have new or worse bleeding.  
 Watch closely for changes in your health, and be sure to contact your doctor if: 
  · You feel weaker or more tired than usual.  
  · You do not get better as expected. Where can you learn more? Go to http://radha-tre.info/. Enter C415 in the search box to learn more about \"Iron Deficiency Anemia: Care Instructions. \" Current as of: March 28, 2019 Content Version: 12.2 © 0665-6004 [employer].  Care instructions adapted under license by [address] (which disclaims liability or warranty for this information). If you have questions about a medical condition or this instruction, always ask your healthcare professional. [address] any warranty or liability for your use of this information. Learning About Cancer Pain What is cancer pain? Cancer pain may be caused by the cancer or by the treatments and tests used. The pain may make it hard for you to do your normal activities, such as sleeping or eating. Over time, cancer pain can cause appetite and sleep problems, isolation, and depression. But most cancer pain can be managed with medicines and other methods. This may not mean that you have no pain but that it stays at a level that you can bear. Treating your pain will make you feel better. You will be more active, eat and sleep better, and enjoy your family and friends. What are some key points about cancer pain? · You are the only person who can say how much pain you have. If you tell your doctor when you have pain or when pain changes, your doctor can help you. · Cancer pain can almost always be relieved if you work with your doctor to create a treatment plan that is right for you. · Pain is often easier to control right after it starts. This may mean it is better to take regular doses instead of waiting until the pain becomes bad. · Take your medicines exactly as prescribed. Call your doctor if you think you are having a problem with your medicine. · You may find that taking your medicine works most of the time, but your pain flares up during extra activity or for no clear reason. This is called breakthrough pain. Ask your doctor what you can do if this happens. Your doctor can give you a prescription for fast-acting medicines that you can take for breakthrough pain.  
· People who take opioid pain medicines for cancer pain rarely develop opioid use disorder. Moderate to severe opioid use disorder is sometimes called addiction. Your body may come to expect daily doses of medicine to control the pain. But your doctor can gradually lower the amount you are taking when and if the cause of your pain is gone. What treatments can help you manage cancer pain? Medical treatments to manage cancer pain include: · Prescription and over-the-counter medicines. Many types of medicines are used. Your doctor may suggest different combinations of medicines. · Surgery, chemotherapy, radiation, and hormone therapy. These may be used to remove or destroy a tumor that is causing pain. · Nerve blocks. These are used to help with nerve pain. A medicine is injected into the nerve that affects the painful area. Nonmedical treatments include: · Physical therapy, gentle massage, acupuncture, and heat or cold to ease pain. · Stretching, yoga, and exercises to help you keep your strength, flexibility, and mobility. · Relaxation, biofeedback, or meditation to relieve stress and anxiety. · Short-term crisis therapy with a counselor. This may help you manage your cancer pain or the discomfort from cancer treatments. · Education and emotional support. Learning as much as you can about your pain may help. So can sharing your feelings with others. A support group can be a safe and comfortable place to talk about your illness. · Complementary therapies, such as aromatherapy, prayer, and humor therapy, may be helpful. How can you manage cancer pain? Your doctor needs all the information you can give about what your pain feels like. It often helps to write things down in a pain diary. · Write down when your pain starts, what it feels like, and how long it lasts. Use words like dull, aching, sharp, shooting, throbbing, or burning. · Note changes in your pain. Is it constant, or does it come and go? Do you have more than one kind of pain?  How long does it last? 
 · Rate your pain on a scale of 0 to 10, with 0 being no pain and 10 being the worst pain you can imagine. · Write exactly where you feel pain. You can use a drawing. Say whether the pain is just in that one place or several places at once. Or tell your doctor if it travels from one place to another. · Write down what makes your pain better or worse. Note when you used a treatment, how well it worked, and any side effects. If you and your doctor are not able to control your pain, ask about seeing a pain specialist. A pain specialist is a health professional who focuses on treating resistant pain. Talk to your doctor if you are having problems with depression. Treating depression can make it easier to manage your cancer pain. Where can you learn more? Go to http://radha-tre.info/. Enter K531 in the search box to learn more about \"Learning About Cancer Pain. \" Current as of: December 19, 2018 Content Version: 12.2 © 3963-2183 [employer]. Care instructions adapted under license by [employer] (which disclaims liability or warranty for this information). If you have questions about a medical condition or this instruction, always ask your healthcare professional. [address] any warranty or liability for your use of this information. Learning About Monoclonal Gammopathy of Unknown Significance (MGUS) What is MGUS? Monoclonal gammopathy of unknown significance (MGUS) is a blood condition. The blood is made of many kinds of cells, including red blood cells, platelets, and white blood cells. With MGUS, a type of white blood cell called a plasma cell makes too much of the \"M\" (for \"monoclonal\") protein in the blood. Most people with MGUS are fine for many years. MGUS seldom causes symptoms or major health problems. In rare cases, MGUS may turn into multiple myeloma.  This is a cancer of plasma cells in the blood that can cause bone weakness. Some people with MGUS may also have a higher risk for osteoporosis, in which bones become thin and weak. MGUS is sometimes seen in younger people, but is more common in people as they get older. It's seen most often in those over the age of 79. How is MGUS diagnosed? MGUS is often found by chance when blood tests are done for other reasons. If you have high levels of a certain protein in your blood, your doctor may order more tests. Blood tests can help identify the protein. The protein is sometimes found in the urine, so you may get a urine test too. Other tests may be done if your doctor thinks you might have a medical problem. In some cases, a bone marrow biopsy may be done to rule out a problem like multiple myeloma. How is it treated? Most people with MGUS don't need any treatment. But you may need regular physical exams, blood tests, and urine tests to make sure that MGUS isn't progressing to a medical problem. Follow-up care is a key part of your treatment and safety. Be sure to make and go to all appointments, and call your doctor if you are having problems. It's also a good idea to know your test results and keep a list of the medicines you take. Where can you learn more? Go to http://radha-tre.info/. Enter A917 in the search box to learn more about \"Learning About Monoclonal Gammopathy of Unknown Significance (MGUS). \" 
Current as of: March 28, 2019 Content Version: 12.2 © 9046-5220 [employer]. Care instructions adapted under license by [employer] (which disclaims liability or warranty for this information). If you have questions about a medical condition or this instruction, always ask your healthcare professional. [place] [address] any warranty or liability for your use of this information. Sarcoma: Care Instructions Your Care Instructions Sarcoma is cancer of certain tissues of the body, such as the muscles, connective tissues (like tendons), blood vessels, bones, and fat. Cancer occurs when abnormal cells grow out of control. The cells can spread to other areas of the body. Sarcoma is a general name for several rare cancers that occur in these tissues. Doctors treat this type of cancer with surgery, radiation, or medicines (chemotherapy). Your doctor will treat you based on how quickly or slowly the type of cancer you have may spread, how far the cancer has spread, and your overall health. One or more treatments may be used. When you find out that you have cancer, you may feel many emotions and may need some help coping. Seek out family, friends, and counselors for support. You also can do things at home to make yourself feel better while you go through treatment. Call the [employer] (0-600.816.7069) or visit its website at [address] [employer] for more information. Follow-up care is a key part of your treatment and safety. Be sure to make and go to all appointments, and call your doctor if you are having problems. It's also a good idea to know your test results and keep a list of the medicines you take. How can you care for yourself at home? · Take your medicines exactly as prescribed. Call your doctor if you think you are having a problem with your medicine. You may get medicine for nausea and vomiting if you have these side effects. · Eat healthy food. If you do not feel like eating, try to eat food that has protein and extra calories to keep up your strength and prevent weight loss. Drink liquid meal replacements for extra calories and protein. Try to eat your main meal early. · Get some physical activity every day, but do not get too tired. Keep doing the hobbies you enjoy as your energy allows. · Take steps to control your stress and workload. Learn relaxation techniques. ? Share your feelings. Stress and tension affect our emotions.  By expressing your feelings to others, you may be able to understand and cope with them. ? Consider joining a support group. Talking about a problem with your spouse, a good friend, or other people with similar problems is a good way to reduce tension and stress. ? Express yourself through art. Try writing, crafts, dance, or art to relieve stress. Some dance, writing, or art groups may be available just for people who have cancer. ? Be kind to your body and mind. Getting enough sleep, eating a healthy diet, and taking time to do things you enjoy can contribute to an overall feeling of balance in your life and help reduce stress. ? Get help if you need it. Discuss your concerns with your doctor or counselor. · If you are vomiting or have diarrhea: ? Drink plenty of fluids (enough so that your urine is light yellow or clear like water) to prevent dehydration. Choose water and other caffeine-free clear liquids. If you have kidney, heart, or liver disease and have to limit fluids, talk with your doctor before you increase the amount of fluids you drink. ? When you are able to eat, try clear soups, mild foods, and liquids until all symptoms are gone for 12 to 48 hours. Other good choices include dry toast, crackers, cooked cereal, and gelatin dessert, such as Jell-O. · If you have not already done so, prepare a list of advance directives. Advance directives are instructions to your doctor and family members about what kind of care you want if you become unable to speak or express yourself. When should you call for help? Call 911 anytime you think you may need emergency care. For example, call if: 
  · You passed out (lost consciousness).  
 Call your doctor now or seek immediate medical care if: 
  · You have a fever.  
  · You have abnormal bleeding.  
  · You have new or worse pain.  
  · You think you have an infection.  
  · You have new symptoms, such as a cough, belly pain, vomiting, diarrhea, or a rash.  Watch closely for changes in your health, and be sure to contact your doctor if: 
  · You are much more tired than usual.  
  · You have swollen glands in your armpits, groin, or neck.  
  · You do not get better as expected. Where can you learn more? Go to http://radha-tre.info/. Enter Mallory Esparza in the search box to learn more about \"Sarcoma: Care Instructions. \" Current as of: December 19, 2018 Content Version: 12.2 © 1883-9334 [employer]. Care instructions adapted under license by "[employer] (which disclaims liability or warranty for this information). If you have questions about a medical condition or this instruction, always ask your healthcare professional. [address] any warranty or liability for your use of this information.

## 2020-01-22 NOTE — PROGRESS NOTES
Hematology/Oncology  Progress Note    Name: Andres Foss  Date: 2020  : 1945    Ivonne Del Toro MD     Mr. González Aguilar is a 69 y.o. 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 Current therapy: Active surveillance; The patient has previously undergone systemic chemotherapy with 4 cycles of Doxil followed by external beam radiation therapy to the soft tissue sarcoma involving the right medial upper thigh. He also completed surgery for removal of the residual mass. Subjective:     Mr. González Aguilar is a 71-year-old -American man with history of soft tissue sarcoma. He is here for follow-up. Today he has no new complaints. He denies having any new swelling or discomfort in the right leg. He continues to ambulate with the use of a cane. He has no complaints of pain or discomfort, at this visit. Past medical history, family history, and social history: these were reviewed and remains unchanged. Past Medical History:   Diagnosis Date    AICD (automatic cardioverter/defibrillator) present     Atrial fibrillation ([address] 2010    [facility] [address]     Cardiac cath 2009    Patent coronary arteries. LVEDP 28.  EF 35%. Global hyk. Mod MR.  Cardiac echocardiogram 2016    LVE. EF 15% at most.  Indeterminate LV diastolic fx.  RVE. Reduced RV systolic fx. RVSP 80-85 mmHg. LAE.  LUISA. Mod-severe MR. Mild AI. Severe TR.  Cardiac MUGA scan 2015    At most mild LVE. EF 45-46%.  Cardiac nuclear imaging test, abnormal 2009    Mild basal/mid inferior, inferoapical, inferoseptal infarct w/mild ischemia in RCA (possibly partially artifact). Anterior, anteroseptal, anterapical ischemia w/o infarct. (Mid & distal LAD.)  LVE. EF 29%. Mod global hyk.  Cardiovascular LE venous duplex 2016    Tech difficult. No DVT bilaterally.    [facility] Cardiovascular renal duplex 06/07/2010    No evidence of renal artery stenosis >60%. Patent SMA and celiac artery.  Cardiovascular UE venous duplex 10/04/2016    No DVT bilaterally.  Diabetes ([address]     HTN (hypertension) 6/8/2010    Hypertensive cardiovascular disease     MR (mitral regurgitation)     Nonischemic cardiomyopathy     3/11 echo EF 25%    Pulmonary hypertension, moderate to severe ([address] 6/8/2010    90-100mmHg; repeat echo in 3/11 showed 70mmHg     Past Surgical History:   Procedure Laterality Date    HX HEART VALVE SURGERY  2011    HX HERNIA REPAIR  2011    IR REMOVE TUNL CVAD W PORT/PUMP  4/19/2019     Social History     Socioeconomic History    Marital status:      Spouse name: Not on file    Number of children: Not on file    Years of education: Not on file    Highest education level: Not on file   Occupational History    Not on file   Social Needs    Financial resource strain: Not on file    Food insecurity:     Worry: Not on file     Inability: Not on file    Transportation needs:     Medical: Not on file     Non-medical: Not on file   Tobacco Use    Smoking status: Never Smoker    Smokeless tobacco: Never Used   Substance and Sexual Activity    Alcohol use: Yes     Comment: occasionally.     Drug use: No    Sexual activity: Yes     Partners: Female     Birth control/protection: None   Lifestyle    Physical activity:     Days per week: Not on file     Minutes per session: Not on file    Stress: Not on file   Relationships    Social connections:     Talks on phone: Not on file     Gets together: Not on file     Attends Yazidism service: Not on file     Active member of club or organization: Not on file     Attends meetings of clubs or organizations: Not on file     Relationship status: Not on file    Intimate partner violence:     Fear of current or ex partner: Not on file     Emotionally abused: Not on file     Physically abused: Not on file     Forced sexual activity: Not on file   Other Topics Concern    Not on file   Social History Narrative    Not on file     Family History   Problem Relation Age of Onset    Hypertension Mother      Current Outpatient Medications   Medication Sig Dispense Refill    bumetanide (BUMEX) 2 mg tablet Take 1 Tab by mouth daily. Or directed 45 Tab 4    spironolactone (ALDACTONE) 25 mg tablet Take  by mouth daily.  cloNIDine HCl (CATAPRES) 0.2 mg tablet TAKE 1 TABLET BY MOUTH 3  TIMES A  Tab 2    warfarin (COUMADIN) 2.5 mg tablet TAKE 1 TABLET BY MOUTH ONCE DAILY OR  AS  DIRECTED  YOUR  DOCTOR 45 Tab 30    carvedilol (COREG) 25 mg tablet Take 1 Tab by mouth two (2) times daily (with meals). 180 Tab 3    digoxin (LANOXIN) 0.125 mg tablet TAKE 1 TABLET BY MOUTH EVERY DAY 90 Tab 3    lisinopril (PRINIVIL, ZESTRIL) 20 mg tablet TAKE ONE TABLET BY MOUTH TWICE DAILY 180 Tab 3    atorvastatin (LIPITOR) 20 mg tablet Take 20 mg by mouth daily.  insulin lispro (HUMALOG U-100 INSULIN) 100 unit/mL injection by SubCUTAneous route.  insulin glargine (LANTUS U-100 INSULIN) 100 unit/mL injection 15 Units by SubCUTAneous route nightly.  magnesium oxide (MAG-OX) 400 mg tablet TAKE 1 TABLET BY MOUTH DAILY. 90 Tab 3    sertraline (ZOLOFT) 25 mg tablet Take  by mouth daily.  allopurinol (ZYLOPRIM) 300 mg tablet Take  by mouth daily.  FERROUS SULFATE, DRIED (IRON, DRIED, PO) Take 325 mg by mouth daily.  aspirin delayed-release (ASPIR-81) 81 mg tablet Take 81 mg by mouth daily.  metOLazone (ZAROXOLYN) 2.5 mg tablet Take 1 Tab by mouth as needed (volume overload). 1 Tab 0       Review of Systems  Constitutional: The patient has no acute distress or discomfort. HEENT: The patient denies recent head trauma, eye pain, blurred vision,  hearing deficit, oropharyngeal mucosal pain or lesions, and the patient denies throat pain or discomfort. Lymphatics: The patient denies palpable peripheral lymphadenopathy.   Hematologic: The patient denies having bruising, bleeding, or progressive fatigue. Respiratory: Patient denies having shortness of breath, cough, sputum production, fever, or dyspnea on exertion. Cardiovascular: The patient denies having leg pain, leg swelling, heart palpitations, chest permit, chest pain, or lightheadedness. The patient denies having dyspnea on exertion. Gastrointestinal: The patient denies having nausea, emesis, or diarrhea. The patient denies having any hematemesis or blood in the stool. Genitourinary: Patient denies having urinary urgency, frequency, or dysuria. The patient denies having blood in the urine. Psychological: The patient denies having symptoms of nervousness, anxiety, depression, or thoughts of harming self. Skin: Patient denies having skin rashes, skin, ulcerations, or unexplained itching or pruritus. Musculoskeletal: The patient denies having pain in the joints or bones. Objective:     Visit Vitals  /84   Pulse (!) 58   Resp 18   Ht 6' 1\" (1.854 m)   Wt 91.6 kg (202 lb)   SpO2 99%   BMI 26.65 kg/m²     ECOG PS=0;  pain score=0/10    Physical Exam:   Gen. Appearance: The patient is in no acute distress. Skin: There is no bruise or rash. HEENT: The exam is unremarkable. Neck: Supple without lymphadenopathy or thyromegaly. Lungs: Clear to auscultation and percussion; there are no wheezes or rhonchi. Heart: Regular rate and rhythm; there are no murmurs, gallops, or rubs. Abdomen: Bowel sounds are present and normal.  There is no guarding, tenderness, or hepatosplenomegaly. Extremities: There is no clubbing, cyanosis, or edema. Neurologic: There are no focal neurologic deficits. Lymphatics: There is no palpable peripheral lymphadenopathy. Musculoskeletal: The patient has full range of motion at all joints. There is no evidence of joint deformity or effusions. There is no focal joint tenderness.   Psychological/psychiatric: There is no clinical evidence of anxiety, depression, or melancholy. Lab data:      Results for orders placed or performed during the hospital encounter of 01/22/20   CBC WITH 3 PART DIFF     Status: Abnormal   Result Value Ref Range Status    WBC 7.0 4.5 - 13.0 K/uL Final    RBC 4.07 (L) 4.10 - 5.10 M/uL Final    HGB 12.2 12.0 - 16 g/dL Final    HCT 37.3 36 - 48 % Final    MCV 91.6 78 - 102 FL Final    MCH 30.0 25.0 - 35.0 PG Final    MCHC 32.7 31 - 37 g/dL Final    RDW 17.7 (H) 11.5 - 14.5 % Final    PLATELET 787 (L) 117 - 440 K/uL Final    NEUTROPHILS 75 (H) 40 - 70 % Final    MIXED CELLS 7 0.1 - 17 % Final    LYMPHOCYTES 18 14 - 44 % Final    ABS. NEUTROPHILS 5.2 1.8 - 9.5 K/UL Final    ABS. MIXED CELLS 0.5 0.0 - 2.3 K/uL Final    ABS. LYMPHOCYTES 1.3 1.1 - 5.9 K/UL Final     Comment: Test performed at [address] [place] [address] or Outpatient [facility]fusion Center Location. Reviewed by Medical Director. DF AUTOMATED   Final         Assessment:     1. Iron deficiency anemia secondary to inadequate dietary iron intake    2. Monoclonal gammopathy    3. Thrombocytopenia ([address]    4. Cancer associated pain    5. Soft tissue sarcoma of right thigh ([facility] [address]    6. Soft tissue sarcoma of lower extremity, unspecified laterality (HCC)      Plan:   Soft tissue sarcoma in the right lower extremity: Clinically there is no evidence of disease recurrence. At his previous clinic visit he was inform of the findings on the most recent imaging study which showed no evidence of disease recurrence or progression. Today he has no new evidence of disease progression. There is no lymphadenopathy and no evidence of mass-effect on the right medial thigh proximally. Monoclonal gammopathy of undetermined significance: From 7/8/2019 the SPEP showed no evidence of a residual monoclonal paraprotein level. I will continue to monitor this at 3-month intervals.     Iron deficiency anemia: The patient's hemoglobin remains corrected at 12.2 g/dL with hematocrit of 37.3%, PLT of 134,000. I will recheck his iron profile, ferritin level, and at this time. .  Cancer associated pain: Currently the patient reports that he is not having any pain or discomfort. Mild thrombocytopenia:  I have explained to the patient that this is very mild thrombocytopenia and that no systemic therapy is warranted. He previously received some radiation therapy to the right leg which is probably contributing to the mild thrombocytopenia. In any event, therapeutic intervention will only be recommended if the platelet counts declines below 30,000. A CBC from today shows a PLT of 134,000. Follow-up in 4 months  Orders Placed This Encounter    COMPLETE CBC & AUTO DIFF WBC    InHouse CBC ([facility])     Standing Status:   Future     Number of Occurrences:   1     Standing Expiration Date:   2/77/6033    METABOLIC PANEL, COMPREHENSIVE     Standing Status:   Future     Standing Expiration Date:   1/22/2021    FERRITIN     Standing Status:   Future     Standing Expiration Date:   1/22/2021    IRON PROFILE     Standing Status:   Future     Standing Expiration Date:   1/22/2021    PROTEIN ELECTROPHORESIS     Standing Status:   Future     Standing Expiration Date:   2/22/2020       Efraín Liriano NP  1/22/2020       I have assessed the patient independently and  agree with the full assessment as outlined. Boby Whitt MD, FACP        Please note: This document has been produced using voice recognition software. Unrecognized errors in transcription may be present.

## 2020-01-24 LAB
ALBUMIN SERPL ELPH-MCNC: 3.7 G/DL (ref 2.9–4.4)
ALBUMIN/GLOB SERPL: 1.1 {RATIO} (ref 0.7–1.7)
ALPHA1 GLOB SERPL ELPH-MCNC: 0.3 G/DL (ref 0–0.4)
ALPHA2 GLOB SERPL ELPH-MCNC: 0.8 G/DL (ref 0.4–1)
B-GLOBULIN SERPL ELPH-MCNC: 0.9 G/DL (ref 0.7–1.3)
GAMMA GLOB SERPL ELPH-MCNC: 1.5 G/DL (ref 0.4–1.8)
GLOBULIN SER CALC-MCNC: 3.5 G/DL (ref 2.2–3.9)
M PROTEIN SERPL ELPH-MCNC: NORMAL G/DL
PROT SERPL-MCNC: 7.2 G/DL (ref 6–8.5)

## 2020-02-05 ENCOUNTER — ANTI-COAG VISIT (OUTPATIENT)
Dept: CARDIOLOGY CLINIC | Age: 75
End: 2020-02-05

## 2020-02-05 DIAGNOSIS — I48.91 ATRIAL FIBRILLATION, UNSPECIFIED TYPE (HCC): ICD-10-CM

## 2020-02-05 DIAGNOSIS — Z79.01 LONG TERM CURRENT USE OF ANTICOAGULANT THERAPY: Primary | ICD-10-CM

## 2020-02-05 LAB
INR BLD: 2.6
PT POC: 30.8 SECONDS
VALID INTERNAL CONTROL?: YES

## 2020-02-05 NOTE — PROGRESS NOTES
The INR is stable and therapeutic. Continue Coumadin to 2.5 mg daily except 3.75 mg on Mon.    Recheck INR in 5 weeks

## 2020-03-11 ENCOUNTER — ANTI-COAG VISIT (OUTPATIENT)
Dept: CARDIOLOGY CLINIC | Age: 75
End: 2020-03-11

## 2020-03-11 DIAGNOSIS — I48.91 ATRIAL FIBRILLATION, UNSPECIFIED TYPE (HCC): ICD-10-CM

## 2020-03-11 DIAGNOSIS — Z79.01 LONG TERM CURRENT USE OF ANTICOAGULANT THERAPY: Primary | ICD-10-CM

## 2020-03-11 LAB
INR BLD: 2.9
PT POC: 34.6 SECONDS
VALID INTERNAL CONTROL?: YES

## 2020-03-11 NOTE — PROGRESS NOTES
Verbal order and read back per Mohini Lucia NP  The INR is stable and therapeutic. Continue same dose of coumadin (Continue Coumadin to 2.5 mg daily except 3.75 mg on Mon. )  recheck in 1 month.

## 2020-03-11 NOTE — PATIENT INSTRUCTIONS
Verbal order and read back per Rachael Stapleton NP The INR is stable and therapeutic. Continue same dose of coumadin (Continue Coumadin to 2.5 mg daily except 3.75 mg on Mon. ) recheck in 1 month.

## 2020-04-06 ENCOUNTER — DOCUMENTATION ONLY (OUTPATIENT)
Dept: CARDIOLOGY CLINIC | Age: 75
End: 2020-04-06

## 2020-04-07 ENCOUNTER — ANTI-COAG VISIT (OUTPATIENT)
Dept: CARDIOLOGY CLINIC | Age: 75
End: 2020-04-07

## 2020-04-07 DIAGNOSIS — I48.91 ATRIAL FIBRILLATION, UNSPECIFIED TYPE (HCC): ICD-10-CM

## 2020-04-07 DIAGNOSIS — Z79.01 LONG TERM CURRENT USE OF ANTICOAGULANT THERAPY: Primary | ICD-10-CM

## 2020-04-07 LAB
INR BLD: 3.1
PT POC: 37.2 SECONDS
VALID INTERNAL CONTROL?: YES

## 2020-04-07 NOTE — PROGRESS NOTES
Continue Coumadin to 2.5 mg daily except 3.75 mg on Mon. Eat one cup of vegetables and recheck in 4 weeks. Will set patient up with home monitoring with [employer] today.

## 2020-04-08 ENCOUNTER — CLINICAL SUPPORT (OUTPATIENT)
Dept: CARDIOLOGY CLINIC | Age: 75
End: 2020-04-08

## 2020-04-08 DIAGNOSIS — Z95.810 AICD (AUTOMATIC CARDIOVERTER/DEFIBRILLATOR) PRESENT: ICD-10-CM

## 2020-04-08 DIAGNOSIS — I42.8 CARDIOMYOPATHY, NONISCHEMIC (HCC): Primary | ICD-10-CM

## 2020-04-08 NOTE — PROGRESS NOTES
I have personally seen and evaluated the device findings. Interrogation reviewed and I agree with assessment.     Noemy Michael

## 2020-05-08 ENCOUNTER — ANTI-COAG VISIT (OUTPATIENT)
Dept: CARDIOLOGY CLINIC | Age: 75
End: 2020-05-08

## 2020-05-08 DIAGNOSIS — I48.91 ATRIAL FIBRILLATION, UNSPECIFIED TYPE (HCC): ICD-10-CM

## 2020-05-08 DIAGNOSIS — Z79.01 LONG TERM CURRENT USE OF ANTICOAGULANT THERAPY: Primary | ICD-10-CM

## 2020-05-08 LAB
INR BLD: 2.3
PT POC: 27.5 SECONDS
VALID INTERNAL CONTROL?: YES

## 2020-05-08 NOTE — PROGRESS NOTES
Verbal order and read back per Pratibha Reid DO  The INR is stable and therapeutic.    Continue same dose of coumadin (Continue Coumadin to 2.5 mg daily except 3.75 mg on Mon. )  recheck in 5 weeks

## 2020-05-21 RX ORDER — DIGOXIN 125 MCG
TABLET ORAL
Qty: 90 TAB | Refills: 3 | Status: SHIPPED | OUTPATIENT
Start: 2020-05-21 | End: 2021-05-15

## 2020-05-21 RX ORDER — CARVEDILOL 25 MG/1
TABLET ORAL
Qty: 180 TAB | Refills: 3 | Status: SHIPPED | OUTPATIENT
Start: 2020-05-21 | End: 2020-07-23 | Stop reason: SDUPTHER

## 2020-05-21 RX ORDER — LISINOPRIL 20 MG/1
TABLET ORAL
Qty: 180 TAB | Refills: 3 | Status: SHIPPED | OUTPATIENT
Start: 2020-05-21 | End: 2021-05-15

## 2020-06-07 NOTE — PROGRESS NOTES
Hematology/Oncology  Progress Note    Name: Logan Richard  Date: 2020  : 1945    Pankaj Luis MD         Oncology History:  Mr. Mihir Rodrigez is a 69 y.o. 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 -- The patient has previously undergone systemic chemotherapy with 4 cycles of Doxil followed by external beam radiation therapy to the soft tissue sarcoma involving the right medial upper thigh.    -- He subsequently completed surgery for removal of residual large right thigh mass. -- Current therapy: Active surveillance. Subjective:     Mr. Mihir Rodrigez is a 71-year-old -American man with history of soft tissue sarcoma. He is here for follow-up. He denies having any new swelling or discomfort in the right leg. He continues to ambulate with the use of a cane. He has no complaints of pain or discomfort, at this visit. The patient otherwise has no other complaints. Denied fever, chills, night sweat, unintentional weight loss, skin lumps or bumps, acute bleeding or bruising issues. No acute bleeding, blood in stool, dark stool, melena, hematochezia, hemoptysis, dark urine, or easily bruising. Denied headache, acute vision change, dizziness, chest pain, worsen shortness of breath, palpitation, productive cough, nausea, vomiting, abdominal pain, altered bowel habits, dysuria, new bone pain or back pain, focal numbness or weakness. Independent with ADLs and IADLs. Past medical history, family history, and social history: these were reviewed and remains unchanged. Past Medical History:   Diagnosis Date    AICD (automatic cardioverter/defibrillator) present     Atrial fibrillation ([address] 2010    [facility]     Cardiac cath 2009    Patent coronary arteries. LVEDP 28.  EF 35%. Global hyk. Mod   Cardiac echocardiogram 09/29/2016    LVE. EF 15% at most.  Indeterminate LV diastolic fx.  RVE. Reduced RV systolic fx. RVSP 80-85 mmHg. LAE.  LUISA. Mod-severe MR. Mild AI. Severe TR.  Cardiac MUGA scan 11/02/2015    At most mild LVE. EF 45-46%.  Cardiac nuclear imaging test, abnormal 02/03/2009    Mild basal/mid inferior, inferoapical, inferoseptal infarct w/mild ischemia in RCA (possibly partially artifact). Anterior, anteroseptal, anterapical ischemia w/o infarct. (Mid & distal LAD.)  LVE. EF 29%. Mod global hyk.  Cardiovascular LE venous duplex 09/29/2016    Tech difficult. No DVT bilaterally.  Cardiovascular renal duplex 06/07/2010    No evidence of renal artery stenosis >60%. Patent SMA and celiac artery.  Cardiovascular UE venous duplex 10/04/2016    No DVT bilaterally.  Diabetes ([address]     HTN (hypertension) 6/8/2010    Hypertensive cardiovascular disease     MR (mitral regurgitation)     Nonischemic cardiomyopathy     3/11 echo EF 25%    Pulmonary hypertension, moderate to severe ([address] 6/8/2010    90-100mmHg; repeat echo in 3/11 showed 70mmHg     Past Surgical History:   Procedure Laterality Date    HX HEART VALVE SURGERY  2011    HX HERNIA REPAIR  2011    IR REMOVE TUNL CVAD W PORT/PUMP  4/19/2019     Social History     Socioeconomic History    Marital status:      Spouse name: Not on file    Number of children: Not on file    Years of education: Not on file    Highest education level: Not on file   Occupational History    Not on file   Social Needs    Financial resource strain: Not on file    Food insecurity     Worry: Not on file     Inability: Not on file   [employer] needs     Medical: Not on file     Non-medical: Not on file   Tobacco Use    Smoking status: Never Smoker    Smokeless tobacco: Never Used   Substance and Sexual Activity    Alcohol use: Yes     Comment: occasionally.     Drug use: No    Sexual activity: Yes Partners: Female     Birth control/protection: None   Lifestyle    Physical activity     Days per week: Not on file     Minutes per session: Not on file    Stress: Not on file   Relationships    Social connections     Talks on phone: Not on file     Gets together: Not on file     Attends Sikhism service: Not on file     Active member of club or organization: Not on file     Attends meetings of clubs or organizations: Not on file     Relationship status: Not on file    Intimate partner violence     Fear of current or ex partner: Not on file     Emotionally abused: Not on file     Physically abused: Not on file     Forced sexual activity: Not on file   Other Topics Concern    Not on file   Social History Narrative    Not on file     Family History   Problem Relation Age of Onset    Hypertension Mother      Current Outpatient Medications   Medication Sig Dispense Refill    digoxin (LANOXIN) 0.125 mg tablet TAKE 1 TABLET BY MOUTH  EVERY DAY 90 Tab 3    lisinopriL (PRINIVIL, ZESTRIL) 20 mg tablet TAKE 1 TABLET BY MOUTH  TWICE A  Tab 3    carvediloL (COREG) 25 mg tablet TAKE 1 TABLET BY MOUTH TWO  TIMES DAILY WITH MEALS 180 Tab 3    metOLazone (ZAROXOLYN) 2.5 mg tablet TAKE 1 TABLET BY MOUTH 30  MINUTES PRIOR TO MORNING  LASIX AS DIRECTED ON MONDAY AND THURSDAY 26 Tab 6    bumetanide (BUMEX) 2 mg tablet Take 1 Tab by mouth daily. Or directed 45 Tab 4    spironolactone (ALDACTONE) 25 mg tablet Take  by mouth daily.  cloNIDine HCl (CATAPRES) 0.2 mg tablet TAKE 1 TABLET BY MOUTH 3  TIMES A  Tab 2    warfarin (COUMADIN) 2.5 mg tablet TAKE 1 TABLET BY MOUTH ONCE DAILY OR  AS  DIRECTED  YOUR  DOCTOR 45 Tab 30    atorvastatin (LIPITOR) 20 mg tablet Take 20 mg by mouth daily.  insulin lispro (HUMALOG U-100 INSULIN) 100 unit/mL injection by SubCUTAneous route.  insulin glargine (LANTUS U-100 INSULIN) 100 unit/mL injection 15 Units by SubCUTAneous route nightly.       magnesium oxide (MAG-OX) 400 mg tablet TAKE 1 TABLET BY MOUTH DAILY. 90 Tab 3    sertraline (ZOLOFT) 25 mg tablet Take  by mouth daily.  allopurinol (ZYLOPRIM) 300 mg tablet Take  by mouth daily.  FERROUS SULFATE, DRIED (IRON, DRIED, PO) Take 325 mg by mouth daily.  aspirin delayed-release (ASPIR-81) 81 mg tablet Take 81 mg by mouth daily. Review of Systems   Constitutional: Negative for chills, diaphoresis, fever, malaise/fatigue and weight loss. Respiratory: Negative for cough, hemoptysis, shortness of breath and wheezing. Cardiovascular: Negative for chest pain, palpitations and leg swelling. Gastrointestinal: Negative for abdominal pain, diarrhea, heartburn, nausea and vomiting. Genitourinary: Negative for dysuria, frequency, hematuria and urgency. Musculoskeletal: Negative for joint pain and myalgias. Skin: Negative for itching and rash. Neurological: Negative for dizziness, seizures, weakness and headaches. Psychiatric/Behavioral: Negative for depression. The patient does not have insomnia. Objective:     Visit Vitals  /79 (BP Patient Position: Sitting)   Pulse 79   Temp 98 °F (36.7 °C) (Oral)   Resp 16   Wt 96.8 kg (213 lb 6.4 oz)   SpO2 98%   BMI 28.15 kg/m²       ECOG Performance Status (grade): 0  0 - able to carry on all pre-disease activity w/out restriction  1 - restricted but able to carry out light work  2 - ambulatory and can self- care but unable to carry out work  3 - bed or chair >50% of waking hours  4 - completely disable, total care, confined to bed or chair    Physical Exam  Constitutional:       General: He is not in acute distress. HENT:      Head: Normocephalic and atraumatic. Eyes:      Pupils: Pupils are equal, round, and reactive to light. Neck:      Musculoskeletal: Neck supple. No neck rigidity. Cardiovascular:      Pulses: Normal pulses. Heart sounds: Normal heart sounds. No murmur.    Pulmonary:      Effort: Pulmonary effort is normal. No respiratory distress. Breath sounds: Normal breath sounds. Abdominal:      General: Bowel sounds are normal. There is no distension. Palpations: Abdomen is soft. There is no mass. Tenderness: There is no abdominal tenderness. There is no guarding. Musculoskeletal:         General: No swelling or tenderness. Comments: Old scar on right inner thigh, no new mass or lymphadenopathy. Lymphadenopathy:      Cervical: No cervical adenopathy. Skin:     General: Skin is warm. Findings: No rash. Neurological:      Mental Status: He is alert and oriented to person, place, and time. Mental status is at baseline. Cranial Nerves: No cranial nerve deficit. Psychiatric:         Mood and Affect: Mood normal.          Diagnostics:      No results found for this or any previous visit (from the past 96 hour(s)). Imaging:  Results for orders placed during the hospital encounter of 04/19/19   IR ESTAB PT LEVEL I    Narrative This procedure was performed in is entirety by a physician assistant. : Nina Humphrey PA-C    Outpatient: Mediport removal incision check    CPT code: 59377    Attending physician: Dr. Sean Simeon    History: Status post Mediport removal    Exam: Patient denies fever, chills, nausea, vomiting or pain at the site. Patient denies drainage, swelling, bleeding or tenderness. Site well approximated and healing well with Dermabond still covering the  incision. No drainage, swelling, erythema or tenderness at the site. Impression Impression: Mediport removal site healing well. Advised if have any questions or  concerns or start have signs of infection to contact interventional radiology       Results for orders placed during the hospital encounter of 07/27/16   XR CHEST SNGL V    Narrative AP CHEST, PORTABLE    INDICATION: Dyspnea    COMPARISON: Prior chest x-rays, most recent 1/22/2016    FINDINGS:  EKG leads overlie the patient.  Pacemaker/ICD device is in grossly  stable position. Mediport catheter terminates at the SVC. Stable mild cardiomegaly  Perhaps very mild central pulmonary venous  hypertension. There is trace blunting of bilateral costophrenic sulci. Mild  haziness at the retrocardiac left lung base. No pneumothorax. Lungs are  hypoinflated. No acute osseous abnormalities are identified. Impression Impression:      1. Stable mild cardiomegaly. Perhaps mild central pulmonary venous hypertension,  appearance may be exaggerated due to hypoinflation. 2. Question of trace bilateral pleural effusions. Haziness at the left lung base  may represent overlying atelectasis. Results for orders placed during the hospital encounter of 08/21/18   CT CHEST ABD PELV W CONT    Narrative EXAM:  CT CHEST ABD PELV W CONT    INDICATION: C76.51  malignant neoplasm of the right lower extremity, soft tissue  sarcoma of the right thigh    COMPARISON: CT April 12, 2018    CONTRAST:  100 mL of Isovue-370. TECHNIQUE:   Following the uneventful intravenous administration of contrast, thin axial  images were obtained through the chest, abdomen and pelvis. Coronal and sagittal  reconstructions were generated. Oral contrast was not administered. CT dose reduction was achieved through use of a standardized protocol tailored  for this examination and automatic exposure control for dose modulation. FINDINGS:     THYROID: No nodule. MEDIASTINUM: No mass or lymphadenopathy. RAZA: No mass or lymphadenopathy. THORACIC AORTA: Mild stable fusiform enlargement of the ascending aorta with  maximum diameter of 4.5 cm. Proximal arch for centimeter. Distal arch 3.3 cm. Descending aorta 2.9 cm. MAIN PULMONARY ARTERY: Normal in caliber. TRACHEA/BRONCHI: Patent. ESOPHAGUS: No wall thickening or dilatation. HEART: Normal in size. PLEURA: No effusion or pneumothorax. LUNGS: No nodule, mass, or airspace disease.   Upper right chest wall MediPort jugular approach terminates in the distal  superior vena cava. Left upper chest cardiac pacer lead in stable position. LIVER: No mass or biliary dilatation. GALLBLADDER: Unremarkable. SPLEEN: No mass. PANCREAS: No mass or ductal dilatation. ADRENALS: Unremarkable. KIDNEYS:   Bilateral renal upper pole and mid pole cortical scarring without change. No  renal calculus. No hydronephrosis or hydroureter. STOMACH: Unremarkable. SMALL BOWEL: No dilatation or wall thickening. COLON: No dilatation or wall thickening. APPENDIX: Unremarkable. PERITONEUM: No ascites or pneumoperitoneum. RETROPERITONEUM: Several stable minimally prominent retroperitoneal nodes. Largest to the left of the aorta at the renal hilum measuring 1.4 x 1 cm  unchanged. Image 103 series 2. Enlarged right external iliac common femoral artery junction node measures 2.2 x  1.5 cm not significantly changed, axial image 144 series 2. Left-sided node at  the same location measures 1.1 x 0.9 cm without change. Image 147 series 2. Stable oval hypodense mass in the right internal inguinal region measuring 3.1 x  1 cm unchanged. Common appearance of postsurgical hernia repair. REPRODUCTIVE ORGANS:  URINARY BLADDER: No mass or calculus. BONES: Small focal sclerosis T10 unchanged. No lytic lesion demonstrated. .    ADDITIONAL COMMENTS: Within the superior medial right upper thigh incompletely  included on the exam is a soft tissue density mass measuring 6.6 x 5 cm, image  168 series 2. Seen at the edge of the film coronal image 34 series 109. Central  diminished density. This region was not included on the more recent CT from  4/12/2018 nor 11/23/2015. It is new however compared to December 20, 2011      Impression IMPRESSION:    Only the superior border of a large soft tissue density mass in the superior  medial right thigh has been included on this exam. Tumor recurrence?   Unfortunately I have no prior examinations which actually demonstrate the  location of the known soft tissue sarcoma. As clinically indicated MR of the right lower extremity may be of benefit for  additional evaluation. Stable right external iliac common femoral junction lymph node unchanged. Additional smaller stable retroperitoneal nodes. Bilateral renal cortical scarring. Stable mild fusiform aneurysmal dilatation of the ascending aorta and aortic  arch. Assessment:     1. Soft tissue sarcoma of lower extremity, unspecified laterality ([address]    2. Iron deficiency anemia secondary to inadequate dietary iron intake    3. Monoclonal gammopathy    4. Thrombocytopenia ([address]    5. Cancer associated pain      Plan:   # Soft tissue sarcoma in the right lower extremity:   -- Please refer to the Oncology History for details. --  At his previous clinic visit he was inform of the findings on the most recent imaging study which showed no evidence of disease recurrence or progression. -- Clinically there is no evidence of disease recurrence. There is no lymphadenopathy and no evidence of mass-effect on the right medial thigh proximally. -- We will continue to monitor clinically. The patient was advised to notify us if any new skin lumps or bumps, new mass, new bone pain, or any concerns. # History of Monoclonal gammopathy of undetermined significance:   -- Since 7/8/2019 the SPEP showed no evidence of a residual monoclonal paraprotein level. -- We will continue to monitor this at 6-month intervals. # History of Iron deficiency anemia:   -- Last CBC showed stable H/H. His iron profile, ferritin level were reviewed. -- We will check CBC today. # Mild thrombocytopenia:   -- He has had very mild thrombocytopenia which it did not require systemic therapy. He previously received some radiation therapy to the right leg which is probably contributing to the mild thrombocytopenia.   In any event, therapeutic intervention will only be recommended if the platelet counts declines below 30,000.   -- Monitoring CBC    # Cancer associated pain, resolved:  -- Currently the patient reports that he is not having any pain or discomfort. # Afib on Coumadin  -- Has follows up with Cardiology        -- We will see the patient back in clinic in about 4 monhts. Always sooner if required. The patient can have lab done prior our next clinic visit. Orders Placed This Encounter    CBC WITH AUTOMATED DIFF     Standing Status:   Future     Standing Expiration Date:   5/5/3773    METABOLIC PANEL, COMPREHENSIVE     Standing Status:   Future     Standing Expiration Date:   6/9/2021    SPEP     Standing Status:   Future     Standing Expiration Date:   6/9/2021    FREE LIGHT CHAINS, KAPPA/LAMBDA, QT     Standing Status:   Future     Standing Expiration Date:   6/9/2021           Mr. Urban Benavidez has a reminder for a \"due or due soon\" health maintenance. I have asked that he contact his primary care provider for follow-up on this health maintenance. All of patient's questions answered to their apparent satisfaction. They verbally show understanding and agreement with aforementioned plan. Mary Beth Lea MD  6/8/2020          About 25 minutes were spent for this encounter with more than 50% of the time spent in face-to-face counseling, discussing on diagnosis and management plan going forward, and co-ordination of care. Parts of this document has been produced using Dragon dictation system. Unrecognized errors in transcription may be present. Please do not hesitate to reach out for any questions or clarifications.       CC: Jc Robison MD

## 2020-06-08 ENCOUNTER — HOSPITAL ENCOUNTER (OUTPATIENT)
Dept: LAB | Age: 75
Discharge: HOME | End: 2020-06-08
Payer: MEDICARE

## 2020-06-08 ENCOUNTER — OFFICE VISIT (OUTPATIENT)
Dept: ONCOLOGY | Age: 75
End: 2020-06-08

## 2020-06-08 VITALS
WEIGHT: 213.4 LBS | HEART RATE: 79 BPM | RESPIRATION RATE: 16 BRPM | DIASTOLIC BLOOD PRESSURE: 79 MMHG | BODY MASS INDEX: 28.15 KG/M2 | OXYGEN SATURATION: 98 % | TEMPERATURE: 98 F | SYSTOLIC BLOOD PRESSURE: 134 MMHG

## 2020-06-08 DIAGNOSIS — C49.20: ICD-10-CM

## 2020-06-08 DIAGNOSIS — C49.20: Primary | ICD-10-CM

## 2020-06-08 DIAGNOSIS — G89.3 CANCER ASSOCIATED PAIN: ICD-10-CM

## 2020-06-08 DIAGNOSIS — D69.6 THROMBOCYTOPENIA (HCC): ICD-10-CM

## 2020-06-08 DIAGNOSIS — D50.8 IRON DEFICIENCY ANEMIA SECONDARY TO INADEQUATE DIETARY IRON INTAKE: ICD-10-CM

## 2020-06-08 DIAGNOSIS — D47.2 MONOCLONAL GAMMOPATHY: ICD-10-CM

## 2020-06-08 LAB
ALBUMIN SERPL-MCNC: 3.9 G/DL (ref 3.4–5)
ALBUMIN/GLOB SERPL: 1 {RATIO} (ref 0.8–1.7)
ALP SERPL-CCNC: 112 U/L (ref 45–117)
ALT SERPL-CCNC: 34 U/L (ref 16–61)
ANION GAP SERPL CALC-SCNC: 6 MMOL/L (ref 3–18)
AST SERPL-CCNC: 34 U/L (ref 10–38)
BASOPHILS # BLD: 0 K/UL (ref 0–0.1)
BASOPHILS NFR BLD: 0 % (ref 0–2)
BILIRUB SERPL-MCNC: 0.7 MG/DL (ref 0.2–1)
BUN SERPL-MCNC: 49 MG/DL (ref 7–18)
BUN/CREAT SERPL: 27 (ref 12–20)
CALCIUM SERPL-MCNC: 9.3 MG/DL (ref 8.5–10.1)
CHLORIDE SERPL-SCNC: 101 MMOL/L (ref 100–111)
CO2 SERPL-SCNC: 29 MMOL/L (ref 21–32)
CREAT SERPL-MCNC: 1.83 MG/DL (ref 0.6–1.3)
DIFFERENTIAL METHOD BLD: ABNORMAL
EOSINOPHIL # BLD: 0.1 K/UL (ref 0–0.4)
EOSINOPHIL NFR BLD: 1 % (ref 0–5)
ERYTHROCYTE [DISTWIDTH] IN BLOOD BY AUTOMATED COUNT: 17.6 % (ref 11.6–14.5)
GLOBULIN SER CALC-MCNC: 4.1 G/DL (ref 2–4)
GLUCOSE SERPL-MCNC: 361 MG/DL (ref 74–99)
HCT VFR BLD AUTO: 36.3 % (ref 36–48)
HGB BLD-MCNC: 12.2 G/DL (ref 13–16)
LYMPHOCYTES # BLD: 1.5 K/UL (ref 0.9–3.6)
LYMPHOCYTES NFR BLD: 18 % (ref 21–52)
MCH RBC QN AUTO: 32 PG (ref 24–34)
MCHC RBC AUTO-ENTMCNC: 33.6 G/DL (ref 31–37)
MCV RBC AUTO: 95.3 FL (ref 74–97)
MONOCYTES # BLD: 0.9 K/UL (ref 0.05–1.2)
MONOCYTES NFR BLD: 11 % (ref 3–10)
NEUTS SEG # BLD: 6 K/UL (ref 1.8–8)
NEUTS SEG NFR BLD: 70 % (ref 40–73)
PLATELET # BLD AUTO: 170 K/UL (ref 135–420)
PMV BLD AUTO: 11.8 FL (ref 9.2–11.8)
POTASSIUM SERPL-SCNC: 4.8 MMOL/L (ref 3.5–5.5)
PROT SERPL-MCNC: 8 G/DL (ref 6.4–8.2)
RBC # BLD AUTO: 3.81 M/UL (ref 4.7–5.5)
SODIUM SERPL-SCNC: 136 MMOL/L (ref 136–145)
WBC # BLD AUTO: 8.6 K/UL (ref 4.6–13.2)

## 2020-06-08 PROCEDURE — 84165 PROTEIN E-PHORESIS SERUM: CPT

## 2020-06-08 PROCEDURE — 83883 ASSAY NEPHELOMETRY NOT SPEC: CPT

## 2020-06-08 PROCEDURE — 80053 COMPREHEN METABOLIC PANEL: CPT

## 2020-06-08 PROCEDURE — 85025 COMPLETE CBC W/AUTO DIFF WBC: CPT

## 2020-06-08 PROCEDURE — 36415 COLL VENOUS BLD VENIPUNCTURE: CPT

## 2020-06-09 LAB
ALBUMIN SERPL ELPH-MCNC: 3.5 G/DL (ref 2.9–4.4)
ALBUMIN/GLOB SERPL: 1 {RATIO} (ref 0.7–1.7)
ALPHA1 GLOB SERPL ELPH-MCNC: 0.2 G/DL (ref 0–0.4)
ALPHA2 GLOB SERPL ELPH-MCNC: 0.9 G/DL (ref 0.4–1)
B-GLOBULIN SERPL ELPH-MCNC: 1 G/DL (ref 0.7–1.3)
GAMMA GLOB SERPL ELPH-MCNC: 1.5 G/DL (ref 0.4–1.8)
GLOBULIN SER CALC-MCNC: 3.6 G/DL (ref 2.2–3.9)
KAPPA LC FREE SER-MCNC: 43.5 MG/L (ref 3.3–19.4)
KAPPA LC FREE/LAMBDA FREE SER: 1.46 {RATIO} (ref 0.26–1.65)
LAMBDA LC FREE SERPL-MCNC: 29.7 MG/L (ref 5.7–26.3)
M PROTEIN SERPL ELPH-MCNC: NORMAL G/DL
PROT SERPL-MCNC: 7.1 G/DL (ref 6–8.5)

## 2020-06-12 ENCOUNTER — ANESTHESIA EVENT (OUTPATIENT)
Dept: ENDOSCOPY | Age: 75
End: 2020-06-12
Payer: MEDICARE

## 2020-06-15 ENCOUNTER — HOSPITAL ENCOUNTER (OUTPATIENT)
Age: 75
Setting detail: OUTPATIENT SURGERY
Discharge: HOME | End: 2020-06-15
Attending: INTERNAL MEDICINE | Admitting: INTERNAL MEDICINE
Payer: MEDICARE

## 2020-06-15 ENCOUNTER — ANESTHESIA (OUTPATIENT)
Dept: ENDOSCOPY | Age: 75
End: 2020-06-15
Payer: MEDICARE

## 2020-06-15 VITALS
WEIGHT: 213.4 LBS | OXYGEN SATURATION: 99 % | DIASTOLIC BLOOD PRESSURE: 81 MMHG | HEART RATE: 62 BPM | HEIGHT: 73 IN | BODY MASS INDEX: 28.28 KG/M2 | TEMPERATURE: 98 F | SYSTOLIC BLOOD PRESSURE: 150 MMHG | RESPIRATION RATE: 20 BRPM

## 2020-06-15 LAB
GLUCOSE BLD STRIP.AUTO-MCNC: 183 MG/DL (ref 70–110)
GLUCOSE BLD STRIP.AUTO-MCNC: 219 MG/DL (ref 70–110)
INR BLD: 1.3 (ref 0.9–1.2)

## 2020-06-15 PROCEDURE — 77030029384 HC SNR POLYP CAPTVR II BSC -B: Performed by: INTERNAL MEDICINE

## 2020-06-15 PROCEDURE — 74011000250 HC RX REV CODE- 250: Performed by: NURSE ANESTHETIST, CERTIFIED REGISTERED

## 2020-06-15 PROCEDURE — 74011250636 HC RX REV CODE- 250/636: Performed by: NURSE ANESTHETIST, CERTIFIED REGISTERED

## 2020-06-15 PROCEDURE — 76040000019: Performed by: INTERNAL MEDICINE

## 2020-06-15 PROCEDURE — 74011636637 HC RX REV CODE- 636/637: Performed by: NURSE ANESTHETIST, CERTIFIED REGISTERED

## 2020-06-15 PROCEDURE — 82962 GLUCOSE BLOOD TEST: CPT

## 2020-06-15 PROCEDURE — 77030013992 HC SNR POLYP ENDOSC BSC -B: Performed by: INTERNAL MEDICINE

## 2020-06-15 PROCEDURE — 76060000031 HC ANESTHESIA FIRST 0.5 HR: Performed by: INTERNAL MEDICINE

## 2020-06-15 PROCEDURE — 74011250637 HC RX REV CODE- 250/637: Performed by: NURSE ANESTHETIST, CERTIFIED REGISTERED

## 2020-06-15 PROCEDURE — 77030008565 HC TBNG SUC IRR ERBE -B: Performed by: INTERNAL MEDICINE

## 2020-06-15 PROCEDURE — 77030021593 HC FCPS BIOP ENDOSC BSC -A: Performed by: INTERNAL MEDICINE

## 2020-06-15 PROCEDURE — 88305 TISSUE EXAM BY PATHOLOGIST: CPT

## 2020-06-15 PROCEDURE — 85610 PROTHROMBIN TIME: CPT

## 2020-06-15 PROCEDURE — 74011250637 HC RX REV CODE- 250/637: Performed by: INTERNAL MEDICINE

## 2020-06-15 PROCEDURE — 77030018846 HC SOL IRR STRL H20 ICUM -A: Performed by: INTERNAL MEDICINE

## 2020-06-15 RX ORDER — FAMOTIDINE 20 MG/1
20 TABLET, FILM COATED ORAL ONCE
Status: COMPLETED | OUTPATIENT
Start: 2020-06-15 | End: 2020-06-15

## 2020-06-15 RX ORDER — DEXTROMETHORPHAN/PSEUDOEPHED 2.5-7.5/.8
DROPS ORAL AS NEEDED
Status: DISCONTINUED | OUTPATIENT
Start: 2020-06-15 | End: 2020-06-15 | Stop reason: HOSPADM

## 2020-06-15 RX ORDER — SODIUM CHLORIDE, SODIUM LACTATE, POTASSIUM CHLORIDE, CALCIUM CHLORIDE 600; 310; 30; 20 MG/100ML; MG/100ML; MG/100ML; MG/100ML
50 INJECTION, SOLUTION INTRAVENOUS CONTINUOUS
Status: DISCONTINUED | OUTPATIENT
Start: 2020-06-15 | End: 2020-06-15 | Stop reason: HOSPADM

## 2020-06-15 RX ORDER — LIDOCAINE HYDROCHLORIDE 20 MG/ML
INJECTION, SOLUTION EPIDURAL; INFILTRATION; INTRACAUDAL; PERINEURAL AS NEEDED
Status: DISCONTINUED | OUTPATIENT
Start: 2020-06-15 | End: 2020-06-15 | Stop reason: HOSPADM

## 2020-06-15 RX ORDER — SODIUM CHLORIDE 0.9 % (FLUSH) 0.9 %
5-40 SYRINGE (ML) INJECTION EVERY 8 HOURS
Status: CANCELLED | OUTPATIENT
Start: 2020-06-15

## 2020-06-15 RX ORDER — SODIUM CHLORIDE, SODIUM LACTATE, POTASSIUM CHLORIDE, CALCIUM CHLORIDE 600; 310; 30; 20 MG/100ML; MG/100ML; MG/100ML; MG/100ML
50 INJECTION, SOLUTION INTRAVENOUS CONTINUOUS
Status: CANCELLED | OUTPATIENT
Start: 2020-06-15

## 2020-06-15 RX ORDER — INSULIN LISPRO 100 [IU]/ML
INJECTION, SOLUTION INTRAVENOUS; SUBCUTANEOUS ONCE
Status: COMPLETED | OUTPATIENT
Start: 2020-06-15 | End: 2020-06-15

## 2020-06-15 RX ORDER — PROPOFOL 10 MG/ML
INJECTION, EMULSION INTRAVENOUS AS NEEDED
Status: DISCONTINUED | OUTPATIENT
Start: 2020-06-15 | End: 2020-06-15 | Stop reason: HOSPADM

## 2020-06-15 RX ORDER — SODIUM CHLORIDE 0.9 % (FLUSH) 0.9 %
5-40 SYRINGE (ML) INJECTION AS NEEDED
Status: CANCELLED | OUTPATIENT
Start: 2020-06-15

## 2020-06-15 RX ADMIN — SODIUM CHLORIDE, SODIUM LACTATE, POTASSIUM CHLORIDE, AND CALCIUM CHLORIDE 50 ML/HR: 600; 310; 30; 20 INJECTION, SOLUTION INTRAVENOUS at 10:17

## 2020-06-15 RX ADMIN — PROPOFOL 60 MG: 10 INJECTION, EMULSION INTRAVENOUS at 10:37

## 2020-06-15 RX ADMIN — PROPOFOL 30 MG: 10 INJECTION, EMULSION INTRAVENOUS at 10:46

## 2020-06-15 RX ADMIN — PROPOFOL 100 MG: 10 INJECTION, EMULSION INTRAVENOUS at 10:36

## 2020-06-15 RX ADMIN — PROPOFOL 20 MG: 10 INJECTION, EMULSION INTRAVENOUS at 10:49

## 2020-06-15 RX ADMIN — FAMOTIDINE 20 MG: 20 TABLET ORAL at 10:05

## 2020-06-15 RX ADMIN — PROPOFOL 20 MG: 10 INJECTION, EMULSION INTRAVENOUS at 10:42

## 2020-06-15 RX ADMIN — LIDOCAINE HYDROCHLORIDE 100 MG: 20 INJECTION, SOLUTION EPIDURAL; INFILTRATION; INTRACAUDAL; PERINEURAL at 10:37

## 2020-06-15 RX ADMIN — PROPOFOL 40 MG: 10 INJECTION, EMULSION INTRAVENOUS at 10:40

## 2020-06-15 RX ADMIN — INSULIN LISPRO 6 UNITS: 100 INJECTION, SOLUTION INTRAVENOUS; SUBCUTANEOUS at 10:25

## 2020-06-15 NOTE — H&P
Chief Complaint: Personal history of polyps. History of present illness: No complaints. 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 LVEDP 28.  EF 35%. Global hyk. Mod MR.  Cardiac echocardiogram 09/29/2016    LVE. EF 15% at most.  Indeterminate LV diastolic fx.  RVE. Reduced RV systolic fx. RVSP 80-85 mmHg. LAE.  LUISA. Mod-severe MR. Mild AI. Severe TR.  Cardiac MUGA scan 11/02/2015    At most mild LVE. EF 45-46%.  Cardiac nuclear imaging test, abnormal 02/03/2009    Mild basal/mid inferior, inferoapical, inferoseptal infarct w/mild ischemia in RCA (possibly partially artifact). Anterior, anteroseptal, anterapical ischemia w/o infarct. (Mid & distal LAD.)  LVE. EF 29%. Mod global hyk.  Cardiovascular LE venous duplex 09/29/2016    Tech difficult. No DVT bilaterally.  Cardiovascular renal duplex 06/07/2010    No evidence of renal artery stenosis >60%. Patent SMA and celiac artery.  Cardiovascular UE venous duplex 10/04/2016    No DVT bilaterally.     CHF (congestive heart failure) (HCC)     Diabetes ([facility] [address]     HTN (hypertension) 6/8/2010    Hypertensive cardiovascular disease     MGUS (monoclonal gammopathy of unknown significance)     MR (mitral regurgitation)     Nonischemic cardiomyopathy     3/11 echo EF 25%    Pulmonary hypertension, moderate to severe ([address] 6/8/2010    90-100mmHg; repeat echo in 3/11 showed 70mmHg    Thrombocytopenia (HCC)      Allergies: No Known Allergies  Medications:   Current Facility-Administered Medications:     lactated Ringers infusion, 50 mL/hr, IntraVENous, CONTINUOUS, Carlo Avery CRNA    insulin lispro (HUMALOG) injection, , SubCUTAneous, ONCE, Carlo Avery CRNA    famotidine (PEPCID) tablet 20 mg, 20 mg, Oral, ONCE, Netta Spencer CRNA  FH:   Family History   Problem Relation Age of Onset    Hypertension Mother      Social:   Social History     Socioeconomic History    Marital status:      Spouse name: Not on file    Number of children: Not on file    Years of education: Not on file    Highest education level: Not on file   Tobacco Use    Smoking status: Never Smoker    Smokeless tobacco: Never Used   Substance and Sexual Activity    Alcohol use: Yes     Comment: occasionally.  Drug use: No    Sexual activity: Yes     Partners: Female     Birth control/protection: None     Surgical H:   Past Surgical History:   Procedure Laterality Date    HX HERNIA REPAIR  2011    IR REMOVE TUNL CVAD W PORT/PUMP  4/19/2019       ROS: negative    Physical Exam:   Visit Vitals  /83   Pulse 78   Temp 98.2 °F (36.8 °C)   Resp 18   Ht 6' 1\" (1.854 m)   Wt 96.8 kg (213 lb 6.4 oz)   SpO2 97%   BMI 28.15 kg/m²     General appearance: alert, no distress  Eyes: pupils equal and reactive, extraocular eye movements intact  Nodes: No gross adenopathy in neck. Skin: no spider angiomata, jaundice, palmar erythema   Respiratory: clear to auscultation bilaterally  Cardiovascular: regular heart rate, no murmurs, no JVD, normal rate and regular rhythm  Abdomen: soft, non-tender, liver not enlarged, spleen not palpable, no obvious ascites  Extremities: no muscle wasting, no gross arthritic changes  Neurologic: alert and oriented, cranial nerves grossly intact, no asterixis    Labs: No results found for this or any previous visit (from the past 24 hour(s)). Imp/ Plan: Will proceed with colonoscopy as planned. Risk benefits alternative including but not limited to infection, bleeding, perforation of viscous, allergic reaction and resultant morbidity and mortality was discussed. Chance of missing a significant lesion due to various reasons were discussed.       Evangelina Abbasi MD  [facility], [place] [place]

## 2020-06-15 NOTE — PERIOP NOTES
Due to COVID 19 no visitors allowed into hospital. Discharge instructions reviewed with patient at bedside and with wife over the phone. Understanding verbalized. Written discharge instructions provided.

## 2020-06-15 NOTE — DISCHARGE INSTRUCTIONS
DISCHARGE SUMMARY from Nurse    PATIENT INSTRUCTIONS:    After general anesthesia or intravenous sedation, for 24 hours or while taking prescription Narcotics:  · Limit your activities  · Do not drive and operate hazardous machinery  · Do not make important personal or business decisions  · Do  not drink alcoholic beverages  · If you have not urinated within 8 hours after discharge, please contact your surgeon on call. Report the following to your surgeon:  · Excessive pain, swelling, redness or odor of or around the surgical area  · Temperature over 100.5  · Nausea and vomiting lasting longer than 4 hours or if unable to take medications  · Any signs of decreased circulation or nerve impairment to extremity: change in color, persistent  numbness, tingling, coldness or increase pain  · Any questions    What to do at Home:  Recommended activity: Activity as tolerated and no driving for today    *  Please give a list of your current medications to your Primary Care Provider. *  Please update this list whenever your medications are discontinued, doses are      changed, or new medications (including over-the-counter products) are added. *  Please carry medication information at all times in case of emergency situations. See included discharge instructions from provider. These are general instructions for a healthy lifestyle:    No smoking/ No tobacco products/ Avoid exposure to second hand smoke  Surgeon General's Warning:  Quitting smoking now greatly reduces serious risk to your health.     Obesity, smoking, and sedentary lifestyle greatly increases your risk for illness    A healthy diet, regular physical exercise & weight monitoring are important for maintaining a healthy lifestyle    You may be retaining fluid if you have a history of heart failure or if you experience any of the following symptoms:  Weight gain of 3 pounds or more overnight or 5 pounds in a week, increased swelling in our hands or feet or shortness of breath while lying flat in bed. Please call your doctor as soon as you notice any of these symptoms; do not wait until your next office visit. The discharge information has been reviewed with the patient. The patient verbalized understanding. Discharge medications reviewed with the patient and appropriate educational materials and side effects teaching were provided.     ___________________________________________________________________________________________________________________________________

## 2020-06-15 NOTE — ANESTHESIA POSTPROCEDURE EVALUATION
Procedure(s):  COLONOSCOPY polypectomies. MAC    Anesthesia Post Evaluation      Multimodal analgesia: multimodal analgesia used between 6 hours prior to anesthesia start to PACU discharge  Patient location during evaluation: bedside  Patient participation: complete - patient participated  Level of consciousness: awake  Pain management: adequate  Airway patency: patent  Anesthetic complications: no  Cardiovascular status: stable  Respiratory status: acceptable  Hydration status: acceptable  Post anesthesia nausea and vomiting:  controlled      INITIAL Post-op Vital signs:   Vitals Value Taken Time   /73 6/15/2020 11:08 AM   Temp 36.4 °C (97.6 °F) 6/15/2020 10:59 AM   Pulse 63 6/15/2020 11:12 AM   Resp 19 6/15/2020 11:12 AM   SpO2 100 % 6/15/2020 11:12 AM   Vitals shown include unvalidated device data.

## 2020-06-15 NOTE — PROCEDURES
[place]  [facility], [address]      Brief Procedure Note    Maya [place]  1945  [id number]    Date of Procedure: 6/15/2020    Preoperative diagnosis: Personal history of tubular adenoma (cecum):   V13.89 - Z87.898  Long term (current) use of anticoagulants:   V58.61 - Z79.01  Cardiac device insitu:   Z97.8  Diabetes mellitus type 2:   250.00 - E11.9  Body mass index 25-29, overweight:   278.02 - E66.3    Postoperative diagnosis:  diverticulosis, cecal polyps x 3, ascending polyp, transverse polyp,  descending polyp, hemorrhoids    Type of Anesthesia: MAC (monitered anesthesia care)    Description of Findings: same as post op dx    Procedure: Procedure(s):  COLONOSCOPY polypectomies    :  Dr. Dana Renae MD    Assistant(s): [unfilled]    Type of Anesthesia:MAC     EBL:None, no implants.     Specimens:   ID Type Source Tests Collected by Time Destination   1 : cecal polyps Preservative Colon  Bahman Ponce MD 6/15/2020 1040 Pathology   2 : Ascending polyp Preservative Colon  Bahman Ponce MD 6/15/2020 1044 Pathology   3 : Transverse polyp Preservative Colon  Bahman Ponce MD 6/15/2020 1046 Pathology   4 :  descending polyp Preservative Colon  Bahman Ponce MD 6/15/2020 1048 Pathology       Findings: See printed and scanned procedure note    Complications: None    Dr. Dana Renae MD  6/15/2020  11:22 AM

## 2020-06-15 NOTE — ANESTHESIA PREPROCEDURE EVALUATION
Anesthetic History   No history of anesthetic complications            Review of Systems / Medical History  Patient summary reviewed and pertinent labs reviewed    Pulmonary          Shortness of breath         Neuro/Psych              Cardiovascular    Hypertension: well controlled  Valvular problems/murmurs: tricuspid insufficiency and mitral insufficiency    CHF: dyspnea on exertion  Dysrhythmias : atrial fibrillation  Pacemaker, past MI and CAD    Exercise tolerance: <4 METS: Moderate to severe pulmonary HTN  Cardiomyopathy EF 25%  Comments: EF=25-46%   GI/Hepatic/Renal                Endo/Other        Cancer     Other Findings   Comments: Documentation of current medication  Current medications obtained, documented and obtained? YES      Risk Factors for Postoperative nausea/vomiting:       History of postoperative nausea/vomiting? NO       Female? NO       Motion sickness? NO       Intended opioid administration for postoperative analgesia? YES      Smoking Abstinence:  Current Smoker? NO  Elective Surgery? NO  Seen preoperatively by anesthesiologist or proxy prior to day of surgery? YES  Pt abstained from smoking 24 hours prior to anesthesia?  N/A    Preventive care/screening for High Blood Pressure:  Aged 18 years and older: YES  Screened for high blood pressure: YES  Patients with high blood pressure referred to primary care provider   for BP management: YES                 Physical Exam    Airway  Mallampati: II  TM Distance: 4 - 6 cm  Neck ROM: normal range of motion   Mouth opening: Normal     Cardiovascular    Rhythm: irregular  Rate: normal         Dental    Dentition: Full upper dentures and Full lower dentures     Pulmonary  Breath sounds clear to auscultation               Abdominal  GI exam deferred       Other Findings            Anesthetic Plan    ASA: 4  Anesthesia type: MAC          Induction: Intravenous  Anesthetic plan and risks discussed with: Patient

## 2020-06-30 ENCOUNTER — ANTI-COAG VISIT (OUTPATIENT)
Dept: CARDIOLOGY CLINIC | Age: 75
End: 2020-06-30

## 2020-06-30 ENCOUNTER — CLINICAL SUPPORT (OUTPATIENT)
Dept: CARDIOLOGY CLINIC | Age: 75
End: 2020-06-30

## 2020-06-30 ENCOUNTER — OFFICE VISIT (OUTPATIENT)
Dept: CARDIOLOGY CLINIC | Age: 75
End: 2020-06-30

## 2020-06-30 VITALS
BODY MASS INDEX: 28.36 KG/M2 | SYSTOLIC BLOOD PRESSURE: 140 MMHG | WEIGHT: 214 LBS | DIASTOLIC BLOOD PRESSURE: 80 MMHG | HEART RATE: 73 BPM | HEIGHT: 73 IN | OXYGEN SATURATION: 98 %

## 2020-06-30 DIAGNOSIS — Z95.810 AICD (AUTOMATIC CARDIOVERTER/DEFIBRILLATOR) PRESENT: ICD-10-CM

## 2020-06-30 DIAGNOSIS — I42.8 CARDIOMYOPATHY, NONISCHEMIC (HCC): ICD-10-CM

## 2020-06-30 DIAGNOSIS — I48.91 ATRIAL FIBRILLATION, UNSPECIFIED TYPE (HCC): ICD-10-CM

## 2020-06-30 DIAGNOSIS — I48.91 ATRIAL FIBRILLATION, UNSPECIFIED TYPE (HCC): Primary | ICD-10-CM

## 2020-06-30 DIAGNOSIS — Z79.01 LONG TERM CURRENT USE OF ANTICOAGULANT THERAPY: Primary | ICD-10-CM

## 2020-06-30 DIAGNOSIS — I42.8 CARDIOMYOPATHY, NONISCHEMIC (HCC): Primary | ICD-10-CM

## 2020-06-30 LAB
INR BLD: 2.9
PT POC: 35.1 SECONDS
VALID INTERNAL CONTROL?: YES

## 2020-06-30 NOTE — PROGRESS NOTES
Damion Bates presents today for   Chief Complaint   Patient presents with    Cardiomyopathy     OVERDUE 6 MONTH - no cardiac complaints       Damion Bates preferred language for health care discussion is english/other. Is someone accompanying this pt? no    Is the patient using any DME equipment during [address]? yes    Depression Screening:  3 most recent PHQ Screens 6/8/2020   Little interest or pleasure in doing things Not at all   Feeling down, depressed, irritable, or hopeless Not at all   Total Score PHQ 2 0       Learning Assessment:  Learning Assessment 4/2/2019   PRIMARY LEARNER Patient   HIGHEST LEVEL OF EDUCATION - PRIMARY LEARNER  -   [address] [place] [address]    NEED -   LEARNER PREFERENCE PRIMARY DEMONSTRATION   LEARNING SPECIAL TOPICS -   ANSWERED BY patient   RELATIONSHIP SELF       Abuse Screening:  Abuse Screening Questionnaire 4/2/2019   Do you ever feel afraid of your partner? N   Are you in a relationship with someone who physically or mentally threatens you? N   Is it safe for you to go home? Y       Fall Risk  Fall Risk Assessment, last 12 mths 6/8/2020   Able to walk? Yes   Fall in past 12 months? No       Pt currently taking Anticoagulant therapy? yes    Coordination of Care:  1. Have you been to the ER, urgent care clinic since your last visit? Hospitalized since your last visit? no    2. Have you seen or consulted any other health care providers outside of the [address] [place] [address] since your last visit? Include any pap smears or colon screening.  no

## 2020-06-30 NOTE — PROGRESS NOTES
Verbal order and read back per FRANCISCO Granger  The INR is stable and therapeutic. Continue same dose of coumadin and recheck in 2 weeks  Continue Coumadin to 2.5 mg daily except 3.75 mg on Mon. Recheck in 2 weeks. Patient made aware of dosing and recheck instructions, no questions.

## 2020-06-30 NOTE — PROGRESS NOTES
HISTORY OF PRESENT ILLNESS  Lucita Morales is a 69 y.o. male. ASSESSMENT and PLAN    Mr. Yasmine Villareal has history of nonischemic cardiomyopathy as well as severe pulmonary hypertension with moderate mitral regurgitation. Because of severe LV dysfunction, he has AICD in place for primary prevention of sudden cardiac death. He also has persistent atrial fibrillation. 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 His understanding is that the edges were clear. He had lost significant amount of weight, over 30 pounds. He had weighed 193 pounds back in October 2015. Eddie Hart is weight is now back to baseline at 194 pounds.    His last MUGA scan in November of 2015 showed ejection fraction of 45%. His echocardiogram in September of 2015 showed ejection fraction of 45-50% with pulmonary hypertension with PA pressure of 45 mmHg. He had AICD generator change in May 2018. · CAD:    He has no documented history of CAD. · CAF: He is 100% ventricular paced with his AICD. · Nonischemic cardiomyopathy: He is doing well without evidence of decompensated CHF. · BP:   Well controlled. · HR:    Stable. · CHF:    There is no evidence of decompensated CHF noted. · Weight:    His weight today is 214 pounds. On his last visit, October 2019, he only weighed 191 pounds. Since then, he has been able to increase his oral caloric intake and has gained back much of the weight that he had lost.  · Cholesterol:   Target LDL <110. · Anti-platelet:   Remains on Coumadin for his atrial fibrillation. I will see him back in 6 months. Thank you. Encounter Diagnoses   Name Primary?     Atrial fibrillation, unspecified type ([address] Yes    Cardiomyopathy, nonischemic ([address]      current treatment plan is effective, no change in therapy  lab results and schedule of future lab studies reviewed with patient  reviewed diet, exercise and weight control  use of aspirin to prevent MI and TIA's discussed      HPI   Today, Mr. Norma Coley has no complaints of chest pains, increased shortness of breath or decreased exercise capacity. With increased oral intake, he is able to gain some of the weight that he had lost.  He is in good spirits. He denies any orthopnea, PND, palpitations, or dizziness. Review of Systems   Respiratory: Negative for shortness of breath. Cardiovascular: Negative for chest pain, palpitations, orthopnea, claudication, leg swelling and PND. All other systems reviewed and are negative. Physical Exam  Vitals signs and nursing note reviewed. Constitutional:       Appearance: Normal appearance. HENT:      Head: Normocephalic and atraumatic. Eyes:      Extraocular Movements: Extraocular movements intact. Conjunctiva/sclera: Conjunctivae normal.   Neck:      Musculoskeletal: No neck rigidity. Cardiovascular:      Rate and Rhythm: Normal rate and regular rhythm. Pulmonary:      Breath sounds: Normal breath sounds. Abdominal:      General: Bowel sounds are normal.      Palpations: Abdomen is soft. Musculoskeletal:         General: No swelling. Skin:     General: Skin is warm and dry. Neurological:      General: No focal deficit present. Mental Status: He is alert and oriented to person, place, and time. Psychiatric:         Mood and Affect: Mood normal.         Behavior: Behavior normal.         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 LVEDP 28.  EF 35%. Global hyk. Mod MR.  Cardiac echocardiogram 09/29/2016    LVE. EF 15% at most.  Indeterminate LV diastolic fx.  RVE. Reduced RV systolic fx. RVSP 80-85 mmHg. LAE.  LUISA. Mod-severe MR. Mild AI. Severe TR.       Cardiac MUGA scan 11/02/2015    At most mild LVE. EF 45-46%.  Cardiac nuclear imaging test, abnormal 02/03/2009    Mild basal/mid inferior, inferoapical, inferoseptal infarct w/mild ischemia in RCA (possibly partially artifact). Anterior, anteroseptal, anterapical ischemia w/o infarct. (Mid & distal LAD.)  LVE. EF 29%. Mod global hyk.  Cardiovascular LE venous duplex 09/29/2016    Tech difficult. No DVT bilaterally.  Cardiovascular renal duplex 06/07/2010    No evidence of renal artery stenosis >60%. Patent SMA and celiac artery.  Cardiovascular UE venous duplex 10/04/2016    No DVT bilaterally.  CHF (congestive heart failure) (HCC)     Diabetes ([address]     HTN (hypertension) 6/8/2010    Hypertensive cardiovascular disease     MGUS (monoclonal gammopathy of unknown significance)     MR (mitral regurgitation)     Nonischemic cardiomyopathy     3/11 echo EF 25%    Pulmonary hypertension, moderate to severe ([address] 6/8/2010    90-100mmHg; repeat echo in 3/11 showed 70mmHg    Thrombocytopenia ([address]        Past Surgical History:   Procedure Laterality Date    COLONOSCOPY N/A 6/15/2020    COLONOSCOPY polypectomies performed by Jamilah Lugo MD at [address]  2011    IR REMOVE TUNL CVAD W PORT/PUMP  4/19/2019       Current Outpatient Medications   Medication Sig Dispense Refill    digoxin (LANOXIN) 0.125 mg tablet TAKE 1 TABLET BY MOUTH  EVERY DAY 90 Tab 3    lisinopriL (PRINIVIL, ZESTRIL) 20 mg tablet TAKE 1 TABLET BY MOUTH  TWICE A  Tab 3    carvediloL (COREG) 25 mg tablet TAKE 1 TABLET BY MOUTH TWO  TIMES DAILY WITH MEALS 180 Tab 3    bumetanide (BUMEX) 2 mg tablet Take 1 Tab by mouth daily. Or directed 45 Tab 4    spironolactone (ALDACTONE) 25 mg tablet Take  by mouth daily.       cloNIDine HCl (CATAPRES) 0.2 mg tablet TAKE 1 TABLET BY MOUTH 3  TIMES A  Tab 2    warfarin (COUMADIN) 2.5 mg tablet TAKE 1 TABLET BY MOUTH ONCE DAILY OR  AS  DIRECTED  YOUR  DOCTOR 45 Tab 30    atorvastatin (LIPITOR) 20 mg tablet Take 20 mg by mouth daily.  insulin lispro (HUMALOG U-100 INSULIN) 100 unit/mL injection 5 Units by SubCUTAneous route three (3) times daily.  insulin glargine (LANTUS U-100 INSULIN) 100 unit/mL injection 20 Units by SubCUTAneous route nightly.  magnesium oxide (MAG-OX) 400 mg tablet TAKE 1 TABLET BY MOUTH DAILY. 90 Tab 3    sertraline (ZOLOFT) 25 mg tablet Take  by mouth daily.  allopurinol (ZYLOPRIM) 300 mg tablet Take  by mouth daily.  FERROUS SULFATE, DRIED (IRON, DRIED, PO) Take 325 mg by mouth daily.  aspirin delayed-release (ASPIR-81) 81 mg tablet Take 81 mg by mouth daily. The patient has a family history of    Social History     Tobacco Use    Smoking status: Never Smoker    Smokeless tobacco: Never Used   Substance Use Topics    Alcohol use: Yes     Comment: occasionally.  Drug use: No       Lab Results   Component Value Date/Time    Cholesterol, total 85 01/23/2016 05:30 AM    HDL Cholesterol 31 (L) 01/23/2016 05:30 AM    LDL, calculated 34 01/23/2016 05:30 AM    Triglyceride 100 01/23/2016 05:30 AM    CHOL/HDL Ratio 2.7 01/23/2016 05:30 AM        BP Readings from Last 3 Encounters:   06/30/20 140/80   06/15/20 150/81   06/08/20 134/79        Pulse Readings from Last 3 Encounters:   06/30/20 73   06/15/20 62   06/08/20 79       Wt Readings from Last 3 Encounters:   06/30/20 97.1 kg (214 lb)   06/15/20 96.8 kg (213 lb 6.4 oz)   06/08/20 96.8 kg (213 lb 6.4 oz)         EKG: unchanged from previous tracings, atrial fibrillation, rate 73, 100% ventricular paced.

## 2020-07-01 NOTE — PROGRESS NOTES
I have personally seen and evaluated the device findings. Interrogation reviewed and I agree with assessment.     Geovani Johnson

## 2020-07-14 LAB — INR, EXTERNAL: 2.6

## 2020-07-15 ENCOUNTER — TELEPHONE ANTICOAG (OUTPATIENT)
Dept: CARDIOLOGY CLINIC | Age: 75
End: 2020-07-15

## 2020-07-15 NOTE — PROGRESS NOTES
Verbal order and read back per Latha Reid MD     Continue Coumadin to 2.5 mg daily except 3.75 mg on Mon. Recheck in 2 weeks.

## 2020-07-22 RX ORDER — BUMETANIDE 2 MG/1
TABLET ORAL
Qty: 90 TAB | Refills: 4 | Status: SHIPPED | OUTPATIENT
Start: 2020-07-22 | End: 2021-07-28

## 2020-07-23 RX ORDER — CARVEDILOL 25 MG/1
TABLET ORAL
Qty: 180 TAB | Refills: 3 | Status: SHIPPED | OUTPATIENT
Start: 2020-07-23 | End: 2021-05-15

## 2020-08-05 RX ORDER — CLONIDINE HYDROCHLORIDE 0.2 MG/1
TABLET ORAL
Qty: 270 TAB | Refills: 3 | Status: SHIPPED | OUTPATIENT
Start: 2020-08-05 | End: 2021-08-23

## 2020-08-12 LAB — INR, EXTERNAL: 2.5

## 2020-08-13 ENCOUNTER — TELEPHONE ANTICOAG (OUTPATIENT)
Dept: CARDIOLOGY CLINIC | Age: 75
End: 2020-08-13

## 2020-08-13 NOTE — PROGRESS NOTES
Verbal order and read back per Tristan Rooney NP  The INR is stable and therapeutic. Continue same dose of coumadin and recheck in 2 weeks  Continue Coumadin to 2.5 mg daily except 3.75 mg on Mon. Recheck in 2 weeks. Patient made aware of dosing and recheck instructions, no questions.

## 2020-08-26 LAB
INR, EXTERNAL: 1.8 (ref 2–3)
INR, EXTERNAL: 3.1
INR, EXTERNAL: 3.1 (ref 2–3)

## 2020-08-27 ENCOUNTER — TELEPHONE ANTICOAG (OUTPATIENT)
Dept: CARDIOLOGY CLINIC | Age: 75
End: 2020-08-27

## 2020-08-27 DIAGNOSIS — I48.91 ATRIAL FIBRILLATION, UNSPECIFIED TYPE (HCC): ICD-10-CM

## 2020-08-27 DIAGNOSIS — Z79.01 LONG TERM CURRENT USE OF ANTICOAGULANT THERAPY: Primary | ICD-10-CM

## 2020-08-27 NOTE — PROGRESS NOTES
Read back per Gaudencio Bhatti. Patient's INR slightly above  therapeutic range of 2-3. Take 1.25 mg Coumadin today only, then return to normal schedule. Continue Coumadin to 2.5 mg daily except 3.75 mg on Mon. Recheck in 2 weeks. Called the patient to give him new Coumadin instructions. Left him a message to give our office a call.

## 2020-08-28 RX ORDER — SPIRONOLACTONE 25 MG/1
TABLET ORAL
Qty: 90 TAB | Refills: 3 | Status: SHIPPED | OUTPATIENT
Start: 2020-08-28 | End: 2021-09-27

## 2020-08-28 NOTE — PROGRESS NOTES
Patient contacted and made aware of dosing instructions. Will be taking Coumadin 1.25mg today and then continue same. This has been fully explained to the patient, who indicates understanding.

## 2020-09-09 LAB — INR, EXTERNAL: 3

## 2020-09-10 ENCOUNTER — TELEPHONE ANTICOAG (OUTPATIENT)
Dept: CARDIOLOGY CLINIC | Age: 75
End: 2020-09-10

## 2020-09-10 NOTE — PROGRESS NOTES
Verbal order and read back per Yvonne Wolfe NP  INR within therapeutic range  Continue Coumadin to 2.5 mg daily except 3.75 mg on Mon. Recheck in 2 weeks. Patient made aware of dosing and recheck instructions, no questions.

## 2020-09-18 RX ORDER — WARFARIN 2.5 MG/1
TABLET ORAL
Qty: 45 TAB | Refills: 0 | Status: SHIPPED | OUTPATIENT
Start: 2020-09-18 | End: 2020-11-03

## 2020-09-23 ENCOUNTER — TELEPHONE ANTICOAG (OUTPATIENT)
Dept: CARDIOLOGY CLINIC | Age: 75
End: 2020-09-23

## 2020-09-23 LAB — INR, EXTERNAL: 2.7

## 2020-10-05 ENCOUNTER — LAB ONLY (OUTPATIENT)
Dept: ONCOLOGY | Age: 75
End: 2020-10-05

## 2020-10-05 ENCOUNTER — HOSPITAL ENCOUNTER (OUTPATIENT)
Dept: LAB | Age: 75
Discharge: HOME | End: 2020-10-05
Payer: MEDICARE

## 2020-10-05 DIAGNOSIS — D47.2 MONOCLONAL GAMMOPATHY: ICD-10-CM

## 2020-10-05 DIAGNOSIS — D69.6 THROMBOCYTOPENIA (HCC): ICD-10-CM

## 2020-10-05 DIAGNOSIS — C49.20: ICD-10-CM

## 2020-10-05 DIAGNOSIS — C49.20: Primary | ICD-10-CM

## 2020-10-05 LAB
ALBUMIN SERPL-MCNC: 3.9 G/DL (ref 3.4–5)
ALBUMIN/GLOB SERPL: 0.8 {RATIO} (ref 0.8–1.7)
ALP SERPL-CCNC: 104 U/L (ref 45–117)
ALT SERPL-CCNC: 35 U/L (ref 16–61)
ANION GAP SERPL CALC-SCNC: 8 MMOL/L (ref 3–18)
AST SERPL-CCNC: 41 U/L (ref 10–38)
BASOPHILS # BLD: 0 K/UL (ref 0–0.1)
BASOPHILS NFR BLD: 0 % (ref 0–2)
BILIRUB SERPL-MCNC: 0.6 MG/DL (ref 0.2–1)
BUN SERPL-MCNC: 39 MG/DL (ref 7–18)
BUN/CREAT SERPL: 23 (ref 12–20)
CALCIUM SERPL-MCNC: 9.2 MG/DL (ref 8.5–10.1)
CHLORIDE SERPL-SCNC: 99 MMOL/L (ref 100–111)
CO2 SERPL-SCNC: 28 MMOL/L (ref 21–32)
CREAT SERPL-MCNC: 1.66 MG/DL (ref 0.6–1.3)
DIFFERENTIAL METHOD BLD: ABNORMAL
EOSINOPHIL # BLD: 0.1 K/UL (ref 0–0.4)
EOSINOPHIL NFR BLD: 1 % (ref 0–5)
ERYTHROCYTE [DISTWIDTH] IN BLOOD BY AUTOMATED COUNT: 15.4 % (ref 11.6–14.5)
GLOBULIN SER CALC-MCNC: 4.6 G/DL (ref 2–4)
GLUCOSE SERPL-MCNC: 203 MG/DL (ref 74–99)
HCT VFR BLD AUTO: 39.7 % (ref 36–48)
HGB BLD-MCNC: 13 G/DL (ref 13–16)
LYMPHOCYTES # BLD: 1.4 K/UL (ref 0.9–3.6)
LYMPHOCYTES NFR BLD: 19 % (ref 21–52)
MCH RBC QN AUTO: 31.9 PG (ref 24–34)
MCHC RBC AUTO-ENTMCNC: 32.7 G/DL (ref 31–37)
MCV RBC AUTO: 97.5 FL (ref 74–97)
MONOCYTES # BLD: 0.8 K/UL (ref 0.05–1.2)
MONOCYTES NFR BLD: 11 % (ref 3–10)
NEUTS SEG # BLD: 5.2 K/UL (ref 1.8–8)
NEUTS SEG NFR BLD: 69 % (ref 40–73)
PLATELET # BLD AUTO: 159 K/UL (ref 135–420)
PMV BLD AUTO: 12 FL (ref 9.2–11.8)
POTASSIUM SERPL-SCNC: 4.9 MMOL/L (ref 3.5–5.5)
PROT SERPL-MCNC: 8.5 G/DL (ref 6.4–8.2)
RBC # BLD AUTO: 4.07 M/UL (ref 4.7–5.5)
SODIUM SERPL-SCNC: 135 MMOL/L (ref 136–145)
WBC # BLD AUTO: 7.5 K/UL (ref 4.6–13.2)

## 2020-10-05 PROCEDURE — 84165 PROTEIN E-PHORESIS SERUM: CPT

## 2020-10-05 PROCEDURE — 83883 ASSAY NEPHELOMETRY NOT SPEC: CPT

## 2020-10-05 PROCEDURE — 80053 COMPREHEN METABOLIC PANEL: CPT

## 2020-10-05 PROCEDURE — 36415 COLL VENOUS BLD VENIPUNCTURE: CPT

## 2020-10-05 PROCEDURE — 85025 COMPLETE CBC W/AUTO DIFF WBC: CPT

## 2020-10-07 ENCOUNTER — TELEPHONE ANTICOAG (OUTPATIENT)
Dept: CARDIOLOGY CLINIC | Age: 75
End: 2020-10-07

## 2020-10-07 ENCOUNTER — OFFICE VISIT (OUTPATIENT)
Dept: CARDIOLOGY CLINIC | Age: 75
End: 2020-10-07
Payer: MEDICARE

## 2020-10-07 DIAGNOSIS — Z95.810 AICD (AUTOMATIC CARDIOVERTER/DEFIBRILLATOR) PRESENT: ICD-10-CM

## 2020-10-07 DIAGNOSIS — I48.91 ATRIAL FIBRILLATION, UNSPECIFIED TYPE (HCC): ICD-10-CM

## 2020-10-07 DIAGNOSIS — I42.8 CARDIOMYOPATHY, NONISCHEMIC (HCC): Primary | ICD-10-CM

## 2020-10-07 LAB
ALBUMIN SERPL ELPH-MCNC: 3.9 G/DL (ref 2.9–4.4)
ALBUMIN/GLOB SERPL: 1 {RATIO} (ref 0.7–1.7)
ALPHA1 GLOB SERPL ELPH-MCNC: 0.2 G/DL (ref 0–0.4)
ALPHA2 GLOB SERPL ELPH-MCNC: 1.1 G/DL (ref 0.4–1)
B-GLOBULIN SERPL ELPH-MCNC: 1 G/DL (ref 0.7–1.3)
GAMMA GLOB SERPL ELPH-MCNC: 1.4 G/DL (ref 0.4–1.8)
GLOBULIN SER CALC-MCNC: 3.8 G/DL (ref 2.2–3.9)
INR, EXTERNAL: 3.1
KAPPA LC FREE SER-MCNC: 37.8 MG/L (ref 3.3–19.4)
KAPPA LC FREE/LAMBDA FREE SER: 1.12 {RATIO} (ref 0.26–1.65)
LAMBDA LC FREE SERPL-MCNC: 33.8 MG/L (ref 5.7–26.3)
M PROTEIN SERPL ELPH-MCNC: ABNORMAL G/DL
PROT SERPL-MCNC: 7.7 G/DL (ref 6–8.5)

## 2020-10-07 PROCEDURE — 93295 DEV INTERROG REMOTE 1/2/MLT: CPT | Performed by: INTERNAL MEDICINE

## 2020-10-12 ENCOUNTER — OFFICE VISIT (OUTPATIENT)
Dept: ONCOLOGY | Age: 75
End: 2020-10-12
Payer: MEDICARE

## 2020-10-12 VITALS
SYSTOLIC BLOOD PRESSURE: 155 MMHG | BODY MASS INDEX: 28.76 KG/M2 | HEIGHT: 73 IN | RESPIRATION RATE: 18 BRPM | WEIGHT: 217 LBS | DIASTOLIC BLOOD PRESSURE: 79 MMHG | OXYGEN SATURATION: 100 % | HEART RATE: 71 BPM

## 2020-10-12 DIAGNOSIS — C49.20: Primary | ICD-10-CM

## 2020-10-12 DIAGNOSIS — D50.8 IRON DEFICIENCY ANEMIA SECONDARY TO INADEQUATE DIETARY IRON INTAKE: ICD-10-CM

## 2020-10-12 DIAGNOSIS — D69.6 THROMBOCYTOPENIA (HCC): ICD-10-CM

## 2020-10-12 DIAGNOSIS — C49.21 SOFT TISSUE SARCOMA OF RIGHT THIGH (HCC): ICD-10-CM

## 2020-10-12 DIAGNOSIS — G89.3 CANCER ASSOCIATED PAIN: ICD-10-CM

## 2020-10-12 DIAGNOSIS — D47.2 MONOCLONAL GAMMOPATHY: ICD-10-CM

## 2020-10-12 PROCEDURE — G0463 HOSPITAL OUTPT CLINIC VISIT: HCPCS | Performed by: NURSE PRACTITIONER

## 2020-10-12 PROCEDURE — 99214 OFFICE O/P EST MOD 30 MIN: CPT | Performed by: NURSE PRACTITIONER

## 2020-10-12 NOTE — PATIENT INSTRUCTIONS
Learning About Monoclonal Gammopathy of Unknown Significance (MGUS) What is MGUS? Monoclonal gammopathy of unknown significance (MGUS) is a blood condition. The blood is made of many kinds of cells, including red blood cells, platelets, and white blood cells. With MGUS, a type of white blood cell called a plasma cell makes too much of the \"M\" (for \"monoclonal\") protein in the blood. Most people with MGUS are fine for many years. MGUS seldom causes symptoms or major health problems. In rare cases, MGUS may turn into multiple myeloma. This is a cancer of plasma cells in the blood that can cause bone weakness. Some people with MGUS may also have a higher risk for osteoporosis, in which bones become thin and weak. MGUS is sometimes seen in younger people, but is more common in people as they get older. It's seen most often in those over the age of 79. How is MGUS diagnosed? MGUS is often found by chance when blood tests are done for other reasons. If you have high levels of a certain protein in your blood, your doctor may order more tests. Blood tests can help identify the protein. The protein is sometimes found in the urine, so you may get a urine test too. Other tests may be done if your doctor thinks you might have a medical problem. In some cases, a bone marrow biopsy may be done to rule out a problem like multiple myeloma. How is it treated? Most people with MGUS don't need any treatment. But you may need regular physical exams, blood tests, and urine tests to make sure that MGUS isn't progressing to a medical problem. Follow-up care is a key part of your treatment and safety. Be sure to make and go to all appointments, and call your doctor if you are having problems. It's also a good idea to know your test results and keep a list of the medicines you take. Where can you learn more? Go to http://www.Nuj[employer].com/ Enter A917 in the search box to learn more about \"Learning About Monoclonal Gammopathy of Unknown Significance (MGUS). \" 
Current as of: November 8, 2019               Content Version: 12.6 © 7386-2283 [employer]. Care instructions adapted under license by [employer] (which disclaims liability or warranty for this information). If you have questions about a medical condition or this instruction, always ask your healthcare professional. [address] any warranty or liability for your use of this information. Complete Blood Count (CBC): About This Test 
What is it? A complete blood count (CBC) is a blood test that gives important information about your blood cells, especially red blood cells, white blood cells, and platelets. Why is this test done? A CBC may be done as part of a regular physical exam. There are many other reasons that a doctor may want this blood test, including to: · Find the cause of symptoms such as fatigue, weakness, fever, bruising, or weight loss. · Check for anemia. · See how much blood has been lost if there is bleeding. · Diagnose polycythemia. · Check for an infection. · Diagnose diseases of the blood, such as leukemia. · Check how the body is dealing with some types of drug or radiation treatment. · Check how abnormal bleeding is affecting the blood cells and counts. · Screen for high and low values before a surgery. · See if there are too many or too few of certain types of cells. This may help find other conditions. For instance, too many eosinophils may be a sign of an allergy or asthma. A blood count can give valuable information about the general state of your health. How do you prepare for the test? 
In general, there's nothing you have to do before this test, unless your doctor tells you to. How is the test done? A health professional uses a needle to take a blood sample, usually from the arm. What happens after the test? 
· You will probably be able to go home right away. · You can go back to your usual activities right away. Follow-up care is a key part of your treatment and safety. Be sure to make and go to all appointments, and call your doctor if you are having problems. It's also a good idea to keep a list of the medicines you take. Ask your doctor when you can expect to have your test results. Where can you learn more? Go to http://www.gray.com/ Enter V890 in the search box to learn more about \"Complete Blood Count (CBC): About This Test.\" Current as of: December 9, 2019               Content Version: 12.6 © 4922-7041 [employer]. Care instructions adapted under license by [employer] (which disclaims liability or warranty for this information). If you have questions about a medical condition or this instruction, always ask your healthcare professional. [address] any warranty or liability for your use of this information.

## 2020-10-12 NOTE — PROGRESS NOTES
Hematology/Oncology  Progress Note    Name: Monroe Mcguire  Date: 10/12/2020  : 1945    Ivana Pitts MD     Mr. Fabiano Cross is a 77 y.o. 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 Current therapy: Active surveillance; Oncology History:  Mr. Fabiano Cross is a 69 y.o. 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 -- The patient has previously undergone systemic chemotherapy with 4 cycles of Doxil followed by external beam radiation therapy to the soft tissue sarcoma involving the right medial upper thigh.    -- He subsequently completed surgery for removal of residual large right thigh mass. -- Current therapy: Active surveillance.       Subjective:     Mr. Fabiano Cross is a 52-year-old -American man with history of soft tissue sarcoma. He is here for follow-up. He denies having any new swelling or discomfort in the right leg. He continues to ambulate with the use of a cane. He has no complaints of pain or discomfort at this time. The patient otherwise has no other complaints. Denied fever, chills, night sweat, unintentional weight loss, skin lumps or bumps, acute bleeding or bruising issues. No acute bleeding, blood in stool, dark stool, melena, hematochezia, hemoptysis, dark urine, or easily bruising. Denied headache, acute vision change, dizziness, chest pain, worsen shortness of breath, palpitation, productive cough, nausea, vomiting, abdominal pain, altered bowel habits, dysuria, new bone pain or back pain, focal numbness or weakness. Independent with ADLs and IADLs. Past medical history, family history, and social history: these were reviewed and remains unchanged.     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 LVEDP 28.  EF 35%. Global hyk. Mod MR.  Cardiac echocardiogram 09/29/2016    LVE. EF 15% at most.  Indeterminate LV diastolic fx.  RVE. Reduced RV systolic fx. RVSP 80-85 mmHg. LAE.  LUISA. Mod-severe MR. Mild AI. Severe TR.  Cardiac MUGA scan 11/02/2015    At most mild LVE. EF 45-46%.  Cardiac nuclear imaging test, abnormal 02/03/2009    Mild basal/mid inferior, inferoapical, inferoseptal infarct w/mild ischemia in RCA (possibly partially artifact). Anterior, anteroseptal, anterapical ischemia w/o infarct. (Mid & distal LAD.)  LVE. EF 29%. Mod global hyk.  Cardiovascular LE venous duplex 09/29/2016    Tech difficult. No DVT bilaterally.  Cardiovascular renal duplex 06/07/2010    No evidence of renal artery stenosis >60%. Patent SMA and celiac artery.  Cardiovascular UE venous duplex 10/04/2016    No DVT bilaterally.     CHF (congestive heart failure) (HCC)     Diabetes ([address]     HTN (hypertension) 6/8/2010    Hypertensive cardiovascular disease     MGUS (monoclonal gammopathy of unknown significance)     MR (mitral regurgitation)     Nonischemic cardiomyopathy     3/11 echo EF 25%    Pulmonary hypertension, moderate to severe ([address] 6/8/2010    90-100mmHg; repeat echo in 3/11 showed 70mmHg    Thrombocytopenia ([address]      Past Surgical History:   Procedure Laterality Date    COLONOSCOPY N/A 6/15/2020    COLONOSCOPY polypectomies performed by Kim Kaye MD at [facility]    HX HERNIA REPAIR  2011    IR REMOVE TUNL CVAD W PORT/PUMP  4/19/2019     Social History     Socioeconomic History    Marital status:      Spouse name: Not on file    Number of children: Not on file    Years of education: Not on file    Highest education level: Not on file   Occupational History    Not on file   Social Needs    Financial resource strain: Not on file    Food insecurity     Worry: Not on file     Inability: Not on file    Transportation needs     Medical: Not on file     Non-medical: Not on file   Tobacco Use    Smoking status: Never Smoker    Smokeless tobacco: Never Used   Substance and Sexual Activity    Alcohol use: Yes     Comment: occasionally.     Drug use: No    Sexual activity: Yes     Partners: Female     Birth control/protection: None   Lifestyle    Physical activity     Days per week: Not on file     Minutes per session: Not on file    Stress: Not on file   Relationships    Social connections     Talks on phone: Not on file     Gets together: Not on file     Attends Taoism service: Not on file     Active member of club or organization: Not on file     Attends meetings of clubs or organizations: Not on file     Relationship status: Not on file    Intimate partner violence     Fear of current or ex partner: Not on file     Emotionally abused: Not on file     Physically abused: Not on file     Forced sexual activity: Not on file   Other Topics Concern    Not on file   Social History Narrative    Not on file     Family History   Problem Relation Age of Onset    Hypertension Mother      Current Outpatient Medications   Medication Sig Dispense Refill    warfarin (COUMADIN) 2.5 mg tablet TAKE 1 TABLET BY MOUTH ONCE DAILY OR  AS  DIRECTED  BY  DOCTOR 45 Tab 0    spironolactone (ALDACTONE) 25 mg tablet TAKE 1 TABLET BY MOUTH  DAILY 90 Tab 3    cloNIDine HCL (CATAPRES) 0.2 mg tablet TAKE 1 TABLET BY MOUTH 3  TIMES A  Tab 3    carvediloL (COREG) 25 mg tablet TAKE 1 TABLET BY MOUTH TWO  TIMES DAILY WITH MEALS 180 Tab 3    bumetanide (BUMEX) 2 mg tablet TAKE 1 TABLET BY MOUTH  DAILY OR AS DIRECTED 90 Tab 4    digoxin (LANOXIN) 0.125 mg tablet TAKE 1 TABLET BY MOUTH  EVERY DAY 90 Tab 3    lisinopriL (PRINIVIL, ZESTRIL) 20 mg tablet TAKE 1 TABLET BY MOUTH  TWICE A  Tab 3    atorvastatin (LIPITOR) 20 mg tablet Take 20 mg by mouth daily.  insulin lispro (HUMALOG U-100 INSULIN) 100 unit/mL injection 5 Units by SubCUTAneous route three (3) times daily.  insulin glargine (LANTUS U-100 INSULIN) 100 unit/mL injection 20 Units by SubCUTAneous route nightly.  magnesium oxide (MAG-OX) 400 mg tablet TAKE 1 TABLET BY MOUTH DAILY. 90 Tab 3    sertraline (ZOLOFT) 25 mg tablet Take  by mouth daily.  allopurinol (ZYLOPRIM) 300 mg tablet Take  by mouth daily.  FERROUS SULFATE, DRIED (IRON, DRIED, PO) Take 325 mg by mouth daily.  aspirin delayed-release (ASPIR-81) 81 mg tablet Take 81 mg by mouth daily. Review of Systems  Constitutional: The patient has no acute distress or discomfort. HEENT: The patient denies recent head trauma, eye pain, blurred vision,  hearing deficit, oropharyngeal mucosal pain or lesions, and the patient denies throat pain or discomfort. Lymphatics: The patient denies palpable peripheral lymphadenopathy. Hematologic: The patient denies having bruising, bleeding, or progressive fatigue. Respiratory: Patient denies having shortness of breath, cough, sputum production, fever, or dyspnea on exertion. Cardiovascular: The patient denies having leg pain, leg swelling, heart palpitations, chest permit, chest pain, or lightheadedness. The patient denies having dyspnea on exertion. Gastrointestinal: The patient denies having nausea, emesis, or diarrhea. The patient denies having any hematemesis or blood in the stool. Genitourinary: Patient denies having urinary urgency, frequency, or dysuria. The patient denies having blood in the urine. Psychological: The patient denies having symptoms of nervousness, anxiety, depression, or thoughts of harming self. Skin: Patient denies having skin rashes, skin, ulcerations, or unexplained itching or pruritus.   Musculoskeletal: The patient denies having pain in the joints or bones. Objective:     Visit Vitals  BP (!) 155/79   Pulse 71   Resp 18   Ht 6' 1\" (1.854 m)   Wt 98.4 kg (217 lb)   SpO2 100%   BMI 28.63 kg/m²     ECOG PS=0;  pain score=0/10    Physical Exam:   Gen. Appearance: The patient is in no acute distress. Skin: There is no bruise or rash. HEENT: The exam is unremarkable. Neck: Supple without lymphadenopathy or thyromegaly. Lungs: Clear to auscultation and percussion; there are no wheezes or rhonchi. Heart: Regular rate and rhythm; there are no murmurs, gallops, or rubs. Abdomen: Bowel sounds are present and normal.  There is no guarding, tenderness, or hepatosplenomegaly. Extremities: There is no clubbing, cyanosis, or edema. Neurologic: There are no focal neurologic deficits. Lymphatics: There is no palpable peripheral lymphadenopathy. Musculoskeletal: The patient has full range of motion at all joints. There is no evidence of joint deformity or effusions. There is no focal joint tenderness. Psychological/psychiatric: There is no clinical evidence of anxiety, depression, or melancholy. Lab data:      Results for orders placed or performed during the hospital encounter of 01/22/20   CBC WITH 3 PART DIFF     Status: Abnormal   Result Value Ref Range Status    WBC 7.0 4.5 - 13.0 K/uL Final    RBC 4.07 (L) 4.10 - 5.10 M/uL Final    HGB 12.2 12.0 - 16 g/dL Final    HCT 37.3 36 - 48 % Final    MCV 91.6 78 - 102 FL Final    MCH 30.0 25.0 - 35.0 PG Final    MCHC 32.7 31 - 37 g/dL Final    RDW 17.7 (H) 11.5 - 14.5 % Final    PLATELET 781 (L) 513 - 440 K/uL Final    NEUTROPHILS 75 (H) 40 - 70 % Final    MIXED CELLS 7 0.1 - 17 % Final    LYMPHOCYTES 18 14 - 44 % Final    ABS. NEUTROPHILS 5.2 1.8 - 9.5 K/UL Final    ABS. MIXED CELLS 0.5 0.0 - 2.3 K/uL Final    ABS. LYMPHOCYTES 1.3 1.1 - 5.9 K/UL Final     Comment: Test performed at [address] [place] [address] or Outpatient Infusion Center Location.  Reviewed by Medical Director. DF AUTOMATED   Final             Assessment:     1. Soft tissue sarcoma of lower extremity, unspecified laterality ([facility]    2. Monoclonal gammopathy    3. Thrombocytopenia ([facility]    4. Iron deficiency anemia secondary to inadequate dietary iron intake    5. Cancer associated pain    6. Soft tissue sarcoma of right thigh ([facility]      Plan:     Soft tissue sarcoma in the right lower extremity:   -- Please refer to the Oncology History for details. --  Clinically there is no evidence of disease recurrence. There is no lymphadenopathy and no evidence of mass-effect on the right medial thigh proximally. -- We will continue to monitor clinically. The patient was advised to notify us if any new skin lumps or bumps, new mass, new bone pain, or any concerns.      History of Monoclonal gammopathy of undetermined significance:   -- Since 10/05/2020 the SPEP showed no evidence of a residual monoclonal paraprotein level. -- We will continue to monitor this at 6-month intervals.     History of Iron deficiency anemia:   -- CBC on 10/5/2002 showed stable H/H. His iron profile, ferritin level were reviewed.        Mild thrombocytopenia:   -- He has had very mild thrombocytopenia which it did not require systemic therapy. He previously received some radiation therapy to the right leg which is probably contributing to the mild thrombocytopenia. In any event, therapeutic intervention will only be recommended if the platelet counts declines below 30,000. -- PLT on 10/5/2020 was 159k        Cancer associated pain, resolved:  -- Currently the patient reports that he is not having any pain or discomfort.     Afib on Coumadin  -- Has follows up with Cardiology           -- Follow-up in 4 months, the patient can have lab done prior our next clinic visit.       No orders of the defined types were placed in this encounter. Lena Sweet NP  10/12/2020           Please note:  This document has been produced using voice recognition software. Unrecognized errors in transcription may be present.

## 2020-10-21 LAB — INR, EXTERNAL: 2.2

## 2020-10-22 ENCOUNTER — TELEPHONE ANTICOAG (OUTPATIENT)
Dept: CARDIOLOGY CLINIC | Age: 75
End: 2020-10-22

## 2020-10-22 NOTE — PROGRESS NOTES
Verbal order and read back per Sho Choe NP  INR within therapeutic range  Continue Coumadin to 2.5 mg daily except 3.75 mg on Mon. Recheck in 2 weeks. Patient made aware of dosing and recheck instructions, no questions.

## 2020-11-03 RX ORDER — WARFARIN 2.5 MG/1
TABLET ORAL
Qty: 45 TAB | Refills: 5 | Status: SHIPPED | OUTPATIENT
Start: 2020-11-03 | End: 2021-07-29

## 2020-11-04 LAB — INR, EXTERNAL: 2.5

## 2020-11-05 ENCOUNTER — TELEPHONE ANTICOAG (OUTPATIENT)
Dept: CARDIOLOGY CLINIC | Age: 75
End: 2020-11-05

## 2020-11-05 NOTE — PROGRESS NOTES
Verbal order and read back per Stephani Sandoval MD  INR within therapeutic range  Continue Coumadin to 2.5 mg daily except 3.75 mg on Mon. Recheck in 2 weeks. Patient made aware of dosing and recheck instructions, no questions.

## 2020-11-15 NOTE — PROGRESS NOTES
Please review attached   Battery life: 8.2 yrs, Lead Impedence and Threshold WNLs, Episodes none, no therapies delivered, V-sensed 53.1%, Optivol WNLs  Anticoag on coumadin for a-fib

## 2020-11-17 NOTE — PROGRESS NOTES
I have personally seen and evaluated the device findings. Interrogation reviewed and I agree with assessment.     Lorna Peñaloza

## 2020-11-17 NOTE — PROGRESS NOTES
From Deirdre    Impression     Battery life: 8.2 yrs, Lead Impedence and Threshold WNLs, Episodes none, no therapies delivered, V-sensed 53.1%, Optivol WNLs   Anticoag on coumadin for a-fib

## 2020-12-01 ENCOUNTER — OFFICE VISIT (OUTPATIENT)
Dept: CARDIOLOGY CLINIC | Age: 75
End: 2020-12-01
Payer: MEDICARE

## 2020-12-01 VITALS
HEIGHT: 73 IN | OXYGEN SATURATION: 97 % | HEART RATE: 68 BPM | DIASTOLIC BLOOD PRESSURE: 66 MMHG | SYSTOLIC BLOOD PRESSURE: 132 MMHG | BODY MASS INDEX: 29.55 KG/M2 | WEIGHT: 223 LBS

## 2020-12-01 DIAGNOSIS — Z95.810 AICD (AUTOMATIC CARDIOVERTER/DEFIBRILLATOR) PRESENT: ICD-10-CM

## 2020-12-01 DIAGNOSIS — I42.8 CARDIOMYOPATHY, NONISCHEMIC (HCC): ICD-10-CM

## 2020-12-01 DIAGNOSIS — Z79.01 LONG TERM CURRENT USE OF ANTICOAGULANT THERAPY: ICD-10-CM

## 2020-12-01 DIAGNOSIS — I10 ESSENTIAL HYPERTENSION: ICD-10-CM

## 2020-12-01 DIAGNOSIS — I48.91 ATRIAL FIBRILLATION, UNSPECIFIED TYPE (HCC): Primary | ICD-10-CM

## 2020-12-01 PROCEDURE — 93000 ELECTROCARDIOGRAM COMPLETE: CPT | Performed by: INTERNAL MEDICINE

## 2020-12-01 PROCEDURE — G8752 SYS BP LESS 140: HCPCS | Performed by: INTERNAL MEDICINE

## 2020-12-01 PROCEDURE — G8510 SCR DEP NEG, NO PLAN REQD: HCPCS | Performed by: INTERNAL MEDICINE

## 2020-12-01 PROCEDURE — G8427 DOCREV CUR MEDS BY ELIG CLIN: HCPCS | Performed by: INTERNAL MEDICINE

## 2020-12-01 PROCEDURE — G8754 DIAS BP LESS 90: HCPCS | Performed by: INTERNAL MEDICINE

## 2020-12-01 PROCEDURE — 99214 OFFICE O/P EST MOD 30 MIN: CPT | Performed by: INTERNAL MEDICINE

## 2020-12-01 PROCEDURE — G8419 CALC BMI OUT NRM PARAM NOF/U: HCPCS | Performed by: INTERNAL MEDICINE

## 2020-12-01 PROCEDURE — G8536 NO DOC ELDER MAL SCRN: HCPCS | Performed by: INTERNAL MEDICINE

## 2020-12-01 NOTE — PROGRESS NOTES
HISTORY OF PRESENT ILLNESS  Star Yoo is a 77 y.o. male. ASSESSMENT and PLAN    Mr. Elle Cheung has history of nonischemic cardiomyopathy as well as severe pulmonary hypertension with moderate mitral regurgitation. Because of severe LV dysfunction, he has AICD in place for primary prevention of sudden cardiac death. He also has persistent atrial fibrillation. In 2016, his right thigh sarcoma was successfully removed in [place]. His understanding is that the edges were clear. He had lost significant amount of weight, over 30 pounds. He had weighed 193 pounds back in October 2015. Damion Santana is weight is now back to baseline at 194 pounds.    His last MUGA scan in November of 2015 showed ejection fraction of 45%. His echocardiogram in September of 2015 showed ejection fraction of 45-50% with pulmonary hypertension with PA pressure of 45 mmHg. He had AICD generator change in May 2018. · CAD:    He has no documented history of CAD. · CAF: Rate controlled atrial fibrillation. · Nonischemic DCM: He has AICD in place. · BP:   Well controlled. · HR:    Stable. · CHF:    There is no evidence of decompensated CHF noted. · Weight:    His weight today is 223 pounds. This is acceptable. · Cholesterol:   Target LDL <110. · Anti-platelet:   Remains on Coumadin for his CAF. I will see him back in 6 months. Thank you. Encounter Diagnoses   Name Primary?     Atrial fibrillation, unspecified type ([address] Yes    Cardiomyopathy, nonischemic ([address]     AICD (automatic cardioverter/defibrillator) present     Long term current use of anticoagulant therapy     Essential hypertension      current treatment plan is effective, no change in therapy  lab results and schedule of future lab studies reviewed with patient  reviewed diet, exercise and weight control  cardiovascular risk and specific lipid/LDL goals reviewed  use of aspirin to prevent MI and TIA's discussed      HPI   Today, Mr. Chapito Valdez has no complaints of chest pains, increased shortness of breath or decreased exercise capacity. He had put on about 10 pounds since his last visit. I advised him that his current weight is acceptable. He denies any orthopnea or PND. He denies any palpitations or dizziness. Review of Systems   Respiratory: Negative for shortness of breath. Cardiovascular: Negative for chest pain, palpitations, orthopnea, claudication, leg swelling and PND. All other systems reviewed and are negative. Physical Exam  Vitals signs and nursing note reviewed. Constitutional:       Appearance: Normal appearance. HENT:      Head: Normocephalic. Eyes:      Conjunctiva/sclera: Conjunctivae normal.   Neck:      Musculoskeletal: No neck rigidity. Cardiovascular:      Rate and Rhythm: Normal rate. Rhythm irregular. Pulmonary:      Breath sounds: Normal breath sounds. Abdominal:      Palpations: Abdomen is soft. Musculoskeletal:         General: No swelling. Skin:     General: Skin is warm and dry. Neurological:      General: No focal deficit present. Mental Status: He is alert and oriented to person, place, and time. Psychiatric:         Mood and Affect: Mood normal.         Behavior: Behavior normal.         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 LVEDP 28.  EF 35%. Global hyk. Mod MR.  Cardiac echocardiogram 09/29/2016    LVE. EF 15% at most.  Indeterminate LV diastolic fx.  RVE. Reduced RV systolic fx. RVSP 80-85 mmHg. LAE.  LUISA. Mod-severe MR. Mild AI. Severe TR.  Cardiac MUGA scan 11/02/2015    At most mild LVE. EF 45-46%.       Cardiac nuclear imaging test, abnormal 02/03/2009    Mild basal/mid inferior, inferoapical, inferoseptal infarct w/mild ischemia in RCA (possibly partially artifact). Anterior, anteroseptal, anterapical ischemia w/o infarct. (Mid & distal LAD.)  LVE. EF 29%. Mod global hyk.  Cardiovascular LE venous duplex 09/29/2016    Tech difficult. No DVT bilaterally.  Cardiovascular renal duplex 06/07/2010    No evidence of renal artery stenosis >60%. Patent SMA and celiac artery.  Cardiovascular UE venous duplex 10/04/2016    No DVT bilaterally.  CHF (congestive heart failure) (HCC)     Diabetes ([address]     HTN (hypertension) 6/8/2010    Hypertensive cardiovascular disease     MGUS (monoclonal gammopathy of unknown significance)     MR (mitral regurgitation)     Nonischemic cardiomyopathy     3/11 echo EF 25%    Pulmonary hypertension, moderate to severe ([address] 6/8/2010    90-100mmHg; repeat echo in 3/11 showed 70mmHg    Thrombocytopenia ([address]        Past Surgical History:   Procedure Laterality Date    COLONOSCOPY N/A 6/15/2020    COLONOSCOPY polypectomies performed by Danisha Lyn MD at [address] [place] [address] HERNIA REPAIR  2011    IR REMOVE TUNL CVAD W PORT/PUMP  4/19/2019       Current Outpatient Medications   Medication Sig Dispense Refill    warfarin (COUMADIN) 2.5 mg tablet TAKE 1 TABLET BY MOUTH ONCE DAILY OR  AS  DIRECTED  BY  DOCTOR 45 Tab 5    spironolactone (ALDACTONE) 25 mg tablet TAKE 1 TABLET BY MOUTH  DAILY 90 Tab 3    cloNIDine HCL (CATAPRES) 0.2 mg tablet TAKE 1 TABLET BY MOUTH 3  TIMES A  Tab 3    carvediloL (COREG) 25 mg tablet TAKE 1 TABLET BY MOUTH TWO  TIMES DAILY WITH MEALS 180 Tab 3    bumetanide (BUMEX) 2 mg tablet TAKE 1 TABLET BY MOUTH  DAILY OR AS DIRECTED 90 Tab 4    digoxin (LANOXIN) 0.125 mg tablet TAKE 1 TABLET BY MOUTH  EVERY DAY 90 Tab 3    lisinopriL (PRINIVIL, ZESTRIL) 20 mg tablet TAKE 1 TABLET BY MOUTH  TWICE A  Tab 3    atorvastatin (LIPITOR) 20 mg tablet Take 20 mg by mouth daily.       insulin lispro (HUMALOG U-100 INSULIN) 100 unit/mL injection 5 Units by SubCUTAneous route three (3) times daily.  insulin glargine (LANTUS U-100 INSULIN) 100 unit/mL injection 20 Units by SubCUTAneous route nightly.  magnesium oxide (MAG-OX) 400 mg tablet TAKE 1 TABLET BY MOUTH DAILY. 90 Tab 3    sertraline (ZOLOFT) 25 mg tablet Take  by mouth daily.  allopurinol (ZYLOPRIM) 300 mg tablet Take  by mouth daily.  FERROUS SULFATE, DRIED (IRON, DRIED, PO) Take 325 mg by mouth daily.  aspirin delayed-release (ASPIR-81) 81 mg tablet Take 81 mg by mouth daily. The patient has a family history of    Social History     Tobacco Use    Smoking status: Never Smoker    Smokeless tobacco: Never Used   Substance Use Topics    Alcohol use: Yes     Comment: occasionally.  Drug use: No       Lab Results   Component Value Date/Time    Cholesterol, total 85 01/23/2016 05:30 AM    HDL Cholesterol 31 (L) 01/23/2016 05:30 AM    LDL, calculated 34 01/23/2016 05:30 AM    Triglyceride 100 01/23/2016 05:30 AM    CHOL/HDL Ratio 2.7 01/23/2016 05:30 AM        BP Readings from Last 3 Encounters:   12/01/20 132/66   10/12/20 (!) 155/79   06/30/20 140/80        Pulse Readings from Last 3 Encounters:   12/01/20 68   10/12/20 71   06/30/20 73       Wt Readings from Last 3 Encounters:   12/01/20 101.2 kg (223 lb)   10/12/20 98.4 kg (217 lb)   06/30/20 97.1 kg (214 lb)         EKG: nonspecific ST and T waves changes, atrial fibrillation, rate 68, he has diffuse T wave inversions inferolaterally.

## 2020-12-01 NOTE — PROGRESS NOTES
Danielle Veras presents today for   Chief Complaint   Patient presents with    Follow-up     6 month follow up        Danielle Veras preferred language for health care discussion is english/other. Is someone accompanying this pt? no    Is the patient using any DME equipment during [address]? no    Depression Screening:  3 most recent PHQ Screens 12/1/2020   Little interest or pleasure in doing things Not at all   Feeling down, depressed, irritable, or hopeless Not at all   Total Score PHQ 2 0       Learning Assessment:  Learning Assessment 4/2/2019   PRIMARY LEARNER Patient   HIGHEST LEVEL OF EDUCATION - PRIMARY LEARNER  -   [address] [place] [address]    NEED -   LEARNER PREFERENCE PRIMARY DEMONSTRATION   LEARNING SPECIAL TOPICS -   ANSWERED BY patient   RELATIONSHIP SELF       Abuse Screening:  Abuse Screening Questionnaire 12/1/2020   Do you ever feel afraid of your partner? N   Are you in a relationship with someone who physically or mentally threatens you? N   Is it safe for you to go home? Y       Fall Risk  Fall Risk Assessment, last 12 mths 12/1/2020   Able to walk? Yes   Fall in past 12 months? No       Pt currently taking Anticoagulant therapy? Warfarin     Coordination of Care:  1. Have you been to the ER, urgent care clinic since your last visit? Hospitalized since your last visit? no    2. Have you seen or consulted any other health care providers outside of the [address] [place] [address] since your last visit? Include any pap smears or colon screening.  no

## 2020-12-16 LAB — INR, EXTERNAL: 2.5

## 2020-12-17 ENCOUNTER — TELEPHONE ANTICOAG (OUTPATIENT)
Dept: CARDIOLOGY CLINIC | Age: 75
End: 2020-12-17

## 2020-12-17 NOTE — PROGRESS NOTES
Verbal order and read back per Chacorta Pradhan, NP  Continue Coumadin to 2.5 mg daily except 3.75 mg on Mon. Recheck in 2 weeks.

## 2020-12-30 LAB — INR, EXTERNAL: 2.9

## 2020-12-31 ENCOUNTER — TELEPHONE ANTICOAG (OUTPATIENT)
Dept: CARDIOLOGY CLINIC | Age: 75
End: 2020-12-31

## 2020-12-31 DIAGNOSIS — Z79.01 LONG TERM CURRENT USE OF ANTICOAGULANT THERAPY: Primary | ICD-10-CM

## 2020-12-31 NOTE — PROGRESS NOTES
Called patient to inform of INR instructions. Verified name and date of birth  Patient verbalized understanding.

## 2020-12-31 NOTE — PROGRESS NOTES
The INR is stable and therapeutic. Continue same dose of coumadin (Continue Coumadin to 2.5 mg daily except 3.75 mg on Mon.  Recheck in 2 weeks.)

## 2021-01-13 ENCOUNTER — OFFICE VISIT (OUTPATIENT)
Dept: CARDIOLOGY CLINIC | Age: 76
End: 2021-01-13
Payer: MEDICARE

## 2021-01-13 DIAGNOSIS — I48.91 ATRIAL FIBRILLATION, UNSPECIFIED TYPE (HCC): Primary | ICD-10-CM

## 2021-01-13 DIAGNOSIS — Z95.810 AICD (AUTOMATIC CARDIOVERTER/DEFIBRILLATOR) PRESENT: ICD-10-CM

## 2021-01-13 DIAGNOSIS — I42.8 CARDIOMYOPATHY, NONISCHEMIC (HCC): ICD-10-CM

## 2021-01-13 PROCEDURE — 93296 REM INTERROG EVL PM/IDS: CPT | Performed by: INTERNAL MEDICINE

## 2021-01-13 PROCEDURE — 93295 DEV INTERROG REMOTE 1/2/MLT: CPT | Performed by: INTERNAL MEDICINE

## 2021-01-14 LAB — INR, EXTERNAL: 2.8

## 2021-01-18 ENCOUNTER — TELEPHONE ANTICOAG (OUTPATIENT)
Dept: CARDIOLOGY CLINIC | Age: 76
End: 2021-01-18

## 2021-01-18 DIAGNOSIS — Z79.01 LONG TERM CURRENT USE OF ANTICOAGULANT THERAPY: Primary | ICD-10-CM

## 2021-01-29 LAB — INR, EXTERNAL: 2.5

## 2021-02-01 ENCOUNTER — TELEPHONE ANTICOAG (OUTPATIENT)
Dept: CARDIOLOGY CLINIC | Age: 76
End: 2021-02-01

## 2021-02-01 NOTE — PROGRESS NOTES
Verbal order and read back per Jesica Lezama NP  INR within therapeutic range  Continue Coumadin to 2.5 mg daily except 3.75 mg on Mon. Recheck in 2 weeks. Patient made aware of dosing and recheck instructions, no questions.

## 2021-02-03 NOTE — PROGRESS NOTES
In atrial fib for 8.9% ,on coumadin. V paced 45%. Lead impedance and threshold WNL. 6.7 years on battery. Op Vol WNL.  Dr Armando Rasmussen patient

## 2021-02-17 LAB — INR, EXTERNAL: 2.2

## 2021-02-18 ENCOUNTER — TELEPHONE ANTICOAG (OUTPATIENT)
Dept: CARDIOLOGY CLINIC | Age: 76
End: 2021-02-18

## 2021-03-05 LAB — INR, EXTERNAL: 2.3

## 2021-03-08 ENCOUNTER — TELEPHONE ANTICOAG (OUTPATIENT)
Dept: CARDIOLOGY CLINIC | Age: 76
End: 2021-03-08

## 2021-03-08 DIAGNOSIS — I48.21 PERMANENT ATRIAL FIBRILLATION (HCC): ICD-10-CM

## 2021-03-08 DIAGNOSIS — Z79.01 LONG TERM CURRENT USE OF ANTICOAGULANT THERAPY: Primary | ICD-10-CM

## 2021-03-08 NOTE — PROGRESS NOTES
Verbal order and read back per Mason Jean-Baptiste NP  INR with therapeutic range  Continue Coumadin to 2.5 mg daily except 3.75 mg on Mon. Recheck in 2 weeks. Patient made aware of dosing and recheck instructions, no questions.

## 2021-03-10 NOTE — PROGRESS NOTES
I have personally seen and evaluated the device findings. Interrogation reviewed and I agree with assessment.     Abril Milian

## 2021-03-26 ENCOUNTER — TELEPHONE ANTICOAG (OUTPATIENT)
Dept: CARDIOLOGY CLINIC | Age: 76
End: 2021-03-26

## 2021-03-26 DIAGNOSIS — Z79.01 LONG TERM CURRENT USE OF ANTICOAGULANT THERAPY: Primary | ICD-10-CM

## 2021-03-26 DIAGNOSIS — I48.21 PERMANENT ATRIAL FIBRILLATION (HCC): ICD-10-CM

## 2021-03-26 LAB — INR, EXTERNAL: 2.6

## 2021-03-26 NOTE — PROGRESS NOTES
Verbal order and read back per Nkechi Thompson NP  iNR within therapeutic range  Continue Coumadin to 2.5 mg daily except 3.75 mg on Mon. Recheck in 2 weeks. Patient's spouse made aware of dosing and recheck instructions, no questions.

## 2021-04-14 LAB — INR, EXTERNAL: 2.4

## 2021-04-15 ENCOUNTER — TELEPHONE ANTICOAG (OUTPATIENT)
Dept: CARDIOLOGY CLINIC | Age: 76
End: 2021-04-15

## 2021-04-15 DIAGNOSIS — I48.21 PERMANENT ATRIAL FIBRILLATION (HCC): ICD-10-CM

## 2021-04-15 DIAGNOSIS — Z79.01 LONG TERM CURRENT USE OF ANTICOAGULANT THERAPY: Primary | ICD-10-CM

## 2021-04-15 NOTE — PROGRESS NOTES
Verbal order and read back per Klarissa Gould, DO  INR within therapeutic range  Continue Coumadin to 2.5 mg daily except 3.75 mg on Mon. Recheck in 2 weeks. Patient's spouse made aware of dosing and recheck instructions, no questions.

## 2021-04-15 NOTE — PROGRESS NOTES
Verbal order and read back per Nirali June, DO  Continue Coumadin to 2.5 mg daily except 3.75 mg on Mon.  Recheck in 2 weeks

## 2021-05-01 LAB — INR, EXTERNAL: 2.7

## 2021-05-03 ENCOUNTER — TELEPHONE ANTICOAG (OUTPATIENT)
Dept: CARDIOLOGY CLINIC | Age: 76
End: 2021-05-03

## 2021-05-03 DIAGNOSIS — I48.21 PERMANENT ATRIAL FIBRILLATION (HCC): ICD-10-CM

## 2021-05-03 DIAGNOSIS — Z79.01 LONG TERM CURRENT USE OF ANTICOAGULANT THERAPY: Primary | ICD-10-CM

## 2021-05-03 NOTE — PROGRESS NOTES
Verbal order and read back per Catherine Stevens NP  INR within therapeutic range  Continue Coumadin to 2.5 mg daily except 3.75 mg on Mon. Recheck in 2 weeks. Patient made aware of dosing and recheck instructions, no questions.

## 2021-05-15 RX ORDER — LISINOPRIL 20 MG/1
TABLET ORAL
Qty: 180 TAB | Refills: 3 | Status: SHIPPED | OUTPATIENT
Start: 2021-05-15 | End: 2022-07-05

## 2021-05-15 RX ORDER — DIGOXIN 125 MCG
TABLET ORAL
Qty: 90 TAB | Refills: 3 | Status: SHIPPED | OUTPATIENT
Start: 2021-05-15 | End: 2022-05-16

## 2021-05-15 RX ORDER — CARVEDILOL 25 MG/1
TABLET ORAL
Qty: 180 TAB | Refills: 3 | Status: SHIPPED | OUTPATIENT
Start: 2021-05-15 | End: 2022-07-05

## 2021-05-20 ENCOUNTER — TELEPHONE ANTICOAG (OUTPATIENT)
Dept: CARDIOLOGY CLINIC | Age: 76
End: 2021-05-20

## 2021-05-20 DIAGNOSIS — I48.21 PERMANENT ATRIAL FIBRILLATION (HCC): ICD-10-CM

## 2021-05-20 DIAGNOSIS — Z79.01 LONG TERM CURRENT USE OF ANTICOAGULANT THERAPY: Primary | ICD-10-CM

## 2021-05-20 LAB — INR, EXTERNAL: 2.6

## 2021-05-20 NOTE — PROGRESS NOTES
Verbal order and read back per Herrera Hussein NP  INR within therapeutic range  Continue Coumadin to 2.5 mg daily except 3.75 mg on Mon. Recheck in 2 weeks. Patient's spouse made aware of dosing and recheck instructions, no questions.

## 2021-06-08 ENCOUNTER — CLINICAL SUPPORT (OUTPATIENT)
Dept: CARDIOLOGY CLINIC | Age: 76
End: 2021-06-08

## 2021-06-08 ENCOUNTER — OFFICE VISIT (OUTPATIENT)
Dept: CARDIOLOGY CLINIC | Age: 76
End: 2021-06-08
Payer: MEDICARE

## 2021-06-08 VITALS
WEIGHT: 223 LBS | HEIGHT: 73 IN | HEART RATE: 78 BPM | OXYGEN SATURATION: 98 % | DIASTOLIC BLOOD PRESSURE: 82 MMHG | SYSTOLIC BLOOD PRESSURE: 148 MMHG | BODY MASS INDEX: 29.55 KG/M2

## 2021-06-08 DIAGNOSIS — Z95.810 AICD (AUTOMATIC CARDIOVERTER/DEFIBRILLATOR) PRESENT: ICD-10-CM

## 2021-06-08 DIAGNOSIS — I42.8 CARDIOMYOPATHY, NONISCHEMIC (HCC): Primary | ICD-10-CM

## 2021-06-08 DIAGNOSIS — I48.21 PERMANENT ATRIAL FIBRILLATION (HCC): Primary | ICD-10-CM

## 2021-06-08 LAB — INR, EXTERNAL: 2.4

## 2021-06-08 PROCEDURE — G8427 DOCREV CUR MEDS BY ELIG CLIN: HCPCS | Performed by: INTERNAL MEDICINE

## 2021-06-08 PROCEDURE — G8536 NO DOC ELDER MAL SCRN: HCPCS | Performed by: INTERNAL MEDICINE

## 2021-06-08 PROCEDURE — 99214 OFFICE O/P EST MOD 30 MIN: CPT | Performed by: INTERNAL MEDICINE

## 2021-06-08 PROCEDURE — 3017F COLORECTAL CA SCREEN DOC REV: CPT | Performed by: INTERNAL MEDICINE

## 2021-06-08 PROCEDURE — G8419 CALC BMI OUT NRM PARAM NOF/U: HCPCS | Performed by: INTERNAL MEDICINE

## 2021-06-08 PROCEDURE — 93000 ELECTROCARDIOGRAM COMPLETE: CPT | Performed by: INTERNAL MEDICINE

## 2021-06-08 PROCEDURE — G8510 SCR DEP NEG, NO PLAN REQD: HCPCS | Performed by: INTERNAL MEDICINE

## 2021-06-08 PROCEDURE — 93282 PRGRMG EVAL IMPLANTABLE DFB: CPT | Performed by: INTERNAL MEDICINE

## 2021-06-08 PROCEDURE — 1101F PT FALLS ASSESS-DOCD LE1/YR: CPT | Performed by: INTERNAL MEDICINE

## 2021-06-08 NOTE — PROGRESS NOTES
No ventricular events, Lead impedance and threshold WNL. V paced 39%, 7.4 years on battery. In Persistent afib and on coumadin. Dr Sarahy Schwab patient.

## 2021-06-08 NOTE — PROGRESS NOTES
Rafaela Cagemond presents today for   Chief Complaint   Patient presents with    Follow-up     6 month follow up        Rafaela De Leon preferred language for health care discussion is english/other. Is someone accompanying this pt? no    Is the patient using any DME equipment during [address]? cane    Depression Screening:  3 most recent PHQ Screens 6/8/2021   Little interest or pleasure in doing things Not at all   Feeling down, depressed, irritable, or hopeless Not at all   Total Score PHQ 2 0       Learning Assessment:  Learning Assessment 4/2/2019   PRIMARY LEARNER Patient   HIGHEST LEVEL OF EDUCATION - PRIMARY LEARNER  -   [address] [place] [address]    NEED -   LEARNER PREFERENCE PRIMARY DEMONSTRATION   LEARNING SPECIAL TOPICS -   ANSWERED BY patient   RELATIONSHIP SELF       Abuse Screening:  Abuse Screening Questionnaire 6/8/2021   Do you ever feel afraid of your partner? N   Are you in a relationship with someone who physically or mentally threatens you? N   Is it safe for you to go home? Y       Fall Risk  Fall Risk Assessment, last 12 mths 6/8/2021   Able to walk? Yes   Fall in past 12 months? 0   Do you feel unsteady? 0   Are you worried about falling 0       Pt currently taking Anticoagulant therapy? Coumadin 2.5mg     Coordination of Care:  1. Have you been to the ER, urgent care clinic since your last visit? Hospitalized since your last visit? no    2. Have you seen or consulted any other health care providers outside of the [address] [place] [address] since your last visit? Include any pap smears or colon screening.  no

## 2021-06-08 NOTE — PROGRESS NOTES
HISTORY OF PRESENT ILLNESS  Reji Alexander is a 77 y.o. male. ASSESSMENT and PLAN    Mr. Lucrecia Augustine has history of nonischemic cardiomyopathy as well as severe pulmonary hypertension with moderate mitral regurgitation. Because of severe LV dysfunction, he has AICD in place for primary prevention of sudden cardiac death. He also has persistent atrial fibrillation. 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 His understanding is that the edges were clear. He had lost significant amount of weight, over 30 pounds. He had weighed 193 pounds back in October 2015. Shila Gonzales is weight is now back to baseline at 194 pounds.    His last MUGA scan in November of 2015 showed ejection fraction of 45%. His echocardiogram in September of 2015 showed ejection fraction of 45-50% with pulmonary hypertension with PA pressure of 45 mmHg. He had AICD generator change in May 2018. · CAD:    He has no documented history of CAD. · Nonischemic DCM: He has AICD in place. · CAF:    Rate controlled. He remains on Coumadin. · BP:    Initially elevated but better controlled on recheck. · CHF:    There is no evidence of decompensated CHF noted. · Weight:     His weight today is 223 pounds. This is unchanged. His current weight is acceptable. · Cholesterol:   Target LDL <110. · Anti-platelet:   Remains on Coumadin for his CAF. I will see him back in 6 months. Thank you. Encounter Diagnoses   Name Primary?  Permanent atrial fibrillation (HCC) Yes     current treatment plan is effective, no change in therapy  lab results and schedule of future lab studies reviewed with patient  reviewed diet, exercise and weight control  cardiovascular risk and specific lipid/LDL goals reviewed  use of aspirin to prevent MI and TIA's discussed      HPI   Today, Mr. Carolina Randolph has no complaints of chest pains, or increased dyspnea on exertion from baseline. He denies any orthopnea or PND.   He denies any palpitations or dizziness. Review of Systems   Respiratory: Negative for shortness of breath. Cardiovascular: Negative for chest pain, palpitations, orthopnea, claudication, leg swelling and PND. All other systems reviewed and are negative. Physical Exam  Vitals and nursing note reviewed. Constitutional:       Appearance: Normal appearance. HENT:      Head: Normocephalic. Eyes:      Conjunctiva/sclera: Conjunctivae normal.   Cardiovascular:      Rate and Rhythm: Normal rate. Rhythm irregular. Pulmonary:      Breath sounds: Normal breath sounds. Abdominal:      Palpations: Abdomen is soft. Musculoskeletal:         General: No swelling. Cervical back: No rigidity. Skin:     General: Skin is warm and dry. Neurological:      General: No focal deficit present. Mental Status: He is oriented to person, place, and time. Psychiatric:         Mood and Affect: Mood normal.         Behavior: Behavior normal.         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 LVEDP 28.  EF 35%. Global hyk. Mod MR.  Cardiac echocardiogram 09/29/2016    LVE. EF 15% at most.  Indeterminate LV diastolic fx.  RVE. Reduced RV systolic fx. RVSP 80-85 mmHg. LAE.  LUISA. Mod-severe MR. Mild AI. Severe TR.  Cardiac MUGA scan 11/02/2015    At most mild LVE. EF 45-46%.  Cardiac nuclear imaging test, abnormal 02/03/2009    Mild basal/mid inferior, inferoapical, inferoseptal infarct w/mild ischemia in RCA (possibly partially artifact). Anterior, anteroseptal, anterapical ischemia w/o infarct. (Mid & distal LAD.)  LVE. EF 29%. Mod global hyk.  Cardiovascular LE venous duplex 09/29/2016    Tech difficult. No DVT bilaterally.    [facility] 06/07/2010    No evidence of renal artery stenosis >60%. Patent SMA and celiac artery.  Cardiovascular UE venous duplex 10/04/2016    No DVT bilaterally.  CHF (congestive heart failure) (HCC)     Diabetes ([facility]     HTN (hypertension) 6/8/2010    Hypertensive cardiovascular disease     MGUS (monoclonal gammopathy of unknown significance)     MR (mitral regurgitation)     Nonischemic cardiomyopathy     3/11 echo EF 25%    Pulmonary hypertension, moderate to severe ([address] 6/8/2010    90-100mmHg; repeat echo in 3/11 showed 70mmHg    Thrombocytopenia ([address]        Past Surgical History:   Procedure Laterality Date    COLONOSCOPY N/A 6/15/2020    COLONOSCOPY polypectomies performed by Sai Esquivel MD at [address]  2011    IR REMOVE TUNL CVAD W PORT/PUMP  4/19/2019       Current Outpatient Medications   Medication Sig Dispense Refill    lisinopriL (PRINIVIL, ZESTRIL) 20 mg tablet TAKE 1 TABLET BY MOUTH  TWICE DAILY 180 Tab 3    digoxin (LANOXIN) 0.125 mg tablet TAKE 1 TABLET BY MOUTH  DAILY 90 Tab 3    carvediloL (COREG) 25 mg tablet TAKE 1 TABLET BY MOUTH  TWICE DAILY WITH MEALS 180 Tab 3    warfarin (COUMADIN) 2.5 mg tablet TAKE 1 TABLET BY MOUTH ONCE DAILY OR  AS  DIRECTED  BY  DOCTOR 45 Tab 5    spironolactone (ALDACTONE) 25 mg tablet TAKE 1 TABLET BY MOUTH  DAILY 90 Tab 3    cloNIDine HCL (CATAPRES) 0.2 mg tablet TAKE 1 TABLET BY MOUTH 3  TIMES A  Tab 3    bumetanide (BUMEX) 2 mg tablet TAKE 1 TABLET BY MOUTH  DAILY OR AS DIRECTED 90 Tab 4    atorvastatin (LIPITOR) 20 mg tablet Take 20 mg by mouth daily.  insulin lispro (HUMALOG U-100 INSULIN) 100 unit/mL injection 5 Units by SubCUTAneous route three (3) times daily.  insulin glargine (LANTUS U-100 INSULIN) 100 unit/mL injection 20 Units by SubCUTAneous route nightly.  magnesium oxide (MAG-OX) 400 mg tablet TAKE 1 TABLET BY MOUTH DAILY.  90 Tab 3    sertraline (ZOLOFT) 25 mg tablet Take  by mouth daily.  allopurinol (ZYLOPRIM) 300 mg tablet Take  by mouth daily.  FERROUS SULFATE, DRIED (IRON, DRIED, PO) Take 325 mg by mouth daily.  aspirin delayed-release (ASPIR-81) 81 mg tablet Take 81 mg by mouth daily. The patient has a family history of    Social History     Tobacco Use    Smoking status: Never Smoker    Smokeless tobacco: Never Used   Substance Use Topics    Alcohol use: Yes     Comment: occasionally.  Drug use: No       Lab Results   Component Value Date/Time    Cholesterol, total 85 01/23/2016 05:30 AM    HDL Cholesterol 31 (L) 01/23/2016 05:30 AM    LDL, calculated 34 01/23/2016 05:30 AM    Triglyceride 100 01/23/2016 05:30 AM    CHOL/HDL Ratio 2.7 01/23/2016 05:30 AM        BP Readings from Last 3 Encounters:   06/08/21 (!) 148/82   12/01/20 132/66   10/12/20 (!) 155/79        Pulse Readings from Last 3 Encounters:   06/08/21 78   12/01/20 68   10/12/20 71       Wt Readings from Last 3 Encounters:   06/08/21 101.2 kg (223 lb)   12/01/20 101.2 kg (223 lb)   10/12/20 98.4 kg (217 lb)         EKG: unchanged from previous tracings, atrial fibrillation, rate 78 with occasional ventricular paced complexes, and PVC.

## 2021-06-09 ENCOUNTER — TELEPHONE ANTICOAG (OUTPATIENT)
Dept: CARDIOLOGY CLINIC | Age: 76
End: 2021-06-09

## 2021-06-09 DIAGNOSIS — Z79.01 LONG TERM CURRENT USE OF ANTICOAGULANT THERAPY: Primary | ICD-10-CM

## 2021-06-09 DIAGNOSIS — I48.91 ATRIAL FIBRILLATION, UNSPECIFIED TYPE (HCC): ICD-10-CM

## 2021-06-09 NOTE — PROGRESS NOTES
Verbal order and read back per Rae Bustillo NP  INR within therapeutic range  Continue Coumadin to 2.5 mg daily except 3.75 mg on Mon. Recheck in 2 weeks. Patient made aware of dosing and recheck instructions, no questions.

## 2021-06-29 LAB — INR, EXTERNAL: 2.6

## 2021-06-30 ENCOUNTER — TELEPHONE ANTICOAG (OUTPATIENT)
Dept: CARDIOLOGY CLINIC | Age: 76
End: 2021-06-30

## 2021-06-30 DIAGNOSIS — I48.91 ATRIAL FIBRILLATION, UNSPECIFIED TYPE (HCC): ICD-10-CM

## 2021-06-30 DIAGNOSIS — Z79.01 LONG TERM CURRENT USE OF ANTICOAGULANT THERAPY: Primary | ICD-10-CM

## 2021-06-30 NOTE — PROGRESS NOTES
Verbal order and read back per Temo Santillan NP  INR within therapeutic range  Continue Coumadin to 2.5 mg daily except 3.75 mg on Mon. Recheck in 2 weeks. Patient made aware of dosing and recheck instructions, no questions.

## 2021-07-20 ENCOUNTER — TELEPHONE ANTICOAG (OUTPATIENT)
Dept: CARDIOLOGY CLINIC | Age: 76
End: 2021-07-20

## 2021-07-20 DIAGNOSIS — I48.91 ATRIAL FIBRILLATION, UNSPECIFIED TYPE (HCC): ICD-10-CM

## 2021-07-20 DIAGNOSIS — Z79.01 LONG TERM CURRENT USE OF ANTICOAGULANT THERAPY: Primary | ICD-10-CM

## 2021-07-20 LAB — INR, EXTERNAL: 2.3

## 2021-07-28 RX ORDER — BUMETANIDE 2 MG/1
TABLET ORAL
Qty: 90 TABLET | Refills: 3 | Status: SHIPPED | OUTPATIENT
Start: 2021-07-28 | End: 2022-07-04

## 2021-07-29 RX ORDER — WARFARIN 2.5 MG/1
TABLET ORAL
Qty: 45 TABLET | Refills: 6 | Status: SHIPPED | OUTPATIENT
Start: 2021-07-29 | End: 2022-07-05

## 2021-08-16 ENCOUNTER — TELEPHONE ANTICOAG (OUTPATIENT)
Dept: CARDIOLOGY CLINIC | Age: 76
End: 2021-08-16

## 2021-08-16 DIAGNOSIS — I48.91 ATRIAL FIBRILLATION, UNSPECIFIED TYPE (HCC): ICD-10-CM

## 2021-08-16 DIAGNOSIS — Z79.01 LONG TERM CURRENT USE OF ANTICOAGULANT THERAPY: Primary | ICD-10-CM

## 2021-08-16 NOTE — PROGRESS NOTES
Verbal order and read back per Baird Mohs, NP  iNR above therapeutic range  Patient held 8/13/21 dose. Continue Coumadin to 2.5 mg daily except 3.75 mg on Mon. Recheck in 2 weeks. Patient made aware of dosing and recheck instructions, no questions.

## 2021-08-23 RX ORDER — CLONIDINE HYDROCHLORIDE 0.2 MG/1
TABLET ORAL
Qty: 270 TABLET | Refills: 3 | Status: SHIPPED | OUTPATIENT
Start: 2021-08-23 | End: 2022-10-07

## 2021-08-24 LAB — INR, EXTERNAL: 2.2

## 2021-08-25 ENCOUNTER — TELEPHONE ANTICOAG (OUTPATIENT)
Dept: CARDIOLOGY CLINIC | Age: 76
End: 2021-08-25

## 2021-08-25 DIAGNOSIS — I48.91 ATRIAL FIBRILLATION, UNSPECIFIED TYPE (HCC): ICD-10-CM

## 2021-08-25 DIAGNOSIS — Z79.01 LONG TERM CURRENT USE OF ANTICOAGULANT THERAPY: Primary | ICD-10-CM

## 2021-08-25 NOTE — PROGRESS NOTES
Verbal order and read back per Media Nims, NP  INR within therapeutic range  Continue Coumadin to 2.5 mg daily except 3.75 mg on Mon. Recheck in 2 weeks. Patient made aware of dosing and recheck instructions, no questions.

## 2021-09-14 LAB — INR, EXTERNAL: 3.6

## 2021-09-15 ENCOUNTER — TELEPHONE ANTICOAG (OUTPATIENT)
Dept: CARDIOLOGY CLINIC | Age: 76
End: 2021-09-15

## 2021-09-15 DIAGNOSIS — I48.91 ATRIAL FIBRILLATION, UNSPECIFIED TYPE (HCC): ICD-10-CM

## 2021-09-15 DIAGNOSIS — Z79.01 LONG TERM CURRENT USE OF ANTICOAGULANT THERAPY: Primary | ICD-10-CM

## 2021-09-15 NOTE — PROGRESS NOTES
Verbal order and read back per Saadia Hall NP  INR above therapeutic range  Hold Coumadin today, then Continue Coumadin to 2.5 mg daily except 3.75 mg on Mon. Recheck in 2 weeks. Patient made aware of dosing and recheck instructions, no questions.

## 2021-09-27 RX ORDER — SPIRONOLACTONE 25 MG/1
TABLET ORAL
Qty: 90 TABLET | Refills: 3 | Status: SHIPPED | OUTPATIENT
Start: 2021-09-27 | End: 2022-10-19

## 2021-10-07 ENCOUNTER — TELEPHONE ANTICOAG (OUTPATIENT)
Dept: CARDIOLOGY CLINIC | Age: 76
End: 2021-10-07

## 2021-10-07 DIAGNOSIS — I48.91 ATRIAL FIBRILLATION, UNSPECIFIED TYPE (HCC): ICD-10-CM

## 2021-10-07 DIAGNOSIS — Z79.01 LONG TERM CURRENT USE OF ANTICOAGULANT THERAPY: Primary | ICD-10-CM

## 2021-10-07 LAB — INR, EXTERNAL: 2.3

## 2021-10-07 NOTE — PROGRESS NOTES
Verbal order and read back per Mason Jean-Baptiste NP  INR within therapeutic range  Continue Coumadin to 2.5 mg daily except 3.75 mg on Mon. Recheck in 2 weeks. Patient's spouse made aware of dosing and recheck instructions, no questions.

## 2021-11-01 LAB — INR, EXTERNAL: 2.9

## 2021-11-02 ENCOUNTER — TELEPHONE ANTICOAG (OUTPATIENT)
Dept: CARDIOLOGY CLINIC | Age: 76
End: 2021-11-02

## 2021-11-02 DIAGNOSIS — I48.91 ATRIAL FIBRILLATION, UNSPECIFIED TYPE (HCC): ICD-10-CM

## 2021-11-02 DIAGNOSIS — Z79.01 LONG TERM CURRENT USE OF ANTICOAGULANT THERAPY: Primary | ICD-10-CM

## 2021-11-02 NOTE — PROGRESS NOTES
Verbal order and read back per Jie Rojas NP  INR within therapeutic range  Continue Coumadin to 2.5 mg daily except 3.75 mg on Mon. Recheck in 2 weeks.

## 2021-12-08 ENCOUNTER — TELEPHONE ANTICOAG (OUTPATIENT)
Dept: CARDIOLOGY CLINIC | Age: 76
End: 2021-12-08

## 2021-12-08 DIAGNOSIS — Z79.01 LONG TERM CURRENT USE OF ANTICOAGULANT THERAPY: Primary | ICD-10-CM

## 2021-12-08 DIAGNOSIS — I48.91 ATRIAL FIBRILLATION, UNSPECIFIED TYPE (HCC): ICD-10-CM

## 2021-12-08 LAB — INR, EXTERNAL: 2.7

## 2021-12-08 NOTE — PROGRESS NOTES
Verbal order and read back per Jt Ellis NP  INR within therapeutic range  Continue Coumadin to 2.5 mg daily except 3.75 mg on Mon. Recheck in 2 weeks. Patient's spouse made aware of dosing and recheck instructions, no questions.

## 2021-12-23 NOTE — PATIENT INSTRUCTIONS
Continue present medication regimen Coumadin as directed and protimes as scheduled [facility] in late February as scheduled Follow-up with Dr Fannie Matthews as scheduled and as needed Weigh daily and record Limit sodium intake to 2000mg per day Limit fluid intake to no more than  6, eight ounce glasses of any type of fluids per day (total of 48 ounces per day) Call if you notice sudden or progressive weight gain (3-5 pounds in 2-3 days), increasing shortness of breath, abdominal bloating, increasing lower extremity edema, inability to lie flat or on your normal number of pillows, having to sit up to catch your breath, fatigue, increased somnolence (sleeping more), poor appetite No difficulties

## 2022-01-09 LAB — INR, EXTERNAL: 3.2

## 2022-01-10 ENCOUNTER — TELEPHONE ANTICOAG (OUTPATIENT)
Dept: CARDIOLOGY CLINIC | Age: 77
End: 2022-01-10

## 2022-01-10 DIAGNOSIS — I48.11 LONGSTANDING PERSISTENT ATRIAL FIBRILLATION (HCC): ICD-10-CM

## 2022-01-10 DIAGNOSIS — Z79.01 LONG TERM CURRENT USE OF ANTICOAGULANT THERAPY: Primary | ICD-10-CM

## 2022-01-10 NOTE — PROGRESS NOTES
Verbal order and read back per Rachel Espino NP     The INR is above the therapeutic range. Ask the patient about bleeding complications. Please make the following adjustments to Coumadin dosing: Take coumadin 1.25MG ON 1/10/2022 & 1/11/2022 Continue Coumadin to 2.5 mg daily except 3.75 mg on Mon. Recheck in 3-4 weeks. LM ON  FOR PATIENT TO GIVE OFFICE A CALL.

## 2022-01-12 ENCOUNTER — CLINICAL SUPPORT (OUTPATIENT)
Dept: CARDIOLOGY CLINIC | Age: 77
End: 2022-01-12
Payer: MEDICARE

## 2022-01-12 ENCOUNTER — OFFICE VISIT (OUTPATIENT)
Dept: CARDIOLOGY CLINIC | Age: 77
End: 2022-01-12

## 2022-01-12 VITALS
HEIGHT: 73 IN | HEART RATE: 63 BPM | SYSTOLIC BLOOD PRESSURE: 114 MMHG | OXYGEN SATURATION: 100 % | BODY MASS INDEX: 28.49 KG/M2 | DIASTOLIC BLOOD PRESSURE: 64 MMHG | WEIGHT: 215 LBS

## 2022-01-12 DIAGNOSIS — I42.8 NONISCHEMIC CARDIOMYOPATHY (HCC): Primary | ICD-10-CM

## 2022-01-12 DIAGNOSIS — Z95.810 AICD (AUTOMATIC CARDIOVERTER/DEFIBRILLATOR) PRESENT: ICD-10-CM

## 2022-01-12 DIAGNOSIS — I27.20 PULMONARY HYPERTENSION, MODERATE TO SEVERE (HCC): ICD-10-CM

## 2022-01-12 DIAGNOSIS — I42.8 NONISCHEMIC CARDIOMYOPATHY (HCC): ICD-10-CM

## 2022-01-12 DIAGNOSIS — I48.21 PERMANENT ATRIAL FIBRILLATION (HCC): Primary | ICD-10-CM

## 2022-01-12 PROCEDURE — 99214 OFFICE O/P EST MOD 30 MIN: CPT | Performed by: INTERNAL MEDICINE

## 2022-01-12 PROCEDURE — G8419 CALC BMI OUT NRM PARAM NOF/U: HCPCS | Performed by: INTERNAL MEDICINE

## 2022-01-12 PROCEDURE — 3017F COLORECTAL CA SCREEN DOC REV: CPT | Performed by: INTERNAL MEDICINE

## 2022-01-12 PROCEDURE — G8510 SCR DEP NEG, NO PLAN REQD: HCPCS | Performed by: INTERNAL MEDICINE

## 2022-01-12 PROCEDURE — G8536 NO DOC ELDER MAL SCRN: HCPCS | Performed by: INTERNAL MEDICINE

## 2022-01-12 PROCEDURE — G8427 DOCREV CUR MEDS BY ELIG CLIN: HCPCS | Performed by: INTERNAL MEDICINE

## 2022-01-12 PROCEDURE — 93282 PRGRMG EVAL IMPLANTABLE DFB: CPT | Performed by: INTERNAL MEDICINE

## 2022-01-12 PROCEDURE — 1101F PT FALLS ASSESS-DOCD LE1/YR: CPT | Performed by: INTERNAL MEDICINE

## 2022-01-12 PROCEDURE — 93000 ELECTROCARDIOGRAM COMPLETE: CPT | Performed by: INTERNAL MEDICINE

## 2022-01-12 NOTE — PROGRESS NOTES
Doroteomatthew Cleo presents today for   Chief Complaint   Patient presents with    Follow-up     7 month follow up        Lesley Gallo preferred language for health care discussion is english/other. Is someone accompanying this pt? no    Is the patient using any DME equipment during [address]? no    Depression Screening:  3 most recent PHQ Screens 1/12/2022   Little interest or pleasure in doing things Not at all   Feeling down, depressed, irritable, or hopeless Not at all   Total Score PHQ 2 0       Learning Assessment:  Learning Assessment 4/2/2019   PRIMARY LEARNER Patient   HIGHEST LEVEL OF EDUCATION - PRIMARY LEARNER  -   [address] [place] [address]    NEED -   LEARNER PREFERENCE PRIMARY DEMONSTRATION   LEARNING SPECIAL TOPICS -   ANSWERED BY patient   RELATIONSHIP SELF       Abuse Screening:  Abuse Screening Questionnaire 1/12/2022   Do you ever feel afraid of your partner? N   Are you in a relationship with someone who physically or mentally threatens you? N   Is it safe for you to go home? Y       Fall Risk  Fall Risk Assessment, last 12 mths 1/12/2022   Able to walk? Yes   Fall in past 12 months? 0   Do you feel unsteady? 0   Are you worried about falling 0       Pt currently taking Anticoagulant therapy? no    Coordination of Care:  1. Have you been to the ER, urgent care clinic since your last visit? Hospitalized since your last visit? no    2. Have you seen or consulted any other health care providers outside of the [address] [place] [address] since your last visit? Include any pap smears or colon screening.  no

## 2022-01-12 NOTE — PROGRESS NOTES
HISTORY OF PRESENT ILLNESS  Orquidea Samano is a 77 y.o. male. ASSESSMENT and PLAN    Mr. Mariano Morrow has history of nonischemic cardiomyopathy as well as severe pulmonary hypertension with moderate mitral regurgitation. Because of severe LV dysfunction, he has AICD in place for primary prevention of sudden cardiac death. He also has persistent atrial fibrillation. 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 His understanding is that the edges were clear. He had lost significant amount of weight, over 30 pounds. He had weighed 193 pounds back in October 2015. [facility] is weight is now back to baseline at 194 pounds.    His last MUGA scan in November of 2015 showed ejection fraction of 45%. His echocardiogram in September of 2015 showed ejection fraction of 45-50% with pulmonary hypertension with PA pressure of 45 mmHg. He had AICD generator change in May 2018. · CAD:    He has no documented history of CAD. · Nonischemic DCM: He has AICD in place. · PAF: He is in sinus rhythm today, with RBBB. He remains on Coumadin. · BP:    Well controlled. · CHF:    There is no evidence of decompensated CHF noted. · Weight:  His weight today is 215 pounds. His baseline weight is 220 pounds. His current weight is acceptable. · Cholesterol:   Target LDL <110. · Anti-platelet:   Remains on Coumadin for his CAF.    I will see him back in 6 months. Thank you. Encounter Diagnoses   Name Primary?     Permanent atrial fibrillation (HCC) Yes    [facility]ischemic [facility]     Pulmonary hypertension, moderate to severe [facility])      current treatment plan is effective, no change in therapy  lab results and schedule of future lab studies reviewed with patient  reviewed diet, exercise and weight control  cardiovascular risk and specific lipid/LDL goals reviewed  use of aspirin to prevent MI and TIA's discussed      HPI   Today, Mr. Fabiano Hawthorne has no complaints of chest pains, increased shortness of breath or decreased exercise capacity. He denies any orthopnea or PND. He denies any palpitations or dizziness. He is in good spirits. He has lost about 5-8 pounds without trying to lose weight. His previous weight back around 2015 was 195 pounds. Review of Systems   Respiratory: Negative for shortness of breath. Cardiovascular: Negative for chest pain, palpitations, orthopnea, claudication, leg swelling and PND. All other systems reviewed and are negative. Physical Exam  Vitals and nursing note reviewed. Constitutional:       Appearance: Normal appearance. HENT:      Head: Normocephalic. Eyes:      Conjunctiva/sclera: Conjunctivae normal.   Neck:      Vascular: No carotid bruit. Cardiovascular:      Rate and Rhythm: Normal rate and regular rhythm. Pulmonary:      Breath sounds: Normal breath sounds. Abdominal:      Palpations: Abdomen is soft. Musculoskeletal:         General: No swelling. Cervical back: No rigidity. Skin:     General: Skin is warm and dry. Neurological:      General: No focal deficit present. Mental Status: He is alert and oriented to person, place, and time. Psychiatric:         Mood and Affect: Mood normal.         Behavior: Behavior normal.         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 LVEDP 28.  EF 35%. Global hyk. Mod MR.  Cardiac echocardiogram 09/29/2016    LVE. EF 15% at most.  Indeterminate LV diastolic fx.  RVE. Reduced RV systolic fx. RVSP 80-85 mmHg. LAE.  LUISA. Mod-severe MR. Mild AI. Severe TR.  Cardiac MUGA scan 11/02/2015    At most mild LVE. EF 45-46%.       Cardiac nuclear imaging test, abnormal 02/03/2009    Mild basal/mid inferior, inferoapical, inferoseptal infarct w/mild ischemia in RCA (possibly partially artifact). Anterior, anteroseptal, anterapical ischemia w/o infarct. (Mid & distal LAD.)  LVE. EF 29%. Mod global hyk.  Cardiovascular LE venous duplex 09/29/2016    Tech difficult. No DVT bilaterally.  Cardiovascular renal duplex 06/07/2010    No evidence of renal artery stenosis >60%. Patent SMA and celiac artery.  Cardiovascular UE venous duplex 10/04/2016    No DVT bilaterally.  CHF (congestive heart failure) (HCC)     Diabetes ([address]     HTN (hypertension) 6/8/2010    Hypertensive cardiovascular disease     MGUS (monoclonal gammopathy of unknown significance)     MR (mitral regurgitation)     Nonischemic cardiomyopathy     3/11 echo EF 25%    Pulmonary hypertension, moderate to severe ([address] 6/8/2010    90-100mmHg; repeat echo in 3/11 showed 70mmHg    Thrombocytopenia ([address]        Past Surgical History:   Procedure Laterality Date    COLONOSCOPY N/A 6/15/2020    COLONOSCOPY polypectomies performed by Saleem Gutiérrez MD at [address] [place] [address] HERNIA REPAIR  2011    IR REMOVE TUNL CVAD W PORT/PUMP  4/19/2019       Current Outpatient Medications   Medication Sig Dispense Refill    spironolactone (ALDACTONE) 25 mg tablet TAKE 1 TABLET BY MOUTH  DAILY 90 Tablet 3    cloNIDine HCL (CATAPRES) 0.2 mg tablet TAKE 1 TABLET BY MOUTH 3  TIMES DAILY 270 Tablet 3    warfarin (COUMADIN) 2.5 mg tablet TAKE 1 TABLET BY MOUTH ONCE A DAY OR AS DIRECTED BY DOCTOR 45 Tablet 6    bumetanide (BUMEX) 2 mg tablet TAKE 1 TABLET BY MOUTH  DAILY OR AS DIRECTED 90 Tablet 3    lisinopriL (PRINIVIL, ZESTRIL) 20 mg tablet TAKE 1 TABLET BY MOUTH  TWICE DAILY 180 Tab 3    digoxin (LANOXIN) 0.125 mg tablet TAKE 1 TABLET BY MOUTH  DAILY 90 Tab 3    carvediloL (COREG) 25 mg tablet TAKE 1 TABLET BY MOUTH  TWICE DAILY WITH MEALS 180 Tab 3    atorvastatin (LIPITOR) 20 mg tablet Take 20 mg by mouth daily.       insulin lispro (HUMALOG U-100 INSULIN) 100 unit/mL injection 5 Units by SubCUTAneous route three (3) times daily.  insulin glargine (LANTUS U-100 INSULIN) 100 unit/mL injection 20 Units by SubCUTAneous route nightly.  magnesium oxide (MAG-OX) 400 mg tablet TAKE 1 TABLET BY MOUTH DAILY. 90 Tab 3    sertraline (ZOLOFT) 25 mg tablet Take  by mouth daily.  allopurinol (ZYLOPRIM) 300 mg tablet Take  by mouth daily.  FERROUS SULFATE, DRIED (IRON, DRIED, PO) Take 325 mg by mouth daily.  aspirin delayed-release (ASPIR-81) 81 mg tablet Take 81 mg by mouth daily. The patient has a family history of    Social History     Tobacco Use    Smoking status: Never Smoker    Smokeless tobacco: Never Used   Substance Use Topics    Alcohol use: Yes     Comment: occasionally.  Drug use: No       Lab Results   Component Value Date/Time    Cholesterol, total 85 01/23/2016 05:30 AM    HDL Cholesterol 31 (L) 01/23/2016 05:30 AM    LDL, calculated 34 01/23/2016 05:30 AM    Triglyceride 100 01/23/2016 05:30 AM    CHOL/HDL Ratio 2.7 01/23/2016 05:30 AM        BP Readings from Last 3 Encounters:   01/12/22 114/64   06/08/21 (!) 148/82   12/01/20 132/66        Pulse Readings from Last 3 Encounters:   01/12/22 63   06/08/21 78   12/01/20 68       Wt Readings from Last 3 Encounters:   01/12/22 97.5 kg (215 lb)   06/08/21 101.2 kg (223 lb)   12/01/20 101.2 kg (223 lb)         EKG:  sinus rhythm with mild dysrhythmia, RBBB; he had been in atrial fibrillation previously.

## 2022-01-18 NOTE — PROGRESS NOTES
On coumadin for persistent afib. Dr Vickie Osorio patient. single AICD. 5.8 years left on battery. Lead impedance and threshold WNL. V paced 23 %. OP Vol WNL. 2 nonsustained VT , monitored only.  Lasting 2 sec

## 2022-02-14 LAB — INR, EXTERNAL: 2.4

## 2022-02-15 ENCOUNTER — TELEPHONE ANTICOAG (OUTPATIENT)
Dept: CARDIOLOGY CLINIC | Age: 77
End: 2022-02-15

## 2022-02-15 DIAGNOSIS — Z79.01 LONG TERM CURRENT USE OF ANTICOAGULANT THERAPY: Primary | ICD-10-CM

## 2022-02-15 DIAGNOSIS — I48.91 ATRIAL FIBRILLATION, UNSPECIFIED TYPE (HCC): ICD-10-CM

## 2022-02-15 NOTE — PROGRESS NOTES
Verbal order and read back per Radha Suero NP  INR within therapeutic range  Continue Coumadin to 2.5 mg daily except 3.75 mg on Mon. Recheck in 3-4 weeks.

## 2022-03-19 PROBLEM — E78.00 TYPE 2 DIABETES MELLITUS WITH HYPERCHOLESTEROLEMIA (HCC): Status: ACTIVE | Noted: 2018-04-30

## 2022-03-19 PROBLEM — E11.69 TYPE 2 DIABETES MELLITUS WITH HYPERCHOLESTEROLEMIA (HCC): Status: ACTIVE | Noted: 2018-04-30

## 2022-03-20 PROBLEM — M1A.9XX1 CHRONIC GOUT WITH TOPHUS: Status: ACTIVE | Noted: 2018-09-16

## 2022-03-20 PROBLEM — E78.00 HYPERCHOLESTEROLEMIA: Status: ACTIVE | Noted: 2018-12-31

## 2022-03-20 PROBLEM — C76.51: Status: ACTIVE | Noted: 2018-08-20

## 2022-03-20 PROBLEM — Z45.02 AICD AT END OF BATTERY LIFE: Status: ACTIVE | Noted: 2018-05-07

## 2022-03-29 ENCOUNTER — TELEPHONE ANTICOAG (OUTPATIENT)
Dept: CARDIOLOGY CLINIC | Age: 77
End: 2022-03-29

## 2022-03-29 DIAGNOSIS — Z79.01 LONG TERM CURRENT USE OF ANTICOAGULANT THERAPY: Primary | ICD-10-CM

## 2022-03-29 DIAGNOSIS — I48.91 ATRIAL FIBRILLATION, UNSPECIFIED TYPE (HCC): ICD-10-CM

## 2022-03-29 LAB — INR, EXTERNAL: 2.6

## 2022-03-29 NOTE — PROGRESS NOTES
Verbal order and read back per Maggie Roy NP  INR within therapeutic range  Continue Coumadin to 2.5 mg daily except 3.75 mg on Mon. Recheck in 3-4 weeks. Patient's spouse made aware of dosing and recheck instructions, no questions.

## 2022-04-13 ENCOUNTER — OFFICE VISIT (OUTPATIENT)
Dept: CARDIOLOGY CLINIC | Age: 77
End: 2022-04-13
Payer: MEDICARE

## 2022-04-13 DIAGNOSIS — Z95.810 AICD (AUTOMATIC CARDIOVERTER/DEFIBRILLATOR) PRESENT: Primary | ICD-10-CM

## 2022-04-13 DIAGNOSIS — I42.8 NONISCHEMIC CARDIOMYOPATHY (HCC): ICD-10-CM

## 2022-04-18 PROCEDURE — 93296 REM INTERROG EVL PM/IDS: CPT | Performed by: INTERNAL MEDICINE

## 2022-04-18 PROCEDURE — 93295 DEV INTERROG REMOTE 1/2/MLT: CPT | Performed by: INTERNAL MEDICINE

## 2022-04-18 NOTE — PROGRESS NOTES
single AICD. 5.7 years left on battery. Lead impedance and threshold WNL. V paced 20 %. OP Vol WNL. 0 ventricular events since last check 1/12/2022.  Dr. Lizz Rhodes patient

## 2022-05-13 ENCOUNTER — TELEPHONE ANTICOAG (OUTPATIENT)
Dept: CARDIOLOGY CLINIC | Age: 77
End: 2022-05-13

## 2022-05-13 DIAGNOSIS — I48.91 ATRIAL FIBRILLATION, UNSPECIFIED TYPE (HCC): ICD-10-CM

## 2022-05-13 DIAGNOSIS — Z79.01 LONG TERM CURRENT USE OF ANTICOAGULANT THERAPY: Primary | ICD-10-CM

## 2022-05-13 LAB — INR, EXTERNAL: 1.9

## 2022-05-13 NOTE — PROGRESS NOTES
Verbal order and read back per Severiano Laguna NP  INR 1.9  - no greens today  Continue Coumadin to 2.5 mg daily except 3.75 mg on Mon. Recheck in 3-4 weeks. Patient's spouse made aware of dosing and recheck instructions, no questions.

## 2022-05-16 RX ORDER — DIGOXIN 125 MCG
TABLET ORAL
Qty: 90 TABLET | Refills: 3 | Status: SHIPPED | OUTPATIENT
Start: 2022-05-16

## 2022-05-29 LAB — INR, EXTERNAL: 3.1

## 2022-05-31 ENCOUNTER — TELEPHONE ANTICOAG (OUTPATIENT)
Dept: CARDIOLOGY CLINIC | Age: 77
End: 2022-05-31

## 2022-05-31 DIAGNOSIS — I48.91 ATRIAL FIBRILLATION, UNSPECIFIED TYPE (HCC): ICD-10-CM

## 2022-05-31 DIAGNOSIS — Z79.01 LONG TERM CURRENT USE OF ANTICOAGULANT THERAPY: Primary | ICD-10-CM

## 2022-05-31 NOTE — PROGRESS NOTES
Verbal order and read back per Nelly Solorio MD  INR 3.1  Continue Coumadin to 2.5 mg daily except 3.75 mg on Mon. Recheck in 3-4 weeks. Eat greens  Patient's spouse made aware of dosing and recheck instructions, no questions.

## 2022-06-16 ENCOUNTER — TELEPHONE ANTICOAG (OUTPATIENT)
Dept: CARDIOLOGY CLINIC | Age: 77
End: 2022-06-16

## 2022-06-16 DIAGNOSIS — Z79.01 LONG TERM CURRENT USE OF ANTICOAGULANT THERAPY: Primary | ICD-10-CM

## 2022-06-16 DIAGNOSIS — I48.11 LONGSTANDING PERSISTENT ATRIAL FIBRILLATION (HCC): ICD-10-CM

## 2022-06-16 LAB — INR, EXTERNAL: 2

## 2022-06-16 NOTE — PROGRESS NOTES
Verbal order and read back per Chidi Sharp MD    The INR is stable and therapeutic. Continue Coumadin to 2.5 mg daily except 3.75 mg on Mon. Recheck in 3-4 weeks.

## 2022-06-17 NOTE — PROGRESS NOTES
Verbal order and read back per Alfred Chavez MD     The INR is stable and therapeutic. Continue Coumadin to 2.5 mg daily except 3.75 mg on Mon. Recheck in 3-4 weeks. This has been fully explained to the patient, who indicates understanding.

## 2022-07-04 RX ORDER — BUMETANIDE 2 MG/1
TABLET ORAL
Qty: 90 TABLET | Refills: 3 | Status: SHIPPED | OUTPATIENT
Start: 2022-07-04

## 2022-07-05 RX ORDER — WARFARIN 2.5 MG/1
TABLET ORAL
Qty: 45 TABLET | Refills: 6 | Status: SHIPPED | OUTPATIENT
Start: 2022-07-05

## 2022-07-05 RX ORDER — CARVEDILOL 25 MG/1
TABLET ORAL
Qty: 180 TABLET | Refills: 3 | Status: SHIPPED | OUTPATIENT
Start: 2022-07-05

## 2022-07-05 RX ORDER — LISINOPRIL 20 MG/1
TABLET ORAL
Qty: 180 TABLET | Refills: 3 | Status: SHIPPED | OUTPATIENT
Start: 2022-07-05

## 2022-07-10 LAB — INR, EXTERNAL: 3.3

## 2022-07-11 ENCOUNTER — TELEPHONE ANTICOAG (OUTPATIENT)
Dept: CARDIOLOGY CLINIC | Age: 77
End: 2022-07-11

## 2022-07-11 DIAGNOSIS — I48.91 ATRIAL FIBRILLATION, UNSPECIFIED TYPE (HCC): ICD-10-CM

## 2022-07-11 DIAGNOSIS — Z79.01 LONG TERM CURRENT USE OF ANTICOAGULANT THERAPY: Primary | ICD-10-CM

## 2022-07-11 NOTE — PROGRESS NOTES
Verbal order and read back per Jie Rojas NP  iNR 3.3  Hold Coumadin today, then Continue Coumadin to 2.5 mg daily except 3.75 mg on Mon. Recheck in 3-4 weeks. Patient's spouse made aware of dosing and recheck instructions, no questions.

## 2022-07-13 ENCOUNTER — CLINICAL SUPPORT (OUTPATIENT)
Dept: CARDIOLOGY CLINIC | Age: 77
End: 2022-07-13
Payer: MEDICARE

## 2022-07-13 ENCOUNTER — OFFICE VISIT (OUTPATIENT)
Dept: CARDIOLOGY CLINIC | Age: 77
End: 2022-07-13

## 2022-07-13 VITALS
DIASTOLIC BLOOD PRESSURE: 84 MMHG | BODY MASS INDEX: 28.1 KG/M2 | OXYGEN SATURATION: 99 % | SYSTOLIC BLOOD PRESSURE: 136 MMHG | HEART RATE: 85 BPM | WEIGHT: 212 LBS | HEIGHT: 73 IN

## 2022-07-13 DIAGNOSIS — Z95.810 AICD (AUTOMATIC CARDIOVERTER/DEFIBRILLATOR) PRESENT: ICD-10-CM

## 2022-07-13 DIAGNOSIS — I42.8 NONISCHEMIC CARDIOMYOPATHY (HCC): ICD-10-CM

## 2022-07-13 DIAGNOSIS — Z79.01 LONG TERM CURRENT USE OF ANTICOAGULANT THERAPY: ICD-10-CM

## 2022-07-13 DIAGNOSIS — Z95.810 AICD (AUTOMATIC CARDIOVERTER/DEFIBRILLATOR) PRESENT: Primary | ICD-10-CM

## 2022-07-13 DIAGNOSIS — I48.21 PERMANENT ATRIAL FIBRILLATION (HCC): Primary | ICD-10-CM

## 2022-07-13 PROCEDURE — 93000 ELECTROCARDIOGRAM COMPLETE: CPT | Performed by: INTERNAL MEDICINE

## 2022-07-13 PROCEDURE — G8536 NO DOC ELDER MAL SCRN: HCPCS | Performed by: INTERNAL MEDICINE

## 2022-07-13 PROCEDURE — 99214 OFFICE O/P EST MOD 30 MIN: CPT | Performed by: INTERNAL MEDICINE

## 2022-07-13 PROCEDURE — G8417 CALC BMI ABV UP PARAM F/U: HCPCS | Performed by: INTERNAL MEDICINE

## 2022-07-13 PROCEDURE — 3017F COLORECTAL CA SCREEN DOC REV: CPT | Performed by: INTERNAL MEDICINE

## 2022-07-13 PROCEDURE — 93289 INTERROG DEVICE EVAL HEART: CPT | Performed by: INTERNAL MEDICINE

## 2022-07-13 PROCEDURE — 1101F PT FALLS ASSESS-DOCD LE1/YR: CPT | Performed by: INTERNAL MEDICINE

## 2022-07-13 PROCEDURE — 1123F ACP DISCUSS/DSCN MKR DOCD: CPT | Performed by: INTERNAL MEDICINE

## 2022-07-13 PROCEDURE — G8427 DOCREV CUR MEDS BY ELIG CLIN: HCPCS | Performed by: INTERNAL MEDICINE

## 2022-07-13 PROCEDURE — G8432 DEP SCR NOT DOC, RNG: HCPCS | Performed by: INTERNAL MEDICINE

## 2022-07-13 RX ORDER — MIRTAZAPINE 15 MG/1
15 TABLET, FILM COATED ORAL
COMMUNITY
Start: 2022-06-22 | End: 2022-07-13

## 2022-07-13 NOTE — PROGRESS NOTES
HISTORY OF PRESENT ILLNESS  Lucita Morales is a 77 y.o. male. ASSESSMENT and PLAN    Mr. Yasmine Villareal has history of nonischemic cardiomyopathy as well as severe pulmonary hypertension with moderate mitral regurgitation. Because of severe LV dysfunction, he has AICD in place for primary prevention of sudden cardiac death. He also has persistent atrial fibrillation. 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 His understanding is that the edges were clear. He had lost significant amount of weight, over 30 pounds. He had weighed 193 pounds back in October 2015. Eddie Hart is weight is now back to baseline at 194 pounds.    His last MUGA scan in November of 2015 showed ejection fraction of 45%. His echocardiogram in September of 2015 showed ejection fraction of 45-50% with pulmonary hypertension with PA pressure of 45 mmHg. He had AICD generator change in May 2018. · CAD:    He has no documented history of CAD. · Nonischemic DCM: He has AICD in place. · BP:    Well controlled at 136/84. · PAF:    Today, he is in atrial fibrillation at 85 bpm.  He remains on Coumadin. · CHF:    There is no evidence of decompensated CHF noted. · Weight:     His weight today is 212 pounds. His baseline weight is 220 pounds. · Cholesterol:   Target LDL <110. · Anti-platelet:   Remains on Coumadin for PAF.    I would continue his current cardiac medication regimen. I would defer his antidepressant medication regimen to his PCP. I will see him back in 6 months. Thank you. Encounter Diagnoses   Name Primary?     Permanent atrial fibrillation (HCC) Yes    Long term current use of anticoagulant therapy     AICD (automatic cardioverter/defibrillator) present     Nonischemic cardiomyopathy ([address]      current treatment plan is effective, no change in therapy  lab results and schedule of future lab studies reviewed with patient  reviewed diet, exercise and weight control  cardiovascular risk and specific lipid/LDL goals reviewed  use of aspirin to prevent MI and TIA's discussed      HPI   Today, Mr. Rena Jim has no complaints of chest pains. He denies any worsening dyspnea on exertion. He does have baseline dyspnea on exertion. He was recently started on new antidepressant medication in hopes of improving his appetite. However, he has felt worse since being initiated on the medication with lethargy, and insomnia. He has not noted significant improvement in his appetite. He denies any changes in his breathing. He denies significant, sudden changes in his exercise capacity. He has baseline SHRESTHA without change. He denies any chest pains. He denies any orthopnea or PND. Review of Systems   Constitutional: Positive for malaise/fatigue. Respiratory: Positive for shortness of breath. Cardiovascular: Negative for chest pain, palpitations, orthopnea, claudication, leg swelling and PND. All other systems reviewed and are negative. Physical Exam  Vitals and nursing note reviewed. Constitutional:       Appearance: Normal appearance. HENT:      Head: Normocephalic. Eyes:      Conjunctiva/sclera: Conjunctivae normal.   Neck:      Vascular: No carotid bruit. Cardiovascular:      Rate and Rhythm: Normal rate. Rhythm irregular. Pulmonary:      Breath sounds: Normal breath sounds. Abdominal:      Palpations: Abdomen is soft. Musculoskeletal:         General: No swelling. Cervical back: No rigidity. Skin:     General: Skin is warm and dry. Neurological:      General: No focal deficit present. Mental Status: He is alert and oriented to person, place, and time.    Psychiatric:         Mood and Affect: Mood normal.         Behavior: Behavior normal.         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 LVEDP 28.  EF 35%. Global hyk. Mod MR.  Cardiac echocardiogram 09/29/2016    LVE. EF 15% at most.  Indeterminate LV diastolic fx.  RVE. Reduced RV systolic fx. RVSP 80-85 mmHg. LAE.  LUISA. Mod-severe MR. Mild AI. Severe TR.  Cardiac MUGA scan 11/02/2015    At most mild LVE. EF 45-46%.  Cardiac nuclear imaging test, abnormal 02/03/2009    Mild basal/mid inferior, inferoapical, inferoseptal infarct w/mild ischemia in RCA (possibly partially artifact). Anterior, anteroseptal, anterapical ischemia w/o infarct. (Mid & distal LAD.)  LVE. EF 29%. Mod global hyk.  Cardiovascular LE venous duplex 09/29/2016    Tech difficult. No DVT bilaterally.  Cardiovascular renal duplex 06/07/2010    No evidence of renal artery stenosis >60%. Patent SMA and celiac artery.  Cardiovascular UE venous duplex 10/04/2016    No DVT bilaterally.     CHF (congestive heart failure) (HCC)     Diabetes ([address]     HTN (hypertension) 6/8/2010    Hypertensive cardiovascular disease     MGUS (monoclonal gammopathy of unknown significance)     MR (mitral regurgitation)     Nonischemic cardiomyopathy     3/11 echo EF 25%    Pulmonary hypertension, moderate to severe ([address] 6/8/2010    90-100mmHg; repeat echo in 3/11 showed 70mmHg    Thrombocytopenia ([address]        Past Surgical History:   Procedure Laterality Date    COLONOSCOPY N/A 6/15/2020    COLONOSCOPY polypectomies performed by Eliud Apple MD at [address] HX HERNIA REPAIR  2011    IR REMOVE TUNL CVAD W PORT/PUMP  4/19/2019       Current Outpatient Medications   Medication Sig Dispense Refill    carvediloL (COREG) 25 mg tablet TAKE 1 TABLET BY MOUTH  TWICE DAILY WITH MEALS 180 Tablet 3    lisinopriL (PRINIVIL, ZESTRIL) 20 mg tablet TAKE 1 TABLET BY MOUTH  TWICE DAILY 180 Tablet 3    warfarin (COUMADIN) 2.5 mg tablet TAKE 1 TABLET BY MOUTH ONCE DAILY OR AS DIRECTED 45 Tablet 6    bumetanide (BUMEX) 2 mg tablet TAKE 1 TABLET BY MOUTH  DAILY OR AS DIRECTED 90 Tablet 3    digoxin (LANOXIN) 0.125 mg tablet TAKE 1 TABLET BY MOUTH  DAILY 90 Tablet 3    spironolactone (ALDACTONE) 25 mg tablet TAKE 1 TABLET BY MOUTH  DAILY 90 Tablet 3    cloNIDine HCL (CATAPRES) 0.2 mg tablet TAKE 1 TABLET BY MOUTH 3  TIMES DAILY 270 Tablet 3    atorvastatin (LIPITOR) 20 mg tablet Take 20 mg by mouth daily.  insulin lispro (HUMALOG U-100 INSULIN) 100 unit/mL injection 5 Units by SubCUTAneous route three (3) times daily.  insulin glargine (LANTUS U-100 INSULIN) 100 unit/mL injection 20 Units by SubCUTAneous route nightly.  magnesium oxide (MAG-OX) 400 mg tablet TAKE 1 TABLET BY MOUTH DAILY. 90 Tab 3    sertraline (ZOLOFT) 25 mg tablet Take  by mouth daily.  allopurinol (ZYLOPRIM) 300 mg tablet Take  by mouth daily.  FERROUS SULFATE, DRIED (IRON, DRIED, PO) Take 325 mg by mouth daily.  aspirin delayed-release (ASPIR-81) 81 mg tablet Take 81 mg by mouth daily. The patient has a family history of    Social History     Tobacco Use    Smoking status: Never Smoker    Smokeless tobacco: Never Used   Substance Use Topics    Alcohol use: Yes     Comment: occasionally.  Drug use: No       Lab Results   Component Value Date/Time    Cholesterol, total 85 01/23/2016 05:30 AM    HDL Cholesterol 31 (L) 01/23/2016 05:30 AM    LDL, calculated 34 01/23/2016 05:30 AM    Triglyceride 100 01/23/2016 05:30 AM    CHOL/HDL Ratio 2.7 01/23/2016 05:30 AM        BP Readings from Last 3 Encounters:   07/13/22 136/84   01/12/22 114/64   06/08/21 (!) 148/82        Pulse Readings from Last 3 Encounters:   07/13/22 85   01/12/22 63   06/08/21 78       Wt Readings from Last 3 Encounters:   07/13/22 96.2 kg (212 lb)   01/12/22 97.5 kg (215 lb)   06/08/21 101.2 kg (223 lb)         EKG: atrial fibrillation, rate 85 bpm, nonspecific ST/T wave changes.

## 2022-07-13 NOTE — PROGRESS NOTES
Briseydalance Garcia presents today for   Chief Complaint   Patient presents with    Follow-up     6 months     Shortness of Breath     exertional     Lethargy     daily        Briseyda Garcia preferred language for health care discussion is english/other. Is someone accompanying this pt? wife    Is the patient using any DME equipment during [address]? Cane     Depression Screening:  3 most recent PHQ Screens 1/12/2022   Little interest or pleasure in doing things Not at all   Feeling down, depressed, irritable, or hopeless Not at all   Total Score PHQ 2 0       Learning Assessment:  Learning Assessment 4/2/2019   PRIMARY LEARNER Patient   HIGHEST LEVEL OF EDUCATION - PRIMARY LEARNER  -   [address] [facility]    NEED -   LEARNER PREFERENCE PRIMARY DEMONSTRATION   LEARNING SPECIAL TOPICS -   ANSWERED BY patient   RELATIONSHIP SELF       Abuse Screening:  Abuse Screening Questionnaire 1/12/2022   Do you ever feel afraid of your partner? N   Are you in a relationship with someone who physically or mentally threatens you? N   Is it safe for you to go home? Y       Fall Risk  Fall Risk Assessment, last 12 mths 1/12/2022   Able to walk? Yes   Fall in past 12 months? 0   Do you feel unsteady? 0   Are you worried about falling 0       Pt currently taking Anticoagulant therapy? Warfarin     Coordination of Care:  1. Have you been to the ER, urgent care clinic since your last visit? Hospitalized since your last visit? no    2. Have you seen or consulted any other health care providers outside of the [address] [place] [address] since your last visit? Include any pap smears or colon screening.  no

## 2022-07-15 NOTE — PROGRESS NOTES
Single AICD. 5.3 years left on battery. Lead impedance and threshold WNL. V paced 19 %. OP Vol elevated since June 21 and is ongoing. 1 nonsustained Vt, lasting 2 sec. Longest episode of afib was 10 hours. Patient is on coumadin.

## 2022-08-01 LAB — INR, EXTERNAL: 2.1

## 2022-08-02 ENCOUNTER — TELEPHONE ANTICOAG (OUTPATIENT)
Dept: CARDIOLOGY CLINIC | Age: 77
End: 2022-08-02

## 2022-08-02 DIAGNOSIS — Z79.01 LONG TERM CURRENT USE OF ANTICOAGULANT THERAPY: Primary | ICD-10-CM

## 2022-08-02 DIAGNOSIS — I48.21 PERMANENT ATRIAL FIBRILLATION (HCC): ICD-10-CM

## 2022-08-02 NOTE — PROGRESS NOTES
Verbal order and read back per Dahlia Carlton NP  INR within therapeutic range  Continue Coumadin to 2.5 mg daily except 3.75 mg on Mon. Recheck in 3 weeks.

## 2022-08-02 NOTE — PROGRESS NOTES
Verbal order and read back per Beth Bosch NP    The INR is stable and therapeutic. Continue Coumadin to 2.5 mg daily except 3.75 mg on Mon. Recheck in 3 weeks.

## 2022-09-07 ENCOUNTER — TELEPHONE ANTICOAG (OUTPATIENT)
Dept: CARDIOLOGY CLINIC | Age: 77
End: 2022-09-07

## 2022-09-07 DIAGNOSIS — Z79.01 LONG TERM CURRENT USE OF ANTICOAGULANT THERAPY: Primary | ICD-10-CM

## 2022-09-07 DIAGNOSIS — I48.91 ATRIAL FIBRILLATION, UNSPECIFIED TYPE (HCC): ICD-10-CM

## 2022-09-07 LAB — INR, EXTERNAL: 3.9

## 2022-09-07 NOTE — PROGRESS NOTES
Verbal order and read back per Lv Weathers NP  iNR 3.9  Hold Coumadin today; then Continue Coumadin to 2.5 mg daily except 3.75 mg on Mon. Recheck in 3 weeks. Patient made aware of dosing and recheck instructions, no questions.

## 2022-09-27 LAB — INR, EXTERNAL: 2.7

## 2022-09-28 ENCOUNTER — TELEPHONE ANTICOAG (OUTPATIENT)
Dept: CARDIOLOGY CLINIC | Age: 77
End: 2022-09-28

## 2022-09-28 DIAGNOSIS — I48.91 ATRIAL FIBRILLATION, UNSPECIFIED TYPE (HCC): ICD-10-CM

## 2022-09-28 DIAGNOSIS — Z79.01 LONG TERM CURRENT USE OF ANTICOAGULANT THERAPY: Primary | ICD-10-CM

## 2022-10-07 RX ORDER — CLONIDINE HYDROCHLORIDE 0.2 MG/1
TABLET ORAL
Qty: 270 TABLET | Refills: 3 | Status: SHIPPED | OUTPATIENT
Start: 2022-10-07

## 2022-10-19 RX ORDER — SPIRONOLACTONE 25 MG/1
TABLET ORAL
Qty: 90 TABLET | Refills: 3 | Status: SHIPPED | OUTPATIENT
Start: 2022-10-19

## 2022-10-21 ENCOUNTER — TELEPHONE ANTICOAG (OUTPATIENT)
Dept: CARDIOLOGY CLINIC | Age: 77
End: 2022-10-21

## 2022-10-21 DIAGNOSIS — Z79.01 LONG TERM CURRENT USE OF ANTICOAGULANT THERAPY: Primary | ICD-10-CM

## 2022-10-21 DIAGNOSIS — I48.91 ATRIAL FIBRILLATION, UNSPECIFIED TYPE (HCC): ICD-10-CM

## 2022-10-21 LAB — INR, EXTERNAL: 5.2

## 2022-10-21 NOTE — PROGRESS NOTES
Verbal order and read back per Eleanor Mcghee NP  iNR 5.2  - eat greens  Hold Coumadin x2 days; then Continue Coumadin to 2.5 mg daily except 3.75 mg on Mon. Recheck in 3 weeks. Patient's spouse made aware of dosing and recheck instructions, no questions.

## 2022-10-26 ENCOUNTER — TELEPHONE ANTICOAG (OUTPATIENT)
Dept: CARDIOLOGY CLINIC | Age: 77
End: 2022-10-26

## 2022-10-26 DIAGNOSIS — I48.91 ATRIAL FIBRILLATION, UNSPECIFIED TYPE (HCC): ICD-10-CM

## 2022-10-26 DIAGNOSIS — Z79.01 LONG TERM CURRENT USE OF ANTICOAGULANT THERAPY: Primary | ICD-10-CM

## 2022-10-26 LAB — INR, EXTERNAL: 2.6

## 2022-10-26 NOTE — PROGRESS NOTES
Verbal order and read back per Fernie Hernandez NP  INR within therapeutic range  Change Coumadin to 2.5 mg daily. Recheck in 3 weeks. Patient's spouse made aware of dosing and recheck instructions, no questions.

## 2022-11-30 LAB — INR, EXTERNAL: 3.8

## 2022-12-02 ENCOUNTER — TELEPHONE ANTICOAG (OUTPATIENT)
Dept: CARDIOLOGY CLINIC | Age: 77
End: 2022-12-02

## 2022-12-02 DIAGNOSIS — I48.91 ATRIAL FIBRILLATION, UNSPECIFIED TYPE (HCC): ICD-10-CM

## 2022-12-02 DIAGNOSIS — Z79.01 LONG TERM CURRENT USE OF ANTICOAGULANT THERAPY: Primary | ICD-10-CM

## 2022-12-08 ENCOUNTER — TELEPHONE ANTICOAG (OUTPATIENT)
Dept: CARDIOLOGY CLINIC | Age: 77
End: 2022-12-08

## 2022-12-08 DIAGNOSIS — I48.91 ATRIAL FIBRILLATION, UNSPECIFIED TYPE (HCC): ICD-10-CM

## 2022-12-08 DIAGNOSIS — Z79.01 LONG TERM CURRENT USE OF ANTICOAGULANT THERAPY: Primary | ICD-10-CM

## 2022-12-08 LAB — INR, EXTERNAL: 2.7

## 2022-12-08 NOTE — PROGRESS NOTES
Verbal order and read back per Jimenez Lilly NP  INR within therapeutic range  Coumadin to 2.5 mg daily except Coumadin 1.25mg on Mondays and Wednesdays. . Recheck in 3 weeks. Patient's spouse made aware of dosing and recheck instructions, no questions.

## 2022-12-20 ENCOUNTER — TELEPHONE ANTICOAG (OUTPATIENT)
Dept: CARDIOLOGY CLINIC | Age: 77
End: 2022-12-20

## 2022-12-20 DIAGNOSIS — I48.91 ATRIAL FIBRILLATION, UNSPECIFIED TYPE (HCC): ICD-10-CM

## 2022-12-20 DIAGNOSIS — Z79.01 LONG TERM CURRENT USE OF ANTICOAGULANT THERAPY: Primary | ICD-10-CM

## 2022-12-20 LAB — INR, EXTERNAL: 3.1

## 2022-12-20 NOTE — PROGRESS NOTES
Verbal order and read back per Megha Bates MD  iNR 3.1  Coumadin to 2.5 mg daily except Coumadin 1.25mg on Mondays and Wednesdays. . Recheck in 3 weeks. Eat greens. Patient's spouse made aware of dosing and recheck instructions, no questions.

## 2023-01-12 ENCOUNTER — TELEPHONE ANTICOAG (OUTPATIENT)
Dept: CARDIOLOGY CLINIC | Age: 78
End: 2023-01-12

## 2023-01-12 DIAGNOSIS — Z79.01 LONG TERM CURRENT USE OF ANTICOAGULANT THERAPY: Primary | ICD-10-CM

## 2023-01-12 DIAGNOSIS — I48.91 ATRIAL FIBRILLATION, UNSPECIFIED TYPE (HCC): ICD-10-CM

## 2023-01-12 LAB — INR, EXTERNAL: 3.2

## 2023-01-12 NOTE — PROGRESS NOTES
Verbal order and read back per Jimenez Lilly NP  INR 3.2  Coumadin 1.25mg today; then change to Coumadin to 2.5 mg daily except Coumadin 1.25mg on Mondays - Wednesdays - Fridays. . Recheck in 3 weeks. Patient's spouse made aware of dosing and recheck instructions, no questions.

## 2023-02-08 ENCOUNTER — TELEPHONE ANTICOAG (OUTPATIENT)
Dept: CARDIOLOGY CLINIC | Age: 78
End: 2023-02-08

## 2023-02-08 DIAGNOSIS — Z79.01 LONG TERM CURRENT USE OF ANTICOAGULANT THERAPY: Primary | ICD-10-CM

## 2023-02-08 DIAGNOSIS — I48.21 PERMANENT ATRIAL FIBRILLATION (HCC): ICD-10-CM

## 2023-02-08 LAB
INR BLD: 2.5
INR, EXTERNAL: 2.5

## 2023-02-15 ENCOUNTER — OFFICE VISIT (OUTPATIENT)
Age: 78
End: 2023-02-15
Payer: MEDICARE

## 2023-02-15 ENCOUNTER — NURSE ONLY (OUTPATIENT)
Age: 78
End: 2023-02-15

## 2023-02-15 VITALS
OXYGEN SATURATION: 100 % | HEIGHT: 73 IN | SYSTOLIC BLOOD PRESSURE: 130 MMHG | DIASTOLIC BLOOD PRESSURE: 60 MMHG | WEIGHT: 212 LBS | HEART RATE: 69 BPM | BODY MASS INDEX: 28.1 KG/M2

## 2023-02-15 DIAGNOSIS — I42.8 NONISCHEMIC CARDIOMYOPATHY (HCC): Primary | ICD-10-CM

## 2023-02-15 DIAGNOSIS — I48.91 ATRIAL FIBRILLATION, UNSPECIFIED TYPE (HCC): ICD-10-CM

## 2023-02-15 DIAGNOSIS — I48.21 PERMANENT ATRIAL FIBRILLATION (HCC): ICD-10-CM

## 2023-02-15 DIAGNOSIS — I42.8 NONISCHEMIC CARDIOMYOPATHY (HCC): ICD-10-CM

## 2023-02-15 DIAGNOSIS — Z79.01 LONG TERM (CURRENT) USE OF ANTICOAGULANTS: ICD-10-CM

## 2023-02-15 DIAGNOSIS — Z79.01 LONG TERM (CURRENT) USE OF ANTICOAGULANTS: Primary | ICD-10-CM

## 2023-02-15 DIAGNOSIS — Z95.810 PRESENCE OF IMPLANTABLE CARDIOVERTER-DEFIBRILLATOR (ICD): ICD-10-CM

## 2023-02-15 DIAGNOSIS — I27.20 PULMONARY HYPERTENSION, UNSPECIFIED (HCC): ICD-10-CM

## 2023-02-15 DIAGNOSIS — I48.21 PERMANENT ATRIAL FIBRILLATION (HCC): Primary | ICD-10-CM

## 2023-02-15 DIAGNOSIS — I42.8 OTHER CARDIOMYOPATHIES (HCC): ICD-10-CM

## 2023-02-15 PROCEDURE — 1036F TOBACCO NON-USER: CPT | Performed by: INTERNAL MEDICINE

## 2023-02-15 PROCEDURE — 1123F ACP DISCUSS/DSCN MKR DOCD: CPT | Performed by: INTERNAL MEDICINE

## 2023-02-15 PROCEDURE — G8484 FLU IMMUNIZE NO ADMIN: HCPCS | Performed by: INTERNAL MEDICINE

## 2023-02-15 PROCEDURE — 3078F DIAST BP <80 MM HG: CPT | Performed by: INTERNAL MEDICINE

## 2023-02-15 PROCEDURE — G8427 DOCREV CUR MEDS BY ELIG CLIN: HCPCS | Performed by: INTERNAL MEDICINE

## 2023-02-15 PROCEDURE — G8417 CALC BMI ABV UP PARAM F/U: HCPCS | Performed by: INTERNAL MEDICINE

## 2023-02-15 PROCEDURE — 93000 ELECTROCARDIOGRAM COMPLETE: CPT | Performed by: INTERNAL MEDICINE

## 2023-02-15 PROCEDURE — 3075F SYST BP GE 130 - 139MM HG: CPT | Performed by: INTERNAL MEDICINE

## 2023-02-15 PROCEDURE — 99214 OFFICE O/P EST MOD 30 MIN: CPT | Performed by: INTERNAL MEDICINE

## 2023-02-15 RX ORDER — CLONIDINE HYDROCHLORIDE 0.2 MG/1
TABLET ORAL
COMMUNITY
Start: 2022-10-07

## 2023-02-15 RX ORDER — WARFARIN SODIUM 2.5 MG/1
TABLET ORAL
COMMUNITY
Start: 2022-12-27

## 2023-02-15 RX ORDER — ATORVASTATIN CALCIUM 10 MG/1
TABLET, FILM COATED ORAL
COMMUNITY
Start: 2022-12-02

## 2023-02-15 RX ORDER — ASPIRIN 81 MG/1
81 TABLET ORAL DAILY
COMMUNITY

## 2023-02-15 RX ORDER — SPIRONOLACTONE 25 MG/1
TABLET ORAL
COMMUNITY
Start: 2022-12-07

## 2023-02-15 RX ORDER — LISINOPRIL 20 MG/1
TABLET ORAL
COMMUNITY
Start: 2022-07-05

## 2023-02-15 RX ORDER — SERTRALINE HYDROCHLORIDE 25 MG/1
TABLET, FILM COATED ORAL
COMMUNITY
Start: 2023-01-05

## 2023-02-15 RX ORDER — CARVEDILOL 25 MG/1
TABLET ORAL
COMMUNITY
Start: 2022-07-05

## 2023-02-15 RX ORDER — DIGOXIN 125 MCG
TABLET ORAL
COMMUNITY
Start: 2022-11-17

## 2023-02-15 RX ORDER — BUMETANIDE 2 MG/1
TABLET ORAL
COMMUNITY
Start: 2022-12-07

## 2023-02-15 NOTE — PROGRESS NOTES
HISTORY OF PRESENT ILLNESS  Jose Maria Hernandez  69 y.o. male     Chief Complaint   Patient presents with    Follow-up     6 month follow up          ASSESSMENT and PLAN    The primary encounter diagnosis was Permanent atrial fibrillation ([address] Diagnoses of Long term (current) use of anticoagulants, Other cardiomyopathies ([address] Pulmonary hypertension, unspecified ([address] and Nonischemic cardiomyopathy ([address] were also pertinent to this visit. Mr. Jose Garner has history of nonischemic cardiomyopathy as well as severe pulmonary hypertension with moderate mitral regurgitation. Because of severe LV dysfunction, he has AICD in place for primary prevention of sudden cardiac death. He also has persistent atrial fibrillation. 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. His understanding is that the edges were clear. He had lost significant amount of weight, over 30 pounds. He had weighed 193 pounds back in October 2015. He is weight is now back to baseline at 194 pounds. His last MUGA scan in November of 2015 showed ejection fraction of 45%. His echocardiogram in September of 2015 showed ejection fraction of 45-50% with pulmonary hypertension with PA pressure of 45 mmHg. He had AICD generator change in May 2018. CAD:    He has no documented history of CAD. Nonischemic DCM: He has AICD in place. It was interrogated today. He has not had significant VT episodes. He has over 5-year battery life left. BP:    Well controlled at 130/60. PAF:    Today, he is in atrial fibrillation at 69 bpm.  He remains on Coumadin. CHF:    There is no evidence of decompensated CHF noted. Weight:     His weight today is 193 pounds. His baseline weight is 200 pounds. His current weight is acceptable. Cholesterol:   Target LDL <110. Anti-platelet:   Remains on Coumadin for PAF. I would continue his current cardiac medication regimen.   I would defer his antidepressant medication regimen to his PCP. I will see him back in 6 months. Thank you. Orders Placed This Encounter   Procedures    EKG 12 Lead     Order Specific Question:   Reason for Exam?     Answer:   Irregular heart rate        HPI  Today, Mr. Rosezena Hashimoto has no complaints of chest pains. He has noted mild, gradual decline in his overall activity capacity. He denies any orthopnea or PND. He denies any palpitations or dizziness. He has noted mildly increased dyspnea on exertion from his baseline. Review of Systems  14 point review of system is negative unless mentioned in HPI    Physical Exam  Vitals and nursing note reviewed. Constitutional:       Appearance: Normal appearance. HENT:      Head: Normocephalic. Eyes:      Conjunctiva/sclera: Conjunctivae normal.   Neck:      Vascular: No carotid bruit. Cardiovascular:      Rate and Rhythm: Normal rate. Rhythm irregular. Pulmonary:      Breath sounds: Normal breath sounds. Abdominal:      Palpations: Abdomen is soft. Musculoskeletal:         General: No swelling. Cervical back: No rigidity. Skin:     General: Skin is warm and dry. Neurological:      General: No focal deficit present. Mental Status: He is alert and oriented to person, place, and time. Psychiatric:         Mood and Affect: Mood normal.         Behavior: Behavior normal.          PCP: Catherine Smith MD    Past Medical History:   Diagnosis Date    Abnormal myocardial perfusion study 11/02/2015    At most mild LVE. EF 45-46%. Abnormal nuclear cardiac imaging test 02/03/2009    Mild basal/mid inferior, inferoapical, inferoseptal infarct w/mild ischemia in RCA (possibly partially artifact). Anterior, anteroseptal, anterapical ischemia w/o infarct. (Mid & distal LAD.)  LVE. EF 29%. Mod global hyk.       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 DVT (deep venous thrombosis) ([facility] 09/29/2016    Tech difficult. No DVT bilaterally. History of echocardiogram 09/29/2016    LVE. EF 15% at most.  Indeterminate LV diastolic fx.  RVE. Reduced RV systolic fx. RVSP 80-85 mmHg. LAE.  DAYSI. Mod-severe MR. Mild AI. Severe TR. HTN (hypertension) 6/8/2010    Hypertensive cardiovascular disease     MGUS (monoclonal gammopathy of unknown significance)     MR (mitral regurgitation)     Pulmonary hypertension, moderate to severe ([facility] 6/8/2010    90-100mmHg; repeat echo in 3/11 showed 70mmHg    Renal artery stenosis ([facility] 06/07/2010    No evidence of renal artery stenosis >60%. Patent SMA and celiac artery. S/P cardiac cath 02/11/2009    Patent coronary arteries. LVEDP 28.  EF 35%. Global hyk. Mod MR.       Thrombocytopenia ([facility]        Past Surgical History:   Procedure Laterality Date    COLONOSCOPY N/A 6/15/2020    COLONOSCOPY polypectomies performed by Alysia Bowman MD at [address] [place] [address] Drive  2011    IR REMOVE TUNNELED VAD W PORT  4/19/2019       Current Outpatient Medications   Medication Sig Dispense Refill    lisinopril (PRINIVIL;ZESTRIL) 20 MG tablet TAKE 1 TABLET BY MOUTH  TWICE DAILY      cloNIDine (CATAPRES) 0.2 MG tablet TAKE 1 TABLET BY MOUTH 3  TIMES DAILY      carvedilol (COREG) 25 MG tablet TAKE 1 TABLET BY MOUTH  TWICE DAILY WITH MEALS      aspirin 81 MG EC tablet Take 81 mg by mouth daily      warfarin (COUMADIN) 2.5 MG tablet TAKE 1 TABLET BY MOUTH ONCE DAILY OR AS DIRECTED      spironolactone (ALDACTONE) 25 MG tablet       sertraline (ZOLOFT) 25 MG tablet TAKE 1 TABLET BY MOUTH EVERY DAY      digoxin (LANOXIN) 125 MCG tablet       bumetanide (BUMEX) 2 MG tablet       atorvastatin (LIPITOR) 10 MG tablet TAKE 1 TABLET BY MOUTH EVERYDAY AT BEDTIME       No current facility-administered medications for this visit. The patient has a family history of    Social History     Tobacco Use    Smoking status: Never    Smokeless tobacco: Never   Substance Use Topics    Alcohol use: Yes    Drug use: No       No results found for: CHOL, CHOLX, CHLST, CHOLV, HDL, HDLC, LDL, LDLC, TGLX, TRIGL     BP Readings from Last 3 Encounters:   02/15/23 130/60   07/13/22 136/84   01/12/22 114/64        Pulse Readings from Last 3 Encounters:   02/15/23 69   07/13/22 85   01/12/22 63       Wt Readings from Last 3 Encounters:   02/15/23 212 lb (96.2 kg)   07/13/22 212 lb (96.2 kg)   01/12/22 215 lb (97.5 kg)         EKG: nonspecific ST and T waves changes, atrial fibrillation, rate 69 bpm with borderline voltage criteria for LVH, unchanged from previous tracings.      Delores Conklin MD, MD   2/15/2023

## 2023-03-16 ENCOUNTER — TELEPHONE (OUTPATIENT)
Age: 78
End: 2023-03-16

## 2023-03-19 LAB — INR BLD: 4

## 2023-03-20 ENCOUNTER — TELEPHONE (OUTPATIENT)
Age: 78
End: 2023-03-20

## 2023-03-20 ENCOUNTER — ANTI-COAG VISIT (OUTPATIENT)
Age: 78
End: 2023-03-20

## 2023-03-20 DIAGNOSIS — I48.11 LONGSTANDING PERSISTENT ATRIAL FIBRILLATION (HCC): Primary | ICD-10-CM

## 2023-03-20 NOTE — PROGRESS NOTES
Verbal order and read back per Kattie Klinefelter, MD  INR 4.0  Hold x 2 then continue Coumadin to 2.5 mg daily except Coumadin 1.25mg on Mondays - Wednesdays - Fridays. . Recheck in 1 week  Patient made aware of dosing and recheck instructions, no questions. Patient made aware of dosing and recheck instructions, no questions.

## 2023-04-24 RX ORDER — DIGOXIN 125 MCG
TABLET ORAL
Qty: 90 TABLET | Refills: 3 | Status: SHIPPED | OUTPATIENT
Start: 2023-04-24

## 2023-04-27 LAB — INR BLD: 2.1

## 2023-04-28 ENCOUNTER — ANTI-COAG VISIT (OUTPATIENT)
Age: 78
End: 2023-04-28

## 2023-04-28 DIAGNOSIS — I48.11 LONGSTANDING PERSISTENT ATRIAL FIBRILLATION (HCC): Primary | ICD-10-CM

## 2023-04-28 NOTE — PROGRESS NOTES
Verbal order and read back per HORACE Phillips NP  INR within therapeutic range   Continue Coumadin to 2.5 mg daily except Coumadin 1.25mg on Mondays - Wednesdays - Fridays. . Recheck in 1 week  Patient's wife made aware of dosing and recheck instructions, no questions.

## 2023-05-05 ENCOUNTER — ANTI-COAG VISIT (OUTPATIENT)
Age: 78
End: 2023-05-05

## 2023-05-05 DIAGNOSIS — I48.11 LONGSTANDING PERSISTENT ATRIAL FIBRILLATION (HCC): Primary | ICD-10-CM

## 2023-05-05 LAB — INR BLD: 4.1

## 2023-05-05 NOTE — PROGRESS NOTES
The INR is above the therapeutic range. Verbal order with read back Vero Sarabia NP      Hold today, take 1.25 mg tomorrow , Continue Coumadin to 2.5 mg daily except Coumadin 1.25mg on Mondays - Wednesdays - Fridays. . Recheck in 1 week  Patient made aware of dosing and recheck instructions, no questions.

## 2023-05-17 ENCOUNTER — ANTI-COAG VISIT (OUTPATIENT)
Age: 78
End: 2023-05-17

## 2023-05-17 ENCOUNTER — OFFICE VISIT (OUTPATIENT)
Age: 78
End: 2023-05-17
Payer: MEDICARE

## 2023-05-17 DIAGNOSIS — I48.91 ATRIAL FIBRILLATION (HCC): ICD-10-CM

## 2023-05-17 DIAGNOSIS — I42.8 NONISCHEMIC CARDIOMYOPATHY (HCC): ICD-10-CM

## 2023-05-17 DIAGNOSIS — I48.11 LONGSTANDING PERSISTENT ATRIAL FIBRILLATION (HCC): Primary | ICD-10-CM

## 2023-05-17 DIAGNOSIS — Z95.810 PRESENCE OF IMPLANTABLE CARDIOVERTER-DEFIBRILLATOR (ICD): Primary | ICD-10-CM

## 2023-05-17 LAB — INR BLD: 1.4

## 2023-05-17 NOTE — PROGRESS NOTES
Verbal order and read back per HORACE Singh NP  INR-1.4  Take 5 mg today , then  Continue Coumadin to 2.5 mg daily except Coumadin 1.25mg on Mondays - Wednesdays - Fridays. . Recheck in 1 week  Patient made aware of dosing and recheck instructions, no questions.

## 2023-05-23 PROCEDURE — 93296 REM INTERROG EVL PM/IDS: CPT | Performed by: INTERNAL MEDICINE

## 2023-05-23 PROCEDURE — 93295 DEV INTERROG REMOTE 1/2/MLT: CPT | Performed by: INTERNAL MEDICINE

## 2023-05-23 NOTE — PROGRESS NOTES
[facility] :  single AICD. 4.8 years left on battery. Lead impedance and threshold WNL. V paced 16 %. OP Vol WNL. 0 episodes, on coumadin for afib. Dr Delta Amezcua patient.

## 2023-05-25 NOTE — RESULT ENCOUNTER NOTE
Device check personally reviewed by me. Normal device function on interrogation. No significant arrhythmias. See scanned interrogation document for complete details.

## 2023-06-04 LAB — INR BLD: 3.4

## 2023-06-05 ENCOUNTER — ANTI-COAG VISIT (OUTPATIENT)
Age: 78
End: 2023-06-05

## 2023-06-05 DIAGNOSIS — I48.11 LONGSTANDING PERSISTENT ATRIAL FIBRILLATION (HCC): Primary | ICD-10-CM

## 2023-06-05 NOTE — PROGRESS NOTES
Verbal order and read back per HORACE Fermin NP  INR-3.4   Continue Coumadin to 2.5 mg daily except Coumadin 1.25mg on Mondays - Wednesdays - Fridays. Recheck in 1 week  Patient made aware of dosing and recheck instructions, no questions.

## 2023-07-01 LAB — INR BLD: 3.3

## 2023-07-03 ENCOUNTER — ANTI-COAG VISIT (OUTPATIENT)
Age: 78
End: 2023-07-03

## 2023-07-03 NOTE — PROGRESS NOTES
Verbal order and read back per Spenser Ocasio MD  INR-3.3   Continue Coumadin to 2.5 mg daily except Coumadin 1.25mg on Mondays - Wednesdays - Fridays. . Recheck in 1 week  Patient made aware of dosing and recheck instructions, no questions.

## 2023-07-18 ENCOUNTER — ANTI-COAG VISIT (OUTPATIENT)
Age: 78
End: 2023-07-18

## 2023-07-18 LAB — INR BLD: 1.8

## 2023-07-18 NOTE — PROGRESS NOTES
Verbal order and read back per HORACE Montez NP  INR-1.8   Continue Coumadin to 2.5 mg daily except Coumadin 1.25mg on Mondays - Wednesdays - Fridays. . Recheck in 1 week  Patient made aware of dosing and recheck instructions, no questions.

## 2023-07-24 RX ORDER — WARFARIN SODIUM 2.5 MG/1
TABLET ORAL
Qty: 90 TABLET | Refills: 3 | Status: SHIPPED | OUTPATIENT
Start: 2023-07-24

## 2023-07-26 RX ORDER — BUMETANIDE 2 MG/1
TABLET ORAL
Qty: 90 TABLET | Refills: 3 | Status: SHIPPED | OUTPATIENT
Start: 2023-07-26

## 2023-08-16 ENCOUNTER — OFFICE VISIT (OUTPATIENT)
Age: 78
End: 2023-08-16

## 2023-08-16 ENCOUNTER — NURSE ONLY (OUTPATIENT)
Age: 78
End: 2023-08-16

## 2023-08-16 VITALS
HEART RATE: 77 BPM | BODY MASS INDEX: 26.24 KG/M2 | DIASTOLIC BLOOD PRESSURE: 60 MMHG | SYSTOLIC BLOOD PRESSURE: 132 MMHG | HEIGHT: 73 IN | WEIGHT: 198 LBS | OXYGEN SATURATION: 99 %

## 2023-08-16 DIAGNOSIS — I42.8 OTHER CARDIOMYOPATHIES (HCC): ICD-10-CM

## 2023-08-16 DIAGNOSIS — Z95.810 PRESENCE OF IMPLANTABLE CARDIOVERTER-DEFIBRILLATOR (ICD): ICD-10-CM

## 2023-08-16 DIAGNOSIS — I42.8 NONISCHEMIC CARDIOMYOPATHY (HCC): ICD-10-CM

## 2023-08-16 DIAGNOSIS — I48.11 LONGSTANDING PERSISTENT ATRIAL FIBRILLATION (HCC): Primary | ICD-10-CM

## 2023-08-16 DIAGNOSIS — Z79.01 LONG TERM (CURRENT) USE OF ANTICOAGULANTS: ICD-10-CM

## 2023-08-16 DIAGNOSIS — I27.20 PULMONARY HYPERTENSION, UNSPECIFIED (HCC): ICD-10-CM

## 2023-08-16 LAB — INTERNATIONAL NORMALIZATION RATIO, POC: 1.7

## 2023-08-16 ASSESSMENT — PATIENT HEALTH QUESTIONNAIRE - PHQ9
SUM OF ALL RESPONSES TO PHQ QUESTIONS 1-9: 0
2. FEELING DOWN, DEPRESSED OR HOPELESS: 0
SUM OF ALL RESPONSES TO PHQ QUESTIONS 1-9: 0
SUM OF ALL RESPONSES TO PHQ QUESTIONS 1-9: 0
1. LITTLE INTEREST OR PLEASURE IN DOING THINGS: 0
SUM OF ALL RESPONSES TO PHQ QUESTIONS 1-9: 0
SUM OF ALL RESPONSES TO PHQ9 QUESTIONS 1 & 2: 0

## 2023-08-16 NOTE — PROGRESS NOTES
Yary Liu presents today for   Chief Complaint   Patient presents with    Follow-up     6 month f/u     Device Check     [employer]     Shortness of Breath     SOB with exertion        Yary Liu preferred language for health care discussion is english/other. Is someone accompanying this pt? no    Is the patient using any DME equipment during OV? no    Depression Screening:  Depression: Not at risk    PHQ-2 Score: 0        Learning Assessment:  Who is the primary learner? Patient    What is the preferred language for health care of the primary learner? ENGLISH    How does the primary learner prefer to learn new concepts? DEMONSTRATION    Answered By patient    Relationship to Learner SELF           Pt currently taking Anticoagulant therapy? Warfarin 1.25 on Mon,Wed, Fri and 2.5 mg on other days    Pt currently taking Antiplatelet therapy ? ASA 81 mg 1x daily       Coordination of Care:  1. Have you been to the ER, urgent care clinic since your last visit? Hospitalized since your last visit? no    2. Have you seen or consulted any other health care providers outside of the [address] [place] [address] since your last visit? Include any pap smears or colon screening.  no
melanoma in ankle    Cardiomyopathy, nonischemic ([address])     3/11 echo EF 25%    CHF (congestive heart failure) ([address])     Diabetes ([address])     DVT (deep venous thrombosis) ([address]) 10/04/2016    No DVT bilaterally. DVT (deep venous thrombosis) ([address]) 09/29/2016    Tech difficult. No DVT bilaterally. History of echocardiogram 09/29/2016    LVE. EF 15% at most.  Indeterminate LV diastolic fx.  RVE. Reduced RV systolic fx. RVSP 80-85 mmHg. LAE.  DAYSI. Mod-severe MR. Mild AI. Severe TR. HTN (hypertension) 6/8/2010    Hypertensive cardiovascular disease     MGUS (monoclonal gammopathy of unknown significance)     MR (mitral regurgitation)     Pulmonary hypertension, moderate to severe ([address]) 6/8/2010    90-100mmHg; repeat echo in 3/11 showed 70mmHg    Renal artery stenosis ([address]) 06/07/2010    No evidence of renal artery stenosis >60%. Patent SMA and celiac artery. S/P cardiac cath 02/11/2009    Patent coronary arteries. LVEDP 28.  EF 35%. Global hyk. Mod MR.       Thrombocytopenia ([address])        Past Surgical History:   Procedure Laterality Date    COLONOSCOPY N/A 6/15/2020    COLONOSCOPY polypectomies performed by Natasha Alcazar MD at [facility]  2011    IR REMOVE TUNNELED VAD W PORT  4/19/2019       Current Outpatient Medications   Medication Sig Dispense Refill    bumetanide (BUMEX) 2 MG tablet TAKE 1 TABLET BY MOUTH  DAILY OR AS DIRECTED (Patient taking differently: Take 1 tablet by mouth daily) 90 tablet 3    warfarin (COUMADIN) 2.5 MG tablet TAKE 1 TABLET BY MOUTH ONCE DAILY OR AS DIRECTED (Patient taking differently: Take 1 tablet by mouth daily TAKE 1 TABLET BY MOUTH ONCE DAILY OR AS DIRECTED) 90 tablet 3    digoxin (LANOXIN) 125 MCG tablet TAKE 1 TABLET BY MOUTH  DAILY (Patient taking differently: Take 1 tablet by mouth daily) 90 tablet 3    allopurinol (ZYLOPRIM) 300 MG tablet Take 1 tablet by mouth daily      insulin glargine (LANTUS) 100 UNIT/ML injection vial Inject 20 Units

## 2023-08-18 LAB — INR BLD: 3.7

## 2023-08-21 ENCOUNTER — ANTI-COAG VISIT (OUTPATIENT)
Age: 78
End: 2023-08-21

## 2023-08-21 DIAGNOSIS — I48.91 ATRIAL FIBRILLATION (HCC): Primary | ICD-10-CM

## 2023-08-21 NOTE — PROGRESS NOTES
Continue Coumadin to 2.5 mg daily except Coumadin 1.25mg on Mondays - Wednesdays -   Eat more green vegetables   Fridays. . Recheck in 1 week  Patient made aware of dosing and recheck instructions, no questions.

## 2023-08-23 ENCOUNTER — ANTI-COAG VISIT (OUTPATIENT)
Age: 78
End: 2023-08-23

## 2023-08-28 RX ORDER — LISINOPRIL 20 MG/1
TABLET ORAL
Qty: 180 TABLET | Refills: 3 | Status: SHIPPED | OUTPATIENT
Start: 2023-08-28

## 2023-08-28 RX ORDER — CARVEDILOL 25 MG/1
TABLET ORAL
Qty: 180 TABLET | Refills: 3 | Status: SHIPPED | OUTPATIENT
Start: 2023-08-28

## 2023-08-30 LAB — INR BLD: 1.9

## 2023-08-31 ENCOUNTER — ANTI-COAG VISIT (OUTPATIENT)
Age: 78
End: 2023-08-31

## 2023-08-31 NOTE — PROGRESS NOTES
Verbal order and read back per Damon Paredes MD  INR-1.9  Continue Coumadin to 2.5 mg daily except Coumadin 1.25mg on Mondays - Wednesdays - Fridays. . Recheck in 1 week  Patient's wife made aware of dosing and recheck instructions, no questions.

## 2023-09-15 ENCOUNTER — ANTI-COAG VISIT (OUTPATIENT)
Age: 78
End: 2023-09-15

## 2023-09-15 DIAGNOSIS — I48.11 LONGSTANDING PERSISTENT ATRIAL FIBRILLATION (HCC): Primary | ICD-10-CM

## 2023-09-15 LAB — INR BLD: 3.2

## 2023-09-15 NOTE — PROGRESS NOTES
Verbal order and read back per HORACE Cain NP  INR-3.2  Continue Coumadin to 2.5 mg daily except Coumadin 1.25mg on Mondays - Wednesdays - Fridays. . Recheck in 1 week  Patient's wife made aware of dosing and recheck instructions, no questions.

## 2023-11-01 ENCOUNTER — TELEPHONE (OUTPATIENT)
Age: 78
End: 2023-11-01

## 2023-11-01 NOTE — TELEPHONE ENCOUNTER
Office received fax from [employer] R. [employer]oneer stating the patient is past due by 9 days for an INR. Had to leave a voicemail for the patient to call the office.

## 2023-11-03 ENCOUNTER — ANTI-COAG VISIT (OUTPATIENT)
Age: 78
End: 2023-11-03

## 2023-11-03 DIAGNOSIS — I48.11 LONGSTANDING PERSISTENT ATRIAL FIBRILLATION (HCC): Primary | ICD-10-CM

## 2023-11-03 LAB — INR BLD: 1.8

## 2023-11-03 NOTE — PROGRESS NOTES
Verbal order and read back per Cirilo Collier, DO    The INR is below the therapeutic range. Take 2.5 mg today then continue Coumadin to 2.5 mg daily except Coumadin 1.25mg on Mondays - Wednesdays - Fridays. . Recheck in 1 week. This has been fully explained to the patient's wife, who indicates understanding.

## 2023-11-07 RX ORDER — CLONIDINE HYDROCHLORIDE 0.2 MG/1
0.2 TABLET ORAL 3 TIMES DAILY
Qty: 270 TABLET | Refills: 3 | Status: SHIPPED | OUTPATIENT
Start: 2023-11-07

## 2023-12-07 ENCOUNTER — ANTI-COAG VISIT (OUTPATIENT)
Age: 78
End: 2023-12-07

## 2023-12-07 DIAGNOSIS — I48.11 LONGSTANDING PERSISTENT ATRIAL FIBRILLATION (HCC): Primary | ICD-10-CM

## 2023-12-07 LAB — INR BLD: 2.3

## 2023-12-07 NOTE — PROGRESS NOTES
Verbal order and read back per HORACE Sidhu NP  INR within therapeutic range  Take 2.5 mg today then continue Coumadin to 2.5 mg daily except Coumadin 1.25mg on Mondays - Wednesdays - Fridays. . Recheck in 1 week.

## 2023-12-11 RX ORDER — SPIRONOLACTONE 25 MG/1
25 TABLET ORAL DAILY
Qty: 90 TABLET | Refills: 3 | Status: SHIPPED | OUTPATIENT
Start: 2023-12-11

## 2024-01-23 ENCOUNTER — APPOINTMENT (OUTPATIENT)
Facility: HOSPITAL | Age: 79
DRG: 377 | End: 2024-01-23
Payer: MEDICARE

## 2024-01-23 ENCOUNTER — HOSPITAL ENCOUNTER (INPATIENT)
Facility: HOSPITAL | Age: 79
LOS: 6 days | Discharge: HOME | DRG: 377 | End: 2024-01-30
Attending: EMERGENCY MEDICINE | Admitting: INTERNAL MEDICINE
Payer: MEDICARE

## 2024-01-23 DIAGNOSIS — R79.1 SUPRATHERAPEUTIC INR: ICD-10-CM

## 2024-01-23 DIAGNOSIS — I50.9 ACUTE DECOMPENSATED HEART FAILURE (HCC): ICD-10-CM

## 2024-01-23 DIAGNOSIS — R53.1 GENERAL WEAKNESS: ICD-10-CM

## 2024-01-23 DIAGNOSIS — I95.89 OTHER SPECIFIED HYPOTENSION: ICD-10-CM

## 2024-01-23 DIAGNOSIS — F10.10 ALCOHOL ABUSE: ICD-10-CM

## 2024-01-23 DIAGNOSIS — R06.02 SHORTNESS OF BREATH: ICD-10-CM

## 2024-01-23 DIAGNOSIS — R73.9 HYPERGLYCEMIA: ICD-10-CM

## 2024-01-23 DIAGNOSIS — N17.9 AKI (ACUTE KIDNEY INJURY) (HCC): ICD-10-CM

## 2024-01-23 DIAGNOSIS — R79.89 ELEVATED TROPONIN: ICD-10-CM

## 2024-01-23 DIAGNOSIS — I27.20 PULMONARY HYPERTENSION, MODERATE TO SEVERE (HCC): Primary | ICD-10-CM

## 2024-01-23 DIAGNOSIS — M79.89 LEFT ARM SWELLING: ICD-10-CM

## 2024-01-23 DIAGNOSIS — K92.1 GASTROINTESTINAL HEMORRHAGE WITH MELENA: ICD-10-CM

## 2024-01-23 DIAGNOSIS — K76.82 HEPATIC ENCEPHALOPATHY (HCC): ICD-10-CM

## 2024-01-23 DIAGNOSIS — I48.91 ATRIAL FIBRILLATION, UNSPECIFIED TYPE (HCC): ICD-10-CM

## 2024-01-23 PROCEDURE — 85025 COMPLETE CBC W/AUTO DIFF WBC: CPT

## 2024-01-23 PROCEDURE — 84145 PROCALCITONIN (PCT): CPT

## 2024-01-23 PROCEDURE — 87040 BLOOD CULTURE FOR BACTERIA: CPT

## 2024-01-23 PROCEDURE — 80053 COMPREHEN METABOLIC PANEL: CPT

## 2024-01-23 PROCEDURE — 82077 ASSAY SPEC XCP UR&BREATH IA: CPT

## 2024-01-23 PROCEDURE — 2580000003 HC RX 258: Performed by: EMERGENCY MEDICINE

## 2024-01-23 PROCEDURE — 71045 X-RAY EXAM CHEST 1 VIEW: CPT

## 2024-01-23 PROCEDURE — 85730 THROMBOPLASTIN TIME PARTIAL: CPT

## 2024-01-23 PROCEDURE — 83605 ASSAY OF LACTIC ACID: CPT

## 2024-01-23 PROCEDURE — 93005 ELECTROCARDIOGRAM TRACING: CPT | Performed by: EMERGENCY MEDICINE

## 2024-01-23 PROCEDURE — 82550 ASSAY OF CK (CPK): CPT

## 2024-01-23 PROCEDURE — 85610 PROTHROMBIN TIME: CPT

## 2024-01-23 PROCEDURE — 84443 ASSAY THYROID STIM HORMONE: CPT

## 2024-01-23 PROCEDURE — 80162 ASSAY OF DIGOXIN TOTAL: CPT

## 2024-01-23 PROCEDURE — 99285 EMERGENCY DEPT VISIT HI MDM: CPT

## 2024-01-23 PROCEDURE — 96361 HYDRATE IV INFUSION ADD-ON: CPT

## 2024-01-23 PROCEDURE — 84484 ASSAY OF TROPONIN QUANT: CPT

## 2024-01-23 RX ORDER — 0.9 % SODIUM CHLORIDE 0.9 %
500 INTRAVENOUS SOLUTION INTRAVENOUS ONCE
Status: COMPLETED | OUTPATIENT
Start: 2024-01-24 | End: 2024-01-24

## 2024-01-23 RX ADMIN — SODIUM CHLORIDE 500 ML: 9 INJECTION, SOLUTION INTRAVENOUS at 23:53

## 2024-01-23 ASSESSMENT — PAIN - FUNCTIONAL ASSESSMENT: PAIN_FUNCTIONAL_ASSESSMENT: 0-10

## 2024-01-23 ASSESSMENT — LIFESTYLE VARIABLES
HOW OFTEN DO YOU HAVE A DRINK CONTAINING ALCOHOL: 4 OR MORE TIMES A WEEK
HOW MANY STANDARD DRINKS CONTAINING ALCOHOL DO YOU HAVE ON A TYPICAL DAY: 1 OR 2

## 2024-01-23 ASSESSMENT — PAIN SCALES - GENERAL: PAINLEVEL_OUTOF10: 0

## 2024-01-24 ENCOUNTER — APPOINTMENT (OUTPATIENT)
Facility: HOSPITAL | Age: 79
DRG: 377 | End: 2024-01-24
Payer: MEDICARE

## 2024-01-24 PROBLEM — K92.2 GI BLEED: Status: ACTIVE | Noted: 2024-01-24

## 2024-01-24 PROBLEM — N17.9 AKI (ACUTE KIDNEY INJURY) (HCC): Status: ACTIVE | Noted: 2024-01-24

## 2024-01-24 PROBLEM — R57.9 SHOCK (HCC): Status: ACTIVE | Noted: 2024-01-24

## 2024-01-24 LAB
ALBUMIN SERPL-MCNC: 2.4 G/DL (ref 3.4–5)
ALBUMIN/GLOB SERPL: 0.6 (ref 0.8–1.7)
ALP SERPL-CCNC: 190 U/L (ref 45–117)
ALT SERPL-CCNC: 14 U/L (ref 16–61)
AMMONIA PLAS-SCNC: 38 UMOL/L (ref 11–32)
AMPHET UR QL SCN: NEGATIVE
ANION GAP SERPL CALC-SCNC: 5 MMOL/L (ref 3–18)
ANION GAP SERPL CALC-SCNC: 7 MMOL/L (ref 3–18)
APPEARANCE UR: CLEAR
APTT PPP: 81.1 SEC (ref 23–36.4)
AST SERPL-CCNC: 19 U/L (ref 10–38)
BACTERIA URNS QL MICRO: NEGATIVE /HPF
BARBITURATES UR QL SCN: NEGATIVE
BASOPHILS # BLD: 0 K/UL (ref 0–0.1)
BASOPHILS NFR BLD: 0 % (ref 0–2)
BENZODIAZ UR QL: NEGATIVE
BILIRUB SERPL-MCNC: 0.5 MG/DL (ref 0.2–1)
BILIRUB UR QL: NEGATIVE
BUN SERPL-MCNC: 172 MG/DL (ref 7–18)
BUN SERPL-MCNC: 179 MG/DL (ref 7–18)
BUN/CREAT SERPL: 58 (ref 12–20)
BUN/CREAT SERPL: 62 (ref 12–20)
CALCIUM SERPL-MCNC: 8.6 MG/DL (ref 8.5–10.1)
CALCIUM SERPL-MCNC: 9 MG/DL (ref 8.5–10.1)
CANNABINOIDS UR QL SCN: NEGATIVE
CHLORIDE SERPL-SCNC: 105 MMOL/L (ref 100–111)
CHLORIDE SERPL-SCNC: 108 MMOL/L (ref 100–111)
CK SERPL-CCNC: 42 U/L (ref 39–308)
CO2 SERPL-SCNC: 26 MMOL/L (ref 21–32)
CO2 SERPL-SCNC: 27 MMOL/L (ref 21–32)
COCAINE UR QL SCN: NEGATIVE
COLOR UR: YELLOW
CREAT SERPL-MCNC: 2.87 MG/DL (ref 0.6–1.3)
CREAT SERPL-MCNC: 2.95 MG/DL (ref 0.6–1.3)
DIFFERENTIAL METHOD BLD: ABNORMAL
DIGOXIN SERPL-MCNC: 1.9 NG/ML (ref 0.9–2)
ECHO AO ASC DIAM: 4.2 CM
ECHO AO ASCENDING AORTA INDEX: 1.91 CM/M2
ECHO AO ROOT DIAM: 3.7 CM
ECHO AO ROOT INDEX: 1.68 CM/M2
ECHO AR MAX VEL PISA: 3.1 M/S
ECHO AV AREA PEAK VELOCITY: 1 CM2
ECHO AV AREA/BSA PEAK VELOCITY: 0.5 CM2/M2
ECHO AV PEAK GRADIENT: 15 MMHG
ECHO AV PEAK VELOCITY: 1.9 M/S
ECHO AV REGURGITANT PHT: 611.9 MILLISECOND
ECHO AV VELOCITY RATIO: 0.37
ECHO BSA: 2.21 M2
ECHO EST RA PRESSURE: 15 MMHG
ECHO LA VOL A-L A2C: 169 ML (ref 18–58)
ECHO LA VOL A-L A4C: 118 ML (ref 18–58)
ECHO LA VOL MOD A2C: 154 ML (ref 18–58)
ECHO LA VOL MOD A4C: 113 ML (ref 18–58)
ECHO LA VOLUME AREA LENGTH: 144 ML
ECHO LA VOLUME INDEX A-L A2C: 77 ML/M2 (ref 16–34)
ECHO LA VOLUME INDEX A-L A4C: 54 ML/M2 (ref 16–34)
ECHO LA VOLUME INDEX AREA LENGTH: 65 ML/M2 (ref 16–34)
ECHO LA VOLUME INDEX MOD A2C: 70 ML/M2 (ref 16–34)
ECHO LA VOLUME INDEX MOD A4C: 51 ML/M2 (ref 16–34)
ECHO LV FRACTIONAL SHORTENING: 14 % (ref 28–44)
ECHO LV INTERNAL DIMENSION DIASTOLE INDEX: 2.55 CM/M2
ECHO LV INTERNAL DIMENSION DIASTOLIC: 5.6 CM (ref 4.2–5.9)
ECHO LV INTERNAL DIMENSION SYSTOLIC INDEX: 2.18 CM/M2
ECHO LV INTERNAL DIMENSION SYSTOLIC: 4.8 CM
ECHO LV IVSD: 1.4 CM (ref 0.6–1)
ECHO LV MASS 2D: 330.2 G (ref 88–224)
ECHO LV MASS INDEX 2D: 150.1 G/M2 (ref 49–115)
ECHO LV POSTERIOR WALL DIASTOLIC: 1.3 CM (ref 0.6–1)
ECHO LV RELATIVE WALL THICKNESS RATIO: 0.46
ECHO LVOT AREA: 2.8 CM2
ECHO LVOT DIAM: 1.9 CM
ECHO LVOT PEAK GRADIENT: 2 MMHG
ECHO LVOT PEAK VELOCITY: 0.7 M/S
ECHO PV MAX VELOCITY: 0.8 M/S
ECHO PV PEAK GRADIENT: 3 MMHG
ECHO RA VOLUME: 88 ML
ECHO RA VOLUME: 88 ML
ECHO RIGHT VENTRICULAR SYSTOLIC PRESSURE (RVSP): 67 MMHG
ECHO RV TAPSE: 1.1 CM (ref 1.7–?)
ECHO TV REGURGITANT MAX VELOCITY: 3.6 M/S
ECHO TV REGURGITANT PEAK GRADIENT: 52 MMHG
EOSINOPHIL # BLD: 0 K/UL (ref 0–0.4)
EOSINOPHIL NFR BLD: 0 % (ref 0–5)
EPITH CASTS URNS QL MICRO: NORMAL /LPF (ref 0–5)
ERYTHROCYTE [DISTWIDTH] IN BLOOD BY AUTOMATED COUNT: 21.5 % (ref 11.6–14.5)
ERYTHROCYTE [DISTWIDTH] IN BLOOD BY AUTOMATED COUNT: 24.5 % (ref 11.6–14.5)
ETHANOL SERPL-MCNC: 28 MG/DL (ref 0–3)
FLUAV RNA SPEC QL NAA+PROBE: NOT DETECTED
FLUBV RNA SPEC QL NAA+PROBE: NOT DETECTED
GLOBULIN SER CALC-MCNC: 3.7 G/DL (ref 2–4)
GLUCOSE BLD STRIP.AUTO-MCNC: 198 MG/DL (ref 70–110)
GLUCOSE BLD STRIP.AUTO-MCNC: 201 MG/DL (ref 70–110)
GLUCOSE BLD STRIP.AUTO-MCNC: 243 MG/DL (ref 70–110)
GLUCOSE BLD STRIP.AUTO-MCNC: 250 MG/DL (ref 70–110)
GLUCOSE BLD STRIP.AUTO-MCNC: 256 MG/DL (ref 70–110)
GLUCOSE SERPL-MCNC: 204 MG/DL (ref 74–99)
GLUCOSE SERPL-MCNC: 208 MG/DL (ref 74–99)
GLUCOSE UR STRIP.AUTO-MCNC: NEGATIVE MG/DL
HCT VFR BLD AUTO: 13.7 % (ref 36–48)
HCT VFR BLD AUTO: 20.9 % (ref 36–48)
HCT VFR BLD AUTO: 21.7 % (ref 36–48)
HCT VFR BLD AUTO: 23.3 % (ref 36–48)
HEMOCCULT STL QL: POSITIVE
HGB BLD-MCNC: 4.1 G/DL (ref 13–16)
HGB BLD-MCNC: 6.4 G/DL (ref 13–16)
HGB BLD-MCNC: 6.6 G/DL (ref 13–16)
HGB BLD-MCNC: 7.1 G/DL (ref 13–16)
HGB UR QL STRIP: NEGATIVE
HISTORY CHECK: NORMAL
IMM GRANULOCYTES # BLD AUTO: 0 K/UL (ref 0–0.04)
IMM GRANULOCYTES NFR BLD AUTO: 0 % (ref 0–0.5)
INR PPP: 1.5 (ref 0.9–1.1)
INR PPP: 6.4 (ref 0.9–1.1)
KETONES UR QL STRIP.AUTO: ABNORMAL MG/DL
LACTATE SERPL-SCNC: 1.1 MMOL/L (ref 0.4–2)
LACTATE SERPL-SCNC: 2.8 MMOL/L (ref 0.4–2)
LEUKOCYTE ESTERASE UR QL STRIP.AUTO: ABNORMAL
LYMPHOCYTES # BLD: 1.7 K/UL (ref 0.9–3.6)
LYMPHOCYTES NFR BLD: 9 % (ref 21–52)
Lab: NORMAL
MCH RBC QN AUTO: 30.1 PG (ref 24–34)
MCH RBC QN AUTO: 30.2 PG (ref 24–34)
MCHC RBC AUTO-ENTMCNC: 29.9 G/DL (ref 31–37)
MCHC RBC AUTO-ENTMCNC: 30.6 G/DL (ref 31–37)
MCV RBC AUTO: 100.7 FL (ref 78–100)
MCV RBC AUTO: 98.6 FL (ref 78–100)
METHADONE UR QL: NEGATIVE
MONOCYTES # BLD: 1 K/UL (ref 0.05–1.2)
MONOCYTES NFR BLD: 5 % (ref 3–10)
NEUTS SEG # BLD: 16.7 K/UL (ref 1.8–8)
NEUTS SEG NFR BLD: 86 % (ref 40–73)
NITRITE UR QL STRIP.AUTO: NEGATIVE
NRBC # BLD: 1.48 K/UL (ref 0–0.01)
NRBC # BLD: 2.16 K/UL (ref 0–0.01)
NRBC BLD-RTO: 11.7 PER 100 WBC
NRBC BLD-RTO: 7.6 PER 100 WBC
OPIATES UR QL: NEGATIVE
PCP UR QL: NEGATIVE
PH UR STRIP: 5 (ref 5–8)
PLATELET # BLD AUTO: 194 K/UL (ref 135–420)
PLATELET # BLD AUTO: 214 K/UL (ref 135–420)
PLATELET COMMENT: ABNORMAL
PMV BLD AUTO: 11.4 FL (ref 9.2–11.8)
PMV BLD AUTO: 11.5 FL (ref 9.2–11.8)
POTASSIUM SERPL-SCNC: 4.7 MMOL/L (ref 3.5–5.5)
POTASSIUM SERPL-SCNC: 4.8 MMOL/L (ref 3.5–5.5)
PROCALCITONIN SERPL-MCNC: 0.26 NG/ML
PROT SERPL-MCNC: 6.1 G/DL (ref 6.4–8.2)
PROT UR STRIP-MCNC: NEGATIVE MG/DL
PROTHROMBIN TIME: 18.2 SEC (ref 11.9–14.7)
PROTHROMBIN TIME: 56.9 SEC (ref 11.9–14.7)
RBC # BLD AUTO: 1.36 M/UL (ref 4.35–5.65)
RBC # BLD AUTO: 2.12 M/UL (ref 4.35–5.65)
RBC #/AREA URNS HPF: NEGATIVE /HPF (ref 0–5)
RBC MORPH BLD: ABNORMAL
SARS-COV-2 RNA RESP QL NAA+PROBE: NOT DETECTED
SODIUM SERPL-SCNC: 139 MMOL/L (ref 136–145)
SODIUM SERPL-SCNC: 139 MMOL/L (ref 136–145)
SP GR UR REFRACTOMETRY: 1.02 (ref 1–1.03)
TROPONIN I SERPL HS-MCNC: 134 NG/L (ref 0–78)
TROPONIN I SERPL HS-MCNC: 149 NG/L (ref 0–78)
TSH SERPL DL<=0.05 MIU/L-ACNC: 3.25 UIU/ML (ref 0.36–3.74)
UROBILINOGEN UR QL STRIP.AUTO: 1 EU/DL (ref 0.2–1)
WBC # BLD AUTO: 18.5 K/UL (ref 4.6–13.2)
WBC # BLD AUTO: 19.4 K/UL (ref 4.6–13.2)
WBC URNS QL MICRO: NORMAL /HPF (ref 0–4)

## 2024-01-24 PROCEDURE — 6370000000 HC RX 637 (ALT 250 FOR IP): Performed by: PHYSICIAN ASSISTANT

## 2024-01-24 PROCEDURE — P9016 RBC LEUKOCYTES REDUCED: HCPCS

## 2024-01-24 PROCEDURE — APPSS30 APP SPLIT SHARED TIME 16-30 MINUTES: Performed by: PHYSICIAN ASSISTANT

## 2024-01-24 PROCEDURE — 86900 BLOOD TYPING SEROLOGIC ABO: CPT

## 2024-01-24 PROCEDURE — 6360000002 HC RX W HCPCS: Performed by: PHYSICIAN ASSISTANT

## 2024-01-24 PROCEDURE — 82962 GLUCOSE BLOOD TEST: CPT

## 2024-01-24 PROCEDURE — 36415 COLL VENOUS BLD VENIPUNCTURE: CPT

## 2024-01-24 PROCEDURE — 96361 HYDRATE IV INFUSION ADD-ON: CPT

## 2024-01-24 PROCEDURE — 82140 ASSAY OF AMMONIA: CPT

## 2024-01-24 PROCEDURE — 80307 DRUG TEST PRSMV CHEM ANLYZR: CPT

## 2024-01-24 PROCEDURE — 86901 BLOOD TYPING SEROLOGIC RH(D): CPT

## 2024-01-24 PROCEDURE — 83605 ASSAY OF LACTIC ACID: CPT

## 2024-01-24 PROCEDURE — 86923 COMPATIBILITY TEST ELECTRIC: CPT

## 2024-01-24 PROCEDURE — 30233L1 TRANSFUSION OF NONAUTOLOGOUS FRESH PLASMA INTO PERIPHERAL VEIN, PERCUTANEOUS APPROACH: ICD-10-PCS | Performed by: INTERNAL MEDICINE

## 2024-01-24 PROCEDURE — 6360000002 HC RX W HCPCS: Performed by: EMERGENCY MEDICINE

## 2024-01-24 PROCEDURE — 93005 ELECTROCARDIOGRAM TRACING: CPT

## 2024-01-24 PROCEDURE — 87086 URINE CULTURE/COLONY COUNT: CPT

## 2024-01-24 PROCEDURE — 2500000003 HC RX 250 WO HCPCS: Performed by: INTERNAL MEDICINE

## 2024-01-24 PROCEDURE — 6370000000 HC RX 637 (ALT 250 FOR IP): Performed by: REGISTERED NURSE

## 2024-01-24 PROCEDURE — 6360000002 HC RX W HCPCS: Performed by: REGISTERED NURSE

## 2024-01-24 PROCEDURE — C9113 INJ PANTOPRAZOLE SODIUM, VIA: HCPCS | Performed by: EMERGENCY MEDICINE

## 2024-01-24 PROCEDURE — A4216 STERILE WATER/SALINE, 10 ML: HCPCS | Performed by: EMERGENCY MEDICINE

## 2024-01-24 PROCEDURE — 30233N1 TRANSFUSION OF NONAUTOLOGOUS RED BLOOD CELLS INTO PERIPHERAL VEIN, PERCUTANEOUS APPROACH: ICD-10-PCS | Performed by: INTERNAL MEDICINE

## 2024-01-24 PROCEDURE — 80048 BASIC METABOLIC PNL TOTAL CA: CPT

## 2024-01-24 PROCEDURE — 71250 CT THORAX DX C-: CPT

## 2024-01-24 PROCEDURE — 2580000003 HC RX 258: Performed by: EMERGENCY MEDICINE

## 2024-01-24 PROCEDURE — 2500000003 HC RX 250 WO HCPCS: Performed by: EMERGENCY MEDICINE

## 2024-01-24 PROCEDURE — 96374 THER/PROPH/DIAG INJ IV PUSH: CPT

## 2024-01-24 PROCEDURE — 93306 TTE W/DOPPLER COMPLETE: CPT | Performed by: INTERNAL MEDICINE

## 2024-01-24 PROCEDURE — 99291 CRITICAL CARE FIRST HOUR: CPT | Performed by: INTERNAL MEDICINE

## 2024-01-24 PROCEDURE — 84484 ASSAY OF TROPONIN QUANT: CPT

## 2024-01-24 PROCEDURE — 85014 HEMATOCRIT: CPT

## 2024-01-24 PROCEDURE — P9059 PLASMA, FRZ BETWEEN 8-24HOUR: HCPCS

## 2024-01-24 PROCEDURE — 86850 RBC ANTIBODY SCREEN: CPT

## 2024-01-24 PROCEDURE — 94761 N-INVAS EAR/PLS OXIMETRY MLT: CPT

## 2024-01-24 PROCEDURE — 82272 OCCULT BLD FECES 1-3 TESTS: CPT

## 2024-01-24 PROCEDURE — C9113 INJ PANTOPRAZOLE SODIUM, VIA: HCPCS | Performed by: PHYSICIAN ASSISTANT

## 2024-01-24 PROCEDURE — A4216 STERILE WATER/SALINE, 10 ML: HCPCS | Performed by: PHYSICIAN ASSISTANT

## 2024-01-24 PROCEDURE — 2580000003 HC RX 258: Performed by: PHYSICIAN ASSISTANT

## 2024-01-24 PROCEDURE — 81001 URINALYSIS AUTO W/SCOPE: CPT

## 2024-01-24 PROCEDURE — 2580000003 HC RX 258: Performed by: INTERNAL MEDICINE

## 2024-01-24 PROCEDURE — 85610 PROTHROMBIN TIME: CPT

## 2024-01-24 PROCEDURE — C8929 TTE W OR WO FOL WCON,DOPPLER: HCPCS

## 2024-01-24 PROCEDURE — 2700000000 HC OXYGEN THERAPY PER DAY

## 2024-01-24 PROCEDURE — 6360000002 HC RX W HCPCS: Performed by: INTERNAL MEDICINE

## 2024-01-24 PROCEDURE — 94640 AIRWAY INHALATION TREATMENT: CPT

## 2024-01-24 PROCEDURE — 85027 COMPLETE CBC AUTOMATED: CPT

## 2024-01-24 PROCEDURE — 85018 HEMOGLOBIN: CPT

## 2024-01-24 PROCEDURE — 87636 SARSCOV2 & INF A&B AMP PRB: CPT

## 2024-01-24 PROCEDURE — 30283B1 TRANSFUSION OF NONAUTOLOGOUS 4-FACTOR PROTHROMBIN COMPLEX CONCENTRATE INTO VEIN, PERCUTANEOUS APPROACH: ICD-10-PCS | Performed by: EMERGENCY MEDICINE

## 2024-01-24 PROCEDURE — 51702 INSERT TEMP BLADDER CATH: CPT

## 2024-01-24 PROCEDURE — 36430 TRANSFUSION BLD/BLD COMPNT: CPT

## 2024-01-24 PROCEDURE — 2000000000 HC ICU R&B

## 2024-01-24 PROCEDURE — 6360000004 HC RX CONTRAST MEDICATION: Performed by: INTERNAL MEDICINE

## 2024-01-24 RX ORDER — INSULIN LISPRO 100 [IU]/ML
0-16 INJECTION, SOLUTION INTRAVENOUS; SUBCUTANEOUS EVERY 6 HOURS
Status: DISCONTINUED | OUTPATIENT
Start: 2024-01-24 | End: 2024-01-29

## 2024-01-24 RX ORDER — FOLIC ACID 5 MG/ML
1 INJECTION, SOLUTION INTRAMUSCULAR; INTRAVENOUS; SUBCUTANEOUS DAILY
Status: DISCONTINUED | OUTPATIENT
Start: 2024-01-24 | End: 2024-01-24 | Stop reason: SDUPTHER

## 2024-01-24 RX ORDER — THIAMINE HYDROCHLORIDE 100 MG/ML
100 INJECTION, SOLUTION INTRAMUSCULAR; INTRAVENOUS DAILY
Status: COMPLETED | OUTPATIENT
Start: 2024-01-26 | End: 2024-01-30

## 2024-01-24 RX ORDER — METRONIDAZOLE 500 MG/100ML
500 INJECTION, SOLUTION INTRAVENOUS EVERY 8 HOURS
Status: DISCONTINUED | OUTPATIENT
Start: 2024-01-24 | End: 2024-01-27

## 2024-01-24 RX ORDER — SODIUM CHLORIDE 9 MG/ML
INJECTION, SOLUTION INTRAVENOUS PRN
Status: DISCONTINUED | OUTPATIENT
Start: 2024-01-24 | End: 2024-01-24

## 2024-01-24 RX ORDER — INSULIN GLARGINE 100 [IU]/ML
5 INJECTION, SOLUTION SUBCUTANEOUS DAILY
Status: DISCONTINUED | OUTPATIENT
Start: 2024-01-24 | End: 2024-01-30 | Stop reason: HOSPADM

## 2024-01-24 RX ORDER — SODIUM CHLORIDE 9 MG/ML
50 INJECTION, SOLUTION INTRAVENOUS ONCE
Status: COMPLETED | OUTPATIENT
Start: 2024-01-24 | End: 2024-01-24

## 2024-01-24 RX ORDER — IPRATROPIUM BROMIDE AND ALBUTEROL SULFATE 2.5; .5 MG/3ML; MG/3ML
1 SOLUTION RESPIRATORY (INHALATION) EVERY 4 HOURS PRN
Status: DISCONTINUED | OUTPATIENT
Start: 2024-01-24 | End: 2024-01-24

## 2024-01-24 RX ORDER — THIAMINE HYDROCHLORIDE 100 MG/ML
400 INJECTION, SOLUTION INTRAMUSCULAR; INTRAVENOUS EVERY 8 HOURS
Status: COMPLETED | OUTPATIENT
Start: 2024-01-24 | End: 2024-01-26

## 2024-01-24 RX ORDER — NOREPINEPHRINE BITARTRATE 0.06 MG/ML
1-100 INJECTION, SOLUTION INTRAVENOUS CONTINUOUS
Status: DISCONTINUED | OUTPATIENT
Start: 2024-01-24 | End: 2024-01-24

## 2024-01-24 RX ORDER — INSULIN LISPRO 100 [IU]/ML
0-8 INJECTION, SOLUTION INTRAVENOUS; SUBCUTANEOUS EVERY 6 HOURS
Status: DISCONTINUED | OUTPATIENT
Start: 2024-01-24 | End: 2024-01-24

## 2024-01-24 RX ORDER — SODIUM CHLORIDE 9 MG/ML
INJECTION, SOLUTION INTRAVENOUS PRN
Status: DISCONTINUED | OUTPATIENT
Start: 2024-01-24 | End: 2024-01-30 | Stop reason: HOSPADM

## 2024-01-24 RX ORDER — IPRATROPIUM BROMIDE AND ALBUTEROL SULFATE 2.5; .5 MG/3ML; MG/3ML
1 SOLUTION RESPIRATORY (INHALATION)
Status: DISCONTINUED | OUTPATIENT
Start: 2024-01-24 | End: 2024-01-28

## 2024-01-24 RX ORDER — FUROSEMIDE 10 MG/ML
60 INJECTION INTRAMUSCULAR; INTRAVENOUS ONCE
Status: COMPLETED | OUTPATIENT
Start: 2024-01-24 | End: 2024-01-24

## 2024-01-24 RX ORDER — DEXTROSE MONOHYDRATE 100 MG/ML
INJECTION, SOLUTION INTRAVENOUS CONTINUOUS PRN
Status: DISCONTINUED | OUTPATIENT
Start: 2024-01-24 | End: 2024-01-30 | Stop reason: HOSPADM

## 2024-01-24 RX ADMIN — PHYTONADIONE 10 MG: 10 INJECTION, EMULSION INTRAMUSCULAR; INTRAVENOUS; SUBCUTANEOUS at 02:50

## 2024-01-24 RX ADMIN — PANTOPRAZOLE SODIUM 40 MG: 40 INJECTION, POWDER, FOR SOLUTION INTRAVENOUS at 12:08

## 2024-01-24 RX ADMIN — PANTOPRAZOLE SODIUM 80 MG: 40 INJECTION, POWDER, FOR SOLUTION INTRAVENOUS at 03:54

## 2024-01-24 RX ADMIN — METRONIDAZOLE 500 MG: 500 INJECTION, SOLUTION INTRAVENOUS at 18:15

## 2024-01-24 RX ADMIN — FOLIC ACID 1 MG: 5 INJECTION, SOLUTION INTRAMUSCULAR; INTRAVENOUS; SUBCUTANEOUS at 13:29

## 2024-01-24 RX ADMIN — METRONIDAZOLE 500 MG: 500 INJECTION, SOLUTION INTRAVENOUS at 10:15

## 2024-01-24 RX ADMIN — IPRATROPIUM BROMIDE AND ALBUTEROL SULFATE 1 DOSE: .5; 3 SOLUTION RESPIRATORY (INHALATION) at 05:07

## 2024-01-24 RX ADMIN — IPRATROPIUM BROMIDE AND ALBUTEROL SULFATE 1 DOSE: .5; 3 SOLUTION RESPIRATORY (INHALATION) at 07:30

## 2024-01-24 RX ADMIN — Medication 3750 UNITS: at 02:55

## 2024-01-24 RX ADMIN — CEFEPIME 1000 MG: 1 INJECTION, POWDER, FOR SOLUTION INTRAMUSCULAR; INTRAVENOUS at 12:08

## 2024-01-24 RX ADMIN — INSULIN LISPRO 2 UNITS: 100 INJECTION, SOLUTION INTRAVENOUS; SUBCUTANEOUS at 08:40

## 2024-01-24 RX ADMIN — INSULIN LISPRO 8 UNITS: 100 INJECTION, SOLUTION INTRAVENOUS; SUBCUTANEOUS at 16:52

## 2024-01-24 RX ADMIN — THIAMINE HYDROCHLORIDE 400 MG: 100 INJECTION, SOLUTION INTRAMUSCULAR; INTRAVENOUS at 18:37

## 2024-01-24 RX ADMIN — FUROSEMIDE 60 MG: 10 INJECTION, SOLUTION INTRAMUSCULAR; INTRAVENOUS at 05:33

## 2024-01-24 RX ADMIN — Medication 5 MCG/MIN: at 03:52

## 2024-01-24 RX ADMIN — METRONIDAZOLE 500 MG: 500 INJECTION, SOLUTION INTRAVENOUS at 04:09

## 2024-01-24 RX ADMIN — WATER 40 MG: 1 INJECTION INTRAMUSCULAR; INTRAVENOUS; SUBCUTANEOUS at 08:29

## 2024-01-24 RX ADMIN — THIAMINE HYDROCHLORIDE 400 MG: 100 INJECTION, SOLUTION INTRAMUSCULAR; INTRAVENOUS at 12:08

## 2024-01-24 RX ADMIN — INSULIN GLARGINE 5 UNITS: 100 INJECTION, SOLUTION SUBCUTANEOUS at 12:00

## 2024-01-24 RX ADMIN — IPRATROPIUM BROMIDE AND ALBUTEROL SULFATE 1 DOSE: .5; 3 SOLUTION RESPIRATORY (INHALATION) at 20:47

## 2024-01-24 RX ADMIN — CEFEPIME 2000 MG: 2 INJECTION, POWDER, FOR SOLUTION INTRAVENOUS at 04:06

## 2024-01-24 RX ADMIN — PERFLUTREN 2 ML: 6.52 INJECTION, SUSPENSION INTRAVENOUS at 10:36

## 2024-01-24 RX ADMIN — SODIUM CHLORIDE 50 ML: 9 INJECTION, SOLUTION INTRAVENOUS at 04:03

## 2024-01-24 RX ADMIN — INSULIN LISPRO 4 UNITS: 100 INJECTION, SOLUTION INTRAVENOUS; SUBCUTANEOUS at 03:50

## 2024-01-24 ASSESSMENT — PAIN SCALES - GENERAL: PAINLEVEL_OUTOF10: 0

## 2024-01-24 NOTE — H&P
0007    Blood Culture 1 [[id number]] Collected: 01/23/24 2335    Order Status: Sent Specimen: Blood Updated: 01/24/24 0007              Telemetry: paced    Imaging:  I have personally reviewed the patient’s radiographs and have reviewed the reports:    Recent image results  No results found.    No results found for this or any previous visit.     Ultrasound Result (most recent):  No results found for this or any previous visit from the past 3650 days.      No results found for this or any previous visit.                Total of   25  min critical care time spent at bedside during the course of care providing evaluation,management and care decisions and ordering appropriate treatment related to critical care problems exclusive of procedures.  The reason for providing this level of medical care for this critically ill patient was due a critical illness that impaired one or more vital organ systems such that there was a high probability of imminent or life threatening deterioration in the patients condition. This care involved high complexity decision making to assess, manipulate, and support vital system functions, to treat this degree vital organ system failure and to prevent further life threatening deterioration of the patient’s condition.        Stacey Casey PA-C  01/24/24  Pulmonary, Critical Care Medicine  [facility]

## 2024-01-24 NOTE — CONSENT
Informed Consent for Blood Component Transfusion Note    I have discussed with the patient the rationale for blood component transfusion; its benefits in treating or preventing fatigue, organ damage, or death; and its risk which includes mild transfusion reactions, rare risk of blood borne infection, or more serious but rare reactions. I have discussed the alternatives to transfusion, including the risk and consequences of not receiving transfusion. The patient had an opportunity to ask questions and had agreed to proceed with transfusion of blood components.    Electronically signed by Aiyana Hartman MD on 1/24/24 at 2:24 AM EST

## 2024-01-24 NOTE — INTERDISCIPLINARY ROUNDS
[facility]  Pulmonary, Critical Care, and Sleep Medicine  Interdisciplinary and Ventilator Weaning Rounds    Patient discussed in morning walking rounds and interdisciplinary rounds.    ICU admission day: 1/24/24     Overnight events:   Admitted to ICU overnight. See H&P for details   Received PRBC x2, Lasix 60 mg  Wheezing noted and given steroids   CT chest A/P completed, awaiting read       Assessments and best practice:  Ventilator     N/A   Glycemic control  Diet  NPO    Stress ulcer prophylaxis.  Protonix  DVT prophylaxis.  Not indicated coagulopathic   Need for Lines, mancini assessed.  Yes  Restraint Reevaluation   Patient does not require restraints   Disposition regarding transferring out of the ICU  RED      Family contact/MPOA:   Family updated     Palliative consult within 3 days of admission to ICU-  Ethics Guidance: 21 days      Daily Plans:  GI consult   Wean off Levophed as tolerated   Give FFP   Q6h H&H   Add thiamine folate   Obtain Echo   Cardiology consulted     CINDY time  minutes        HORACE Azevedo - NP  01/24/24  Pulmonary, Critical Care Medicine  [facility]

## 2024-01-24 NOTE — ED TRIAGE NOTES
N/v since last Thursday, diabetic, has defibrillator, on coumadin, pt appears ill and weak, resp effort with accessory muscle use and tachypnic, sats 97% ra.

## 2024-01-24 NOTE — ED PROVIDER NOTES
16.0 g/dL    Hematocrit 13.7 (LL) 36.0 - 48.0 %    .7 (H) 78.0 - 100.0 FL    MCH 30.1 24.0 - 34.0 PG    MCHC 29.9 (L) 31.0 - 37.0 g/dL    RDW 24.5 (H) 11.6 - 14.5 %    Platelets 214 135 - 420 K/uL    MPV 11.5 9.2 - 11.8 FL    Nucleated RBCs 7.6 (H) 0  WBC    nRBC 1.48 (H) 0.00 - 0.01 K/uL    Neutrophils % 86 (H) 40 - 73 %    Lymphocytes % 9 (L) 21 - 52 %    Monocytes % 5 3 - 10 %    Eosinophils % 0 0 - 5 %    Basophils % 0 0 - 2 %    Immature Granulocytes 0 0.0 - 0.5 %    Neutrophils Absolute 16.7 (H) 1.8 - 8.0 K/UL    Lymphocytes Absolute 1.7 0.9 - 3.6 K/UL    Monocytes Absolute 1.0 0.05 - 1.2 K/UL    Eosinophils Absolute 0.0 0.0 - 0.4 K/UL    Basophils Absolute 0.0 0.0 - 0.1 K/UL    Absolute Immature Granulocyte 0.0 0.00 - 0.04 K/UL    Differential Type MANUAL      Platelet Comment ADEQUATE PLATELETS      RBC Comment ANISOCYTOSIS  2+        RBC Comment POLYCHROMASIA  1+        RBC Comment OVALOCYTES  1+       Comprehensive Metabolic Panel    Collection Time: 01/23/24 11:30 PM   Result Value Ref Range    Sodium 139 136 - 145 mmol/L    Potassium 4.7 3.5 - 5.5 mmol/L    Chloride 105 100 - 111 mmol/L    CO2 27 21 - 32 mmol/L    Anion Gap 7 3.0 - 18 mmol/L    Glucose 208 (H) 74 - 99 mg/dL     (H) 7.0 - 18 MG/DL    Creatinine 2.87 (H) 0.6 - 1.3 MG/DL    Bun/Cre Ratio 62 (H) 12 - 20      Est, Glom Filt Rate 22 (L) >60 ml/min/1.73m2    Calcium 9.0 8.5 - 10.1 MG/DL    Total Bilirubin 0.5 0.2 - 1.0 MG/DL    ALT 14 (L) 16 - 61 U/L    AST 19 10 - 38 U/L    Alk Phosphatase 190 (H) 45 - 117 U/L    Total Protein 6.1 (L) 6.4 - 8.2 g/dL    Albumin 2.4 (L) 3.4 - 5.0 g/dL    Globulin 3.7 2.0 - 4.0 g/dL    Albumin/Globulin Ratio 0.6 (L) 0.8 - 1.7     Procalcitonin    Collection Time: 01/23/24 11:30 PM   Result Value Ref Range    Procalcitonin 0.26 ng/mL   Troponin    Collection Time: 01/23/24 11:30 PM   Result Value Ref Range    Troponin, High Sensitivity 149 (HH) 0 - 78 ng/L   Lactate, Sepsis    Collection Time:

## 2024-01-25 ENCOUNTER — ANESTHESIA (OUTPATIENT)
Facility: HOSPITAL | Age: 79
End: 2024-01-25
Payer: MEDICARE

## 2024-01-25 ENCOUNTER — ANESTHESIA EVENT (OUTPATIENT)
Facility: HOSPITAL | Age: 79
End: 2024-01-25
Payer: MEDICARE

## 2024-01-25 PROBLEM — R73.9 HYPERGLYCEMIA: Status: ACTIVE | Noted: 2024-01-25

## 2024-01-25 PROBLEM — R53.1 GENERAL WEAKNESS: Status: ACTIVE | Noted: 2024-01-25

## 2024-01-25 PROBLEM — R79.1 SUPRATHERAPEUTIC INR: Status: ACTIVE | Noted: 2024-01-25

## 2024-01-25 PROBLEM — I50.9 ACUTE DECOMPENSATED HEART FAILURE (HCC): Status: ACTIVE | Noted: 2024-01-25

## 2024-01-25 LAB
ANION GAP SERPL CALC-SCNC: 5 MMOL/L (ref 3–18)
BACTERIA SPEC CULT: NORMAL
BASOPHILS # BLD: 0 K/UL (ref 0–0.1)
BASOPHILS NFR BLD: 0 % (ref 0–2)
BLD PROD TYP BPU: NORMAL
BLOOD BANK DISPENSE STATUS: NORMAL
BPU ID: NORMAL
BUN SERPL-MCNC: 181 MG/DL (ref 7–18)
BUN/CREAT SERPL: 56 (ref 12–20)
CALCIUM SERPL-MCNC: 8.8 MG/DL (ref 8.5–10.1)
CALLED TO: NORMAL
CHLORIDE SERPL-SCNC: 111 MMOL/L (ref 100–111)
CO2 SERPL-SCNC: 24 MMOL/L (ref 21–32)
CREAT SERPL-MCNC: 3.22 MG/DL (ref 0.6–1.3)
DIFFERENTIAL METHOD BLD: ABNORMAL
EKG DIAGNOSIS: NORMAL
EKG DIAGNOSIS: NORMAL
EKG Q-T INTERVAL: 390 MS
EKG Q-T INTERVAL: 490 MS
EKG QRS DURATION: 170 MS
EKG QRS DURATION: 98 MS
EKG QTC CALCULATION (BAZETT): 399 MS
EKG QTC CALCULATION (BAZETT): 539 MS
EKG R AXIS: -40 DEGREES
EKG R AXIS: 80 DEGREES
EKG T AXIS: 141 DEGREES
EKG T AXIS: 232 DEGREES
EKG VENTRICULAR RATE: 63 BPM
EKG VENTRICULAR RATE: 73 BPM
EOSINOPHIL # BLD: 0 K/UL (ref 0–0.4)
EOSINOPHIL NFR BLD: 0 % (ref 0–5)
ERYTHROCYTE [DISTWIDTH] IN BLOOD BY AUTOMATED COUNT: 19.9 % (ref 11.6–14.5)
GLUCOSE BLD STRIP.AUTO-MCNC: 162 MG/DL (ref 70–110)
GLUCOSE BLD STRIP.AUTO-MCNC: 194 MG/DL (ref 70–110)
GLUCOSE BLD STRIP.AUTO-MCNC: 216 MG/DL (ref 70–110)
GLUCOSE BLD STRIP.AUTO-MCNC: 227 MG/DL (ref 70–110)
GLUCOSE SERPL-MCNC: 206 MG/DL (ref 74–99)
HCT VFR BLD AUTO: 23.5 % (ref 36–48)
HCT VFR BLD AUTO: 23.8 % (ref 36–48)
HCT VFR BLD AUTO: 23.9 % (ref 36–48)
HGB BLD-MCNC: 7.3 G/DL (ref 13–16)
HGB BLD-MCNC: 7.4 G/DL (ref 13–16)
HGB BLD-MCNC: 7.4 G/DL (ref 13–16)
IMM GRANULOCYTES # BLD AUTO: 0.5 K/UL (ref 0–0.04)
IMM GRANULOCYTES NFR BLD AUTO: 4 % (ref 0–0.5)
INR PPP: 1.3 (ref 0.9–1.1)
LYMPHOCYTES # BLD: 0.9 K/UL (ref 0.9–3.6)
LYMPHOCYTES NFR BLD: 6 % (ref 21–52)
MAGNESIUM SERPL-MCNC: 2.6 MG/DL (ref 1.6–2.6)
MCH RBC QN AUTO: 29.7 PG (ref 24–34)
MCHC RBC AUTO-ENTMCNC: 30.7 G/DL (ref 31–37)
MCV RBC AUTO: 96.7 FL (ref 78–100)
MONOCYTES # BLD: 0.9 K/UL (ref 0.05–1.2)
MONOCYTES NFR BLD: 6 % (ref 3–10)
NEUTS SEG # BLD: 12.6 K/UL (ref 1.8–8)
NEUTS SEG NFR BLD: 84 % (ref 40–73)
NRBC # BLD: 1.43 K/UL (ref 0–0.01)
NRBC BLD-RTO: 9.5 PER 100 WBC
PHOSPHATE SERPL-MCNC: 6.8 MG/DL (ref 2.5–4.9)
PLATELET # BLD AUTO: 143 K/UL (ref 135–420)
PMV BLD AUTO: 11.4 FL (ref 9.2–11.8)
POTASSIUM SERPL-SCNC: 5.5 MMOL/L (ref 3.5–5.5)
PROTHROMBIN TIME: 16.5 SEC (ref 11.9–14.7)
RBC # BLD AUTO: 2.46 M/UL (ref 4.35–5.65)
SERVICE CMNT-IMP: NORMAL
SODIUM SERPL-SCNC: 140 MMOL/L (ref 136–145)
UNIT DIVISION: 0
WBC # BLD AUTO: 15 K/UL (ref 4.6–13.2)

## 2024-01-25 PROCEDURE — 6360000002 HC RX W HCPCS: Performed by: INTERNAL MEDICINE

## 2024-01-25 PROCEDURE — 2580000003 HC RX 258: Performed by: PHYSICIAN ASSISTANT

## 2024-01-25 PROCEDURE — 3600007502: Performed by: STUDENT IN AN ORGANIZED HEALTH CARE EDUCATION/TRAINING PROGRAM

## 2024-01-25 PROCEDURE — 6360000002 HC RX W HCPCS: Performed by: REGISTERED NURSE

## 2024-01-25 PROCEDURE — 6370000000 HC RX 637 (ALT 250 FOR IP): Performed by: REGISTERED NURSE

## 2024-01-25 PROCEDURE — 2580000003 HC RX 258: Performed by: INTERNAL MEDICINE

## 2024-01-25 PROCEDURE — 6370000000 HC RX 637 (ALT 250 FOR IP): Performed by: PHYSICIAN ASSISTANT

## 2024-01-25 PROCEDURE — 7100000000 HC PACU RECOVERY - FIRST 15 MIN: Performed by: STUDENT IN AN ORGANIZED HEALTH CARE EDUCATION/TRAINING PROGRAM

## 2024-01-25 PROCEDURE — 85610 PROTHROMBIN TIME: CPT

## 2024-01-25 PROCEDURE — 7100000010 HC PHASE II RECOVERY - FIRST 15 MIN: Performed by: STUDENT IN AN ORGANIZED HEALTH CARE EDUCATION/TRAINING PROGRAM

## 2024-01-25 PROCEDURE — A4216 STERILE WATER/SALINE, 10 ML: HCPCS | Performed by: PHYSICIAN ASSISTANT

## 2024-01-25 PROCEDURE — 82962 GLUCOSE BLOOD TEST: CPT

## 2024-01-25 PROCEDURE — APPSS15 APP SPLIT SHARED TIME 0-15 MINUTES: Performed by: PHYSICIAN ASSISTANT

## 2024-01-25 PROCEDURE — 2580000003 HC RX 258: Performed by: NURSE ANESTHETIST, CERTIFIED REGISTERED

## 2024-01-25 PROCEDURE — 94664 DEMO&/EVAL PT USE INHALER: CPT

## 2024-01-25 PROCEDURE — 94640 AIRWAY INHALATION TREATMENT: CPT

## 2024-01-25 PROCEDURE — 6360000002 HC RX W HCPCS: Performed by: PHYSICIAN ASSISTANT

## 2024-01-25 PROCEDURE — 80048 BASIC METABOLIC PNL TOTAL CA: CPT

## 2024-01-25 PROCEDURE — 99232 SBSQ HOSP IP/OBS MODERATE 35: CPT | Performed by: INTERNAL MEDICINE

## 2024-01-25 PROCEDURE — 85018 HEMOGLOBIN: CPT

## 2024-01-25 PROCEDURE — 0W3P8ZZ CONTROL BLEEDING IN GASTROINTESTINAL TRACT, VIA NATURAL OR ARTIFICIAL OPENING ENDOSCOPIC: ICD-10-PCS | Performed by: STUDENT IN AN ORGANIZED HEALTH CARE EDUCATION/TRAINING PROGRAM

## 2024-01-25 PROCEDURE — 3700000000 HC ANESTHESIA ATTENDED CARE: Performed by: STUDENT IN AN ORGANIZED HEALTH CARE EDUCATION/TRAINING PROGRAM

## 2024-01-25 PROCEDURE — 2700000000 HC OXYGEN THERAPY PER DAY

## 2024-01-25 PROCEDURE — 93010 ELECTROCARDIOGRAM REPORT: CPT | Performed by: INTERNAL MEDICINE

## 2024-01-25 PROCEDURE — 83735 ASSAY OF MAGNESIUM: CPT

## 2024-01-25 PROCEDURE — 2709999900 HC NON-CHARGEABLE SUPPLY: Performed by: STUDENT IN AN ORGANIZED HEALTH CARE EDUCATION/TRAINING PROGRAM

## 2024-01-25 PROCEDURE — 2500000003 HC RX 250 WO HCPCS: Performed by: INTERNAL MEDICINE

## 2024-01-25 PROCEDURE — 36415 COLL VENOUS BLD VENIPUNCTURE: CPT

## 2024-01-25 PROCEDURE — 2500000003 HC RX 250 WO HCPCS: Performed by: NURSE ANESTHETIST, CERTIFIED REGISTERED

## 2024-01-25 PROCEDURE — C9113 INJ PANTOPRAZOLE SODIUM, VIA: HCPCS | Performed by: PHYSICIAN ASSISTANT

## 2024-01-25 PROCEDURE — 85014 HEMATOCRIT: CPT

## 2024-01-25 PROCEDURE — 84100 ASSAY OF PHOSPHORUS: CPT

## 2024-01-25 PROCEDURE — 7100000011 HC PHASE II RECOVERY - ADDTL 15 MIN: Performed by: STUDENT IN AN ORGANIZED HEALTH CARE EDUCATION/TRAINING PROGRAM

## 2024-01-25 PROCEDURE — 36430 TRANSFUSION BLD/BLD COMPNT: CPT

## 2024-01-25 PROCEDURE — C1889 IMPLANT/INSERT DEVICE, NOC: HCPCS | Performed by: STUDENT IN AN ORGANIZED HEALTH CARE EDUCATION/TRAINING PROGRAM

## 2024-01-25 PROCEDURE — 94761 N-INVAS EAR/PLS OXIMETRY MLT: CPT

## 2024-01-25 PROCEDURE — 1100000003 HC PRIVATE W/ TELEMETRY

## 2024-01-25 PROCEDURE — 85025 COMPLETE CBC W/AUTO DIFF WBC: CPT

## 2024-01-25 PROCEDURE — 6360000002 HC RX W HCPCS: Performed by: NURSE ANESTHETIST, CERTIFIED REGISTERED

## 2024-01-25 DEVICE — WORKING LENGTH 235CM, WORKING CHANNEL 2.8MM
Type: IMPLANTABLE DEVICE | Status: FUNCTIONAL
Brand: RESOLUTION 360 CLIP

## 2024-01-25 RX ORDER — LIDOCAINE HYDROCHLORIDE 20 MG/ML
INJECTION, SOLUTION EPIDURAL; INFILTRATION; INTRACAUDAL; PERINEURAL PRN
Status: DISCONTINUED | OUTPATIENT
Start: 2024-01-25 | End: 2024-01-25 | Stop reason: SDUPTHER

## 2024-01-25 RX ORDER — PROPOFOL 10 MG/ML
INJECTION, EMULSION INTRAVENOUS PRN
Status: DISCONTINUED | OUTPATIENT
Start: 2024-01-25 | End: 2024-01-25 | Stop reason: SDUPTHER

## 2024-01-25 RX ORDER — SODIUM CHLORIDE, SODIUM LACTATE, POTASSIUM CHLORIDE, CALCIUM CHLORIDE 600; 310; 30; 20 MG/100ML; MG/100ML; MG/100ML; MG/100ML
INJECTION, SOLUTION INTRAVENOUS CONTINUOUS PRN
Status: DISCONTINUED | OUTPATIENT
Start: 2024-01-25 | End: 2024-01-25 | Stop reason: SDUPTHER

## 2024-01-25 RX ORDER — GLYCOPYRROLATE 0.2 MG/ML
INJECTION INTRAMUSCULAR; INTRAVENOUS PRN
Status: DISCONTINUED | OUTPATIENT
Start: 2024-01-25 | End: 2024-01-25 | Stop reason: SDUPTHER

## 2024-01-25 RX ADMIN — INSULIN LISPRO 4 UNITS: 100 INJECTION, SOLUTION INTRAVENOUS; SUBCUTANEOUS at 00:08

## 2024-01-25 RX ADMIN — PANTOPRAZOLE SODIUM 40 MG: 40 INJECTION, POWDER, FOR SOLUTION INTRAVENOUS at 13:13

## 2024-01-25 RX ADMIN — THIAMINE HYDROCHLORIDE 400 MG: 100 INJECTION, SOLUTION INTRAMUSCULAR; INTRAVENOUS at 18:24

## 2024-01-25 RX ADMIN — PROPOFOL 100 MG: 10 INJECTION, EMULSION INTRAVENOUS at 14:26

## 2024-01-25 RX ADMIN — METRONIDAZOLE 500 MG: 500 INJECTION, SOLUTION INTRAVENOUS at 02:21

## 2024-01-25 RX ADMIN — THIAMINE HYDROCHLORIDE 400 MG: 100 INJECTION, SOLUTION INTRAMUSCULAR; INTRAVENOUS at 02:16

## 2024-01-25 RX ADMIN — IPRATROPIUM BROMIDE AND ALBUTEROL SULFATE 1 DOSE: .5; 3 SOLUTION RESPIRATORY (INHALATION) at 19:41

## 2024-01-25 RX ADMIN — FOLIC ACID 1 MG: 5 INJECTION, SOLUTION INTRAMUSCULAR; INTRAVENOUS; SUBCUTANEOUS at 10:03

## 2024-01-25 RX ADMIN — IPRATROPIUM BROMIDE AND ALBUTEROL SULFATE 1 DOSE: .5; 3 SOLUTION RESPIRATORY (INHALATION) at 00:28

## 2024-01-25 RX ADMIN — CEFEPIME 1000 MG: 1 INJECTION, POWDER, FOR SOLUTION INTRAMUSCULAR; INTRAVENOUS at 13:11

## 2024-01-25 RX ADMIN — PHENYLEPHRINE HYDROCHLORIDE 100 MCG: 10 INJECTION INTRAVENOUS at 14:28

## 2024-01-25 RX ADMIN — METRONIDAZOLE 500 MG: 500 INJECTION, SOLUTION INTRAVENOUS at 18:25

## 2024-01-25 RX ADMIN — GLYCOPYRROLATE 0.2 MG: 0.2 INJECTION, SOLUTION INTRAMUSCULAR; INTRAVENOUS at 14:22

## 2024-01-25 RX ADMIN — CEFEPIME 1000 MG: 1 INJECTION, POWDER, FOR SOLUTION INTRAMUSCULAR; INTRAVENOUS at 23:59

## 2024-01-25 RX ADMIN — IPRATROPIUM BROMIDE AND ALBUTEROL SULFATE 1 DOSE: .5; 3 SOLUTION RESPIRATORY (INHALATION) at 08:42

## 2024-01-25 RX ADMIN — INSULIN LISPRO 4 UNITS: 100 INJECTION, SOLUTION INTRAVENOUS; SUBCUTANEOUS at 12:29

## 2024-01-25 RX ADMIN — METRONIDAZOLE 500 MG: 500 INJECTION, SOLUTION INTRAVENOUS at 10:29

## 2024-01-25 RX ADMIN — SODIUM CHLORIDE, SODIUM LACTATE, POTASSIUM CHLORIDE, AND CALCIUM CHLORIDE: 600; 310; 30; 20 INJECTION, SOLUTION INTRAVENOUS at 14:28

## 2024-01-25 RX ADMIN — IPRATROPIUM BROMIDE AND ALBUTEROL SULFATE 1 DOSE: .5; 3 SOLUTION RESPIRATORY (INHALATION) at 04:57

## 2024-01-25 RX ADMIN — THIAMINE HYDROCHLORIDE 400 MG: 100 INJECTION, SOLUTION INTRAMUSCULAR; INTRAVENOUS at 10:29

## 2024-01-25 RX ADMIN — INSULIN GLARGINE 5 UNITS: 100 INJECTION, SOLUTION SUBCUTANEOUS at 10:03

## 2024-01-25 RX ADMIN — CEFEPIME 1000 MG: 1 INJECTION, POWDER, FOR SOLUTION INTRAMUSCULAR; INTRAVENOUS at 00:09

## 2024-01-25 RX ADMIN — PANTOPRAZOLE SODIUM 40 MG: 40 INJECTION, POWDER, FOR SOLUTION INTRAVENOUS at 02:16

## 2024-01-25 RX ADMIN — LIDOCAINE HYDROCHLORIDE 100 MG: 20 INJECTION, SOLUTION EPIDURAL; INFILTRATION; INTRACAUDAL; PERINEURAL at 14:26

## 2024-01-25 RX ADMIN — INSULIN LISPRO 4 UNITS: 100 INJECTION, SOLUTION INTRAVENOUS; SUBCUTANEOUS at 18:40

## 2024-01-25 ASSESSMENT — PAIN SCALES - GENERAL
PAINLEVEL_OUTOF10: 0

## 2024-01-25 NOTE — INTERDISCIPLINARY ROUNDS
[facility]  Pulmonary, Critical Care, and Sleep Medicine  Interdisciplinary and Ventilator Weaning Rounds    Patient discussed in morning walking rounds and interdisciplinary rounds.    ICU admission day: 1    Overnight events:   No significant events noted overnight    Assessments and best practice:  Ventilator     N/A  Glycemic control  Diet  NPO    Stress ulcer prophylaxis.  Protonix  DVT prophylaxis.  Not indicated anemia  Need for Lines, mancini assessed.  Yes  Restraint Reevaluation     None needed  Disposition regarding transferring out of the ICU  GREEN      Family contact/MPOA:   Family updated     Palliative consult within 3 days of admission to ICU-  Ethics Guidance: 21 days      Daily Plans:  Transfer to telemetry  GI for scope?    CINDY time 12 minutes        Taina Pacheco PA-C  01/25/24  Pulmonary, Critical Care Medicine  [facility]

## 2024-01-25 NOTE — ANESTHESIA POSTPROCEDURE EVALUATION
Department of Anesthesiology  Postprocedure Note    Patient: Gumaro Khan  MRN: [id number]  YOB: 1945  Date of evaluation: 1/25/2024    Procedure Summary       Date: 01/25/24 Room / Location: [facility] ENDO 02 / [facility] ENDOSCOPY    Anesthesia Start: 1422 Anesthesia Stop: 1433    Procedure: EGD ESOPHAGOGASTRODUODENOSCOPY with clip (Upper GI Region) Diagnosis:       Gastrointestinal hemorrhage, unspecified gastrointestinal hemorrhage type      (Gastrointestinal hemorrhage, unspecified gastrointestinal hemorrhage type [K92.2])    Surgeons: Luis Tan MD Responsible Provider: Kaylyn Gallagher MD    Anesthesia Type: MAC ASA Status: 3            Anesthesia Type: MAC    Lilian Phase I: Lilian Score: 10    Lilian Phase II: Lilian Score: 10    Anesthesia Post Evaluation    Patient location during evaluation: PACU  Patient participation: complete - patient participated  Level of consciousness: awake and alert  Pain score: 0  Airway patency: patent  Nausea & Vomiting: no nausea and no vomiting  Cardiovascular status: hemodynamically stable  Respiratory status: acceptable  Hydration status: euvolemic  Multimodal analgesia pain management approach  Pain management: adequate    No notable events documented.

## 2024-01-25 NOTE — ANESTHESIA PRE PROCEDURE
Department of Anesthesiology  Preprocedure Note       Name:  Gumaro Khan   Age:  78 y.o.  :  1945                                          MRN:  [id number]         Date:  2024      Surgeon: Surgeon(s):  Luis Tan MD    Procedure: Procedure(s):  EGD ESOPHAGOGASTRODUODENOSCOPY with clip    Medications prior to admission:   Prior to Admission medications    Medication Sig Start Date End Date Taking? Authorizing Provider   spironolactone (ALDACTONE) 25 MG tablet TAKE 1 TABLET BY MOUTH  DAILY 23   Viv Lazaro APRN - NP   cloNIDine (CATAPRES) 0.2 MG tablet TAKE 1 TABLET BY MOUTH 3  TIMES DAILY 23   Thiago Velasquez MD   carvedilol (COREG) 25 MG tablet TAKE 1 TABLET BY MOUTH  TWICE DAILY WITH MEALS 23   Thiago Velasquez MD   lisinopril (PRINIVIL;ZESTRIL) 20 MG tablet TAKE 1 TABLET BY MOUTH  TWICE DAILY 23   Thiago Velasquez MD   bumetanide (BUMEX) 2 MG tablet TAKE 1 TABLET BY MOUTH  DAILY OR AS DIRECTED  Patient taking differently: Take 1 tablet by mouth daily 23   Viv Lazaro APRN - NP   warfarin (COUMADIN) 2.5 MG tablet TAKE 1 TABLET BY MOUTH ONCE DAILY OR AS DIRECTED  Patient taking differently: Take 1 tablet by mouth daily TAKE 1 TABLET BY MOUTH ONCE DAILY OR AS DIRECTED 23   Thiago Velasquez MD   digoxin (LANOXIN) 125 MCG tablet TAKE 1 TABLET BY MOUTH  DAILY  Patient taking differently: Take 1 tablet by mouth daily 23   Jose Madrigal MD   allopurinol (ZYLOPRIM) 300 MG tablet Take 1 tablet by mouth daily    Automatic Reconciliation, Ar   insulin glargine (LANTUS) 100 UNIT/ML injection vial Inject 20 Units into the skin    Automatic Reconciliation, Ar   insulin lispro (HUMALOG) 100 UNIT/ML SOLN injection vial Inject 5 Units into the skin 3 times daily    Automatic Reconciliation, Ar   magnesium oxide (MAG-OX) 400 MG tablet Take 1 tablet by mouth daily 3/15/17   Automatic Reconciliation, Ar   sertraline (ZOLOFT) 25 MG tablet Take 1 tablet by mouth

## 2024-01-25 NOTE — PROCEDURES
[email or web address]  [phone]    [address] [address]     Endoscopic Gastroduodenoscopy Procedure Note    Gumaro Khan  1945  [id number]    Indication: Anemia and melena     : Luis Tan MD    Referring Provider:  Artis Shell MD    Anesthesia/Sedation:  MAC anesthesia Propofol      Procedure Details   Full disclosure of risks were reviewed with patient as detailed on the consent form. The patient was placed in the left lateral decubitus position and monitored with continuous interval blood pressure monitoring and direct observations.   After adequate sedation, the endoscope was carefully introduced into the oropharynx and passed in to the esophagus under direct visualization. The esophagus and GE junction were carefully examined. After advancement of the endoscope into the stomach, a careful examination was performed, including views of the antrum, incisura angularis, corpus and retroflexed views of the cardia and fundus.   The pylorus was then intubated without any difficulty and the endoscope was advanced to proximal jejunum. Careful examination of the second portion of the duodenum and the bulb was then performed.  The stomach was then decompressed and the endoscope withdrawn.   Findings and intervention(s) are detailed below.     Findings:   Esophagus:  Normal mucosa without evidence of erythema, erosion or ulceration  Regular Z-line    Stomach:  Insufflated appropriately  Gastric antrum appeared normal mucosa without evidence of erythema, erosion or ulceration  Retroflexed view of the incisura and cardia revealed normal mucosa  Gastric body had one actively oozing angioectasia. Decision was to use endoclip as patient has implanted defibrillator:      Duodenum/small bowel:  Examination into approximately 120cm, proximal jejunum.  There were some erosions in the duodenal sweep, without stigmata of recent

## 2024-01-26 ENCOUNTER — APPOINTMENT (OUTPATIENT)
Facility: HOSPITAL | Age: 79
DRG: 377 | End: 2024-01-26
Payer: MEDICARE

## 2024-01-26 PROBLEM — N17.0 ATN (ACUTE TUBULAR NECROSIS) (HCC): Status: ACTIVE | Noted: 2024-01-26

## 2024-01-26 PROBLEM — I51.9 SYSTOLIC DYSFUNCTION: Status: ACTIVE | Noted: 2024-01-26

## 2024-01-26 LAB
ABO + RH BLD: NORMAL
ANION GAP SERPL CALC-SCNC: 2 MMOL/L (ref 3–18)
APPEARANCE UR: CLEAR
BACTERIA URNS QL MICRO: ABNORMAL /HPF
BASOPHILS # BLD: 0 K/UL (ref 0–0.1)
BASOPHILS NFR BLD: 0 % (ref 0–2)
BILIRUB UR QL: NEGATIVE
BLD PROD TYP BPU: NORMAL
BLOOD BANK DISPENSE STATUS: NORMAL
BLOOD GROUP ANTIBODIES SERPL: NORMAL
BPU ID: NORMAL
BUN SERPL-MCNC: 176 MG/DL (ref 7–18)
BUN/CREAT SERPL: 55 (ref 12–20)
CALCIUM SERPL-MCNC: 8.7 MG/DL (ref 8.5–10.1)
CALLED TO: NORMAL
CALLED TO:: NORMAL
CHLORIDE SERPL-SCNC: 112 MMOL/L (ref 100–111)
CO2 SERPL-SCNC: 25 MMOL/L (ref 21–32)
COLOR UR: YELLOW
CREAT SERPL-MCNC: 3.21 MG/DL (ref 0.6–1.3)
CREAT UR-MCNC: 44 MG/DL (ref 30–125)
CREAT UR-MCNC: 46 MG/DL (ref 30–125)
CROSSMATCH RESULT: NORMAL
DIFFERENTIAL METHOD BLD: ABNORMAL
EOSINOPHIL # BLD: 0 K/UL (ref 0–0.4)
EOSINOPHIL NFR BLD: 0 % (ref 0–5)
EPITH CASTS URNS QL MICRO: ABNORMAL /LPF (ref 0–5)
ERYTHROCYTE [DISTWIDTH] IN BLOOD BY AUTOMATED COUNT: 21.2 % (ref 11.6–14.5)
GLUCOSE BLD STRIP.AUTO-MCNC: 129 MG/DL (ref 70–110)
GLUCOSE BLD STRIP.AUTO-MCNC: 173 MG/DL (ref 70–110)
GLUCOSE SERPL-MCNC: 130 MG/DL (ref 74–99)
GLUCOSE UR STRIP.AUTO-MCNC: NEGATIVE MG/DL
HCT VFR BLD AUTO: 23.4 % (ref 36–48)
HCT VFR BLD AUTO: 23.7 % (ref 36–48)
HCT VFR BLD AUTO: 24.2 % (ref 36–48)
HCT VFR BLD AUTO: 24.3 % (ref 36–48)
HGB BLD-MCNC: 7.3 G/DL (ref 13–16)
HGB BLD-MCNC: 7.3 G/DL (ref 13–16)
HGB BLD-MCNC: 7.4 G/DL (ref 13–16)
HGB BLD-MCNC: 7.5 G/DL (ref 13–16)
HGB UR QL STRIP: ABNORMAL
IMM GRANULOCYTES # BLD AUTO: 0.3 K/UL (ref 0–0.04)
IMM GRANULOCYTES NFR BLD AUTO: 2 % (ref 0–0.5)
INR PPP: 1.4 (ref 0.9–1.1)
KETONES UR QL STRIP.AUTO: NEGATIVE MG/DL
LEUKOCYTE ESTERASE UR QL STRIP.AUTO: ABNORMAL
LYMPHOCYTES # BLD: 0.7 K/UL (ref 0.9–3.6)
LYMPHOCYTES NFR BLD: 5 % (ref 21–52)
MAGNESIUM SERPL-MCNC: 2.5 MG/DL (ref 1.6–2.6)
MCH RBC QN AUTO: 29.4 PG (ref 24–34)
MCHC RBC AUTO-ENTMCNC: 30.6 G/DL (ref 31–37)
MCV RBC AUTO: 96 FL (ref 78–100)
MONOCYTES # BLD: 1 K/UL (ref 0.05–1.2)
MONOCYTES NFR BLD: 8 % (ref 3–10)
NEUTS SEG # BLD: 10.6 K/UL (ref 1.8–8)
NEUTS SEG NFR BLD: 85 % (ref 40–73)
NITRITE UR QL STRIP.AUTO: NEGATIVE
NRBC # BLD: 1.14 K/UL (ref 0–0.01)
NRBC BLD-RTO: 9.1 PER 100 WBC
PH UR STRIP: 5.5 (ref 5–8)
PHOSPHATE SERPL-MCNC: 6.1 MG/DL (ref 2.5–4.9)
PLATELET # BLD AUTO: 152 K/UL (ref 135–420)
PMV BLD AUTO: 12 FL (ref 9.2–11.8)
POTASSIUM SERPL-SCNC: 5 MMOL/L (ref 3.5–5.5)
POTASSIUM UR-SCNC: 10 MMOL/L (ref 12–62)
PROCALCITONIN SERPL-MCNC: 0.13 NG/ML
PROT UR STRIP-MCNC: ABNORMAL MG/DL
PROT UR-MCNC: 46 MG/DL
PROT UR-MCNC: 48 MG/DL
PROT/CREAT UR-RTO: 1.1
PROTHROMBIN TIME: 17.1 SEC (ref 11.9–14.7)
RBC # BLD AUTO: 2.52 M/UL (ref 4.35–5.65)
RBC #/AREA URNS HPF: ABNORMAL /HPF (ref 0–5)
SODIUM SERPL-SCNC: 139 MMOL/L (ref 136–145)
SODIUM UR-SCNC: 32 MMOL/L (ref 20–110)
SP GR UR REFRACTOMETRY: 1.01 (ref 1–1.03)
SPECIMEN EXP DATE BLD: NORMAL
UNIT DIVISION: 0
UROBILINOGEN UR QL STRIP.AUTO: 0.2 EU/DL (ref 0.2–1)
WBC # BLD AUTO: 12.5 K/UL (ref 4.6–13.2)
WBC URNS QL MICRO: ABNORMAL /HPF (ref 0–4)

## 2024-01-26 PROCEDURE — C9113 INJ PANTOPRAZOLE SODIUM, VIA: HCPCS | Performed by: PHYSICIAN ASSISTANT

## 2024-01-26 PROCEDURE — 6360000002 HC RX W HCPCS: Performed by: PHYSICIAN ASSISTANT

## 2024-01-26 PROCEDURE — 84100 ASSAY OF PHOSPHORUS: CPT

## 2024-01-26 PROCEDURE — 71045 X-RAY EXAM CHEST 1 VIEW: CPT

## 2024-01-26 PROCEDURE — 99232 SBSQ HOSP IP/OBS MODERATE 35: CPT

## 2024-01-26 PROCEDURE — 97163 PT EVAL HIGH COMPLEX 45 MIN: CPT

## 2024-01-26 PROCEDURE — 84133 ASSAY OF URINE POTASSIUM: CPT

## 2024-01-26 PROCEDURE — 80048 BASIC METABOLIC PNL TOTAL CA: CPT

## 2024-01-26 PROCEDURE — 36415 COLL VENOUS BLD VENIPUNCTURE: CPT

## 2024-01-26 PROCEDURE — 97166 OT EVAL MOD COMPLEX 45 MIN: CPT

## 2024-01-26 PROCEDURE — 2580000003 HC RX 258: Performed by: INTERNAL MEDICINE

## 2024-01-26 PROCEDURE — 2580000003 HC RX 258: Performed by: PHYSICIAN ASSISTANT

## 2024-01-26 PROCEDURE — 51702 INSERT TEMP BLADDER CATH: CPT

## 2024-01-26 PROCEDURE — 85014 HEMATOCRIT: CPT

## 2024-01-26 PROCEDURE — 99232 SBSQ HOSP IP/OBS MODERATE 35: CPT | Performed by: INTERNAL MEDICINE

## 2024-01-26 PROCEDURE — 84156 ASSAY OF PROTEIN URINE: CPT

## 2024-01-26 PROCEDURE — 76770 US EXAM ABDO BACK WALL COMP: CPT

## 2024-01-26 PROCEDURE — 85025 COMPLETE CBC W/AUTO DIFF WBC: CPT

## 2024-01-26 PROCEDURE — 6370000000 HC RX 637 (ALT 250 FOR IP): Performed by: REGISTERED NURSE

## 2024-01-26 PROCEDURE — 84145 PROCALCITONIN (PCT): CPT

## 2024-01-26 PROCEDURE — 85018 HEMOGLOBIN: CPT

## 2024-01-26 PROCEDURE — 6360000002 HC RX W HCPCS: Performed by: REGISTERED NURSE

## 2024-01-26 PROCEDURE — 83735 ASSAY OF MAGNESIUM: CPT

## 2024-01-26 PROCEDURE — 83935 ASSAY OF URINE OSMOLALITY: CPT

## 2024-01-26 PROCEDURE — 2500000003 HC RX 250 WO HCPCS: Performed by: INTERNAL MEDICINE

## 2024-01-26 PROCEDURE — 94640 AIRWAY INHALATION TREATMENT: CPT

## 2024-01-26 PROCEDURE — 97530 THERAPEUTIC ACTIVITIES: CPT

## 2024-01-26 PROCEDURE — 85610 PROTHROMBIN TIME: CPT

## 2024-01-26 PROCEDURE — 2700000000 HC OXYGEN THERAPY PER DAY

## 2024-01-26 PROCEDURE — A4216 STERILE WATER/SALINE, 10 ML: HCPCS | Performed by: PHYSICIAN ASSISTANT

## 2024-01-26 PROCEDURE — 84300 ASSAY OF URINE SODIUM: CPT

## 2024-01-26 PROCEDURE — 82962 GLUCOSE BLOOD TEST: CPT

## 2024-01-26 PROCEDURE — 94761 N-INVAS EAR/PLS OXIMETRY MLT: CPT

## 2024-01-26 PROCEDURE — 6370000000 HC RX 637 (ALT 250 FOR IP): Performed by: PHYSICIAN ASSISTANT

## 2024-01-26 PROCEDURE — 1100000003 HC PRIVATE W/ TELEMETRY

## 2024-01-26 PROCEDURE — 81001 URINALYSIS AUTO W/SCOPE: CPT

## 2024-01-26 PROCEDURE — 6360000002 HC RX W HCPCS: Performed by: INTERNAL MEDICINE

## 2024-01-26 PROCEDURE — 82570 ASSAY OF URINE CREATININE: CPT

## 2024-01-26 RX ADMIN — METRONIDAZOLE 500 MG: 500 INJECTION, SOLUTION INTRAVENOUS at 02:18

## 2024-01-26 RX ADMIN — IPRATROPIUM BROMIDE AND ALBUTEROL SULFATE 1 DOSE: .5; 3 SOLUTION RESPIRATORY (INHALATION) at 03:56

## 2024-01-26 RX ADMIN — INSULIN GLARGINE 5 UNITS: 100 INJECTION, SOLUTION SUBCUTANEOUS at 09:00

## 2024-01-26 RX ADMIN — PANTOPRAZOLE SODIUM 40 MG: 40 INJECTION, POWDER, FOR SOLUTION INTRAVENOUS at 13:06

## 2024-01-26 RX ADMIN — METRONIDAZOLE 500 MG: 500 INJECTION, SOLUTION INTRAVENOUS at 18:42

## 2024-01-26 RX ADMIN — IPRATROPIUM BROMIDE AND ALBUTEROL SULFATE 1 DOSE: .5; 3 SOLUTION RESPIRATORY (INHALATION) at 08:43

## 2024-01-26 RX ADMIN — IPRATROPIUM BROMIDE AND ALBUTEROL SULFATE 1 DOSE: .5; 3 SOLUTION RESPIRATORY (INHALATION) at 11:43

## 2024-01-26 RX ADMIN — IPRATROPIUM BROMIDE AND ALBUTEROL SULFATE 1 DOSE: .5; 3 SOLUTION RESPIRATORY (INHALATION) at 00:52

## 2024-01-26 RX ADMIN — CEFEPIME 1000 MG: 1 INJECTION, POWDER, FOR SOLUTION INTRAMUSCULAR; INTRAVENOUS at 13:00

## 2024-01-26 RX ADMIN — THIAMINE HYDROCHLORIDE 400 MG: 100 INJECTION, SOLUTION INTRAMUSCULAR; INTRAVENOUS at 02:17

## 2024-01-26 RX ADMIN — IPRATROPIUM BROMIDE AND ALBUTEROL SULFATE 1 DOSE: .5; 3 SOLUTION RESPIRATORY (INHALATION) at 20:27

## 2024-01-26 RX ADMIN — FOLIC ACID 1 MG: 5 INJECTION, SOLUTION INTRAMUSCULAR; INTRAVENOUS; SUBCUTANEOUS at 09:53

## 2024-01-26 RX ADMIN — METRONIDAZOLE 500 MG: 500 INJECTION, SOLUTION INTRAVENOUS at 09:41

## 2024-01-26 RX ADMIN — THIAMINE HYDROCHLORIDE 100 MG: 100 INJECTION, SOLUTION INTRAMUSCULAR; INTRAVENOUS at 13:19

## 2024-01-26 RX ADMIN — PANTOPRAZOLE SODIUM 40 MG: 40 INJECTION, POWDER, FOR SOLUTION INTRAVENOUS at 02:17

## 2024-01-26 ASSESSMENT — PAIN SCALES - GENERAL
PAINLEVEL_OUTOF10: 0

## 2024-01-27 ENCOUNTER — APPOINTMENT (OUTPATIENT)
Facility: HOSPITAL | Age: 79
DRG: 377 | End: 2024-01-27
Attending: STUDENT IN AN ORGANIZED HEALTH CARE EDUCATION/TRAINING PROGRAM
Payer: MEDICARE

## 2024-01-27 ENCOUNTER — APPOINTMENT (OUTPATIENT)
Facility: HOSPITAL | Age: 79
DRG: 377 | End: 2024-01-27
Payer: MEDICARE

## 2024-01-27 ENCOUNTER — HOME HEALTH ADMISSION (OUTPATIENT)
Age: 79
End: 2024-01-27
Payer: MEDICARE

## 2024-01-27 LAB
ANION GAP SERPL CALC-SCNC: 7 MMOL/L (ref 3–18)
B PERT DNA SPEC QL NAA+PROBE: NOT DETECTED
BASOPHILS # BLD: 0 K/UL (ref 0–0.1)
BASOPHILS NFR BLD: 0 % (ref 0–2)
BORDETELLA PARAPERTUSSIS BY PCR: NOT DETECTED
BUN SERPL-MCNC: 163 MG/DL (ref 7–18)
BUN/CREAT SERPL: 53 (ref 12–20)
C PNEUM DNA SPEC QL NAA+PROBE: NOT DETECTED
CALCIUM SERPL-MCNC: 8.7 MG/DL (ref 8.5–10.1)
CHLORIDE SERPL-SCNC: 111 MMOL/L (ref 100–111)
CO2 SERPL-SCNC: 23 MMOL/L (ref 21–32)
CREAT SERPL-MCNC: 3.07 MG/DL (ref 0.6–1.3)
DIFFERENTIAL METHOD BLD: ABNORMAL
ECHO BSA: 2.21 M2
ECHO BSA: 2.21 M2
EOSINOPHIL # BLD: 0 K/UL (ref 0–0.4)
EOSINOPHIL NFR BLD: 0 % (ref 0–5)
ERYTHROCYTE [DISTWIDTH] IN BLOOD BY AUTOMATED COUNT: 21.2 % (ref 11.6–14.5)
FLUAV SUBTYP SPEC NAA+PROBE: NOT DETECTED
FLUBV RNA SPEC QL NAA+PROBE: NOT DETECTED
GLUCOSE BLD STRIP.AUTO-MCNC: 134 MG/DL (ref 70–110)
GLUCOSE BLD STRIP.AUTO-MCNC: 151 MG/DL (ref 70–110)
GLUCOSE BLD STRIP.AUTO-MCNC: 154 MG/DL (ref 70–110)
GLUCOSE BLD STRIP.AUTO-MCNC: 170 MG/DL (ref 70–110)
GLUCOSE SERPL-MCNC: 157 MG/DL (ref 74–99)
HADV DNA SPEC QL NAA+PROBE: NOT DETECTED
HCOV 229E RNA SPEC QL NAA+PROBE: NOT DETECTED
HCOV HKU1 RNA SPEC QL NAA+PROBE: NOT DETECTED
HCOV NL63 RNA SPEC QL NAA+PROBE: NOT DETECTED
HCOV OC43 RNA SPEC QL NAA+PROBE: NOT DETECTED
HCT VFR BLD AUTO: 23.7 % (ref 36–48)
HCT VFR BLD AUTO: 24.6 % (ref 36–48)
HGB BLD-MCNC: 7.4 G/DL (ref 13–16)
HGB BLD-MCNC: 7.5 G/DL (ref 13–16)
HMPV RNA SPEC QL NAA+PROBE: NOT DETECTED
HPIV1 RNA SPEC QL NAA+PROBE: NOT DETECTED
HPIV2 RNA SPEC QL NAA+PROBE: NOT DETECTED
HPIV3 RNA SPEC QL NAA+PROBE: NOT DETECTED
HPIV4 RNA SPEC QL NAA+PROBE: NOT DETECTED
IMM GRANULOCYTES # BLD AUTO: 0.2 K/UL (ref 0–0.04)
IMM GRANULOCYTES NFR BLD AUTO: 1 % (ref 0–0.5)
INR PPP: 1.5 (ref 0.9–1.1)
LYMPHOCYTES # BLD: 0.7 K/UL (ref 0.9–3.6)
LYMPHOCYTES NFR BLD: 5 % (ref 21–52)
M PNEUMO DNA SPEC QL NAA+PROBE: NOT DETECTED
MAGNESIUM SERPL-MCNC: 2.6 MG/DL (ref 1.6–2.6)
MCH RBC QN AUTO: 30 PG (ref 24–34)
MCHC RBC AUTO-ENTMCNC: 31.2 G/DL (ref 31–37)
MCV RBC AUTO: 96 FL (ref 78–100)
MONOCYTES # BLD: 1.2 K/UL (ref 0.05–1.2)
MONOCYTES NFR BLD: 9 % (ref 3–10)
NEUTS SEG # BLD: 11.6 K/UL (ref 1.8–8)
NEUTS SEG NFR BLD: 85 % (ref 40–73)
NRBC # BLD: 0.78 K/UL (ref 0–0.01)
NRBC BLD-RTO: 5.7 PER 100 WBC
OSMOLALITY UR: 429 MOSM/KG H2O
PHOSPHATE SERPL-MCNC: 5 MG/DL (ref 2.5–4.9)
PLATELET # BLD AUTO: 139 K/UL (ref 135–420)
PMV BLD AUTO: 11.9 FL (ref 9.2–11.8)
POTASSIUM SERPL-SCNC: 4.9 MMOL/L (ref 3.5–5.5)
PROTHROMBIN TIME: 18 SEC (ref 11.9–14.7)
RBC # BLD AUTO: 2.47 M/UL (ref 4.35–5.65)
RSV RNA SPEC QL NAA+PROBE: NOT DETECTED
RV+EV RNA SPEC QL NAA+PROBE: NOT DETECTED
SARS-COV-2 RNA RESP QL NAA+PROBE: NOT DETECTED
SODIUM SERPL-SCNC: 141 MMOL/L (ref 136–145)
WBC # BLD AUTO: 13.6 K/UL (ref 4.6–13.2)

## 2024-01-27 PROCEDURE — 6360000002 HC RX W HCPCS: Performed by: INTERNAL MEDICINE

## 2024-01-27 PROCEDURE — 6360000002 HC RX W HCPCS: Performed by: PHYSICIAN ASSISTANT

## 2024-01-27 PROCEDURE — 6360000002 HC RX W HCPCS: Performed by: REGISTERED NURSE

## 2024-01-27 PROCEDURE — 2700000000 HC OXYGEN THERAPY PER DAY

## 2024-01-27 PROCEDURE — C9113 INJ PANTOPRAZOLE SODIUM, VIA: HCPCS | Performed by: PHYSICIAN ASSISTANT

## 2024-01-27 PROCEDURE — 85018 HEMOGLOBIN: CPT

## 2024-01-27 PROCEDURE — 83735 ASSAY OF MAGNESIUM: CPT

## 2024-01-27 PROCEDURE — 85025 COMPLETE CBC W/AUTO DIFF WBC: CPT

## 2024-01-27 PROCEDURE — 6370000000 HC RX 637 (ALT 250 FOR IP): Performed by: REGISTERED NURSE

## 2024-01-27 PROCEDURE — 94640 AIRWAY INHALATION TREATMENT: CPT

## 2024-01-27 PROCEDURE — 2580000003 HC RX 258: Performed by: INTERNAL MEDICINE

## 2024-01-27 PROCEDURE — 0202U NFCT DS 22 TRGT SARS-COV-2: CPT

## 2024-01-27 PROCEDURE — 3430000000 HC RX DIAGNOSTIC RADIOPHARMACEUTICAL: Performed by: STUDENT IN AN ORGANIZED HEALTH CARE EDUCATION/TRAINING PROGRAM

## 2024-01-27 PROCEDURE — 85014 HEMATOCRIT: CPT

## 2024-01-27 PROCEDURE — 94761 N-INVAS EAR/PLS OXIMETRY MLT: CPT

## 2024-01-27 PROCEDURE — A9539 TC99M PENTETATE: HCPCS | Performed by: STUDENT IN AN ORGANIZED HEALTH CARE EDUCATION/TRAINING PROGRAM

## 2024-01-27 PROCEDURE — 93971 EXTREMITY STUDY: CPT

## 2024-01-27 PROCEDURE — A4216 STERILE WATER/SALINE, 10 ML: HCPCS | Performed by: PHYSICIAN ASSISTANT

## 2024-01-27 PROCEDURE — 84100 ASSAY OF PHOSPHORUS: CPT

## 2024-01-27 PROCEDURE — 71045 X-RAY EXAM CHEST 1 VIEW: CPT

## 2024-01-27 PROCEDURE — 6370000000 HC RX 637 (ALT 250 FOR IP): Performed by: PHYSICIAN ASSISTANT

## 2024-01-27 PROCEDURE — 82962 GLUCOSE BLOOD TEST: CPT

## 2024-01-27 PROCEDURE — 78582 LUNG VENTILAT&PERFUS IMAGING: CPT

## 2024-01-27 PROCEDURE — 93971 EXTREMITY STUDY: CPT | Performed by: INTERNAL MEDICINE

## 2024-01-27 PROCEDURE — 2500000003 HC RX 250 WO HCPCS: Performed by: INTERNAL MEDICINE

## 2024-01-27 PROCEDURE — 1100000003 HC PRIVATE W/ TELEMETRY

## 2024-01-27 PROCEDURE — 2580000003 HC RX 258: Performed by: PHYSICIAN ASSISTANT

## 2024-01-27 PROCEDURE — 85610 PROTHROMBIN TIME: CPT

## 2024-01-27 PROCEDURE — 93970 EXTREMITY STUDY: CPT | Performed by: INTERNAL MEDICINE

## 2024-01-27 PROCEDURE — 36415 COLL VENOUS BLD VENIPUNCTURE: CPT

## 2024-01-27 PROCEDURE — 80048 BASIC METABOLIC PNL TOTAL CA: CPT

## 2024-01-27 PROCEDURE — 93970 EXTREMITY STUDY: CPT

## 2024-01-27 RX ORDER — KIT FOR THE PREPARATION OF TECHNETIUM TC 99M PENTETATE 20 MG/1
5 INJECTION, POWDER, LYOPHILIZED, FOR SOLUTION INTRAVENOUS; RESPIRATORY (INHALATION)
Status: COMPLETED | OUTPATIENT
Start: 2024-01-27 | End: 2024-01-27

## 2024-01-27 RX ADMIN — INSULIN GLARGINE 5 UNITS: 100 INJECTION, SOLUTION SUBCUTANEOUS at 08:43

## 2024-01-27 RX ADMIN — METRONIDAZOLE 500 MG: 500 INJECTION, SOLUTION INTRAVENOUS at 01:30

## 2024-01-27 RX ADMIN — METRONIDAZOLE 500 MG: 500 INJECTION, SOLUTION INTRAVENOUS at 18:20

## 2024-01-27 RX ADMIN — METRONIDAZOLE 500 MG: 500 INJECTION, SOLUTION INTRAVENOUS at 11:49

## 2024-01-27 RX ADMIN — PANTOPRAZOLE SODIUM 40 MG: 40 INJECTION, POWDER, FOR SOLUTION INTRAVENOUS at 14:31

## 2024-01-27 RX ADMIN — Medication 30 MILLICURIE: at 16:50

## 2024-01-27 RX ADMIN — IPRATROPIUM BROMIDE AND ALBUTEROL SULFATE 1 DOSE: .5; 3 SOLUTION RESPIRATORY (INHALATION) at 08:49

## 2024-01-27 RX ADMIN — IPRATROPIUM BROMIDE AND ALBUTEROL SULFATE 1 DOSE: .5; 3 SOLUTION RESPIRATORY (INHALATION) at 15:28

## 2024-01-27 RX ADMIN — IPRATROPIUM BROMIDE AND ALBUTEROL SULFATE 1 DOSE: .5; 3 SOLUTION RESPIRATORY (INHALATION) at 00:01

## 2024-01-27 RX ADMIN — CEFEPIME 1000 MG: 1 INJECTION, POWDER, FOR SOLUTION INTRAMUSCULAR; INTRAVENOUS at 01:29

## 2024-01-27 RX ADMIN — PANTOPRAZOLE SODIUM 40 MG: 40 INJECTION, POWDER, FOR SOLUTION INTRAVENOUS at 01:30

## 2024-01-27 RX ADMIN — THIAMINE HYDROCHLORIDE 100 MG: 100 INJECTION, SOLUTION INTRAMUSCULAR; INTRAVENOUS at 08:43

## 2024-01-27 RX ADMIN — FOLIC ACID 1 MG: 5 INJECTION, SOLUTION INTRAMUSCULAR; INTRAVENOUS; SUBCUTANEOUS at 08:43

## 2024-01-27 RX ADMIN — IPRATROPIUM BROMIDE AND ALBUTEROL SULFATE 1 DOSE: .5; 3 SOLUTION RESPIRATORY (INHALATION) at 23:12

## 2024-01-27 RX ADMIN — IPRATROPIUM BROMIDE AND ALBUTEROL SULFATE 1 DOSE: .5; 3 SOLUTION RESPIRATORY (INHALATION) at 12:41

## 2024-01-27 RX ADMIN — Medication 5 MILLICURIE: at 16:51

## 2024-01-27 RX ADMIN — IPRATROPIUM BROMIDE AND ALBUTEROL SULFATE 1 DOSE: .5; 3 SOLUTION RESPIRATORY (INHALATION) at 03:21

## 2024-01-27 RX ADMIN — IPRATROPIUM BROMIDE AND ALBUTEROL SULFATE 1 DOSE: .5; 3 SOLUTION RESPIRATORY (INHALATION) at 19:41

## 2024-01-27 RX ADMIN — CEFEPIME 1000 MG: 1 INJECTION, POWDER, FOR SOLUTION INTRAMUSCULAR; INTRAVENOUS at 11:48

## 2024-01-27 ASSESSMENT — PAIN SCALES - GENERAL
PAINLEVEL_OUTOF10: 0

## 2024-01-27 NOTE — CONSULTS
Structural Cardiology Consultation Note    Patient: Gumaro Khan Age: 78 y.o. Sex: male    YOB: 1945 Admit Date: 1/23/2024 PCP: Artis Shell MD   MRN: [id number]  CSN: [id number]       Cardiologist: Dayron Johnson MD       Chief Complaint     Chief Complaint   Patient presents with    Fatigue    Emesis     Ems reports n/v since last Thursday,  became incontinent today and weaker, bp 80's / 30's, resp effort with accessory muscles, rate 28, sats 97 room air, blood sugar 200's        HPI:   Gumaro Khan is a 78 y.o. gentleman who used to work for [employer], long retired and now admittedly sedentary \"doing nothing\" who was admitted with shock related to sepsis and blood loss from bleeding requiring transfusion, FFP and Kcentra on admission , improved with treatmetn including endoclip and antibiotics.  He has persistent atrial fibrillation on anticoagulation for stroke prevention with coumadin.  He has ischemic cardiomyopathy with LVEF 25-30% by echo 5/2018.  He has a Medtronic single chamber AICD with generator changed 5/2018. His echo in 2018 also showed moderate MR, severe global hypokinesis and severe PAH. He also had right thigh osteosarcoma resected, T2DM and history of daily EtOH abuse and related liver failure with hepatic encephalopathy.  On admission noted SHAHNAZ      Assessment and Diagnosis   Atrial Fibrillation, persistent  GI bleeding  EtOH abuse with associated liver failure  Non ischemic cardiomyopathy  Medtronic AICD, generator changed 2018  PAH  T2DM  SHAHNAZ  Moderate MR    Recommendations and Plan    Excellent candidate for Watchman as alternative to anticoagulation for stroke prevention in setting of GI bleeding with liver failure and high risk for AF related CVA. Plan to arrange as outpatient. Disucssed with him at length and he is agreeable to proceed.    Physical Examination:     Vital Signs:    Vitals:    01/27/24 0849   BP:    Pulse: 89   Resp: 22   Temp:    SpO2: 
Infectious Disease Consultation Note        Reason: Evaluate for sepsis    Current abx Prior abx   Cefepime, metronidazole since 1/24/2024      Lines:       Assessment :  78-year-old man with past medical history significant for melanoma, cardiomyopathy, CHF status post AICD, DVT presented to [facility] on 1/23/2024 with nausea, vomiting x 1 week.    Etiology of significant leukocytosis on admission not entirely clear.  Differential diagnosis includes leukemoid reaction to severe anemia, nausea versus undiagnosed aspiration pneumonia.    Chest x-ray, CT chest 1/24 didn't reveal definitive pneumonia. Imaging findings can lag behind clinical presentation. Occasional rhonchi alongwith SOB noted on today's exam. Hence, will continue abx, follow up imaging studies, clinical course to determine duration of antibiotics    Acute kidney injury-nephrology follow-up appreciated    GI bleed- s/p EGD on 1/25. showed angioteciasia and endoclip was placed     Recommendations:    Continue cefepime, metronidazole for now  Obtain chest x-ray to evaluate for aspiration pneumonia  Add on procalcitonin to today's labs  Follow-up CBC, temperature, procalcitonin and chest x-ray results.  Okay to discontinue antibiotics tomorrow if no evidence of pneumonia/bacterial infection noted on labs, x-ray, subsequent exam    Thank you for consultation request. Above plan was discussed in details with patient, family and dr Reyes. Please call me if any further questions or concerns. Will continue to participate in the care of this patient.  HPI:    78-year-old man with past medical history significant for melanoma, cardiomyopathy, CHF status post AICD, DVT presented to [facility] on 1/23/2024 with nausea, vomiting x 1 week.    Patient is unable to state the exact events which led to his hospitalization.  He recollects having shortness of breath for couple months.  Apparently patient had nausea, vomiting for 1 week prior to admission.  Was also noted to have 
taking differently: Take 1 tablet by mouth daily 7/26/23   Viv Lazaro, APRN - NP   warfarin (COUMADIN) 2.5 MG tablet TAKE 1 TABLET BY MOUTH ONCE DAILY OR AS DIRECTED  Patient taking differently: Take 1 tablet by mouth daily TAKE 1 TABLET BY MOUTH ONCE DAILY OR AS DIRECTED 7/24/23   Thiago Velasquez MD   digoxin (LANOXIN) 125 MCG tablet TAKE 1 TABLET BY MOUTH  DAILY  Patient taking differently: Take 1 tablet by mouth daily 4/24/23   Jose Madrigal MD   allopurinol (ZYLOPRIM) 300 MG tablet Take 1 tablet by mouth daily    Automatic Reconciliation, Ar   insulin glargine (LANTUS) 100 UNIT/ML injection vial Inject 20 Units into the skin    Automatic Reconciliation, Ar   insulin lispro (HUMALOG) 100 UNIT/ML SOLN injection vial Inject 5 Units into the skin 3 times daily    Automatic Reconciliation, Ar   magnesium oxide (MAG-OX) 400 MG tablet Take 1 tablet by mouth daily 3/15/17   Automatic Reconciliation, Ar   sertraline (ZOLOFT) 25 MG tablet Take 1 tablet by mouth daily    Automatic Reconciliation, Ar   aspirin 81 MG EC tablet Take 1 tablet by mouth daily    Provider, MD Racheal   atorvastatin (LIPITOR) 10 MG tablet Take 1 tablet by mouth at bedtime 12/2/22   Provider, MD Racheal       Review of Systems: neg     Constitutional: No fevers, chills, weight loss, fatigue.   Skin: No rashes, pruritis, jaundice, ulcerations, erythema.   HENT: No headaches, nosebleeds, sinus pressure, rhinorrhea, sore throat.   Eyes: No visual changes, blurred vision, eye pain, photophobia, jaundice.   Cardiovascular: No chest pain, heart palpitations.   Respiratory: No cough, SOB, wheezing, chest discomfort, orthopnea.   Gastrointestinal: Neg unless noted otherwise in H&P   Genitourinary: No dysuria, bleeding, discharge, pyuria.   Musculoskeletal: No weakness, arthralgias, wasting.   Endo: No sweats.   Heme: No bruising, easy bleeding.   Allergies: As noted.   Neurological: Cranial nerves intact.  Alert and oriented. Gait not 
metroNIDAZOLE (FLAGYL) 500 mg in 0.9% NaCl 100 mL IVPB premix  500 mg IntraVENous Q8H    pantoprazole (PROTONIX) 40 mg in sodium chloride (PF) 0.9 % 10 mL injection  40 mg IntraVENous Q12H    0.9 % sodium chloride infusion   IntraVENous PRN    ipratropium 0.5 mg-albuterol 2.5 mg (DUONEB) nebulizer solution 1 Dose  1 Dose Inhalation Q4H RT    thiamine (B-1) injection 400 mg  400 mg IntraVENous Q8H    Followed by    [START ON 1/26/2024] thiamine (B-1) injection 100 mg  100 mg IntraVENous Daily    folic acid 1 mg in sodium chloride 0.9 % 50 mL IVPB  1 mg IntraVENous Daily    insulin glargine (LANTUS) injection vial 5 Units  5 Units SubCUTAneous Daily    insulin lispro (HUMALOG) injection vial 0-16 Units  0-16 Units SubCUTAneous Q6H    cefepime (MAXIPIME) 1,000 mg in sodium chloride 0.9 % 50 mL IVPB (mini-bag)  1,000 mg IntraVENous Q12H    glucose chewable tablet 16 g  4 tablet Oral PRN    dextrose bolus 10% 125 mL  125 mL IntraVENous PRN    Or    dextrose bolus 10% 250 mL  250 mL IntraVENous PRN    glucagon injection 1 mg  1 mg SubCUTAneous PRN    dextrose 10 % infusion   IntraVENous Continuous PRN         Physical Exam:     Vitals:    01/24/24 0930   BP: (!) 108/50   Pulse: 69   Resp: 27   Temp: 97.8 °F (36.6 °C)   SpO2: 100%       TELE: AFIB    BP Readings from Last 3 Encounters:   01/24/24 (!) 108/50   08/16/23 132/60   02/15/23 130/60     Pulse Readings from Last 3 Encounters:   01/24/24 69   08/16/23 77   02/15/23 69     Wt Readings from Last 3 Encounters:   01/24/24 95.3 kg (210 lb)   08/16/23 89.8 kg (198 lb)   02/15/23 96.2 kg (212 lb)       General:  alert, appears stated age, and cooperative  Neck:  no JVD  Lungs:  clear to auscultation bilaterally anterior  Heart:  irregularly irregular rhythm  Abdomen:  abdomen is soft without significant tenderness, masses, organomegaly or guarding  Extremities:  extremities normal, atraumatic, no cyanosis or edema  Skin: warm and dry, no hyperpigmentation, vitiligo, or

## 2024-01-28 LAB
ANION GAP SERPL CALC-SCNC: 1 MMOL/L (ref 3–18)
BASOPHILS # BLD: 0 K/UL (ref 0–0.1)
BASOPHILS NFR BLD: 0 % (ref 0–2)
BUN SERPL-MCNC: 134 MG/DL (ref 7–18)
BUN/CREAT SERPL: 53 (ref 12–20)
CALCIUM SERPL-MCNC: 8.7 MG/DL (ref 8.5–10.1)
CHLORIDE SERPL-SCNC: 119 MMOL/L (ref 100–111)
CO2 SERPL-SCNC: 24 MMOL/L (ref 21–32)
CREAT SERPL-MCNC: 2.53 MG/DL (ref 0.6–1.3)
DIFFERENTIAL METHOD BLD: ABNORMAL
EOSINOPHIL # BLD: 0 K/UL (ref 0–0.4)
EOSINOPHIL NFR BLD: 0 % (ref 0–5)
ERYTHROCYTE [DISTWIDTH] IN BLOOD BY AUTOMATED COUNT: 22.2 % (ref 11.6–14.5)
GLUCOSE BLD STRIP.AUTO-MCNC: 154 MG/DL (ref 70–110)
GLUCOSE BLD STRIP.AUTO-MCNC: 158 MG/DL (ref 70–110)
GLUCOSE BLD STRIP.AUTO-MCNC: 161 MG/DL (ref 70–110)
GLUCOSE BLD STRIP.AUTO-MCNC: 177 MG/DL (ref 70–110)
GLUCOSE SERPL-MCNC: 137 MG/DL (ref 74–99)
HCT VFR BLD AUTO: 23.3 % (ref 36–48)
HGB BLD-MCNC: 7.2 G/DL (ref 13–16)
IMM GRANULOCYTES # BLD AUTO: 0.1 K/UL (ref 0–0.04)
IMM GRANULOCYTES NFR BLD AUTO: 1 % (ref 0–0.5)
LYMPHOCYTES # BLD: 0.5 K/UL (ref 0.9–3.6)
LYMPHOCYTES NFR BLD: 4 % (ref 21–52)
MAGNESIUM SERPL-MCNC: 2.6 MG/DL (ref 1.6–2.6)
MCH RBC QN AUTO: 30 PG (ref 24–34)
MCHC RBC AUTO-ENTMCNC: 30.9 G/DL (ref 31–37)
MCV RBC AUTO: 97.1 FL (ref 78–100)
MONOCYTES # BLD: 1.3 K/UL (ref 0.05–1.2)
MONOCYTES NFR BLD: 11 % (ref 3–10)
NEUTS SEG # BLD: 10.3 K/UL (ref 1.8–8)
NEUTS SEG NFR BLD: 84 % (ref 40–73)
NRBC # BLD: 0.33 K/UL (ref 0–0.01)
NRBC BLD-RTO: 2.7 PER 100 WBC
PHOSPHATE SERPL-MCNC: 4 MG/DL (ref 2.5–4.9)
PLATELET # BLD AUTO: 114 K/UL (ref 135–420)
PMV BLD AUTO: 10.6 FL (ref 9.2–11.8)
POTASSIUM SERPL-SCNC: 4.9 MMOL/L (ref 3.5–5.5)
RBC # BLD AUTO: 2.4 M/UL (ref 4.35–5.65)
SODIUM SERPL-SCNC: 144 MMOL/L (ref 136–145)
WBC # BLD AUTO: 12.2 K/UL (ref 4.6–13.2)

## 2024-01-28 PROCEDURE — 1100000003 HC PRIVATE W/ TELEMETRY

## 2024-01-28 PROCEDURE — 6370000000 HC RX 637 (ALT 250 FOR IP): Performed by: REGISTERED NURSE

## 2024-01-28 PROCEDURE — 82962 GLUCOSE BLOOD TEST: CPT

## 2024-01-28 PROCEDURE — 2500000003 HC RX 250 WO HCPCS: Performed by: INTERNAL MEDICINE

## 2024-01-28 PROCEDURE — 6370000000 HC RX 637 (ALT 250 FOR IP): Performed by: PHYSICIAN ASSISTANT

## 2024-01-28 PROCEDURE — A4216 STERILE WATER/SALINE, 10 ML: HCPCS | Performed by: PHYSICIAN ASSISTANT

## 2024-01-28 PROCEDURE — 80048 BASIC METABOLIC PNL TOTAL CA: CPT

## 2024-01-28 PROCEDURE — C9113 INJ PANTOPRAZOLE SODIUM, VIA: HCPCS | Performed by: PHYSICIAN ASSISTANT

## 2024-01-28 PROCEDURE — 2580000003 HC RX 258: Performed by: PHYSICIAN ASSISTANT

## 2024-01-28 PROCEDURE — 85025 COMPLETE CBC W/AUTO DIFF WBC: CPT

## 2024-01-28 PROCEDURE — 94640 AIRWAY INHALATION TREATMENT: CPT

## 2024-01-28 PROCEDURE — 2580000003 HC RX 258: Performed by: INTERNAL MEDICINE

## 2024-01-28 PROCEDURE — 83735 ASSAY OF MAGNESIUM: CPT

## 2024-01-28 PROCEDURE — 6360000002 HC RX W HCPCS: Performed by: REGISTERED NURSE

## 2024-01-28 PROCEDURE — 84100 ASSAY OF PHOSPHORUS: CPT

## 2024-01-28 PROCEDURE — 6360000002 HC RX W HCPCS: Performed by: PHYSICIAN ASSISTANT

## 2024-01-28 PROCEDURE — 36415 COLL VENOUS BLD VENIPUNCTURE: CPT

## 2024-01-28 RX ORDER — IPRATROPIUM BROMIDE AND ALBUTEROL SULFATE 2.5; .5 MG/3ML; MG/3ML
1 SOLUTION RESPIRATORY (INHALATION)
Status: DISCONTINUED | OUTPATIENT
Start: 2024-01-29 | End: 2024-01-30

## 2024-01-28 RX ADMIN — IPRATROPIUM BROMIDE AND ALBUTEROL SULFATE 1 DOSE: .5; 3 SOLUTION RESPIRATORY (INHALATION) at 03:52

## 2024-01-28 RX ADMIN — INSULIN GLARGINE 5 UNITS: 100 INJECTION, SOLUTION SUBCUTANEOUS at 09:16

## 2024-01-28 RX ADMIN — PANTOPRAZOLE SODIUM 40 MG: 40 INJECTION, POWDER, FOR SOLUTION INTRAVENOUS at 01:16

## 2024-01-28 RX ADMIN — FOLIC ACID 1 MG: 5 INJECTION, SOLUTION INTRAMUSCULAR; INTRAVENOUS; SUBCUTANEOUS at 09:17

## 2024-01-28 RX ADMIN — PANTOPRAZOLE SODIUM 40 MG: 40 INJECTION, POWDER, FOR SOLUTION INTRAVENOUS at 14:06

## 2024-01-28 RX ADMIN — IPRATROPIUM BROMIDE AND ALBUTEROL SULFATE 1 DOSE: .5; 3 SOLUTION RESPIRATORY (INHALATION) at 19:25

## 2024-01-28 RX ADMIN — THIAMINE HYDROCHLORIDE 100 MG: 100 INJECTION, SOLUTION INTRAMUSCULAR; INTRAVENOUS at 09:17

## 2024-01-28 ASSESSMENT — PAIN SCALES - GENERAL
PAINLEVEL_OUTOF10: 0
PAINLEVEL_OUTOF10: 0

## 2024-01-29 LAB
ANION GAP SERPL CALC-SCNC: 0 MMOL/L (ref 3–18)
BASOPHILS # BLD: 0 K/UL (ref 0–0.1)
BASOPHILS NFR BLD: 0 % (ref 0–2)
BUN SERPL-MCNC: 109 MG/DL (ref 7–18)
BUN/CREAT SERPL: 45 (ref 12–20)
CALCIUM SERPL-MCNC: 8.6 MG/DL (ref 8.5–10.1)
CHLORIDE SERPL-SCNC: 121 MMOL/L (ref 100–111)
CO2 SERPL-SCNC: 24 MMOL/L (ref 21–32)
CREAT SERPL-MCNC: 2.41 MG/DL (ref 0.6–1.3)
DIFFERENTIAL METHOD BLD: ABNORMAL
EOSINOPHIL # BLD: 0.1 K/UL (ref 0–0.4)
EOSINOPHIL NFR BLD: 1 % (ref 0–5)
ERYTHROCYTE [DISTWIDTH] IN BLOOD BY AUTOMATED COUNT: 22.1 % (ref 11.6–14.5)
GLUCOSE BLD STRIP.AUTO-MCNC: 134 MG/DL (ref 70–110)
GLUCOSE BLD STRIP.AUTO-MCNC: 142 MG/DL (ref 70–110)
GLUCOSE BLD STRIP.AUTO-MCNC: 144 MG/DL (ref 70–110)
GLUCOSE BLD STRIP.AUTO-MCNC: 154 MG/DL (ref 70–110)
GLUCOSE BLD STRIP.AUTO-MCNC: 211 MG/DL (ref 70–110)
GLUCOSE SERPL-MCNC: 139 MG/DL (ref 74–99)
HCT VFR BLD AUTO: 24.3 % (ref 36–48)
HGB BLD-MCNC: 7.4 G/DL (ref 13–16)
IMM GRANULOCYTES # BLD AUTO: 0.1 K/UL (ref 0–0.04)
IMM GRANULOCYTES NFR BLD AUTO: 1 % (ref 0–0.5)
LYMPHOCYTES # BLD: 0.7 K/UL (ref 0.9–3.6)
LYMPHOCYTES NFR BLD: 6 % (ref 21–52)
MAGNESIUM SERPL-MCNC: 2.5 MG/DL (ref 1.6–2.6)
MCH RBC QN AUTO: 30.2 PG (ref 24–34)
MCHC RBC AUTO-ENTMCNC: 30.5 G/DL (ref 31–37)
MCV RBC AUTO: 99.2 FL (ref 78–100)
MONOCYTES # BLD: 1.2 K/UL (ref 0.05–1.2)
MONOCYTES NFR BLD: 11 % (ref 3–10)
NEUTS SEG # BLD: 8.9 K/UL (ref 1.8–8)
NEUTS SEG NFR BLD: 82 % (ref 40–73)
NRBC # BLD: 0.14 K/UL (ref 0–0.01)
NRBC BLD-RTO: 1.3 PER 100 WBC
PHOSPHATE SERPL-MCNC: 3.8 MG/DL (ref 2.5–4.9)
PLATELET # BLD AUTO: 128 K/UL (ref 135–420)
PMV BLD AUTO: 12.1 FL (ref 9.2–11.8)
POTASSIUM SERPL-SCNC: 5.3 MMOL/L (ref 3.5–5.5)
RBC # BLD AUTO: 2.45 M/UL (ref 4.35–5.65)
SODIUM SERPL-SCNC: 145 MMOL/L (ref 136–145)
WBC # BLD AUTO: 11 K/UL (ref 4.6–13.2)

## 2024-01-29 PROCEDURE — 6370000000 HC RX 637 (ALT 250 FOR IP): Performed by: REGISTERED NURSE

## 2024-01-29 PROCEDURE — 2580000003 HC RX 258: Performed by: PHYSICIAN ASSISTANT

## 2024-01-29 PROCEDURE — 85025 COMPLETE CBC W/AUTO DIFF WBC: CPT

## 2024-01-29 PROCEDURE — 2500000003 HC RX 250 WO HCPCS: Performed by: INTERNAL MEDICINE

## 2024-01-29 PROCEDURE — 2580000003 HC RX 258: Performed by: INTERNAL MEDICINE

## 2024-01-29 PROCEDURE — 36415 COLL VENOUS BLD VENIPUNCTURE: CPT

## 2024-01-29 PROCEDURE — 84100 ASSAY OF PHOSPHORUS: CPT

## 2024-01-29 PROCEDURE — 6370000000 HC RX 637 (ALT 250 FOR IP): Performed by: STUDENT IN AN ORGANIZED HEALTH CARE EDUCATION/TRAINING PROGRAM

## 2024-01-29 PROCEDURE — 82962 GLUCOSE BLOOD TEST: CPT

## 2024-01-29 PROCEDURE — 1100000003 HC PRIVATE W/ TELEMETRY

## 2024-01-29 PROCEDURE — 6370000000 HC RX 637 (ALT 250 FOR IP)

## 2024-01-29 PROCEDURE — A4216 STERILE WATER/SALINE, 10 ML: HCPCS | Performed by: PHYSICIAN ASSISTANT

## 2024-01-29 PROCEDURE — 2700000000 HC OXYGEN THERAPY PER DAY

## 2024-01-29 PROCEDURE — 94640 AIRWAY INHALATION TREATMENT: CPT

## 2024-01-29 PROCEDURE — 6360000002 HC RX W HCPCS: Performed by: REGISTERED NURSE

## 2024-01-29 PROCEDURE — 99232 SBSQ HOSP IP/OBS MODERATE 35: CPT | Performed by: INTERNAL MEDICINE

## 2024-01-29 PROCEDURE — 6360000002 HC RX W HCPCS: Performed by: PHYSICIAN ASSISTANT

## 2024-01-29 PROCEDURE — 83735 ASSAY OF MAGNESIUM: CPT

## 2024-01-29 PROCEDURE — C9113 INJ PANTOPRAZOLE SODIUM, VIA: HCPCS | Performed by: PHYSICIAN ASSISTANT

## 2024-01-29 PROCEDURE — 80048 BASIC METABOLIC PNL TOTAL CA: CPT

## 2024-01-29 PROCEDURE — 94761 N-INVAS EAR/PLS OXIMETRY MLT: CPT

## 2024-01-29 RX ORDER — CLOPIDOGREL BISULFATE 75 MG/1
75 TABLET ORAL DAILY
Status: DISCONTINUED | OUTPATIENT
Start: 2024-01-29 | End: 2024-01-30 | Stop reason: HOSPADM

## 2024-01-29 RX ORDER — INSULIN LISPRO 100 [IU]/ML
0-16 INJECTION, SOLUTION INTRAVENOUS; SUBCUTANEOUS
Status: DISCONTINUED | OUTPATIENT
Start: 2024-01-30 | End: 2024-01-30 | Stop reason: HOSPADM

## 2024-01-29 RX ADMIN — PANTOPRAZOLE SODIUM 40 MG: 40 INJECTION, POWDER, FOR SOLUTION INTRAVENOUS at 13:41

## 2024-01-29 RX ADMIN — THIAMINE HYDROCHLORIDE 100 MG: 100 INJECTION, SOLUTION INTRAMUSCULAR; INTRAVENOUS at 10:18

## 2024-01-29 RX ADMIN — INSULIN LISPRO 4 UNITS: 100 INJECTION, SOLUTION INTRAVENOUS; SUBCUTANEOUS at 18:10

## 2024-01-29 RX ADMIN — PANTOPRAZOLE SODIUM 40 MG: 40 INJECTION, POWDER, FOR SOLUTION INTRAVENOUS at 01:11

## 2024-01-29 RX ADMIN — FOLIC ACID 1 MG: 5 INJECTION, SOLUTION INTRAMUSCULAR; INTRAVENOUS; SUBCUTANEOUS at 10:25

## 2024-01-29 RX ADMIN — IPRATROPIUM BROMIDE AND ALBUTEROL SULFATE 1 DOSE: .5; 3 SOLUTION RESPIRATORY (INHALATION) at 13:41

## 2024-01-29 RX ADMIN — CLOPIDOGREL BISULFATE 75 MG: 75 TABLET, FILM COATED ORAL at 13:48

## 2024-01-29 RX ADMIN — INSULIN GLARGINE 5 UNITS: 100 INJECTION, SOLUTION SUBCUTANEOUS at 10:15

## 2024-01-29 RX ADMIN — IPRATROPIUM BROMIDE AND ALBUTEROL SULFATE 1 DOSE: .5; 3 SOLUTION RESPIRATORY (INHALATION) at 19:55

## 2024-01-30 VITALS
WEIGHT: 210 LBS | SYSTOLIC BLOOD PRESSURE: 135 MMHG | DIASTOLIC BLOOD PRESSURE: 72 MMHG | HEART RATE: 68 BPM | TEMPERATURE: 98.9 F | OXYGEN SATURATION: 95 % | BODY MASS INDEX: 27.83 KG/M2 | HEIGHT: 73 IN | RESPIRATION RATE: 18 BRPM

## 2024-01-30 LAB
ANION GAP SERPL CALC-SCNC: 1 MMOL/L (ref 3–18)
BACTERIA SPEC CULT: NORMAL
BACTERIA SPEC CULT: NORMAL
BASOPHILS # BLD: 0 K/UL (ref 0–0.1)
BASOPHILS NFR BLD: 0 % (ref 0–2)
BUN SERPL-MCNC: 96 MG/DL (ref 7–18)
BUN/CREAT SERPL: 42 (ref 12–20)
CALCIUM SERPL-MCNC: 8.3 MG/DL (ref 8.5–10.1)
CHLORIDE SERPL-SCNC: 118 MMOL/L (ref 100–111)
CO2 SERPL-SCNC: 22 MMOL/L (ref 21–32)
CREAT SERPL-MCNC: 2.31 MG/DL (ref 0.6–1.3)
DIFFERENTIAL METHOD BLD: ABNORMAL
EOSINOPHIL # BLD: 0.3 K/UL (ref 0–0.4)
EOSINOPHIL NFR BLD: 2 % (ref 0–5)
ERYTHROCYTE [DISTWIDTH] IN BLOOD BY AUTOMATED COUNT: 21.5 % (ref 11.6–14.5)
GLUCOSE BLD STRIP.AUTO-MCNC: 122 MG/DL (ref 70–110)
GLUCOSE BLD STRIP.AUTO-MCNC: 141 MG/DL (ref 70–110)
GLUCOSE BLD STRIP.AUTO-MCNC: 158 MG/DL (ref 70–110)
GLUCOSE SERPL-MCNC: 127 MG/DL (ref 74–99)
HCT VFR BLD AUTO: 23.7 % (ref 36–48)
HGB BLD-MCNC: 7.2 G/DL (ref 13–16)
IMM GRANULOCYTES # BLD AUTO: 0.1 K/UL (ref 0–0.04)
IMM GRANULOCYTES NFR BLD AUTO: 1 % (ref 0–0.5)
LYMPHOCYTES # BLD: 0.8 K/UL (ref 0.9–3.6)
LYMPHOCYTES NFR BLD: 7 % (ref 21–52)
MAGNESIUM SERPL-MCNC: 2.4 MG/DL (ref 1.6–2.6)
MCH RBC QN AUTO: 29.9 PG (ref 24–34)
MCHC RBC AUTO-ENTMCNC: 30.4 G/DL (ref 31–37)
MCV RBC AUTO: 98.3 FL (ref 78–100)
MONOCYTES # BLD: 1.3 K/UL (ref 0.05–1.2)
MONOCYTES NFR BLD: 11 % (ref 3–10)
NEUTS SEG # BLD: 9.3 K/UL (ref 1.8–8)
NEUTS SEG NFR BLD: 80 % (ref 40–73)
NRBC # BLD: 0.13 K/UL (ref 0–0.01)
NRBC BLD-RTO: 1.1 PER 100 WBC
PHOSPHATE SERPL-MCNC: 3.4 MG/DL (ref 2.5–4.9)
PLATELET # BLD AUTO: 156 K/UL (ref 135–420)
PMV BLD AUTO: 12.5 FL (ref 9.2–11.8)
POTASSIUM SERPL-SCNC: 5.4 MMOL/L (ref 3.5–5.5)
RBC # BLD AUTO: 2.41 M/UL (ref 4.35–5.65)
SERVICE CMNT-IMP: NORMAL
SERVICE CMNT-IMP: NORMAL
SODIUM SERPL-SCNC: 141 MMOL/L (ref 136–145)
WBC # BLD AUTO: 11.7 K/UL (ref 4.6–13.2)

## 2024-01-30 PROCEDURE — 36415 COLL VENOUS BLD VENIPUNCTURE: CPT

## 2024-01-30 PROCEDURE — 84100 ASSAY OF PHOSPHORUS: CPT

## 2024-01-30 PROCEDURE — 2580000003 HC RX 258: Performed by: PHYSICIAN ASSISTANT

## 2024-01-30 PROCEDURE — 97530 THERAPEUTIC ACTIVITIES: CPT

## 2024-01-30 PROCEDURE — 83735 ASSAY OF MAGNESIUM: CPT

## 2024-01-30 PROCEDURE — 2500000003 HC RX 250 WO HCPCS: Performed by: INTERNAL MEDICINE

## 2024-01-30 PROCEDURE — A4216 STERILE WATER/SALINE, 10 ML: HCPCS | Performed by: PHYSICIAN ASSISTANT

## 2024-01-30 PROCEDURE — 97535 SELF CARE MNGMENT TRAINING: CPT

## 2024-01-30 PROCEDURE — 6370000000 HC RX 637 (ALT 250 FOR IP)

## 2024-01-30 PROCEDURE — 2580000003 HC RX 258: Performed by: INTERNAL MEDICINE

## 2024-01-30 PROCEDURE — 6360000002 HC RX W HCPCS: Performed by: REGISTERED NURSE

## 2024-01-30 PROCEDURE — 6370000000 HC RX 637 (ALT 250 FOR IP): Performed by: REGISTERED NURSE

## 2024-01-30 PROCEDURE — 80048 BASIC METABOLIC PNL TOTAL CA: CPT

## 2024-01-30 PROCEDURE — 82962 GLUCOSE BLOOD TEST: CPT

## 2024-01-30 PROCEDURE — 6360000002 HC RX W HCPCS: Performed by: PHYSICIAN ASSISTANT

## 2024-01-30 PROCEDURE — 85025 COMPLETE CBC W/AUTO DIFF WBC: CPT

## 2024-01-30 PROCEDURE — C9113 INJ PANTOPRAZOLE SODIUM, VIA: HCPCS | Performed by: PHYSICIAN ASSISTANT

## 2024-01-30 RX ORDER — IPRATROPIUM BROMIDE AND ALBUTEROL SULFATE 2.5; .5 MG/3ML; MG/3ML
1 SOLUTION RESPIRATORY (INHALATION)
Status: DISCONTINUED | OUTPATIENT
Start: 2024-01-30 | End: 2024-01-30 | Stop reason: HOSPADM

## 2024-01-30 RX ORDER — CLOPIDOGREL BISULFATE 75 MG/1
75 TABLET ORAL DAILY
Qty: 30 TABLET | Refills: 0 | Status: SHIPPED | OUTPATIENT
Start: 2024-01-31

## 2024-01-30 RX ADMIN — PANTOPRAZOLE SODIUM 40 MG: 40 INJECTION, POWDER, FOR SOLUTION INTRAVENOUS at 01:42

## 2024-01-30 RX ADMIN — THIAMINE HYDROCHLORIDE 100 MG: 100 INJECTION, SOLUTION INTRAMUSCULAR; INTRAVENOUS at 09:12

## 2024-01-30 RX ADMIN — FOLIC ACID 1 MG: 5 INJECTION, SOLUTION INTRAMUSCULAR; INTRAVENOUS; SUBCUTANEOUS at 09:14

## 2024-01-30 RX ADMIN — INSULIN GLARGINE 5 UNITS: 100 INJECTION, SOLUTION SUBCUTANEOUS at 09:10

## 2024-01-30 RX ADMIN — CLOPIDOGREL BISULFATE 75 MG: 75 TABLET, FILM COATED ORAL at 09:12

## 2024-01-30 NOTE — HOME CARE
Received home health referral for [facility] for (SN / PT / OT).  Discharge order noted for today.   Spoke with patient in room with spouse at bedside;  patient identifiers verified.  Explained home health care services and routines.  Demographics verified including insurance, phone and address confirmed.  Caregivers available spouse as primary.  Also has other family members available.  Orders noted and arranged and sent to central intake and scheduling.      ---   GABRIEL Naik [facility]ia[facility]

## 2024-01-30 NOTE — PLAN OF CARE
Problem: Discharge Planning  Goal: Discharge to home or other facility with appropriate resources  Outcome: Progressing     Problem: Pain  Goal: Verbalizes/displays adequate comfort level or baseline comfort level  Outcome: Progressing     Problem: Safety - Adult  Goal: Free from fall injury  Outcome: Progressing     
  Problem: Discharge Planning  Goal: Discharge to home or other facility with appropriate resources  Outcome: Progressing     Problem: Pain  Goal: Verbalizes/displays adequate comfort level or baseline comfort level  Outcome: Progressing     Problem: Safety - Adult  Goal: Free from fall injury  Outcome: Progressing     Problem: Skin/Tissue Integrity  Goal: Absence of new skin breakdown  Description: 1.  Monitor for areas of redness and/or skin breakdown  2.  Assess vascular access sites hourly  3.  Every 4-6 hours minimum:  Change oxygen saturation probe site  4.  Every 4-6 hours:  If on nasal continuous positive airway pressure, respiratory therapy assess nares and determine need for appliance change or resting period.  Outcome: Progressing     Problem: Chronic Conditions and Co-morbidities  Goal: Patient's chronic conditions and co-morbidity symptoms are monitored and maintained or improved  Outcome: Progressing     
  Problem: Physical Therapy - Adult  Goal: By Discharge: Performs mobility at highest level of function for planned discharge setting.  See evaluation for individualized goals.  Description: Initiated  1/26/2024  to be met within 7-10 days.    1.  Patient will move from supine to sit and sit to supine , scoot up and down, and roll side to side in bed with modified independence.    2.  Patient will transfer from bed to chair and chair to bed with minimal assistance/contact guard assist using the least restrictive device.  3.  Patient will perform sit to stand with minimal assistance/contact guard assist.  4.  Patient will ambulate with minimal assistance/contact guard assist for 50 feet with the least restrictive device.     PLOF: Pt lives with Wife and Son in two story home, able to stay on first level.  No steps to enter home.  Pt needs assistance with transfers and mobility.  Has RW and SPC   Outcome: Progressing   PHYSICAL THERAPY EVALUATION    Patient: Gumaro Khan (78 y.o. male)  Date: 1/26/2024  Primary Diagnosis: Other specified hypotension [I95.89]  Hepatic encephalopathy (HCC) [K76.82]  Alcohol abuse [F10.10]  GI bleed [K92.2]  Hyperglycemia [R73.9]  General weakness [R53.1]  Elevated troponin [R79.89]  Pulmonary hypertension, moderate to severe (HCC) [I27.20]  SHAHNAZ (acute kidney injury) (HCC) [N17.9]  Supratherapeutic INR [R79.1]  Acute decompensated heart failure (HCC) [I50.9]  Gastrointestinal hemorrhage with melena [K92.1]  Procedure(s) (LRB):  EGD ESOPHAGOGASTRODUODENOSCOPY with clip (N/A) 1 Day Post-Op   Precautions: General Precautions, Fall Risk,      ASSESSMENT :  Pt resting in bed upon entering room for PT evaluation, agreeable to treatment.  Pt is on 4L O2 NC, in slumped position in bed, denies any pain.  Pt was supervision for supine to sit, supervision to scoot to EOB.  Pt with difficulty bending R knee due to surgery on R thigh due to osteosarcoma, pt notes he doesn't have as much 
  Problem: Physical Therapy - Adult  Goal: By Discharge: Performs mobility at highest level of function for planned discharge setting.  See evaluation for individualized goals.  Description: Initiated  1/26/2024  to be met within 7-10 days.    1.  Patient will move from supine to sit and sit to supine , scoot up and down, and roll side to side in bed with modified independence.    2.  Patient will transfer from bed to chair and chair to bed with minimal assistance/contact guard assist using the least restrictive device.  3.  Patient will perform sit to stand with minimal assistance/contact guard assist.  4.  Patient will ambulate with minimal assistance/contact guard assist for 50 feet with the least restrictive device.     PLOF: Pt lives with Wife and Son in two story home, able to stay on first level.  No steps to enter home.  Pt needs assistance with transfers and mobility.  Has RW and SPC   Outcome: Progressing   PHYSICAL THERAPY TREATMENT    Patient: Gumaro Khan (78 y.o. male)  Date: 1/30/2024  Diagnosis: Other specified hypotension [I95.89]  Hepatic encephalopathy (HCC) [K76.82]  Alcohol abuse [F10.10]  GI bleed [K92.2]  Hyperglycemia [R73.9]  General weakness [R53.1]  Elevated troponin [R79.89]  Pulmonary hypertension, moderate to severe (HCC) [I27.20]  SHAHNAZ (acute kidney injury) (HCC) [N17.9]  Supratherapeutic INR [R79.1]  Acute decompensated heart failure (HCC) [I50.9]  Gastrointestinal hemorrhage with melena [K92.1] GI bleed  Procedure(s) (LRB):  EGD ESOPHAGOGASTRODUODENOSCOPY with clip (N/A) 5 Days Post-Op  Precautions: General Precautions, Fall Risk,    ASSESSMENT:  Pt resting in bed upon entering room for PT treatment session, agreeable to treatment.  Co-tx with OT due to assistance level and medical complexity.  Pt was Supervision for all bed mobility, monitored O2 sats throughout treatment session due to being cleared by RN to come off NC.  Pt sats stayed between 95-97% throughout treatment 
agreeable for participation. SPO2 monitored throughout session maintaining in 90's on RA. Pt educated on performing PLB technique to prevent SOB and for optimal oxygenation. Pt came to EOB Supervision in prep for functional tasks. STS transfer MIN A with RW x 2 trials tolerating ~ 2 mins to improve standing tolerance for ADL carryover. Pt with noted soiled chux pad from BM. He required MAX A for pamela area care 2/2 decreased dyn standing balance and tolerance. Pt able to take ~ 3 side steps along EOB MIN A with RW for optimal bed positioning. Pt was able to return self to supine and was positioned for comfort. He was left with all needs within reach and RN present.   Progression toward goals:  []          Improving appropriately and progressing toward goals  [x]          Improving slowly and progressing toward goals  []          Not making progress toward goals and plan of care will be adjusted     PLAN:  Patient continues to benefit from skilled intervention to address the above impairments.  Continue treatment per established plan of care.    Further Equipment Recommendations for Discharge: N/A    AMPA: AM-PAC Inpatient Daily Activity Raw Score: 15      Current research shows that an AM-PAC score of 17 or less is not associated with a discharge to the patient's home setting. Based on an AM-PAC score and their current ADL deficits; it is recommended that the patient have 3-5 sessions per week of Occupational Therapy at d/c to increase the patient's independence.      This [facility] score should be considered in conjunction with interdisciplinary team recommendations to determine the most appropriate discharge setting. Patient's social support, diagnosis, medical stability, and prior level of function should also be taken into consideration.     SUBJECTIVE:   Patient stated, \"I have help at home with everything.\"    OBJECTIVE DATA SUMMARY:   Cognitive/Behavioral Status:  Orientation  Overall Orientation Status: Within 
ADLs:  Bed Mobility:    Bed Mobility Training  Bed Mobility Training: Yes  Overall Level of Assistance: Supervision  Supine to Sit: Supervision  Sit to Supine: Supervision  Scooting: Supervision  Transfers:    Transfer Training  Transfer Training: Yes  Overall Level of Assistance: Minimum and moderate assistance  Sit to Stand: Moderate assistance  Stand to Sit: Minimum assistance    ADL Assessment:   Feeding: Modified independent ;Setup  Grooming: Contact guard assistance (when standing)  UE Bathing: Contact guard assistance  LE Bathing: Moderate assistance  UE Dressing: Supervision  LE Dressing: Maximum assistance  Toileting: Moderate assistance  Functional Mobility: Minimal assistance    Pain:  Pain level pre-treatment: 0/10   Pain level post-treatment: 0/10   Pain Intervention(s): Rest, Ice, Repositioning   Response to intervention: Nurse notified    Activity Tolerance:   Activity Tolerance: Patient limited by fatigue  Please refer to the flowsheet for vital signs taken during this treatment.    After treatment:   [] Patient left in no apparent distress sitting up in chair  [x] Patient left in no apparent distress in bed  [x] Call bell left within reach  [x] Nursing notified  [] Caregiver present  [x] Bed alarm activated    COMMUNICATION/EDUCATION:   Patient Education  Education Given To: Patient  Education Provided: Role of Therapy;Plan of Care;Transfer Training;Energy Conservation;Fall Prevention Strategies;ADL Adaptive Strategies  Education Method: Verbal;Teach Back  Barriers to Learning: None  Education Outcome: Verbalized understanding    Thank you for this referral.  Daniel Cheng  Minutes: 25    Eval Complexity: Decision Making: Medium Complexity

## 2024-01-30 NOTE — CARE COORDINATION
1606: DME order noted for home oxygen. No walk test noted. CM informed Dr. Reyes and BLAZE Linton that oxygen test is required for insurance to cover the cost of oxygen.     BLAZE Linton will conduct oxygen test.     1615: Per BLAZE Linton, the patient's O2 sats remained above 95% during testing. She will notify Dr. Reyes.    MARGAUX RuffinN, RN  Care Management  Phone: [phone]                
1745: CM attempted to speak with the patient's wife via phone but no answer. The voicemail will not allow this writer to leave a message.     CM left a voicemail message for the patient's son Gumaro Villa, requesting a return call to discuss disposition.     1811: CM received a call back from the patient's son. Son was able to provide a secondary number for the wife at [phone]. CM left a message for the wife at the number.     1815: CM received a call back from the patient's wife. Disposition is home with home health. Verbal FOC obtained for [facility]. Referral placed in queue for [facility]. Dr. Reyes notified that home health order needed.     MEREDITH Ruffin, RN  Care Management  Phone: [phone]    
Call made to [facility], [phone], spoke with Elvia, will transport patient at 3:00 pm.  
Discharge order noted for today. Pt has been accepted to [facility]. Met with patient and his wife and both are agreeable to the transition plan today. Transport has been arranged through [facility]. Patient's discharge summary and home health  orders have been forwarded to [facility] via [facility]. Updated bedside RN, Derick,  to the transition plan.  Discharge information has been documented on the AVS.       Wife requested bedside commode for patient. CM contacted Dr. Reyes and obtained a verbal order. Bedside commode ordered from [employer] to be delivered to the home.       MARGAUX RuffinN, RN  Care Management  Phone: [phone]      
Requested Case Management specialist to assist with transportation to home.  Address is [address] [place] [id number] and phone number is [phone]  Patient will require BLS transport.   Pt requires Stretcher If stretcher, reason: Generalized weakness, Severe pulmonary hypertension, Systolic dysfunction,   Patient is currently requiring oxygen: no   Height: 6'1   Weight: 210 lb  Pt is on isolation: no   Is the pt ready now? no  Requested time: 3 pm  PCS Faxed: no  Insurance verified on face sheet: Yes  Auth needed for transport: No  CM completed PCS/ Envelope and placed on chart.       MARGAUX RuffinN, RN  Care Management  Phone: [phone]      
patient representative Gumaro and his family were provided with a choice of provider and agrees with the discharge plan. Freedom of choice list with basic dialogue that supports the patient's individualized plan of care/goals and shares the quality data associated with the providers was provided to: (P) Patient Representative   Patient Representative Name: (P) Kristi Khan, Wife     The Patient and/or Patient Representative Agree with the Discharge Plan? (P) Yes       01/26/24 7537   Service Assessment   Patient Orientation Other (see comment)  (Patient asleep)   Cognition Other (see comment)   History Provided By Spouse   Primary Caregiver Spouse   Support Systems Spouse/Significant Other   Patient's Healthcare Decision Maker is: Legal Next of Kin   PCP Verified by CM Yes   Last Visit to PCP Within last 6 months   Prior Functional Level Cooking;Housework;Shopping;Mobility   Can patient return to prior living arrangement Yes   Ability to make needs known: Fair   Family able to assist with home care needs: Yes   Would you like for me to discuss the discharge plan with any other family members/significant others, and if so, who? Yes   Financial Resources Medicare   Social/Functional History   Lives With Spouse   Type of Home House   Home Layout Two level;Performs ADL's on one level;Able to Live on Main level with bedroom/bathroom   Home Access Level entry   Bathroom Shower/Tub Tub/Shower unit   Bathroom Toilet Standard   Home Equipment Walker, rolling;Cane   Receives Help From Family   ADL Assistance Needs assistance   Toileting Needs assistance   Homemaking Assistance Needs assistance   Active  No   Mode of Transportation Family   Occupation Retired   Discharge Planning   Type of Residence House   Living Arrangements Spouse/Significant Other   Current Services Prior To Admission None   Potential Assistance Needed Home Care   DME Ordered? No   Potential Assistance Purchasing Medications No   Type of Home

## 2024-01-30 NOTE — PROGRESS NOTES
[phone]    Gastroenterology follow up-Progress note    Impression:  Pt presented to ED with NV for one week, PMH of osteosarcoma, alcohol abuse, DMII, NICMO, pAF on warfarin, and pHTN. Pt was noted to be in septic shock, requiring vasopressors.     He was noted to be with severe anemia, for which we are consulted. Anemia is multifactorial: presence of macrocytosis at admission with anisocytosis indicates chronic etiology. Blood is a very powerful, cathartic agent, and without overt bleeding, GI bleed cannot account for this degree of anemia. It is possible that he may have had chronic, slow oozing from GI angioectasias, as there are plenty of risk factors.     Hgb 7.3, and INR 1.3: improved today, still no sign of overt bleeding    Plan:    - Continue septic shock management per ICU  - Will plan on push enteroscopy later this admission, once pt is off pressers, unless there are signs of overt GIB    Chief Complaint: anemia    Subjective:  Pt seen resting in bed, when asked how he is doing today, he states, \"I don't know\". When asked if he is having any NV, he said, \"No, but it's coming\". Denied ABD pain, BM, and overt bleeding of any kind overnight.    ROS: Denies any fevers, chills, rash.       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)    Mass of right thigh    Weight loss, abnormal    NSTEMI (non-ST elevated myocardial infarction) (HCC)    MR (mitral regurgitation)    Nonischemic cardiomyopathy (HCC)    Type 2 diabetes mellitus with hypercholesterolemia (HCC)    Anxiety disorder    Presence of implantable cardioverter-defibrillator (ICD)    UTI (urinary tract infection)    Poor appetite    Iron deficiency anemia    Atrial fibrillation (HCC)    Follow-up examination    MGUS (monoclonal gammopathy of unknown significance)    Long term current use of anticoagulant therapy    Antineoplastic chemotherapy induced anemia    Cancer associated pain    Pulmonary 
    Disposition:    Here for shock 2/2 GI bleed. Downgraded yesterday. EGD yesterday showed angioteciasia and endoclip was placed. SHAHNAZ is worsening. Pending Nephro consult. Pending PT/OT to determine dispo needs.      Aline Reyes DO, MBA, MS  [facility]  Hospitalist    
    Gastroenterology follow up-Progress note    Assessment:  1. Oozing angioectasia - gastric body  2. Duodenal erosions without active bleeding  3. Anemia - multifactorial   - Hgb 7.4 today up from 4.1 (1/23/24)   - s/p 4u PRBCs and 1u FFP  4. Septic shock requiring vasopressors - this admission  5. Paroxysmal A fib on warfarin - outpatient  6. NICMO  7. Implanted defibrillator  8. Heme positive stool 1/24/24        Recommendation:  1. Continue acid suppression of IV PPI BID. Transition to PO pantoprazole 40 mg BID at discharge especially if patient will remain on anticoagulation otherwise pantoprazole 40 mg daily. Decision to resume anticoagulant/ASA therapy deferred to cardiologist.  2. No NSAIDs.  3. Advance diet as tolerated.  4. Repeat endoscopic evaluation if recurrent/overt GI bleeding. Colonoscopy this admission if evidence of GI bleed/drop in H/H.  5. Daily bowel optimization   6. Close outpatient GI follow-up after discharge.  7. Inpatient medical management per primary team.  8. Consideration for hematology consult per primary team.     Will sign off but available as needed.  Thank you for this consultation and the opportunity to participate in the care of this patient. Please do not hesitate to call with any questions or concerns, or should event occur that may necessitate additional GI evaluation.     Chief Complaint: anemia      Subjective:  Yesterday EGD with placement of Endoclip for therapy of oozing angioectasia gastric body and duodenal erosions with bleeding 1/25/24.  No acute GI events overnight. No bleeding nor other GI concerns today.    Background: Pt presented to ED with NV for one week, PMH of osteosarcoma, alcohol abuse, DMII, NICMO, pAF on warfarin, and pHTN. Pt was noted to be in septic shock, requiring vasopressors admitted to the ICU. He was noted to be with severe anemia, for which we are consulted. Anemia is multifactorial: presence of macrocytosis at admission with anisocytosis 
  Disposition:    Patient is medically clear for discharge today. Patient does not need home oxygen. I was told at one point to place home health orders. However, PT/OT notes state SNF. Please have PT/OT evaluate him again. If stable for home, he will go home with PT/OT today. If not, then patient will need to go to SNF.      Aline Reyes DO, MBA, MS  [facility]  Hospitalist    
  Physician Progress Note      PATIENT:               MICHELE OROZCO  [facility] #:                  [id number]  :                       1945  ADMIT DATE:       2024 11:23 PM  DISCH DATE:  RESPONDING  PROVIDER #:        Aline Reyes DO          QUERY TEXT:    Patient admitted with shock.  Noted documentation of sepsis vs SIRS. If   possible, please document in progress notes and discharge summary the source   of sepsis:    The medical record reflects the following:  Risk Factors: 78/M with pmh of afib on warfarin, nonischemic cardiomyopathy,   AICD, osteosarcoma, diabetes mellitus type 2, etoh dependence presents with   N/V. Patient found to have multifactorial shock due to Acute GI blood loss   anemia with melena, sepsis/SIRS.  Clinical Indicators: Per H&P - Sepsis vs SIRs- leukocytosis, lactic acidosis,   elevated procal- unknown source, possibly GI  Per  Med - Coagulopathy due to above + ?sepsis - resolved; Continue   antibiotics.  De-escalate pending cultures.  Per  Med - Unclear why antibiotics were initiated with no clear source of   infection.  However leukocytosis improving.  Consult infectious disease for   further management.  Lactic  - 2.8  WBC  - 19.4,  - 18.5,  - 15  Temp  - 94, 94  MAP  - 55, 51, 53 Multifactorial shock  Treatment: cefepime, flagyl, pending ID consult    Thank you,  Dayanna Stover RN, CDI  [email or web address]  >  Options provided:  -- Sepsis, present on admission, due to, Please document source.  -- Sepsis, present on admission ruled in due to unknown source  -- Sepsis was ruled out  -- Other - I will add my own diagnosis  -- Disagree - Not applicable / Not valid  -- Disagree - Clinically unable to determine / Unknown  -- Refer to Clinical Documentation Reviewer    PROVIDER RESPONSE TEXT:    This patient has sepsis which was present on admission ruled in due to unknown   source.    Query created by: Dayanna Stover on 2024 2:30 
  Physician Progress Note      PATIENT:               MICHELE OROZCO  [facility] #:                  [id number]  :                       1945  ADMIT DATE:       2024 11:23 PM  DISCH DATE:        2024 4:05 PM  RESPONDING  PROVIDER #:        Aline Reyes DO          QUERY TEXT:    Patient admitted with shock.  Noted documentation of sepsis vs SIRS in H&P and   subsequent progress notes. If possible, please document in progress notes and   discharge summary the source of sepsis:    The medical record reflects the following:  Risk Factors: 78/M with multifactorial shock. Acute GI blood loss anemia with   melena, sepsis/SIRS. ETOH abuse. Acute on chronic kidney disease.  Clinical Indicators: Per  ID - significant leukocytosis on admission   likely leukemoid reaction to severe anemia, nausea; Chest x-ray, CT chest    didn't reveal definitive pneumonia.  No evidence of pneumonia noted on cxr   24.  Per  Med - Sepsis vs SIRs- leukocytosis, lactic acidosis, elevated procal-   unknown source.  Resolved.  Per  Neph: Acute renal failure. Urinalysis is consistent with   tubulointerstitial damage.  No evidence of any UTI..  Treatment: Hold further abx, ID consult    Thank you,  Dayanna Stover RN, CDI  [email or web address]  >  Options provided:  -- Sepsis, present on admission, due to, Please document source.  -- Sepsis was ruled out  -- Other - I will add my own diagnosis  -- Disagree - Not applicable / Not valid  -- Disagree - Clinically unable to determine / Unknown  -- Refer to Clinical Documentation Reviewer    PROVIDER RESPONSE TEXT:    After further study, sepsis was ruled out for this patient.    Query created by: Dayanna Stover on 2024 3:30 PM      Electronically signed by:  Aline Reyes DO 2024 6:56 PM          
77 Y/O male full code arrived ED c/o N/V using accessory muscles to breath    Admitted 01/24/24 Shock, hemorrhagic, sepsis, melena, alcohol abuse, Nstemi,             History: CHF, diabetic, defibrillator (coumadin) HTN, chronic kidney disease, thigh sarcoma with resection, atrial fibrillation, antineoplastic chemo induced anemia, cancer pain, pulmonary HTN    Q6 glucose checks     Neuro  Resp  Cardiac  GI stress ulcer prophylaxis Protonix    Skin bruises legs   Lines    Nasreen pringle  
Acute Renal Failure. Very High BUN with Pre-Renal Bun /Creatinine ratio. Very High Bun Can be explained by Recent GI Bleed.  Has H/O NSAIDS.At Risk for ATN from recent shock. On Antibiotics for Possible Pneumonia.  Mold SOB.  Will not give any Diuretics  & also don't give any fluid.  Renal failure work up outlined  
Advance Care Planning     Advance Care Planning Inpatient Note  Spiritual Care Department    Today's Date: 1/24/2024  Unit: [facility] 3 INTENSIVE CARE UNIT    Received request from admission screening.  Upon review of chart and communication with care team, patient's decision making abilities are not in question.. Patient and Spouse was/were present in the room during visit.    Health Care Decision Makers:     No healthcare decision makers have been documented.  Click here to complete HealthCare Decision Makers including selection of the Healthcare Decision Maker Relationship (ie \"Primary\")  Summary:  Documented Next of Kin, per patient report  ErinKristi araujo spouse   Home Phone: [phone]               Advance Care Planning Documents (Patient Wishes):  None     Assessment:     01/24/24 1157   Encounter Summary   Encounter Overview/Reason  Spiritual/Emotional Needs;Advance Care Planning   Service Provided For: Patient and family together   Referral/Consult From: Multi-disciplinary team   Support System Family members;Spouse   Last Encounter  01/24/24  (ICU-SA-KP)   Complexity of Encounter High   Begin Time 1140   End Time  1150   Total Time Calculated 10 min   Spiritual/Emotional needs   Type Spiritual Support   Advance Care Planning   Type   (legal next of kin)   Assessment/Intervention/Outcome   Assessment Coping   Intervention Active listening   Outcome Encouraged   Plan and Referrals   Plan/Referrals Continue to visit, (comment)       Interventions:  Reviewed but did not complete ACP document    Care Preferences Communicated:   No    Outcomes/Plan:  ACP Discussion: Postponed    Electronically signed by JOHN Andrade on 1/24/2024 at 11:59 AM            
[facility] - Progress Note    Admit Date: 1/23/2024  Attending Cardiologist: Dr. 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    Malignant neoplasm of right lower extremity (HCC) 08/20/2018    AICD at end of battery life 05/07/2018    Type 2 diabetes mellitus with hypercholesterolemia ([facility]) 04/30/2018    UTI (urinary tract infection) 07/27/2016    NSTEMI (non-ST elevated myocardial infarction) ([facility]) 01/22/2016    Weight loss, abnormal 01/05/2016    Poor appetite 01/05/2016    Cancer associated pain 01/05/2016    Antineoplastic chemotherapy induced anemia 11/24/2015    Soft tissue sarcoma of right thigh ([facility]) 10/27/2015    MGUS (monoclonal gammopathy of unknown significance) 10/27/2015    Mass of right thigh 10/06/2015    Iron deficiency anemia 10/06/2015    Leukocytosis 10/06/2015    Anxiety disorder 09/06/2013    Presence of implantable cardioverter-defibrillator (ICD) 04/14/2011    MR (mitral regurgitation) 03/29/2011    Long term current use of anticoagulant therapy 03/28/2011    Follow-up examination     Nonischemic cardiomyopathy ([facility]) 06/08/2010    Atrial fibrillation ([facility]) 06/08/2010    Pulmonary hypertension, moderate to severe ([facility]) 06/08/2010    Hypertension associated with diabetes ([facility]) 06/08/2010     - Hemorrhagic shock: received 4 units PRBCs, 1 unit FFP. Received vitamin K and kcentra in on admission for supra therapeutic INR. GI completed EGD 1/25, showed angioteciasia and endoclip was placed.   - Mildly elevated troponin level: 149 > 134. EKGs reviewed and compared to prior, no new changes. Likely related to demand ischemia in setting of GI 
[facility] - Progress Note    Admit Date: 1/23/2024  Attending Cardiologist: Dr. Velasquez    Assessment:     Patient Active Problem List    Diagnosis Date Noted    GI bleed 01/24/2024    Shock ([facility]) 01/24/2024    SHAHNAZ (acute kidney injury) ([facility]) 01/24/2024    Hypercholesterolemia 12/31/2018    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    Malignant neoplasm of right lower extremity ([facility]) 08/20/2018    AICD at end of battery life 05/07/2018    Type 2 diabetes mellitus with hypercholesterolemia ([facility]) 04/30/2018    UTI (urinary tract infection) 07/27/2016    NSTEMI (non-ST elevated myocardial infarction) ([facility]) 01/22/2016    Weight loss, abnormal 01/05/2016    Poor appetite 01/05/2016    Cancer associated pain 01/05/2016    Antineoplastic chemotherapy induced anemia 11/24/2015    Soft tissue sarcoma of right thigh ([facility]) 10/27/2015    MGUS (monoclonal gammopathy of unknown significance) 10/27/2015    Mass of right thigh 10/06/2015    Iron deficiency anemia 10/06/2015    Leukocytosis 10/06/2015    Anxiety disorder 09/06/2013    Presence of implantable cardioverter-defibrillator (ICD) 04/14/2011    MR (mitral regurgitation) 03/29/2011    Long term current use of anticoagulant therapy 03/28/2011    Follow-up examination     Nonischemic cardiomyopathy ([facility]) 06/08/2010    Atrial fibrillation ([facility]) 06/08/2010    Pulmonary hypertension, moderate to severe ([facility]) 06/08/2010    Hypertension associated with diabetes ([facility]) 06/08/2010     - Hemorrhagic shock: received 4 units PRBCs, 1 unit FFP. Received vitamin K and kcentra in on admission for supra therapeutic INR. Off vasopressor.   - Mildly elevated troponin level: 149 > 134. EKGs reviewed and compared to prior, no new changes. Likely related to demand ischemia in setting of GI bleed and hypovolemia.   - NICMO: echo showed EF 25 to 30%, decreased from last echo 5/2018 EF 35%, moderate diffuse hypokinesis, wall thickness mildly increased, moderate mitral regurgitation, severe 
[facility] Hospitalist Group  Progress Note  Date:2024       Room:[facility]  Patient Name:Gumaro Khan     YOB: 1945     Age:78 y.o.        Subjective    Subjective   Review of Systems    No acute events overnight.  Wife is bedside.  Patient has increased work of breathing.  Patient ambulates at baseline.  Patient is agreeable to SNF if needed.    Objective         Vitals Last 24 Hours:  TEMPERATURE:  Temp  Av.6 °F (36.4 °C)  Min: 97.2 °F (36.2 °C)  Max: 98.4 °F (36.9 °C)  RESPIRATIONS RANGE: Resp  Av.5  Min: 12  Max: 29  PULSE OXIMETRY RANGE: SpO2  Av.4 %  Min: 93 %  Max: 100 %  PULSE RANGE: Pulse  Av.2  Min: 66  Max: 109  BLOOD PRESSURE RANGE: Systolic (24hrs), Av , Min:98 , Max:146     ; Diastolic (24hrs), Av, Min:47, Max:77      I/O (24Hr):    Intake/Output Summary (Last 24 hours) at 2024 0847  Last data filed at 2024 0458  Gross per 24 hour   Intake 100 ml   Output 1250 ml   Net -1150 ml       Objective:  General Appearance:  Comfortable.    Vital signs: (most recent): Blood pressure 138/69, pulse 71, temperature (!) 96.4 °F (35.8 °C), temperature source Axillary, resp. rate 28, height 1.854 m (6' 1\"), weight 95.3 kg (210 lb), SpO2 98 %.    Output: Producing urine.    HEENT: Normal HEENT exam.    Lungs:  Normal effort and normal respiratory rate.  Breath sounds clear to auscultation.  (3 L nasal cannula)  Heart: Normal rate.  Regular rhythm.    Abdomen: Abdomen is soft and flat.  Bowel sounds are normal.   There is no abdominal tenderness.     Extremities: Normal range of motion.    Pulses: Distal pulses are intact.    Neurological: Patient is alert and oriented to person, place and time.    Skin:  Warm and dry.          Labs/Imaging/Diagnostics    Labs:  CBC:  Recent Labs     24  0934 24  1356 24  0229 24  1028 24  1735 24  0038 24  0628   WBC 18.5*  --  15.0*  --   --   --  12.5 
[facility] Hospitalist Group  Progress Note  Date:2024       Room:[facility]  Patient Name:Gumaro Khan     YOB: 1945     Age:78 y.o.        Subjective    Subjective   Review of Systems    No acute events overnight.  Son is bedside. Patient appeared more short of breath today. He denies chest pain. Discussed with the wife later in the afternoon.    Objective         Vitals Last 24 Hours:  TEMPERATURE:  Temp  Av.5 °F (36.4 °C)  Min: 96.4 °F (35.8 °C)  Max: 98 °F (36.7 °C)  RESPIRATIONS RANGE: Resp  Av.1  Min: 16  Max: 28  PULSE OXIMETRY RANGE: SpO2  Av %  Min: 93 %  Max: 100 %  PULSE RANGE: Pulse  Av.1  Min: 66  Max: 91  BLOOD PRESSURE RANGE: Systolic (24hrs), Av , Min:121 , Max:161     ; Diastolic (24hrs), Av, Min:64, Max:82      I/O (24Hr):    Intake/Output Summary (Last 24 hours) at 2024 0827  Last data filed at 2024 0616  Gross per 24 hour   Intake 1320 ml   Output 3300 ml   Net -1980 ml       Objective:  General Appearance:  Comfortable.    Vital signs: (most recent): Blood pressure (!) 161/82, pulse 84, temperature 97.4 °F (36.3 °C), temperature source Oral, resp. rate 20, height 1.854 m (6' 1\"), weight 95.3 kg (210 lb), SpO2 99 %.    Output: Producing urine.    HEENT: Normal HEENT exam.    Lungs:  Normal effort and normal respiratory rate.  Breath sounds clear to auscultation.  (3 L nasal cannula)  Heart: Normal rate.  Regular rhythm.    Abdomen: Abdomen is soft and flat.  Bowel sounds are normal.   There is no abdominal tenderness.     Extremities: Normal range of motion.    Pulses: Distal pulses are intact.    Neurological: Patient is alert and oriented to person, place and time.    Skin:  Warm and dry.          Labs/Imaging/Diagnostics    Labs:  CBC:  Recent Labs     24  0229 24  1028 24  0628 24  1311 24  0047   WBC 15.0*  --  12.5  --   --  13.6*   RBC 2.46*  --  2.52*  -- 
[facility] Hospitalist Group  Progress Note  Date:2024       Room:[facility]  Patient Name:Gumaro Khan     YOB: 1945     Age:78 y.o.        Subjective    Subjective   Review of Systems    No acute events overnight.  Wife is bedside. Patient feels better today. Denies SOB and chest pain.    Objective         Vitals Last 24 Hours:  TEMPERATURE:  Temp  Av.8 °F (36.6 °C)  Min: 97.5 °F (36.4 °C)  Max: 98.6 °F (37 °C)  RESPIRATIONS RANGE: Resp  Av.8  Min: 20  Max: 24  PULSE OXIMETRY RANGE: SpO2  Av.2 %  Min: 96 %  Max: 99 %  PULSE RANGE: Pulse  Av.8  Min: 80  Max: 92  BLOOD PRESSURE RANGE: Systolic (24hrs), Av , Min:137 , Max:168     ; Diastolic (24hrs), Av, Min:65, Max:86      I/O (24Hr):    Intake/Output Summary (Last 24 hours) at 2024 0940  Last data filed at 2024 0916  Gross per 24 hour   Intake --   Output 3200 ml   Net -3200 ml       Objective:  General Appearance:  Comfortable.    Vital signs: (most recent): Blood pressure (!) 146/67, pulse 85, temperature 97.5 °F (36.4 °C), temperature source Oral, resp. rate 22, height 1.854 m (6' 1\"), weight 95.3 kg (210 lb), SpO2 96 %.    Output: Producing urine.    HEENT: Normal HEENT exam.    Lungs:  Normal effort and normal respiratory rate.  Breath sounds clear to auscultation.  (3 L nasal cannula)  Heart: Normal rate.  Regular rhythm.    Abdomen: Abdomen is soft and flat.  Bowel sounds are normal.   There is no abdominal tenderness.     Extremities: Normal range of motion.    Pulses: Distal pulses are intact.    Neurological: Patient is alert and oriented to person, place and time.    Skin:  Warm and dry.          Labs/Imaging/Diagnostics    Labs:  CBC:  Recent Labs     24  0628 24  1311 24  0047 24  0915 24  0446   WBC 12.5  --  13.6*  --  12.2   RBC 2.52*  --  2.47*  --  2.40*   HGB 7.4*   < > 7.4* 7.5* 7.2*   HCT 24.2*   < > 23.7* 24.6* 23.3*   MCV 
[facility]  Progress Note  Date:2024       Room:[facility]  Patient Name:Gumaro Khan     YOB: 1945     Age:78 y.o.        Subjective    Subjective   Review of Systems    Interval HPI:     78/M with pmh of afib on warfarin, nonischemic cardiomyopathy, AICD, osteosarcoma, diabetes mellitus type 2, etoh dependence presents with N/V. Patient found to have multifactorial shock due to Acute GI blood loss anemia with melena, sepsis/SIRS. Patient was admitted to ICU. Patient was transfused a total of 3 units prbc. GI consulted. Plan for EGD today.    Today, patient has no complaints.  He is resting comfortably in bed.  Denies hematochezia and hematemesis.  Objective         Vitals Last 24 Hours:  TEMPERATURE:  Temp  Av.2 °F (36.2 °C)  Min: 96.7 °F (35.9 °C)  Max: 98 °F (36.7 °C)  RESPIRATIONS RANGE: Resp  Av.7  Min: 10  Max: 28  PULSE OXIMETRY RANGE: SpO2  Av.9 %  Min: 96 %  Max: 100 %  PULSE RANGE: Pulse  Av.7  Min: 57  Max: 109  BLOOD PRESSURE RANGE: Systolic (24hrs), Av , Min:95 , Max:135     ; Diastolic (24hrs), Av, Min:42, Max:72      I/O (24Hr):    Intake/Output Summary (Last 24 hours) at 2024 1233  Last data filed at 2024 0800  Gross per 24 hour   Intake 1241.17 ml   Output 850 ml   Net 391.17 ml     Objective:  General Appearance:  Comfortable.    Vital signs: (most recent): Blood pressure (!) 146/73, pulse 71, temperature 97.7 °F (36.5 °C), temperature source Oral, resp. rate 22, height 1.854 m (6' 1\"), weight 95.3 kg (210 lb), SpO2 98 %.    Output: Producing urine.    HEENT: Normal HEENT exam.    Lungs:  Normal effort and normal respiratory rate.  Breath sounds clear to auscultation.  (And 3 L nasal cannula)  Heart: Normal rate.  Regular rhythm.    Abdomen: Abdomen is soft and flat.  Bowel sounds are normal.   There is no abdominal tenderness.     Extremities: Normal range of motion.    Pulses: Distal 
[facility].  Pulmonary, Critical Care, and Sleep Medicine    Name: Gumaro Khan MRN: [id number]   : 1945 Hospital: [facility]   Date: 2024  Admission Date: 2024     Chart and notes reviewed. Data reviewed. I have evaluated all findings.    [x]I have reviewed the flowsheet and previous day’s notes.      Interval HPI:Patient is a 78 y.o. male w/ PMH of osteosarcoma, alcohol abuse, DMII, NICMO, pAF on warfarin, and pHTN presenting to [facility] on  w/ complaints of N/V x 1 week. History mainly obtained from wife due to patient's condition. Wife reports n/v x 1 week, no BRB noted. Melena x 2-3 days. ROS (+) for episodes of decreased LOC and anorexia. Patient drinks vodka daily. On warfarin for pAF. Admitted for blood loss anemia and shock.     Subjective 24  Hospital Day: 1  1 unit PRBC given overnight  Remains SOB on salter, but improved                 ROS:Review of systems not obtained due to patient factors.    Events, medications, imaging, and notes from last 24 hours reviewed.     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)    Mass of right thigh    Weight loss, abnormal    NSTEMI (non-ST elevated myocardial infarction) (HCC)    MR (mitral regurgitation)    Nonischemic cardiomyopathy (HCC)    Type 2 diabetes mellitus with hypercholesterolemia (HCC)    Anxiety disorder    Presence of implantable cardioverter-defibrillator (ICD)    UTI (urinary tract infection)    Poor appetite    Iron deficiency anemia    Atrial fibrillation (HCC)    Follow-up examination    MGUS (monoclonal gammopathy of unknown significance)    Long term current use of anticoagulant therapy    Antineoplastic chemotherapy induced anemia    Cancer associated pain    Pulmonary hypertension, moderate to severe (HCC)    Chronic gout with tophus    Hypercholesterolemia    Leukocytosis    Malignant neoplasm of right lower extremity (HCC)    AICD at 
Infectious Disease progress Note        Reason: Evaluate for sepsis    Current abx Prior abx   Cefepime, metronidazole since 1/24/2024-1/27/24      Lines:       Assessment :  78-year-old man with past medical history significant for melanoma, cardiomyopathy, CHF status post AICD, DVT presented to [facility] on 1/23/2024 with nausea, vomiting x 1 week.    significant leukocytosis on admission likely leukemoid reaction to severe anemia, nausea     Chest x-ray, CT chest 1/24 didn't reveal definitive pneumonia.  No evidence of pneumonia noted on cxr 1/27/24      Acute kidney injury-nephrology follow-up appreciated    GI bleed- s/p EGD on 1/25. showed angioteciasia and endoclip was placed     Remains stable off antibiotics    Recommendations:    Hold further abx  Follow up nephrology, cardiology recommendations    Will sign off. F/u prn. thanks     Above plan was discussed in details with patient, and dr Reyes. Please call me if any further questions or concerns. Will continue to participate in the care of this patient.  HPI:    Feels better.   Patient denies fever, chills throughout this time.   No abdominal pain.  Denies noticing any blood in the stool.      Past Medical History:   Diagnosis Date    Abnormal myocardial perfusion study 11/02/2015    At most mild LVE.  EF 45-46%.      Abnormal nuclear cardiac imaging test 02/03/2009    Mild basal/mid inferior, inferoapical, inferoseptal infarct w/mild ischemia in RCA (possibly partially artifact).  Anterior, anteroseptal, anterapical ischemia w/o infarct.  (Mid & distal LAD.)  LVE.  EF 29%.  Mod global hyk.      AICD (automatic cardioverter/defibrillator) present     Anemia     Atrial fibrillation (HCC) 6/8/2010    Cancer (HCC)     carcinoma in thigh melanoma in ankle    Cardiomyopathy, nonischemic (HCC)     3/11 echo EF 25%    CHF (congestive heart failure) (HCC)     Diabetes (HCC)     DVT (deep venous thrombosis) ([facility]) 10/04/2016    No DVT bilaterally.    DVT (deep venous 
PIV removed, DC instructions reviewed, all questions answered. Patient left with all belongings. Patient wheeled down by medical transport.    
Progress Note    Gumaro Khan  78 y.o.      Admit Date: 1/23/2024  [unfilled]        Subjective:     Patient continues to feel good.  No shortness of breath.  Appetite is better.  Gets more energetic.  Says possibly he will go home today..       A comprehensive review of systems was negative except for that written in the History of Present Illness.    Objective:     /72   Pulse 68   Temp 98.9 °F (37.2 °C) (Oral)   Resp 18   Ht 1.854 m (6' 1\")   Wt 95.3 kg (210 lb)   SpO2 95%   BMI 27.71 kg/m²       Intake/Output Summary (Last 24 hours) at 1/30/2024 1334  Last data filed at 1/30/2024 1333  Gross per 24 hour   Intake --   Output 2808 ml   Net -2808 ml       Current Facility-Administered Medications   Medication Dose Route Frequency Provider Last Rate Last Admin    ipratropium 0.5 mg-albuterol 2.5 mg (DUONEB) nebulizer solution 1 Dose  1 Dose Inhalation Q6H WA RT EricAline talbot, DO        clopidogrel (PLAVIX) tablet 75 mg  75 mg Oral Daily Jennifer Jovel APRN - NP   75 mg at 01/30/24 0912    insulin lispro (HUMALOG) injection vial 0-16 Units  0-16 Units SubCUTAneous 4x Daily AC & HS EricAline solo DO        0.9 % sodium chloride infusion   IntraVENous PRN Stacey Casey PA-C        pantoprazole (PROTONIX) 40 mg in sodium chloride (PF) 0.9 % 10 mL injection  40 mg IntraVENous Q12H Stacey Casey PA-C   40 mg at 01/30/24 0142    folic acid 1 mg in sodium chloride 0.9 % 50 mL IVPB  1 mg IntraVENous Daily Sathya Trotter DO   Stopped at 01/30/24 1031    insulin glargine (LANTUS) injection vial 5 Units  5 Units SubCUTAneous Daily Viv Carpenter APRN - NP   5 Units at 01/30/24 0910    glucose chewable tablet 16 g  4 tablet Oral PRN Luba Gamez MD        dextrose bolus 10% 125 mL  125 mL IntraVENous PRN Luba Gamez MD        Or    dextrose bolus 10% 250 mL  250 mL IntraVENous PRN Luba Gamez MD        glucagon injection 1 mg  1 mg SubCUTAneous PRN Franco, 
Progress Note    Gumaro Khan  78 y.o.      Admit Date: 1/23/2024  [unfilled]        Subjective:     Patient remains comfortable in the bed.  Very mild shortness of breath but much better than yesterday maintains good urine output..  No more passing of blood in the stool..  In good volume of urine through the Mann catheter  Repeat urine exam is consistent with some interstitial damage to the kidney most likely from NSAID induced..   Noticed spontaneous improvement of blood urea and the creatinine without any fluid or without any diuretics.  Patient is still on antibiotics every 12 hours for sepsis without any definitive source.      A comprehensive review of systems was negative except for that written in the History of Present Illness.    Objective:     BP (!) 168/65   Pulse 86   Temp 97.7 °F (36.5 °C) (Oral)   Resp 24   Ht 1.854 m (6' 1\")   Wt 95.3 kg (210 lb)   SpO2 98%   BMI 27.71 kg/m²       Intake/Output Summary (Last 24 hours) at 1/27/2024 1526  Last data filed at 1/27/2024 0616  Gross per 24 hour   Intake 420 ml   Output 3300 ml   Net -2880 ml       Current Facility-Administered Medications   Medication Dose Route Frequency Provider Last Rate Last Admin    0.9 % sodium chloride infusion   IntraVENous PRN Stacey Casey PA-C        metroNIDAZOLE (FLAGYL) 500 mg in 0.9% NaCl 100 mL IVPB premix  500 mg IntraVENous Q8H Stacey Casey PA-C   Stopped at 01/27/24 1328    pantoprazole (PROTONIX) 40 mg in sodium chloride (PF) 0.9 % 10 mL injection  40 mg IntraVENous Q12H Stacey Casey PA-C   40 mg at 01/27/24 1431    ipratropium 0.5 mg-albuterol 2.5 mg (DUONEB) nebulizer solution 1 Dose  1 Dose Inhalation Q4H RT Stacey Casey PA-C   1 Dose at 01/27/24 1241    thiamine (B-1) injection 100 mg  100 mg IntraVENous Daily CapiliViv barrientos APRN - NP   100 mg at 01/27/24 0843    folic acid 1 mg in sodium chloride 0.9 % 50 mL IVPB  1 mg IntraVENous Daily Satyha Trotter DO   Stopped at 
Progress Note    Gumaro Khan  78 y.o.      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)    Mass of right thigh    Weight loss, abnormal    NSTEMI (non-ST elevated myocardial infarction) (HCC)    MR (mitral regurgitation)    Nonischemic cardiomyopathy (HCC)    Type 2 diabetes mellitus with hypercholesterolemia (HCC)    Anxiety disorder    Presence of implantable cardioverter-defibrillator (ICD)    UTI (urinary tract infection)    Poor appetite    Iron deficiency anemia    Atrial fibrillation (HCC)    Follow-up examination    MGUS (monoclonal gammopathy of unknown significance)    Long term current use of anticoagulant therapy    Antineoplastic chemotherapy induced anemia    Cancer associated pain    Pulmonary hypertension, moderate to severe (HCC)    Chronic gout with tophus    Hypercholesterolemia    Leukocytosis    Malignant neoplasm of right lower extremity (HCC)    AICD at end of battery life    Hypertension associated with diabetes (HCC)    GI bleed    Shock (HCC)    SHAHNAZ (acute kidney injury) (HCC)    Acute decompensated heart failure (HCC)    Hyperglycemia    Supratherapeutic INR    General weakness    ATN (acute tubular necrosis) (HCC)    Systolic dysfunction           Subjective:     Patient continues to feel good no new complaint.  Says every day is getting better and stronger.  No shortness of breath today.  Reviewed ID consult note.  Wants to continue cefepime until the pneumonia is ruled out.  Maintains good urine output.  Ongoing improvement of blood in the creatinine spontaneously significant improvement for last 3 to 4 days.  But is still prerenal ratio.       A comprehensive review of systems was negative except for that written in the History of Present Illness.    Objective:     BP (!) 140/64   Pulse 73   Temp 97.8 °F (36.6 °C) (Oral)   Resp 18   Ht 1.854 m (6' 1\")   Wt 95.3 kg (210 lb)   SpO2 100%   BMI 27.71 kg/m² 
Pt's oxygen removed to test oxygen saturation on RA. Pt's oxygen saturation fluctuated between 97% - 99%. Oxygen was not replaced. Pt tolerated RA well, NAD observed. Will continue to monitor.   
Pt's wife Kristi updated at bedside.   
TRANSFER - OUT REPORT:    Verbal report given to RN on Gumaro Khan  being transferred to [address] [place] [address] for transfer of care.    Report consisted of patient’s Situation, Background, Assessment and   Recommendations(SBAR).     Information from the following report(s) was reviewed with the receiving nurse.    Opportunity for questions and clarification was provided.      Patient transported with via bed with NC @ 4L              
139 141   K 5.5 5.0 4.9    112* 111   CO2 24 25 23   * 176* 163*   MG 2.6 2.5 2.6   PHOS 6.8* 6.1* 5.0*      CBC w/Diff Recent Labs     01/25/24  0229 01/25/24  1028 01/26/24  0628 01/26/24  1311 01/26/24  1952 01/27/24  0047 01/27/24  0915   WBC 15.0*  --  12.5  --   --  13.6*  --    RBC 2.46*  --  2.52*  --   --  2.47*  --    HGB 7.3*   < > 7.4*   < > 7.5* 7.4* 7.5*   HCT 23.8*   < > 24.2*   < > 24.3* 23.7* 24.6*     --  152  --   --  139  --     < > = values in this interval not displayed.      Cardiac Enzymes No results for input(s): \"CPK\", \"ROLANDA\" in the last 72 hours.    Invalid input(s): \"CKRMB\", \"CKND1\", \"TROIP\"   Coagulation Recent Labs     01/26/24  0628 01/27/24  0047   INR 1.4* 1.5*       Lipid Panel No results found for: \"CHOL\", \"CHOLPOCT\", \"CHOLX\", \"CHLST\", \"CHOLV\", \"668485\", \"HDL\", \"HDLC\", \"LDL\", \"LDLC\", \"401801\", \"VLDLC\", \"VLDL\", \"TGLX\", \"TRIGL\"   BNP Invalid input(s): \"BNPP\"   Liver Enzymes No results for input(s): \"TP\", \"ALB\" in the last 72 hours.    Invalid input(s): \"TBIL\", \"AP\", \"SGOT\", \"GPT\", \"DBIL\"   Digoxin    Thyroid Studies No results found for: \"T4\", \"T3RU\", \"TSH\"           Kyle Lovett MD   Pager # [phone]      
therapeutic INR. GI completed EGD 1/25, showed angioteciasia and endoclip was placed.   - Mildly elevated troponin level: 149 > 134. EKGs reviewed and compared to prior, no new changes. Likely related to demand ischemia in setting of GI bleed and hypovolemia.   - NICMO: echo showed EF 25 to 30%, decreased from last echo 5/2018 EF 35%, moderate diffuse hypokinesis, wall thickness mildly increased, moderate mitral regurgitation, severe pulmonary hypertension. S/p [employer] single AICD with gen change in 5/2018  - T2DM   - HLD  - History of right thigh sarcoma s/p resection    - Daily ETOH abuse      Primary cardiologist: Dr. Velasquez     Plan:     Addendum: Independently seen and evaluated.  Agree with below.  For his GI bleed, and high risk for long-term oral anticoagulation, he will be seen for Watchman procedure as outpatient.  In the meantime, we will initiate Plavix for some protection.  No new cardiac recommendations at this time.    - Patient to follow up with Dr. Johnson as outpatient for Watchman evaluation. Do not recommend oral AC at this time. Will start Plavix for now for stroke prevention. Can consider starting a low dose oral AC as an outpatient.   - No further cardiac testing planned. Patient stable for discharge from cardiac standpoint.    Subjective:     No new complaints. Patient denies chest pain and shortness of breath.    Objective:      Patient Vitals for the past 8 hrs:   Temp Pulse Resp BP SpO2   01/29/24 0815 97.8 °F (36.6 °C) 73 18 (!) 140/64 100 %   01/29/24 0415 97.9 °F (36.6 °C) 80 20 (!) 128/58 100 %         No data found.    TELE: AFIB               Current Facility-Administered Medications   Medication Dose Route Frequency    ipratropium 0.5 mg-albuterol 2.5 mg (DUONEB) nebulizer solution 1 Dose  1 Dose Inhalation Q4H WA RT    0.9 % sodium chloride infusion   IntraVENous PRN    pantoprazole (PROTONIX) 40 mg in sodium chloride (PF) 0.9 % 10 mL injection  40 mg IntraVENous Q12H    thiamine 
IntraVENous, Daily  folic acid 1 mg in sodium chloride 0.9 % 50 mL IVPB, 1 mg, IntraVENous, Daily  insulin glargine (LANTUS) injection vial 5 Units, 5 Units, SubCUTAneous, Daily  insulin lispro (HUMALOG) injection vial 0-16 Units, 0-16 Units, SubCUTAneous, Q6H  glucose chewable tablet 16 g, 4 tablet, Oral, PRN  dextrose bolus 10% 125 mL, 125 mL, IntraVENous, PRN **OR** dextrose bolus 10% 250 mL, 250 mL, IntraVENous, PRN  glucagon injection 1 mg, 1 mg, SubCUTAneous, PRN  dextrose 10 % infusion, , IntraVENous, Continuous PRN  0.9 % sodium chloride infusion, , IntraVENous, PRN      Assessment//Plan           Hospital Problems             Last Modified POA    * (Principal) GI bleed 1/24/2024 Yes    Atrial fibrillation (HCC) 1/25/2024 Yes    Shock ([facility]) 1/24/2024 Yes    SHAHNAZ (acute kidney injury) ([facility]) 1/24/2024 Yes    Acute decompensated heart failure ([facility]) 1/25/2024 Yes    Hyperglycemia 1/25/2024 Yes    Supratherapeutic INR 1/25/2024 Yes    General weakness 1/25/2024 Yes    ATN (acute tubular necrosis) ([facility]) 1/26/2024 Yes    Systolic dysfunction 1/26/2024 Yes   Assessment:    Condition: In stable condition.  Unchanged.   (    # Acute hypoxic respiratory failure requiring salter. Respiratory panel neg. V/Q scan neg. LE and UE doppler US r/o PE and DVT  # Multifactorial shock- hemorrhagic and possibly distributive.  Resolved  # Sepsis vs SIRs- leukocytosis, lactic acidosis, elevated procal- unknown source.  Resolved  # Acute blood loss anemia with melena. S/p EGD on 1/25. Gastric body had one actively oozing angioectasia. Decision was to use endoclip as patient has implanted defibrillator.  # Alcohol abuse, elevated ETOH on admission, ~6 shots vodka /day   # Acute on chronic kidney disease stage 3.  Improving. Renal US shows no evidence of obstruction  # NICMO with severe LV dysfunction s/p AICD , uncompensated, EF 25-30%  # Severe pHTN  # pAF on OP warfarin.  Currently held due to GI bleed  # Hyperglycemia with DMII  # 
organomegaly.   Extremities: Extremities normal, atraumatic, no cyanosis or edema,                         Data Review:    Labs: Results:   Chemistry Recent Labs     01/26/24  0628 01/27/24  0047 01/28/24  0446   GLUCOSE 130* 157* 137*    141 144   K 5.0 4.9 4.9   * 111 119*   CO2 25 23 24   * 163* 134*   CREATININE 3.21* 3.07* 2.53*      CBC w/Diff Recent Labs     01/26/24  0628 01/26/24  1311 01/27/24  0047 01/27/24  0915 01/28/24  0446   WBC 12.5  --  13.6*  --  12.2   RBC 2.52*  --  2.47*  --  2.40*   HGB 7.4*   < > 7.4* 7.5* 7.2*   HCT 24.2*   < > 23.7* 24.6* 23.3*     --  139  --  114*    < > = values in this interval not displayed.                Imaging:     Procedures/imaging: see electronic medical records for all procedures, Xrays and details which were not copied into this note but were reviewed prior to   taking my decision.  Impression:       Active Hospital Problems    Diagnosis Date Noted    ATN (acute tubular necrosis) ([facility]) 01/26/2024    Systolic dysfunction 01/26/2024    Acute decompensated heart failure ([facility]) 01/25/2024    Hyperglycemia 01/25/2024    Supratherapeutic INR 01/25/2024    General weakness 01/25/2024    GI bleed 01/24/2024    Shock ([facility]) 01/24/2024    SHAHNAZ (acute kidney injury) ([facility]) 01/24/2024    Atrial fibrillation ([facility]) 06/08/2010   Slow but steady improvement of renal function spontaneously without any intervention        Plan:     Will continue current care will not give any fluid not give any diuretics.  Continue to avoid nephrotoxins monitor renal functions and adjust the doses of medicine as per the eGFR..      JERRY CATHERINE MD               
Automatic Reconciliation, Ar   magnesium oxide (MAG-OX) 400 MG tablet Take 1 tablet by mouth daily 3/15/17   Automatic Reconciliation, Ar   sertraline (ZOLOFT) 25 MG tablet Take 1 tablet by mouth daily    Automatic Reconciliation, Ar   aspirin 81 MG EC tablet Take 1 tablet by mouth daily    ProviderRacheal MD   atorvastatin (LIPITOR) 10 MG tablet Take 1 tablet by mouth at bedtime 12/2/22   Provider, MD Racheal       No Known Allergies    Review of Systems        Physical Exam:      Visit Vitals  /69   Pulse 71   Temp (!) 96.4 °F (35.8 °C) (Axillary)   Resp 28   Ht 1.854 m (6' 1\")   Wt 95.3 kg (210 lb)   SpO2 98%   BMI 27.71 kg/m²       Physical Exam:  Mildly short of breath.  General obese pressure mildly elevated..  Lungs decreased breath sounds without any crackles..  Heart irregularly irregular.  Ankle trace edema.      Labs Reviewed:  Labs: Results:   Chemistry Recent Labs     01/23/24  2330 01/24/24  0502 01/25/24  0229 01/26/24  0628   GLUCOSE 208* 204* 206* 130*    139 140 139   K 4.7 4.8 5.5 5.0    108 111 112*   CO2 27 26 24 25   * 172* 181* 176*   CREATININE 2.87* 2.95* 3.22* 3.21*   GLOB 3.7  --   --   --    ALT 14*  --   --   --    AST 19  --   --   --       CBC w/Diff Recent Labs     01/24/24  0934 01/24/24  1356 01/25/24  0229 01/25/24  1028 01/26/24  0038 01/26/24  0628 01/26/24  1311   WBC 18.5*  --  15.0*  --   --  12.5  --    RBC 2.12*  --  2.46*  --   --  2.52*  --    HGB 6.4*   < > 7.3*   < > 7.3* 7.4* 7.3*   HCT 20.9*   < > 23.8*   < > 23.7* 24.2* 23.4*     --  143  --   --  152  --     < > = values in this interval not displayed.        Chest x-ray shows cardiomegaly with elevated right hemidiaphragm.  Urinalysis at the time of admission was sent from the emergency room which shows trace ketones specific gravity of 1.016 and blood negative for leukocyte esterase small nitrate negative.  Bacteria negative RBC negative WBC 2-3.,  Recent echocardiogram

## 2024-02-01 ENCOUNTER — HOME CARE VISIT (OUTPATIENT)
Age: 79
End: 2024-02-01

## 2024-02-01 NOTE — DISCHARGE SUMMARY
Discharge Summary    Patient: Gumaro Khan MRN: [id number]  CSN: [id number]    YOB: 1945  Age: 78 y.o.  Sex: male    DOA: 1/23/2024 LOS:  LOS: 6 days        Disposition: Home with [facility]    Discharge Date: 1/30/2024    Admission Diagnosis: Other specified hypotension [I95.89]  Hepatic encephalopathy (HCC) [K76.82]  Alcohol abuse [F10.10]  GI bleed [K92.2]  Hyperglycemia [R73.9]  General weakness [R53.1]  Elevated troponin [R79.89]  Pulmonary hypertension, moderate to severe (HCC) [I27.20]  SHAHNAZ (acute kidney injury) (HCC) [N17.9]  Supratherapeutic INR [R79.1]  Acute decompensated heart failure (HCC) [I50.9]  Gastrointestinal hemorrhage with melena [K92.1]    Discharge Diagnosis:    # Acute hypoxic respiratory failure requiring salter. Respiratory panel neg. V/Q scan neg. LE and UE doppler US r/o PE and DVT  # Multifactorial shock- hemorrhagic and possibly distributive.  Resolved  # Sepsis vs SIRs- leukocytosis, lactic acidosis, elevated procal- unknown source.  Resolved  # Acute blood loss anemia with melena. S/p EGD on 1/25. Gastric body had one actively oozing angioectasia. Decision was to use endoclip as patient has implanted defibrillator.  # Alcohol abuse, elevated ETOH on admission, ~6 shots vodka /day   # Acute on chronic kidney disease stage 3.  Improving. Renal US shows no evidence of obstruction  # NICMO with severe LV dysfunction s/p AICD , uncompensated, EF 25-30%  # Severe pHTN  # pAF on OP warfarin.  Currently held due to GI bleed  # Hyperglycemia with DMII  # Hx thigh sarcoma s/p resection, thigh mass remains, ?chronic hematoma    # 4 mm lung nodule, new on CT 2024    Discharge Condition: Stable      PHYSICAL EXAM  Visit Vitals  /72   Pulse 68   Temp 98.9 °F (37.2 °C) (Oral)   Resp 18   Ht 1.854 m (6' 1\")   Wt 95.3 kg (210 lb)   SpO2 95%   BMI 27.71 kg/m²       General: Alert, cooperative, no acute distress    HEENT: PERRLA, EOMI. Anicteric sclerae.  Lungs:  CTA

## 2024-02-02 ENCOUNTER — HOME CARE VISIT (OUTPATIENT)
Age: 79
End: 2024-02-02

## 2024-02-06 ENCOUNTER — HOME CARE VISIT (OUTPATIENT)
Age: 79
End: 2024-02-06

## 2024-02-06 ENCOUNTER — TELEPHONE (OUTPATIENT)
Dept: CARDIOTHORACIC SURGERY | Age: 79
End: 2024-02-06

## 2024-02-06 VITALS
SYSTOLIC BLOOD PRESSURE: 138 MMHG | OXYGEN SATURATION: 97 % | TEMPERATURE: 97 F | HEART RATE: 100 BPM | RESPIRATION RATE: 18 BRPM | DIASTOLIC BLOOD PRESSURE: 86 MMHG

## 2024-02-06 VITALS
HEART RATE: 100 BPM | DIASTOLIC BLOOD PRESSURE: 86 MMHG | TEMPERATURE: 97 F | OXYGEN SATURATION: 97 % | RESPIRATION RATE: 18 BRPM | SYSTOLIC BLOOD PRESSURE: 138 MMHG

## 2024-02-06 PROCEDURE — 0221000100 HH NO PAY CLAIM PROCEDURE

## 2024-02-06 PROCEDURE — G0299 HHS/HOSPICE OF RN EA 15 MIN: HCPCS

## 2024-02-06 PROCEDURE — G0151 HHCP-SERV OF PT,EA 15 MIN: HCPCS

## 2024-02-06 ASSESSMENT — ENCOUNTER SYMPTOMS
HEMOPTYSIS: 0
DYSPNEA ACTIVITY LEVEL: AFTER AMBULATING LESS THAN 10 FT

## 2024-02-06 NOTE — TELEPHONE ENCOUNTER
Called the patient and talked to his wife. I offered for them to come in and see Viv tomorrow 02-07-24 but his wife stated her son is bringing them and he can't do tomorrow. She just wanted to inform her of what was going on. The nurse came to the house yesterday and said that she can still her some crackling in his chest and that he is still weak. I informed Ashely that I talked to the wife. I told his wife before hanging up that if their son states he can bring him tomorrow to call and let the office know.

## 2024-02-06 NOTE — TELEPHONE ENCOUNTER
Called pt. At request of Dr. Johnson to follow up with watchman procedure. He wants to wait until after follow up appointment with Dr. Velasquez later in the month before making a final decision. Instructed to call my office after appointment if they would like to move forward and I will assist with scheduling watchman procedure, provided contact information.     Since discharge, pt. Continues to have shortness of breath with exertion and lower extremity swelling. Home health saw him today, he had not been taking bumex. Bumex restarted today after home health visit per wife report. Denies chest pain, palpitations, shortness of breath at rest. No ETOH since discharge per wife. Noted to have upcoming appointment with MARKUS Nam on 2/8/24. Discussed importance of daily home BP monitoring, daily, weights, < 2 g Na daily, and less than 2 L of fluid daily. Encouraged her to call Dr. Velasquez's office about worsening shortness of breath and for further instructions. Verbalized understanding and provided opportunity for questions, had no questions at this time.

## 2024-02-06 NOTE — HOME HEALTH
PT INITIAL EVALUATION    Past Medical Hx:     Abnormal myocardial perfusion study   At most mild LVE.  EF 45-46%.     Abnormal nuclear cardiac imaging test   Mild basal/mid inferior, inferoapical, inferoseptal infarct w/mild ischemia in RCA (possibly partially artifact).  Anterior, anteroseptal, anterapical ischemia w/o infarct.  (Mid & distal LAD.)  LVE.    AICD (automatic cardioverter/defibrillator) present   Anemia   Atrial fibrillation (HCC)   Cancer (HCC)   carcinoma in thigh melanoma in ankle   Cardiomyopathy, nonischemic ([facility])   3/11 echo EF 25%   CHF (congestive heart failure) (HCC)   Diabetes (HCC)   DVT (deep venous thrombosis) (HCC)   No DVT bilaterally.   DVT (deep venous thrombosis) ([facility])   Tech difficult.  No DVT bilaterally.   History of echocardiogram   LVE.  EF 15% at most.  Indeterminate LV diastolic fx.  RVE.  Reduced RV systolic fx.  RVSP 80-85 mmHg.  LAE.  DAYSI.  Mod-severe MR.  Mild AI.  Severe TR.     HTN (hypertension)   Hypertensive cardiovascular disease   MGUS (monoclonal gammopathy of unknown significance)   MR (mitral regurgitation)   Pulmonary hypertension, moderate to severe ([facility])   90-100mmHg; repeat echo in 3/11 showed 70mmHg   Renal artery stenosis ([facility])   No evidence of renal artery stenosis >60%.  Patent SMA and celiac artery.   S/P cardiac cath   Patent coronary arteries.  LVEDP 28.  EF 35%.  Global hyk.  Mod MR.     Thrombocytopenia (HCC)           Recent H/o current illness:  78  year old male presents with MD referral for HHPT s/p hospitalization due to systolic HF  Medication Management: wife manages medications  Social hx and home eval:  Pt lives in single story home with wife.   Caregiver Involvement: assist with ADL's, medical appointments  PLOF:  Pt PLOF is ambulation w FWW, pts base level of function needed assistance with ADL's  BALANCE:     Seated unsupported balance is good   Standing static balance is fair-  Standing dynamic balance is poor  Ankur 10 /28    Patient

## 2024-02-07 ENCOUNTER — HOME CARE VISIT (OUTPATIENT)
Age: 79
End: 2024-02-07

## 2024-02-07 NOTE — HOME HEALTH
Skilled services/Home bound verification:      Skilled Reason for admission/summary of clinical condition: 78-year-old male past medical history of osteosarcoma, alcohol dependence, type 2 diabetes, nonischemic cardiomyopathy, paroxysmal atrial fibrillation on warfarin presents with nausea and vomiting for 1 week.  Patient is found to have hemorrhagic shock and acute hypoxic respiratory failure.  GI was consulted.  Patient was admitted to ICU.  Patient was transfused 4 units PRBC and 1 unit platelets with vitamin K.  Patient started on pressors and antibiotics.  Patient underwent EGD and was found to have angiectasia that was clipped.  Nephrology was consulted for worsening SHAHNAZ on CKD.  Renal functions monitored.  Due to GI bleed, warfarin was held. Patient worked with PT/OT.  Patient was seen by cardiology for evaluation of Watchman device.  Patient respiratory distress worsened for a day and PE was ruled out.  Patient was weaned off oxygen.  Patient is hemodynamically stable to be discharged home with home health.  Patient instructed to continue to hold warfarin.  Patient be started on Plavix upon discharge and will see Dr. Johnson for evaluation of Watchman device  Patient and wife were agreeable plan.      This patient is homebound for the following reasons Requires considerable and taxing effort to leave the home  and Only leaves the home for medical reasons or [employer] services and are infrequent and of short duration for other reasons.    Skilled care: Teaching disease and medication management, completed assessment, explained [facility] admission packet, discussed POC, SNV freq, PT, OT eval and d/c plan.   Patient response to procedure performed:  Pt tolerated well during the assessment procedure with no c/o.    Caregiver: Caregiver/wife available to assist with daily meals, ADL, assist/provide reminders with daily medications and accompany to MD appt prn.  Medications reconciled and all medications listed are

## 2024-02-09 ENCOUNTER — HOME CARE VISIT (OUTPATIENT)
Age: 79
End: 2024-02-09
Payer: MEDICARE

## 2024-02-12 ENCOUNTER — HOME CARE VISIT (OUTPATIENT)
Age: 79
End: 2024-02-12
Payer: MEDICARE

## 2024-02-12 PROCEDURE — G0157 HHC PT ASSISTANT EA 15: HCPCS

## 2024-02-14 ENCOUNTER — HOME CARE VISIT (OUTPATIENT)
Age: 79
End: 2024-02-14
Payer: MEDICARE

## 2024-02-14 PROCEDURE — G0157 HHC PT ASSISTANT EA 15: HCPCS

## 2024-02-14 NOTE — HOME HEALTH
SUBJECTIVE: Pt reports he walks a few times a day and sits up long sitting on bed throughout the day.  CAREGIVER INVOLVEMENT/ASSISTANCE NEEDED FOR: Pts wife present during PT session and assist pt with all needs prn.  .  OBJECTIVE:  See interventions.  PATIENT EDUCATION PROVIDED THIS VISIT: Recommend pt sit up in armed chair in bedroom for meals increasing OOB time to increase sitting tolerance and core strength.  PATIENT RESPONSE TO EDUCATION PROVIDED: Pt reports he will try to do exercises 2x/day.  Pt reports he walks to/from bathroom 3-4x/day.  PATIENT RESPONSE TO TREATMENT: Pt requires frequent rest breaks.  Vitals remained WFL throughout PT session.  .  ASSESSMENT OF PROGRESS TOWARD GOALS: Pt was able to tolerate amb and sitting HEP but required frequent rest breaks secondary to c/o fatigue.  Vitals remained WFL but pt requires cuing for safety to keep walker closer to him during amb and to pace himself along with focusing on proper breathing techniques to conserve energy and for safety.  .  PLAN FOR NEXT VISIT: Will plan to progress to standing exercises next visit as tolerated to increase LE strength and gait balance.  THE FOLLOWING DISCHARGE PLANNING WAS DISCUSSED WITH THE PATIENT/CAREGIVER: Pt is scheduled to continue PT 1 more visit this week then 2w3, 1w1.

## 2024-02-15 ENCOUNTER — HOME CARE VISIT (OUTPATIENT)
Age: 79
End: 2024-02-15
Payer: MEDICARE

## 2024-02-16 NOTE — HOME HEALTH
SUBJECTIVE: Pt reports he is doing well today but gets tired easily.  Pt reports he stays in bed most of the day but does walk to/from bathroom 3-4x/day.  CAREGIVER INVOLVEMENT/ASSISTANCE NEEDED FOR: pts wife present during PT session and assist pt with all needs prn.  .  OBJECTIVE:  See interventions.  PATIENT EDUCATION PROVIDED THIS VISIT: Reviewed with pt importance of increasing OOB time to increase endurance and strength and also help prevent further illness.  Also reviewed with pt importance of compliance with HEP.  Also instructed pt to change position atleast every  2 hrs to prevent pressure injury.  Pt instructed to focus on pacing himself with all activities and to focus on proper breathing techniques to conserve energy.  Recommended pts wife move chair to pts side of bed and pt to sit up in chair for meals.  PATIENT RESPONSE TO EDUCATION PROVIDED: Pt reports he walks to/from bathroom prn but just sits up in bed most of the day.  Pt reports he will try to do exercises more often and sit up more.  PATIENT RESPONSE TO TREATMENT: Pt was able to sit up in chair in bedroom today to perform sitting HEP.  Pt amb to/from chair on opposite side of bed with 1 seated rest break on bed with each amb.  .  ASSESSMENT OF PROGRESS TOWARD GOALS: Pt amb with FWW around bed to chair on opposite side of bed requiring seated rest break in between each amb.  Pt requires cuing for safe positioning and placement for transfers.  Pt has been non compliant with HEP and reviewed with pt importance of compliance.  Pts wife very supportive and reports she will try to have pt sitting up for meals.  .  PLAN FOR NEXT VISIT: Will plan to begin standing exercises next visit to improve LE strength and gait balance.  THE FOLLOWING DISCHARGE PLANNING WAS DISCUSSED WITH THE PATIENT/CAREGIVER: Pt is scheduled to continue PT 2w3, 1w1.

## 2024-02-19 ENCOUNTER — HOME CARE VISIT (OUTPATIENT)
Age: 79
End: 2024-02-19
Payer: MEDICARE

## 2024-02-20 ENCOUNTER — HOME CARE VISIT (OUTPATIENT)
Age: 79
End: 2024-02-20
Payer: MEDICARE

## 2024-02-20 PROCEDURE — G0157 HHC PT ASSISTANT EA 15: HCPCS

## 2024-02-22 ENCOUNTER — HOME CARE VISIT (OUTPATIENT)
Age: 79
End: 2024-02-22
Payer: MEDICARE

## 2024-02-22 PROCEDURE — G0157 HHC PT ASSISTANT EA 15: HCPCS

## 2024-02-22 PROCEDURE — G0300 HHS/HOSPICE OF LPN EA 15 MIN: HCPCS

## 2024-02-22 NOTE — HOME HEALTH
SUBJECTIVE: Pt reports she is doing okay today.  Pts caregiver reports pt has been walking alot during the day with the walker and has been trying to do exercises.  CAREGIVER INVOLVEMENT/ASSISTANCE NEEDED FOR: pts wife assist pt with all needs prn and present during PT session.  .  OBJECTIVE:  See interventions.  PATIENT EDUCATION PROVIDED THIS VISIT: Pt instructed to continue HEP 2x/day and amb with RW in home as tolerated. Recommend pt sit up for all meals.  PATIENT RESPONSE TO EDUCATION PROVIDED: Pt reports he has been doing his HEP and walking to/from bathroom prn.  PATIENT RESPONSE TO TREATMENT: Pt requires frequent rest breaks throughout PT session secondary to c/o fatigue.  .  ASSESSMENT OF PROGRESS TOWARD GOALS: Pt continues to require frequent rest breaks throughout PT session but has increased amb distance x 50 ft today but continues to require cuing for proper posture and to pace himself for energy conservation/safety.  .  PLAN FOR NEXT VISIT: Will plan to continue progressing standing exercises to improve LE strength/gait ability.  THE FOLLOWING DISCHARGE PLANNING WAS DISCUSSED WITH THE PATIENT/CAREGIVER: Pt is scheduled to continue PT 1 more visit this week, 2w2, 1w1

## 2024-02-23 ENCOUNTER — ANTI-COAG VISIT (OUTPATIENT)
Age: 79
End: 2024-02-23

## 2024-02-23 VITALS
HEART RATE: 74 BPM | TEMPERATURE: 97.8 F | OXYGEN SATURATION: 98 % | RESPIRATION RATE: 17 BRPM | DIASTOLIC BLOOD PRESSURE: 68 MMHG | SYSTOLIC BLOOD PRESSURE: 120 MMHG

## 2024-02-23 DIAGNOSIS — I48.19 PERSISTENT ATRIAL FIBRILLATION (HCC): Primary | ICD-10-CM

## 2024-02-23 LAB — INR BLD: 1.2

## 2024-02-26 VITALS
SYSTOLIC BLOOD PRESSURE: 130 MMHG | HEART RATE: 86 BPM | DIASTOLIC BLOOD PRESSURE: 75 MMHG | RESPIRATION RATE: 16 BRPM | OXYGEN SATURATION: 94 %

## 2024-02-26 ASSESSMENT — ENCOUNTER SYMPTOMS: STOOL DESCRIPTION: FORMED

## 2024-02-26 NOTE — HOME HEALTH
skilled reason- pacer follow up and medication mgt    CAREGIVER INVOLVEMENT:family is available as needed for assistance with iadls, adls, meal prep, medication management, taking to md appointments.   MEDICATIONS: reviewed and all medications are available in the home this visit. Patient denies any medication changes at this time of assessment.     EDUCATION PROVIDED: regarding medications, medication interactions, and look alike medications (specify): reviewed side effects, purposes, dosage, frequencies. High risk medication teaching regarding anticoagulants, hyperglycemic agents or opiod narcotics performed (specify) na Medications are effective at this time. SUPPLIES: by type and quantity ordered/delivered this visit include: n/a     PATIENT EDUCATION ON THIS VISIT:medication mgt -reviewed meds and reviewed plavix for sign and symptoms      PATIENT LEVEL OF UNDERSTANDING: patient/cg has a partial understanding of education that was provided at this time by engaging in all education provided and is able to verbalize understanding, pt denies any questions or concerns at this time.   SKILLED CARE PERFORMED THIS VISIT-n/a    PATIENT RESPONSE TO PROCEDURE PERFORMED: patient tolerated procedure with no signs of discomfort, grimacing or pain, no complications or concerns noted. Agency Progress toward goals: goals/teaching reviewed. patient is progressing towards goals at this time, Patient's Progress towards personal goals: pt reporting they are progressing towards their goals at this time.   Home exercise program: HEP deep breathing exercises Continued need for the following skills:  teaching Plan for next visit: nrsg routinePatient and/or caregiver notified and agrees to changes in the Plan Care NA The following discharge planning was discussed with the pt/caregiver: Patient will be discharged once education has completed, patient is medically stable and independent with care 136.1

## 2024-02-27 ENCOUNTER — HOME CARE VISIT (OUTPATIENT)
Age: 79
End: 2024-02-27
Payer: MEDICARE

## 2024-02-27 VITALS
TEMPERATURE: 98 F | OXYGEN SATURATION: 95 % | SYSTOLIC BLOOD PRESSURE: 116 MMHG | HEART RATE: 98 BPM | DIASTOLIC BLOOD PRESSURE: 64 MMHG | RESPIRATION RATE: 16 BRPM

## 2024-02-27 PROCEDURE — G0151 HHCP-SERV OF PT,EA 15 MIN: HCPCS

## 2024-03-01 ENCOUNTER — HOME CARE VISIT (OUTPATIENT)
Age: 79
End: 2024-03-01
Payer: MEDICARE

## 2024-03-01 PROCEDURE — G0157 HHC PT ASSISTANT EA 15: HCPCS

## 2024-03-05 ENCOUNTER — HOME CARE VISIT (OUTPATIENT)
Age: 79
End: 2024-03-05
Payer: MEDICARE

## 2024-03-05 VITALS
SYSTOLIC BLOOD PRESSURE: 130 MMHG | TEMPERATURE: 97.9 F | HEART RATE: 75 BPM | RESPIRATION RATE: 16 BRPM | DIASTOLIC BLOOD PRESSURE: 72 MMHG | OXYGEN SATURATION: 100 %

## 2024-03-05 PROCEDURE — G0151 HHCP-SERV OF PT,EA 15 MIN: HCPCS

## 2024-03-05 ASSESSMENT — ENCOUNTER SYMPTOMS: PAIN LOCATION - PAIN QUALITY: ACHING

## 2024-03-06 NOTE — HOME HEALTH
POST FALL:   Date and Time of Fall: 3/1/24  8 pm  SOC/CAMI Date: 2/6/24  Fall observed by HH Staff? no  Describe Event and Document any re-training or treatment plan modifications indicated:  pt was standing in the bathroom and slipped on some water.  Response to re-training or treatment plan modifications: Pt. needs to use FWW with all mobility  Assisted Devices used by patient prior to fall: yes - pt left the walker outside of the bathroom  Was equipment in use at time of fall? (No)  Injury (Yes / No), If yes, describe:  some rib pain on the left, bruising  Emergent Care Received: (Yes / No), if yes, describe: no  Was patient identified as High Risk for falls? (Yes)  List Tests Performed, Scores of Tests, and Patient Risk Factors: Ankur AGUIAR Notified (name and time): Dr. Shell 3/1/24  No change in POC indicated at this time. Pt. needs to continue to use FWW with all mobility.  Plan is to discharge from [facility] on 3/11/24

## 2024-03-07 ENCOUNTER — HOME CARE VISIT (OUTPATIENT)
Age: 79
End: 2024-03-07
Payer: MEDICARE

## 2024-03-07 PROCEDURE — G0157 HHC PT ASSISTANT EA 15: HCPCS

## 2024-03-08 ENCOUNTER — OFFICE VISIT (OUTPATIENT)
Age: 79
End: 2024-03-08
Payer: MEDICARE

## 2024-03-08 ENCOUNTER — NURSE ONLY (OUTPATIENT)
Age: 79
End: 2024-03-08
Payer: MEDICARE

## 2024-03-08 VITALS
HEART RATE: 81 BPM | DIASTOLIC BLOOD PRESSURE: 62 MMHG | SYSTOLIC BLOOD PRESSURE: 116 MMHG | HEIGHT: 73 IN | BODY MASS INDEX: 27.71 KG/M2 | OXYGEN SATURATION: 99 %

## 2024-03-08 DIAGNOSIS — Z95.810 PRESENCE OF AUTOMATIC (IMPLANTABLE) CARDIAC DEFIBRILLATOR: ICD-10-CM

## 2024-03-08 DIAGNOSIS — I42.8 NONISCHEMIC CARDIOMYOPATHY (HCC): ICD-10-CM

## 2024-03-08 DIAGNOSIS — I48.11 LONGSTANDING PERSISTENT ATRIAL FIBRILLATION (HCC): ICD-10-CM

## 2024-03-08 DIAGNOSIS — I48.19 PERSISTENT ATRIAL FIBRILLATION (HCC): ICD-10-CM

## 2024-03-08 DIAGNOSIS — I48.91 ATRIAL FIBRILLATION, UNSPECIFIED TYPE (HCC): Primary | ICD-10-CM

## 2024-03-08 DIAGNOSIS — Z95.810 PRESENCE OF AUTOMATIC (IMPLANTABLE) CARDIAC DEFIBRILLATOR: Primary | ICD-10-CM

## 2024-03-08 PROCEDURE — G8417 CALC BMI ABV UP PARAM F/U: HCPCS | Performed by: INTERNAL MEDICINE

## 2024-03-08 PROCEDURE — 3078F DIAST BP <80 MM HG: CPT | Performed by: INTERNAL MEDICINE

## 2024-03-08 PROCEDURE — 1036F TOBACCO NON-USER: CPT | Performed by: INTERNAL MEDICINE

## 2024-03-08 PROCEDURE — 99214 OFFICE O/P EST MOD 30 MIN: CPT | Performed by: INTERNAL MEDICINE

## 2024-03-08 PROCEDURE — G8427 DOCREV CUR MEDS BY ELIG CLIN: HCPCS | Performed by: INTERNAL MEDICINE

## 2024-03-08 PROCEDURE — G8484 FLU IMMUNIZE NO ADMIN: HCPCS | Performed by: INTERNAL MEDICINE

## 2024-03-08 PROCEDURE — 1123F ACP DISCUSS/DSCN MKR DOCD: CPT | Performed by: INTERNAL MEDICINE

## 2024-03-08 PROCEDURE — 93000 ELECTROCARDIOGRAM COMPLETE: CPT | Performed by: INTERNAL MEDICINE

## 2024-03-08 PROCEDURE — 3074F SYST BP LT 130 MM HG: CPT | Performed by: INTERNAL MEDICINE

## 2024-03-08 ASSESSMENT — PATIENT HEALTH QUESTIONNAIRE - PHQ9
SUM OF ALL RESPONSES TO PHQ QUESTIONS 1-9: 0
SUM OF ALL RESPONSES TO PHQ QUESTIONS 1-9: 0
2. FEELING DOWN, DEPRESSED OR HOPELESS: 0
SUM OF ALL RESPONSES TO PHQ QUESTIONS 1-9: 0
SUM OF ALL RESPONSES TO PHQ9 QUESTIONS 1 & 2: 0
SUM OF ALL RESPONSES TO PHQ QUESTIONS 1-9: 0
1. LITTLE INTEREST OR PLEASURE IN DOING THINGS: 0

## 2024-03-08 NOTE — PROGRESS NOTES
Gumaro Hicksdo Khan presents today for   Chief Complaint   Patient presents with    Follow-up     6 months       Gumaro Hicksdo Khan preferred language for health care discussion is english/other.    Is someone accompanying this pt? no    Is the patient using any DME equipment during OV? no    Depression Screening:  Depression: Not at risk (3/8/2024)    PHQ-2     PHQ-2 Score: 0        Learning Assessment:  Who is the primary learner? Patient    What is the preferred language for health care of the primary learner? ENGLISH    How does the primary learner prefer to learn new concepts? DEMONSTRATION    Answered By patient    Relationship to Learner SELF           Pt currently taking Anticoagulant therapy? no    Pt currently taking Antiplatelet therapy ? plavix      Coordination of Care:  1. Have you been to the ER, urgent care clinic since your last visit? Hospitalized since your last visit? no    2. Have you seen or consulted any other health care providers outside of the [facility] since your last visit? Include any pap smears or colon screening. no

## 2024-03-08 NOTE — PROGRESS NOTES
HISTORY OF PRESENT ILLNESS  Gumaro Khan  78 y.o. male     Chief Complaint   Patient presents with    Follow-up     6 months       ASSESSMENT and PLAN    The primary encounter diagnosis was Atrial fibrillation, unspecified type (HCC). Diagnoses of Longstanding persistent atrial fibrillation (HCC), Persistent atrial fibrillation (HCC), Nonischemic cardiomyopathy (HCC), and Presence of automatic (implantable) cardiac defibrillator were also pertinent to this visit.    Mr. Gumaro Khan has history of nonischemic cardiomyopathy as well as severe pulmonary hypertension with moderate mitral regurgitation. Because of severe LV dysfunction, he has AICD in place for primary prevention of sudden cardiac death. He also has persistent atrial fibrillation.  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 His understanding is that the edges were clear. He had lost significant amount of weight, over 30 pounds. He had weighed 193 pounds back in October 2015.  He is weight is now back to baseline at 194 pounds.    His last MUGA scan in November of 2015 showed ejection fraction of 45%. His echocardiogram in September of 2015 showed ejection fraction of 45-50% with pulmonary hypertension with PA pressure of 45 mmHg.  He had AICD generator change in May 2018.    CAD:    He has no documented history of CAD.  Nonischemic DCM: He has AICD in place.  It was interrogated today.  He has not had significant VT episodes; he remains in atrial fibrillation for lengthy periods of time.  He has over 5-year battery life left.  PAF:    Today, he is in irregular rhythm at 81 bpm.  He remains on Coumadin.   BP:    Well-controlled at 116/62.  CHF:    There is no evidence of decompensated CHF noted.  Weight:     His weight today is unable to be obtained because he cannot stand up from his wheelchair.  Cholesterol:   Target LDL <110.  Anti-platelet:   Remains on Plavix because of frequent falls.  Will arrange

## 2024-03-11 ENCOUNTER — HOME CARE VISIT (OUTPATIENT)
Age: 79
End: 2024-03-11
Payer: MEDICARE

## 2024-03-11 VITALS
OXYGEN SATURATION: 94 % | SYSTOLIC BLOOD PRESSURE: 133 MMHG | TEMPERATURE: 97.7 F | DIASTOLIC BLOOD PRESSURE: 65 MMHG | HEART RATE: 74 BPM | RESPIRATION RATE: 17 BRPM

## 2024-03-11 PROCEDURE — G0151 HHCP-SERV OF PT,EA 15 MIN: HCPCS

## 2024-03-11 ASSESSMENT — ENCOUNTER SYMPTOMS: PAIN LOCATION - PAIN QUALITY: ACHING

## 2024-03-11 NOTE — HOME HEALTH
DC ACTIONS NARRATIVE   Pt. clinically discharged and documentation finalized for completion of PT discharge. Caregiver involvement: Pt wife is primary caregiver at this time and has been assisting with medical appointments and ADL support.   Medications reconciled and all medications are available in the home this visit. The following education was provided regarding medications, medication interactions, and look a like medications: NA Medications are effective at this time.   Home health supplies by type and quantity ordered/delivered this visit include: NA  Patient education provided this visit: safety with functional mobility  Current Functional Status and progress towards goals:   Strength: Pt. BLE strength is 3+/5 which is an improvement from the initial evaluation strength of 3-/5. This allows the patient increased functional independence and mobility    BED MOBILITY: Pt. is independent with all bed mobility which demonstrates an improvement from the initial evaluation of CGA. This allows for the patient to be functionally more independent.    TRANSFERS: Pt. is supervision with all transfers with FWW which demonstrates and improvement from the initial evaluation score of min/mod A with FWW. This allows the patient increased functional independence and mobility    GAIT/WC MOBILITY: Pt. is able to ambulate >60 feet inside over even surfaces with SBA with FWW AD. This represents an improvement from the initial evaluation of 10 feet with min/mod A on level surfaces with FWW AD.    STAIRS: NA    BALANCE: Patient's tinetti is 20/28 which is an improvement from the initial evaluation score of 10/28. This allows the patient to be functionally more independent and have a decreased fall risk Progress toward goals: Patient has met  goals, see interventions for details. Pt. was able to return demonstrate all mobility training and HEP shown independently. Patient response to treatment and education: Patient tolerated

## 2024-03-13 PROCEDURE — 93289 INTERROG DEVICE EVAL HEART: CPT | Performed by: INTERNAL MEDICINE

## 2024-03-18 ASSESSMENT — ENCOUNTER SYMPTOMS: DYSPNEA ACTIVITY LEVEL: AFTER AMBULATING MORE THAN 20 FT

## 2024-04-16 RX ORDER — DIGOXIN 125 MCG
125 TABLET ORAL DAILY
Qty: 90 TABLET | Refills: 3 | Status: SHIPPED | OUTPATIENT
Start: 2024-04-16

## 2024-05-22 ENCOUNTER — NURSE ONLY (OUTPATIENT)
Age: 79
End: 2024-05-22
Payer: MEDICARE

## 2024-05-22 DIAGNOSIS — Z95.810 PRESENCE OF AUTOMATIC (IMPLANTABLE) CARDIAC DEFIBRILLATOR: Primary | ICD-10-CM

## 2024-05-22 DIAGNOSIS — I48.11 LONGSTANDING PERSISTENT ATRIAL FIBRILLATION (HCC): ICD-10-CM

## 2024-05-22 DIAGNOSIS — I42.8 NONISCHEMIC CARDIOMYOPATHY (HCC): ICD-10-CM

## 2024-05-22 PROCEDURE — 93296 REM INTERROG EVL PM/IDS: CPT | Performed by: INTERNAL MEDICINE

## 2024-05-22 PROCEDURE — 93295 DEV INTERROG REMOTE 1/2/MLT: CPT | Performed by: INTERNAL MEDICINE

## 2024-05-24 ENCOUNTER — APPOINTMENT (OUTPATIENT)
Facility: HOSPITAL | Age: 79
DRG: 682 | End: 2024-05-24
Payer: MEDICARE

## 2024-05-24 ENCOUNTER — HOSPITAL ENCOUNTER (INPATIENT)
Facility: HOSPITAL | Age: 79
LOS: 5 days | Discharge: NURSING, REHAB OR LONG-TERM CARE | DRG: 682 | End: 2024-05-29
Attending: STUDENT IN AN ORGANIZED HEALTH CARE EDUCATION/TRAINING PROGRAM | Admitting: INTERNAL MEDICINE
Payer: MEDICARE
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
## 2024-05-24 DIAGNOSIS — M25.552 LEFT HIP PAIN: ICD-10-CM

## 2024-05-24 DIAGNOSIS — E87.5 ACUTE HYPERKALEMIA: Primary | ICD-10-CM

## 2024-05-24 DIAGNOSIS — N17.9 AKI (ACUTE KIDNEY INJURY) (HCC): ICD-10-CM

## 2024-05-24 PROBLEM — D64.9 ANEMIA: Status: ACTIVE | Noted: 2024-05-24

## 2024-05-24 PROBLEM — R29.6 RECURRENT FALLS: Status: ACTIVE | Noted: 2024-05-24

## 2024-05-24 PROBLEM — N18.9 RENAL FAILURE (ARF), ACUTE ON CHRONIC (HCC): Status: ACTIVE | Noted: 2024-01-24

## 2024-05-24 LAB
ANION GAP SERPL CALC-SCNC: 11 MMOL/L (ref 3–18)
ANION GAP SERPL CALC-SCNC: 8 MMOL/L (ref 3–18)
APPEARANCE UR: CLEAR
BACTERIA URNS QL MICRO: ABNORMAL /HPF
BASOPHILS # BLD: 0 K/UL (ref 0–0.1)
BASOPHILS NFR BLD: 0 % (ref 0–2)
BILIRUB UR QL: NEGATIVE
BUN SERPL-MCNC: 136 MG/DL (ref 7–18)
BUN SERPL-MCNC: 146 MG/DL (ref 7–18)
BUN/CREAT SERPL: 33 (ref 12–20)
BUN/CREAT SERPL: 36 (ref 12–20)
CALCIUM SERPL-MCNC: 10.7 MG/DL (ref 8.5–10.1)
CALCIUM SERPL-MCNC: 9.4 MG/DL (ref 8.5–10.1)
CHLORIDE SERPL-SCNC: 102 MMOL/L (ref 100–111)
CHLORIDE SERPL-SCNC: 98 MMOL/L (ref 100–111)
CO2 SERPL-SCNC: 21 MMOL/L (ref 21–32)
CO2 SERPL-SCNC: 23 MMOL/L (ref 21–32)
COLOR UR: YELLOW
CREAT SERPL-MCNC: 3.82 MG/DL (ref 0.6–1.3)
CREAT SERPL-MCNC: 4.44 MG/DL (ref 0.6–1.3)
CREAT UR-MCNC: 95 MG/DL (ref 30–125)
DIFFERENTIAL METHOD BLD: ABNORMAL
DIGOXIN SERPL-MCNC: 2 NG/ML (ref 0.9–2)
EKG ATRIAL RATE: 99 BPM
EKG DIAGNOSIS: NORMAL
EKG P AXIS: 59 DEGREES
EKG P-R INTERVAL: 320 MS
EKG Q-T INTERVAL: 318 MS
EKG QRS DURATION: 118 MS
EKG QTC CALCULATION (BAZETT): 408 MS
EKG R AXIS: 79 DEGREES
EKG T AXIS: 218 DEGREES
EKG VENTRICULAR RATE: 99 BPM
EOSINOPHIL # BLD: 0 K/UL (ref 0–0.4)
EOSINOPHIL NFR BLD: 0 % (ref 0–5)
EPITH CASTS URNS QL MICRO: ABNORMAL /LPF (ref 0–5)
ERYTHROCYTE [DISTWIDTH] IN BLOOD BY AUTOMATED COUNT: 19.9 % (ref 11.6–14.5)
ETHANOL SERPL-MCNC: <3 MG/DL (ref 0–3)
GLUCOSE BLD STRIP.AUTO-MCNC: 188 MG/DL (ref 70–110)
GLUCOSE BLD STRIP.AUTO-MCNC: 222 MG/DL (ref 70–110)
GLUCOSE BLD STRIP.AUTO-MCNC: 241 MG/DL (ref 70–110)
GLUCOSE SERPL-MCNC: 224 MG/DL (ref 74–99)
GLUCOSE SERPL-MCNC: 248 MG/DL (ref 74–99)
GLUCOSE UR STRIP.AUTO-MCNC: NEGATIVE MG/DL
HCT VFR BLD AUTO: 21.2 % (ref 36–48)
HGB BLD-MCNC: 6.6 G/DL (ref 13–16)
HGB UR QL STRIP: NEGATIVE
IMM GRANULOCYTES # BLD AUTO: 0 K/UL (ref 0–0.04)
IMM GRANULOCYTES NFR BLD AUTO: 0 % (ref 0–0.5)
KETONES UR QL STRIP.AUTO: NEGATIVE MG/DL
LEUKOCYTE ESTERASE UR QL STRIP.AUTO: NEGATIVE
LYMPHOCYTES # BLD: 1.9 K/UL (ref 0.9–3.6)
LYMPHOCYTES NFR BLD: 11 % (ref 21–52)
MCH RBC QN AUTO: 25.3 PG (ref 24–34)
MCHC RBC AUTO-ENTMCNC: 31.1 G/DL (ref 31–37)
MCV RBC AUTO: 81.2 FL (ref 78–100)
MONOCYTES # BLD: 0.7 K/UL (ref 0.05–1.2)
MONOCYTES NFR BLD: 4 % (ref 3–10)
NEUTS SEG # BLD: 14.8 K/UL (ref 1.8–8)
NEUTS SEG NFR BLD: 85 % (ref 40–73)
NITRITE UR QL STRIP.AUTO: NEGATIVE
NRBC # BLD: 0 K/UL (ref 0–0.01)
NRBC BLD-RTO: 0 PER 100 WBC
OSMOLALITY SERPL: 344 MOSM/KG H2O (ref 280–301)
OSMOLALITY UR: 383 MOSM/KG H2O
PH UR STRIP: 5 (ref 5–8)
PLATELET # BLD AUTO: 314 K/UL (ref 135–420)
PLATELET COMMENT: ABNORMAL
PMV BLD AUTO: 10.6 FL (ref 9.2–11.8)
POTASSIUM SERPL-SCNC: 6 MMOL/L (ref 3.5–5.5)
POTASSIUM SERPL-SCNC: 6.3 MMOL/L (ref 3.5–5.5)
PROT UR STRIP-MCNC: 30 MG/DL
RBC # BLD AUTO: 2.61 M/UL (ref 4.35–5.65)
RBC #/AREA URNS HPF: ABNORMAL /HPF (ref 0–5)
RBC MORPH BLD: ABNORMAL
SODIUM SERPL-SCNC: 130 MMOL/L (ref 136–145)
SODIUM SERPL-SCNC: 133 MMOL/L (ref 136–145)
SODIUM UR-SCNC: 27 MMOL/L (ref 20–110)
SP GR UR REFRACTOMETRY: 1.02 (ref 1–1.03)
TROPONIN I SERPL HS-MCNC: 60 NG/L (ref 0–78)
UROBILINOGEN UR QL STRIP.AUTO: 1 EU/DL (ref 0.2–1)
WBC # BLD AUTO: 17.4 K/UL (ref 4.6–13.2)
WBC URNS QL MICRO: ABNORMAL /HPF (ref 0–4)

## 2024-05-24 PROCEDURE — 1100000000 HC RM PRIVATE

## 2024-05-24 PROCEDURE — 86901 BLOOD TYPING SEROLOGIC RH(D): CPT

## 2024-05-24 PROCEDURE — 83935 ASSAY OF URINE OSMOLALITY: CPT

## 2024-05-24 PROCEDURE — 30233N1 TRANSFUSION OF NONAUTOLOGOUS RED BLOOD CELLS INTO PERIPHERAL VEIN, PERCUTANEOUS APPROACH: ICD-10-PCS | Performed by: INTERNAL MEDICINE

## 2024-05-24 PROCEDURE — 2580000003 HC RX 258: Performed by: STUDENT IN AN ORGANIZED HEALTH CARE EDUCATION/TRAINING PROGRAM

## 2024-05-24 PROCEDURE — 80048 BASIC METABOLIC PNL TOTAL CA: CPT

## 2024-05-24 PROCEDURE — 99285 EMERGENCY DEPT VISIT HI MDM: CPT

## 2024-05-24 PROCEDURE — 86923 COMPATIBILITY TEST ELECTRIC: CPT

## 2024-05-24 PROCEDURE — 82570 ASSAY OF URINE CREATININE: CPT

## 2024-05-24 PROCEDURE — 86900 BLOOD TYPING SEROLOGIC ABO: CPT

## 2024-05-24 PROCEDURE — 93010 ELECTROCARDIOGRAM REPORT: CPT | Performed by: INTERNAL MEDICINE

## 2024-05-24 PROCEDURE — 84156 ASSAY OF PROTEIN URINE: CPT

## 2024-05-24 PROCEDURE — 6360000002 HC RX W HCPCS: Performed by: STUDENT IN AN ORGANIZED HEALTH CARE EDUCATION/TRAINING PROGRAM

## 2024-05-24 PROCEDURE — 2580000003 HC RX 258: Performed by: INTERNAL MEDICINE

## 2024-05-24 PROCEDURE — 73552 X-RAY EXAM OF FEMUR 2/>: CPT

## 2024-05-24 PROCEDURE — 76770 US EXAM ABDO BACK WALL COMP: CPT

## 2024-05-24 PROCEDURE — 82077 ASSAY SPEC XCP UR&BREATH IA: CPT

## 2024-05-24 PROCEDURE — 70450 CT HEAD/BRAIN W/O DYE: CPT

## 2024-05-24 PROCEDURE — 93005 ELECTROCARDIOGRAM TRACING: CPT | Performed by: STUDENT IN AN ORGANIZED HEALTH CARE EDUCATION/TRAINING PROGRAM

## 2024-05-24 PROCEDURE — 94761 N-INVAS EAR/PLS OXIMETRY MLT: CPT

## 2024-05-24 PROCEDURE — 96365 THER/PROPH/DIAG IV INF INIT: CPT

## 2024-05-24 PROCEDURE — 83930 ASSAY OF BLOOD OSMOLALITY: CPT

## 2024-05-24 PROCEDURE — 99223 1ST HOSP IP/OBS HIGH 75: CPT | Performed by: INTERNAL MEDICINE

## 2024-05-24 PROCEDURE — 84484 ASSAY OF TROPONIN QUANT: CPT

## 2024-05-24 PROCEDURE — 86850 RBC ANTIBODY SCREEN: CPT

## 2024-05-24 PROCEDURE — 72170 X-RAY EXAM OF PELVIS: CPT

## 2024-05-24 PROCEDURE — 6370000000 HC RX 637 (ALT 250 FOR IP): Performed by: STUDENT IN AN ORGANIZED HEALTH CARE EDUCATION/TRAINING PROGRAM

## 2024-05-24 PROCEDURE — 6360000002 HC RX W HCPCS: Performed by: INTERNAL MEDICINE

## 2024-05-24 PROCEDURE — 82962 GLUCOSE BLOOD TEST: CPT

## 2024-05-24 PROCEDURE — 85025 COMPLETE CBC W/AUTO DIFF WBC: CPT

## 2024-05-24 PROCEDURE — 2500000003 HC RX 250 WO HCPCS: Performed by: STUDENT IN AN ORGANIZED HEALTH CARE EDUCATION/TRAINING PROGRAM

## 2024-05-24 PROCEDURE — 84300 ASSAY OF URINE SODIUM: CPT

## 2024-05-24 PROCEDURE — 87086 URINE CULTURE/COLONY COUNT: CPT

## 2024-05-24 PROCEDURE — 73562 X-RAY EXAM OF KNEE 3: CPT

## 2024-05-24 PROCEDURE — 81001 URINALYSIS AUTO W/SCOPE: CPT

## 2024-05-24 PROCEDURE — 80162 ASSAY OF DIGOXIN TOTAL: CPT

## 2024-05-24 PROCEDURE — 96375 TX/PRO/DX INJ NEW DRUG ADDON: CPT

## 2024-05-24 PROCEDURE — 36415 COLL VENOUS BLD VENIPUNCTURE: CPT

## 2024-05-24 RX ORDER — ALLOPURINOL 300 MG/1
300 TABLET ORAL DAILY
Status: DISCONTINUED | OUTPATIENT
Start: 2024-05-24 | End: 2024-05-29 | Stop reason: HOSPADM

## 2024-05-24 RX ORDER — INSULIN GLARGINE 100 [IU]/ML
20 INJECTION, SOLUTION SUBCUTANEOUS NIGHTLY
Status: DISCONTINUED | OUTPATIENT
Start: 2024-05-24 | End: 2024-05-27

## 2024-05-24 RX ORDER — 0.9 % SODIUM CHLORIDE 0.9 %
1000 INTRAVENOUS SOLUTION INTRAVENOUS ONCE
Status: COMPLETED | OUTPATIENT
Start: 2024-05-24 | End: 2024-05-24

## 2024-05-24 RX ORDER — INSULIN LISPRO 100 [IU]/ML
0-4 INJECTION, SOLUTION INTRAVENOUS; SUBCUTANEOUS NIGHTLY
Status: DISCONTINUED | OUTPATIENT
Start: 2024-05-24 | End: 2024-05-29 | Stop reason: HOSPADM

## 2024-05-24 RX ORDER — DIGOXIN 125 MCG
125 TABLET ORAL DAILY
Status: DISCONTINUED | OUTPATIENT
Start: 2024-05-24 | End: 2024-05-25

## 2024-05-24 RX ORDER — ONDANSETRON 2 MG/ML
4 INJECTION INTRAMUSCULAR; INTRAVENOUS EVERY 6 HOURS PRN
Status: DISCONTINUED | OUTPATIENT
Start: 2024-05-24 | End: 2024-05-29 | Stop reason: HOSPADM

## 2024-05-24 RX ORDER — ACETAMINOPHEN 325 MG/1
650 TABLET ORAL EVERY 6 HOURS PRN
Status: DISCONTINUED | OUTPATIENT
Start: 2024-05-24 | End: 2024-05-29 | Stop reason: HOSPADM

## 2024-05-24 RX ORDER — SERTRALINE HYDROCHLORIDE 25 MG/1
25 TABLET, FILM COATED ORAL DAILY
Status: DISCONTINUED | OUTPATIENT
Start: 2024-05-24 | End: 2024-05-29 | Stop reason: HOSPADM

## 2024-05-24 RX ORDER — TRAMADOL HYDROCHLORIDE 50 MG/1
50 TABLET ORAL EVERY 6 HOURS PRN
Status: DISCONTINUED | OUTPATIENT
Start: 2024-05-24 | End: 2024-05-25

## 2024-05-24 RX ORDER — INSULIN LISPRO 100 [IU]/ML
0-4 INJECTION, SOLUTION INTRAVENOUS; SUBCUTANEOUS
Status: DISCONTINUED | OUTPATIENT
Start: 2024-05-24 | End: 2024-05-29 | Stop reason: HOSPADM

## 2024-05-24 RX ORDER — CALCIUM GLUCONATE 20 MG/ML
1000 INJECTION, SOLUTION INTRAVENOUS
Status: COMPLETED | OUTPATIENT
Start: 2024-05-24 | End: 2024-05-24

## 2024-05-24 RX ORDER — AMITRIPTYLINE HYDROCHLORIDE 25 MG/1
25 TABLET, FILM COATED ORAL NIGHTLY
Status: DISCONTINUED | OUTPATIENT
Start: 2024-05-24 | End: 2024-05-29 | Stop reason: HOSPADM

## 2024-05-24 RX ORDER — ATORVASTATIN CALCIUM 10 MG/1
10 TABLET, FILM COATED ORAL NIGHTLY
Status: DISCONTINUED | OUTPATIENT
Start: 2024-05-24 | End: 2024-05-29 | Stop reason: HOSPADM

## 2024-05-24 RX ORDER — SODIUM CHLORIDE 9 MG/ML
INJECTION, SOLUTION INTRAVENOUS CONTINUOUS
Status: DISCONTINUED | OUTPATIENT
Start: 2024-05-24 | End: 2024-05-29 | Stop reason: HOSPADM

## 2024-05-24 RX ORDER — INSULIN GLARGINE 100 [IU]/ML
35 INJECTION, SOLUTION SUBCUTANEOUS NIGHTLY
Status: DISCONTINUED | OUTPATIENT
Start: 2024-05-24 | End: 2024-05-24

## 2024-05-24 RX ORDER — ONDANSETRON 2 MG/ML
4 INJECTION INTRAMUSCULAR; INTRAVENOUS
Status: COMPLETED | OUTPATIENT
Start: 2024-05-24 | End: 2024-05-24

## 2024-05-24 RX ADMIN — ONDANSETRON 4 MG: 2 INJECTION INTRAMUSCULAR; INTRAVENOUS at 06:56

## 2024-05-24 RX ADMIN — INSULIN LISPRO 1 UNITS: 100 INJECTION, SOLUTION INTRAVENOUS; SUBCUTANEOUS at 16:40

## 2024-05-24 RX ADMIN — SODIUM CHLORIDE 1000 ML: 9 INJECTION, SOLUTION INTRAVENOUS at 04:02

## 2024-05-24 RX ADMIN — ACETAMINOPHEN 650 MG: 325 TABLET ORAL at 16:39

## 2024-05-24 RX ADMIN — SODIUM CHLORIDE: 9 INJECTION, SOLUTION INTRAVENOUS at 13:07

## 2024-05-24 RX ADMIN — INSULIN GLARGINE 20 UNITS: 100 INJECTION, SOLUTION SUBCUTANEOUS at 21:00

## 2024-05-24 RX ADMIN — SODIUM ZIRCONIUM CYCLOSILICATE 10 G: 10 POWDER, FOR SUSPENSION ORAL at 05:35

## 2024-05-24 RX ADMIN — ATORVASTATIN CALCIUM 10 MG: 10 TABLET, FILM COATED ORAL at 22:16

## 2024-05-24 RX ADMIN — INSULIN LISPRO 1 UNITS: 100 INJECTION, SOLUTION INTRAVENOUS; SUBCUTANEOUS at 12:22

## 2024-05-24 RX ADMIN — INSULIN HUMAN 5 UNITS: 100 INJECTION, SOLUTION PARENTERAL at 05:31

## 2024-05-24 RX ADMIN — CALCIUM GLUCONATE 1000 MG: 20 INJECTION, SOLUTION INTRAVENOUS at 05:34

## 2024-05-24 RX ADMIN — CALCIUM GLUCONATE 1000 MG: 20 INJECTION, SOLUTION INTRAVENOUS at 05:55

## 2024-05-24 RX ADMIN — ONDANSETRON 4 MG: 2 INJECTION INTRAMUSCULAR; INTRAVENOUS at 16:21

## 2024-05-24 RX ADMIN — SODIUM CHLORIDE 1000 ML: 9 INJECTION, SOLUTION INTRAVENOUS at 05:30

## 2024-05-24 ASSESSMENT — PAIN DESCRIPTION - DESCRIPTORS
DESCRIPTORS: ACHING
DESCRIPTORS: ACHING;DISCOMFORT

## 2024-05-24 ASSESSMENT — PAIN SCALES - GENERAL
PAINLEVEL_OUTOF10: 0
PAINLEVEL_OUTOF10: 2
PAINLEVEL_OUTOF10: 3
PAINLEVEL_OUTOF10: 0
PAINLEVEL_OUTOF10: 0
PAINLEVEL_OUTOF10: 3
PAINLEVEL_OUTOF10: 0
PAINLEVEL_OUTOF10: 4
PAINLEVEL_OUTOF10: 0

## 2024-05-24 ASSESSMENT — PAIN DESCRIPTION - LOCATION
LOCATION: HIP

## 2024-05-24 ASSESSMENT — LIFESTYLE VARIABLES
HOW MANY STANDARD DRINKS CONTAINING ALCOHOL DO YOU HAVE ON A TYPICAL DAY: PATIENT DOES NOT DRINK
HOW OFTEN DO YOU HAVE A DRINK CONTAINING ALCOHOL: NEVER

## 2024-05-24 ASSESSMENT — PAIN DESCRIPTION - ORIENTATION
ORIENTATION: LEFT

## 2024-05-24 ASSESSMENT — PAIN - FUNCTIONAL ASSESSMENT
PAIN_FUNCTIONAL_ASSESSMENT: ACTIVITIES ARE NOT PREVENTED
PAIN_FUNCTIONAL_ASSESSMENT: ACTIVITIES ARE NOT PREVENTED
PAIN_FUNCTIONAL_ASSESSMENT: 0-10

## 2024-05-24 ASSESSMENT — PAIN DESCRIPTION - PAIN TYPE: TYPE: CHRONIC PAIN

## 2024-05-24 NOTE — ED TRIAGE NOTES
Patient arrives via EMS from home with c/o hyperglycemia, FSBS 300-400's over the last couple days. Per EMS wife states patient has not been taking his insulin as he should. FSBS 366 in route. Patient was given 250 ml NS IV in route. Patient also had a fall two days ago and has pain to left hip.

## 2024-05-24 NOTE — CARE COORDINATION
05/24/24 6196   Service Assessment   Patient Orientation Alert and Oriented   Cognition Alert   History Provided By Patient   Primary Caregiver Self   Accompanied By/Relationship Son   Support Systems Spouse/Significant Other;Family Members   Patient's Healthcare Decision Maker is: Legal Next of Kin   PCP Verified by CM Yes   Last Visit to PCP Within last 6 months   Prior Functional Level Assistance with the following:;Housework;Shopping;Cooking   Current Functional Level Assistance with the following:;Shopping;Housework;Cooking;Toileting;Mobility   Can patient return to prior living arrangement Yes   Ability to make needs known: Good   Family able to assist with home care needs: Yes   Would you like for me to discuss the discharge plan with any other family members/significant others, and if so, who? Yes   Financial Resources Medicare;Other (Comment)   Community Resources None   Social/Functional History   Lives With Spouse;Son   Type of Home House   Home Layout Two level;Able to Live on Main level with bedroom/bathroom   Home Access Stairs to enter without rails   Entrance Stairs - Number of Steps 1   Entrance Stairs - Rails None   Bathroom Shower/Tub Tub/Shower unit  (States does not use tub/shower, just washes up at the sink)   Bathroom Toilet Bedside commode   Bathroom Equipment None   Bathroom Accessibility Accessible   Home Equipment Cane;Walker - Standard;Wheelchair - Manual   Receives Help From Family   ADL Assistance Needs assistance   Toileting Independent   Homemaking Assistance Needs assistance   Homemaking Responsibilities No   Ambulation Assistance Needs assistance   Transfer Assistance Independent   Active  No   Patient's  Info Spouse or son   Mode of Transportation Car   Occupation Retired   Discharge Planning   Type of Residence House   Living Arrangements Spouse/Significant Other   Current Services Prior To Admission None   Potential Assistance Needed N/A   DME Ordered? No

## 2024-05-24 NOTE — H&P
index is 28.76 kg/m².  Vitals:    05/24/24 0344 05/24/24 0402 05/24/24 0530 05/24/24 0556   BP: 122/63 126/65 120/61    Pulse: (!) 102 100 96 77   Resp: 18 22 19 22   Temp: 98 °F (36.7 °C)      TempSrc: Oral      SpO2: 100% 100%  100%   Weight: 98.9 kg (218 lb)      Height: 1.854 m (6' 1\")           Physical Exam:  General: Chronically ill-appearing cachectic male  Cardiovascular:  S1S2+, RRR  Pulmonary:  CTA b/l  GI:  Soft, BS+, NT, ND  Extremities:  No edema           CBC:  Lab Results   Component Value Date/Time    WBC 17.4 05/24/2024 04:02 AM    HGB 6.6 05/24/2024 04:02 AM    HCT 21.2 05/24/2024 04:02 AM     05/24/2024 04:02 AM    MCV 81.2 05/24/2024 04:02 AM        CMP:  Lab Results   Component Value Date/Time     05/24/2024 04:02 AM    K 6.3 05/24/2024 04:02 AM    CL 98 05/24/2024 04:02 AM    CO2 21 05/24/2024 04:02 AM     05/24/2024 04:02 AM    GFRAA 49 10/05/2020 09:18 AM    GLOB 3.7 01/23/2024 11:30 PM    ALT 14 01/23/2024 11:30 PM        PT/INR  Lab Results   Component Value Date/Time    INR 1.20 02/23/2024 12:00 AM    INR 1.5 01/27/2024 12:47 AM    INR 1.4 01/26/2024 06:28 AM    INR 2.9 06/30/2020 11:52 AM    INR 2.3 05/08/2020 07:58 AM    INR 3.1 04/07/2020 08:40 AM                 Assessment and  Plan:     1 Acute NV - Started in ED   2 Hyperkalemia - 6.3   3 SHAHNAZ on CKD4 - followed by Dr Talavera   4 Dehydration   5 Severe emaciation - Severe protein calorie malnutrition   6 Left Hip Pain   7 Hyperglycemia   8 h/o Multiple falls - CT head no acute lesions   9 DM2 Uncontrolled high blood glucose   10 Anemia of chronic illness   11 Afib - paroxysmal - on PLAVIX , not on anticoagulation per charting   12 hip pains-x-rays are pending      Total time 75 minutes taking care of patient in following activities:  1-Preparing to see the patient (review of tests, prior medical visits)  2-Obtaining and/or reviewing separately medically appropriate obtained history  3-Performing the medically

## 2024-05-24 NOTE — ED PROVIDER NOTES
EMERGENCY DEPARTMENT HISTORY AND PHYSICAL EXAM      Date: 5/24/2024  Patient Name: Gumaro Khan    History of Presenting Illness     Chief Complaint   Patient presents with    Fall    Hip Pain    Hyperglycemia       History (Context): Gumaro Khan is a 78 y.o. male  presents to the ED today with chief complaint of hyperglycemia and left hip pain.  Patient brought in by EMS who states according to wife he has fallen 3 times in the past 2 days.  Upon arrival to the emergency department patient endorsing left-sided hip pain and hyperglycemia.  States that he is been taking his medications at appropriately with exception to yesterday which he \"missed.\"  Wife states that he fell 3 times landing on the left side but denied any head trauma or loss of consciousness.  Patient currently on Plavix.  When asked why patient feels like he is falling he states \"I have no balance.\"  Patient denies any numbness, tingling, unilateral weakness.  States since the last fall he has been unable to ambulate which is abnormal for him.  Further denies any chest pain, dyspnea, abdominal pain, nausea, or vomiting.      PCP: Artis Shell MD    Current Facility-Administered Medications   Medication Dose Route Frequency Provider Last Rate Last Admin    sodium chloride 0.9 % bolus 1,000 mL  1,000 mL IntraVENous Once Derick Arzola Jr., .9 mL/hr at 05/24/24 0530 1,000 mL at 05/24/24 0530    calcium gluconate 1,000 mg in sodium chloride 50 mL  1,000 mg IntraVENous Q30 Min Derick Arzola Jr.,  mL/hr at 05/24/24 0555 1,000 mg at 05/24/24 0555    glucose chewable tablet 16 g  4 tablet Oral PRN Derick Arzola Jr., DO        dextrose bolus 10% 125 mL  125 mL IntraVENous PRN Derick Arzola Jr., DO        Or    dextrose bolus 10% 250 mL  250 mL IntraVENous PRN Derick Arzola Jr., DO        glucagon injection 1 mg  1 mg SubCUTAneous PRN Derick Arzola Jr., DO         Current Outpatient Medications

## 2024-05-24 NOTE — CARE COORDINATION
FOC for facilities given to the pt and wife to look over, spouse states will tour facilities in [place]. Pt information loaded in careport and referral send to [facility].    Francheska DAMICON RN  Case Management  [phone]

## 2024-05-25 ENCOUNTER — APPOINTMENT (OUTPATIENT)
Facility: HOSPITAL | Age: 79
DRG: 682 | End: 2024-05-25
Payer: MEDICARE

## 2024-05-25 PROBLEM — I49.9 ARRHYTHMIA: Status: ACTIVE | Noted: 2024-05-25

## 2024-05-25 LAB
ALBUMIN SERPL-MCNC: 2.5 G/DL (ref 3.4–5)
ALBUMIN/GLOB SERPL: 0.4 (ref 0.8–1.7)
ALP SERPL-CCNC: 550 U/L (ref 45–117)
ALT SERPL-CCNC: 14 U/L (ref 16–61)
ANION GAP SERPL CALC-SCNC: 11 MMOL/L (ref 3–18)
AST SERPL-CCNC: 15 U/L (ref 10–38)
BACTERIA SPEC CULT: NORMAL
BASOPHILS # BLD: 0 K/UL (ref 0–0.1)
BASOPHILS NFR BLD: 0 % (ref 0–2)
BILIRUB SERPL-MCNC: 1.1 MG/DL (ref 0.2–1)
BUN SERPL-MCNC: 122 MG/DL (ref 7–18)
BUN/CREAT SERPL: 37 (ref 12–20)
CALCIUM SERPL-MCNC: 10 MG/DL (ref 8.5–10.1)
CALCIUM SERPL-MCNC: 10.4 MG/DL (ref 8.5–10.1)
CHLORIDE SERPL-SCNC: 106 MMOL/L (ref 100–111)
CO2 SERPL-SCNC: 20 MMOL/L (ref 21–32)
CREAT SERPL-MCNC: 3.33 MG/DL (ref 0.6–1.3)
DIFFERENTIAL METHOD BLD: ABNORMAL
EOSINOPHIL # BLD: 0 K/UL (ref 0–0.4)
EOSINOPHIL NFR BLD: 0 % (ref 0–5)
ERYTHROCYTE [DISTWIDTH] IN BLOOD BY AUTOMATED COUNT: 20.1 % (ref 11.6–14.5)
FERRITIN SERPL-MCNC: 1358 NG/ML (ref 8–388)
GLOBULIN SER CALC-MCNC: 6.1 G/DL (ref 2–4)
GLUCOSE BLD STRIP.AUTO-MCNC: 157 MG/DL (ref 70–110)
GLUCOSE BLD STRIP.AUTO-MCNC: 207 MG/DL (ref 70–110)
GLUCOSE BLD STRIP.AUTO-MCNC: 225 MG/DL (ref 70–110)
GLUCOSE SERPL-MCNC: 153 MG/DL (ref 74–99)
HCT VFR BLD AUTO: 20.5 % (ref 36–48)
HCT VFR BLD AUTO: 23.3 % (ref 36–48)
HGB BLD-MCNC: 6.4 G/DL (ref 13–16)
HGB BLD-MCNC: 7.3 G/DL (ref 13–16)
HISTORY CHECK: NORMAL
IMM GRANULOCYTES # BLD AUTO: 0.1 K/UL (ref 0–0.04)
IMM GRANULOCYTES NFR BLD AUTO: 0 % (ref 0–0.5)
IRON SATN MFR SERPL: 15 % (ref 20–50)
IRON SERPL-MCNC: 29 UG/DL (ref 50–175)
LYMPHOCYTES # BLD: 1.1 K/UL (ref 0.9–3.6)
LYMPHOCYTES NFR BLD: 8 % (ref 21–52)
MAGNESIUM SERPL-MCNC: 2.2 MG/DL (ref 1.6–2.6)
MCH RBC QN AUTO: 25.4 PG (ref 24–34)
MCHC RBC AUTO-ENTMCNC: 31.2 G/DL (ref 31–37)
MCV RBC AUTO: 81.3 FL (ref 78–100)
MONOCYTES # BLD: 1 K/UL (ref 0.05–1.2)
MONOCYTES NFR BLD: 7 % (ref 3–10)
NEUTS SEG # BLD: 12.4 K/UL (ref 1.8–8)
NEUTS SEG NFR BLD: 85 % (ref 40–73)
NRBC # BLD: 0.02 K/UL (ref 0–0.01)
NRBC BLD-RTO: 0.1 PER 100 WBC
PHOSPHATE SERPL-MCNC: 6.3 MG/DL (ref 2.5–4.9)
PLATELET # BLD AUTO: 328 K/UL (ref 135–420)
PMV BLD AUTO: 10.4 FL (ref 9.2–11.8)
POTASSIUM SERPL-SCNC: 5.5 MMOL/L (ref 3.5–5.5)
PROCALCITONIN SERPL-MCNC: 1.27 NG/ML
PROT SERPL-MCNC: 8.6 G/DL (ref 6.4–8.2)
PTH-INTACT SERPL-MCNC: 43.7 PG/ML (ref 18.4–88)
RBC # BLD AUTO: 2.52 M/UL (ref 4.35–5.65)
SERVICE CMNT-IMP: NORMAL
SODIUM SERPL-SCNC: 137 MMOL/L (ref 136–145)
TIBC SERPL-MCNC: 195 UG/DL (ref 250–450)
URATE SERPL-MCNC: 5.7 MG/DL (ref 2.6–7.2)
WBC # BLD AUTO: 14.5 K/UL (ref 4.6–13.2)

## 2024-05-25 PROCEDURE — 80053 COMPREHEN METABOLIC PANEL: CPT

## 2024-05-25 PROCEDURE — 85014 HEMATOCRIT: CPT

## 2024-05-25 PROCEDURE — 1100000000 HC RM PRIVATE

## 2024-05-25 PROCEDURE — 83735 ASSAY OF MAGNESIUM: CPT

## 2024-05-25 PROCEDURE — 74018 RADEX ABDOMEN 1 VIEW: CPT

## 2024-05-25 PROCEDURE — 2580000003 HC RX 258: Performed by: INTERNAL MEDICINE

## 2024-05-25 PROCEDURE — P9016 RBC LEUKOCYTES REDUCED: HCPCS

## 2024-05-25 PROCEDURE — 94761 N-INVAS EAR/PLS OXIMETRY MLT: CPT

## 2024-05-25 PROCEDURE — 36430 TRANSFUSION BLD/BLD COMPNT: CPT

## 2024-05-25 PROCEDURE — 82728 ASSAY OF FERRITIN: CPT

## 2024-05-25 PROCEDURE — 99232 SBSQ HOSP IP/OBS MODERATE 35: CPT | Performed by: STUDENT IN AN ORGANIZED HEALTH CARE EDUCATION/TRAINING PROGRAM

## 2024-05-25 PROCEDURE — C9113 INJ PANTOPRAZOLE SODIUM, VIA: HCPCS | Performed by: STUDENT IN AN ORGANIZED HEALTH CARE EDUCATION/TRAINING PROGRAM

## 2024-05-25 PROCEDURE — 84145 PROCALCITONIN (PCT): CPT

## 2024-05-25 PROCEDURE — 6360000002 HC RX W HCPCS: Performed by: STUDENT IN AN ORGANIZED HEALTH CARE EDUCATION/TRAINING PROGRAM

## 2024-05-25 PROCEDURE — 83970 ASSAY OF PARATHORMONE: CPT

## 2024-05-25 PROCEDURE — 84550 ASSAY OF BLOOD/URIC ACID: CPT

## 2024-05-25 PROCEDURE — 6370000000 HC RX 637 (ALT 250 FOR IP): Performed by: STUDENT IN AN ORGANIZED HEALTH CARE EDUCATION/TRAINING PROGRAM

## 2024-05-25 PROCEDURE — 36415 COLL VENOUS BLD VENIPUNCTURE: CPT

## 2024-05-25 PROCEDURE — 93005 ELECTROCARDIOGRAM TRACING: CPT | Performed by: STUDENT IN AN ORGANIZED HEALTH CARE EDUCATION/TRAINING PROGRAM

## 2024-05-25 PROCEDURE — 85018 HEMOGLOBIN: CPT

## 2024-05-25 PROCEDURE — 2580000003 HC RX 258: Performed by: STUDENT IN AN ORGANIZED HEALTH CARE EDUCATION/TRAINING PROGRAM

## 2024-05-25 PROCEDURE — 82962 GLUCOSE BLOOD TEST: CPT

## 2024-05-25 PROCEDURE — 83540 ASSAY OF IRON: CPT

## 2024-05-25 PROCEDURE — 84100 ASSAY OF PHOSPHORUS: CPT

## 2024-05-25 PROCEDURE — A4216 STERILE WATER/SALINE, 10 ML: HCPCS | Performed by: STUDENT IN AN ORGANIZED HEALTH CARE EDUCATION/TRAINING PROGRAM

## 2024-05-25 PROCEDURE — 85025 COMPLETE CBC W/AUTO DIFF WBC: CPT

## 2024-05-25 PROCEDURE — 83550 IRON BINDING TEST: CPT

## 2024-05-25 PROCEDURE — 71045 X-RAY EXAM CHEST 1 VIEW: CPT

## 2024-05-25 RX ORDER — SODIUM CHLORIDE 9 MG/ML
INJECTION, SOLUTION INTRAVENOUS PRN
Status: DISCONTINUED | OUTPATIENT
Start: 2024-05-25 | End: 2024-05-26

## 2024-05-25 RX ORDER — DIGOXIN 125 MCG
125 TABLET ORAL DAILY
Status: DISCONTINUED | OUTPATIENT
Start: 2024-05-25 | End: 2024-05-29 | Stop reason: HOSPADM

## 2024-05-25 RX ORDER — CLONIDINE HYDROCHLORIDE 0.1 MG/1
0.2 TABLET ORAL 3 TIMES DAILY
Status: DISCONTINUED | OUTPATIENT
Start: 2024-05-25 | End: 2024-05-29 | Stop reason: HOSPADM

## 2024-05-25 RX ORDER — CARVEDILOL 25 MG/1
25 TABLET ORAL 2 TIMES DAILY WITH MEALS
Status: DISCONTINUED | OUTPATIENT
Start: 2024-05-25 | End: 2024-05-29 | Stop reason: HOSPADM

## 2024-05-25 RX ORDER — TRAMADOL HYDROCHLORIDE 50 MG/1
50 TABLET ORAL EVERY 12 HOURS PRN
Status: DISCONTINUED | OUTPATIENT
Start: 2024-05-25 | End: 2024-05-29 | Stop reason: HOSPADM

## 2024-05-25 RX ADMIN — TRAMADOL HYDROCHLORIDE 50 MG: 50 TABLET, COATED ORAL at 17:53

## 2024-05-25 RX ADMIN — SODIUM CHLORIDE: 9 INJECTION, SOLUTION INTRAVENOUS at 05:59

## 2024-05-25 RX ADMIN — SERTRALINE HYDROCHLORIDE 25 MG: 25 TABLET ORAL at 11:32

## 2024-05-25 RX ADMIN — DIGOXIN 125 MCG: 125 TABLET ORAL at 12:25

## 2024-05-25 RX ADMIN — INSULIN GLARGINE 20 UNITS: 100 INJECTION, SOLUTION SUBCUTANEOUS at 21:18

## 2024-05-25 RX ADMIN — INSULIN LISPRO 1 UNITS: 100 INJECTION, SOLUTION INTRAVENOUS; SUBCUTANEOUS at 11:32

## 2024-05-25 RX ADMIN — CLONIDINE HYDROCHLORIDE 0.2 MG: 0.1 TABLET ORAL at 21:50

## 2024-05-25 RX ADMIN — PANTOPRAZOLE SODIUM 40 MG: 40 INJECTION, POWDER, FOR SOLUTION INTRAVENOUS at 16:04

## 2024-05-25 RX ADMIN — ATORVASTATIN CALCIUM 10 MG: 10 TABLET, FILM COATED ORAL at 21:49

## 2024-05-25 RX ADMIN — CARVEDILOL 25 MG: 25 TABLET, FILM COATED ORAL at 21:52

## 2024-05-25 ASSESSMENT — PAIN SCALES - GENERAL
PAINLEVEL_OUTOF10: 0
PAINLEVEL_OUTOF10: 8
PAINLEVEL_OUTOF10: 0

## 2024-05-25 ASSESSMENT — PAIN DESCRIPTION - PAIN TYPE: TYPE: CHRONIC PAIN

## 2024-05-25 ASSESSMENT — PAIN DESCRIPTION - DESCRIPTORS
DESCRIPTORS: ACHING
DESCRIPTORS: ACHING

## 2024-05-25 ASSESSMENT — PAIN DESCRIPTION - LOCATION
LOCATION: OTHER (COMMENT)
LOCATION: OTHER (COMMENT)

## 2024-05-25 ASSESSMENT — PAIN DESCRIPTION - ORIENTATION: ORIENTATION: OTHER (COMMENT)

## 2024-05-25 ASSESSMENT — PAIN - FUNCTIONAL ASSESSMENT: PAIN_FUNCTIONAL_ASSESSMENT: PREVENTS OR INTERFERES SOME ACTIVE ACTIVITIES AND ADLS

## 2024-05-25 NOTE — CONSENT
Informed Consent for Blood Component Transfusion Note    I have discussed with the patient and son the rationale for blood component transfusion; its benefits in treating or preventing fatigue, organ damage, or death; and its risk which includes mild transfusion reactions, rare risk of blood borne infection, or more serious but rare reactions. I have discussed the alternatives to transfusion, including the risk and consequences of not receiving transfusion. The patient and son had an opportunity to ask questions and had agreed to proceed with transfusion of blood components.    Electronically signed by Aline Reyes DO on 5/25/24 at 3:55 PM EDT

## 2024-05-26 ENCOUNTER — APPOINTMENT (OUTPATIENT)
Facility: HOSPITAL | Age: 79
DRG: 682 | End: 2024-05-26
Payer: MEDICARE

## 2024-05-26 PROBLEM — E83.52 HYPERCALCEMIA: Status: ACTIVE | Noted: 2024-05-26

## 2024-05-26 LAB
ANION GAP SERPL CALC-SCNC: 4 MMOL/L (ref 3–18)
BASOPHILS # BLD: 0 K/UL (ref 0–0.1)
BASOPHILS NFR BLD: 0 % (ref 0–2)
BUN SERPL-MCNC: 107 MG/DL (ref 7–18)
BUN/CREAT SERPL: 41 (ref 12–20)
CALCIUM SERPL-MCNC: 10.2 MG/DL (ref 8.5–10.1)
CHLORIDE SERPL-SCNC: 109 MMOL/L (ref 100–111)
CO2 SERPL-SCNC: 26 MMOL/L (ref 21–32)
CREAT SERPL-MCNC: 2.64 MG/DL (ref 0.6–1.3)
CREAT UR-MCNC: 91 MG/DL (ref 30–125)
DIFFERENTIAL METHOD BLD: ABNORMAL
EKG ATRIAL RATE: 71 BPM
EKG DIAGNOSIS: NORMAL
EKG P AXIS: 251 DEGREES
EKG P-R INTERVAL: 392 MS
EKG Q-T INTERVAL: 356 MS
EKG QRS DURATION: 110 MS
EKG QTC CALCULATION (BAZETT): 386 MS
EKG R AXIS: 58 DEGREES
EKG T AXIS: 177 DEGREES
EKG VENTRICULAR RATE: 71 BPM
EOSINOPHIL # BLD: 0.3 K/UL (ref 0–0.4)
EOSINOPHIL NFR BLD: 2 % (ref 0–5)
ERYTHROCYTE [DISTWIDTH] IN BLOOD BY AUTOMATED COUNT: 19.3 % (ref 11.6–14.5)
ERYTHROCYTE [DISTWIDTH] IN BLOOD BY AUTOMATED COUNT: 19.3 % (ref 11.6–14.5)
GLUCOSE BLD STRIP.AUTO-MCNC: 142 MG/DL (ref 70–110)
GLUCOSE BLD STRIP.AUTO-MCNC: 156 MG/DL (ref 70–110)
GLUCOSE BLD STRIP.AUTO-MCNC: 223 MG/DL (ref 70–110)
GLUCOSE BLD STRIP.AUTO-MCNC: 234 MG/DL (ref 70–110)
GLUCOSE SERPL-MCNC: 150 MG/DL (ref 74–99)
HCT VFR BLD AUTO: 21.7 % (ref 36–48)
HCT VFR BLD AUTO: 23.1 % (ref 36–48)
HGB BLD-MCNC: 6.7 G/DL (ref 13–16)
HGB BLD-MCNC: 7.1 G/DL (ref 13–16)
HISTORY CHECK: NORMAL
IMM GRANULOCYTES # BLD AUTO: 0.1 K/UL (ref 0–0.04)
IMM GRANULOCYTES NFR BLD AUTO: 1 % (ref 0–0.5)
LYMPHOCYTES # BLD: 1.2 K/UL (ref 0.9–3.6)
LYMPHOCYTES NFR BLD: 11 % (ref 21–52)
MAGNESIUM SERPL-MCNC: 2 MG/DL (ref 1.6–2.6)
MCH RBC QN AUTO: 25.9 PG (ref 24–34)
MCH RBC QN AUTO: 26 PG (ref 24–34)
MCHC RBC AUTO-ENTMCNC: 30.7 G/DL (ref 31–37)
MCHC RBC AUTO-ENTMCNC: 30.9 G/DL (ref 31–37)
MCV RBC AUTO: 84.1 FL (ref 78–100)
MCV RBC AUTO: 84.3 FL (ref 78–100)
MONOCYTES # BLD: 0.8 K/UL (ref 0.05–1.2)
MONOCYTES NFR BLD: 7 % (ref 3–10)
NEUTS SEG # BLD: 8.9 K/UL (ref 1.8–8)
NEUTS SEG NFR BLD: 79 % (ref 40–73)
NRBC # BLD: 0.02 K/UL (ref 0–0.01)
NRBC # BLD: 0.06 K/UL (ref 0–0.01)
NRBC BLD-RTO: 0.2 PER 100 WBC
NRBC BLD-RTO: 0.5 PER 100 WBC
PLATELET # BLD AUTO: 262 K/UL (ref 135–420)
PLATELET # BLD AUTO: 305 K/UL (ref 135–420)
PMV BLD AUTO: 10 FL (ref 9.2–11.8)
PMV BLD AUTO: 10.5 FL (ref 9.2–11.8)
POTASSIUM SERPL-SCNC: 4.8 MMOL/L (ref 3.5–5.5)
PROT UR-MCNC: 73 MG/DL
PROT/CREAT UR-RTO: 0.8
RBC # BLD AUTO: 2.58 M/UL (ref 4.35–5.65)
RBC # BLD AUTO: 2.74 M/UL (ref 4.35–5.65)
SODIUM SERPL-SCNC: 139 MMOL/L (ref 136–145)
WBC # BLD AUTO: 11.3 K/UL (ref 4.6–13.2)
WBC # BLD AUTO: 12.9 K/UL (ref 4.6–13.2)

## 2024-05-26 PROCEDURE — 1100000000 HC RM PRIVATE

## 2024-05-26 PROCEDURE — 99232 SBSQ HOSP IP/OBS MODERATE 35: CPT | Performed by: STUDENT IN AN ORGANIZED HEALTH CARE EDUCATION/TRAINING PROGRAM

## 2024-05-26 PROCEDURE — 2580000003 HC RX 258: Performed by: INTERNAL MEDICINE

## 2024-05-26 PROCEDURE — 80048 BASIC METABOLIC PNL TOTAL CA: CPT

## 2024-05-26 PROCEDURE — 85027 COMPLETE CBC AUTOMATED: CPT

## 2024-05-26 PROCEDURE — 85025 COMPLETE CBC W/AUTO DIFF WBC: CPT

## 2024-05-26 PROCEDURE — 84155 ASSAY OF PROTEIN SERUM: CPT

## 2024-05-26 PROCEDURE — 83521 IG LIGHT CHAINS FREE EACH: CPT

## 2024-05-26 PROCEDURE — 74176 CT ABD & PELVIS W/O CONTRAST: CPT

## 2024-05-26 PROCEDURE — 36430 TRANSFUSION BLD/BLD COMPNT: CPT

## 2024-05-26 PROCEDURE — A4216 STERILE WATER/SALINE, 10 ML: HCPCS | Performed by: STUDENT IN AN ORGANIZED HEALTH CARE EDUCATION/TRAINING PROGRAM

## 2024-05-26 PROCEDURE — 84156 ASSAY OF PROTEIN URINE: CPT

## 2024-05-26 PROCEDURE — 84165 PROTEIN E-PHORESIS SERUM: CPT

## 2024-05-26 PROCEDURE — P9016 RBC LEUKOCYTES REDUCED: HCPCS

## 2024-05-26 PROCEDURE — 6370000000 HC RX 637 (ALT 250 FOR IP): Performed by: STUDENT IN AN ORGANIZED HEALTH CARE EDUCATION/TRAINING PROGRAM

## 2024-05-26 PROCEDURE — 82784 ASSAY IGA/IGD/IGG/IGM EACH: CPT

## 2024-05-26 PROCEDURE — 82962 GLUCOSE BLOOD TEST: CPT

## 2024-05-26 PROCEDURE — 6360000002 HC RX W HCPCS: Performed by: INTERNAL MEDICINE

## 2024-05-26 PROCEDURE — 2580000003 HC RX 258: Performed by: STUDENT IN AN ORGANIZED HEALTH CARE EDUCATION/TRAINING PROGRAM

## 2024-05-26 PROCEDURE — 6360000002 HC RX W HCPCS: Performed by: STUDENT IN AN ORGANIZED HEALTH CARE EDUCATION/TRAINING PROGRAM

## 2024-05-26 PROCEDURE — 86334 IMMUNOFIX E-PHORESIS SERUM: CPT

## 2024-05-26 PROCEDURE — 36415 COLL VENOUS BLD VENIPUNCTURE: CPT

## 2024-05-26 PROCEDURE — 84166 PROTEIN E-PHORESIS/URINE/CSF: CPT

## 2024-05-26 PROCEDURE — C9113 INJ PANTOPRAZOLE SODIUM, VIA: HCPCS | Performed by: STUDENT IN AN ORGANIZED HEALTH CARE EDUCATION/TRAINING PROGRAM

## 2024-05-26 PROCEDURE — 86335 IMMUNFIX E-PHORSIS/URINE/CSF: CPT

## 2024-05-26 PROCEDURE — 94761 N-INVAS EAR/PLS OXIMETRY MLT: CPT

## 2024-05-26 PROCEDURE — 83735 ASSAY OF MAGNESIUM: CPT

## 2024-05-26 RX ORDER — SODIUM CHLORIDE 9 MG/ML
INJECTION, SOLUTION INTRAVENOUS PRN
Status: DISCONTINUED | OUTPATIENT
Start: 2024-05-26 | End: 2024-05-29 | Stop reason: HOSPADM

## 2024-05-26 RX ORDER — POLYETHYLENE GLYCOL 3350 17 G/17G
17 POWDER, FOR SOLUTION ORAL DAILY
Status: DISCONTINUED | OUTPATIENT
Start: 2024-05-26 | End: 2024-05-29 | Stop reason: HOSPADM

## 2024-05-26 RX ADMIN — SODIUM CHLORIDE: 9 INJECTION, SOLUTION INTRAVENOUS at 12:20

## 2024-05-26 RX ADMIN — ATORVASTATIN CALCIUM 10 MG: 10 TABLET, FILM COATED ORAL at 21:30

## 2024-05-26 RX ADMIN — SERTRALINE HYDROCHLORIDE 25 MG: 25 TABLET ORAL at 09:41

## 2024-05-26 RX ADMIN — CLONIDINE HYDROCHLORIDE 0.2 MG: 0.1 TABLET ORAL at 14:51

## 2024-05-26 RX ADMIN — INSULIN LISPRO 1 UNITS: 100 INJECTION, SOLUTION INTRAVENOUS; SUBCUTANEOUS at 09:55

## 2024-05-26 RX ADMIN — CLONIDINE HYDROCHLORIDE 0.2 MG: 0.1 TABLET ORAL at 21:30

## 2024-05-26 RX ADMIN — EPOETIN ALFA-EPBX 10000 UNITS: 10000 INJECTION, SOLUTION INTRAVENOUS; SUBCUTANEOUS at 21:29

## 2024-05-26 RX ADMIN — POLYETHYLENE GLYCOL 3350 17 G: 17 POWDER, FOR SOLUTION ORAL at 17:17

## 2024-05-26 RX ADMIN — CLONIDINE HYDROCHLORIDE 0.2 MG: 0.1 TABLET ORAL at 09:41

## 2024-05-26 RX ADMIN — PANTOPRAZOLE SODIUM 40 MG: 40 INJECTION, POWDER, FOR SOLUTION INTRAVENOUS at 03:14

## 2024-05-26 RX ADMIN — CARVEDILOL 25 MG: 25 TABLET, FILM COATED ORAL at 09:41

## 2024-05-26 RX ADMIN — CARVEDILOL 25 MG: 25 TABLET, FILM COATED ORAL at 16:22

## 2024-05-26 RX ADMIN — PANTOPRAZOLE SODIUM 40 MG: 40 INJECTION, POWDER, FOR SOLUTION INTRAVENOUS at 14:50

## 2024-05-26 RX ADMIN — IRON SUCROSE 200 MG: 20 INJECTION, SOLUTION INTRAVENOUS at 09:47

## 2024-05-26 RX ADMIN — INSULIN GLARGINE 20 UNITS: 100 INJECTION, SOLUTION SUBCUTANEOUS at 21:28

## 2024-05-26 RX ADMIN — INSULIN LISPRO 1 UNITS: 100 INJECTION, SOLUTION INTRAVENOUS; SUBCUTANEOUS at 16:21

## 2024-05-26 ASSESSMENT — PAIN SCALES - GENERAL
PAINLEVEL_OUTOF10: 0

## 2024-05-26 NOTE — CONSULTS
\"MYELO\", \"BLAST\"     Lab Results   Component Value Date     05/26/2024    K 4.8 05/26/2024     05/26/2024    CO2 26 05/26/2024     (H) 05/26/2024    CREATININE 2.64 (H) 05/26/2024    GLUCOSE 150 (H) 05/26/2024    CALCIUM 10.2 (H) 05/26/2024    BILITOT 1.1 (H) 05/25/2024    ALKPHOS 550 (H) 05/25/2024    AST 15 05/25/2024    ALT 14 (L) 05/25/2024    LABGLOM 24 (L) 05/26/2024    GFRAA 49 (L) 10/05/2020    AGRATIO 0.8 10/05/2020    GLOB 6.1 (H) 05/25/2024             Milo Ng MD  [facility]  [facility]  Phone: [phone]  Pager: [phone]

## 2024-05-27 LAB
ABO + RH BLD: NORMAL
ANION GAP SERPL CALC-SCNC: 4 MMOL/L (ref 3–18)
BLD PROD TYP BPU: NORMAL
BLD PROD TYP BPU: NORMAL
BLOOD BANK BLOOD PRODUCT EXPIRATION DATE: NORMAL
BLOOD BANK BLOOD PRODUCT EXPIRATION DATE: NORMAL
BLOOD BANK DISPENSE STATUS: NORMAL
BLOOD BANK DISPENSE STATUS: NORMAL
BLOOD BANK ISBT PRODUCT BLOOD TYPE: 6200
BLOOD BANK ISBT PRODUCT BLOOD TYPE: 6200
BLOOD BANK PRODUCT CODE: NORMAL
BLOOD BANK PRODUCT CODE: NORMAL
BLOOD BANK UNIT TYPE AND RH: NORMAL
BLOOD BANK UNIT TYPE AND RH: NORMAL
BLOOD GROUP ANTIBODIES SERPL: NORMAL
BPU ID: NORMAL
BPU ID: NORMAL
BUN SERPL-MCNC: 86 MG/DL (ref 7–18)
BUN/CREAT SERPL: 41 (ref 12–20)
CALCIUM SERPL-MCNC: 9.4 MG/DL (ref 8.5–10.1)
CALLED TO: NORMAL
CALLED TO:: NORMAL
CHLORIDE SERPL-SCNC: 111 MMOL/L (ref 100–111)
CO2 SERPL-SCNC: 23 MMOL/L (ref 21–32)
CREAT SERPL-MCNC: 2.12 MG/DL (ref 0.6–1.3)
CROSSMATCH RESULT: NORMAL
CROSSMATCH RESULT: NORMAL
ERYTHROCYTE [DISTWIDTH] IN BLOOD BY AUTOMATED COUNT: 19.2 % (ref 11.6–14.5)
GLUCOSE BLD STRIP.AUTO-MCNC: 124 MG/DL (ref 70–110)
GLUCOSE BLD STRIP.AUTO-MCNC: 95 MG/DL (ref 70–110)
GLUCOSE BLD STRIP.AUTO-MCNC: 98 MG/DL (ref 70–110)
GLUCOSE SERPL-MCNC: 80 MG/DL (ref 74–99)
HCT VFR BLD AUTO: 23.8 % (ref 36–48)
HGB BLD-MCNC: 7.3 G/DL (ref 13–16)
MCH RBC QN AUTO: 26.4 PG (ref 24–34)
MCHC RBC AUTO-ENTMCNC: 30.7 G/DL (ref 31–37)
MCV RBC AUTO: 86.2 FL (ref 78–100)
NRBC # BLD: 0.11 K/UL (ref 0–0.01)
NRBC BLD-RTO: 1 PER 100 WBC
PLATELET # BLD AUTO: 255 K/UL (ref 135–420)
PMV BLD AUTO: 10.9 FL (ref 9.2–11.8)
POTASSIUM SERPL-SCNC: 5.2 MMOL/L (ref 3.5–5.5)
RBC # BLD AUTO: 2.76 M/UL (ref 4.35–5.65)
SODIUM SERPL-SCNC: 138 MMOL/L (ref 136–145)
SPECIMEN EXP DATE BLD: NORMAL
UNIT DIVISION: 0
UNIT DIVISION: 0
UNIT ISSUE DATE/TIME: NORMAL
UNIT ISSUE DATE/TIME: NORMAL
WBC # BLD AUTO: 10.6 K/UL (ref 4.6–13.2)

## 2024-05-27 PROCEDURE — 99232 SBSQ HOSP IP/OBS MODERATE 35: CPT | Performed by: STUDENT IN AN ORGANIZED HEALTH CARE EDUCATION/TRAINING PROGRAM

## 2024-05-27 PROCEDURE — 2580000003 HC RX 258: Performed by: STUDENT IN AN ORGANIZED HEALTH CARE EDUCATION/TRAINING PROGRAM

## 2024-05-27 PROCEDURE — 1100000000 HC RM PRIVATE

## 2024-05-27 PROCEDURE — 94761 N-INVAS EAR/PLS OXIMETRY MLT: CPT

## 2024-05-27 PROCEDURE — 6370000000 HC RX 637 (ALT 250 FOR IP): Performed by: INTERNAL MEDICINE

## 2024-05-27 PROCEDURE — C9113 INJ PANTOPRAZOLE SODIUM, VIA: HCPCS | Performed by: STUDENT IN AN ORGANIZED HEALTH CARE EDUCATION/TRAINING PROGRAM

## 2024-05-27 PROCEDURE — 80048 BASIC METABOLIC PNL TOTAL CA: CPT

## 2024-05-27 PROCEDURE — 85027 COMPLETE CBC AUTOMATED: CPT

## 2024-05-27 PROCEDURE — A4216 STERILE WATER/SALINE, 10 ML: HCPCS | Performed by: STUDENT IN AN ORGANIZED HEALTH CARE EDUCATION/TRAINING PROGRAM

## 2024-05-27 PROCEDURE — 6360000002 HC RX W HCPCS: Performed by: STUDENT IN AN ORGANIZED HEALTH CARE EDUCATION/TRAINING PROGRAM

## 2024-05-27 PROCEDURE — 36415 COLL VENOUS BLD VENIPUNCTURE: CPT

## 2024-05-27 PROCEDURE — 82962 GLUCOSE BLOOD TEST: CPT

## 2024-05-27 PROCEDURE — 6370000000 HC RX 637 (ALT 250 FOR IP): Performed by: STUDENT IN AN ORGANIZED HEALTH CARE EDUCATION/TRAINING PROGRAM

## 2024-05-27 PROCEDURE — 2580000003 HC RX 258: Performed by: INTERNAL MEDICINE

## 2024-05-27 RX ORDER — DEXTROSE MONOHYDRATE 100 MG/ML
INJECTION, SOLUTION INTRAVENOUS CONTINUOUS PRN
Status: DISCONTINUED | OUTPATIENT
Start: 2024-05-27 | End: 2024-05-29 | Stop reason: HOSPADM

## 2024-05-27 RX ORDER — SENNOSIDES A AND B 8.6 MG/1
1 TABLET, FILM COATED ORAL 2 TIMES DAILY
Status: DISCONTINUED | OUTPATIENT
Start: 2024-05-27 | End: 2024-05-29 | Stop reason: HOSPADM

## 2024-05-27 RX ORDER — NEPHROCAP 1 MG
1 CAP ORAL DAILY
Status: DISCONTINUED | OUTPATIENT
Start: 2024-05-27 | End: 2024-05-29 | Stop reason: HOSPADM

## 2024-05-27 RX ORDER — SEVELAMER CARBONATE 800 MG/1
800 TABLET, FILM COATED ORAL
Status: DISCONTINUED | OUTPATIENT
Start: 2024-05-27 | End: 2024-05-29 | Stop reason: HOSPADM

## 2024-05-27 RX ORDER — INSULIN GLARGINE 100 [IU]/ML
15 INJECTION, SOLUTION SUBCUTANEOUS NIGHTLY
Status: DISCONTINUED | OUTPATIENT
Start: 2024-05-27 | End: 2024-05-29 | Stop reason: HOSPADM

## 2024-05-27 RX ADMIN — CARVEDILOL 25 MG: 25 TABLET, FILM COATED ORAL at 08:13

## 2024-05-27 RX ADMIN — CLONIDINE HYDROCHLORIDE 0.2 MG: 0.1 TABLET ORAL at 13:33

## 2024-05-27 RX ADMIN — SEVELAMER CARBONATE 800 MG: 800 TABLET, FILM COATED ORAL at 13:33

## 2024-05-27 RX ADMIN — PANTOPRAZOLE SODIUM 40 MG: 40 INJECTION, POWDER, FOR SOLUTION INTRAVENOUS at 02:40

## 2024-05-27 RX ADMIN — CLONIDINE HYDROCHLORIDE 0.2 MG: 0.1 TABLET ORAL at 22:29

## 2024-05-27 RX ADMIN — SENNOSIDES 8.6 MG: 8.6 TABLET, FILM COATED ORAL at 22:00

## 2024-05-27 RX ADMIN — PANTOPRAZOLE SODIUM 40 MG: 40 INJECTION, POWDER, FOR SOLUTION INTRAVENOUS at 13:34

## 2024-05-27 RX ADMIN — CLONIDINE HYDROCHLORIDE 0.2 MG: 0.1 TABLET ORAL at 08:13

## 2024-05-27 RX ADMIN — SODIUM CHLORIDE: 9 INJECTION, SOLUTION INTRAVENOUS at 05:39

## 2024-05-27 RX ADMIN — POLYETHYLENE GLYCOL 3350 17 G: 17 POWDER, FOR SOLUTION ORAL at 08:13

## 2024-05-27 RX ADMIN — SEVELAMER CARBONATE 800 MG: 800 TABLET, FILM COATED ORAL at 16:05

## 2024-05-27 RX ADMIN — CARVEDILOL 25 MG: 25 TABLET, FILM COATED ORAL at 16:05

## 2024-05-27 RX ADMIN — ATORVASTATIN CALCIUM 10 MG: 10 TABLET, FILM COATED ORAL at 22:00

## 2024-05-27 RX ADMIN — SERTRALINE HYDROCHLORIDE 25 MG: 25 TABLET ORAL at 08:13

## 2024-05-27 RX ADMIN — SENNOSIDES 8.6 MG: 8.6 TABLET, FILM COATED ORAL at 11:16

## 2024-05-27 RX ADMIN — INSULIN GLARGINE 15 UNITS: 100 INJECTION, SOLUTION SUBCUTANEOUS at 21:30

## 2024-05-27 RX ADMIN — RENO CAPS 1 MG: 100; 1.5; 1.7; 20; 10; 1; 150; 5; 6 CAPSULE ORAL at 16:05

## 2024-05-27 ASSESSMENT — PAIN SCALES - GENERAL
PAINLEVEL_OUTOF10: 0

## 2024-05-28 LAB
ANION GAP SERPL CALC-SCNC: 5 MMOL/L (ref 3–18)
BUN SERPL-MCNC: 66 MG/DL (ref 7–18)
BUN/CREAT SERPL: 40 (ref 12–20)
CALCIUM SERPL-MCNC: 8.9 MG/DL (ref 8.5–10.1)
CHLORIDE SERPL-SCNC: 111 MMOL/L (ref 100–111)
CO2 SERPL-SCNC: 23 MMOL/L (ref 21–32)
CREAT SERPL-MCNC: 1.66 MG/DL (ref 0.6–1.3)
ERYTHROCYTE [DISTWIDTH] IN BLOOD BY AUTOMATED COUNT: 19.7 % (ref 11.6–14.5)
GLUCOSE BLD STRIP.AUTO-MCNC: 158 MG/DL (ref 70–110)
GLUCOSE BLD STRIP.AUTO-MCNC: 179 MG/DL (ref 70–110)
GLUCOSE BLD STRIP.AUTO-MCNC: 191 MG/DL (ref 70–110)
GLUCOSE BLD STRIP.AUTO-MCNC: 40 MG/DL (ref 70–110)
GLUCOSE BLD STRIP.AUTO-MCNC: 41 MG/DL (ref 70–110)
GLUCOSE BLD STRIP.AUTO-MCNC: 50 MG/DL (ref 70–110)
GLUCOSE BLD STRIP.AUTO-MCNC: 85 MG/DL (ref 70–110)
GLUCOSE SERPL-MCNC: 80 MG/DL (ref 74–99)
HCT VFR BLD AUTO: 22.5 % (ref 36–48)
HGB BLD-MCNC: 7 G/DL (ref 13–16)
LACTATE SERPL-SCNC: 0.8 MMOL/L (ref 0.4–2)
MCH RBC QN AUTO: 26.7 PG (ref 24–34)
MCHC RBC AUTO-ENTMCNC: 31.1 G/DL (ref 31–37)
MCV RBC AUTO: 85.9 FL (ref 78–100)
NRBC # BLD: 0.07 K/UL (ref 0–0.01)
NRBC BLD-RTO: 0.6 PER 100 WBC
PLATELET # BLD AUTO: 234 K/UL (ref 135–420)
PMV BLD AUTO: 10.5 FL (ref 9.2–11.8)
POTASSIUM SERPL-SCNC: 5.4 MMOL/L (ref 3.5–5.5)
RBC # BLD AUTO: 2.62 M/UL (ref 4.35–5.65)
SODIUM SERPL-SCNC: 139 MMOL/L (ref 136–145)
WBC # BLD AUTO: 11.2 K/UL (ref 4.6–13.2)

## 2024-05-28 PROCEDURE — 6370000000 HC RX 637 (ALT 250 FOR IP): Performed by: INTERNAL MEDICINE

## 2024-05-28 PROCEDURE — 36415 COLL VENOUS BLD VENIPUNCTURE: CPT

## 2024-05-28 PROCEDURE — 2580000003 HC RX 258: Performed by: STUDENT IN AN ORGANIZED HEALTH CARE EDUCATION/TRAINING PROGRAM

## 2024-05-28 PROCEDURE — 2580000003 HC RX 258: Performed by: INTERNAL MEDICINE

## 2024-05-28 PROCEDURE — 1100000000 HC RM PRIVATE

## 2024-05-28 PROCEDURE — 80048 BASIC METABOLIC PNL TOTAL CA: CPT

## 2024-05-28 PROCEDURE — C9113 INJ PANTOPRAZOLE SODIUM, VIA: HCPCS | Performed by: STUDENT IN AN ORGANIZED HEALTH CARE EDUCATION/TRAINING PROGRAM

## 2024-05-28 PROCEDURE — 82962 GLUCOSE BLOOD TEST: CPT

## 2024-05-28 PROCEDURE — 83605 ASSAY OF LACTIC ACID: CPT

## 2024-05-28 PROCEDURE — 6370000000 HC RX 637 (ALT 250 FOR IP): Performed by: STUDENT IN AN ORGANIZED HEALTH CARE EDUCATION/TRAINING PROGRAM

## 2024-05-28 PROCEDURE — 85027 COMPLETE CBC AUTOMATED: CPT

## 2024-05-28 PROCEDURE — 99232 SBSQ HOSP IP/OBS MODERATE 35: CPT | Performed by: HOSPITALIST

## 2024-05-28 PROCEDURE — 94761 N-INVAS EAR/PLS OXIMETRY MLT: CPT

## 2024-05-28 PROCEDURE — A4216 STERILE WATER/SALINE, 10 ML: HCPCS | Performed by: STUDENT IN AN ORGANIZED HEALTH CARE EDUCATION/TRAINING PROGRAM

## 2024-05-28 PROCEDURE — 6360000002 HC RX W HCPCS: Performed by: STUDENT IN AN ORGANIZED HEALTH CARE EDUCATION/TRAINING PROGRAM

## 2024-05-28 RX ORDER — HYDRALAZINE HYDROCHLORIDE 20 MG/ML
10 INJECTION INTRAMUSCULAR; INTRAVENOUS EVERY 6 HOURS PRN
Status: DISCONTINUED | OUTPATIENT
Start: 2024-05-28 | End: 2024-05-29 | Stop reason: HOSPADM

## 2024-05-28 RX ADMIN — PANTOPRAZOLE SODIUM 40 MG: 40 INJECTION, POWDER, FOR SOLUTION INTRAVENOUS at 14:24

## 2024-05-28 RX ADMIN — CARVEDILOL 25 MG: 25 TABLET, FILM COATED ORAL at 17:21

## 2024-05-28 RX ADMIN — CARVEDILOL 25 MG: 25 TABLET, FILM COATED ORAL at 08:06

## 2024-05-28 RX ADMIN — CLONIDINE HYDROCHLORIDE 0.2 MG: 0.1 TABLET ORAL at 14:25

## 2024-05-28 RX ADMIN — RENO CAPS 1 MG: 100; 1.5; 1.7; 20; 10; 1; 150; 5; 6 CAPSULE ORAL at 08:09

## 2024-05-28 RX ADMIN — SENNOSIDES 8.6 MG: 8.6 TABLET, FILM COATED ORAL at 21:35

## 2024-05-28 RX ADMIN — SEVELAMER CARBONATE 800 MG: 800 TABLET, FILM COATED ORAL at 17:21

## 2024-05-28 RX ADMIN — SENNOSIDES 8.6 MG: 8.6 TABLET, FILM COATED ORAL at 08:06

## 2024-05-28 RX ADMIN — INSULIN GLARGINE 15 UNITS: 100 INJECTION, SOLUTION SUBCUTANEOUS at 21:35

## 2024-05-28 RX ADMIN — PANTOPRAZOLE SODIUM 40 MG: 40 INJECTION, POWDER, FOR SOLUTION INTRAVENOUS at 03:37

## 2024-05-28 RX ADMIN — SODIUM CHLORIDE: 9 INJECTION, SOLUTION INTRAVENOUS at 19:41

## 2024-05-28 RX ADMIN — SEVELAMER CARBONATE 800 MG: 800 TABLET, FILM COATED ORAL at 08:08

## 2024-05-28 RX ADMIN — CLONIDINE HYDROCHLORIDE 0.2 MG: 0.1 TABLET ORAL at 21:35

## 2024-05-28 RX ADMIN — CLONIDINE HYDROCHLORIDE 0.2 MG: 0.1 TABLET ORAL at 08:08

## 2024-05-28 RX ADMIN — ATORVASTATIN CALCIUM 10 MG: 10 TABLET, FILM COATED ORAL at 21:35

## 2024-05-28 RX ADMIN — SERTRALINE HYDROCHLORIDE 25 MG: 25 TABLET ORAL at 08:09

## 2024-05-28 ASSESSMENT — PAIN SCALES - GENERAL
PAINLEVEL_OUTOF10: 0

## 2024-05-28 NOTE — CARE COORDINATION
Called pt spouse to follow up on SNF selection, no answer left voicemail.     Francheska DAMICON RN  Case Management  [phone]

## 2024-05-28 NOTE — CARE COORDINATION
Pt spouse selected and have acceptance at [facility], spoke with Angelita keller at [phone] they can accept when medically stable.    Francheska Mitchell BSN RN  Case Management  [phone]

## 2024-05-29 VITALS
RESPIRATION RATE: 18 BRPM | BODY MASS INDEX: 28.89 KG/M2 | DIASTOLIC BLOOD PRESSURE: 54 MMHG | TEMPERATURE: 97.2 F | HEIGHT: 73 IN | HEART RATE: 70 BPM | WEIGHT: 218 LBS | OXYGEN SATURATION: 100 % | SYSTOLIC BLOOD PRESSURE: 116 MMHG

## 2024-05-29 LAB
ANION GAP SERPL CALC-SCNC: 5 MMOL/L (ref 3–18)
ANION GAP SERPL CALC-SCNC: 5 MMOL/L (ref 3–18)
BUN SERPL-MCNC: 46 MG/DL (ref 7–18)
BUN SERPL-MCNC: 50 MG/DL (ref 7–18)
BUN/CREAT SERPL: 30 (ref 12–20)
BUN/CREAT SERPL: 32 (ref 12–20)
CALCIUM SERPL-MCNC: 9 MG/DL (ref 8.5–10.1)
CALCIUM SERPL-MCNC: 9.2 MG/DL (ref 8.5–10.1)
CHLORIDE SERPL-SCNC: 109 MMOL/L (ref 100–111)
CHLORIDE SERPL-SCNC: 110 MMOL/L (ref 100–111)
CO2 SERPL-SCNC: 21 MMOL/L (ref 21–32)
CO2 SERPL-SCNC: 22 MMOL/L (ref 21–32)
COLLECT DURATION TIME UR: 24 HR
CREAT SERPL-MCNC: 1.53 MG/DL (ref 0.6–1.3)
CREAT SERPL-MCNC: 1.55 MG/DL (ref 0.6–1.3)
ERYTHROCYTE [DISTWIDTH] IN BLOOD BY AUTOMATED COUNT: 20.7 % (ref 11.6–14.5)
GLUCOSE BLD STRIP.AUTO-MCNC: 138 MG/DL (ref 70–110)
GLUCOSE BLD STRIP.AUTO-MCNC: 154 MG/DL (ref 70–110)
GLUCOSE BLD STRIP.AUTO-MCNC: 86 MG/DL (ref 70–110)
GLUCOSE SERPL-MCNC: 107 MG/DL (ref 74–99)
GLUCOSE SERPL-MCNC: 152 MG/DL (ref 74–99)
HCT VFR BLD AUTO: 23.4 % (ref 36–48)
HGB BLD-MCNC: 7.1 G/DL (ref 13–16)
KAPPA LC FREE UR-MCNC: 180.47 MG/L (ref 1.17–86.46)
KAPPA LC FREE/LAMBDA FREE UR: 6.17 (ref 1.83–14.26)
LAMBDA LC FREE UR-MCNC: 29.24 MG/L (ref 0.27–15.21)
MCH RBC QN AUTO: 26.6 PG (ref 24–34)
MCHC RBC AUTO-ENTMCNC: 30.3 G/DL (ref 31–37)
MCV RBC AUTO: 87.6 FL (ref 78–100)
NRBC # BLD: 0.05 K/UL (ref 0–0.01)
NRBC BLD-RTO: 0.5 PER 100 WBC
PHOSPHATE SERPL-MCNC: 3 MG/DL (ref 2.5–4.9)
PLATELET # BLD AUTO: 240 K/UL (ref 135–420)
PMV BLD AUTO: 10.8 FL (ref 9.2–11.8)
POTASSIUM SERPL-SCNC: 5 MMOL/L (ref 3.5–5.5)
POTASSIUM SERPL-SCNC: 5.4 MMOL/L (ref 3.5–5.5)
RBC # BLD AUTO: 2.67 M/UL (ref 4.35–5.65)
SODIUM SERPL-SCNC: 136 MMOL/L (ref 136–145)
SODIUM SERPL-SCNC: 136 MMOL/L (ref 136–145)
SPECIMEN VOL ?TM UR: 1900 ML
WBC # BLD AUTO: 10.1 K/UL (ref 4.6–13.2)

## 2024-05-29 PROCEDURE — 2580000003 HC RX 258: Performed by: STUDENT IN AN ORGANIZED HEALTH CARE EDUCATION/TRAINING PROGRAM

## 2024-05-29 PROCEDURE — 2580000003 HC RX 258: Performed by: INTERNAL MEDICINE

## 2024-05-29 PROCEDURE — 6370000000 HC RX 637 (ALT 250 FOR IP): Performed by: INTERNAL MEDICINE

## 2024-05-29 PROCEDURE — A4216 STERILE WATER/SALINE, 10 ML: HCPCS | Performed by: STUDENT IN AN ORGANIZED HEALTH CARE EDUCATION/TRAINING PROGRAM

## 2024-05-29 PROCEDURE — 85027 COMPLETE CBC AUTOMATED: CPT

## 2024-05-29 PROCEDURE — 94761 N-INVAS EAR/PLS OXIMETRY MLT: CPT

## 2024-05-29 PROCEDURE — 6360000002 HC RX W HCPCS: Performed by: STUDENT IN AN ORGANIZED HEALTH CARE EDUCATION/TRAINING PROGRAM

## 2024-05-29 PROCEDURE — 6370000000 HC RX 637 (ALT 250 FOR IP): Performed by: STUDENT IN AN ORGANIZED HEALTH CARE EDUCATION/TRAINING PROGRAM

## 2024-05-29 PROCEDURE — 97162 PT EVAL MOD COMPLEX 30 MIN: CPT

## 2024-05-29 PROCEDURE — 82962 GLUCOSE BLOOD TEST: CPT

## 2024-05-29 PROCEDURE — C9113 INJ PANTOPRAZOLE SODIUM, VIA: HCPCS | Performed by: STUDENT IN AN ORGANIZED HEALTH CARE EDUCATION/TRAINING PROGRAM

## 2024-05-29 PROCEDURE — 80048 BASIC METABOLIC PNL TOTAL CA: CPT

## 2024-05-29 PROCEDURE — 36415 COLL VENOUS BLD VENIPUNCTURE: CPT

## 2024-05-29 PROCEDURE — 97530 THERAPEUTIC ACTIVITIES: CPT

## 2024-05-29 PROCEDURE — 84100 ASSAY OF PHOSPHORUS: CPT

## 2024-05-29 PROCEDURE — 99239 HOSP IP/OBS DSCHRG MGMT >30: CPT | Performed by: HOSPITALIST

## 2024-05-29 RX ORDER — BUMETANIDE 1 MG/1
1 TABLET ORAL DAILY
Qty: 30 TABLET | Refills: 0 | Status: SHIPPED | OUTPATIENT
Start: 2024-05-29

## 2024-05-29 RX ORDER — FERROUS SULFATE 325(65) MG
325 TABLET ORAL
Qty: 90 TABLET | Refills: 1 | Status: SHIPPED | OUTPATIENT
Start: 2024-05-29

## 2024-05-29 RX ORDER — DOCUSATE SODIUM 100 MG/1
100 CAPSULE, LIQUID FILLED ORAL 2 TIMES DAILY
Qty: 60 CAPSULE | Refills: 0 | Status: SHIPPED | OUTPATIENT
Start: 2024-05-29 | End: 2024-06-28

## 2024-05-29 RX ADMIN — CLONIDINE HYDROCHLORIDE 0.2 MG: 0.1 TABLET ORAL at 14:22

## 2024-05-29 RX ADMIN — SERTRALINE HYDROCHLORIDE 25 MG: 25 TABLET ORAL at 09:01

## 2024-05-29 RX ADMIN — RENO CAPS 1 MG: 100; 1.5; 1.7; 20; 10; 1; 150; 5; 6 CAPSULE ORAL at 09:02

## 2024-05-29 RX ADMIN — SEVELAMER CARBONATE 800 MG: 800 TABLET, FILM COATED ORAL at 09:01

## 2024-05-29 RX ADMIN — PANTOPRAZOLE SODIUM 40 MG: 40 INJECTION, POWDER, FOR SOLUTION INTRAVENOUS at 03:57

## 2024-05-29 RX ADMIN — SENNOSIDES 8.6 MG: 8.6 TABLET, FILM COATED ORAL at 09:01

## 2024-05-29 RX ADMIN — SEVELAMER CARBONATE 800 MG: 800 TABLET, FILM COATED ORAL at 17:21

## 2024-05-29 RX ADMIN — PANTOPRAZOLE SODIUM 40 MG: 40 INJECTION, POWDER, FOR SOLUTION INTRAVENOUS at 14:22

## 2024-05-29 RX ADMIN — CARVEDILOL 25 MG: 25 TABLET, FILM COATED ORAL at 17:21

## 2024-05-29 RX ADMIN — CARVEDILOL 25 MG: 25 TABLET, FILM COATED ORAL at 09:01

## 2024-05-29 RX ADMIN — CLONIDINE HYDROCHLORIDE 0.2 MG: 0.1 TABLET ORAL at 09:01

## 2024-05-29 RX ADMIN — SEVELAMER CARBONATE 800 MG: 800 TABLET, FILM COATED ORAL at 12:15

## 2024-05-29 RX ADMIN — SODIUM CHLORIDE: 9 INJECTION, SOLUTION INTRAVENOUS at 09:06

## 2024-05-29 ASSESSMENT — PAIN SCALES - GENERAL
PAINLEVEL_OUTOF10: 0

## 2024-05-29 NOTE — CARE COORDINATION
Attempted x2 to call pt wife Kristi [phone] to inform pt will transfer this afternoon, no response left voicemail to call CM.    Francheska Mitchell BSN RN  Case Management  [phone]

## 2024-05-29 NOTE — DISCHARGE SUMMARY
Hospitalist Discharge Summary      Patient: Gumaro Khan MRN: [id number]  [facility]: [id number]    YOB: 1945  Age: 78 y.o.  Sex: male    DOA: 5/24/2024 LOS:  LOS: 5 days   Discharge Date:     Admission Diagnoses: Acute hyperkalemia [E87.5]  Left hip pain [M25.552]  SHAHNAZ (acute kidney injury) (HCC) [N17.9]  Fall, initial encounter [W19.XXXA]    Discharge Diagnoses:    Hyperkalemia-resolved  SHAHNAZ on CKD 4-improved  Nausea and vomiting secondary to hyperkalemia-resolved  Acute on chronic anemia  Iron deficiency  Severe protein calorie malnutrition  History of multiple falls  Type 2 diabetes poorly controlled  History of A-fib    Discharge Condition: Fair    Discharge To: SNF    CODE STATUS: Full Code       PHYSICAL EXAM  Visit Vitals  /65   Pulse 74   Temp 98.6 °F (37 °C) (Oral)   Resp 20   Ht 1.854 m (6' 1\")   Wt 98.9 kg (218 lb)   SpO2 99%   BMI 28.76 kg/m²       General: Alert, cooperative, no acute distress    Lungs:  CTA Bilaterally. No Wheezing/Rhonchi/Rales.  Heart:  Regular rate and Rhythm.  Abdomen: Soft, Non distended, Non tender. + Bowel sounds.  Extremities: No edema/ cyanosis/ clubbing  Neurologic:  AA oriented X 3. Moves all extremities.                                HPI:    Gumaro Khan is a 78 y.o. male with PMH of CKD 4, hypertension, chronic anemia due to renal dysfunction, multiple falls, paroxysmal atrial fibrillation, cachexia now being admitted for multiple falls and acute on chronic renal failure with hyperkalemia.     Patient is in distress due to vomiting, he cannot give me much history most of the history obtained from chart review and ED discussion.     Patient initially came with history of blood glucose being very high, ED results suggest a less than 250 number, also has history of multiple falls with history of dizziness, blood test revealed patient has SHAHNAZ on CKD 4 and severe hyperkalemia.  Patient was given initial treatment for hyperkalemia in form

## 2024-05-29 NOTE — PLAN OF CARE
77 Y/o male full code   arrived 05/24/24 to emergency department with left hip pain and c/o high blood sugar. According to hi wife has fallen 3 times in last 2 days. Since his last fall he cannot ambulate. Pt was not taking insulin at home. He was vomiting,      History: CKD 4 HTN, chronic anemia, multiple falls, paroxysmal atrial fibrillation and cachexia, Hyperkalemic, dehydration, severe malnutrition, CHF     2315 telemetry called and informed this RN that patient had 15 beats of ventricular tachycardia rate . EKG completed     2340 Called hospitalist Dr. Pratt and informed him that the patient was asleep when this occurred and that he was off digoxin yesterday then received it today. Patient denies pain, denies all needs at this time. Labs are to be drawn shortly to assess for needed order and or interventions     Neuro alert and oriented, no fevers, denies all pain, sleepy he did not sleep last night until 0400 am.  Respiratory room air  Cardiac   GI   full liquid low potassium  Skin bruises throughout d/t 3 falls in 2 days. Tenderness to left hip and left knee  lines      Problem: Pain  Goal: Verbalizes/displays adequate comfort level or baseline comfort level  5/26/2024 0148 by Darrell Busby RN  Outcome: Progressing  5/25/2024 1218 by Angel Gonzalez RN  Outcome: Progressing     Problem: Skin/Tissue Integrity  Goal: Absence of new skin breakdown  5/26/2024 0148 by Darrell Busby RN  Outcome: Progressing  5/25/2024 1218 by Angel Gonzalez RN  Outcome: Progressing     Problem: Safety - Adult  Goal: Free from fall injury  5/26/2024 0148 by Darrell Busby RN  Outcome: Progressing  5/25/2024 1218 by Angel Gonzalez RN  Outcome: Progressing     Problem: ABCDS Injury Assessment  Goal: Absence of physical injury  5/26/2024 0148 by Darrell Busby RN  Outcome: Progressing  5/25/2024 1218 by Angel Gonzalez RN  Outcome: Progressing     
  Problem: Pain  Goal: Verbalizes/displays adequate comfort level or baseline comfort level  5/26/2024 1059 by Adri Carlton, RN  Outcome: Progressing  5/26/2024 0148 by Darrell Busby RN  Outcome: Progressing     Problem: Skin/Tissue Integrity  Goal: Absence of new skin breakdown  Description: 1.  Monitor for areas of redness and/or skin breakdown  2.  Assess vascular access sites hourly  3.  Every 4-6 hours minimum:  Change oxygen saturation probe site  4.  Every 4-6 hours:  If on nasal continuous positive airway pressure, respiratory therapy assess nares and determine need for appliance change or resting period.  5/26/2024 1059 by Adri Carlton, RN  Outcome: Progressing  5/26/2024 0148 by Darrell Busby RN  Outcome: Progressing     Problem: Safety - Adult  Goal: Free from fall injury  5/26/2024 1059 by Adri Carlton, RN  Outcome: Progressing  5/26/2024 0148 by Darrell Busby RN  Outcome: Progressing     Problem: ABCDS Injury Assessment  Goal: Absence of physical injury  5/26/2024 1059 by Adri Carlton, RN  Outcome: Progressing  5/26/2024 0148 by Darrell Busby, RN  Outcome: Progressing     
  Problem: Pain  Goal: Verbalizes/displays adequate comfort level or baseline comfort level  5/28/2024 1005 by Shilpa Lee RN  Outcome: Progressing  5/28/2024 0653 by Darlene Campo RN  Outcome: Progressing  Flowsheets (Taken 5/27/2024 1512 by Grace Handy, RN)  Verbalizes/displays adequate comfort level or baseline comfort level:   Encourage patient to monitor pain and request assistance   Assess pain using appropriate pain scale     Problem: Skin/Tissue Integrity  Goal: Absence of new skin breakdown  Description: 1.  Monitor for areas of redness and/or skin breakdown  2.  Assess vascular access sites hourly  3.  Every 4-6 hours minimum:  Change oxygen saturation probe site  4.  Every 4-6 hours:  If on nasal continuous positive airway pressure, respiratory therapy assess nares and determine need for appliance change or resting period.  5/28/2024 1005 by Shilpa Lee RN  Outcome: Progressing  5/28/2024 0653 by Darlene Campo RN  Outcome: Progressing     Problem: Safety - Adult  Goal: Free from fall injury  Outcome: Progressing     Problem: ABCDS Injury Assessment  Goal: Absence of physical injury  Outcome: Progressing     Problem: Nutrition Deficit:  Goal: Optimize nutritional status  Outcome: Progressing     
  Problem: Pain  Goal: Verbalizes/displays adequate comfort level or baseline comfort level  5/29/2024 0116 by Della Tan RN  Outcome: Progressing  5/29/2024 0110 by Della Tan RN  Outcome: Progressing     Problem: Skin/Tissue Integrity  Goal: Absence of new skin breakdown  Description: 1.  Monitor for areas of redness and/or skin breakdown  2.  Assess vascular access sites hourly  3.  Every 4-6 hours minimum:  Change oxygen saturation probe site  4.  Every 4-6 hours:  If on nasal continuous positive airway pressure, respiratory therapy assess nares and determine need for appliance change or resting period.  5/29/2024 0116 by Dlela Tan RN  Outcome: Progressing  5/29/2024 0110 by Della Tan RN  Outcome: Progressing     Problem: Safety - Adult  Goal: Free from fall injury  5/29/2024 0116 by Della Tan RN  Outcome: Progressing  5/29/2024 0110 by Della Tan RN  Outcome: Progressing     Problem: ABCDS Injury Assessment  Goal: Absence of physical injury  5/29/2024 0116 by Della Tan RN  Outcome: Progressing  5/29/2024 0110 by Della Tan RN  Outcome: Progressing     Problem: Nutrition Deficit:  Goal: Optimize nutritional status  5/29/2024 0116 by Della Tan RN  Outcome: Progressing  5/29/2024 0110 by Della Tan RN  Outcome: Progressing     
  Problem: Pain  Goal: Verbalizes/displays adequate comfort level or baseline comfort level  Outcome: Progressing     Problem: Skin/Tissue Integrity  Goal: Absence of new skin breakdown  Description: 1.  Monitor for areas of redness and/or skin breakdown  2.  Assess vascular access sites hourly  3.  Every 4-6 hours minimum:  Change oxygen saturation probe site  4.  Every 4-6 hours:  If on nasal continuous positive airway pressure, respiratory therapy assess nares and determine need for appliance change or resting period.  Outcome: Progressing     Problem: Safety - Adult  Goal: Free from fall injury  Outcome: Progressing     Problem: ABCDS Injury Assessment  Goal: Absence of physical injury  Outcome: Progressing     
  Problem: Pain  Goal: Verbalizes/displays adequate comfort level or baseline comfort level  Outcome: Progressing     Problem: Skin/Tissue Integrity  Goal: Absence of new skin breakdown  Description: 1.  Monitor for areas of redness and/or skin breakdown  2.  Assess vascular access sites hourly  3.  Every 4-6 hours minimum:  Change oxygen saturation probe site  4.  Every 4-6 hours:  If on nasal continuous positive airway pressure, respiratory therapy assess nares and determine need for appliance change or resting period.  Outcome: Progressing     Problem: Safety - Adult  Goal: Free from fall injury  Outcome: Progressing     Problem: ABCDS Injury Assessment  Goal: Absence of physical injury  Outcome: Progressing     
  Problem: Pain  Goal: Verbalizes/displays adequate comfort level or baseline comfort level  Outcome: Progressing     Problem: Skin/Tissue Integrity  Goal: Absence of new skin breakdown  Description: 1.  Monitor for areas of redness and/or skin breakdown  2.  Assess vascular access sites hourly  3.  Every 4-6 hours minimum:  Change oxygen saturation probe site  4.  Every 4-6 hours:  If on nasal continuous positive airway pressure, respiratory therapy assess nares and determine need for appliance change or resting period.  Outcome: Progressing     Problem: Safety - Adult  Goal: Free from fall injury  Outcome: Progressing     Problem: ABCDS Injury Assessment  Goal: Absence of physical injury  Outcome: Progressing     Problem: Nutrition Deficit:  Goal: Optimize nutritional status  Outcome: Progressing     
  Problem: Pain  Goal: Verbalizes/displays adequate comfort level or baseline comfort level  Outcome: Progressing  Flowsheets  Taken 5/27/2024 1221  Verbalizes/displays adequate comfort level or baseline comfort level:   Encourage patient to monitor pain and request assistance   Assess pain using appropriate pain scale  Taken 5/27/2024 0813  Verbalizes/displays adequate comfort level or baseline comfort level:   Encourage patient to monitor pain and request assistance   Assess pain using appropriate pain scale     Problem: Skin/Tissue Integrity  Goal: Absence of new skin breakdown  Description: 1.  Monitor for areas of redness and/or skin breakdown  2.  Assess vascular access sites hourly  3.  Every 4-6 hours minimum:  Change oxygen saturation probe site  4.  Every 4-6 hours:  If on nasal continuous positive airway pressure, respiratory therapy assess nares and determine need for appliance change or resting period.  Outcome: Progressing     Problem: Safety - Adult  Goal: Free from fall injury  5/27/2024 1347 by Grace Handy, RN  Outcome: Progressing  5/27/2024 0652 by Catherine Schwartz, RN  Outcome: Progressing     Problem: ABCDS Injury Assessment  Goal: Absence of physical injury  5/27/2024 1347 by Grace Handy, RN  Outcome: Progressing  5/27/2024 0652 by Catherine Schwartz, RN  Outcome: Progressing     
  Problem: Safety - Adult  Goal: Free from fall injury  Outcome: Progressing     Problem: ABCDS Injury Assessment  Goal: Absence of physical injury  Outcome: Progressing     
  Problem: Skin/Tissue Integrity  Goal: Absence of new skin breakdown  Description: 1.  Monitor for areas of redness and/or skin breakdown  2.  Assess vascular access sites hourly  3.  Every 4-6 hours minimum:  Change oxygen saturation probe site  4.  Every 4-6 hours:  If on nasal continuous positive airway pressure, respiratory therapy assess nares and determine need for appliance change or resting period.  Outcome: Progressing     Problem: Pain  Goal: Verbalizes/displays adequate comfort level or baseline comfort level  Outcome: Progressing  Flowsheets (Taken 5/27/2024 1512 by Grace Handy, RN)  Verbalizes/displays adequate comfort level or baseline comfort level:   Encourage patient to monitor pain and request assistance   Assess pain using appropriate pain scale     
77 Y/o male full code   arrived 05/24/24 to emergency department with left hip pain and c/o high blood sugar. According to hi wife has fallen 3 times in last 2 days. Since his last fall he cannot ambulate. Pt was not taking insulin at home. He was vomiting,     History: CKD 4 HTN, chronic anemia, multiple falls, paroxysmal atrial fibrillation and cachexia, Hyperkalemic, dehydration, severe malnutrition, CHF    2315 telemetry called and informed this RN that patient had 15 beats of ventricular tachycardia rate . EKG completed   2340 Called hospitalist Dr. Pratt and informed him that the patient was asleep when this occurred and that he was off digoxin yesterday then received it today. Patient denies pain, denies all needs at this time. Labs are to be drawn shortly to assess for needed order and or interventions    Neuro alert and oriented, no fevers, denies all pain, sleepy he did not sleep last night until 0400 am.  Respiratory room air  Cardiac   GI    Skin bruises throughout d/t 3 falls in 2 days. Tenderness to left hip and left knee  lines    Problem: Pain  Goal: Verbalizes/displays adequate comfort level or baseline comfort level  5/24/2024 2117 by Darrell Busby RN  Outcome: Progressing  Flowsheets  Taken 5/24/2024 1739 by Angel Gonzalez RN  Verbalizes/displays adequate comfort level or baseline comfort level:   Encourage patient to monitor pain and request assistance   Assess pain using appropriate pain scale   Administer analgesics based on type and severity of pain and evaluate response  Taken 5/24/2024 1615 by Angel Gonzalez RN  Verbalizes/displays adequate comfort level or baseline comfort level:   Encourage patient to monitor pain and request assistance   Assess pain using appropriate pain scale   Administer analgesics based on type and severity of pain and evaluate response  5/24/2024 1312 by Angel Gonzalez RN  Outcome: Progressing     Problem: Skin/Tissue Integrity  Goal: Absence of new 
RN  Outcome: Progressing     Problem: Chronic Conditions and Co-morbidities  Goal: Patient's chronic conditions and co-morbidity symptoms are monitored and maintained or improved  Outcome: Progressing  Flowsheets (Taken 5/29/2024 0903)  Care Plan - Patient's Chronic Conditions and Co-Morbidity Symptoms are Monitored and Maintained or Improved:   Monitor and assess patient's chronic conditions and comorbid symptoms for stability, deterioration, or improvement   Collaborate with multidisciplinary team to address chronic and comorbid conditions and prevent exacerbation or deterioration     
by Luis Tan MD at [facility] ENDOSCOPY       Home Situation:  Social/Functional History  Lives With: Spouse, Son  Type of Home: House  Home Layout: Two level, Able to Live on Main level with bedroom/bathroom  Home Access: Stairs to enter without rails  Entrance Stairs - Number of Steps: 1  Entrance Stairs - Rails: None  Bathroom Shower/Tub: Tub/Shower unit (States does not use tub/shower, just washes up at the sink)  Bathroom Toilet: Bedside commode  Bathroom Equipment: None  Bathroom Accessibility: Accessible  Home Equipment: Cane, Walker - Standard, Wheelchair - Manual  Receives Help From: Family  ADL Assistance: Needs assistance  Toileting: Independent  Homemaking Assistance: Needs assistance  Homemaking Responsibilities: No  Ambulation Assistance: Needs assistance  Transfer Assistance: Independent  Active : No  Patient's  Info: Spouse or son  Mode of Transportation: Car  Occupation: Retired  Critical Behavior:  Orientation  Overall Orientation Status: Within Normal Limits  Orientation Level: Oriented X4       Strength:    Strength: Generally decreased, functional      Range Of Motion:  AROM: Generally decreased, functional (right LE; left LE WFL)  PROM: Generally decreased, functional (right knee limited flexion 2/2 hx of cancer; left LE WFL)    Functional Mobility:  Bed Mobility:     Bed Mobility Training  Bed Mobility Training: Yes  Supine to Sit: Stand-by assistance  Sit to Supine: Stand-by assistance  Scooting: Stand-by assistance  Transfers:     Transfer Training  Transfer Training: Yes  Sit to Stand: Minimum assistance  Stand to Sit: Minimum assistance  Balance:     Balance  Sitting: Intact  Standing: Impaired  Standing - Static: Fair  Standing - Dynamic: Fair       Ambulation/Gait Training:    Gait  Gait Training: Yes  Overall Level of Assistance: Minimum assistance  Distance (ft): 1 Feet  Assistive Device: Walker, rolling  Gait Abnormalities: Decreased step

## 2024-05-29 NOTE — CARE COORDINATION
05/29/24 1545   IMM Letter   IMM Letter date given: 05/29/24   IMM Letter time given: 1426   Observation Status Letter given to Patient/Family/Significant other/Guardian/POA/by: Francheska Mitchell RN     PT given the IMM letter, transport is set for 1730, pt ready to go to SNF and is waiving the  4 hour window.    Francheska Mitchell BSN RN  Case Management  [phone]

## 2024-05-29 NOTE — DISCHARGE INSTRUCTIONS
a week, increased swelling in our hands or feet or shortness of breath while lying flat in bed.  Please call your doctor as soon as you notice any of these symptoms; do not wait until your next office visit.    Patient armband removed and shredded   Thank you for enrolling in [employer]. Please follow the instructions below to securely access your online medical record. [employer] allows you to send messages to your doctor, view your test results, renew your prescriptions, schedule appointments, and more.     How Do I Sign Up?  In your Internet browser, go to [email or web address]  Click on the Sign Up Now link in the Sign In box. You will see the New Member Sign Up page.  Enter your [employer] Access Code exactly as it appears below. You will not need to use this code after you’ve completed the sign-up process. If you do not sign up before the expiration date, you must request a new code.  [employer] Access Code: [email or web address]  Expires: 7/8/2024  4:20 AM    Enter your Social Security Number ([id number]) and Date of Birth (mm/dd/yyyy) as indicated and click Submit. You will be taken to the next sign-up page.  Create a [employer] ID. This will be your [employer] login ID and cannot be changed, so think of one that is secure and easy to remember.  Create a [employer] password. You can change your password at any time.  Enter your Password Reset Question and Answer. This can be used at a later time if you forget your password.   Enter your e-mail address. You will receive e-mail notification when new information is available in [employer].  Click Sign Up. You can now view your medical record.     Additional Information  If you have questions, please contact your physician practice where you receive care. Remember, [employer] is NOT to be used for urgent needs. For medical emergencies, dial 911.   The discharge information has been reviewed with the {PATIENT PARENT GUARDIAN:[id number]}.  The {PATIENT PARENT GUARDIAN:[id number]}

## 2024-05-29 NOTE — PROGRESS NOTES
[facility]  [address] [place] [address]     [facility]  [phone]  [email or web address]         Impression/Plan:  1.  Chronic GI bleeding  History of multiple colon polyps (last colonoscopy 4 years ago)  Coronary artery disease  Iron deficiency anemia  Plan: Patient is a poor candidate for any diagnostic/therapeutic procedures I would recommend checking stool Cologuard.  If positive then I would proceed with pan colonoscopy      Chief Complaint:     Anemia  Subjective:    Patient is without complaints he denies any melena or hematochezia.  His most recent H&H remains low 7.3 and 23.8    Patient alert and oriented  Eyes: conjunctiva normal, EOM normal   Neck: ROM normal, supple and trachea normal   Cardiovascular: heart normal, intact distal pulses, normal rate and regular rhythm   Pulmonary/Chest Wall: breath sounds normal and effort normal   Abdominal: appearance normal, bowel sounds normal and soft, non-acute, non-tender                /69   Pulse 74   Temp 98.5 °F (36.9 °C) (Oral)   Resp 17   Ht 1.854 m (6' 1\")   Wt 98.9 kg (218 lb)   SpO2 98%   BMI 28.76 kg/m²         Intake/Output Summary (Last 24 hours) at 5/27/2024 1009  Last data filed at 5/27/2024 0537  Gross per 24 hour   Intake 700 ml   Output 600 ml   Net 100 ml       CBC w/Diff     No results found for: \"MONO\", \"BANDS\", \"BASOS\", \"METAS\", \"PRO\"   Basic Metabolic Profile   Recent Labs     05/25/24  0241 05/26/24  0134 05/27/24  0314    139 138   K 5.5 4.8 5.2    109 111   CO2 20* 26 23   * 107* 86*   GLUCOSE 153* 150* 80   MG 2.2 2.0  --    PHOS 6.3*  --   --         Hepatic Function    No results found for: \"TP\" No components found for: \"SGOT\", \"GPT\", \"DBILI\", \"TBIL\"                 Joaquin Zamora MD, MD  [facility]  [phone]  [email or web address]    
    [email or web address]  [phone]    Gastroenterology follow up-Progress note    Impression:  1. 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 Decision was to use endoclip as patient has implanted defibrillator.There were some erosions in the duodenal sweep, without stigmata of recent bleed.   -Colonoscopy 6/15/20 - 8 mm TA polyp in cecum, three 2-23 mm TA polyps in cecum, two 5 mm hyperplastic polyps descending, 2 mm TA polyp transverse, mild diverticulosis, grade 1 internal hemorrhoids. Further colonoscopy is not recommended for screening or surveillance purposed due to age/comorbidities   2. Acute on chronic renal failure  3. P-Afib - was on plavix at home , not currently on anticoagulation per chart   4. Multiple falls  5. CHF - digoxin held  6. DM  7. S/p AICD    Plan:  1.  No indication for endoscopic intervention as there is no overt GI bleeding.   --Patient is a poor candidate for any diagnostic/therapeutic procedures  2. Anemia likely from known gastric AVMs seen on prior EGD, will need iron replacement and close follow up on H/H and iron levels with repletion as needed   3. Monitor H/H and transfuse as per protocol  4. Replace iron as indicated  5. Medical management as per primary team  6. Will sign off-Thank you for this consultation and the opportunity to participate in the care of this patient. Please do not hesitate to call with any questions or concerns, or should event occur that may necessitate additional GI evaluation.      Chief Complaint: anemia      Subjective:  Resting comfortably, denies abdominal pain, nausea, vomiting, denies blood in stool    ROS: Denies any fevers, chills, rash.     Eyes: conjunctiva normal, EOM normal   Neck: ROM normal, supple and trachea normal   Cardiovascular: 
4 Eyes Skin Assessment     NAME:  Gumaro Khan  YOB: 1945  MEDICAL RECORD NUMBER:  [id number]    The patient is being assessed for  Shift Handoff    I agree that at least one RN has performed a thorough Head to Toe Skin Assessment on the patient. ALL assessment sites listed below have been assessed.      Areas assessed by both nurses:    Head, Face, Ears, Shoulders, Back, Chest, Arms, Elbows, Hands, Sacrum. Buttock, Coccyx, Ischium, and Legs. Feet and Heels        Does the Patient have a Wound? Yes wound(s) were present on assessment. LDA wound assessment was Initiated and completed by RN       Johann Prevention initiated by RN: Yes  Wound Care Orders initiated by RN: Yes    Pressure Injury (Stage 3,4, Unstageable, DTI, NWPT, and Complex wounds) if present, place Wound referral order by RN under : No    New Ostomies, if present place, Ostomy referral order under : No     Nurse 1 eSignature: Electronically signed by Angel Gonzalez RN on 5/25/24 at 7:39 AM EDT    **SHARE this note so that the co-signing nurse can place an eSignature**    Nurse 2 eSignature: {Esignature:[id number]}   
Advance Care Planning   Healthcare Decision Maker:    Today we documented Decision Maker(s) consistent with Legal Next of Kin hierarchy.       Kristi Khan Spouse    Home Phone: [phone]      Gumaro Khan Jr  Son    Primary Phone: [phone] (H)Home Phone: [phone]         conducted an initial consultation and Spiritual Assessment for Gumaro Khan, who is a 78 y.o.,male. Patient's Primary Language is: English.   According to the patient's EMR Restorationist Affiliation is: Holiness.     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    Malignant neoplasm of right lower extremity (HCC) 08/20/2018    AICD at end of battery life 05/07/2018    Type 2 diabetes mellitus with hypercholesterolemia ([facility]) 04/30/2018    UTI (urinary tract infection) 07/27/2016    NSTEMI (non-ST elevated myocardial infarction) ([facility]) 01/22/2016    Weight loss, abnormal 01/05/2016    Poor appetite 01/05/2016    Cancer associated pain 01/05/2016    Antineoplastic chemotherapy induced anemia 11/24/2015    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 
Completed telephone report with [facility] and rehab. Provided GABRIEL Evangelista at [facility] and Rehab opportunity to ask any questions. All questions answered to patient's satisfaction. Patient discharged to SNF, transport scheduled for 1730. Will remove IV and telebox.   
Comprehensive Nutrition Assessment      Type and Reason for Visit:  Initial, Positive Nutrition Screen    Nutrition Recommendations/Plan:   Diet as ordered - advancing as medically possible  Add Nepro with Carb Steady (each provides 425 kcal, 19g protein)  Once Daily to maximize calories/protein intake opportunity   Order Virt-cap  Continue to monitor tolerance of PO, compliance of oral supplements, weight, labs, and plan of care during admission.        Malnutrition Assessment:  Malnutrition Status:  Severe malnutrition (05/27/24 1333)    Context:  Chronic Illness     Findings of the 6 clinical characteristics of malnutrition:  Energy Intake:  Unable to assess  Weight Loss:  Greater than 7.5% over 3 months     Body Fat Loss:  Severe body fat loss Orbital, Triceps, Buccal region   Muscle Mass Loss:  Severe muscle mass loss Temples (temporalis)  Fluid Accumulation:  Unable to assess     Strength:  Normal  strength    Nutrition History and Allergies:   PMH of CKD 4, hypertension, chronic anemia due to renal dysfunction, multiple falls, paroxysmal atrial fibrillation, cachexia, s/p EGD w/ clip (1/25/24). UBW ~218-220# per pt. Pt is unsure if he has any wt loss PTA. Wt hx (per EMR): 89.8 kg (3/14/23), 83.5 kg (7/11/23), 95.3 kg (1/23/24). [facility]    Nutrition Assessment:    Gumaro Khan is a 78 y.o.admitted for multiple falls and acute on chronic renal failure with hyperkalemia. Visited pt for positive nutrition screen for wt loss. Noted -11% x 4 months compared current wt UBW. Current po intake fair- pt shares have an appettie but observed lunch tray at bedside barely touched. RD offered ONS to promote po intake and pt agreed - See nutrition recommendation/plan for details.     Nutrition Related Findings:    Meds: lispro, coreg, sertraline, glycolax, lispro, retacrit. Labs: eGFR 31, Bun/cre 41, poc Glu .   Wound Type: Multiple, Skin Tears (per avatar)        Edema: Right lower extremity    Current 
Consult Note    Patient: Gumaro Khan               Sex: male          DOA: 5/24/2024         YOB: 1945      Age:  78 y.o.        LOS:  LOS: 0 days              HPI:     Gumaro Khan is a 78 y.o. male who has been seen on consultation at the request of the hospitalist service.  Patient is being admitted through the emergency room after having history of recurrent falls at home for the last 2 days as well as having generalized weakness and not able to keep food by mouth.  The blood sugars on the high side.  Patient is very poor historian.  Patient has a history of hypertension type 2 diabetes mellitus possible atrial fibrillation systolic dysfunction as well as acute on chronic renal failure.   He was evaluated in January of this year found to have a chronic kidney disease with underlying diabetic nephropathy.  Also had ATN.  ,  By renal ultrasound during that time it showed increased chronic echogenicity consistent with CKD  Patient improved during last admission but remained in stage III to stage IV chronic kidney disease.  Patient was supposed to follow-up with me as an outpatient but did not make it.  Patient denies of taking any NSAIDs and takes Tylenol.  States for the last 2 days happens 3-4 times fall at home and was also feeling dizzy.  Also having left-sided hip pain.  Evaluation in the emergency room found that the patient has hyperkalemia.  CT of the head was negative patient hyperkalemia was treated with calcium gluconate D50 insulin on the Lokelma.  Repeat potassium is pending.  No urinary complaint.,  By reviewing the patient's med list patient was taking amitriptyline which is a known medicine that can cause orthostatic hypotension also on high-dose of allopurinol for possible history of gout and is also taking digoxin on a daily basis besides that on his medicine list he is also on spironolactone.  Has history of left ventricular dysfunction with ejection 
[facility] Hospitalist Group  Progress Note  Date:2024       Room:Formerly named [facility]  Patient Name:Gumaro Khan     YOB: 1945     Age:78 y.o.        Subjective    Subjective   Review of Systems    No acute events overnight.    Patient feels comfortable. He is sleepy. No other complaints.    -discussed with GI about possible Cologuard versus colonoscopy.  They will review patient chart and get back to me.  Will continue to monitor H&H.    Objective         Vitals Last 24 Hours:  TEMPERATURE:  Temp  Av.8 °F (36.6 °C)  Min: 97.4 °F (36.3 °C)  Max: 98.1 °F (36.7 °C)  RESPIRATIONS RANGE: Resp  Av.5  Min: 16  Max: 18  PULSE OXIMETRY RANGE: SpO2  Av %  Min: 99 %  Max: 99 %  PULSE RANGE: Pulse  Av.9  Min: 68  Max: 91  BLOOD PRESSURE RANGE: Systolic (24hrs), Av , Min:122 , Max:150     ; Diastolic (24hrs), Av, Min:68, Max:78      I/O (24Hr):    Intake/Output Summary (Last 24 hours) at 2024 1250  Last data filed at 2024 1224  Gross per 24 hour   Intake --   Output 300 ml   Net -300 ml       Objective:  General Appearance:  Comfortable.    Vital signs: (most recent): Blood pressure 137/78, pulse 68, temperature 97.5 °F (36.4 °C), temperature source Oral, resp. rate 18, height 1.854 m (6' 1\"), weight 98.9 kg (218 lb), SpO2 99 %.    Output: Producing urine.    HEENT: Normal HEENT exam.    Lungs:  Normal effort and normal respiratory rate.  Breath sounds clear to auscultation.    Heart: Normal rate.  Regular rhythm.    Abdomen: Abdomen is soft and flat.  Bowel sounds are normal.   There is no abdominal tenderness.     Extremities: Normal range of motion.    Pulses: Distal pulses are intact.    Neurological: Patient is alert and oriented to person, place and time.    Skin:  Warm and dry.          Labs/Imaging/Diagnostics    Labs:  CBC:  Recent Labs     24  1400 24  0314 24  0100   WBC 11.3 10.6 11.2   RBC 2.58* 2.76* 2.62*   HGB 
[facility] Hospitalist Group  Progress Note  Date:2024       Room:[facility]  Patient Name:Gumaro Khan     YOB: 1945     Age:78 y.o.        Subjective    Subjective   Review of Systems    No acute events overnight.    Patient feels comfortable. He is sleepy. No other complaints.    Objective         Vitals Last 24 Hours:  TEMPERATURE:  Temp  Av.1 °F (36.7 °C)  Min: 97.1 °F (36.2 °C)  Max: 99.3 °F (37.4 °C)  RESPIRATIONS RANGE: Resp  Av.6  Min: 18  Max: 20  PULSE OXIMETRY RANGE: SpO2  Av.5 %  Min: 98 %  Max: 100 %  PULSE RANGE: Pulse  Av.9  Min: 71  Max: 75  BLOOD PRESSURE RANGE: Systolic (24hrs), Av , Min:109 , Max:136     ; Diastolic (24hrs), Av, Min:54, Max:68      I/O (24Hr):    Intake/Output Summary (Last 24 hours) at 2024 0903  Last data filed at 2024 0537  Gross per 24 hour   Intake 1000 ml   Output 600 ml   Net 400 ml       Objective:  General Appearance:  Comfortable.    Vital signs: (most recent): Blood pressure 134/68, pulse 73, temperature 99 °F (37.2 °C), temperature source Oral, resp. rate 18, height 1.854 m (6' 1\"), weight 98.9 kg (218 lb), SpO2 100 %.    Output: Producing urine.    HEENT: Normal HEENT exam.    Lungs:  Normal effort and normal respiratory rate.  Breath sounds clear to auscultation.    Heart: Normal rate.  Regular rhythm.    Abdomen: Abdomen is soft and flat.  Bowel sounds are normal.   There is no abdominal tenderness.     Extremities: Normal range of motion.    Pulses: Distal pulses are intact.    Neurological: Patient is alert and oriented to person, place and time.    Skin:  Warm and dry.          Labs/Imaging/Diagnostics    Labs:  CBC:  Recent Labs     24  0134 24  1400 24  0314   WBC 12.9 11.3 10.6   RBC 2.74* 2.58* 2.76*   HGB 7.1* 6.7* 7.3*   HCT 23.1* 21.7* 23.8*   MCV 84.3 84.1 86.2   RDW 19.3* 19.3* 19.2*    262 255       CHEMISTRIES:  Recent Labs     
IDR note    Patient admitted for hyperkalemia-resolved, nephrology on board.  Patient noted to be anemic, GI consulted, recommended conservative management.  Anticipate discharge to skilled nursing facility, case management on board for discharge planning.    
[facility] Renal Dosing Services      Previous Regimen Tramadol 50mg q6h prn   Serum Creatinine No results found for: \"DK\", \"CREAPOC\"   Creatinine Clearance Estimated Creatinine Clearance: 23 mL/min (A) (based on SCr of 3.33 mg/dL (H)).   BUN Lab Results   Component Value Date/Time     05/25/2024 02:41 AM           The following medication: tramadol was automatically dose-adjusted per [facility] P&T Committee Protocol, with respect to renal function.      Dosage changed to:  50mg q12h prn    Additional notes:    Pharmacy to continue to monitor patient daily.   Will make dosage adjustments based upon changing renal function.  Signed PEGGY SOSA RPH.     
Paged regarding 15 beat NSVT.  Potassium borderline high if anything.  Ordered a magnesium.  Patient on beta-blocker already, will continue to monitor.  
Patient to be discharged to skilled nursing facility today however I am waiting on his CBC which was ordered at 1230 and still has not been completed.  
Problem List :    SHAHNAZ   CKD 3/4  HTN  Anemia  Afib  Vomiting  CAD  History of DVT  MGUS  Acute on chronic anemia  Falls  CHF   AICD  DM    Assessment and Plan :    1) SHAHNAZ    Creatinine improving   Down to 1.53     2) Anemia   GI seen   Will likely need iron and retacrit if not improving spontaneously    3) CHF   Bumex held on admission    Restart with 50% reduction   Follow up in 1 week with Dr. Talavera   Labs next week    4) Hydration    Discussed with patient    Lives with wife and he reports she is in resonable health     5) Severe atherosclerosis seen on CT scan    Close follow up with Nephrology and cardiology as outpaitnet      Imaging reviewed  Discussed with Dr. Kirkland     Subjective :     No new complaints   No chest pain or SOB   No nausea vomiting or diarrhea  No fever or chills    Objective :     BP (!) 116/54   Pulse 70   Temp 97.2 °F (36.2 °C) (Oral)   Resp 18   Ht 1.854 m (6' 1\")   Wt 98.9 kg (218 lb)   SpO2 100%   BMI 28.76 kg/m²       General : No apparent distress.   HEENT : Normocephalic and atraumatic  Lungs : Clear to auscultation bilaterally  Cardiovascular : Regular rate and rhythm, no rubs or gallops  Abdomen : Soft , nontender, nondistended with positive bowel sounds.  Extremities : No lower extremity edema    Laboratory and imaging data:     Pertinent laboratory testing and imaging data reviewed    Checklist    Code status :  Full Code      Diet :    ADULT DIET; Full Liquid; Low Potassium (Less than 3000 mg/day)  ADULT ORAL NUTRITION SUPPLEMENT; Lunch; Renal Oral Supplement    Treatment Team :  Treatment Team: Attending Provider: Alexander Kirkland MD; Consulting Physician: Evens Talavera MD; Registered Nurse: Sabrina Huang RN; : Francheska Mitchell; : Nelsy Fields, BLAZE; Physical Therapist: Rianna Salinas PT    Medications :   Current Medications  :     Current Facility-Administered Medications   Medication Dose Route Frequency Provider Last Rate Last Admin    
Progress Note    Gumaro Yaos  78 y.o.      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)    Mass of right thigh    Weight loss, abnormal    NSTEMI (non-ST elevated myocardial infarction) (HCC)    MR (mitral regurgitation)    Nonischemic cardiomyopathy (HCC)    Type 2 diabetes mellitus with hypercholesterolemia (HCC)    Anxiety disorder    Presence of implantable cardioverter-defibrillator (ICD)    UTI (urinary tract infection)    Poor appetite    Iron deficiency anemia    Atrial fibrillation (HCC)    Follow-up examination    MGUS (monoclonal gammopathy of unknown significance)    Long term current use of anticoagulant therapy    Antineoplastic chemotherapy induced anemia    Cancer associated pain    Pulmonary hypertension, moderate to severe (HCC)    Chronic gout with tophus    Hypercholesterolemia    Leukocytosis    Malignant neoplasm of right lower extremity (HCC)    AICD at end of battery life    Hypertension associated with diabetes (HCC)    GI bleed    Shock (HCC)    Renal failure (ARF), acute on chronic (HCC)    Acute decompensated heart failure (HCC)    Hyperglycemia    Supratherapeutic INR    General weakness    ATN (acute tubular necrosis) (HCC)    Systolic dysfunction    Hyperkalemia    Anemia    Recurrent falls    Arrhythmia    Hypercalcemia           Subjective:     Patient remains on bed.  Tolerating IV fluid.  Responds to questions.  Stools positive for Hemoccult.  24 hours urine for light chain disease workup collected and reassured by the caring nurse.   Started Renvela for hyperphosphatemia.  Updated GI note reviewed      A comprehensive review of systems was negative except for that written in the History of Present Illness.    Objective:     /78   Pulse 78   Temp 97.5 °F (36.4 °C) (Oral)   Resp 18   Ht 1.854 m (6' 1\")   Wt 98.9 kg (218 lb)   SpO2 99%   BMI 28.76 kg/m²     No intake or output data in the 24 hours 
  HGB 6.6* 6.4*   HCT 21.2* 20.5*   MCV 81.2 81.3   RDW 19.9* 20.1*    328     CHEMISTRIES:  Recent Labs     05/24/24  0402 05/24/24  1214 05/25/24  0241   * 133* 137   K 6.3* 6.0* 5.5   CL 98* 102 106   CO2 21 23 20*   * 136* 122*   CREATININE 4.44* 3.82* 3.33*   GLUCOSE 248* 224* 153*   PHOS  --   --  6.3*   MG  --   --  2.2     PT/INR:No results for input(s): \"PROTIME\", \"INR\" in the last 72 hours.  APTT:No results for input(s): \"APTT\" in the last 72 hours.  LIVER PROFILE:  Recent Labs     05/25/24  0241   AST 15   ALT 14*   BILITOT 1.1*   ALKPHOS 550*       Imaging:  XR CHEST PORTABLE    Result Date: 5/25/2024  1. There is mild prominence of the pulmonary vasculature which may be due to low lung volumes and elevation of the right hemidiaphragm. Otherwise no evidence of acute cardiopulmonary process.    US RETROPERITONEAL COMPLETE    Result Date: 5/24/2024  1.  Unremarkable ultrasound of the kidneys. 2.  No hydronephrosis.     XR KNEE LEFT (3 VIEWS)    Result Date: 5/24/2024  1.  No acute fracture or subluxation. 2.  Moderate joint effusion. 3.  Tricompartmental osteoarthrosis, most pronounced in the medial compartment. 4.  Distal femur posterior aspect with a small osteochondroma.    XR PELVIS (1-2 VIEWS)    Result Date: 5/24/2024  No acute fracture or subluxation.    XR FEMUR LEFT (MIN 2 VIEWS)    Result Date: 5/24/2024  No acute fracture or dislocation. Severe vascular calcifications.     CT Head W/O Contrast    Result Date: 5/24/2024  1.No acute intracranial abnormality. Mild atrophy. Thank you for enabling us to participate in the care of this patient.      Current Medications:  Current Facility-Administered Medications: [Held by provider] digoxin (LANOXIN) tablet 125 mcg, 125 mcg, Oral, Daily  0.9 % sodium chloride infusion, , IntraVENous, PRN  pantoprazole (PROTONIX) 40 mg in sodium chloride (PF) 0.9 % 10 mL injection, 40 mg, IntraVENous, Q12H  traMADol (ULTRAM) tablet 50 mg, 50 mg, 
arrhythmias and no more hyperkalemia since stopping digoxin  .       A comprehensive review of systems was negative except for that written in the History of Present Illness.    Objective:     BP (!) 109/58   Pulse 75   Temp 97.9 °F (36.6 °C) (Oral)   Resp 20   Ht 1.854 m (6' 1\")   Wt 98.9 kg (218 lb)   SpO2 100%   BMI 28.76 kg/m²       Intake/Output Summary (Last 24 hours) at 5/26/2024 1334  Last data filed at 5/26/2024 1257  Gross per 24 hour   Intake 1035.75 ml   Output --   Net 1035.75 ml       Current Facility-Administered Medications   Medication Dose Route Frequency Provider Last Rate Last Admin    epoetin emily-epbx (RETACRIT) injection 10,000 Units  10,000 Units SubCUTAneous Weekly Evens Talavera MD        [Held by provider] digoxin (LANOXIN) tablet 125 mcg  125 mcg Oral Daily Eric, Omoyemi Ajoke, DO   125 mcg at 05/25/24 1225    0.9 % sodium chloride infusion   IntraVENous PRN Eric, Omchristemcayden Hilariooke, DO        pantoprazole (PROTONIX) 40 mg in sodium chloride (PF) 0.9 % 10 mL injection  40 mg IntraVENous Q12H Eric, Omoyemi Ajoke, DO   40 mg at 05/26/24 0314    traMADol (ULTRAM) tablet 50 mg  50 mg Oral Q12H PRN Eric, Omoyemcayden Hilariooke, DO   50 mg at 05/25/24 1753    cloNIDine (CATAPRES) tablet 0.2 mg  0.2 mg Oral TID Eric, Omkeegan Hilariooke, DO   0.2 mg at 05/26/24 0941    carvedilol (COREG) tablet 25 mg  25 mg Oral BID WC Eric, Omoyemi Ajoke, DO   25 mg at 05/26/24 0941    glucose chewable tablet 16 g  4 tablet Oral PRN Derick Arzola Jr., DO        dextrose bolus 10% 125 mL  125 mL IntraVENous PRN Derick Arzola Jr., DO        Or    dextrose bolus 10% 250 mL  250 mL IntraVENous PRN Derick Arzola Jr., DO        glucagon injection 1 mg  1 mg SubCUTAneous PRN Derick Arzola Jr., DO        acetaminophen (TYLENOL) tablet 650 mg  650 mg Oral Q6H PRN Aline Reyes DO   650 mg at 05/24/24 1639    [Held by provider] allopurinol (ZYLOPRIM) tablet 300 mg  300 mg Oral Daily Aline Reyes DO 
Oral Nightly Eric, Omoyemi Ajoke, DO        atorvastatin (LIPITOR) tablet 10 mg  10 mg Oral Nightly Eric, Arminemi Ajoke, DO   10 mg at 05/26/24 2130    sertraline (ZOLOFT) tablet 25 mg  25 mg Oral Daily Eric, Omchristemi Ajoke, DO   25 mg at 05/27/24 0813    insulin lispro (HUMALOG,ADMELOG) injection vial 0-4 Units  0-4 Units SubCUTAneous TID WC Eric, Arminemi Rajat, DO   1 Units at 05/26/24 1621    insulin lispro (HUMALOG,ADMELOG) injection vial 0-4 Units  0-4 Units SubCUTAneous Nightly Eric, Omchristemi Ajneelam, DO        0.9 % sodium chloride infusion   IntraVENous Continuous Evens Talavera MD 75 mL/hr at 05/27/24 0539 New Bag at 05/27/24 0539    ondansetron (ZOFRAN) injection 4 mg  4 mg IntraVENous Q6H PRN Eric, Aline Earl, DO   4 mg at 05/24/24 1621        Physical Exam:     Physical Exam:   General:  Alert, cooperative, no distress, appears stated age.   Eyes:  Conjunctivae/corneas clear. PERRL, EOMs intact.    Mouth/Throat: Lips, mucosa, and tongue normal. Teeth and gums normal.   Neck: Supple, symmetrical, trachea midline, no adenopathy, thyroid: no enlargement/tenderness/nodules, no carotid bruit and no JVD.   Lungs:   Clear to auscultation bilaterally.   Heart:  Regular rate and rhythm, S1, S2 normal, no murmur, click, rub or gallop.   Abdomen:   Soft, non-tender. Bowel sounds normal. No masses,  No organomegaly.   Extremities: Extremities normal, atraumatic, no cyanosis or edema.   Pulses: 2+ and symmetric all extremities.   Skin: Skin color, texture, turgor normal. No rashes or lesions     Data Review:    Labs: Results:   Chemistry Recent Labs     05/25/24  0241 05/26/24  0134 05/27/24  0314   GLUCOSE 153* 150* 80    139 138   K 5.5 4.8 5.2    109 111   CO2 20* 26 23   * 107* 86*   CREATININE 3.33* 2.64* 2.12*   GLOB 6.1*  --   --    ALT 14*  --   --    AST 15  --   --       CBC w/Diff Recent Labs     05/26/24  0134 05/26/24  1400 05/27/24  0314   WBC 12.9 11.3 10.6   RBC 2.74* 2.58* 
interval not displayed.        Hepatic Function    No results found for: \"TP\" No components found for: \"SGOT\", \"GPT\", \"DBILI\", \"TBIL\"       Coags   No results for input(s): \"INR\", \"APTT\" in the last 72 hours.    Invalid input(s): \"PTP\"            HORACE Castañeda - MARKUS    [facility].  Www[email or web address]  Phone: [phone]  Pager: [phone]      
lispro (HUMALOG,ADMELOG) injection vial 0-4 Units  0-4 Units SubCUTAneous Nightly Eric, Aline Earl, DO        0.9 % sodium chloride infusion   IntraVENous Continuous Evens Talavera MD 75 mL/hr at 05/25/24 0559 New Bag at 05/25/24 0559    ondansetron (ZOFRAN) injection 4 mg  4 mg IntraVENous Q6H PRN Eric, Aline Earl, DO   4 mg at 05/24/24 1621    traMADol (ULTRAM) tablet 50 mg  50 mg Oral Q6H PRN Eric, Aline Earl, DO            Physical Exam:     Physical Exam:   General:  Alert, cooperative, no distress, appears stated age.   Eyes:  Conjunctivae/corneas clear. PERRL, EOMs intact.    Mouth/Throat: Lips, mucosa, and tongue normal. Teeth and gums normal.   Neck: Supple, symmetrical, trachea midline, no adenopathy, thyroid: no enlargement/tenderness/nodules, no carotid bruit and no JVD.   Lungs:   Clear to auscultation bilaterally.   Heart:  Regular rate and rhythm, S1, S2 normal, no murmur, click, rub or gallop.   Abdomen:   Soft, non-tender. Bowel sounds normal. No masses,  No organomegaly.   Extremities: Extremities normal, atraumatic, no cyanosis or edema,   Pulses: 2+ and symmetric all extremities.   Skin: Skin color, texture, turgor normal. No rashes or lesions   Lymph nodes: Cervical, supraclavicular, and axillary nodes normal.   Neurologic: CNII-XII intact. Normal strength, sensation and reflexes throughout.         Data Review:    Labs: Results:   Chemistry Recent Labs     05/24/24  0402 05/24/24  1214 05/25/24  0241   GLUCOSE 248* 224* 153*   * 133* 137   K 6.3* 6.0* 5.5   CL 98* 102 106   CO2 21 23 20*   * 136* 122*   CREATININE 4.44* 3.82* 3.33*   GLOB  --   --  6.1*   ALT  --   --  14*   AST  --   --  15      CBC w/Diff Recent Labs     05/24/24  0402 05/25/24  0241   WBC 17.4* 14.5*   RBC 2.61* 2.52*   HGB 6.6* 6.4*   HCT 21.2* 20.5*    328        His iron chemistry was transferred elsewhere with consistent with iron deficiency        Imaging:     Procedures/imaging: see 
H/o of bleeding angioectasia in 1/2024  # Dehydration due to vomiting  # Severe emaciation - Severe protein calorie malnutrition   # Left Hip Pain. Xray shows no evidence of fracture  # h/o Multiple falls - CT head no acute lesions   # DM2 Uncontrolled high blood glucose   # Afib - paroxysmal - on PLAVIX , not on anticoagulation per charting   ).     Plan:   Consults: gastroenterology (nephrology).  Full liquid diet.  Administer medications as ordered and give fluids.   (    - Avoid medications that can contribute to hyperkalemia  - Nephrology following  - IV fluids  - Transfuse 1 unit PRBC on 5/25 and 5/26  - Consulted GI for anemia  - Protonix 40 milligrams IV twice daily  - Pending read of CT A/P  - Disposition: Stable for discharge to SNF on 5/28  ).       Case discussed with:  [x]Patient  [x]Family  [x]Nursing  []Case Management  DVT Prophylaxis:  []Lovenox  []Hep SQ  [x]SCDs  []Coumadin   []Heparin gtt   []Xarelto   []Eliquis    Aline Reyes DO, MBA, MS  [facility]  Hospitalist

## 2024-05-29 NOTE — CARE COORDINATION
Transition of Care Plan to SNF/Rehab      Patient Name: Gumaro Khan                   YOB: 1945    SNF/Rehab Transition:  Patient has been accepted to [facility] SNF/Rehab and meets criteria for admission.   [facility] and expected to [facility] at 1730.    Medicaid Long-term Services and Supports(LTSS) screening completion: No    Three Inpatient Midnights for Medicare: Yes    Current Code Status:   Code Status: Full Code     Last Weight:   Wt Readings from Last 1 Encounters:   05/24/24 98.9 kg (218 lb)       Weightbearing Status: n/a    IV Medication, IV Site, Device Type: No    Dialysis: No      O2 Needs (including O2, Bipap, Cpap, ect.): No    Covid Vaccine Dates/ if known:    Internal Administration   First Dose      Second Dose           Last COVID Lab SARS-CoV-2, PCR ( )   Date Value   01/27/2024 Not detected     POC Glucose (mg/dL)   Date Value   05/29/2024 138 (H)     INR,(POC) (no units)   Date Value   08/16/2023 1.7     POC Occult Blood, Fecal ( )   Date Value   01/24/2024 Positive (A)     Rapid Influenza A By PCR ( )   Date Value   01/24/2024 Not detected     Rapid Influenza B By PCR ( )   Date Value   01/24/2024 Not detected            Current Diet: ADULT DIET; Full Liquid; Low Potassium (Less than 3000 mg/day)  ADULT ORAL NUTRITION SUPPLEMENT; Lunch; Renal Oral Supplement    Wound Vac or Other Equipment Needs: n/a    Patient requires Isolation: No    Follow-up Appointment needed or scheduled: No    Communication to SNF/Rehab:  Bedside RNSabrina, has been notified to update the transition plan to the facility and call report (phone number).  Discharge information has been updated on the AVS and communicated to facility via CarePort.    Nursing Please include all hard scripts for controlled substances, med rec and dc summary, and AVS in packet.     Francheska Mitchell  Case Management Department  [phone]

## 2024-05-31 LAB
ALBUMIN 24H MFR UR ELPH: 33.2 %
ALPHA1 GLOB 24H MFR UR ELPH: 4.8 %
ALPHA2 GLOB 24H MFR UR ELPH: 19.8 %
B-GLOBULIN MFR UR ELPH: 19 %
COLLECT DURATION TIME UR: 24 HR
GAMMA GLOB 24H MFR UR ELPH: 23.2 %
INTERPRETATION UR IFE-IMP: ABNORMAL
Lab: ABNORMAL
M PROTEIN 24H MFR UR ELPH: ABNORMAL %
PROT 24H UR-MRATE: 699 MG/24 HR (ref 30–150)
PROT UR-MCNC: 36.8 MG/DL
SPECIMEN VOL ?TM UR: 1900 ML

## 2024-06-04 LAB
ALBUMIN SERPL ELPH-MCNC: 2.4 G/DL (ref 2.9–4.4)
ALBUMIN/GLOB SERPL: 0.6 (ref 0.7–1.7)
ALPHA1 GLOB SERPL ELPH-MCNC: 0.5 G/DL (ref 0–0.4)
ALPHA2 GLOB SERPL ELPH-MCNC: 1.4 G/DL (ref 0.4–1)
B-GLOBULIN SERPL ELPH-MCNC: 1 G/DL (ref 0.7–1.3)
GAMMA GLOB SERPL ELPH-MCNC: 1.6 G/DL (ref 0.4–1.8)
GLOBULIN SER-MCNC: 4.5 G/DL (ref 2.2–3.9)
IGA SERPL-MCNC: 272 MG/DL (ref 61–437)
IGG SERPL-MCNC: 1719 MG/DL (ref 603–1613)
IGM SERPL-MCNC: 195 MG/DL (ref 15–143)
INTERPRETATION SERPL IEP-IMP: ABNORMAL
KAPPA LC FREE SER-MCNC: 115.5 MG/L (ref 3.3–19.4)
KAPPA LC FREE/LAMBDA FREE SER: 1.43 (ref 0.26–1.65)
LAMBDA LC FREE SERPL-MCNC: 80.6 MG/L (ref 5.7–26.3)
M PROTEIN SERPL ELPH-MCNC: ABNORMAL G/DL
PROT SERPL-MCNC: 6.9 G/DL (ref 6–8.5)

## 2024-06-06 ENCOUNTER — APPOINTMENT (OUTPATIENT)
Facility: HOSPITAL | Age: 79
End: 2024-06-06
Payer: MEDICARE

## 2024-06-06 ENCOUNTER — HOSPITAL ENCOUNTER (EMERGENCY)
Facility: HOSPITAL | Age: 79
Discharge: NURSING, REHAB OR LONG-TERM CARE | End: 2024-06-06
Attending: EMERGENCY MEDICINE
Payer: MEDICARE

## 2024-06-06 VITALS
WEIGHT: 180.1 LBS | HEART RATE: 75 BPM | BODY MASS INDEX: 23.87 KG/M2 | TEMPERATURE: 98.3 F | HEIGHT: 73 IN | OXYGEN SATURATION: 98 % | SYSTOLIC BLOOD PRESSURE: 124 MMHG | RESPIRATION RATE: 18 BRPM | DIASTOLIC BLOOD PRESSURE: 68 MMHG

## 2024-06-06 DIAGNOSIS — E16.0 HYPOGLYCEMIA DUE TO INSULIN: Primary | ICD-10-CM

## 2024-06-06 DIAGNOSIS — N18.32 STAGE 3B CHRONIC KIDNEY DISEASE (HCC): ICD-10-CM

## 2024-06-06 DIAGNOSIS — I48.20 CHRONIC ATRIAL FIBRILLATION (HCC): ICD-10-CM

## 2024-06-06 DIAGNOSIS — T38.3X5A HYPOGLYCEMIA DUE TO INSULIN: Primary | ICD-10-CM

## 2024-06-06 LAB
ALBUMIN SERPL-MCNC: 2.9 G/DL (ref 3.4–5)
ALBUMIN/GLOB SERPL: 0.5 (ref 0.8–1.7)
ALP SERPL-CCNC: 723 U/L (ref 45–117)
ALT SERPL-CCNC: 16 U/L (ref 16–61)
ANION GAP SERPL CALC-SCNC: 6 MMOL/L (ref 3–18)
APPEARANCE UR: CLEAR
AST SERPL-CCNC: 25 U/L (ref 10–38)
BACTERIA URNS QL MICRO: ABNORMAL /HPF
BASOPHILS # BLD: 0 K/UL (ref 0–0.1)
BASOPHILS NFR BLD: 0 % (ref 0–2)
BILIRUB SERPL-MCNC: 1.4 MG/DL (ref 0.2–1)
BILIRUB UR QL: NEGATIVE
BUN SERPL-MCNC: 43 MG/DL (ref 7–18)
BUN/CREAT SERPL: 26 (ref 12–20)
CALCIUM SERPL-MCNC: 9.4 MG/DL (ref 8.5–10.1)
CHLORIDE SERPL-SCNC: 104 MMOL/L (ref 100–111)
CO2 SERPL-SCNC: 26 MMOL/L (ref 21–32)
COLOR UR: YELLOW
CREAT SERPL-MCNC: 1.63 MG/DL (ref 0.6–1.3)
DIFFERENTIAL METHOD BLD: ABNORMAL
EOSINOPHIL # BLD: 0.2 K/UL (ref 0–0.4)
EOSINOPHIL NFR BLD: 2 % (ref 0–5)
EPITH CASTS URNS QL MICRO: ABNORMAL /LPF (ref 0–5)
ERYTHROCYTE [DISTWIDTH] IN BLOOD BY AUTOMATED COUNT: 19.9 % (ref 11.6–14.5)
GLOBULIN SER CALC-MCNC: 5.3 G/DL (ref 2–4)
GLUCOSE BLD STRIP.AUTO-MCNC: 104 MG/DL (ref 70–110)
GLUCOSE BLD STRIP.AUTO-MCNC: 82 MG/DL (ref 70–110)
GLUCOSE SERPL-MCNC: 112 MG/DL (ref 74–99)
GLUCOSE UR STRIP.AUTO-MCNC: NEGATIVE MG/DL
HCT VFR BLD AUTO: 26.6 % (ref 36–48)
HGB BLD-MCNC: 8.2 G/DL (ref 13–16)
HGB UR QL STRIP: NEGATIVE
HYALINE CASTS URNS QL MICRO: ABNORMAL /LPF (ref 0–2)
IMM GRANULOCYTES # BLD AUTO: 0 K/UL (ref 0–0.04)
IMM GRANULOCYTES NFR BLD AUTO: 0 % (ref 0–0.5)
KETONES UR QL STRIP.AUTO: NEGATIVE MG/DL
LACTATE SERPL-SCNC: 1.5 MMOL/L (ref 0.4–2)
LEUKOCYTE ESTERASE UR QL STRIP.AUTO: ABNORMAL
LYMPHOCYTES # BLD: 1.4 K/UL (ref 0.9–3.6)
LYMPHOCYTES NFR BLD: 18 % (ref 21–52)
MCH RBC QN AUTO: 26.8 PG (ref 24–34)
MCHC RBC AUTO-ENTMCNC: 30.8 G/DL (ref 31–37)
MCV RBC AUTO: 86.9 FL (ref 78–100)
MONOCYTES # BLD: 0.7 K/UL (ref 0.05–1.2)
MONOCYTES NFR BLD: 9 % (ref 3–10)
NEUTS SEG # BLD: 5.8 K/UL (ref 1.8–8)
NEUTS SEG NFR BLD: 71 % (ref 40–73)
NITRITE UR QL STRIP.AUTO: NEGATIVE
NRBC # BLD: 0 K/UL (ref 0–0.01)
NRBC BLD-RTO: 0 PER 100 WBC
PH UR STRIP: 5.5 (ref 5–8)
PLATELET # BLD AUTO: 300 K/UL (ref 135–420)
PMV BLD AUTO: 9.1 FL (ref 9.2–11.8)
POTASSIUM SERPL-SCNC: 4.8 MMOL/L (ref 3.5–5.5)
PROT SERPL-MCNC: 8.2 G/DL (ref 6.4–8.2)
PROT UR STRIP-MCNC: NEGATIVE MG/DL
RBC # BLD AUTO: 3.06 M/UL (ref 4.35–5.65)
RBC #/AREA URNS HPF: ABNORMAL /HPF (ref 0–5)
SODIUM SERPL-SCNC: 136 MMOL/L (ref 136–145)
SP GR UR REFRACTOMETRY: 1.01 (ref 1–1.03)
TROPONIN I SERPL HS-MCNC: 42 NG/L (ref 0–78)
UROBILINOGEN UR QL STRIP.AUTO: 1 EU/DL (ref 0.2–1)
WBC # BLD AUTO: 8.2 K/UL (ref 4.6–13.2)
WBC URNS QL MICRO: ABNORMAL /HPF (ref 0–4)

## 2024-06-06 PROCEDURE — 83605 ASSAY OF LACTIC ACID: CPT

## 2024-06-06 PROCEDURE — 82962 GLUCOSE BLOOD TEST: CPT

## 2024-06-06 PROCEDURE — 81001 URINALYSIS AUTO W/SCOPE: CPT

## 2024-06-06 PROCEDURE — 94761 N-INVAS EAR/PLS OXIMETRY MLT: CPT

## 2024-06-06 PROCEDURE — 85025 COMPLETE CBC W/AUTO DIFF WBC: CPT

## 2024-06-06 PROCEDURE — 99284 EMERGENCY DEPT VISIT MOD MDM: CPT

## 2024-06-06 PROCEDURE — 84484 ASSAY OF TROPONIN QUANT: CPT

## 2024-06-06 PROCEDURE — 80053 COMPREHEN METABOLIC PANEL: CPT

## 2024-06-06 PROCEDURE — 93005 ELECTROCARDIOGRAM TRACING: CPT | Performed by: EMERGENCY MEDICINE

## 2024-06-06 ASSESSMENT — PAIN SCALES - GENERAL: PAINLEVEL_OUTOF10: 0

## 2024-06-06 ASSESSMENT — PAIN - FUNCTIONAL ASSESSMENT: PAIN_FUNCTIONAL_ASSESSMENT: 0-10

## 2024-06-06 NOTE — ED PROVIDER NOTES
[facility] EMERGENCY DEPT  EMERGENCY DEPARTMENT ENCOUNTER    Patient Name: Gumaro Khan  MRN: [id number]  YOB: 1945  Provider: Wade Menjivar DO  PCP: Artis Shell MD   Time/Date of evaluation: 11:27 AM EDT on 6/6/24    History of Presenting Illness     History Provided by: Patient  History is limited by: Nothing     HISTORY:   Gumaro Khan is a 78 y.o. male brought to the ED by EMS from [facility] with a chief complaint of altered mental status.  EMS technician reports that the facility staff stated that the patient became altered at approximately 8 AM.  He was noted to have a Accu-Chek of 42.  The staff members at the rehab center gave 1 mg of glucagon which improved the patient's serum glucose to 80.  EMS technician states that when they arrived the patient's blood sugar decreased to 46.  He was treated with D10 infusion 125 mL by EMS prior to arrival.  Serum glucose improved to 118.  Patient states that he lives at home with his wife.  He is oriented to person and place.  There is no documentation of dementia.  Nursing Notes were all reviewed and agreed with or any disagreements were addressed in the HPI.    Past History     PAST MEDICAL HISTORY:  Past Medical History:   Diagnosis Date    Abnormal myocardial perfusion study 11/02/2015    At most mild LVE.  EF 45-46%.      Abnormal nuclear cardiac imaging test 02/03/2009    Mild basal/mid inferior, inferoapical, inferoseptal infarct w/mild ischemia in RCA (possibly partially artifact).  Anterior, anteroseptal, anterapical ischemia w/o infarct.  (Mid & distal LAD.)  LVE.  EF 29%.  Mod global hyk.      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 My clinical assessment and recommendations were discussed. They agree with the plan of discharge. Return precautions were discussed. All questions were answered and they are in agreement with plan.    7:22 PM Upon re-evaluation the patient's symptoms have improved. Pt has non-toxic appearance and condition is stable for discharge. He was informed of his results, instructed to f/u with his PCP and return to the ED upon worsening of symptoms. All questions and concerns were addressed.      RECORDS REVIEWED: Nursing Notes, Old Medical Records, ED Notes from Outside Facility (Separate Group), Previous EKGs, Ambulance Run Sheet, Previous Radiology Studies, and Previous Laboratory Studies    Is this patient to be included in the SEP-1 core measure? No Exclusion criteria - the patient is NOT to be included for SEP-1 Core Measure due to: Infection is not suspected    MEDICATIONS ADMINISTERED IN THE ED:  Medications - No data to display         None    Critical Care: None    Diagnosis and Disposition   Diagnosis:   1. Hypoglycemia due to insulin    2. Chronic atrial fibrillation (HCC)    3. Stage 3b chronic kidney disease (HCC)          Disposition:    DISPOSITION Decision To Discharge 06/06/2024 02:06:33 PM    Skilled Nursing Facility     @[email or web address]@      Dragon Disclaimer     Please note that this dictation was completed with [employer], the computer voice recognition software. Quite often unanticipated grammatical, syntax, homophones, and other interpretive errors are inadvertently transcribed by the computer software. Please disregard  98

## 2024-06-06 NOTE — ED NOTES
This nurse called [facility] and rehab to inquire on when and how much insulin pt was given. Spoke with Melissa who reports they did not give Humalog this morning and last dose of insulin \"should have been last night.\" Per Melissa.

## 2024-06-06 NOTE — ED NOTES
Report was taken at bedside from Caren THAKUR for assumption of care. Pt is currently in bed, resting, ANOx4, on room air and in no voiced or noticeable acute distress. Pt has his spouse at bedside.

## 2024-06-06 NOTE — ED NOTES
Bedside and Verbal shift change report given to Cyn (oncoming nurse) by Kerry (offgoing nurse). Report included the following information ED Encounter Summary, ED SBAR, and MAR.

## 2024-06-06 NOTE — ED TRIAGE NOTES
Pt arrives via EMS from From [facility] rehab, pt was noted to be altered at about 8am by staff, Initial BS-42, Rehab staff gave 1mg of glucagon and BS came up to 80. When EMS arrived pts BS was back down to 46, EMS gave 125ml of D50 drip and BS -118 . Pt does have an  automatic implanted defibrillator. Pt has a dexacom port on left arm that he reports is only monitoring BS levels and does not admin insulin.

## 2024-06-07 LAB
EKG ATRIAL RATE: 258 BPM
EKG DIAGNOSIS: NORMAL
EKG Q-T INTERVAL: 392 MS
EKG QRS DURATION: 106 MS
EKG QTC CALCULATION (BAZETT): 423 MS
EKG R AXIS: 64 DEGREES
EKG T AXIS: 257 DEGREES
EKG VENTRICULAR RATE: 70 BPM

## 2024-06-07 PROCEDURE — 93010 ELECTROCARDIOGRAM REPORT: CPT | Performed by: INTERNAL MEDICINE

## 2024-07-08 ENCOUNTER — OFFICE VISIT (OUTPATIENT)
Age: 79
End: 2024-07-08
Payer: MEDICARE

## 2024-07-08 VITALS
SYSTOLIC BLOOD PRESSURE: 130 MMHG | OXYGEN SATURATION: 98 % | HEART RATE: 96 BPM | DIASTOLIC BLOOD PRESSURE: 74 MMHG | WEIGHT: 191 LBS | HEIGHT: 73 IN | BODY MASS INDEX: 25.31 KG/M2

## 2024-07-08 DIAGNOSIS — I48.19 PERSISTENT ATRIAL FIBRILLATION (HCC): Primary | ICD-10-CM

## 2024-07-08 DIAGNOSIS — I50.22 CHRONIC SYSTOLIC (CONGESTIVE) HEART FAILURE (HCC): ICD-10-CM

## 2024-07-08 DIAGNOSIS — I42.8 NONISCHEMIC CARDIOMYOPATHY (HCC): ICD-10-CM

## 2024-07-08 DIAGNOSIS — I10 ESSENTIAL (PRIMARY) HYPERTENSION: ICD-10-CM

## 2024-07-08 DIAGNOSIS — Z95.810 PRESENCE OF AUTOMATIC (IMPLANTABLE) CARDIAC DEFIBRILLATOR: ICD-10-CM

## 2024-07-08 PROBLEM — N18.30 CKD STAGE 3 SECONDARY TO DIABETES (HCC): Status: ACTIVE | Noted: 2023-07-11

## 2024-07-08 PROBLEM — E11.22 CKD STAGE 3 SECONDARY TO DIABETES (HCC): Status: ACTIVE | Noted: 2023-07-11

## 2024-07-08 PROCEDURE — G8417 CALC BMI ABV UP PARAM F/U: HCPCS | Performed by: NURSE PRACTITIONER

## 2024-07-08 PROCEDURE — 99214 OFFICE O/P EST MOD 30 MIN: CPT | Performed by: NURSE PRACTITIONER

## 2024-07-08 PROCEDURE — 93000 ELECTROCARDIOGRAM COMPLETE: CPT | Performed by: NURSE PRACTITIONER

## 2024-07-08 PROCEDURE — 1036F TOBACCO NON-USER: CPT | Performed by: NURSE PRACTITIONER

## 2024-07-08 PROCEDURE — 3075F SYST BP GE 130 - 139MM HG: CPT | Performed by: NURSE PRACTITIONER

## 2024-07-08 PROCEDURE — 3078F DIAST BP <80 MM HG: CPT | Performed by: NURSE PRACTITIONER

## 2024-07-08 PROCEDURE — 1123F ACP DISCUSS/DSCN MKR DOCD: CPT | Performed by: NURSE PRACTITIONER

## 2024-07-08 PROCEDURE — G8427 DOCREV CUR MEDS BY ELIG CLIN: HCPCS | Performed by: NURSE PRACTITIONER

## 2024-07-08 RX ORDER — BUMETANIDE 1 MG/1
1 TABLET ORAL EVERY OTHER DAY
Qty: 1 TABLET | Refills: 0
Start: 2024-07-08

## 2024-07-08 ASSESSMENT — ENCOUNTER SYMPTOMS
CHEST TIGHTNESS: 0
WHEEZING: 0
ABDOMINAL DISTENTION: 0
COUGH: 0
CONSTIPATION: 0
DIARRHEA: 0
NAUSEA: 0
BLOOD IN STOOL: 0
SHORTNESS OF BREATH: 1
VOMITING: 0

## 2024-07-08 ASSESSMENT — ANXIETY QUESTIONNAIRES
7. FEELING AFRAID AS IF SOMETHING AWFUL MIGHT HAPPEN: NOT AT ALL
GAD7 TOTAL SCORE: 0
1. FEELING NERVOUS, ANXIOUS, OR ON EDGE: NOT AT ALL
4. TROUBLE RELAXING: NOT AT ALL
6. BECOMING EASILY ANNOYED OR IRRITABLE: NOT AT ALL
5. BEING SO RESTLESS THAT IT IS HARD TO SIT STILL: NOT AT ALL
2. NOT BEING ABLE TO STOP OR CONTROL WORRYING: NOT AT ALL
3. WORRYING TOO MUCH ABOUT DIFFERENT THINGS: NOT AT ALL

## 2024-07-08 ASSESSMENT — PATIENT HEALTH QUESTIONNAIRE - PHQ9
SUM OF ALL RESPONSES TO PHQ9 QUESTIONS 1 & 2: 0
2. FEELING DOWN, DEPRESSED OR HOPELESS: NOT AT ALL
1. LITTLE INTEREST OR PLEASURE IN DOING THINGS: NOT AT ALL
SUM OF ALL RESPONSES TO PHQ QUESTIONS 1-9: 0

## 2024-07-08 NOTE — PROGRESS NOTES
Gumaro Hicksdo Khan presents today for   Chief Complaint   Patient presents with    ED Follow-up     ED 6/6/24 due to chronic atrial fib     Shortness of Breath     SOB with exertion     Fall     Fell 5/24/24       Gumaro Hooker Edmonds preferred language for health care discussion is english/other.    Is someone accompanying this pt? yes    Is the patient using any DME equipment during OV? yes    Depression Screening:  Depression: Not at risk (7/8/2024)    PHQ-2     PHQ-2 Score: 0        Learning Assessment:  Who is the primary learner? Patient    What is the preferred language for health care of the primary learner? ENGLISH    How does the primary learner prefer to learn new concepts? DEMONSTRATION    Answered By patient    Relationship to Learner SELF           Pt currently taking Anticoagulant therapy? no    Pt currently taking Antiplatelet therapy ? Plavix 75 mg 1x daily       Coordination of Care:  1. Have you been to the ER, urgent care clinic since your last visit? Hospitalized since your last visit? yes    2. Have you seen or consulted any other health care providers outside of the [facility] since your last visit? Include any pap smears or colon screening. no    
Mental Status: He is alert and oriented to person, place, and time.   Psychiatric:         Mood and Affect: Mood normal.         Behavior: Behavior normal.         Thought Content: Thought content normal.          Data:  EKG:    LABS:  Lab Results   Component Value Date/Time     06/06/2024 09:46 AM    K 4.8 06/06/2024 09:46 AM     06/06/2024 09:46 AM    CO2 26 06/06/2024 09:46 AM    BUN 43 06/06/2024 09:46 AM     No results found for: \"CHOL\", \"CHLST\", \"CHOLV\", \"HDL\", \"HDLC\", \"LDL\"  Lab Results   Component Value Date/Time    ALT 16 06/06/2024 09:46 AM       Impression / Plan:  1.  Atrial fibrillation, rate controlled and no longer anticoagulated due to anemia  2.  Hypertension, blood pressure controlled  3.  Pulmonary hypertension, RVSP 67 mmHg (by echo Jan. 2024)  4.  Non-ischemic cardiomyopathy, EF 25-30% (by echo Jan. 2024), s/p AICD implant   5.  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  Chronic systolic heart failure, appears compensated  7.  Chronic kidney disease, stage 4  8.  Anemia    Mr. Khan was seen today for a post-hospital follow-up.  He states he stayed 2 weeks at a SNF for rehab and is now back home.  He reports that he has not been taking any bumetanide or spironolactone.  His most recent labs done through his PCPs office on 6/26/24, showed an potassium level of 5.5.  He has noticed some mild lower extremity edema.    Will now restart his bumetanide at 1mg every other day. He was instructed to continue holding his spironolactone.  All other medications to remain the same.  He was given a lab slip for a BMP to be done in 2 weeks.  If potassium levels have come down after restarting bumetanide, may need to restart his spironolactone at a lower dose or take it every other day versus daily.     Congestive heart failure teaching reinforced today.  Advised to limit sodium intake to no more than 2000mg per day and to also watch fluid intake.  Advised to weigh daily every

## 2024-09-25 ENCOUNTER — NURSE ONLY (OUTPATIENT)
Age: 79
End: 2024-09-25

## 2024-09-25 ENCOUNTER — OFFICE VISIT (OUTPATIENT)
Age: 79
End: 2024-09-25
Payer: MEDICARE

## 2024-09-25 VITALS
OXYGEN SATURATION: 100 % | BODY MASS INDEX: 21.74 KG/M2 | WEIGHT: 164 LBS | DIASTOLIC BLOOD PRESSURE: 62 MMHG | HEART RATE: 61 BPM | SYSTOLIC BLOOD PRESSURE: 120 MMHG | HEIGHT: 73 IN

## 2024-09-25 DIAGNOSIS — I50.22 CHRONIC SYSTOLIC (CONGESTIVE) HEART FAILURE (HCC): ICD-10-CM

## 2024-09-25 DIAGNOSIS — I42.8 NONISCHEMIC CARDIOMYOPATHY (HCC): ICD-10-CM

## 2024-09-25 DIAGNOSIS — I48.19 PERSISTENT ATRIAL FIBRILLATION (HCC): Primary | ICD-10-CM

## 2024-09-25 DIAGNOSIS — Z95.810 PRESENCE OF AUTOMATIC (IMPLANTABLE) CARDIAC DEFIBRILLATOR: Primary | ICD-10-CM

## 2024-09-25 DIAGNOSIS — Z95.810 PRESENCE OF AUTOMATIC (IMPLANTABLE) CARDIAC DEFIBRILLATOR: ICD-10-CM

## 2024-09-25 DIAGNOSIS — I10 ESSENTIAL (PRIMARY) HYPERTENSION: ICD-10-CM

## 2024-09-25 DIAGNOSIS — I48.19 PERSISTENT ATRIAL FIBRILLATION (HCC): ICD-10-CM

## 2024-09-25 PROCEDURE — 3078F DIAST BP <80 MM HG: CPT | Performed by: INTERNAL MEDICINE

## 2024-09-25 PROCEDURE — 93000 ELECTROCARDIOGRAM COMPLETE: CPT | Performed by: INTERNAL MEDICINE

## 2024-09-25 PROCEDURE — 1123F ACP DISCUSS/DSCN MKR DOCD: CPT | Performed by: INTERNAL MEDICINE

## 2024-09-25 PROCEDURE — G8427 DOCREV CUR MEDS BY ELIG CLIN: HCPCS | Performed by: INTERNAL MEDICINE

## 2024-09-25 PROCEDURE — 1036F TOBACCO NON-USER: CPT | Performed by: INTERNAL MEDICINE

## 2024-09-25 PROCEDURE — 3074F SYST BP LT 130 MM HG: CPT | Performed by: INTERNAL MEDICINE

## 2024-09-25 PROCEDURE — 99214 OFFICE O/P EST MOD 30 MIN: CPT | Performed by: INTERNAL MEDICINE

## 2024-09-25 PROCEDURE — G8420 CALC BMI NORM PARAMETERS: HCPCS | Performed by: INTERNAL MEDICINE

## 2024-09-25 RX ORDER — SPIRONOLACTONE 25 MG/1
25 TABLET ORAL DAILY
COMMUNITY
Start: 2024-09-24

## 2024-09-25 ASSESSMENT — PATIENT HEALTH QUESTIONNAIRE - PHQ9
SUM OF ALL RESPONSES TO PHQ QUESTIONS 1-9: 0
SUM OF ALL RESPONSES TO PHQ9 QUESTIONS 1 & 2: 0
SUM OF ALL RESPONSES TO PHQ QUESTIONS 1-9: 0
1. LITTLE INTEREST OR PLEASURE IN DOING THINGS: NOT AT ALL
SUM OF ALL RESPONSES TO PHQ QUESTIONS 1-9: 0
2. FEELING DOWN, DEPRESSED OR HOPELESS: NOT AT ALL
SUM OF ALL RESPONSES TO PHQ QUESTIONS 1-9: 0

## 2024-09-25 ASSESSMENT — ENCOUNTER SYMPTOMS: SHORTNESS OF BREATH: 1

## 2024-10-20 LAB — INR BLD: 1.3

## 2024-10-22 ENCOUNTER — ANTI-COAG VISIT (OUTPATIENT)
Age: 79
End: 2024-10-22

## 2024-10-22 DIAGNOSIS — I48.19 PERSISTENT ATRIAL FIBRILLATION (HCC): Primary | ICD-10-CM

## 2025-01-01 ENCOUNTER — HOSPITAL ENCOUNTER (EMERGENCY)
Facility: HOSPITAL | Age: 80
End: 2025-05-13
Attending: EMERGENCY MEDICINE
Payer: MEDICARE

## 2025-01-01 DIAGNOSIS — N17.9 ACUTE RENAL FAILURE SUPERIMPOSED ON STAGE 3B CHRONIC KIDNEY DISEASE, UNSPECIFIED ACUTE RENAL FAILURE TYPE (HCC): ICD-10-CM

## 2025-01-01 DIAGNOSIS — N18.32 ACUTE RENAL FAILURE SUPERIMPOSED ON STAGE 3B CHRONIC KIDNEY DISEASE, UNSPECIFIED ACUTE RENAL FAILURE TYPE (HCC): ICD-10-CM

## 2025-01-01 DIAGNOSIS — E83.39 HYPERPHOSPHATEMIA: ICD-10-CM

## 2025-01-01 DIAGNOSIS — E83.41 HYPERMAGNESEMIA: ICD-10-CM

## 2025-01-01 DIAGNOSIS — K70.31 ALCOHOLIC CIRRHOSIS OF LIVER WITH ASCITES (HCC): ICD-10-CM

## 2025-01-01 DIAGNOSIS — I46.9 CARDIOPULMONARY ARREST (HCC): Primary | ICD-10-CM

## 2025-01-01 DIAGNOSIS — E87.5 HYPERKALEMIA: ICD-10-CM

## 2025-01-01 LAB
ALBUMIN SERPL-MCNC: 2.3 G/DL (ref 3.4–5)
ALBUMIN/GLOB SERPL: 0.5 (ref 0.8–1.7)
ALP SERPL-CCNC: 287 U/L (ref 45–117)
ALT SERPL-CCNC: 9 U/L (ref 10–50)
ANION GAP SERPL CALC-SCNC: 17 MMOL/L (ref 3–18)
AST SERPL-CCNC: 31 U/L (ref 10–38)
BASOPHILS # BLD: 0.05 K/UL (ref 0–0.1)
BASOPHILS NFR BLD: 0.4 % (ref 0–2)
BILIRUB SERPL-MCNC: 1 MG/DL (ref 0.2–1)
BUN SERPL-MCNC: 94 MG/DL (ref 6–23)
BUN/CREAT SERPL: 30 (ref 12–20)
CALCIUM SERPL-MCNC: 8.4 MG/DL (ref 8.5–10.1)
CHLORIDE SERPL-SCNC: 100 MMOL/L (ref 98–107)
CO2 SERPL-SCNC: 16 MMOL/L (ref 21–32)
CREAT SERPL-MCNC: 3.07 MG/DL (ref 0.6–1.3)
DIFFERENTIAL METHOD BLD: ABNORMAL
EOSINOPHIL # BLD: 0.12 K/UL (ref 0–0.4)
EOSINOPHIL NFR BLD: 1 % (ref 0–5)
ERYTHROCYTE [DISTWIDTH] IN BLOOD BY AUTOMATED COUNT: 20 % (ref 11.6–14.5)
ETHANOL SERPL-MCNC: <11 MG/DL (ref 0–0.08)
GLOBULIN SER CALC-MCNC: 4.3 G/DL (ref 2–4)
GLUCOSE SERPL-MCNC: 90 MG/DL (ref 74–108)
HCT VFR BLD AUTO: 30.7 % (ref 36–48)
HGB BLD-MCNC: 9.3 G/DL (ref 13–16)
IMM GRANULOCYTES # BLD AUTO: 0.61 K/UL (ref 0–0.04)
IMM GRANULOCYTES NFR BLD AUTO: 5 % (ref 0–0.5)
LYMPHOCYTES # BLD: 4.24 K/UL (ref 0.9–3.6)
LYMPHOCYTES NFR BLD: 35 % (ref 21–52)
MAGNESIUM SERPL-MCNC: 3.1 MG/DL (ref 1.6–2.6)
MCH RBC QN AUTO: 28.3 PG (ref 24–34)
MCHC RBC AUTO-ENTMCNC: 30.3 G/DL (ref 31–37)
MCV RBC AUTO: 93.3 FL (ref 78–100)
MONOCYTES # BLD: 0.83 K/UL (ref 0.05–1.2)
MONOCYTES NFR BLD: 6.9 % (ref 3–10)
NEUTS SEG # BLD: 6.25 K/UL (ref 1.8–8)
NEUTS SEG NFR BLD: 51.7 % (ref 40–73)
NRBC # BLD: 0.05 K/UL (ref 0–0.01)
NRBC BLD-RTO: 0.4 PER 100 WBC
PHOSPHATE SERPL-MCNC: 7.7 MG/DL (ref 2.5–4.9)
PLATELET # BLD AUTO: 222 K/UL (ref 135–420)
PMV BLD AUTO: 10.7 FL (ref 9.2–11.8)
POTASSIUM SERPL-SCNC: 6.9 MMOL/L (ref 3.5–5.5)
PROT SERPL-MCNC: 6.5 G/DL (ref 6.4–8.2)
RBC # BLD AUTO: 3.29 M/UL (ref 4.35–5.65)
SODIUM SERPL-SCNC: 133 MMOL/L (ref 136–145)
WBC # BLD AUTO: 12.1 K/UL (ref 4.6–13.2)

## 2025-01-01 PROCEDURE — 84100 ASSAY OF PHOSPHORUS: CPT

## 2025-01-01 PROCEDURE — 83735 ASSAY OF MAGNESIUM: CPT

## 2025-01-01 PROCEDURE — 80053 COMPREHEN METABOLIC PANEL: CPT

## 2025-01-01 PROCEDURE — 92950 HEART/LUNG RESUSCITATION CPR: CPT

## 2025-01-01 PROCEDURE — 99285 EMERGENCY DEPT VISIT HI MDM: CPT

## 2025-01-01 PROCEDURE — 82077 ASSAY SPEC XCP UR&BREATH IA: CPT

## 2025-01-01 PROCEDURE — 85025 COMPLETE CBC W/AUTO DIFF WBC: CPT

## 2025-03-18 RX ORDER — DIGOXIN 125 MCG
125 TABLET ORAL DAILY
Qty: 90 TABLET | Refills: 3 | Status: SHIPPED | OUTPATIENT
Start: 2025-03-18

## 2025-04-07 ENCOUNTER — APPOINTMENT (OUTPATIENT)
Facility: HOSPITAL | Age: 80
DRG: 432 | End: 2025-04-07
Payer: MEDICARE

## 2025-04-07 ENCOUNTER — HOSPITAL ENCOUNTER (INPATIENT)
Facility: HOSPITAL | Age: 80
LOS: 11 days | Discharge: HOME HEALTH | DRG: 432 | End: 2025-04-18
Attending: EMERGENCY MEDICINE | Admitting: INTERNAL MEDICINE
Payer: MEDICARE

## 2025-04-07 DIAGNOSIS — R57.9 SHOCK (HCC): ICD-10-CM

## 2025-04-07 DIAGNOSIS — N18.9 ACUTE RENAL FAILURE SUPERIMPOSED ON CHRONIC KIDNEY DISEASE, UNSPECIFIED ACUTE RENAL FAILURE TYPE, UNSPECIFIED CKD STAGE: ICD-10-CM

## 2025-04-07 DIAGNOSIS — E87.29 RESPIRATORY ACIDOSIS: ICD-10-CM

## 2025-04-07 DIAGNOSIS — K70.31 ASCITES DUE TO ALCOHOLIC CIRRHOSIS (HCC): ICD-10-CM

## 2025-04-07 DIAGNOSIS — R06.02 SHORTNESS OF BREATH: ICD-10-CM

## 2025-04-07 DIAGNOSIS — E87.5 HYPERKALEMIA: Primary | ICD-10-CM

## 2025-04-07 DIAGNOSIS — N17.9 ACUTE RENAL FAILURE SUPERIMPOSED ON CHRONIC KIDNEY DISEASE, UNSPECIFIED ACUTE RENAL FAILURE TYPE, UNSPECIFIED CKD STAGE: ICD-10-CM

## 2025-04-07 LAB
ALBUMIN SERPL-MCNC: 2.5 G/DL (ref 3.4–5)
ALBUMIN SERPL-MCNC: 2.8 G/DL (ref 3.4–5)
ALBUMIN/GLOB SERPL: 0.5 (ref 0.8–1.7)
ALBUMIN/GLOB SERPL: 0.6 (ref 0.8–1.7)
ALP SERPL-CCNC: 243 U/L (ref 45–117)
ALP SERPL-CCNC: 275 U/L (ref 45–117)
ALT SERPL-CCNC: 11 U/L (ref 16–61)
ALT SERPL-CCNC: 12 U/L (ref 16–61)
AMMONIA PLAS-SCNC: 49 UMOL/L (ref 11–32)
ANION GAP SERPL CALC-SCNC: 3 MMOL/L (ref 3–18)
ANION GAP SERPL CALC-SCNC: 5 MMOL/L (ref 3–18)
APPEARANCE UR: ABNORMAL
ARTERIAL PATENCY WRIST A: ABNORMAL
ARTERIAL PATENCY WRIST A: ABNORMAL
AST SERPL-CCNC: 17 U/L (ref 10–38)
AST SERPL-CCNC: 27 U/L (ref 10–38)
B PERT DNA SPEC QL NAA+PROBE: NOT DETECTED
BACTERIA URNS QL MICRO: ABNORMAL /HPF
BASE DEFICIT BLD-SCNC: 0.2 MMOL/L
BASE DEFICIT BLD-SCNC: 1.8 MMOL/L
BASOPHILS # BLD: 0.01 K/UL (ref 0–0.1)
BASOPHILS NFR BLD: 0.1 % (ref 0–2)
BDY SITE: ABNORMAL
BDY SITE: ABNORMAL
BILIRUB SERPL-MCNC: 1 MG/DL (ref 0.2–1)
BILIRUB SERPL-MCNC: 1.2 MG/DL (ref 0.2–1)
BILIRUB UR QL: ABNORMAL
BORDETELLA PARAPERTUSSIS BY PCR: NOT DETECTED
BUN SERPL-MCNC: 124 MG/DL (ref 7–18)
BUN SERPL-MCNC: 126 MG/DL (ref 7–18)
BUN/CREAT SERPL: 29 (ref 12–20)
BUN/CREAT SERPL: 29 (ref 12–20)
C PNEUM DNA SPEC QL NAA+PROBE: NOT DETECTED
CALCIUM SERPL-MCNC: 8.6 MG/DL (ref 8.5–10.1)
CALCIUM SERPL-MCNC: 8.8 MG/DL (ref 8.5–10.1)
CHLORIDE SERPL-SCNC: 104 MMOL/L (ref 100–111)
CHLORIDE SERPL-SCNC: 105 MMOL/L (ref 100–111)
CHLORIDE UR-SCNC: <10 MMOL/L (ref 55–125)
CO2 SERPL-SCNC: 24 MMOL/L (ref 21–32)
CO2 SERPL-SCNC: 25 MMOL/L (ref 21–32)
COLOR UR: ABNORMAL
CREAT SERPL-MCNC: 4.21 MG/DL (ref 0.6–1.3)
CREAT SERPL-MCNC: 4.32 MG/DL (ref 0.6–1.3)
CREAT UR-MCNC: 193 MG/DL (ref 30–125)
DIFFERENTIAL METHOD BLD: ABNORMAL
EOSINOPHIL # BLD: 0.08 K/UL (ref 0–0.4)
EOSINOPHIL NFR BLD: 1.1 % (ref 0–5)
EPITH CASTS URNS QL MICRO: ABNORMAL /LPF (ref 0–5)
ERYTHROCYTE [DISTWIDTH] IN BLOOD BY AUTOMATED COUNT: 20.9 % (ref 11.6–14.5)
ETHANOL SERPL-MCNC: <3 MG/DL (ref 0–3)
FLUAV SUBTYP SPEC NAA+PROBE: NOT DETECTED
FLUBV RNA SPEC QL NAA+PROBE: NOT DETECTED
GAS FLOW.O2 O2 DELIVERY SYS: ABNORMAL
GAS FLOW.O2 O2 DELIVERY SYS: ABNORMAL
GLOBULIN SER CALC-MCNC: 4.8 G/DL (ref 2–4)
GLOBULIN SER CALC-MCNC: 4.8 G/DL (ref 2–4)
GLUCOSE BLD STRIP.AUTO-MCNC: 104 MG/DL (ref 70–110)
GLUCOSE BLD STRIP.AUTO-MCNC: 105 MG/DL (ref 70–110)
GLUCOSE BLD STRIP.AUTO-MCNC: 152 MG/DL (ref 70–110)
GLUCOSE BLD STRIP.AUTO-MCNC: 88 MG/DL (ref 70–110)
GLUCOSE SERPL-MCNC: 85 MG/DL (ref 74–99)
GLUCOSE SERPL-MCNC: 94 MG/DL (ref 74–99)
GLUCOSE UR STRIP.AUTO-MCNC: NEGATIVE MG/DL
HADV DNA SPEC QL NAA+PROBE: NOT DETECTED
HCO3 BLD-SCNC: 23.9 MMOL/L (ref 21–28)
HCO3 BLD-SCNC: 27 MMOL/L (ref 21–28)
HCOV 229E RNA SPEC QL NAA+PROBE: NOT DETECTED
HCOV HKU1 RNA SPEC QL NAA+PROBE: NOT DETECTED
HCOV NL63 RNA SPEC QL NAA+PROBE: NOT DETECTED
HCOV OC43 RNA SPEC QL NAA+PROBE: NOT DETECTED
HCT VFR BLD AUTO: 23.4 % (ref 36–48)
HGB BLD-MCNC: 7.5 G/DL (ref 13–16)
HGB UR QL STRIP: NEGATIVE
HMPV RNA SPEC QL NAA+PROBE: NOT DETECTED
HPIV1 RNA SPEC QL NAA+PROBE: NOT DETECTED
HPIV2 RNA SPEC QL NAA+PROBE: NOT DETECTED
HPIV3 RNA SPEC QL NAA+PROBE: NOT DETECTED
HPIV4 RNA SPEC QL NAA+PROBE: NOT DETECTED
IMM GRANULOCYTES # BLD AUTO: 0.04 K/UL (ref 0–0.04)
IMM GRANULOCYTES NFR BLD AUTO: 0.5 % (ref 0–0.5)
INR PPP: 1.5 (ref 0.9–1.1)
KETONES UR QL STRIP.AUTO: ABNORMAL MG/DL
LACTATE BLD-SCNC: 1.43 MMOL/L (ref 0.4–2)
LEUKOCYTE ESTERASE UR QL STRIP.AUTO: ABNORMAL
LIPASE SERPL-CCNC: 22 U/L (ref 13–75)
LYMPHOCYTES # BLD: 1.05 K/UL (ref 0.9–3.6)
LYMPHOCYTES NFR BLD: 14.1 % (ref 21–52)
M PNEUMO DNA SPEC QL NAA+PROBE: NOT DETECTED
MAGNESIUM SERPL-MCNC: 3.2 MG/DL (ref 1.6–2.6)
MCH RBC QN AUTO: 28.3 PG (ref 24–34)
MCHC RBC AUTO-ENTMCNC: 32.1 G/DL (ref 31–37)
MCV RBC AUTO: 88.3 FL (ref 78–100)
MONOCYTES # BLD: 0.74 K/UL (ref 0.05–1.2)
MONOCYTES NFR BLD: 9.9 % (ref 3–10)
NEUTS SEG # BLD: 5.54 K/UL (ref 1.8–8)
NEUTS SEG NFR BLD: 74.3 % (ref 40–73)
NITRITE UR QL STRIP.AUTO: NEGATIVE
NRBC # BLD: 0.03 K/UL (ref 0–0.01)
NRBC BLD-RTO: 0.4 PER 100 WBC
OSMOLALITY UR: 348 MOSM/KG H2O
PCO2 BLD: 43.5 MMHG (ref 35–48)
PCO2 BLD: 57.1 MMHG (ref 35–48)
PH BLD: 7.28 (ref 7.35–7.45)
PH BLD: 7.35 (ref 7.35–7.45)
PH UR STRIP: 5 (ref 5–8)
PHOSPHATE SERPL-MCNC: 7.5 MG/DL (ref 2.5–4.9)
PLATELET # BLD AUTO: 151 K/UL (ref 135–420)
PMV BLD AUTO: 11.2 FL (ref 9.2–11.8)
PO2 BLD: 52 MMHG (ref 83–108)
PO2 BLD: 58 MMHG (ref 83–108)
POTASSIUM SERPL-SCNC: 7.3 MMOL/L (ref 3.5–5.5)
POTASSIUM SERPL-SCNC: 8 MMOL/L (ref 3.5–5.5)
POTASSIUM UR-SCNC: 57 MMOL/L (ref 12–62)
PROT SERPL-MCNC: 7.3 G/DL (ref 6.4–8.2)
PROT SERPL-MCNC: 7.6 G/DL (ref 6.4–8.2)
PROT UR STRIP-MCNC: 100 MG/DL
PROTHROMBIN TIME: 18.5 SEC (ref 11.9–14.9)
RBC # BLD AUTO: 2.65 M/UL (ref 4.35–5.65)
RBC #/AREA URNS HPF: NEGATIVE /HPF (ref 0–5)
RESPIRATORY RATE, POC: 24 (ref 5–40)
RESPIRATORY RATE, POC: 31 (ref 5–40)
RSV RNA SPEC QL NAA+PROBE: NOT DETECTED
RV+EV RNA SPEC QL NAA+PROBE: NOT DETECTED
SAO2 % BLD: 84.5 % (ref 92–97)
SAO2 % BLD: 85.5 % (ref 92–97)
SARS-COV-2 RNA RESP QL NAA+PROBE: NOT DETECTED
SERVICE CMNT-IMP: ABNORMAL
SERVICE CMNT-IMP: ABNORMAL
SODIUM SERPL-SCNC: 133 MMOL/L (ref 136–145)
SODIUM SERPL-SCNC: 133 MMOL/L (ref 136–145)
SODIUM UR-SCNC: 19 MMOL/L (ref 20–110)
SP GR UR REFRACTOMETRY: 1.02 (ref 1–1.03)
SPECIMEN TYPE: ABNORMAL
SPECIMEN TYPE: ABNORMAL
TROPONIN I SERPL HS-MCNC: 54 NG/L (ref 0–78)
TROPONIN I SERPL HS-MCNC: 57 NG/L (ref 0–78)
TSH SERPL DL<=0.05 MIU/L-ACNC: 3.84 UIU/ML (ref 0.36–3.74)
UROBILINOGEN UR QL STRIP.AUTO: 1 EU/DL (ref 0.2–1)
UUN UR-MCNC: 316 MG/DL
WBC # BLD AUTO: 7.5 K/UL (ref 4.6–13.2)
WBC URNS QL MICRO: ABNORMAL /HPF (ref 0–5)

## 2025-04-07 PROCEDURE — 82803 BLOOD GASES ANY COMBINATION: CPT

## 2025-04-07 PROCEDURE — 96361 HYDRATE IV INFUSION ADD-ON: CPT

## 2025-04-07 PROCEDURE — 36600 WITHDRAWAL OF ARTERIAL BLOOD: CPT

## 2025-04-07 PROCEDURE — 1100000000 HC RM PRIVATE

## 2025-04-07 PROCEDURE — 81001 URINALYSIS AUTO W/SCOPE: CPT

## 2025-04-07 PROCEDURE — 83690 ASSAY OF LIPASE: CPT

## 2025-04-07 PROCEDURE — 83605 ASSAY OF LACTIC ACID: CPT

## 2025-04-07 PROCEDURE — 0202U NFCT DS 22 TRGT SARS-COV-2: CPT

## 2025-04-07 PROCEDURE — 82570 ASSAY OF URINE CREATININE: CPT

## 2025-04-07 PROCEDURE — 82077 ASSAY SPEC XCP UR&BREATH IA: CPT

## 2025-04-07 PROCEDURE — 2500000003 HC RX 250 WO HCPCS: Performed by: INTERNAL MEDICINE

## 2025-04-07 PROCEDURE — 2580000003 HC RX 258

## 2025-04-07 PROCEDURE — 2500000003 HC RX 250 WO HCPCS

## 2025-04-07 PROCEDURE — 82436 ASSAY OF URINE CHLORIDE: CPT

## 2025-04-07 PROCEDURE — 83735 ASSAY OF MAGNESIUM: CPT

## 2025-04-07 PROCEDURE — 71045 X-RAY EXAM CHEST 1 VIEW: CPT

## 2025-04-07 PROCEDURE — 84133 ASSAY OF URINE POTASSIUM: CPT

## 2025-04-07 PROCEDURE — 83935 ASSAY OF URINE OSMOLALITY: CPT

## 2025-04-07 PROCEDURE — 74176 CT ABD & PELVIS W/O CONTRAST: CPT

## 2025-04-07 PROCEDURE — 96375 TX/PRO/DX INJ NEW DRUG ADDON: CPT

## 2025-04-07 PROCEDURE — 84484 ASSAY OF TROPONIN QUANT: CPT

## 2025-04-07 PROCEDURE — 2580000003 HC RX 258: Performed by: EMERGENCY MEDICINE

## 2025-04-07 PROCEDURE — 6360000002 HC RX W HCPCS

## 2025-04-07 PROCEDURE — 93005 ELECTROCARDIOGRAM TRACING: CPT | Performed by: EMERGENCY MEDICINE

## 2025-04-07 PROCEDURE — 82140 ASSAY OF AMMONIA: CPT

## 2025-04-07 PROCEDURE — 99285 EMERGENCY DEPT VISIT HI MDM: CPT

## 2025-04-07 PROCEDURE — 82962 GLUCOSE BLOOD TEST: CPT

## 2025-04-07 PROCEDURE — 96374 THER/PROPH/DIAG INJ IV PUSH: CPT

## 2025-04-07 PROCEDURE — 80053 COMPREHEN METABOLIC PANEL: CPT

## 2025-04-07 PROCEDURE — 2580000003 HC RX 258: Performed by: INTERNAL MEDICINE

## 2025-04-07 PROCEDURE — 6370000000 HC RX 637 (ALT 250 FOR IP): Performed by: EMERGENCY MEDICINE

## 2025-04-07 PROCEDURE — 6360000002 HC RX W HCPCS: Performed by: INTERNAL MEDICINE

## 2025-04-07 PROCEDURE — 85025 COMPLETE CBC W/AUTO DIFF WBC: CPT

## 2025-04-07 PROCEDURE — 84443 ASSAY THYROID STIM HORMONE: CPT

## 2025-04-07 PROCEDURE — 84300 ASSAY OF URINE SODIUM: CPT

## 2025-04-07 PROCEDURE — 84100 ASSAY OF PHOSPHORUS: CPT

## 2025-04-07 PROCEDURE — 6370000000 HC RX 637 (ALT 250 FOR IP)

## 2025-04-07 PROCEDURE — 84540 ASSAY OF URINE/UREA-N: CPT

## 2025-04-07 PROCEDURE — 85610 PROTHROMBIN TIME: CPT

## 2025-04-07 RX ORDER — ALBUTEROL SULFATE 0.83 MG/ML
10 SOLUTION RESPIRATORY (INHALATION) ONCE
Status: COMPLETED | OUTPATIENT
Start: 2025-04-08 | End: 2025-04-07

## 2025-04-07 RX ORDER — DEXTROSE MONOHYDRATE 100 MG/ML
INJECTION, SOLUTION INTRAVENOUS CONTINUOUS PRN
Status: DISCONTINUED | OUTPATIENT
Start: 2025-04-07 | End: 2025-04-18 | Stop reason: HOSPADM

## 2025-04-07 RX ORDER — ACETAMINOPHEN 650 MG/1
650 SUPPOSITORY RECTAL EVERY 6 HOURS PRN
Status: DISCONTINUED | OUTPATIENT
Start: 2025-04-07 | End: 2025-04-18 | Stop reason: HOSPADM

## 2025-04-07 RX ORDER — ACETAMINOPHEN 325 MG/1
650 TABLET ORAL EVERY 6 HOURS PRN
Status: DISCONTINUED | OUTPATIENT
Start: 2025-04-07 | End: 2025-04-18 | Stop reason: HOSPADM

## 2025-04-07 RX ORDER — ALBUTEROL SULFATE 0.83 MG/ML
10 SOLUTION RESPIRATORY (INHALATION) ONCE
Status: COMPLETED | OUTPATIENT
Start: 2025-04-07 | End: 2025-04-07

## 2025-04-07 RX ORDER — SODIUM CHLORIDE 9 MG/ML
INJECTION, SOLUTION INTRAVENOUS CONTINUOUS
Status: DISPENSED | OUTPATIENT
Start: 2025-04-07 | End: 2025-04-08

## 2025-04-07 RX ORDER — MIDODRINE HYDROCHLORIDE 10 MG/1
10 TABLET ORAL ONCE
Status: COMPLETED | OUTPATIENT
Start: 2025-04-07 | End: 2025-04-07

## 2025-04-07 RX ORDER — HEPARIN SODIUM 5000 [USP'U]/ML
5000 INJECTION, SOLUTION INTRAVENOUS; SUBCUTANEOUS 2 TIMES DAILY
Status: DISCONTINUED | OUTPATIENT
Start: 2025-04-07 | End: 2025-04-18 | Stop reason: HOSPADM

## 2025-04-07 RX ORDER — SODIUM CHLORIDE 0.9 % (FLUSH) 0.9 %
5-40 SYRINGE (ML) INJECTION PRN
Status: DISCONTINUED | OUTPATIENT
Start: 2025-04-07 | End: 2025-04-18 | Stop reason: HOSPADM

## 2025-04-07 RX ORDER — 0.9 % SODIUM CHLORIDE 0.9 %
250 INTRAVENOUS SOLUTION INTRAVENOUS ONCE
Status: COMPLETED | OUTPATIENT
Start: 2025-04-07 | End: 2025-04-08

## 2025-04-07 RX ORDER — SODIUM CHLORIDE 9 MG/ML
INJECTION, SOLUTION INTRAVENOUS PRN
Status: DISCONTINUED | OUTPATIENT
Start: 2025-04-07 | End: 2025-04-18 | Stop reason: HOSPADM

## 2025-04-07 RX ORDER — FUROSEMIDE 10 MG/ML
40 INJECTION INTRAMUSCULAR; INTRAVENOUS ONCE
Status: DISCONTINUED | OUTPATIENT
Start: 2025-04-07 | End: 2025-04-07

## 2025-04-07 RX ORDER — INDOMETHACIN 25 MG/1
50 CAPSULE ORAL ONCE
Status: COMPLETED | OUTPATIENT
Start: 2025-04-07 | End: 2025-04-07

## 2025-04-07 RX ORDER — SODIUM CHLORIDE 0.9 % (FLUSH) 0.9 %
5-40 SYRINGE (ML) INJECTION EVERY 12 HOURS SCHEDULED
Status: DISCONTINUED | OUTPATIENT
Start: 2025-04-07 | End: 2025-04-18 | Stop reason: HOSPADM

## 2025-04-07 RX ORDER — ONDANSETRON 4 MG/1
4 TABLET, ORALLY DISINTEGRATING ORAL EVERY 8 HOURS PRN
Status: DISCONTINUED | OUTPATIENT
Start: 2025-04-07 | End: 2025-04-18 | Stop reason: HOSPADM

## 2025-04-07 RX ORDER — POLYETHYLENE GLYCOL 3350 17 G/17G
17 POWDER, FOR SOLUTION ORAL DAILY PRN
Status: DISCONTINUED | OUTPATIENT
Start: 2025-04-07 | End: 2025-04-18 | Stop reason: HOSPADM

## 2025-04-07 RX ORDER — ONDANSETRON 2 MG/ML
4 INJECTION INTRAMUSCULAR; INTRAVENOUS EVERY 6 HOURS PRN
Status: DISCONTINUED | OUTPATIENT
Start: 2025-04-07 | End: 2025-04-18 | Stop reason: HOSPADM

## 2025-04-07 RX ORDER — 0.9 % SODIUM CHLORIDE 0.9 %
250 INTRAVENOUS SOLUTION INTRAVENOUS ONCE
Status: COMPLETED | OUTPATIENT
Start: 2025-04-07 | End: 2025-04-07

## 2025-04-07 RX ORDER — CALCIUM GLUCONATE 20 MG/ML
1000 INJECTION, SOLUTION INTRAVENOUS ONCE
Status: COMPLETED | OUTPATIENT
Start: 2025-04-07 | End: 2025-04-07

## 2025-04-07 RX ORDER — CALCIUM GLUCONATE 20 MG/ML
1000 INJECTION, SOLUTION INTRAVENOUS ONCE
Status: COMPLETED | OUTPATIENT
Start: 2025-04-08 | End: 2025-04-07

## 2025-04-07 RX ADMIN — DEXTROSE 250 ML: 10 SOLUTION INTRAVENOUS at 23:53

## 2025-04-07 RX ADMIN — ALBUTEROL SULFATE 10 MG: 2.5 SOLUTION RESPIRATORY (INHALATION) at 20:11

## 2025-04-07 RX ADMIN — ALBUTEROL SULFATE 10 MG: 2.5 SOLUTION RESPIRATORY (INHALATION) at 23:49

## 2025-04-07 RX ADMIN — CALCIUM GLUCONATE 1000 MG: 20 INJECTION, SOLUTION INTRAVENOUS at 23:46

## 2025-04-07 RX ADMIN — CALCIUM GLUCONATE 1000 MG: 20 INJECTION, SOLUTION INTRAVENOUS at 20:19

## 2025-04-07 RX ADMIN — SODIUM CHLORIDE 250 ML: 0.9 INJECTION, SOLUTION INTRAVENOUS at 19:04

## 2025-04-07 RX ADMIN — SODIUM BICARBONATE 50 MEQ: 84 INJECTION, SOLUTION INTRAVENOUS at 20:08

## 2025-04-07 RX ADMIN — HEPARIN SODIUM 5000 UNITS: 5000 INJECTION INTRAVENOUS; SUBCUTANEOUS at 23:38

## 2025-04-07 RX ADMIN — SODIUM CHLORIDE 250 ML: 0.9 INJECTION, SOLUTION INTRAVENOUS at 22:55

## 2025-04-07 RX ADMIN — MIDODRINE HYDROCHLORIDE 10 MG: 10 TABLET ORAL at 19:03

## 2025-04-07 RX ADMIN — DEXTROSE 250 ML: 10 SOLUTION INTRAVENOUS at 20:27

## 2025-04-07 RX ADMIN — SODIUM CHLORIDE, PRESERVATIVE FREE 10 ML: 5 INJECTION INTRAVENOUS at 23:40

## 2025-04-07 RX ADMIN — INSULIN HUMAN 10 UNITS: 100 INJECTION, SOLUTION PARENTERAL at 20:27

## 2025-04-07 ASSESSMENT — PAIN - FUNCTIONAL ASSESSMENT: PAIN_FUNCTIONAL_ASSESSMENT: 0-10

## 2025-04-07 NOTE — ED NOTES
Started an IV, sent blood work   POC Glu- 104. Provider notified   POC LAC- 1.43 provider notified

## 2025-04-07 NOTE — ED TRIAGE NOTES
Patient arrived to ED via EMS from Home with complaints of shortness of breath.  Per EMS, they were called for this patient 2 days ago due to a fall, but pt refused to come into ED.      Pt sats 95% on room air, but respirations are labored.  Pt has distended abdomen.  Denies blood thinners, has a pacemaker.

## 2025-04-08 ENCOUNTER — APPOINTMENT (OUTPATIENT)
Facility: HOSPITAL | Age: 80
DRG: 432 | End: 2025-04-08
Payer: MEDICARE

## 2025-04-08 PROBLEM — N18.30 ACUTE RENAL FAILURE SUPERIMPOSED ON STAGE 3 CHRONIC KIDNEY DISEASE (HCC): Status: ACTIVE | Noted: 2024-01-24

## 2025-04-08 PROBLEM — R18.8 ASCITES: Status: ACTIVE | Noted: 2025-04-08

## 2025-04-08 PROBLEM — I50.20 SYSTOLIC CONGESTIVE HEART FAILURE (HCC): Status: ACTIVE | Noted: 2025-04-08

## 2025-04-08 LAB
ALBUMIN FLD-MCNC: 1.8 G/DL
ALBUMIN SERPL-MCNC: 2.4 G/DL (ref 3.4–5)
ALBUMIN/GLOB SERPL: 0.5 (ref 0.8–1.7)
ALP SERPL-CCNC: 247 U/L (ref 45–117)
ALT SERPL-CCNC: 11 U/L (ref 16–61)
ANION GAP BLD CALC-SCNC: ABNORMAL MMOL/L (ref 10–20)
ANION GAP SERPL CALC-SCNC: 5 MMOL/L (ref 3–18)
ANION GAP SERPL CALC-SCNC: 6 MMOL/L (ref 3–18)
ANION GAP SERPL CALC-SCNC: 7 MMOL/L (ref 3–18)
APPEARANCE FLD: ABNORMAL
APTT PPP: 44 SEC (ref 23–36.4)
ARTERIAL PATENCY WRIST A: ABNORMAL
AST SERPL-CCNC: 18 U/L (ref 10–38)
BASE EXCESS BLD CALC-SCNC: 1 MMOL/L
BASOPHILS # BLD: 0 K/UL (ref 0–0.1)
BASOPHILS # BLD: 0.01 K/UL (ref 0–0.1)
BASOPHILS NFR BLD: 0 % (ref 0–2)
BASOPHILS NFR BLD: 0.1 % (ref 0–2)
BDY SITE: ABNORMAL
BILIRUB SERPL-MCNC: 1 MG/DL (ref 0.2–1)
BUN SERPL-MCNC: 126 MG/DL (ref 7–18)
BUN SERPL-MCNC: 130 MG/DL (ref 7–18)
BUN SERPL-MCNC: 73 MG/DL (ref 7–18)
BUN/CREAT SERPL: 27 (ref 12–20)
BUN/CREAT SERPL: 28 (ref 12–20)
BUN/CREAT SERPL: 30 (ref 12–20)
CA-I BLD-MCNC: 1.11 MMOL/L (ref 1.15–1.33)
CA-I SERPL-SCNC: 1.17 MMOL/L (ref 1.15–1.33)
CALCIUM SERPL-MCNC: 8.4 MG/DL (ref 8.5–10.1)
CALCIUM SERPL-MCNC: 8.8 MG/DL (ref 8.5–10.1)
CALCIUM SERPL-MCNC: 8.9 MG/DL (ref 8.5–10.1)
CHLORIDE BLD-SCNC: 103 MMOL/L (ref 98–107)
CHLORIDE SERPL-SCNC: 102 MMOL/L (ref 100–111)
CHLORIDE SERPL-SCNC: 102 MMOL/L (ref 100–111)
CHLORIDE SERPL-SCNC: 103 MMOL/L (ref 100–111)
CK SERPL-CCNC: 83 U/L (ref 39–308)
CO2 BLD-SCNC: 27 MMOL/L (ref 22–29)
CO2 SERPL-SCNC: 24 MMOL/L (ref 21–32)
CO2 SERPL-SCNC: 25 MMOL/L (ref 21–32)
CO2 SERPL-SCNC: 26 MMOL/L (ref 21–32)
COLOR FLD: YELLOW
CREAT BLD-MCNC: 2.18 MG/DL (ref 0.6–1.3)
CREAT SERPL-MCNC: 2.67 MG/DL (ref 0.6–1.3)
CREAT SERPL-MCNC: 4.39 MG/DL (ref 0.6–1.3)
CREAT SERPL-MCNC: 4.44 MG/DL (ref 0.6–1.3)
CREAT UR-MCNC: 125 MG/DL (ref 30–125)
CYTOLOGY-NON GYN: NORMAL
DIFFERENTIAL METHOD BLD: ABNORMAL
DIFFERENTIAL METHOD BLD: ABNORMAL
DIGOXIN SERPL-MCNC: 1.4 NG/ML (ref 0.9–2)
EKG ATRIAL RATE: 73 BPM
EKG DIAGNOSIS: NORMAL
EKG Q-T INTERVAL: 422 MS
EKG QRS DURATION: 86 MS
EKG QTC CALCULATION (BAZETT): 593 MS
EKG R AXIS: 60 DEGREES
EKG T AXIS: 269 DEGREES
EKG VENTRICULAR RATE: 119 BPM
EOSINOPHIL # BLD: 0.01 K/UL (ref 0–0.4)
EOSINOPHIL # BLD: 0.02 K/UL (ref 0–0.4)
EOSINOPHIL NFR BLD: 0.1 % (ref 0–5)
EOSINOPHIL NFR BLD: 0.3 % (ref 0–5)
EOSINOPHIL NFR FLD MANUAL: 0 %
ERYTHROCYTE [DISTWIDTH] IN BLOOD BY AUTOMATED COUNT: 19.4 % (ref 11.6–14.5)
ERYTHROCYTE [DISTWIDTH] IN BLOOD BY AUTOMATED COUNT: 20.8 % (ref 11.6–14.5)
ERYTHROCYTE [DISTWIDTH] IN BLOOD BY AUTOMATED COUNT: 21.1 % (ref 11.6–14.5)
FERRITIN SERPL-MCNC: 499 NG/ML (ref 8–388)
FIO2 ON VENT: 2 %
GAS FLOW.O2 O2 DELIVERY SYS: ABNORMAL
GLOBULIN SER CALC-MCNC: 5 G/DL (ref 2–4)
GLUCOSE BLD STRIP.AUTO-MCNC: 125 MG/DL (ref 70–110)
GLUCOSE BLD STRIP.AUTO-MCNC: 155 MG/DL (ref 70–110)
GLUCOSE BLD STRIP.AUTO-MCNC: 165 MG/DL (ref 70–110)
GLUCOSE BLD STRIP.AUTO-MCNC: 89 MG/DL (ref 70–110)
GLUCOSE BLD-MCNC: 103 MG/DL (ref 74–99)
GLUCOSE SERPL-MCNC: 81 MG/DL (ref 74–99)
GLUCOSE SERPL-MCNC: 81 MG/DL (ref 74–99)
GLUCOSE SERPL-MCNC: 84 MG/DL (ref 74–99)
HCO3 BLD-SCNC: 27.3 MMOL/L (ref 21–28)
HCT VFR BLD AUTO: 21.9 % (ref 36–48)
HCT VFR BLD AUTO: 22 % (ref 36–48)
HCT VFR BLD AUTO: 23.2 % (ref 36–48)
HGB BLD-MCNC: 6.9 G/DL (ref 13–16)
HGB BLD-MCNC: 7.3 G/DL (ref 13–16)
HGB BLD-MCNC: 7.4 G/DL (ref 13–16)
HISTORY CHECK: NORMAL
IMM GRANULOCYTES # BLD AUTO: 0.02 K/UL (ref 0–0.04)
IMM GRANULOCYTES # BLD AUTO: 0.04 K/UL (ref 0–0.04)
IMM GRANULOCYTES NFR BLD AUTO: 0.2 % (ref 0–0.5)
IMM GRANULOCYTES NFR BLD AUTO: 0.6 % (ref 0–0.5)
INR PPP: 1.6 (ref 0.9–1.1)
IRON SATN MFR SERPL: 60 % (ref 20–50)
IRON SERPL-MCNC: 127 UG/DL (ref 50–175)
LACTATE BLD-SCNC: 0.99 MMOL/L (ref 0.4–2)
LACTATE SERPL-SCNC: 1.1 MMOL/L (ref 0.4–2)
LACTATE SERPL-SCNC: 1.6 MMOL/L (ref 0.4–2)
LDH FLD L TO P-CCNC: 92 U/L
LYMPHOCYTES # BLD: 0.76 K/UL (ref 0.9–3.6)
LYMPHOCYTES # BLD: 0.93 K/UL (ref 0.9–3.6)
LYMPHOCYTES NFR BLD: 11 % (ref 21–52)
LYMPHOCYTES NFR BLD: 11.5 % (ref 21–52)
LYMPHOCYTES NFR FLD: 21 %
MACROPHAGES NFR FLD: 58 %
MAGNESIUM SERPL-MCNC: 2.3 MG/DL (ref 1.6–2.6)
MAGNESIUM SERPL-MCNC: 3 MG/DL (ref 1.6–2.6)
MCH RBC QN AUTO: 27.9 PG (ref 24–34)
MCH RBC QN AUTO: 28.6 PG (ref 24–34)
MCH RBC QN AUTO: 29.2 PG (ref 24–34)
MCHC RBC AUTO-ENTMCNC: 31.4 G/DL (ref 31–37)
MCHC RBC AUTO-ENTMCNC: 31.9 G/DL (ref 31–37)
MCHC RBC AUTO-ENTMCNC: 33.3 G/DL (ref 31–37)
MCV RBC AUTO: 87.6 FL (ref 78–100)
MCV RBC AUTO: 89.1 FL (ref 78–100)
MCV RBC AUTO: 89.6 FL (ref 78–100)
MICROALBUMIN UR-MCNC: 39.6 MG/DL (ref 0–3)
MICROALBUMIN/CREAT UR-RTO: 317 MG/G (ref 0–30)
MONOCYTES # BLD: 0.67 K/UL (ref 0.05–1.2)
MONOCYTES # BLD: 0.71 K/UL (ref 0.05–1.2)
MONOCYTES NFR BLD: 8.7 % (ref 3–10)
MONOCYTES NFR BLD: 9.7 % (ref 3–10)
MONOCYTES NFR FLD: 3 %
NEUTROPHILS NFR FLD: 18 % (ref 0–25)
NEUTS BAND # FLD: 0 %
NEUTS SEG # BLD: 5.39 K/UL (ref 1.8–8)
NEUTS SEG # BLD: 6.44 K/UL (ref 1.8–8)
NEUTS SEG NFR BLD: 78.4 % (ref 40–73)
NEUTS SEG NFR BLD: 79.4 % (ref 40–73)
NRBC # BLD: 0 K/UL (ref 0–0.01)
NRBC # BLD: 0.03 K/UL (ref 0–0.01)
NRBC # BLD: 0.04 K/UL (ref 0–0.01)
NRBC BLD-RTO: 0 PER 100 WBC
NRBC BLD-RTO: 0.4 PER 100 WBC
NRBC BLD-RTO: 0.5 PER 100 WBC
NT PRO BNP: ABNORMAL PG/ML (ref 0–1800)
NUC CELL # FLD: 169 /CU MM
PCO2 BLDV: 51.1 MMHG (ref 41–51)
PH BLDV: 7.34 (ref 7.32–7.42)
PHOSPHATE SERPL-MCNC: 4.6 MG/DL (ref 2.5–4.9)
PHOSPHATE SERPL-MCNC: 7.4 MG/DL (ref 2.5–4.9)
PLATELET # BLD AUTO: 154 K/UL (ref 135–420)
PLATELET # BLD AUTO: 155 K/UL (ref 135–420)
PLATELET # BLD AUTO: 156 K/UL (ref 135–420)
PMV BLD AUTO: 11.3 FL (ref 9.2–11.8)
PMV BLD AUTO: 11.4 FL (ref 9.2–11.8)
PMV BLD AUTO: 11.6 FL (ref 9.2–11.8)
PO2 BLDV: 37 MMHG (ref 25–40)
POTASSIUM BLD-SCNC: 4.9 MMOL/L (ref 3.5–5.1)
POTASSIUM SERPL-SCNC: 4.9 MMOL/L (ref 3.5–5.5)
POTASSIUM SERPL-SCNC: 7.1 MMOL/L (ref 3.5–5.5)
POTASSIUM SERPL-SCNC: 7.3 MMOL/L (ref 3.5–5.5)
PROCALCITONIN SERPL-MCNC: 0.92 NG/ML
PROT FLD-MCNC: 4.6 G/DL
PROT SERPL-MCNC: 7.4 G/DL (ref 6.4–8.2)
PROTHROMBIN TIME: 18.9 SEC (ref 11.9–14.9)
RBC # BLD AUTO: 2.47 M/UL (ref 4.35–5.65)
RBC # BLD AUTO: 2.5 M/UL (ref 4.35–5.65)
RBC # BLD AUTO: 2.59 M/UL (ref 4.35–5.65)
RBC # FLD: 5000 /CU MM
RESPIRATORY RATE, POC: 18 (ref 5–40)
SAO2 % BLD: 66 % (ref 94–98)
SERVICE CMNT-IMP: ABNORMAL
SODIUM BLD-SCNC: 139 MMOL/L (ref 136–145)
SODIUM SERPL-SCNC: 132 MMOL/L (ref 136–145)
SODIUM SERPL-SCNC: 133 MMOL/L (ref 136–145)
SODIUM SERPL-SCNC: 135 MMOL/L (ref 136–145)
SPECIMEN SITE: ABNORMAL
SPECIMEN SOURCE FLD: ABNORMAL
SPECIMEN SOURCE FLD: NORMAL
TIBC SERPL-MCNC: 210 UG/DL (ref 250–450)
TROPONIN I SERPL HS-MCNC: 61 NG/L (ref 0–78)
WBC # BLD AUTO: 6.9 K/UL (ref 4.6–13.2)
WBC # BLD AUTO: 8.1 K/UL (ref 4.6–13.2)
WBC # BLD AUTO: 8.4 K/UL (ref 4.6–13.2)
WBC MORPH BLD: ABNORMAL

## 2025-04-08 PROCEDURE — 94761 N-INVAS EAR/PLS OXIMETRY MLT: CPT

## 2025-04-08 PROCEDURE — 85610 PROTHROMBIN TIME: CPT

## 2025-04-08 PROCEDURE — 87070 CULTURE OTHR SPECIMN AEROBIC: CPT

## 2025-04-08 PROCEDURE — 82803 BLOOD GASES ANY COMBINATION: CPT

## 2025-04-08 PROCEDURE — 2580000003 HC RX 258: Performed by: PHYSICIAN ASSISTANT

## 2025-04-08 PROCEDURE — 83615 LACTATE (LD) (LDH) ENZYME: CPT

## 2025-04-08 PROCEDURE — 86705 HEP B CORE ANTIBODY IGM: CPT

## 2025-04-08 PROCEDURE — 85027 COMPLETE CBC AUTOMATED: CPT

## 2025-04-08 PROCEDURE — 2000000000 HC ICU R&B

## 2025-04-08 PROCEDURE — 3E033XZ INTRODUCTION OF VASOPRESSOR INTO PERIPHERAL VEIN, PERCUTANEOUS APPROACH: ICD-10-PCS | Performed by: PHYSICIAN ASSISTANT

## 2025-04-08 PROCEDURE — 82570 ASSAY OF URINE CREATININE: CPT

## 2025-04-08 PROCEDURE — 83605 ASSAY OF LACTIC ACID: CPT

## 2025-04-08 PROCEDURE — 87015 SPECIMEN INFECT AGNT CONCNTJ: CPT

## 2025-04-08 PROCEDURE — 36556 INSERT NON-TUNNEL CV CATH: CPT | Performed by: PHYSICIAN ASSISTANT

## 2025-04-08 PROCEDURE — 84132 ASSAY OF SERUM POTASSIUM: CPT

## 2025-04-08 PROCEDURE — 80162 ASSAY OF DIGOXIN TOTAL: CPT

## 2025-04-08 PROCEDURE — 82962 GLUCOSE BLOOD TEST: CPT

## 2025-04-08 PROCEDURE — 85730 THROMBOPLASTIN TIME PARTIAL: CPT

## 2025-04-08 PROCEDURE — 36556 INSERT NON-TUNNEL CV CATH: CPT

## 2025-04-08 PROCEDURE — 87340 HEPATITIS B SURFACE AG IA: CPT

## 2025-04-08 PROCEDURE — 87205 SMEAR GRAM STAIN: CPT

## 2025-04-08 PROCEDURE — 6370000000 HC RX 637 (ALT 250 FOR IP): Performed by: INTERNAL MEDICINE

## 2025-04-08 PROCEDURE — 0W9G3ZZ DRAINAGE OF PERITONEAL CAVITY, PERCUTANEOUS APPROACH: ICD-10-PCS

## 2025-04-08 PROCEDURE — 36415 COLL VENOUS BLD VENIPUNCTURE: CPT

## 2025-04-08 PROCEDURE — 99291 CRITICAL CARE FIRST HOUR: CPT | Performed by: INTERNAL MEDICINE

## 2025-04-08 PROCEDURE — 6370000000 HC RX 637 (ALT 250 FOR IP): Performed by: PHYSICIAN ASSISTANT

## 2025-04-08 PROCEDURE — 84295 ASSAY OF SERUM SODIUM: CPT

## 2025-04-08 PROCEDURE — 86850 RBC ANTIBODY SCREEN: CPT

## 2025-04-08 PROCEDURE — 6360000002 HC RX W HCPCS: Performed by: INTERNAL MEDICINE

## 2025-04-08 PROCEDURE — P9047 ALBUMIN (HUMAN), 25%, 50ML: HCPCS | Performed by: INTERNAL MEDICINE

## 2025-04-08 PROCEDURE — 82330 ASSAY OF CALCIUM: CPT

## 2025-04-08 PROCEDURE — 86901 BLOOD TYPING SEROLOGIC RH(D): CPT

## 2025-04-08 PROCEDURE — 83880 ASSAY OF NATRIURETIC PEPTIDE: CPT

## 2025-04-08 PROCEDURE — P9059 PLASMA, FRZ BETWEEN 8-24HOUR: HCPCS

## 2025-04-08 PROCEDURE — P9047 ALBUMIN (HUMAN), 25%, 50ML: HCPCS | Performed by: PHYSICIAN ASSISTANT

## 2025-04-08 PROCEDURE — 83735 ASSAY OF MAGNESIUM: CPT

## 2025-04-08 PROCEDURE — 2500000003 HC RX 250 WO HCPCS: Performed by: INTERNAL MEDICINE

## 2025-04-08 PROCEDURE — 86900 BLOOD TYPING SEROLOGIC ABO: CPT

## 2025-04-08 PROCEDURE — 2580000003 HC RX 258: Performed by: INTERNAL MEDICINE

## 2025-04-08 PROCEDURE — 86923 COMPATIBILITY TEST ELECTRIC: CPT

## 2025-04-08 PROCEDURE — 84157 ASSAY OF PROTEIN OTHER: CPT

## 2025-04-08 PROCEDURE — 90935 HEMODIALYSIS ONE EVALUATION: CPT

## 2025-04-08 PROCEDURE — P9016 RBC LEUKOCYTES REDUCED: HCPCS

## 2025-04-08 PROCEDURE — 84484 ASSAY OF TROPONIN QUANT: CPT

## 2025-04-08 PROCEDURE — 82043 UR ALBUMIN QUANTITATIVE: CPT

## 2025-04-08 PROCEDURE — P9040 RBC LEUKOREDUCED IRRADIATED: HCPCS

## 2025-04-08 PROCEDURE — 36556 INSERT NON-TUNNEL CV CATH: CPT | Performed by: INTERNAL MEDICINE

## 2025-04-08 PROCEDURE — 88112 CYTOPATH CELL ENHANCE TECH: CPT

## 2025-04-08 PROCEDURE — 93306 TTE W/DOPPLER COMPLETE: CPT

## 2025-04-08 PROCEDURE — 36592 COLLECT BLOOD FROM PICC: CPT

## 2025-04-08 PROCEDURE — 6370000000 HC RX 637 (ALT 250 FOR IP)

## 2025-04-08 PROCEDURE — 76937 US GUIDE VASCULAR ACCESS: CPT | Performed by: PHYSICIAN ASSISTANT

## 2025-04-08 PROCEDURE — 86706 HEP B SURFACE ANTIBODY: CPT

## 2025-04-08 PROCEDURE — 87040 BLOOD CULTURE FOR BACTERIA: CPT

## 2025-04-08 PROCEDURE — 5A1D70Z PERFORMANCE OF URINARY FILTRATION, INTERMITTENT, LESS THAN 6 HOURS PER DAY: ICD-10-PCS | Performed by: INTERNAL MEDICINE

## 2025-04-08 PROCEDURE — 82550 ASSAY OF CK (CPK): CPT

## 2025-04-08 PROCEDURE — 84100 ASSAY OF PHOSPHORUS: CPT

## 2025-04-08 PROCEDURE — 30243L1 TRANSFUSION OF NONAUTOLOGOUS FRESH PLASMA INTO CENTRAL VEIN, PERCUTANEOUS APPROACH: ICD-10-PCS | Performed by: INTERNAL MEDICINE

## 2025-04-08 PROCEDURE — 30243N1 TRANSFUSION OF NONAUTOLOGOUS RED BLOOD CELLS INTO CENTRAL VEIN, PERCUTANEOUS APPROACH: ICD-10-PCS | Performed by: INTERNAL MEDICINE

## 2025-04-08 PROCEDURE — 89051 BODY FLUID CELL COUNT: CPT

## 2025-04-08 PROCEDURE — 93010 ELECTROCARDIOGRAM REPORT: CPT | Performed by: INTERNAL MEDICINE

## 2025-04-08 PROCEDURE — 80048 BASIC METABOLIC PNL TOTAL CA: CPT

## 2025-04-08 PROCEDURE — 80053 COMPREHEN METABOLIC PANEL: CPT

## 2025-04-08 PROCEDURE — 82947 ASSAY GLUCOSE BLOOD QUANT: CPT

## 2025-04-08 PROCEDURE — 83540 ASSAY OF IRON: CPT

## 2025-04-08 PROCEDURE — 6360000002 HC RX W HCPCS: Performed by: PHYSICIAN ASSISTANT

## 2025-04-08 PROCEDURE — 2580000003 HC RX 258

## 2025-04-08 PROCEDURE — 83550 IRON BINDING TEST: CPT

## 2025-04-08 PROCEDURE — 88305 TISSUE EXAM BY PATHOLOGIST: CPT

## 2025-04-08 PROCEDURE — 85014 HEMATOCRIT: CPT

## 2025-04-08 PROCEDURE — 36430 TRANSFUSION BLD/BLD COMPNT: CPT

## 2025-04-08 PROCEDURE — 02HV33Z INSERTION OF INFUSION DEVICE INTO SUPERIOR VENA CAVA, PERCUTANEOUS APPROACH: ICD-10-PCS | Performed by: INTERNAL MEDICINE

## 2025-04-08 PROCEDURE — 2500000003 HC RX 250 WO HCPCS

## 2025-04-08 PROCEDURE — 82042 OTHER SOURCE ALBUMIN QUAN EA: CPT

## 2025-04-08 PROCEDURE — 82728 ASSAY OF FERRITIN: CPT

## 2025-04-08 PROCEDURE — 85025 COMPLETE CBC W/AUTO DIFF WBC: CPT

## 2025-04-08 PROCEDURE — 71045 X-RAY EXAM CHEST 1 VIEW: CPT

## 2025-04-08 PROCEDURE — 80074 ACUTE HEPATITIS PANEL: CPT

## 2025-04-08 PROCEDURE — 84145 PROCALCITONIN (PCT): CPT

## 2025-04-08 PROCEDURE — 37799 UNLISTED PX VASCULAR SURGERY: CPT

## 2025-04-08 RX ORDER — LACTULOSE 10 G/15ML
20 SOLUTION ORAL 3 TIMES DAILY
Status: DISCONTINUED | OUTPATIENT
Start: 2025-04-08 | End: 2025-04-18 | Stop reason: HOSPADM

## 2025-04-08 RX ORDER — ALBUMIN (HUMAN) 12.5 G/50ML
25 SOLUTION INTRAVENOUS ONCE
Status: DISCONTINUED | OUTPATIENT
Start: 2025-04-08 | End: 2025-04-08

## 2025-04-08 RX ORDER — DEXTROSE MONOHYDRATE 100 MG/ML
INJECTION, SOLUTION INTRAVENOUS CONTINUOUS PRN
Status: DISCONTINUED | OUTPATIENT
Start: 2025-04-08 | End: 2025-04-08

## 2025-04-08 RX ORDER — NOREPINEPHRINE BITARTRATE 0.06 MG/ML
INJECTION, SOLUTION INTRAVENOUS
Status: COMPLETED
Start: 2025-04-08 | End: 2025-04-08

## 2025-04-08 RX ORDER — SODIUM CHLORIDE 9 MG/ML
INJECTION, SOLUTION INTRAVENOUS PRN
Status: DISCONTINUED | OUTPATIENT
Start: 2025-04-08 | End: 2025-04-18 | Stop reason: HOSPADM

## 2025-04-08 RX ORDER — SODIUM CHLORIDE 9 MG/ML
INJECTION, SOLUTION INTRAVENOUS PRN
Status: DISCONTINUED | OUTPATIENT
Start: 2025-04-08 | End: 2025-04-10

## 2025-04-08 RX ORDER — ALBUMIN (HUMAN) 12.5 G/50ML
25 SOLUTION INTRAVENOUS ONCE
Status: COMPLETED | OUTPATIENT
Start: 2025-04-08 | End: 2025-04-08

## 2025-04-08 RX ORDER — EPINEPHRINE IN 0.9 % SOD CHLOR 5 MG/250ML
1-20 PLASTIC BAG, INJECTION (ML) INTRAVENOUS CONTINUOUS
Status: DISCONTINUED | OUTPATIENT
Start: 2025-04-08 | End: 2025-04-08 | Stop reason: CLARIF

## 2025-04-08 RX ORDER — INSULIN LISPRO 100 [IU]/ML
0-4 INJECTION, SOLUTION INTRAVENOUS; SUBCUTANEOUS
Status: DISCONTINUED | OUTPATIENT
Start: 2025-04-08 | End: 2025-04-18 | Stop reason: HOSPADM

## 2025-04-08 RX ORDER — DIPHENHYDRAMINE HYDROCHLORIDE 50 MG/ML
25 INJECTION, SOLUTION INTRAMUSCULAR; INTRAVENOUS SEE ADMIN INSTRUCTIONS
Status: DISCONTINUED | OUTPATIENT
Start: 2025-04-08 | End: 2025-04-18 | Stop reason: HOSPADM

## 2025-04-08 RX ORDER — NOREPINEPHRINE BITARTRATE 0.06 MG/ML
1-20 INJECTION, SOLUTION INTRAVENOUS CONTINUOUS
Status: DISCONTINUED | OUTPATIENT
Start: 2025-04-08 | End: 2025-04-08

## 2025-04-08 RX ORDER — VANCOMYCIN 2 GRAM/500 ML IN 0.9 % SODIUM CHLORIDE INTRAVENOUS
2000 ONCE
Status: COMPLETED | OUTPATIENT
Start: 2025-04-08 | End: 2025-04-08

## 2025-04-08 RX ADMIN — SODIUM POLYSTYRENE SULFONATE 30 G: 15 SUSPENSION ORAL; RECTAL at 00:35

## 2025-04-08 RX ADMIN — DEXTROSE: 10 SOLUTION INTRAVENOUS at 05:24

## 2025-04-08 RX ADMIN — Medication 5 MCG/MIN: at 03:18

## 2025-04-08 RX ADMIN — SODIUM ZIRCONIUM CYCLOSILICATE 5 G: 5 POWDER, FOR SUSPENSION ORAL at 04:30

## 2025-04-08 RX ADMIN — INSULIN HUMAN 10 UNITS: 100 INJECTION, SOLUTION PARENTERAL at 04:59

## 2025-04-08 RX ADMIN — HEPARIN SODIUM 5000 UNITS: 5000 INJECTION INTRAVENOUS; SUBCUTANEOUS at 21:25

## 2025-04-08 RX ADMIN — EPOETIN ALFA-EPBX 5000 UNITS: 3000 INJECTION, SOLUTION INTRAVENOUS; SUBCUTANEOUS at 18:41

## 2025-04-08 RX ADMIN — LACTULOSE 20 G: 10 SOLUTION ORAL at 21:25

## 2025-04-08 RX ADMIN — ALBUMIN (HUMAN) 25 G: 0.25 INJECTION, SOLUTION INTRAVENOUS at 08:58

## 2025-04-08 RX ADMIN — Medication 2000 MG: at 05:56

## 2025-04-08 RX ADMIN — PIPERACILLIN AND TAZOBACTAM 3375 MG: 3; .375 INJECTION, POWDER, LYOPHILIZED, FOR SOLUTION INTRAVENOUS at 13:55

## 2025-04-08 RX ADMIN — INSULIN HUMAN 10 UNITS: 100 INJECTION, SOLUTION PARENTERAL at 00:23

## 2025-04-08 RX ADMIN — ALBUMIN (HUMAN) 25 G: 0.25 INJECTION, SOLUTION INTRAVENOUS at 04:57

## 2025-04-08 RX ADMIN — PIPERACILLIN AND TAZOBACTAM 3375 MG: 3; .375 INJECTION, POWDER, LYOPHILIZED, FOR SOLUTION INTRAVENOUS at 02:53

## 2025-04-08 RX ADMIN — SODIUM CHLORIDE, PRESERVATIVE FREE 10 ML: 5 INJECTION INTRAVENOUS at 21:22

## 2025-04-08 RX ADMIN — LACTULOSE 20 G: 10 SOLUTION ORAL at 13:57

## 2025-04-08 RX ADMIN — NOREPINEPHRINE BITARTRATE 5 MCG/MIN: 0.06 INJECTION, SOLUTION INTRAVENOUS at 03:18

## 2025-04-08 RX ADMIN — SODIUM CHLORIDE: 0.9 INJECTION, SOLUTION INTRAVENOUS at 00:46

## 2025-04-08 ASSESSMENT — PAIN SCALES - GENERAL: PAINLEVEL_OUTOF10: 0

## 2025-04-08 NOTE — PROGRESS NOTES
attended the interdisciplinary rounds for Gumaro Khan, who is a 79 y.o.,male. Patient's Primary Language is: English.   According to the patient's EMR Tenriism Affiliation is: Buddhist.     The reason the Patient came to the hospital is:   Patient Active Problem List    Diagnosis Date Noted    Acute renal failure 04/07/2025    Acute renal injury 04/07/2025    Chronic systolic (congestive) heart failure 07/08/2024    Hypercalcemia 05/26/2024    Arrhythmia 05/25/2024    Hyperkalemia 05/24/2024    Anemia 05/24/2024    Recurrent falls 05/24/2024    ATN (acute tubular necrosis) 01/26/2024    Systolic dysfunction 01/26/2024    Acute decompensated heart failure (HCC) 01/25/2024    Hyperglycemia 01/25/2024    Supratherapeutic INR 01/25/2024    General weakness 01/25/2024    GI bleed 01/24/2024    Shock (HCC) 01/24/2024    Renal failure (ARF), acute on chronic 01/24/2024    CKD stage 3 secondary to diabetes ([facility]) 07/11/2023    Hypercholesterolemia 12/31/2018    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    Malignant neoplasm of right lower extremity 08/20/2018    AICD at end of battery life 05/07/2018    Type 2 diabetes mellitus with hypercholesterolemia ([facility]) 04/30/2018    UTI (urinary tract infection) 07/27/2016    NSTEMI (non-ST elevated myocardial infarction) ([facility]) 01/22/2016    Weight loss, abnormal 01/05/2016    Poor appetite 01/05/2016    Cancer associated pain 01/05/2016    Antineoplastic chemotherapy induced anemia 11/24/2015    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 cardiomyopathy

## 2025-04-08 NOTE — ED NOTES
TRANSFER - OUT REPORT:    Verbal report given to Avril on Gumaro Khan  being transferred to ICU for routine progression of patient care       Report consisted of patient's Situation, Background, Assessment and   Recommendations(SBAR).     Information from the following report(s) Nurse Handoff Report, ED Encounter Summary, ED SBAR, Intake/Output, MAR, Recent Results, and Cardiac Rhythm V-paced  was reviewed with the receiving nurse.    [place] Fall Assessment:    Presents to emergency department  because of falls (Syncope, seizure, or loss of consciousness): Yes  Age > 70: Yes  Altered Mental Status, Intoxication with alcohol or substance confusion (Disorientation, impaired judgment, poor safety awaremess, or inability to follow instructions): No  Impaired Mobility: Ambulates or transfers with assistive devices or assistance; Unable to ambulate or transer.: Yes  Nursing Judgement: Yes          Lines:   Dual Lumen Implantable Port Left Chest (Active)       Peripheral IV 04/07/25 Right Forearm (Active)       Peripheral IV 04/07/25 Left;Anterior Antecubital (Active)        CRITICAL LABS:     Verbally repeated the following test results Lab/POC: potassium 8,GFR 13 to [facility].    Orders for critical lab are (pordered    Repeat lab draw for critical 0130        Park Baez RN     Opportunity for questions and clarification was provided.      Patient transported with:  Monitor and Registered Nurse

## 2025-04-08 NOTE — PROCEDURES
[facility]  Temporary Hemodialysis Catheter Procedure Note With Ultrasound Guidance    Indication/ pre procedure diagnosis: need for dialysis/CRRT  Post procedure diagnosis: same    Risks, benefits, alternatives explained and consent obtained.    Time out performed.  Patient positioned in Trendelenburg.     Central line Bundle:  Full sterile barrier precautions used.  7-Step Sterility Protocol followed.  (cap, mask sterile gown, sterile gloves, large sterile sheet, hand hygiene, 2% chlorhexidine for cutaneous antisepsis)  5 mL 1% Lidocaine placed at insertion site.      Using ultrasound guidance,   left femoral vein cannulated x 1 attempt(s) utilizing the modified Seldinger technique.    Position of guidewire confirmed in vein using ultrasound prior to dilating.   Dilation completed twice successfully.   Guidewire was removed.  Central venous catheter was placed without difficulty.  no immediate complications encountered.  Good blood return on all 3 ports.  Catheter secured & Biopatch applied.  Sterile Tegaderm placed.            Niyah Trevizo PA-C  04/08/25  Pulmonary, Critical Care Medicine  [facility]

## 2025-04-08 NOTE — PLAN OF CARE
Problem: Chronic Conditions and Co-morbidities  Goal: Patient's chronic conditions and co-morbidity symptoms are monitored and maintained or improved  Recent Flowsheet Documentation  Taken 4/8/2025 0630 by Avril Og GN  Care Plan - Patient's Chronic Conditions and Co-Morbidity Symptoms are Monitored and Maintained or Improved:   Collaborate with multidisciplinary team to address chronic and comorbid conditions and prevent exacerbation or deterioration   Update acute care plan with appropriate goals if chronic or comorbid symptoms are exacerbated and prevent overall improvement and discharge  Taken 4/8/2025 0400 by Avril Og GN  Care Plan - Patient's Chronic Conditions and Co-Morbidity Symptoms are Monitored and Maintained or Improved: Update acute care plan with appropriate goals if chronic or comorbid symptoms are exacerbated and prevent overall improvement and discharge     Problem: Discharge Planning  Goal: Discharge to home or other facility with appropriate resources  Outcome: Progressing  Flowsheets (Taken 4/8/2025 0630)  Discharge to home or other facility with appropriate resources:   Identify discharge learning needs (meds, wound care, etc)   Arrange for needed discharge resources and transportation as appropriate   Identify barriers to discharge with patient and caregiver     Problem: Pain  Goal: Verbalizes/displays adequate comfort level or baseline comfort level  Outcome: Progressing  Flowsheets (Taken 4/8/2025 0600)  Verbalizes/displays adequate comfort level or baseline comfort level:   Encourage patient to monitor pain and request assistance   Assess pain using appropriate pain scale   Administer analgesics based on type and severity of pain and evaluate response     Problem: Safety - Adult  Goal: Free from fall injury  Outcome: Progressing

## 2025-04-08 NOTE — PROGRESS NOTES
Pharmacy Note     Zosyn 2.25 gm IV q8h ordered for treatment of sepsis. Per [facility] Policy, Zosyn will be changed to 3.375 gm IV extended infusion q12h.     Estimated Creatinine Clearance: Estimated Creatinine Clearance: 16 mL/min (A) (based on SCr of 4.32 mg/dL (H)).  Dialysis Status, SHAHNAZ, CKD: SHAHNAZ    BMI:  Body mass index is 23.09 kg/m².    Rationale for Adjustment:  [facility] B-Lactam extended infusion policy    Pharmacy will continue to monitor and adjust dose as necessary.      Please call with any questions.    Thank you,  CHINO DELOEN, [facility]

## 2025-04-08 NOTE — PROCEDURES
PROCEDURE NOTE  Date: 2025   Name: Gumaro Khan  YOB: 1945    Procedures    [facility]   Abdominal Paracentesis Note   Name:  Gumaro Khan   :  1945   MRN:  [id number]   Date:  [unfilled]       Procedure: US guided paracentesis - RIGHT ... lower quadrant of abdomen  Indication/ pre procedure diagnosis: Cirrhosis, tense ascites  Post procedure diagnosis: same   Labs: reviewed    Consent was obtained by the patient, and wife signed as the MPOA.   Prior to procedure, right lower quadrant examined with ultrasound and site marked.  Time out done with FREDERICK Velazquez.    Sterile precautions were taken,  The site was cleaned with chloroprep and betadine times 3.  The site was prepped and draped in usual sterile fashion.   1% lidocaine anesthetic was administered locally utilizing ultrasound.  Peritoneal space entered utilizing centesis needle from paracentesis kit until yellow ascitic fluid yielded.  Catheter advance utilizing Seldinger technique over centesis needle.   Ascitic samples pulled for samples, totaling about 120 mL.  Following that, a total of 2 L drained into Vacutainer.     Patient tolerated procedure well, breathing and O2 sats remained stable during the procedure.  No immediate complications, no bleeding or hematoma.       Labs sent for: albumin, ldh, cell count, cytology, gram stain and culture.      Kristi Bianchi PA-C  25  Pulmonary, Critical Care Medicine  [facility]

## 2025-04-08 NOTE — PROCEDURES
PROCEDURE NOTE  Date: 4/8/2025   Name: Gumaro hKan  YOB: 1945    Procedures  [facility]  Central Line Procedure Note With Ultrasound Guidance    Indication/ pre procedure diagnosis: Need for vasopressors  Post procedure diagnosis: same    Risks, benefits, alternatives explained and consent obtained.    Time out performed.  Patient positioned in Trendelenburg.     Central line Bundle:  Full sterile barrier precautions used.  7-Step Sterility Protocol followed.  (cap, mask sterile gown, sterile gloves, large sterile sheet, hand hygiene, 2% chlorhexidine for cutaneous antisepsis)  5 mL 1% Lidocaine placed at insertion site.      Using ultrasound guidance,   RIGHT/LEFT: rightinternal jugular vein cannulated x 1 attempt(s) utilizing the modified Seldinger technique.    Position of guidewire confirmed in vein using ultrasound prior to dilating.   Dilation completed once successfully.   Guidewire was removed.  Central venous catheter was placed without difficulty.  no immediate complications encountered.  Good blood return on all 3 ports.  Catheter secured & Biopatch applied.  Sterile Tegaderm placed.      CXR pending.        Luba Gamez MD  04/08/25  Pulmonary, Critical Care Medicine  [facility]

## 2025-04-08 NOTE — ED NOTES
Informed Dr. Bustamante of repeat potassium of 7.3. Confirmed plan to administer hyperkalemia protocol again.

## 2025-04-08 NOTE — ED PROVIDER NOTES
[facility] EMERGENCY DEPARTMENT  EMERGENCY DEPARTMENT ENCOUNTER    Patient Name: Gumaro Khan  MRN: [id number]  YOB: 1945  Provider: Dayron Covington DO  PCP: Artis Shell MD   Time/Date of evaluation: 9:39 PM EDT on 4/7/25    History of Presenting Illness     History Provided by: Patient EMS  History is limited by: Nothing     HISTORY:   Gumaro Khan is a 79 y.o. male with PMHx with AICD and PMHx HFrEF (EF 25% on 3/11/2024), a-fib, DM and hx hypoglycemia, HTN, pulmonary HTN, renal artery stenosis presenting to ED today for dyspnea. Pt states progressively worsening SOB over past two days. Notes fall two days ago onto L lower leg resulting in skin tear without other trauma. Since this time, worsening abd distension and dyspnea. Denies abd pain, N/V/D, fever, CP, diaphoresis, weakness, numbness, or other associated symptoms.     Nursing Notes were all reviewed and agreed with or any disagreements were addressed in the HPI.    Past History     PAST MEDICAL HISTORY:  Past Medical History:   Diagnosis Date    Abnormal myocardial perfusion study 11/02/2015    At most mild LVE.  EF 45-46%.      Abnormal nuclear cardiac imaging test 02/03/2009    Mild basal/mid inferior, inferoapical, inferoseptal infarct w/mild ischemia in RCA (possibly partially artifact).  Anterior, anteroseptal, anterapical ischemia w/o infarct.  (Mid & distal LAD.)  LVE.  EF 29%.  Mod global hyk.      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    DVT (deep venous thrombosis) ([facility]) 09/29/2016    Tech difficult.  No DVT bilaterally.    History of echocardiogram 09/29/2016    LVE.  EF 15% at most.   Indeterminate LV diastolic fx.  RVE.  Reduced RV systolic fx.  RVSP 80-85 mmHg.  LAE.  DAYSI.  Mod-severe MR.  Mild AI.  Severe TR.      HTN (hypertension) 6/8/2010    Hypertensive cardiovascular disease     MGUS (monoclonal gammopathy of unknown significance)     MR (mitral regurgitation)     Pulmonary hypertension, moderate to severe (HCC) 6/8/2010    90-100mmHg; repeat echo in 3/11 showed 70mmHg    Renal artery stenosis 06/07/2010    No evidence of renal artery stenosis >60%.  Patent SMA and celiac artery.    S/P cardiac cath 02/11/2009    Patent coronary arteries.  LVEDP 28.  EF 35%.  Global hyk.  Mod MR.      Thrombocytopenia        PAST SURGICAL HISTORY:  Past Surgical History:   Procedure Laterality Date    COLONOSCOPY N/A 6/15/2020    COLONOSCOPY polypectomies performed by Victor Hugo Cordoba MD at [facility] ENDOSCOPY    HERNIA REPAIR  2011    IR REMOVE TUNNELED CVAD W SQ PORT/PUMP INSERT  4/19/2019    UPPER GASTROINTESTINAL ENDOSCOPY N/A 1/25/2024    EGD ESOPHAGOGASTRODUODENOSCOPY with clip performed by Luis Tan MD at [facility] ENDOSCOPY       FAMILY HISTORY:  Family History   Problem Relation Age of Onset    Hypertension Mother     No Known Problems Father     No Known Problems Sister     No Known Problems Brother     No Known Problems Maternal Aunt     No Known Problems Maternal Uncle     No Known Problems Paternal Aunt     No Known Problems Paternal Uncle     No Known Problems Maternal Grandmother     No Known Problems Maternal Grandfather     No Known Problems Paternal Grandmother     No Known Problems Paternal Grandfather     No Known Problems Other        SOCIAL HISTORY:  Social History     Tobacco Use    Smoking status: Never    Smokeless tobacco: Never   Substance Use Topics    Alcohol use: Yes    Drug use: No       MEDICATIONS:  Current Facility-Administered Medications   Medication Dose Route Frequency Provider Last Rate Last Admin    glucose chewable tablet 16 g  4 tablet Oral PRN Dayron Covington DO         in time range)     Or   dextrose bolus 10% 250 mL (has no administration in time range)   glucagon injection 1 mg (has no administration in time range)   dextrose 10 % infusion (has no administration in time range)   midodrine (PROAMATINE) tablet 10 mg (10 mg Oral Given 4/7/25 1903)   sodium chloride 0.9 % bolus 250 mL (0 mLs IntraVENous Stopped 4/7/25 2000)   calcium gluconate 1,000 mg in sodium chloride 50 mL (0 mg IntraVENous Stopped 4/7/25 2030)   insulin regular (HumuLIN R;NovoLIN R) injection 10 Units (10 Units IntraVENous Given 4/7/25 2027)     And   dextrose bolus 10% 250 mL (0 mLs IntraVENous Stopped 4/7/25 2040)   albuterol (PROVENTIL) (2.5 MG/3ML) 0.083% nebulizer solution 10 mg (10 mg Nebulization Given 4/7/25 2011)   sodium bicarbonate 8.4 % injection 50 mEq (50 mEq IntraVENous Given 4/7/25 2008)       CONSULTS: (Who and What was discussed)  None    Chronic Conditions: HTN, renal artery stenosis, DM, HFrEF    Social Determinants affecting Dx or Tx: None    Records Reviewed (source and summary of external notes): Old Medical Records Pt with AICD and PMHx HFrEF (EF 25% on 3/11/2024), a-fib, DM and hx hypoglycemia, HTN, pulmonary HTN, renal artery stenosis.    @[facility]@    Critical Care Time: 61 minutes    SCREENING TOOLS:  None    CLINICAL MANAGEMENT TOOLS:  Not Applicable      RECORDS REVIEWED: Nursing Notes    Is this patient to be included in the SEP-1 core measure? No Exclusion criteria - the patient is NOT to be included for SEP-1 Core Measure due to: Infection is not suspected    MEDICATIONS ADMINISTERED IN THE ED:  Medications   glucose chewable tablet 16 g (has no administration in time range)   dextrose bolus 10% 125 mL (has no administration in time range)     Or   dextrose bolus 10% 250 mL (has no administration in time range)   glucagon injection 1 mg (has no administration in time range)   dextrose 10 % infusion (has no administration in time range)   midodrine (PROAMATINE) tablet 10 mg (10

## 2025-04-08 NOTE — CONSENT
Informed Consent for Blood Component Transfusion Note    I have discussed with the patient the rationale for blood component transfusion; its benefits in treating or preventing fatigue, organ damage, or death; and its risk which includes mild transfusion reactions, rare risk of blood borne infection, or more serious but rare reactions. I have discussed the alternatives to transfusion, including the risk and consequences of not receiving transfusion. The patient had an opportunity to ask questions and had agreed to proceed with transfusion of blood components.    Electronically signed by Kristi Bianchi PA-C on 4/8/25 at 7:43 AM EDT      Kristi Bianchi PA-C  04/08/25  Pulmonary, Critical Care Medicine  [facility]

## 2025-04-08 NOTE — PLAN OF CARE
Problem: Chronic Conditions and Co-morbidities  Goal: Patient's chronic conditions and co-morbidity symptoms are monitored and maintained or improved  Outcome: Progressing  Flowsheets  Taken 4/8/2025 0800 by Jannette Wells RN  Care Plan - Patient's Chronic Conditions and Co-Morbidity Symptoms are Monitored and Maintained or Improved:   Monitor and assess patient's chronic conditions and comorbid symptoms for stability, deterioration, or improvement   Collaborate with multidisciplinary team to address chronic and comorbid conditions and prevent exacerbation or deterioration  Taken 4/8/2025 0630 by Avril Og GN  Care Plan - Patient's Chronic Conditions and Co-Morbidity Symptoms are Monitored and Maintained or Improved:   Collaborate with multidisciplinary team to address chronic and comorbid conditions and prevent exacerbation or deterioration   Update acute care plan with appropriate goals if chronic or comorbid symptoms are exacerbated and prevent overall improvement and discharge  Taken 4/8/2025 0400 by Avril Og GN  Care Plan - Patient's Chronic Conditions and Co-Morbidity Symptoms are Monitored and Maintained or Improved: Update acute care plan with appropriate goals if chronic or comorbid symptoms are exacerbated and prevent overall improvement and discharge     Problem: Discharge Planning  Goal: Discharge to home or other facility with appropriate resources  4/8/2025 1441 by Jannette Wells RN  Outcome: Progressing  Flowsheets (Taken 4/8/2025 0800)  Discharge to home or other facility with appropriate resources: Identify barriers to discharge with patient and caregiver  4/8/2025 0645 by Avril Og GN  Outcome: Progressing  Flowsheets (Taken 4/8/2025 0630)  Discharge to home or other facility with appropriate resources:   Identify discharge learning needs (meds, wound care, etc)   Arrange for needed discharge resources and transportation as appropriate   Identify barriers to discharge

## 2025-04-08 NOTE — H&P
HPI    This is a 79-year-old male who appears to have some underlying dementia.  He is review of system is unreliable at this time.  The history was taken from ER report as well as nursing note.  I spoke to the resident/attending who admitted the patient in an effort regarding the case.    Patient came to the ED due to difficulty breathing.  He stated that he has not feeling well and has been having difficulty catching his breath for the last few days.  He lives at home reportedly with his wife who helps take care of him.  Does have some increased edema to lower extremities.    He denied chest pain.  No chest pressure.  No chest palpitation.  No orthopnea.  No syncopal episode.    Patient also denied any nausea or vomiting.  He does report some decreased appetite.  He denied fever.  No chills.  No diarrhea.    In admission in the ER, workup showing multiple abnormality with a BUN of 124 and a creatinine of 4.  He is potassium is 8.  Slightly low sodium level.  No leukocytosis.  He also showing diffuse signs of coagulopathy with an INR of 1.5.  He is on Coumadin.  Magnesium is elevated above 3.  Ammonia is elevated as well.  Which indicate likely liver disease such as cirrhosis.  Urinalysis was not very impressive however bacteriuria is appreciated and some trace leukoesterase.    Per ER report, nephrology is being consulted.  And assistance greatly appreciated    A CAT scan of the abdomen also showing ascites associated with liver failure likely.  Lithiasis, diverticulosis and some chronic hematoma also appreciated.  Chest x-ray did not show signs of pulmonary congestion signifying fluid overload state likely.      Past Medical History:   Diagnosis Date    Abnormal myocardial perfusion study 11/02/2015    At most mild LVE.  EF 45-46%.      Abnormal nuclear cardiac imaging test 02/03/2009    Mild basal/mid inferior, inferoapical, inferoseptal infarct w/mild ischemia in RCA (possibly partially artifact).  Anterior,  Known Problems Maternal Grandmother     No Known Problems Maternal Grandfather     No Known Problems Paternal Grandmother     No Known Problems Paternal Grandfather     No Known Problems Other       Social History     Tobacco Use    Smoking status: Never    Smokeless tobacco: Never   Substance Use Topics    Alcohol use: Yes       Prior to Admission medications    Medication Sig Start Date End Date Taking? Authorizing Provider   digoxin (LANOXIN) 125 MCG tablet TAKE 1 TABLET BY MOUTH DAILY 3/18/25   Viv Lazaro APRN - NP   spironolactone (ALDACTONE) 25 MG tablet Take 1 tablet by mouth daily 9/24/24   Racheal Coon MD   bumetanide (BUMEX) 1 MG tablet Take 1 tablet by mouth every other day  Patient taking differently: Take 1 tablet by mouth daily 7/8/24   Viv Lazaro APRN - NP   ferrous sulfate (IRON 325) 325 (65 Fe) MG tablet Take 1 tablet by mouth daily (with breakfast) 5/29/24   Alexander Kirkland MD   amitriptyline (ELAVIL) 25 MG tablet Take 1 tablet by mouth nightly    ProviderRacheal MD   acetaminophen (TYLENOL) 325 MG tablet Take 2 tablets by mouth every 6 hours as needed for Pain    ProviderRacheal MD   clopidogrel (PLAVIX) 75 MG tablet Take 1 tablet by mouth daily 1/31/24   Aline Martin DO   cloNIDine (CATAPRES) 0.2 MG tablet TAKE 1 TABLET BY MOUTH 3  TIMES DAILY 11/7/23   Thiago Velasquez MD   carvedilol (COREG) 25 MG tablet TAKE 1 TABLET BY MOUTH  TWICE DAILY WITH MEALS 8/28/23   Thiago Velasquez MD   allopurinol (ZYLOPRIM) 300 MG tablet Take 1 tablet by mouth daily    Automatic Reconciliation, Ar   insulin glargine (LANTUS) 100 UNIT/ML injection vial Inject 35 Units into the skin nightly    Automatic Reconciliation, Ar   insulin lispro (HUMALOG) 100 UNIT/ML SOLN injection vial Inject 5 Units into the skin 3 times daily Administer 5 units before meals.    Automatic Reconciliation, Ar   magnesium oxide (MAG-OX) 400 MG tablet Take 1 tablet by mouth daily 3/15/17

## 2025-04-08 NOTE — PROGRESS NOTES
Consult Note    Patient: Gumaro Khan               Sex: male          DOA: 4/7/2025         YOB: 1945      Age:  79 y.o.        LOS:  LOS: 1 day              HPI:     Gumaro Khan is a 79 y.o. male who has been seen on consultation at the request of the emergency room physician as well as ICU team.  He presented to the emergency room complaining of acute shortness of breath which started several days back.  He has underlying past medical history of systolic dysfunction pulmonary hypertension paroxysmal atrial fibrillation and polyclonal gammopathy with CKD stage III.  At the time of presentation  found increased work of breathing ,sats in the mid 90s on room air, hypotensive and bradycardic .  .  Potassium was 8 .  I was called about this potassium and was treated medically and urgently as possible with calcium gluconate D10 insulin and Kayexalate also.  Also given some guidelines for the treatment for acute renal failure in the management and the volume status.  Despite persistent dysfunction and to give history of right to improve his hemodynamics.   .  Mann inserted and urine came out ,there is in no obstruction no hydronephrosis.  Urine did show urine chloride less than 10 and urine sodium 17 patient looks dry hypotensive given the situation despite known poor ejection  patient was given a bolus of fluid of normal saline subsequently patient was transferred to the ICU.    CT scan of the abdomen and pelvis was done noted to have significant for ascites and trace bilateral pleural effusion no acute findings no hydronephrosis.   Repeat potassium was checked and still potassium remained quite high ,Bradycardic &hypotensive..  and dextrose and Kayexalate were given decision was made without any further delay need to start dialysis for that reason with l discussion with the PA of ICU team temporary dialysis catheter was arranged  and subsequently started on dialysis.  In the  dialysis on daily basis for the time being..  Prognosis looks guarded   wait for the bedside echocardiogram

## 2025-04-08 NOTE — PROGRESS NOTES
Could not get an accurate temp reading both axillary and orally. A rectal temp probe was placed and read 35 degrees celsius. A felix hugger was placed over top at 230 am. Temps are slowly raising. Will continue to access temp and probe placement

## 2025-04-08 NOTE — PROGRESS NOTES
PT orders received and chart reviewed. Holding PT treatment d/t HD and blood transfusion. Will follow up.

## 2025-04-08 NOTE — H&P
[facility]  Pulmonary, Critical Care, and Sleep Medicine    Name: Gumaro Khan MRN: [id number]   : 1945 Hospital: [facility]   Date: 2025        Critical Care History and Physical      IMPRESSION:   Shock - likely hypovolemic, resolved s/p 500 cc fluid bolus  Severe hyperkalemia - 8 on admission  SHAHNAZ on CKD - prerenal azotemia in the setting of poor PO intake + diuretics vs ischemic ATN  Severe ascites - new onset in the last ~2 weeks per patient with unknown etiology at present. 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)    Mass of right thigh    Weight loss, abnormal    NSTEMI (non-ST elevated myocardial infarction) (HCC)    MR (mitral regurgitation)    Nonischemic cardiomyopathy (HCC)    Type 2 diabetes mellitus with hypercholesterolemia (HCC)    Anxiety disorder    Presence of implantable cardioverter-defibrillator (ICD)    UTI (urinary tract infection)    Poor appetite    Iron deficiency anemia    Atrial fibrillation (HCC)    Follow-up examination    MGUS (monoclonal gammopathy of unknown significance)    Long term current use of anticoagulant therapy    Antineoplastic chemotherapy induced anemia    Cancer associated pain    Pulmonary hypertension, moderate to severe (HCC)    Chronic gout with tophus    Hypercholesterolemia    Leukocytosis    Malignant neoplasm of     No DVT bilaterally.    DVT (deep venous thrombosis) ([facility]) 09/29/2016    Tech difficult.  No DVT bilaterally.    History of echocardiogram 09/29/2016    LVE.  EF 15% at most.  Indeterminate LV diastolic fx.  RVE.  Reduced RV systolic fx.  RVSP 80-85 mmHg.  LAE.  DAYSI.  Mod-severe MR.  Mild AI.  Severe TR.      HTN (hypertension) 6/8/2010    Hypertensive cardiovascular disease     MGUS (monoclonal gammopathy of unknown significance)     MR (mitral regurgitation)     Pulmonary hypertension, moderate to severe ([facility]) 6/8/2010    90-100mmHg; repeat echo in 3/11 showed 70mmHg    Renal artery stenosis 06/07/2010    No evidence of renal artery stenosis >60%.  Patent SMA and celiac artery.    S/P cardiac cath 02/11/2009    Patent coronary arteries.  LVEDP 28.  EF 35%.  Global hyk.  Mod MR.      Thrombocytopenia         Past Surgical History:   Procedure Laterality Date    COLONOSCOPY N/A 6/15/2020    COLONOSCOPY polypectomies performed by Victor Hugo Cordoba MD at [facility] ENDOSCOPY    HERNIA REPAIR  2011    IR REMOVE TUNNELED CVAD W SQ PORT/PUMP INSERT  4/19/2019    UPPER GASTROINTESTINAL ENDOSCOPY N/A 1/25/2024    EGD ESOPHAGOGASTRODUODENOSCOPY with clip performed by Luis Tan MD at [facility] ENDOSCOPY        Prior to Admission medications    Medication Sig Start Date End Date Taking? Authorizing Provider   digoxin (LANOXIN) 125 MCG tablet TAKE 1 TABLET BY MOUTH DAILY 3/18/25   Viv Lazaro APRN - NP   spironolactone (ALDACTONE) 25 MG tablet Take 1 tablet by mouth daily 9/24/24   ProviderRacheal MD   bumetanide (BUMEX) 1 MG tablet Take 1 tablet by mouth every other day  Patient taking differently: Take 1 tablet by mouth daily 7/8/24   Viv Lazaro APRN - NP   ferrous sulfate (IRON 325) 325 (65 Fe) MG tablet Take 1 tablet by mouth daily (with breakfast) 5/29/24   Alexander Kirkland MD   amitriptyline (ELAVIL) 25 MG tablet Take 1 tablet by mouth nightly    ProviderRacheal MD   acetaminophen (TYLENOL) 325 MG  tablet Take 2 tablets by mouth every 6 hours as needed for Pain    Provider, MD Racheal   clopidogrel (PLAVIX) 75 MG tablet Take 1 tablet by mouth daily 1/31/24   Aline Martin DO   cloNIDine (CATAPRES) 0.2 MG tablet TAKE 1 TABLET BY MOUTH 3  TIMES DAILY 11/7/23   Thiago Velasquez MD   carvedilol (COREG) 25 MG tablet TAKE 1 TABLET BY MOUTH  TWICE DAILY WITH MEALS 8/28/23   Thiago Velasquez MD   allopurinol (ZYLOPRIM) 300 MG tablet Take 1 tablet by mouth daily    Automatic Reconciliation, Ar   insulin glargine (LANTUS) 100 UNIT/ML injection vial Inject 35 Units into the skin nightly    Automatic Reconciliation, Ar   insulin lispro (HUMALOG) 100 UNIT/ML SOLN injection vial Inject 5 Units into the skin 3 times daily Administer 5 units before meals.    Automatic Reconciliation, Ar   magnesium oxide (MAG-OX) 400 MG tablet Take 1 tablet by mouth daily 3/15/17   Automatic Reconciliation, Ar   sertraline (ZOLOFT) 25 MG tablet Take 1 tablet by mouth daily    Automatic Reconciliation, Ar   atorvastatin (LIPITOR) 10 MG tablet Take 1 tablet by mouth at bedtime 12/2/22   Provider, MD Racheal       No Known Allergies     Social History     Tobacco Use    Smoking status: Never    Smokeless tobacco: Never   Substance Use Topics    Alcohol use: Yes        Family History   Problem Relation Age of Onset    Hypertension Mother     No Known Problems Father     No Known Problems Sister     No Known Problems Brother     No Known Problems Maternal Aunt     No Known Problems Maternal Uncle     No Known Problems Paternal Aunt     No Known Problems Paternal Uncle     No Known Problems Maternal Grandmother     No Known Problems Maternal Grandfather     No Known Problems Paternal Grandmother     No Known Problems Paternal Grandfather     No Known Problems Other           Review of Systems:  Pertinent items are noted in HPI.    Objective:   Vital Signs:    BP (!) 107/56   Pulse 76   Temp 97 °F (36.1 °C) (Oral)   Resp 22   Ht 1.854  Scan.    CLINICAL INDICATION:  SHAHNAZ.    COMPARISON:  US 01/26/24.  CT 01/24/24.    TECHNIQUE:  Sector scan utilizing insonation frequency of 3.0 MHz and 4.0 MHz.    FINDINGS:    Right kidney size:  11.7 x 5.6 x 4.6 cm.  Left kidney size:  12.5 x 7.0 x 5.4 cm.    The renal cortical parenchymal echogenicity is unremarkable.  No evidence of  solid or complex cystic renal mass, hydronephrosis, or intrarenal stone is  detected in the kidneys.  The previously observed left renal cyst is not  apparent on current exam.    The bladder appears grossly unremarkable.  No free intraperitoneal fluid is  seen.    The visualized portions of the liver and spleen are unremarkable.  The inferior  vena cava and aorta are not well visualized.    Impression  1.  Unremarkable ultrasound of the kidneys.    2.  No hydronephrosis.      01/23/24    ECHO (TTE) COMPLETE (PRN CONTRAST/BUBBLE/STRAIN/3D) 01/24/2024  2:10 PM (Final)    Interpretation Summary    Left Ventricle: Severely reduced left ventricular systolic function with a visually estimated EF of 25 - 30%. Left ventricle is moderately dilated. Mildly increased wall thickness. Severe global hypokinesis present.    Right Ventricle: Reduced systolic function. TAPSE is abnormal.    Aortic Valve: Trileaflet valve. Moderately thickened cusp. Moderately calcified cusp. Mild regurgitation.    Mitral Valve: Moderate to severe regurgitation.    Tricuspid Valve: Moderate regurgitation. Mildly elevated RVSP, consistent with mild pulmonary hypertension. The estimated RVSP is 67 mmHg.    Aorta: Mildly dilated sinus of Valsalva. Dilated ascending aorta. Ao ascending diameter is 4.2 cm.    Image quality is good. Contrast used: Definity.    Signed by: Rinku Clifton on 1/24/2024  2:10 PM                Total of 30 min critical care time spent at bedside during the course of care providing evaluation,management and care decisions and ordering appropriate treatment related to critical care problems

## 2025-04-08 NOTE — DIALYSIS
Treatment summary  Received report from Brenden STEELE Rn     Patient in bed awake and alert, VSS, Left fem. Trialysis functioning well  Accessed without complication, treatment initiated. Started with 1k acid  Bath to run for 1hr, then switch tp 2k to run for 2hrs.    Dialyzed patient in  room 315  for 3 hours.     1100- New catheter being placed at bedside.    1133- One unit PRBC Started  1220- Machine for testing  hep b broke per . No testing for today.    Total Uf 500  ml  Net UF 0 ml     Treatment note:           Patient tolerate treatment well remain in stable condition.    Offered assistance with repositioning every 2 hours.      Vascular access visible at all times and line connected at all   times during treatment. Access R Fem Functioning well   De access without complications.       Report given to Betty GARCIA RN, RN with all questions answered.

## 2025-04-09 ENCOUNTER — APPOINTMENT (OUTPATIENT)
Facility: HOSPITAL | Age: 80
DRG: 432 | End: 2025-04-09
Payer: MEDICARE

## 2025-04-09 LAB
-: NORMAL
ABO + RH BLD: NORMAL
ALBUMIN SERPL-MCNC: 2.7 G/DL (ref 3.4–5)
ALBUMIN/GLOB SERPL: 0.7 (ref 0.8–1.7)
ALP SERPL-CCNC: 209 U/L (ref 45–117)
ALT SERPL-CCNC: 11 U/L (ref 16–61)
AMMONIA PLAS-SCNC: 64 UMOL/L (ref 11–32)
ANION GAP SERPL CALC-SCNC: 7 MMOL/L (ref 3–18)
ANION GAP SERPL CALC-SCNC: 7 MMOL/L (ref 3–18)
ANION GAP SERPL CALC-SCNC: 8 MMOL/L (ref 3–18)
APTT PPP: 46.5 SEC (ref 23–36.4)
AST SERPL-CCNC: 21 U/L (ref 10–38)
BASOPHILS # BLD: 0.01 K/UL (ref 0–0.1)
BASOPHILS NFR BLD: 0.1 % (ref 0–2)
BILIRUB SERPL-MCNC: 1.7 MG/DL (ref 0.2–1)
BLD PROD TYP BPU: NORMAL
BLD PROD TYP BPU: NORMAL
BLOOD BANK BLOOD PRODUCT EXPIRATION DATE: NORMAL
BLOOD BANK BLOOD PRODUCT EXPIRATION DATE: NORMAL
BLOOD BANK DISPENSE STATUS: NORMAL
BLOOD BANK DISPENSE STATUS: NORMAL
BLOOD BANK ISBT PRODUCT BLOOD TYPE: 6200
BLOOD BANK ISBT PRODUCT BLOOD TYPE: 9500
BLOOD BANK PRODUCT CODE: NORMAL
BLOOD BANK PRODUCT CODE: NORMAL
BLOOD BANK UNIT TYPE AND RH: NORMAL
BLOOD BANK UNIT TYPE AND RH: NORMAL
BLOOD GROUP ANTIBODIES SERPL: NORMAL
BPU ID: NORMAL
BPU ID: NORMAL
BUN SERPL-MCNC: 37 MG/DL (ref 7–18)
BUN SERPL-MCNC: 69 MG/DL (ref 7–18)
BUN SERPL-MCNC: 78 MG/DL (ref 7–18)
BUN/CREAT SERPL: 20 (ref 12–20)
BUN/CREAT SERPL: 24 (ref 12–20)
BUN/CREAT SERPL: 26 (ref 12–20)
CA-I SERPL-SCNC: 1.14 MMOL/L (ref 1.15–1.33)
CALCIUM SERPL-MCNC: 7.9 MG/DL (ref 8.5–10.1)
CALCIUM SERPL-MCNC: 8 MG/DL (ref 8.5–10.1)
CALCIUM SERPL-MCNC: 8.3 MG/DL (ref 8.5–10.1)
CALCIUM SERPL-MCNC: 8.5 MG/DL (ref 8.5–10.1)
CALLED TO: NORMAL
CHLORIDE SERPL-SCNC: 101 MMOL/L (ref 100–111)
CHLORIDE SERPL-SCNC: 102 MMOL/L (ref 100–111)
CHLORIDE SERPL-SCNC: 103 MMOL/L (ref 100–111)
CO2 SERPL-SCNC: 26 MMOL/L (ref 21–32)
CO2 SERPL-SCNC: 27 MMOL/L (ref 21–32)
CO2 SERPL-SCNC: 30 MMOL/L (ref 21–32)
CREAT SERPL-MCNC: 1.88 MG/DL (ref 0.6–1.3)
CREAT SERPL-MCNC: 2.9 MG/DL (ref 0.6–1.3)
CREAT SERPL-MCNC: 2.96 MG/DL (ref 0.6–1.3)
CROSSMATCH RESULT: NORMAL
DIFFERENTIAL METHOD BLD: ABNORMAL
EOSINOPHIL # BLD: 0.11 K/UL (ref 0–0.4)
EOSINOPHIL NFR BLD: 1.2 % (ref 0–5)
ERYTHROCYTE [DISTWIDTH] IN BLOOD BY AUTOMATED COUNT: 19.5 % (ref 11.6–14.5)
EST. AVERAGE GLUCOSE BLD GHB EST-MCNC: 114 MG/DL
GLOBULIN SER CALC-MCNC: 4 G/DL (ref 2–4)
GLUCOSE BLD STRIP.AUTO-MCNC: 106 MG/DL (ref 70–110)
GLUCOSE BLD STRIP.AUTO-MCNC: 144 MG/DL (ref 70–110)
GLUCOSE BLD STRIP.AUTO-MCNC: 148 MG/DL (ref 70–110)
GLUCOSE BLD STRIP.AUTO-MCNC: 169 MG/DL (ref 70–110)
GLUCOSE SERPL-MCNC: 113 MG/DL (ref 74–99)
GLUCOSE SERPL-MCNC: 127 MG/DL (ref 74–99)
GLUCOSE SERPL-MCNC: 142 MG/DL (ref 74–99)
HAV IGM SER QL: NEGATIVE
HBA1C MFR BLD: 5.6 % (ref 4.2–5.6)
HBV CORE IGM SER QL: NEGATIVE
HBV CORE IGM SER QL: NEGATIVE
HBV SURFACE AB SER QL IA: NEGATIVE
HBV SURFACE AB SERPL IA-ACNC: <3.1 MIU/ML
HBV SURFACE AG SER QL: <0.1 INDEX
HBV SURFACE AG SER QL: <0.1 INDEX
HBV SURFACE AG SER QL: NEGATIVE
HBV SURFACE AG SER QL: NEGATIVE
HCT VFR BLD AUTO: 21.4 % (ref 36–48)
HCV AB SER IA-ACNC: 0.13 INDEX
HCV AB SERPL QL IA: NEGATIVE
HEP BS AB COMMENT: ABNORMAL
HEPATITIS C COMMENT: NORMAL
HGB BLD-MCNC: 7.2 G/DL (ref 13–16)
IMM GRANULOCYTES # BLD AUTO: 0.05 K/UL (ref 0–0.04)
IMM GRANULOCYTES NFR BLD AUTO: 0.6 % (ref 0–0.5)
INR PPP: 1.6 (ref 0.9–1.1)
LYMPHOCYTES # BLD: 1.05 K/UL (ref 0.9–3.6)
LYMPHOCYTES NFR BLD: 11.8 % (ref 21–52)
MAGNESIUM SERPL-MCNC: 2 MG/DL (ref 1.6–2.6)
MAGNESIUM SERPL-MCNC: 2.2 MG/DL (ref 1.6–2.6)
MCH RBC QN AUTO: 29.3 PG (ref 24–34)
MCHC RBC AUTO-ENTMCNC: 33.6 G/DL (ref 31–37)
MCV RBC AUTO: 87 FL (ref 78–100)
MONOCYTES # BLD: 0.81 K/UL (ref 0.05–1.2)
MONOCYTES NFR BLD: 9.1 % (ref 3–10)
NEUTS SEG # BLD: 6.88 K/UL (ref 1.8–8)
NEUTS SEG NFR BLD: 77.2 % (ref 40–73)
NRBC # BLD: 0.05 K/UL (ref 0–0.01)
NRBC BLD-RTO: 0.6 PER 100 WBC
PERIPHERAL SMEAR, MD REVIEW: NORMAL
PHOSPHATE SERPL-MCNC: 2.6 MG/DL (ref 2.5–4.9)
PHOSPHATE SERPL-MCNC: 4.8 MG/DL (ref 2.5–4.9)
PLATELET # BLD AUTO: 159 K/UL (ref 135–420)
PMV BLD AUTO: 11.9 FL (ref 9.2–11.8)
POTASSIUM SERPL-SCNC: 4 MMOL/L (ref 3.5–5.5)
POTASSIUM SERPL-SCNC: 5.2 MMOL/L (ref 3.5–5.5)
POTASSIUM SERPL-SCNC: 5.3 MMOL/L (ref 3.5–5.5)
PROT SERPL-MCNC: 6.7 G/DL (ref 6.4–8.2)
PROTHROMBIN TIME: 19.4 SEC (ref 11.9–14.9)
PTH-INTACT SERPL-MCNC: 83.5 PG/ML (ref 18.4–88)
RBC # BLD AUTO: 2.46 M/UL (ref 4.35–5.65)
SODIUM SERPL-SCNC: 136 MMOL/L (ref 136–145)
SODIUM SERPL-SCNC: 137 MMOL/L (ref 136–145)
SODIUM SERPL-SCNC: 138 MMOL/L (ref 136–145)
SPECIMEN EXP DATE BLD: NORMAL
UNIT DIVISION: 0
UNIT DIVISION: 0
UNIT ISSUE DATE/TIME: NORMAL
UNIT ISSUE DATE/TIME: NORMAL
VANCOMYCIN SERPL-MCNC: 12 UG/ML (ref 5–40)
WBC # BLD AUTO: 8.9 K/UL (ref 4.6–13.2)

## 2025-04-09 PROCEDURE — 36592 COLLECT BLOOD FROM PICC: CPT

## 2025-04-09 PROCEDURE — 82330 ASSAY OF CALCIUM: CPT

## 2025-04-09 PROCEDURE — 6360000002 HC RX W HCPCS

## 2025-04-09 PROCEDURE — 84100 ASSAY OF PHOSPHORUS: CPT

## 2025-04-09 PROCEDURE — 6360000002 HC RX W HCPCS: Performed by: PHYSICIAN ASSISTANT

## 2025-04-09 PROCEDURE — 2500000003 HC RX 250 WO HCPCS

## 2025-04-09 PROCEDURE — 83970 ASSAY OF PARATHORMONE: CPT

## 2025-04-09 PROCEDURE — 2500000003 HC RX 250 WO HCPCS: Performed by: INTERNAL MEDICINE

## 2025-04-09 PROCEDURE — 83735 ASSAY OF MAGNESIUM: CPT

## 2025-04-09 PROCEDURE — 2000000000 HC ICU R&B

## 2025-04-09 PROCEDURE — 80048 BASIC METABOLIC PNL TOTAL CA: CPT

## 2025-04-09 PROCEDURE — 97535 SELF CARE MNGMENT TRAINING: CPT

## 2025-04-09 PROCEDURE — 99291 CRITICAL CARE FIRST HOUR: CPT | Performed by: INTERNAL MEDICINE

## 2025-04-09 PROCEDURE — 97162 PT EVAL MOD COMPLEX 30 MIN: CPT

## 2025-04-09 PROCEDURE — 83036 HEMOGLOBIN GLYCOSYLATED A1C: CPT

## 2025-04-09 PROCEDURE — 76705 ECHO EXAM OF ABDOMEN: CPT

## 2025-04-09 PROCEDURE — 6370000000 HC RX 637 (ALT 250 FOR IP)

## 2025-04-09 PROCEDURE — 90935 HEMODIALYSIS ONE EVALUATION: CPT

## 2025-04-09 PROCEDURE — 82962 GLUCOSE BLOOD TEST: CPT

## 2025-04-09 PROCEDURE — 6360000002 HC RX W HCPCS: Performed by: INTERNAL MEDICINE

## 2025-04-09 PROCEDURE — APPSS30 APP SPLIT SHARED TIME 16-30 MINUTES

## 2025-04-09 PROCEDURE — 82140 ASSAY OF AMMONIA: CPT

## 2025-04-09 PROCEDURE — 94761 N-INVAS EAR/PLS OXIMETRY MLT: CPT

## 2025-04-09 PROCEDURE — 80202 ASSAY OF VANCOMYCIN: CPT

## 2025-04-09 PROCEDURE — 85730 THROMBOPLASTIN TIME PARTIAL: CPT

## 2025-04-09 PROCEDURE — 97166 OT EVAL MOD COMPLEX 45 MIN: CPT

## 2025-04-09 PROCEDURE — 85610 PROTHROMBIN TIME: CPT

## 2025-04-09 PROCEDURE — 97110 THERAPEUTIC EXERCISES: CPT

## 2025-04-09 PROCEDURE — 85025 COMPLETE CBC W/AUTO DIFF WBC: CPT

## 2025-04-09 PROCEDURE — 2580000003 HC RX 258: Performed by: PHYSICIAN ASSISTANT

## 2025-04-09 RX ORDER — HEPARIN SODIUM 1000 [USP'U]/ML
3200 INJECTION, SOLUTION INTRAVENOUS; SUBCUTANEOUS
Status: COMPLETED | OUTPATIENT
Start: 2025-04-09 | End: 2025-04-09

## 2025-04-09 RX ADMIN — LACTULOSE 20 G: 10 SOLUTION ORAL at 08:28

## 2025-04-09 RX ADMIN — LACTULOSE 20 G: 10 SOLUTION ORAL at 14:00

## 2025-04-09 RX ADMIN — HEPARIN SODIUM 3200 UNITS: 1000 INJECTION, SOLUTION INTRAVENOUS; SUBCUTANEOUS at 18:58

## 2025-04-09 RX ADMIN — SODIUM CHLORIDE, PRESERVATIVE FREE 10 ML: 5 INJECTION INTRAVENOUS at 20:21

## 2025-04-09 RX ADMIN — PIPERACILLIN AND TAZOBACTAM 3375 MG: 3; .375 INJECTION, POWDER, LYOPHILIZED, FOR SOLUTION INTRAVENOUS at 01:46

## 2025-04-09 RX ADMIN — LACTULOSE 20 G: 10 SOLUTION ORAL at 20:20

## 2025-04-09 RX ADMIN — HEPARIN SODIUM 5000 UNITS: 5000 INJECTION INTRAVENOUS; SUBCUTANEOUS at 20:20

## 2025-04-09 RX ADMIN — HEPARIN SODIUM 5000 UNITS: 5000 INJECTION INTRAVENOUS; SUBCUTANEOUS at 08:29

## 2025-04-09 RX ADMIN — CEFTRIAXONE 1000 MG: 1 INJECTION, POWDER, FOR SOLUTION INTRAMUSCULAR; INTRAVENOUS at 10:18

## 2025-04-09 ASSESSMENT — PAIN SCALES - GENERAL
PAINLEVEL_OUTOF10: 0

## 2025-04-09 NOTE — PROGRESS NOTES
[facility].  Pulmonary, Critical Care, and Sleep Medicine    Name: Gumaro Khan MRN: [id number]   : 1945 Hospital: [facility]   Date: 2025  Admission Date: 2025     Chart and notes reviewed. Data reviewed. I have evaluated all findings.    [x]I have reviewed the flowsheet and previous day’s notes.        [x]The patient is critically ill on      []Mechanical ventilation []Pressors   []BiPAP []     IMPRESSION:   Shock - likely hypovolemic, resolved s/p 500 cc fluid bolus  Severe hyperkalemia - 8 on admission, resolved following urgent HD  SHAHNAZ on CKD - prerenal azotemia in the setting of poor PO intake + diuretics vs ischemic ATN  Severe ascites - new onset in the last ~2 weeks per patient with unknown etiology at present. No history of cirrhosis documented, no history of prior ascites. 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)    Mass of right thigh    Weight loss, abnormal    NSTEMI (non-ST elevated myocardial infarction) (HCC)    MR (mitral regurgitation)    Nonischemic cardiomyopathy (HCC)    Type 2 diabetes mellitus with hypercholesterolemia (HCC)    Anxiety disorder    Presence of implantable cardioverter-defibrillator (ICD)    UTI (urinary tract infection)    Poor appetite    Iron deficiency anemia    Atrial fibrillation (HCC)    Follow-up examination     04/07/2025    PROTIME 19.4 (H) 04/09/2025    PROTIME 18.9 (H) 04/08/2025    PROTIME 18.5 (H) 04/07/2025      Results       Procedure Component Value Units Date/Time    Culture, Body Fluid (with Gram Stain) [[id number]] Collected: 04/08/25 1045    Order Status: Completed Specimen: Body Fluid from Paracentesis Updated: 04/08/25 1300     Special Requests NO SPECIAL REQUESTS        Gram Stain RARE WBCS SEEN         NO ORGANISMS SEEN        Culture PENDING    Culture, Body Fluid (with Gram Stain) [[id number]] Collected: 04/08/25 0930    Order Status: Canceled Specimen: Body Fluid from Ascitic Fluid     Culture, Blood 1 [[id number]] Collected: 04/08/25 0259    Order Status: Completed Specimen: Blood Updated: 04/09/25 0731     Special Requests NO SPECIAL REQUESTS        Culture NO GROWTH 1 DAY       Culture, Blood 2 [[id number]] Collected: 04/08/25 0259    Order Status: Completed Specimen: Blood Updated: 04/09/25 0731     Special Requests NO SPECIAL REQUESTS        Culture NO GROWTH 1 DAY       Respiratory Panel, Molecular, with COVID-19 (Restricted: peds pts or suitable admitted adults) [[id number]] Collected: 04/07/25 1917    Order Status: Completed Specimen: Nasopharyngeal Updated: 04/07/25 2017     Adenovirus by PCR Not detected        Coronavirus 229E by PCR Not detected        Coronavirus HKU1 by PCR Not detected        Coronavirus NL63 by PCR Not detected        Coronavirus OC43 by PCR Not detected        SARS-CoV-2, PCR Not detected        Human Metapneumovirus by PCR Not detected        Rhinovirus Enterovirus PCR Not detected        Influenza A by PCR Not detected        Influenza B PCR Not detected        Parainfluenza 1 PCR Not detected        Parainfluenza 2 PCR Not detected        Parainfluenza 3 PCR Not detected        Parainfluenza 4 PCR Not detected        Respiratory Syncytial Virus by PCR Not detected        Bordetella parapertussis by PCR Not detected        Bordetella pertussis by PCR Not detected

## 2025-04-09 NOTE — PROGRESS NOTES
attended the interdisciplinary rounds for Gumaro Khan, who is a 79 y.o.,male. Patient's Primary Language is: English.   According to the patient's EMR Pent[employer]ism Affiliation is: [employer]ism.     The reason the Patient came to the hospital is:   Patient Active Problem List    Diagnosis Date Noted    Ascites 04/08/2025    Systolic congestive heart failure (HCC) 04/08/2025    Acute renal failure 04/07/2025    Acute renal injury 04/07/2025    Chronic systolic (congestive) heart failure 07/08/2024    Hypercalcemia 05/26/2024    Arrhythmia 05/25/2024    Hyperkalemia 05/24/2024    Anemia 05/24/2024    Recurrent falls 05/24/2024    ATN (acute tubular necrosis) 01/26/2024    Systolic dysfunction 01/26/2024    Acute decompensated heart failure (HCC) 01/25/2024    Hyperglycemia 01/25/2024    Supratherapeutic INR 01/25/2024    General weakness 01/25/2024    GI bleed 01/24/2024    Shock (HCC) 01/24/2024    Acute renal failure superimposed on chronic kidney disease 01/24/2024    CKD stage 3 secondary to diabetes ([facility]) 07/11/2023    Hypercholesterolemia 12/31/2018    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    Malignant neoplasm of right lower extremity 08/20/2018    AICD at end of battery life 05/07/2018    Type 2 diabetes mellitus with hypercholesterolemia ([facility]) 04/30/2018    UTI (urinary tract infection) 07/27/2016    NSTEMI (non-ST elevated myocardial infarction) ([facility]) 01/22/2016    Weight loss, abnormal 01/05/2016    Poor appetite 01/05/2016    Cancer associated pain 01/05/2016    Antineoplastic chemotherapy induced anemia 11/24/2015    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

## 2025-04-09 NOTE — PROGRESS NOTES
Subjective   Stable   Attended to by nursing and wound care      Prior to Admission medications    Medication Sig Start Date End Date Taking? Authorizing Provider   digoxin (LANOXIN) 125 MCG tablet TAKE 1 TABLET BY MOUTH DAILY 3/18/25   Viv Lazaro APRN - NP   spironolactone (ALDACTONE) 25 MG tablet Take 1 tablet by mouth daily 9/24/24   Racheal Coon MD   bumetanide (BUMEX) 1 MG tablet Take 1 tablet by mouth every other day  Patient taking differently: Take 1 tablet by mouth daily 7/8/24   Viv Lazaro APRN - NP   ferrous sulfate (IRON 325) 325 (65 Fe) MG tablet Take 1 tablet by mouth daily (with breakfast) 5/29/24   Alexander Kirkland MD   amitriptyline (ELAVIL) 25 MG tablet Take 1 tablet by mouth nightly    ProviderRacheal MD   acetaminophen (TYLENOL) 325 MG tablet Take 2 tablets by mouth every 6 hours as needed for Pain    ProviderRacheal MD   clopidogrel (PLAVIX) 75 MG tablet Take 1 tablet by mouth daily 1/31/24   Aline Martin DO   cloNIDine (CATAPRES) 0.2 MG tablet TAKE 1 TABLET BY MOUTH 3  TIMES DAILY 11/7/23   Thiago Velasquez MD   carvedilol (COREG) 25 MG tablet TAKE 1 TABLET BY MOUTH  TWICE DAILY WITH MEALS 8/28/23   Thiago Velasquez MD   allopurinol (ZYLOPRIM) 300 MG tablet Take 1 tablet by mouth daily    Automatic Reconciliation, Ar   insulin glargine (LANTUS) 100 UNIT/ML injection vial Inject 35 Units into the skin nightly    Automatic Reconciliation, Ar   insulin lispro (HUMALOG) 100 UNIT/ML SOLN injection vial Inject 5 Units into the skin 3 times daily Administer 5 units before meals.    Automatic Reconciliation, Ar   magnesium oxide (MAG-OX) 400 MG tablet Take 1 tablet by mouth daily 3/15/17   Automatic Reconciliation, Ar   sertraline (ZOLOFT) 25 MG tablet Take 1 tablet by mouth daily    Automatic Reconciliation, Ar   atorvastatin (LIPITOR) 10 MG tablet Take 1 tablet by mouth at bedtime 12/2/22   Racheal Coon MD       No Known Allergies    Review

## 2025-04-09 NOTE — PLAN OF CARE
Problem: Physical Therapy - Adult  Goal: By Discharge: Performs mobility at highest level of function for planned discharge setting.  See evaluation for individualized goals.  Description: Physical Therapy Goals  Initiated 4/9/2025 and to be accomplished within 7 day(s)  1.  Patient will move from supine to sit and sit to supine  in bed with supervision/set-up in preparation for seated tasks.    2.  Patient will transfer from bed to chair and chair to bed with supervision/set-up using the least restrictive device in preparation for out of bed.  3.  Patient will perform sit to stand with supervision/set-up in preparation for out of bed tasks.  4.  Patient will ambulate with supervision/set-up for 25 feet with the least restrictive device in preparation for home setting.     PLOF: Lives with wife on first floor of two story home. No steps to enter. Amb with walker; has wheelchair and bedside commode. History of falls.   Outcome: Progressing   PHYSICAL THERAPY EVALUATION    Patient: Gumaro Khan (79 y.o. male)  Date: 4/9/2025  Primary Diagnosis: Shortness of breath [R06.02]  Hyperkalemia [E87.5]  Respiratory acidosis [E87.29]  Acute renal failure [N17.9]  Acute renal injury [N17.9]  Ascites due to alcoholic cirrhosis (HCC) [K70.31]  Acute renal failure superimposed on chronic kidney disease, unspecified acute renal failure type, unspecified CKD stage [N17.9, N18.9]  Precautions: Fall Risk  ASSESSMENT :  Oriented to self, time, and place. Wife and son at bedside. Transfers to EOB with close guarding at trunk. Good seated balance. Left knee unable to flex to 90 degrees; baseline rests at approximately 60 degrees from full extension. Attempted sit to stand twice with max A x2; unable to achieve full standing. Lifts hips from bed, unable to extend at hips and knees with BLE under trunk for standing. Able to demonstrate lateral scoot at EOB with min A. Mod A for sit to supine. Decreased safety awareness with  consideration.    Current research shows that an AM-PAC score of 17 (13 without stairs) or less is not associated with a discharge to the patient's home setting.     SUBJECTIVE:   Patient stated “You should see my foot.”    OBJECTIVE DATA SUMMARY:     Past Medical History:   Diagnosis Date    Abnormal myocardial perfusion study 11/02/2015    At most mild LVE.  EF 45-46%.      Abnormal nuclear cardiac imaging test 02/03/2009    Mild basal/mid inferior, inferoapical, inferoseptal infarct w/mild ischemia in RCA (possibly partially artifact).  Anterior, anteroseptal, anterapical ischemia w/o infarct.  (Mid & distal LAD.)  LVE.  EF 29%.  Mod global hyk.      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    DVT (deep venous thrombosis) ([facility]) 09/29/2016    Tech difficult.  No DVT bilaterally.    History of echocardiogram 09/29/2016    LVE.  EF 15% at most.  Indeterminate LV diastolic fx.  RVE.  Reduced RV systolic fx.  RVSP 80-85 mmHg.  LAE.  DAYSI.  Mod-severe MR.  Mild AI.  Severe TR.      HTN (hypertension) 6/8/2010    Hypertensive cardiovascular disease     MGUS (monoclonal gammopathy of unknown significance)     MR (mitral regurgitation)     Pulmonary hypertension, moderate to severe ([facility]) 6/8/2010    90-100mmHg; repeat echo in 3/11 showed 70mmHg    Renal artery stenosis 06/07/2010    No evidence of renal artery stenosis >60%.  Patent SMA and celiac artery.    S/P cardiac cath 02/11/2009    Patent coronary arteries.  LVEDP 28.  EF 35%.  Global hyk.  Mod MR.      Thrombocytopenia      Past Surgical History:   Procedure Laterality Date    COLONOSCOPY N/A 6/15/2020    COLONOSCOPY polypectomies performed by Victor Hugo Cordoba MD at  [facility] ENDOSCOPY    HERNIA REPAIR  2011    IR REMOVE TUNNELED CVAD W SQ PORT/PUMP INSERT  4/19/2019    UPPER GASTROINTESTINAL ENDOSCOPY N/A 1/25/2024    EGD ESOPHAGOGASTRODUODENOSCOPY with clip performed by Luis Tan MD at [facility] ENDOSCOPY     Home Situation:  Social/Functional History  Lives With: Spouse  Type of Home: House  Home Layout: Two level, Performs ADL's on one level  Home Equipment: Walker - Standard, Wheelchair - Manual    Critical Behavior:  Orientation  Orientation Level: Oriented to place;Oriented to person;Oriented to time    Strength (BLE):    Strength: Generally decreased, functional    Range Of Motion (BLE):  AROM: Generally decreased, functional  PROM: Generally decreased, functional    Functional Mobility:  Bed Mobility:  Bed Mobility Training  Bed Mobility Training: Yes  Supine to Sit: Contact guard assistance  Sit to Supine: Partial/Moderate assistance   Scooting: Contact guard assistance;Minimal assistance  Transfers:  Transfer Training  Transfer Training: Yes  Sit to Stand: Substantial/Maximal assistance;2 Person assistance (unable to achieve full upright position)  Balance:   Balance  Sitting: Impaired  Sitting - Static: Good (unsupported)  Sitting - Dynamic: Fair (occasional)  Standing: Impaired (unable to achieve full stand)  Therapeutic Exercises/Neuromuscular Re-education:   Seated EOB 8 minutes  Lateral scoot x2  Sit to stand attempted x2    Pain:  Intensity Pre-treatment: 0/10   Intensity Post-treatment: 0/10  Scale: Numeric Rating Scale    Activity Tolerance:   Activity Tolerance: Patient tolerated evaluation without incident    After treatment:   []         Patient left in no apparent distress sitting up in chair  [x]         Patient left in no apparent distress in bed  [x]         Call bell left within reach  [x]         Nursing notified  []         Caregiver present  []         Bed alarm activated -not on upon entry  []         Chair alarm activated  []         SCDs

## 2025-04-09 NOTE — PROGRESS NOTES
Received pre HD report from  , SHA Beach RN.      Arrived to pt room 315 in ICU. Pt in bed, A+O x4, no s/s of distress noted. RA     Performed time out and safety process per policy. Performed aseptic technique. Accessed LFemoral CVC w/o complications.  Tx initiated at 1550.      CVC flowing with ease.  For hemodynamic stability UF goal 1000 ml.      Offered assistance with repositioning every 2 hours.  Vascular access visible at all times during treatment, line connections intact at all times.      Tx completed at 1858, tolerated well 1 L removed.  De-accessed per protocol.    Heparin locked 1.3 ml in arterial, and 1.3 ml in venous catheter.      Unit nurse , SHA Beach RN given report.    Patient assessment performed and witnessed by CEDRIC Malloy RN

## 2025-04-09 NOTE — CONSULTS
[email or web address]  [phone]    GASTROENTEROLOGY CONSULT      Impression:   1. Ascites of unclear etiology, however, SAAG of 0.6 indicates ascites is less likely from cirrhosis or heart failure.  Recommend evaluation for malignant causes. H/o elevated kappa/lamda lt chains in 5/2024 which can be seen in multiple myeloma and amyloidosis  -s/p paracentesis 4/8/25, only 2L removed due to hypotension. Pt with known CHF   -Findings: T protein 4.6, LDH 92, polys 18, cytology pending, no organisms on gram stain. SAAG 0.6   -receiving Albumin q 12 hrs  -Platelet ct 159  -Ammonia mildly elevated  - on admission, currently 209  AST/ALT low to normal  -Ethanol <3  2. Chronic Anemia  -HH on admission: 7.5,dropped to 6.9, received 1 unit, current Hgb 7.2.  -Iron sat 60, iron 127, ferritin 499  -EGD 1/25/24 - Gastric body had one actively oozing angioectasia. Decision was  to use endoclip as patient has implanted defibrillator.There were some erosions  in the duodenal sweep, without stigmata of recent bleed.  -Colonoscopy 6/15/20 - 8 mm TA polyp in cecum, three 2-23 mm TA polyps in cecum,two 5 mm hyperplastic polyps descending, 2 mm TA polyp transverse, mild  diverticulosis, grade 1 internal hemorrhoids. Further colonoscopy is not  recommended for screening or surveillance purposed due to age/comorbidities   3. Acute renal failure, stage 3 CHD - Creat 4.21 on admission, currently 2.9  4. Hyperkalemia - likely medication induced per nephrology. Initiated on HD due to hemodynamic instability and hyperkalemia. Potassium 8 on admission, currently 5.3  5. Systolic congestive heart failure - echocardiogram pending  -Pt has pacemaker, home meds include Aldactone, Digoxin and Bumex      Plan:     1. Ascites: Low SAAG ascites < 1.1, which makes it less likely to be from cirrhosis/CHF. Will need close evaluation for malignant causes.   2. Anemia: Prior EGD from 1/2024 notable for oozing angioectasia, non-bleeding erosions of  duodenal sweep. Pt is not actively bleeding and he is not a good surgical candidate for procedures. Recommend continuing iron supplementation and monitor for bleeding.   Continue monitor H/H, transfuse for Hgb < 7.  3. Medical management as per primary team  4. GI following      Chief Complaint: Ascites      HPI:  Gumaro Khan is a 79 y.o. male who I am being asked to see in consultation for an opinion regarding ascites.  Pt with h/o CHF, paroxysmal afib, polyclonal gammopathy with CKD 3 presented with SOB for several days. Sats in mid 90s on room air. Pt hypotensive and bradycardic, potassium 8. CT notable for significant ascites and trace bilateral pleural effusion,no hydro. S/p paracentesis on 4/8, only 2L removed due to hypotension.  Pt initiated on Levo and albumin. US of liver pending.  Pt admits to a h/o alcohol use, however, he denies drinking in the last year.     PMH:   Past Medical History:   Diagnosis Date    Abnormal myocardial perfusion study 11/02/2015    At most mild LVE.  EF 45-46%.      Abnormal nuclear cardiac imaging test 02/03/2009    Mild basal/mid inferior, inferoapical, inferoseptal infarct w/mild ischemia in RCA (possibly partially artifact).  Anterior, anteroseptal, anterapical ischemia w/o infarct.  (Mid & distal LAD.)  LVE.  EF 29%.  Mod global hyk.      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    DVT (deep venous thrombosis) (HCC) 09/29/2016    Tech difficult.  No DVT bilaterally.    History of echocardiogram 09/29/2016    LVE.  EF 15% at most.  Indeterminate LV diastolic fx.  RVE.  Reduced RV systolic fx.  RVSP 80-85 mmHg.  LAE.  DAYSI.  Mod-severe MR.  Mild AI.  Severe TR.    firm, distended.   Liver/spleen: not palpable.   rectal: hemoccult/guaiac: not performed.   extremities: no significant deformity or contracture, no edema. Gait not assessed          Basic Metabolic Profile   Recent Labs     04/09/25  0404      K 5.3      CO2 26   BUN 69*   MG 2.2   PHOS 4.8         CBC w/Diff    Recent Labs     04/09/25  0404   WBC 8.9   RBC 2.46*   HGB 7.2*   HCT 21.4*   MCV 87.0   MCH 29.3   MCHC 33.6   RDW 19.5*      MPV 11.9*    No results for input(s): \"MONO\", \"PRO\", \"METAS\" in the last 72 hours.    Invalid input(s): \"GRANS\", \"LYMPH\", \"EOS\", \"BASO\", \"MYELO\", \"BLAST\"     Hepatic Function   No results for input(s): \"TP\" in the last 72 hours.    Invalid input(s): \"ALB\", \"DBILI\", \"TBILI\", \"GPT\", \"SGOT\", \"AP\", \"AML\", \"LPSE\"     Coa   Recent Labs     04/08/25  1520 04/09/25  0052   INR 1.6* 1.6*   APTT 44.0* 46.5*           Charlene Reyes, PA   4/9/2025, 10:32 AM   [facility]  Office: [phone]  [facility] Cell: [phone] (M-F 7:30-4:30)  [email or web address]

## 2025-04-09 NOTE — PLAN OF CARE
Problem: Chronic Conditions and Co-morbidities  Goal: Patient's chronic conditions and co-morbidity symptoms are monitored and maintained or improved  4/8/2025 2206 by Opal Cerda RN  Outcome: Progressing  Flowsheets (Taken 4/8/2025 2000)  Care Plan - Patient's Chronic Conditions and Co-Morbidity Symptoms are Monitored and Maintained or Improved:   Monitor and assess patient's chronic conditions and comorbid symptoms for stability, deterioration, or improvement   Collaborate with multidisciplinary team to address chronic and comorbid conditions and prevent exacerbation or deterioration   Update acute care plan with appropriate goals if chronic or comorbid symptoms are exacerbated and prevent overall improvement and discharge  4/8/2025 1441 by Jannette Wells RN  Outcome: Progressing  Flowsheets  Taken 4/8/2025 0800 by Jannette Wells RN  Care Plan - Patient's Chronic Conditions and Co-Morbidity Symptoms are Monitored and Maintained or Improved:   Monitor and assess patient's chronic conditions and comorbid symptoms for stability, deterioration, or improvement   Collaborate with multidisciplinary team to address chronic and comorbid conditions and prevent exacerbation or deterioration  Taken 4/8/2025 0630 by Avril Og GN  Care Plan - Patient's Chronic Conditions and Co-Morbidity Symptoms are Monitored and Maintained or Improved:   Collaborate with multidisciplinary team to address chronic and comorbid conditions and prevent exacerbation or deterioration   Update acute care plan with appropriate goals if chronic or comorbid symptoms are exacerbated and prevent overall improvement and discharge  Taken 4/8/2025 0400 by Avril Og GN  Care Plan - Patient's Chronic Conditions and Co-Morbidity Symptoms are Monitored and Maintained or Improved: Update acute care plan with appropriate goals if chronic or comorbid symptoms are exacerbated and prevent overall improvement and discharge

## 2025-04-09 NOTE — PLAN OF CARE
Problem: Occupational Therapy - Adult  Goal: By Discharge: Performs self-care activities at highest level of function for planned discharge setting.  See evaluation for individualized goals.  Description: Occupational Therapy Goals:  Initiated 4/9/2025 to be met within 7-10 days.    1.  Patient will perform bed mobility in preparation for ADLs with supervision/set-up.   2.  Patient will perform grooming > 5 minutes sitting EOB with modified independence, G balance.  3.  Patient will perform upper body dressing with modified independence.  4.  Patient will perform toilet transfers with minimal assistance  5.  Patient will perform all aspects of toileting with minimal assistance.  6.  Patient will participate in upper extremity therapeutic exercise/activities with supervision/set-up for 8-10 minutes to increase strength/endurance for ADLs.    7.  Patient will utilize energy conservation techniques during functional activities with verbal cues.      PLOF: Pt lives with spouse in 2SH, first floor setup and needed assist with LB self-care. Pt with step over tub at home, performs basin bath for safety.   Outcome: Progressing      OCCUPATIONAL THERAPY EVALUATION    Patient: Gumaro Khan (79 y.o. male)  Date: 4/9/2025  Primary Diagnosis: Shortness of breath [R06.02]  Hyperkalemia [E87.5]  Respiratory acidosis [E87.29]  Acute renal failure [N17.9]  Acute renal injury [N17.9]  Ascites due to alcoholic cirrhosis (HCC) [K70.31]  Acute renal failure superimposed on chronic kidney disease, unspecified acute renal failure type, unspecified CKD stage [N17.9, N18.9]   Precautions: Fall Risk, General Precautions      ASSESSMENT :  Pt supine in ICU bed, alert, and agreeable to participate with spouse (caregiver) and son present. /43, HR 72 bpm and O2 100%. Patient contact guard assistance for supine > sit and stand by assist for simulating grooming seated EOB. Pt reported feeling dizzy EOB, BP retake 103/55. Pt unable  cath 02/11/2009    Patent coronary arteries.  LVEDP 28.  EF 35%.  Global hyk.  Mod MRNuno Herrera      Past Surgical History:   Procedure Laterality Date    COLONOSCOPY N/A 6/15/2020    COLONOSCOPY polypectomies performed by Victor Hugo Cordoba MD at [facility] ENDOSCOPY    HERNIA REPAIR  2011    IR REMOVE TUNNELED CVAD W SQ PORT/PUMP INSERT  4/19/2019    UPPER GASTROINTESTINAL ENDOSCOPY N/A 1/25/2024    EGD ESOPHAGOGASTRODUODENOSCOPY with clip performed by Luis Tan MD at [facility] ENDOSCOPY     Home Situation:   Social/Functional History  Lives With: Spouse  Type of Home: House  Home Layout: Two level, Performs ADL's on one level  Home Access: Stairs to enter with rails  Entrance Stairs - Number of Steps: 3  Entrance Stairs - Rails: Both  Bathroom Shower/Tub: Tub only  Bathroom Toilet: Bedside commode  Bathroom Equipment: None  Bathroom Accessibility: Accessible  Home Equipment: Walker - Standard, Wheelchair - Manual  Receives Help From: Family  Prior Level of Assist for ADLs: Independent  Prior Level of Assist for Homemaking: Independent  Prior Level of Assist for Transfers: Independent  Active : No  Patient's  Info: Spouse or son  Mode of Transportation: Family  Education: n/a  Occupation: Retired  Type of Occupation: n/a  [x]  Right hand dominant   []  Left hand dominant    Cognitive/Behavioral Status:  Orientation  Orientation Level: Oriented to place;Oriented to person;Oriented to time  Cognition  Overall Cognitive Status: Exceptions  Arousal/Alertness: Appears intact  Following Commands: Follows one step commands with increased time  Attention Span: Appears intact  Safety Judgement: Decreased awareness of need for safety;Decreased awareness of need for assistance  Insights: Decreased awareness of deficits  Initiation: Does not require cues  Sequencing: Requires cues for some    Skin: intact  Edema: none noted    Vision/Perceptual:    Vision  Vision: Within Functional Limits        Coordination:  BUE  Coordination: Generally decreased, functional    Balance:  Balance  Sitting: Impaired  Sitting - Static: Good (unsupported)  Sitting - Dynamic: Fair (occasional)  Standing: Impaired (unable to achieve full stand)    Strength: BUE  Strength: Generally decreased, functional    Tone & Sensation: BUE  Tone: Normal  Sensation: Intact    Range of Motion: BUE  AROM: Generally decreased, functional    Functional Mobility and Transfers for ADLs:  Bed Mobility:  Bed Mobility Training  Bed Mobility Training: Yes  Supine to Sit: Contact guard assistance  Sit to Supine: Partial/Moderate assistance (assist for BLE into bed)  Scooting: Contact guard assistance;Minimal assistance    Transfers:  Transfer Training  Transfer Training: Yes  Sit to Stand: Substantial/Maximal assistance;2 Person assistance (unable to achieve full upright position)    ADL Assessment:   Feeding: Setup  Grooming: Stand by assistance  UE Bathing: Minimal assistance  LE Bathing: Maximum assistance  UE Dressing: Minimal assistance  LE Dressing: Maximum assistance  Toileting: Maximum assistance    Pain:  Intensity Pre-treatment: 0/10   Intensity Post-treatment: 0/10    Activity Tolerance:   Activity Tolerance: Patient Tolerated treatment well  Please refer to the flowsheet for vital signs taken during this treatment.    After treatment:   [] Patient left in no apparent distress sitting up in chair  [x] Patient left in no apparent distress in bed  [x] Call bell left within reach  [x] Nursing notified  [x] Caregiver present  [x] Bed/chair alarm activated    COMMUNICATION/EDUCATION:   Patient Education  Education Given To: Patient;Family  Education Provided: Role of Therapy;Plan of Care;Transfer Training;ADL Adaptive Strategies;Equipment;Precautions;Energy Conservation;Fall Prevention Strategies;Family Education  Education Method: Demonstration;Verbal;Teach Back  Barriers to Learning: None  Education Outcome: Verbalized understanding;Continued education  needed    Thank you for this referral.  Kalani Bunch, OTR/L  Minutes: 20    Eval Complexity: Decision Making: Medium Complexity

## 2025-04-09 NOTE — CARE COORDINATION
Spoke with patient spouse ( Kristi)  via phone HIPAA verified. Initial case management admission assessment completed with patient's permission. Patient Demographics verified. Patient's preference for disposition at discharge is return home.     04/09/25 1032   Service Assessment   Patient Orientation Alert and Oriented   Cognition Alert   History Provided By Significant Other   Primary Caregiver Self   Accompanied By/Relationship Patient is currently alone in the room   Support Systems Spouse/Significant Other   Patient's Healthcare Decision Maker is: Legal Next of Kin   PCP Verified by CM Yes   Last Visit to PCP Within last 6 months   Prior Functional Level Independent in ADLs/IADLs   Current Functional Level Independent in ADLs/IADLs   Can patient return to prior living arrangement Yes   Ability to make needs known: Fair   Family able to assist with home care needs: Yes   Financial Resources Medicare;Other (Comment)  ([facility])   CM/MILAGROS Referral Other (see comment);ADLs/IADLs  (Discharge planning)   Social/Functional History   Lives With Spouse   Type of Home House   Home Layout Two level;Performs ADL's on one level   Home Access Stairs to enter with rails   Entrance Stairs - Number of Steps 3   Entrance Stairs - Rails Both   Bathroom Shower/Tub Tub only   Bathroom Toilet Standard   Bathroom Equipment None   Bathroom Accessibility Accessible   Home Equipment Walker - Standard   Receives Help From Family   Prior Level of Assist for ADLs Independent   Prior Level of Assist for Homemaking Independent   Ambulation Assistance Independent   Prior Level of Assist for Transfers Independent   Active  No   Patient's  Info Spouse or son   Mode of Transportation Family   Education n/a   Occupation Retired   Type of Occupation n/a   Discharge Planning   Type of Residence House   Living Arrangements Spouse/Significant Other   Current Services Prior To Admission None   Potential Assistance Needed N/A   DME  Ordered? Other (comment)   Potential Assistance Purchasing Medications No   Type of Home Care Services None   Patient expects to be discharged to: House   Follow Up Appointment: Best Day/Time    (TBD)   One/Two Story Residence Two story, live on first floor   # of Interior Steps 12   Interior Rails Both   Lift Chair Available No   History of falls? 1   Services At/After Discharge   Transition of Care Consult (CM Consult) Discharge Planning   Services At/After Discharge None   [place] Resource Information Provided? No   Mode of Transport at Discharge S   Hospital Transport Time of Discharge   (TBD at the time of discharge)   Confirm Follow Up Transport Family   Condition of Participation: Discharge Planning   The Plan for Transition of Care is related to the following treatment goals: Not medically ready dispo unknown at this time.   The Patient and/or Patient Representative was provided with a Choice of Provider? Patient Representative   Name of the Patient Representative who was provided with the Choice of Provider and agrees with the Discharge Plan?  spouse-Kristi   The Patient and/Or Patient Representative agree with the Discharge Plan? Yes   Freedom of Choice list was provided with basic dialogue that supports the patient's individualized plan of care/goals, treatment preferences, and shares the quality data associated with the providers?  Yes   Documentation for Discharge Appeal   Discharge Appealed by Other (comment)  (n/a)   Luis Ricks BSW  Case Management

## 2025-04-10 PROBLEM — R06.02 SHORTNESS OF BREATH: Status: ACTIVE | Noted: 2025-04-10

## 2025-04-10 LAB
ALBUMIN SERPL-MCNC: 2.7 G/DL (ref 3.4–5)
ALBUMIN/GLOB SERPL: 0.6 (ref 0.8–1.7)
ALP SERPL-CCNC: 217 U/L (ref 45–117)
ALT SERPL-CCNC: 11 U/L (ref 16–61)
AMMONIA PLAS-SCNC: 45 UMOL/L (ref 11–32)
ANION GAP SERPL CALC-SCNC: 7 MMOL/L (ref 3–18)
ANION GAP SERPL CALC-SCNC: 8 MMOL/L (ref 3–18)
AST SERPL-CCNC: 18 U/L (ref 10–38)
BASOPHILS # BLD: 0.01 K/UL (ref 0–0.1)
BASOPHILS NFR BLD: 0.1 % (ref 0–2)
BILIRUB DIRECT SERPL-MCNC: 1 MG/DL (ref 0–0.2)
BILIRUB SERPL-MCNC: 1.5 MG/DL (ref 0.2–1)
BUN SERPL-MCNC: 38 MG/DL (ref 7–18)
BUN SERPL-MCNC: 39 MG/DL (ref 7–18)
BUN/CREAT SERPL: 19 (ref 12–20)
BUN/CREAT SERPL: 19 (ref 12–20)
CALCIUM SERPL-MCNC: 8.2 MG/DL (ref 8.5–10.1)
CALCIUM SERPL-MCNC: 8.4 MG/DL (ref 8.5–10.1)
CHLORIDE SERPL-SCNC: 101 MMOL/L (ref 100–111)
CHLORIDE SERPL-SCNC: 103 MMOL/L (ref 100–111)
CO2 SERPL-SCNC: 28 MMOL/L (ref 21–32)
CO2 SERPL-SCNC: 28 MMOL/L (ref 21–32)
CREAT SERPL-MCNC: 2.03 MG/DL (ref 0.6–1.3)
CREAT SERPL-MCNC: 2.08 MG/DL (ref 0.6–1.3)
DIFFERENTIAL METHOD BLD: ABNORMAL
ECHO AO ASC DIAM: 4.4 CM
ECHO AO ASCENDING AORTA INDEX: 2.17 CM/M2
ECHO AO ROOT DIAM: 3.9 CM
ECHO AO ROOT INDEX: 1.92 CM/M2
ECHO AR MAX VEL PISA: 2.8 M/S
ECHO AV AREA PEAK VELOCITY: 1.5 CM2
ECHO AV AREA VTI: 1.6 CM2
ECHO AV AREA/BSA PEAK VELOCITY: 0.7 CM2/M2
ECHO AV AREA/BSA VTI: 0.8 CM2/M2
ECHO AV MEAN GRADIENT: 11 MMHG
ECHO AV MEAN VELOCITY: 1.6 M/S
ECHO AV PEAK GRADIENT: 21 MMHG
ECHO AV PEAK VELOCITY: 2.3 M/S
ECHO AV REGURGITANT PHT: 233.7 MS
ECHO AV VELOCITY RATIO: 0.39
ECHO AV VTI: 43.9 CM
ECHO BSA: 2.02 M2
ECHO EST RA PRESSURE: 15 MMHG
ECHO LA VOL A-L A2C: 133 ML (ref 18–58)
ECHO LA VOL A-L A4C: 100 ML (ref 18–58)
ECHO LA VOL BP: 115 ML (ref 18–58)
ECHO LA VOL MOD A2C: 127 ML (ref 18–58)
ECHO LA VOL MOD A4C: 94 ML (ref 18–58)
ECHO LA VOL/BSA BIPLANE: 57 ML/M2 (ref 16–34)
ECHO LA VOLUME AREA LENGTH: 122 ML
ECHO LA VOLUME INDEX A-L A2C: 66 ML/M2 (ref 16–34)
ECHO LA VOLUME INDEX A-L A4C: 49 ML/M2 (ref 16–34)
ECHO LA VOLUME INDEX AREA LENGTH: 60 ML/M2 (ref 16–34)
ECHO LA VOLUME INDEX MOD A2C: 63 ML/M2 (ref 16–34)
ECHO LA VOLUME INDEX MOD A4C: 46 ML/M2 (ref 16–34)
ECHO LV E' LATERAL VELOCITY: 11.62 CM/S
ECHO LV E' SEPTAL VELOCITY: 7.48 CM/S
ECHO LV EDV A2C: 176 ML
ECHO LV EDV A4C: 158 ML
ECHO LV EDV BP: 167 ML (ref 67–155)
ECHO LV EDV INDEX A4C: 78 ML/M2
ECHO LV EDV INDEX BP: 82 ML/M2
ECHO LV EDV NDEX A2C: 87 ML/M2
ECHO LV EF PHYSICIAN: 50 %
ECHO LV EJECTION FRACTION A2C: 54 %
ECHO LV EJECTION FRACTION A4C: 48 %
ECHO LV EJECTION FRACTION BIPLANE: 51 % (ref 55–100)
ECHO LV ESV A2C: 80 ML
ECHO LV ESV A4C: 83 ML
ECHO LV ESV BP: 82 ML (ref 22–58)
ECHO LV ESV INDEX A2C: 39 ML/M2
ECHO LV ESV INDEX A4C: 41 ML/M2
ECHO LV ESV INDEX BP: 40 ML/M2
ECHO LV FRACTIONAL SHORTENING: 13 % (ref 28–44)
ECHO LV INTERNAL DIMENSION DIASTOLE INDEX: 2.66 CM/M2
ECHO LV INTERNAL DIMENSION DIASTOLIC: 5.4 CM (ref 4.2–5.9)
ECHO LV INTERNAL DIMENSION SYSTOLIC INDEX: 2.32 CM/M2
ECHO LV INTERNAL DIMENSION SYSTOLIC: 4.7 CM
ECHO LV IVSD: 1 CM (ref 0.6–1)
ECHO LV MASS 2D: 193.3 G (ref 88–224)
ECHO LV MASS INDEX 2D: 95.2 G/M2 (ref 49–115)
ECHO LV POSTERIOR WALL DIASTOLIC: 0.9 CM (ref 0.6–1)
ECHO LV RELATIVE WALL THICKNESS RATIO: 0.33
ECHO LVOT AREA: 3.8 CM2
ECHO LVOT AV VTI INDEX: 0.42
ECHO LVOT DIAM: 2.2 CM
ECHO LVOT MEAN GRADIENT: 2 MMHG
ECHO LVOT PEAK GRADIENT: 3 MMHG
ECHO LVOT PEAK VELOCITY: 0.9 M/S
ECHO LVOT STROKE VOLUME INDEX: 34.3 ML/M2
ECHO LVOT SV: 69.5 ML
ECHO LVOT VTI: 18.3 CM
ECHO MV A VELOCITY: 0.35 M/S
ECHO MV AREA VTI: 2.9 CM2
ECHO MV E DECELERATION TIME (DT): 139.7 MS
ECHO MV E VELOCITY: 1.13 M/S
ECHO MV E/A RATIO: 3.23
ECHO MV E/E' LATERAL: 9.72
ECHO MV E/E' RATIO (AVERAGED): 12.42
ECHO MV E/E' SEPTAL: 15.11
ECHO MV LVOT VTI INDEX: 1.3
ECHO MV MAX VELOCITY: 1.2 M/S
ECHO MV MEAN GRADIENT: 3 MMHG
ECHO MV MEAN VELOCITY: 0.8 M/S
ECHO MV PEAK GRADIENT: 6 MMHG
ECHO MV VTI: 23.7 CM
ECHO PV MAX VELOCITY: 0.8 M/S
ECHO PV PEAK GRADIENT: 2 MMHG
ECHO RA END SYSTOLIC VOLUME APICAL 4 CHAMBER INDEX BSA: 75 ML/M2
ECHO RA VOLUME AREA LENGTH APICAL 4 CHAMBER: 162 ML
ECHO RA VOLUME: 153 ML
ECHO RIGHT VENTRICULAR SYSTOLIC PRESSURE (RVSP): 56 MMHG
ECHO RV BASAL DIMENSION: 6.1 CM
ECHO RV FREE WALL PEAK S': 5.8 CM/S
ECHO RV TAPSE: 0.9 CM (ref 1.7–?)
ECHO TV REGURGITANT MAX VELOCITY: 3.2 M/S
ECHO TV REGURGITANT PEAK GRADIENT: 41 MMHG
EOSINOPHIL # BLD: 0.17 K/UL (ref 0–0.4)
EOSINOPHIL NFR BLD: 1.8 % (ref 0–5)
ERYTHROCYTE [DISTWIDTH] IN BLOOD BY AUTOMATED COUNT: 20 % (ref 11.6–14.5)
FOLATE SERPL-MCNC: 5.1 NG/ML (ref 3.1–17.5)
GLOBULIN SER CALC-MCNC: 4.6 G/DL (ref 2–4)
GLUCOSE BLD STRIP.AUTO-MCNC: 126 MG/DL (ref 70–110)
GLUCOSE BLD STRIP.AUTO-MCNC: 130 MG/DL (ref 70–110)
GLUCOSE BLD STRIP.AUTO-MCNC: 146 MG/DL (ref 70–110)
GLUCOSE BLD STRIP.AUTO-MCNC: 152 MG/DL (ref 70–110)
GLUCOSE SERPL-MCNC: 121 MG/DL (ref 74–99)
GLUCOSE SERPL-MCNC: 146 MG/DL (ref 74–99)
HCT VFR BLD AUTO: 22 % (ref 36–48)
HGB BLD-MCNC: 7.3 G/DL (ref 13–16)
IMM GRANULOCYTES # BLD AUTO: 0.03 K/UL (ref 0–0.04)
IMM GRANULOCYTES NFR BLD AUTO: 0.3 % (ref 0–0.5)
LYMPHOCYTES # BLD: 1.19 K/UL (ref 0.9–3.6)
LYMPHOCYTES NFR BLD: 12.7 % (ref 21–52)
MAGNESIUM SERPL-MCNC: 2.1 MG/DL (ref 1.6–2.6)
MCH RBC QN AUTO: 29 PG (ref 24–34)
MCHC RBC AUTO-ENTMCNC: 33.2 G/DL (ref 31–37)
MCV RBC AUTO: 87.3 FL (ref 78–100)
MONOCYTES # BLD: 0.98 K/UL (ref 0.05–1.2)
MONOCYTES NFR BLD: 10.5 % (ref 3–10)
NEUTS SEG # BLD: 6.99 K/UL (ref 1.8–8)
NEUTS SEG NFR BLD: 74.6 % (ref 40–73)
NRBC # BLD: 0.04 K/UL (ref 0–0.01)
NRBC BLD-RTO: 0.4 PER 100 WBC
PHOSPHATE SERPL-MCNC: 2.7 MG/DL (ref 2.5–4.9)
PLATELET # BLD AUTO: 156 K/UL (ref 135–420)
PMV BLD AUTO: 9.7 FL (ref 9.2–11.8)
POTASSIUM SERPL-SCNC: 4.1 MMOL/L (ref 3.5–5.5)
POTASSIUM SERPL-SCNC: 4.1 MMOL/L (ref 3.5–5.5)
PROT SERPL-MCNC: 7.3 G/DL (ref 6.4–8.2)
RBC # BLD AUTO: 2.52 M/UL (ref 4.35–5.65)
SODIUM SERPL-SCNC: 137 MMOL/L (ref 136–145)
SODIUM SERPL-SCNC: 138 MMOL/L (ref 136–145)
VIT B12 SERPL-MCNC: 706 PG/ML (ref 211–911)
WBC # BLD AUTO: 9.4 K/UL (ref 4.6–13.2)

## 2025-04-10 PROCEDURE — 6360000002 HC RX W HCPCS

## 2025-04-10 PROCEDURE — 93306 TTE W/DOPPLER COMPLETE: CPT | Performed by: INTERNAL MEDICINE

## 2025-04-10 PROCEDURE — 94761 N-INVAS EAR/PLS OXIMETRY MLT: CPT

## 2025-04-10 PROCEDURE — 83735 ASSAY OF MAGNESIUM: CPT

## 2025-04-10 PROCEDURE — 82746 ASSAY OF FOLIC ACID SERUM: CPT

## 2025-04-10 PROCEDURE — 85025 COMPLETE CBC W/AUTO DIFF WBC: CPT

## 2025-04-10 PROCEDURE — 99291 CRITICAL CARE FIRST HOUR: CPT | Performed by: INTERNAL MEDICINE

## 2025-04-10 PROCEDURE — 82784 ASSAY IGA/IGD/IGG/IGM EACH: CPT

## 2025-04-10 PROCEDURE — 2500000003 HC RX 250 WO HCPCS

## 2025-04-10 PROCEDURE — 82248 BILIRUBIN DIRECT: CPT

## 2025-04-10 PROCEDURE — APPSS30 APP SPLIT SHARED TIME 16-30 MINUTES

## 2025-04-10 PROCEDURE — 2500000003 HC RX 250 WO HCPCS: Performed by: INTERNAL MEDICINE

## 2025-04-10 PROCEDURE — 82140 ASSAY OF AMMONIA: CPT

## 2025-04-10 PROCEDURE — 84100 ASSAY OF PHOSPHORUS: CPT

## 2025-04-10 PROCEDURE — 99223 1ST HOSP IP/OBS HIGH 75: CPT | Performed by: INTERNAL MEDICINE

## 2025-04-10 PROCEDURE — 6360000002 HC RX W HCPCS: Performed by: INTERNAL MEDICINE

## 2025-04-10 PROCEDURE — 6370000000 HC RX 637 (ALT 250 FOR IP)

## 2025-04-10 PROCEDURE — 82962 GLUCOSE BLOOD TEST: CPT

## 2025-04-10 PROCEDURE — 83521 IG LIGHT CHAINS FREE EACH: CPT

## 2025-04-10 PROCEDURE — 2000000000 HC ICU R&B

## 2025-04-10 PROCEDURE — 86334 IMMUNOFIX E-PHORESIS SERUM: CPT

## 2025-04-10 PROCEDURE — 82607 VITAMIN B-12: CPT

## 2025-04-10 PROCEDURE — 80053 COMPREHEN METABOLIC PANEL: CPT

## 2025-04-10 PROCEDURE — 84165 PROTEIN E-PHORESIS SERUM: CPT

## 2025-04-10 RX ORDER — CLONIDINE HYDROCHLORIDE 0.1 MG/1
0.2 TABLET ORAL 3 TIMES DAILY
Status: DISCONTINUED | OUTPATIENT
Start: 2025-04-10 | End: 2025-04-18 | Stop reason: HOSPADM

## 2025-04-10 RX ORDER — DIGOXIN 125 MCG
125 TABLET ORAL DAILY
Status: DISCONTINUED | OUTPATIENT
Start: 2025-04-10 | End: 2025-04-18 | Stop reason: HOSPADM

## 2025-04-10 RX ORDER — SPIRONOLACTONE 25 MG/1
25 TABLET ORAL DAILY
Status: DISCONTINUED | OUTPATIENT
Start: 2025-04-10 | End: 2025-04-15

## 2025-04-10 RX ORDER — CARVEDILOL 25 MG/1
25 TABLET ORAL 2 TIMES DAILY WITH MEALS
Status: DISCONTINUED | OUTPATIENT
Start: 2025-04-10 | End: 2025-04-15

## 2025-04-10 RX ADMIN — HEPARIN SODIUM 5000 UNITS: 5000 INJECTION INTRAVENOUS; SUBCUTANEOUS at 20:43

## 2025-04-10 RX ADMIN — SODIUM CHLORIDE, PRESERVATIVE FREE 10 ML: 5 INJECTION INTRAVENOUS at 09:32

## 2025-04-10 RX ADMIN — CEFTRIAXONE 1000 MG: 1 INJECTION, POWDER, FOR SOLUTION INTRAMUSCULAR; INTRAVENOUS at 08:31

## 2025-04-10 RX ADMIN — LACTULOSE 20 G: 10 SOLUTION ORAL at 20:43

## 2025-04-10 RX ADMIN — LACTULOSE 20 G: 10 SOLUTION ORAL at 08:31

## 2025-04-10 RX ADMIN — SODIUM CHLORIDE, PRESERVATIVE FREE 10 ML: 5 INJECTION INTRAVENOUS at 20:43

## 2025-04-10 RX ADMIN — LACTULOSE 20 G: 10 SOLUTION ORAL at 14:30

## 2025-04-10 RX ADMIN — EPOETIN ALFA-EPBX 5000 UNITS: 3000 INJECTION, SOLUTION INTRAVENOUS; SUBCUTANEOUS at 18:17

## 2025-04-10 RX ADMIN — HEPARIN SODIUM 5000 UNITS: 5000 INJECTION INTRAVENOUS; SUBCUTANEOUS at 08:31

## 2025-04-10 ASSESSMENT — PAIN SCALES - GENERAL
PAINLEVEL_OUTOF10: 0

## 2025-04-10 NOTE — PLAN OF CARE
Problem: Chronic Conditions and Co-morbidities  Goal: Patient's chronic conditions and co-morbidity symptoms are monitored and maintained or improved  Outcome: Progressing     Problem: Discharge Planning  Goal: Discharge to home or other facility with appropriate resources  4/10/2025 1609 by Randall Thao RN  Outcome: Progressing  4/10/2025 0654 by Avril Og GN  Outcome: Progressing     Problem: Pain  Goal: Verbalizes/displays adequate comfort level or baseline comfort level  4/10/2025 1609 by Randall Thao RN  Outcome: Progressing  4/10/2025 0654 by Avril Og GN  Outcome: Progressing  Flowsheets (Taken 4/10/2025 0000)  Verbalizes/displays adequate comfort level or baseline comfort level:   Assess pain using appropriate pain scale   Encourage patient to monitor pain and request assistance     Problem: Safety - Adult  Goal: Free from fall injury  4/10/2025 1609 by Randall Thao RN  Outcome: Progressing  4/10/2025 0654 by Avril Og GN  Outcome: Progressing     Problem: Respiratory - Adult  Goal: Achieves optimal ventilation and oxygenation  4/10/2025 1609 by Randall Thao RN  Outcome: Progressing  4/10/2025 0654 by Avril Og GN  Outcome: Progressing     Problem: Cardiovascular - Adult  Goal: Maintains optimal cardiac output and hemodynamic stability  4/10/2025 1609 by Randall Thao RN  Outcome: Progressing  4/10/2025 0654 by Avril Og GN  Outcome: Progressing  Goal: Absence of cardiac dysrhythmias or at baseline  4/10/2025 1609 by Randall Thao RN  Outcome: Progressing  4/10/2025 0654 by Avril Og GN  Outcome: Progressing     Problem: Skin/Tissue Integrity - Adult  Goal: Skin integrity remains intact  4/10/2025 1609 by Randall Thao RN  Outcome: Progressing  4/10/2025 0654 by Avril Og GN  Outcome: Progressing  Goal: Incisions, wounds, or drain sites healing without S/S of infection  4/10/2025 1609 by Randall Thao RN  Outcome:  Progressing  4/10/2025 0654 by Avril Og GN  Outcome: Progressing     Problem: Musculoskeletal - Adult  Goal: Return mobility to safest level of function  4/10/2025 1609 by Randall Thao RN  Outcome: Progressing  4/10/2025 0654 by Avril Og GN  Outcome: Progressing  Goal: Return ADL status to a safe level of function  4/10/2025 1609 by Randall Thao RN  Outcome: Progressing  4/10/2025 0654 by Avril Og GN  Outcome: Progressing     Problem: Gastrointestinal - Adult  Goal: Maintains adequate nutritional intake  4/10/2025 1609 by Randall Thao RN  Outcome: Progressing  4/10/2025 0654 by Avril Og GN  Outcome: Progressing     Problem: Genitourinary - Adult  Goal: Urinary catheter remains patent  4/10/2025 1609 by Randall Thao RN  Outcome: Progressing  4/10/2025 0654 by Avril Og GN  Outcome: Progressing  Flowsheets (Taken 4/10/2025 0300)  Urinary catheter remains patent: Assess patency of urinary catheter     Problem: Infection - Adult  Goal: Absence of infection at discharge  4/10/2025 1609 by Randall Thao RN  Outcome: Progressing  4/10/2025 0654 by Avril Og GN  Outcome: Progressing     Problem: Metabolic/Fluid and Electrolytes - Adult  Goal: Electrolytes maintained within normal limits  4/10/2025 1609 by Randall Thao RN  Outcome: Progressing  4/10/2025 0654 by Avril Og GN  Outcome: Progressing  Goal: Hemodynamic stability and optimal renal function maintained  4/10/2025 1609 by Randall Thao RN  Outcome: Progressing  4/10/2025 0654 by Avril Og GN  Outcome: Progressing  Goal: Glucose maintained within prescribed range  4/10/2025 1609 by Randall Thao RN  Outcome: Progressing  4/10/2025 0654 by Avril Og GN  Outcome: Progressing     Problem: Hematologic - Adult  Goal: Maintains hematologic stability  4/10/2025 1609 by Randall Thao RN  Outcome: Progressing  4/10/2025 0654 by Avril Og GN  Outcome:  Progressing

## 2025-04-10 NOTE — PROGRESS NOTES
[phone]    Gastroenterology follow up-Progress note    Impression:  Ascites: SAAG of 0.6. Differentials favor malignancy vs nephrotic syndrome. Though cytology was negative, malignancy is not ruled out because this test has very low sensitivity (high specificity). Unlikely cirrhosis or heart failure.   s/p paracentesis 4/8/25, only 2L removed due to hypotension. Pt with known CHF  Findings: T protein 4.6, LDH 92, polys 18, cytology neg, no organisms on gram stain. SAAG 0.6   receiving Albumin q 12 hrs  Platelet ct 159  ammonia mildly elevated   on admission  AST/ALT low to normal  T bili 1.7  Ethanol <3  Chronic Anemia  HH on admission: 7.5, dropped to 6.9 on 4/8, received 1 unit PRBCs, stable now Hgb 7.3.  Iron %sat 60, iron 127, ferritin 499  EGD 1/25/24   Gastric body one actively oozing angioectasia treated with with endoclip  There were some erosions  in the duodenal sweep, without stigmata of recent bleed.  Colonoscopy 6/15/20   Multiple polyps, mild diverticulosis, grade 1 internal hemorrhoids.   Further colonoscopy is not recommended for CRCS screening   Acute renal failure, stage 3 CHD - Creat 4.21 on admission, currently 2.9  Hyperkalemia - likely medication induced per nephrology. Initiated on HD due to hemodynamic instability and hyperkalemia. Potassium 8 on admission, currently 5.3  Systolic congestive heart failure - echocardiogram pending  Pt has pacemaker, home meds include Aldactone, Digoxin and Bumex      Plan:     Appreciate heme/onc input for possible malignant ascites  No plans for endoscopic intervention at this time without signs of active bleeding. Pt poses and above-average risk of complications with procedures and anesthesia.   Recommend continuing iron supplementation and monitor for bleeding.   Continue monitor H/H, transfuse for Hgb < 7.  Medical management per primary team      Chief Complaint: Ascites      Subjective:  Pt seen resting in bed, NAD, denies pain. ABD  ulcers, icterus or other lesions  eyes: normal conjunctivae and lids; no jaundice pupils: normal  HEENT: normocephalic, atraumatic  respiratory: Minimally labored breathing with auditory wheezing  cardiovascular: regular rate and rhythm, no murmur, rub or gallop.   abdominal: Distended, NTTP  extremities: LEs with +1 pitting edema bilaterally      Intake/Output Summary (Last 24 hours) at 4/10/2025 0959  Last data filed at 4/10/2025 0700  Gross per 24 hour   Intake 500.63 ml   Output 2901 ml   Net -2400.37 ml       CBC w/Diff    Lab Results   Component Value Date/Time    WBC 9.4 04/10/2025 02:20 AM    RBC 2.52 (L) 04/10/2025 02:20 AM    HGB 7.3 (L) 04/10/2025 02:20 AM    HCT 22.0 (L) 04/10/2025 02:20 AM    MCV 87.3 04/10/2025 02:20 AM    MCH 29.0 04/10/2025 02:20 AM    MCHC 33.2 04/10/2025 02:20 AM    RDW 20.0 (H) 04/10/2025 02:20 AM     04/10/2025 02:20 AM    MPV 9.7 04/10/2025 02:20 AM    No results found for: \"MONO\", \"BANDS\", \"BASOS\", \"METAS\", \"PRO\"   Basic Metabolic Profile   Recent Labs     04/10/25  0130 04/10/25  0915    138   K 4.1 4.1    103   CO2 28 28   BUN 38* 39*   GLUCOSE 121* 146*   MG 2.1  --    PHOS 2.7  --         Hepatic Function    Lab Results   Component Value Date/Time    ALT 11 04/10/2025 01:30 AM    No components found for: \"SGOT\", \"GPT\", \"DBILI\", \"TBIL\"       Coags   Recent Labs     04/08/25  1520 04/09/25  0052   INR 1.6* 1.6*   APTT 44.0* 46.5*               DENISSE Farrell    [employer] [facility]  [email or web address]  Phone: [phone]

## 2025-04-10 NOTE — CONSULTS
Phone: [phone]      [facility]  Hematology / Oncology Consult Note      Patient: Gumaro Khan  Gender: male   MRN: [id number]    YOB: 1945 Age: 79 y.o.   CSN: [id number]    LOS:  LOS: 3 days   Admit Date: 4/7/2025    Reason for Consultation:  Gumaro Khan is a 79 y.o. male who I've been asked to consult for anemia, ascites.      Subjective:     HPI:  Mr Khan is a frail, chronically ill appearing male.     We are asked to evaluate for ascites, worked up by GI, SAAG 0.6. He has elevated transaminases.  His ascitic fluid cytology is negative for malignant cells. He is also noted to have acute on chronic renal failure .  There is elevated free kappa light chains. No SPEP done. Severe chronic anemia noted Hb 7.    He has underlying cardiomyopathy EF  25 % 1/2024.    Patient has a progressive wt loss, no rash or bleeding.    Past Medical History:   Diagnosis Date    Abnormal myocardial perfusion study 11/02/2015    At most mild LVE.  EF 45-46%.      Abnormal nuclear cardiac imaging test 02/03/2009    Mild basal/mid inferior, inferoapical, inferoseptal infarct w/mild ischemia in RCA (possibly partially artifact).  Anterior, anteroseptal, anterapical ischemia w/o infarct.  (Mid & distal LAD.)  LVE.  EF 29%.  Mod global hyk.      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    DVT (deep venous thrombosis) ([facility]) 09/29/2016    Tech difficult.  No DVT bilaterally.    History of echocardiogram 09/29/2016    LVE.  EF 15% at most.  Indeterminate LV diastolic fx.  RVE.  Reduced RV systolic fx.  RVSP 80-85 mmHg.  LAE.  DAYSI.  Mod-severe MR.  Mild AI.  Severe TR.      HTN (hypertension)

## 2025-04-10 NOTE — PLAN OF CARE
Problem: Discharge Planning  Goal: Discharge to home or other facility with appropriate resources  4/10/2025 0654 by Avril Og GN  Outcome: Progressing  4/9/2025 2213 by Avril Og GN  Outcome: Progressing     Problem: Pain  Goal: Verbalizes/displays adequate comfort level or baseline comfort level  4/10/2025 0654 by Avril Og GN  Outcome: Progressing  Flowsheets (Taken 4/10/2025 0000)  Verbalizes/displays adequate comfort level or baseline comfort level:   Assess pain using appropriate pain scale   Encourage patient to monitor pain and request assistance  4/9/2025 2213 by Avril Og GN  Outcome: Progressing  Flowsheets  Taken 4/9/2025 2200  Verbalizes/displays adequate comfort level or baseline comfort level: Encourage patient to monitor pain and request assistance  Taken 4/9/2025 2000  Verbalizes/displays adequate comfort level or baseline comfort level:   Encourage patient to monitor pain and request assistance   Assess pain using appropriate pain scale     Problem: Safety - Adult  Goal: Free from fall injury  4/10/2025 0654 by Avril Og GN  Outcome: Progressing  4/9/2025 2213 by Avril Og GN  Outcome: Progressing     Problem: Cardiovascular - Adult  Goal: Maintains optimal cardiac output and hemodynamic stability  4/10/2025 0654 by Avril Og GN  Outcome: Progressing  4/9/2025 2213 by Avril Og GN  Outcome: Progressing  Flowsheets (Taken 4/9/2025 2000)  Maintains optimal cardiac output and hemodynamic stability:   Monitor blood pressure and heart rate   Monitor urine output and notify Licensed Independent Practitioner for values outside of normal range  Goal: Absence of cardiac dysrhythmias or at baseline  4/10/2025 0654 by Avril Og GN  Outcome: Progressing  4/9/2025 2213 by Avril Og GN  Outcome: Progressing  Flowsheets (Taken 4/9/2025 2000)  Absence of cardiac dysrhythmias or at baseline: Monitor cardiac rate and rhythm     Problem: Skin/Tissue

## 2025-04-10 NOTE — PROGRESS NOTES
[facility]  Progress Note    Patient: Gumaro Khan Age: 79 y.o. : 1945 MR#: [id number] SSN: [id number]      Subjective/24-hour events:     Called by ICU team to assume care as patient stable for downgrade in clinical status.  Admitted 2 days ago after presenting with low BP/nicholas, suspected to be multifactorial.  Has had paracentesis     Assessment:   SHAHNAZ on CKD 3  Ascites w/low SAAG, s/p paracentesis  Acute on chronic anemia s/p 1 unit PRBCs  Systolic CHF - EF 25-30% by echo 2024  Paroxysmal atrial fibrillation  DM2  Severe hyperkalemia requiring emergent hemodialysis, resolved  Acute metabolic encephalopathy, improved  History of polyclonal gammopathy - elevated kappa/lambda chains  Alcohol abuse history    Plan:   Await results of BMB.  Path from ascitic fluid without any evidence for malignant cells.  Hematology on board.  Cardiology evaluation requested - FREDERICK Liriano notified via PS this AM.  Additional recommendations/assistance w/management appreciated.  Renal/volume management per nephrology.  PT/OT, mobilize.  May benefit from SNF v rehab.  Will follow up formally in AM.    Case discussed with:  []Patient  []Family  [] Nursing  []Case Management  DVT Prophylaxis:  []Lovenox  [x]Hep SQ  []SCDs  []Coumadin   []On Heparin gtt []PO anticoagulant    Objective:   VS: /62   Pulse 96   Temp 98.7 °F (37.1 °C) (Oral)   Resp 26   Ht 1.854 m (6' 1\")   Wt 88 kg (194 lb 0.1 oz)   SpO2 100%   BMI 25.60 kg/m²      Tmax/24hrs: Temp (24hrs), Av.8 °F (36.6 °C), Min:97.3 °F (36.3 °C), Max:98.8 °F (37.1 °C)    Intake/Output Summary (Last 24 hours) at 4/10/2025 1203  Last data filed at 4/10/2025 1000  Gross per 24 hour   Intake 500.63 ml   Output 2896 ml   Net -2395.37 ml       Current Facility-Administered Medications   Medication Dose Route Frequency    [Held by provider] carvedilol (COREG) tablet 25 mg  25 mg Oral BID WC    [Held by provider] digoxin  Range    Vitamin B-12 706 211 - 911 pg/mL    Folate 5.1 3.10 - 17.50 ng/mL   CBC with Auto Differential    Collection Time: 04/10/25  2:20 AM   Result Value Ref Range    WBC 9.4 4.6 - 13.2 K/uL    RBC 2.52 (L) 4.35 - 5.65 M/uL    Hemoglobin 7.3 (L) 13.0 - 16.0 g/dL    Hematocrit 22.0 (L) 36.0 - 48.0 %    MCV 87.3 78.0 - 100.0 FL    MCH 29.0 24.0 - 34.0 PG    MCHC 33.2 31.0 - 37.0 g/dL    RDW 20.0 (H) 11.6 - 14.5 %    Platelets 156 135 - 420 K/uL    MPV 9.7 9.2 - 11.8 FL    Nucleated RBCs 0.4 (H) 0  WBC    nRBC 0.04 (H) 0.00 - 0.01 K/uL    Neutrophils % 74.6 (H) 40.0 - 73.0 %    Lymphocytes % 12.7 (L) 21.0 - 52.0 %    Monocytes % 10.5 (H) 3.0 - 10.0 %    Eosinophils % 1.8 0.0 - 5.0 %    Basophils % 0.1 0.0 - 2.0 %    Immature Granulocytes % 0.3 0.0 - 0.5 %    Neutrophils Absolute 6.99 1.80 - 8.00 K/UL    Lymphocytes Absolute 1.19 0.90 - 3.60 K/UL    Monocytes Absolute 0.98 0.05 - 1.20 K/UL    Eosinophils Absolute 0.17 0.00 - 0.40 K/UL    Basophils Absolute 0.01 0.00 - 0.10 K/UL    Immature Granulocytes Absolute 0.03 0.00 - 0.04 K/UL    Differential Type AUTOMATED     POCT Glucose    Collection Time: 04/10/25  8:17 AM   Result Value Ref Range    POC Glucose 130 (H) 70 - 110 mg/dL   Basic Metabolic Panel    Collection Time: 04/10/25  9:15 AM   Result Value Ref Range    Sodium 138 136 - 145 mmol/L    Potassium 4.1 3.5 - 5.5 mmol/L    Chloride 103 100 - 111 mmol/L    CO2 28 21 - 32 mmol/L    Anion Gap 7 3.0 - 18 mmol/L    Glucose 146 (H) 74 - 99 mg/dL    BUN 39 (H) 7.0 - 18 MG/DL    Creatinine 2.08 (H) 0.6 - 1.3 MG/DL    BUN/Creatinine Ratio 19 12 - 20      Est, Glom Filt Rate 32 (L) >60 ml/min/1.73m2    Calcium 8.2 (L) 8.5 - 10.1 MG/DL   Ammonia    Collection Time: 04/10/25  9:15 AM   Result Value Ref Range    Ammonia 45 (H) 11 - 32 UMOL/L   Bilirubin, Direct    Collection Time: 04/10/25  9:15 AM   Result Value Ref Range    Bilirubin, Direct 1.0 (H) 0.0 - 0.2 MG/DL   POCT Glucose    Collection Time: 04/10/25

## 2025-04-10 NOTE — PROGRESS NOTES
[facility].  Pulmonary, Critical Care, and Sleep Medicine    Name: Gumaro Khan MRN: [id number]   : 1945 Hospital: [facility]   Date: 4/10/2025  Admission Date: 2025     Chart and notes reviewed. Data reviewed. I have evaluated all findings.    [x]I have reviewed the flowsheet and previous day’s notes.      []The patient is critically ill on      []Mechanical ventilation []Pressors   []BiPAP []     IMPRESSION:   Shock - likely hypovolemic, resolved s/p 500 cc fluid bolus  Severe hyperkalemia - 8 on admission, resolved following urgent HD  SHAHNAZ on CKD - prerenal azotemia in the setting of poor PO intake + diuretics vs ischemic ATN  Acute toxic metabolic encephalopathy - improved   Severe ascites - new onset in the last ~2 weeks per patient with unknown etiology at present. No history of cirrhosis documented, no history of prior ascites. S/p paracentesis with 2L out  Transaminase   Acute on chronic anemia - s/p 1u PRBCs  Hx of elevated Kappa/Lamda chains in  - oncology consulted for multiple myeloma or amyloidosis r/o.      -  Bone marrow biopsy pending - SPEP, PEP/SIFE, 24 hr UPEP, FLC, Quant Igs, amyloid stain, iron stores  Hx of ETOH abuse, quit drinking last year   Paroxysmal a fib, previously on coumadin which was held in Oct 2024 until clearance by GI per cardiology documentation  DM2  HFrEF - EF 25-30% 24, AICD in place  Pulmonary hypertension, RVSP 24 of 67  Moderate mitral regurgitation  Hx of thigh sarcoma s/p resection   8 mm lung nodule, increased in size from previous CT scan   Hx of GI bleed in the setting of chronic gastric AVMs, hx of angioectasia s/p endoclipping 24     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    For comparison purposes, the right subclavian vein was investigated. This vein appeared to show normal color filling and Doppler interrogation showed phasic, spontaneous, and somewhat pulsatile flow.    Signed by: Eliecer Gutierrez on 1/27/2024  6:18 PM     No valid procedures specified.         16 minutes were spent with the patient at the bedside. This care involved high complexity decision making to assess, manipulate, and support vital system functions, to treat this degreee vital organ system failure and to prevent further life threatening deterioration of the patient’s condition  The services I provided to this patient were to treat and/or prevent clinically significant deterioration that could result in the failure of one or more body systems and/or organ systems due to respiratory distress, hypoxia, cardiac dysrhythmia.       Kristi Bianchi PA-C  04/10/25  Pulmonary, Critical Care Medicine  [facility]

## 2025-04-10 NOTE — PROGRESS NOTES
Progress Note    Gumaro Hooker Edmonds  79 y.o.      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)    Mass of right thigh    Weight loss, abnormal    NSTEMI (non-ST elevated myocardial infarction) (HCC)    MR (mitral regurgitation)    Nonischemic cardiomyopathy (HCC)    Type 2 diabetes mellitus with hypercholesterolemia (HCC)    Anxiety disorder    Presence of implantable cardioverter-defibrillator (ICD)    UTI (urinary tract infection)    Poor appetite    Iron deficiency anemia    Atrial fibrillation (HCC)    Follow-up examination    MGUS (monoclonal gammopathy of unknown significance)    Long term current use of anticoagulant therapy    Antineoplastic chemotherapy induced anemia    Cancer associated pain    Pulmonary hypertension, moderate to severe (HCC)    Chronic gout with tophus    Hypercholesterolemia    Leukocytosis    Malignant neoplasm of right lower extremity    AICD at end of battery life    Hypertension associated with diabetes    GI bleed    Shock (HCC)    Acute renal failure superimposed on chronic kidney disease    Acute decompensated heart failure (HCC)    Hyperglycemia    Supratherapeutic INR    General weakness    ATN (acute tubular necrosis)    Systolic dysfunction    Hyperkalemia    Anemia    Recurrent falls    Arrhythmia    Hypercalcemia    CKD stage 3 secondary to diabetes (HCC)    Chronic systolic (congestive) heart failure    Acute renal failure    Acute renal injury    Ascites    Systolic congestive heart failure (HCC)           Subjective:     Patient was seen earlier this morning and again afterwards.  Patient looks much better compared to the time of admission send history.  Mentally clear.  Off pressor now he can eat by himself.  Making good volume of urine through the Mann catheter..  Dialyzed 2 consecutive days.  No more hyperkalemic..  Oncology's note reviewed because of this ascites is not clear..  Patient has underlying  electrophoresis and gammopathy workup including light chain panel.  Patient can be transferred out to stepdown bed or daily bed.  Discussed with his wife over telephone and explained the cause of him needing dialysis acutely after talking with me she feels much better      JERRY CATHERINE MD

## 2025-04-10 NOTE — CONSULTS
[facility] - Consult Note    Cardiology consultation request from Dr. Suresh for evaluation and management/treatment of nonischemic cardiomyopathy history    Date of  Admission: 4/7/2025  6:45 PM   Primary Care Physician:  Artis Shell MD    Attending Cardiologist: Dr. Velasquez       Assessment:     - Hypervolemic shock, resolved with IV fluids. Briefly required pressors  - Severe ascites. new onset in the last ~2 weeks per patient with unknown etiology at present. No history of cirrhosis documented, no history of prior ascites. S/p paracentesis with 2L out. Negative cytology for malignancy  - Persistent A fib: on coreg and digoxin as outpatient. Currently held due to shock and bradycardia. Not on AC due to anemia  - Chronic systolic heart failure. EF 45-50% on Echo this admission. Appears compensated on exam.   - Nonischemic cardiomyopathy:   Echo 4/8/25 EF 45-50%, septal motion, mild global hypokinesis, grade III DD. BORIS RV dilated with reduced RV function. EF has been as low as 25%  S/p [employer] single AICD with gen change in 5/2018  GDMT with ARB, aldactone, coreg as outpatient. Aldactone and ARB held due to hyperkalemia  - Mild to moderate AI on Echo 4/2025  - Mild to moderate MR with multiple jets 4/2025  - Moderate to severe TR on echo 4/2025  - PAH Who group 5  - Hx of elevated Kappa/Lambda chains. Bone marrow biopsy pending.    - SHAHNAZ on CKD stage 4  - T2DM   - HLD  - History of right thigh sarcoma s/p resection    - History of GIB/hx of angioectasia s/p endoclipping 1/25/24   - ETOH abuse      Primary cardiologist: Dr. Velasquez      Plan:     Addendum: Independently seen and evaluated.  Agree with below.  Appreciate volume management per nephrology.  Once bone marrow biopsy is completed, and no other contraindications to anticoagulation, he would benefit from OAC with persistent atrial fibrillation.    - Volume management per nephrology   - Tele reviewed, patient in rate controlled AF.  Summary    Image quality is adequate. Procedure performed with the patient in a supine position.    Left Ventricle: Mildly reduced left ventricular systolic function with a visually estimated EF of 45 - 50%. Left ventricle size is normal. Normal wall thickness. Septal motion is consistent with bundle branch block. Mild global hypokinesis present. Grade III diastolic dysfunction with increased LAP.    Left Atrium: Left atrium is severely dilated. Left atrial volume index is severely increased (>48 mL/m2) mL/m2. LA Vol Index A/L is 60 mL/m2 mL/m2.    Right Ventricle: Right ventricle is severely dilated. Lead present in the right ventricle. Reduced systolic function. TAPSE is abnormal. TAPSE is 0.9 cm. RV Peak S' is 5.8 cm/s. TDI systolic excursion is abnormal.    Right Atrium: Lead present in the right atrium. Right atrium is severely dilated.    Tricuspid Valve: Moderate to severe regurgitation. RVSP may be underestimated in the setting of equalization of pressure. The estimated RVSP is 56 mmHg.    Aortic Valve: Mild to moderate regurgitation with a posterolateral eccentrically directed jet and may underestimate severity. AV PHT is 233.7 ms.    Mitral Valve: Mild to moderate regurgitation with multiple jets.    Aorta: Dilated aortic root. Ao root diameter is 3.9 cm. Ao Root Index is 1.92 cm/m2. Dilated ascending aorta. Ao ascending diameter is 4.4 cm. Ao Ascending Index is 2.17 cm/m2.    Signed by: Thiago Velasquez on 4/10/2025 12:47 PM    No results found for this or any previous visit.    No results found for this or any previous visit.    Xray Result (most recent):  XR CHEST PORTABLE 04/08/2025    Narrative  EXAM: CHEST  CPT CODE: 50715    CLINICAL INDICATION/HISTORY: Central line placement.    COMPARISON: 7 April 2025.    TECHNIQUE: Single AP portable view of chest at 1209.    FINDINGS: There has been interval placement of right internal jugular central  venous catheter with tip in the distal superior vena cava. There

## 2025-04-11 ENCOUNTER — APPOINTMENT (OUTPATIENT)
Facility: HOSPITAL | Age: 80
DRG: 432 | End: 2025-04-11
Payer: MEDICARE

## 2025-04-11 LAB
ALBUMIN SERPL-MCNC: 2.7 G/DL (ref 3.4–5)
ALBUMIN/GLOB SERPL: 0.6 (ref 0.8–1.7)
ALP SERPL-CCNC: 212 U/L (ref 45–117)
ALT SERPL-CCNC: 11 U/L (ref 16–61)
ANION GAP SERPL CALC-SCNC: 7 MMOL/L (ref 3–18)
AST SERPL-CCNC: 16 U/L (ref 10–38)
BASOPHILS # BLD: 0.01 K/UL (ref 0–0.1)
BASOPHILS NFR BLD: 0.1 % (ref 0–2)
BILIRUB SERPL-MCNC: 1.6 MG/DL (ref 0.2–1)
BUN SERPL-MCNC: 38 MG/DL (ref 7–18)
BUN/CREAT SERPL: 18 (ref 12–20)
CALCIUM SERPL-MCNC: 8.5 MG/DL (ref 8.5–10.1)
CHLORIDE SERPL-SCNC: 103 MMOL/L (ref 100–111)
CO2 SERPL-SCNC: 26 MMOL/L (ref 21–32)
CREAT SERPL-MCNC: 2.08 MG/DL (ref 0.6–1.3)
DIFFERENTIAL METHOD BLD: ABNORMAL
EOSINOPHIL # BLD: 0.22 K/UL (ref 0–0.4)
EOSINOPHIL NFR BLD: 1.9 % (ref 0–5)
ERYTHROCYTE [DISTWIDTH] IN BLOOD BY AUTOMATED COUNT: 20.4 % (ref 11.6–14.5)
GLOBULIN SER CALC-MCNC: 4.6 G/DL (ref 2–4)
GLUCOSE BLD STRIP.AUTO-MCNC: 125 MG/DL (ref 70–110)
GLUCOSE BLD STRIP.AUTO-MCNC: 130 MG/DL (ref 70–110)
GLUCOSE BLD STRIP.AUTO-MCNC: 136 MG/DL (ref 70–110)
GLUCOSE BLD STRIP.AUTO-MCNC: 143 MG/DL (ref 70–110)
GLUCOSE SERPL-MCNC: 138 MG/DL (ref 74–99)
HCT VFR BLD AUTO: 23.9 % (ref 36–48)
HGB BLD-MCNC: 7.6 G/DL (ref 13–16)
IMM GRANULOCYTES # BLD AUTO: 0.07 K/UL (ref 0–0.04)
IMM GRANULOCYTES NFR BLD AUTO: 0.6 % (ref 0–0.5)
LYMPHOCYTES # BLD: 1.57 K/UL (ref 0.9–3.6)
LYMPHOCYTES NFR BLD: 13.9 % (ref 21–52)
MAGNESIUM SERPL-MCNC: 2 MG/DL (ref 1.6–2.6)
MCH RBC QN AUTO: 28.3 PG (ref 24–34)
MCHC RBC AUTO-ENTMCNC: 31.8 G/DL (ref 31–37)
MCV RBC AUTO: 88.8 FL (ref 78–100)
MONOCYTES # BLD: 1.37 K/UL (ref 0.05–1.2)
MONOCYTES NFR BLD: 12.1 % (ref 3–10)
NEUTS SEG # BLD: 8.09 K/UL (ref 1.8–8)
NEUTS SEG NFR BLD: 71.4 % (ref 40–73)
NRBC # BLD: 0.1 K/UL (ref 0–0.01)
NRBC BLD-RTO: 0.9 PER 100 WBC
PHOSPHATE SERPL-MCNC: 2.4 MG/DL (ref 2.5–4.9)
PLATELET # BLD AUTO: 195 K/UL (ref 135–420)
PMV BLD AUTO: 10.2 FL (ref 9.2–11.8)
POTASSIUM SERPL-SCNC: 4 MMOL/L (ref 3.5–5.5)
PROT SERPL-MCNC: 7.3 G/DL (ref 6.4–8.2)
RBC # BLD AUTO: 2.69 M/UL (ref 4.35–5.65)
SODIUM SERPL-SCNC: 136 MMOL/L (ref 136–145)
WBC # BLD AUTO: 11.3 K/UL (ref 4.6–13.2)

## 2025-04-11 PROCEDURE — 88313 SPECIAL STAINS GROUP 2: CPT

## 2025-04-11 PROCEDURE — 6360000002 HC RX W HCPCS: Performed by: RADIOLOGY

## 2025-04-11 PROCEDURE — 99156 MOD SED OTH PHYS/QHP 5/>YRS: CPT

## 2025-04-11 PROCEDURE — 94761 N-INVAS EAR/PLS OXIMETRY MLT: CPT

## 2025-04-11 PROCEDURE — 2500000003 HC RX 250 WO HCPCS: Performed by: INTERNAL MEDICINE

## 2025-04-11 PROCEDURE — 88264 CHROMOSOME ANALYSIS 20-25: CPT

## 2025-04-11 PROCEDURE — 88185 FLOWCYTOMETRY/TC ADD-ON: CPT

## 2025-04-11 PROCEDURE — 83521 IG LIGHT CHAINS FREE EACH: CPT

## 2025-04-11 PROCEDURE — 88305 TISSUE EXAM BY PATHOLOGIST: CPT

## 2025-04-11 PROCEDURE — 84165 PROTEIN E-PHORESIS SERUM: CPT

## 2025-04-11 PROCEDURE — 2500000003 HC RX 250 WO HCPCS

## 2025-04-11 PROCEDURE — 079T3ZX DRAINAGE OF BONE MARROW, PERCUTANEOUS APPROACH, DIAGNOSTIC: ICD-10-PCS | Performed by: INTERNAL MEDICINE

## 2025-04-11 PROCEDURE — 82962 GLUCOSE BLOOD TEST: CPT

## 2025-04-11 PROCEDURE — 6370000000 HC RX 637 (ALT 250 FOR IP)

## 2025-04-11 PROCEDURE — 6360000002 HC RX W HCPCS: Performed by: INTERNAL MEDICINE

## 2025-04-11 PROCEDURE — 88184 FLOWCYTOMETRY/ TC 1 MARKER: CPT

## 2025-04-11 PROCEDURE — 2000000000 HC ICU R&B

## 2025-04-11 PROCEDURE — 84080 ASSAY ALKALINE PHOSPHATASES: CPT

## 2025-04-11 PROCEDURE — 83735 ASSAY OF MAGNESIUM: CPT

## 2025-04-11 PROCEDURE — 82784 ASSAY IGA/IGD/IGG/IGM EACH: CPT

## 2025-04-11 PROCEDURE — 80053 COMPREHEN METABOLIC PANEL: CPT

## 2025-04-11 PROCEDURE — 76377 3D RENDER W/INTRP POSTPROCES: CPT

## 2025-04-11 PROCEDURE — 84100 ASSAY OF PHOSPHORUS: CPT

## 2025-04-11 PROCEDURE — 88311 DECALCIFY TISSUE: CPT

## 2025-04-11 PROCEDURE — 1100000003 HC PRIVATE W/ TELEMETRY

## 2025-04-11 PROCEDURE — 85025 COMPLETE CBC W/AUTO DIFF WBC: CPT

## 2025-04-11 PROCEDURE — 84075 ASSAY ALKALINE PHOSPHATASE: CPT

## 2025-04-11 PROCEDURE — 86334 IMMUNOFIX E-PHORESIS SERUM: CPT

## 2025-04-11 PROCEDURE — 88237 TISSUE CULTURE BONE MARROW: CPT

## 2025-04-11 PROCEDURE — 99232 SBSQ HOSP IP/OBS MODERATE 35: CPT | Performed by: INTERNAL MEDICINE

## 2025-04-11 PROCEDURE — 6360000002 HC RX W HCPCS

## 2025-04-11 RX ORDER — LIDOCAINE HYDROCHLORIDE 10 MG/ML
INJECTION, SOLUTION EPIDURAL; INFILTRATION; INTRACAUDAL; PERINEURAL PRN
Status: COMPLETED | OUTPATIENT
Start: 2025-04-11 | End: 2025-04-11

## 2025-04-11 RX ORDER — MIDAZOLAM HYDROCHLORIDE 2 MG/2ML
INJECTION, SOLUTION INTRAMUSCULAR; INTRAVENOUS PRN
Status: COMPLETED | OUTPATIENT
Start: 2025-04-11 | End: 2025-04-11

## 2025-04-11 RX ORDER — FENTANYL CITRATE 50 UG/ML
INJECTION, SOLUTION INTRAMUSCULAR; INTRAVENOUS PRN
Status: COMPLETED | OUTPATIENT
Start: 2025-04-11 | End: 2025-04-11

## 2025-04-11 RX ADMIN — SODIUM CHLORIDE, PRESERVATIVE FREE 10 ML: 5 INJECTION INTRAVENOUS at 21:09

## 2025-04-11 RX ADMIN — LACTULOSE 20 G: 10 SOLUTION ORAL at 09:39

## 2025-04-11 RX ADMIN — FENTANYL CITRATE 25 MCG: 50 INJECTION INTRAMUSCULAR; INTRAVENOUS at 11:30

## 2025-04-11 RX ADMIN — SODIUM CHLORIDE, PRESERVATIVE FREE 10 ML: 5 INJECTION INTRAVENOUS at 09:39

## 2025-04-11 RX ADMIN — HEPARIN SODIUM 5000 UNITS: 5000 INJECTION INTRAVENOUS; SUBCUTANEOUS at 21:09

## 2025-04-11 RX ADMIN — LACTULOSE 20 G: 10 SOLUTION ORAL at 21:09

## 2025-04-11 RX ADMIN — LACTULOSE 20 G: 10 SOLUTION ORAL at 14:35

## 2025-04-11 RX ADMIN — LIDOCAINE HYDROCHLORIDE 10 ML: 10 INJECTION, SOLUTION EPIDURAL; INFILTRATION; INTRACAUDAL; PERINEURAL at 11:34

## 2025-04-11 RX ADMIN — CEFTRIAXONE 1000 MG: 1 INJECTION, POWDER, FOR SOLUTION INTRAMUSCULAR; INTRAVENOUS at 09:38

## 2025-04-11 RX ADMIN — MIDAZOLAM HYDROCHLORIDE 0.5 MG: 1 INJECTION, SOLUTION INTRAMUSCULAR; INTRAVENOUS at 11:30

## 2025-04-11 ASSESSMENT — PAIN SCALES - GENERAL: PAINLEVEL_OUTOF10: 0

## 2025-04-11 NOTE — PROGRESS NOTES
Patient: Gumaro Khan   MRN: [id number]         CSN: [id number]    YOB: 1945      Admit Date: on [facility]    Assessment:     Principal Problem:    Acute renal failure  Active Problems:    Acute renal failure superimposed on chronic kidney disease    Hyperkalemia    Acute renal injury    Ascites    Systolic congestive heart failure (HCC)    Shortness of breath  Resolved Problems:    * No resolved hospital problems. *    Severe anemia  Ascites, etiology under investigation  Polyclonal gammopathy   Low albumin  Elevated alkphos  CRF  Cardiomyopathy  EGD 2024    Plan:     Ascites, SAAG 0.6, cytology negative for malignant cells  Ct abd not noting portal vein thrombus   Await SPEP/SIFE, 24 hr UPEP, Ze Igs , FLC assay pending 4/10/25  Bone marrow asp and biopsy per IR, requested to assess etiology of anemia,  wide differential  Will perform amyloid stain on BM biopsy  Consider fat pad biopsy for amyloid  Alkphos isoenzymes , ? bone vs liver   Echo 45-%EF,  25 appearance not concerning for myocardial infiltration/amyloid  Will followup with the team    I am off next week  Dr Roland Ordoñez to see him 25    Manisha Cordoba MD  [facility]  Office [phone]      Subjective:     Awake alert this am    Objective:     BP (!) 137/58   Pulse 88   Temp 98.4 °F (36.9 °C) (Oral)   Resp (!) 35   Ht 1.854 m (6' 1\")   Wt 88 kg (194 lb 0.1 oz)   SpO2 100%   BMI 25.60 kg/m²           Temp (24hrs), Av.3 °F (36.8 °C), Min:98.2 °F (36.8 °C), Max:98.4 °F (36.9 °C)        Intake/Output Summary (Last 24 hours) at 2025 0809  Last data filed at 2025 0100  Gross per 24 hour   Intake 340 ml   Output 1220 ml   Net -880 ml     Review of Systems:   Constitutional: negative for fevers, chills, sweats and positive for  fatigue  Eyes: negative for visual disturbance,  icterus  Ears, Nose, Mouth, Throat, and Face: negative for tinnitus, epistaxis,

## 2025-04-11 NOTE — PROGRESS NOTES
[facility]  Progress Note  Date:2025       Room:[facility]  Patient Name:Gumaro Khan     YOB: 1945     Age:79 y.o.        Subjective    Subjective:  Symptoms:  Stable.    Diet:  Poor intake.    Activity level: Impaired due to weakness.    Pain:  He reports no pain.       Review of Systems    No acute events overnight.  Patient resting comfortably in bed.  Patient with pain after bone marrow biopsy.  Done at the bedside.  Patient has no complaints.    Disposition: Likely stable for discharge to SNF on .      Objective         Vitals Last 24 Hours:  TEMPERATURE:  Temp  Av.3 °F (36.8 °C)  Min: 98.2 °F (36.8 °C)  Max: 98.4 °F (36.9 °C)  RESPIRATIONS RANGE: Resp  Av.4  Min: 16  Max: 38  PULSE OXIMETRY RANGE: SpO2  Av.4 %  Min: 95 %  Max: 100 %  PULSE RANGE: Pulse  Av.5  Min: 63  Max: 97  BLOOD PRESSURE RANGE: Systolic (24hrs), Av , Min:115 , Max:141     ; Diastolic (24hrs), Av, Min:58, Max:85      I/O (24Hr):    Intake/Output Summary (Last 24 hours) at 2025 1322  Last data filed at 2025 0900  Gross per 24 hour   Intake 340 ml   Output 1050 ml   Net -710 ml     Objective:  General Appearance:  Comfortable.    Vital signs: (most recent): Blood pressure 134/75, pulse 96, temperature 98.4 °F (36.9 °C), temperature source Oral, resp. rate 29, height 1.854 m (6' 1\"), weight 88 kg (194 lb 0.1 oz), SpO2 96%.    Output: Producing urine.    HEENT: Normal HEENT exam.    Lungs:  Normal effort and normal respiratory rate.  Breath sounds clear to auscultation.    Heart: Normal rate.  Regular rhythm.    Abdomen: Abdomen is soft and flat.  Bowel sounds are normal.   There is no abdominal tenderness.     Extremities: Normal range of motion.  (Temp femoral HD catheter)  Pulses: Distal pulses are intact.    Neurological: Patient is alert and oriented to person, place and time.    Skin:  Warm and dry.           Labs/Imaging/Diagnostics    Labs:  CBC:  Recent Labs     04/09/25  0404 04/10/25  0220 04/11/25  0430   WBC 8.9 9.4 11.3   RBC 2.46* 2.52* 2.69*   HGB 7.2* 7.3* 7.6*   HCT 21.4* 22.0* 23.9*   MCV 87.0 87.3 88.8   RDW 19.5* 20.0* 20.4*    156 195     CHEMISTRIES:  Recent Labs     04/09/25  2040 04/10/25  0130 04/10/25  0915 04/11/25  0430    137 138 136   K 4.0 4.1 4.1 4.0    101 103 103   CO2 30 28 28 26   BUN 37* 38* 39* 38*   CREATININE 1.88* 2.03* 2.08* 2.08*   GLUCOSE 142* 121* 146* 138*   PHOS 2.6 2.7  --  2.4*   MG 2.0 2.1  --  2.0     PT/INR:  Recent Labs     04/08/25  1520 04/09/25  0052   PROTIME 18.9* 19.4*   INR 1.6* 1.6*     APTT:  Recent Labs     04/08/25  1520 04/09/25  0052   APTT 44.0* 46.5*     LIVER PROFILE:  Recent Labs     04/09/25  0404 04/10/25  0130 04/10/25  0915 04/11/25  0430   AST 21 18  --  16   ALT 11* 11*  --  11*   BILIDIR  --   --  1.0*  --    BILITOT 1.7* 1.5*  --  1.6*   ALKPHOS 209* 217*  --  212*       Imaging:  US ABDOMEN LIMITED W ELASTOGRAPHY Specify organ? LIVER, SPLEEN, GALLBLADDER, PANCREAS  Result Date: 4/10/2025  Elastography measurements are inconclusive and not diagnostic. Moderate ascites. Layering sludge or nonshadowing stones at the gallbladder neck. No acute cholecystitis. Increased renal echogenicity likely due to chronic medical renal disease. Electronically signed by Edilia SOTO CHEST PORTABLE  Result Date: 4/8/2025  Right IJ central venous catheter with tip in the distal superior vena cava. No pneumothorax. Suboptimal inspiration; elevation right hemidiaphragm. There are no consolidations but the retrodiaphragmatic retrocardiac lung bases are not well seen. Possible pulmonary vascular congestion. Borderline heart size. AICD. Electronically signed by De Mcgee    CT ABDOMEN PELVIS WO CONTRAST Additional Contrast? None  Result Date: 4/7/2025  1. New large volume ascites. 2. Trace bilateral pleural effusions. 3. Enlarged 8 mm

## 2025-04-11 NOTE — PROCEDURES
RADIOLOGY POST PROCEDURE NOTE     April 11, 2025            Preoperative Diagnosis:   Anemia.    Postoperative Diagnosis:  Same.    :  Dr. Owens    Assistant:  None.    Type of Anesthesia: 1% plain lidocaine and IV moderate sedation with Versed and Fentanyl.    Procedure/Description:  Image guided BM Bx.    Findings:   No bleeding.    Estimated blood Loss:  Minimal    Specimen Removed:  Yes    Blood transfusions:  None.    Implants:  None.    Complications: None    Condition: Stable    Discharge Plan:  continue present therapy    Jenaro Owens MD

## 2025-04-11 NOTE — PROGRESS NOTES
Preprocedure Assessment      Today 4/11/2025     Indication/Symptoms:   Gumaro Khan is a 79 y.o. male with anemia who will present to IR today for bone marrow biopsy and aspiration with possible moderate sedation.    The H & P and/or progress notes and any available imaging were reviewed.  The risks, indications and possible alternatives to the procedure, including doing nothing, were discussed and informed consent was obtained.    Physical Exam:        Mallampati: 2   ASA: 3    Heart:    RRR   Lungs:   Increased work of breathing, RR 35    The patient is an appropriate candidate to undergo the planned procedure and sedation.    Immediate reassessment prior to sedation:  The patient's status was reviewed and vitals assessed. It is acceptable to perform the procedure and proceed with sedation as planned.        FREDERICK Mejia

## 2025-04-11 NOTE — PLAN OF CARE
Problem: Chronic Conditions and Co-morbidities  Goal: Patient's chronic conditions and co-morbidity symptoms are monitored and maintained or improved  4/11/2025 1904 by Marisol Selby RN  Outcome: Progressing  4/11/2025 1158 by Marisol Selby RN  Outcome: Progressing     Problem: Discharge Planning  Goal: Discharge to home or other facility with appropriate resources  4/11/2025 1904 by Marisol Selby RN  Outcome: Progressing  4/11/2025 1158 by Marisol Selby RN  Outcome: Progressing     Problem: Pain  Goal: Verbalizes/displays adequate comfort level or baseline comfort level  4/11/2025 1904 by Marisol Selby RN  Outcome: Progressing  4/11/2025 1158 by Marisol Selby RN  Outcome: Progressing     Problem: Safety - Adult  Goal: Free from fall injury  4/11/2025 1904 by Mairsol Selby RN  Outcome: Progressing  4/11/2025 1158 by Marisol Selby RN  Outcome: Progressing     Problem: Respiratory - Adult  Goal: Achieves optimal ventilation and oxygenation  4/11/2025 1904 by Marisol Selby RN  Outcome: Progressing  4/11/2025 1158 by Marisol Selby RN  Outcome: Progressing     Problem: Cardiovascular - Adult  Goal: Maintains optimal cardiac output and hemodynamic stability  4/11/2025 1904 by Marisol Selby RN  Outcome: Progressing  4/11/2025 1158 by Marisol Selby RN  Outcome: Progressing  Goal: Absence of cardiac dysrhythmias or at baseline  4/11/2025 1904 by Marisol Selby RN  Outcome: Progressing  Flowsheets (Taken 4/11/2025 1904)  Absence of cardiac dysrhythmias or at baseline: Administer antiarrhythmia medication and electrolyte replacement as ordered  4/11/2025 1158 by Marisol Selby RN  Outcome: Progressing     Problem: Skin/Tissue Integrity - Adult  Goal: Skin integrity remains intact  4/11/2025 1904 by Marisol Selby RN  Outcome: Progressing  4/11/2025 1158 by Marisol Selby RN  Outcome: Progressing  Goal: Incisions, wounds, or drain sites healing without S/S of infection  4/11/2025 1904 by Marisol Selby RN  Outcome:

## 2025-04-11 NOTE — PROGRESS NOTES
Progress Note    Gumaro Yaos  79 y.o.      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)    Mass of right thigh    Weight loss, abnormal    NSTEMI (non-ST elevated myocardial infarction) (HCC)    MR (mitral regurgitation)    Nonischemic cardiomyopathy (HCC)    Type 2 diabetes mellitus with hypercholesterolemia (HCC)    Anxiety disorder    Presence of implantable cardioverter-defibrillator (ICD)    UTI (urinary tract infection)    Poor appetite    Iron deficiency anemia    Atrial fibrillation (HCC)    Follow-up examination    MGUS (monoclonal gammopathy of unknown significance)    Long term current use of anticoagulant therapy    Antineoplastic chemotherapy induced anemia    Cancer associated pain    Pulmonary hypertension, moderate to severe (HCC)    Chronic gout with tophus    Hypercholesterolemia    Leukocytosis    Malignant neoplasm of right lower extremity    AICD at end of battery life    Hypertension associated with diabetes    GI bleed    Shock (HCC)    Acute renal failure superimposed on chronic kidney disease    Acute decompensated heart failure (HCC)    Hyperglycemia    Supratherapeutic INR    General weakness    ATN (acute tubular necrosis)    Systolic dysfunction    Hyperkalemia    Anemia    Recurrent falls    Arrhythmia    Hypercalcemia    CKD stage 3 secondary to diabetes (HCC)    Chronic systolic (congestive) heart failure    Acute renal failure    Acute renal injury    Ascites    Systolic congestive heart failure (HCC)    Shortness of breath           Subjective:     Patient is comfortable and is more alert and awake compared to last 3 days.  He is back from bone marrow biopsy.  Workup for light chain disease is in progress including 24 hours urine collection.  Cardiology note reviewed having intermittent atrial fibrillation ejection fraction around 45%..  No more shortness of breath continues to make urine..   Because of ascites  still not clear undergoing evaluation with low albumin elevated alkaline phosphatase.  Serum albumin ascites gradient 0.6 cytology negative for malignant cells of the ascitic fluid.  Renal functions remained stable.  Not hyperkalemic not acidotic.  Hemodynamically stable not requiring any more pressors      A comprehensive review of systems was negative except for that written in the History of Present Illness.    Objective:     /75   Pulse 96   Temp 98.4 °F (36.9 °C) (Oral)   Resp 29   Ht 1.854 m (6' 1\")   Wt 88 kg (194 lb 0.1 oz)   SpO2 96%   BMI 25.60 kg/m²       Intake/Output Summary (Last 24 hours) at 4/11/2025 1335  Last data filed at 4/11/2025 0900  Gross per 24 hour   Intake 340 ml   Output 1050 ml   Net -710 ml       Current Facility-Administered Medications   Medication Dose Route Frequency Provider Last Rate Last Admin    [Held by provider] carvedilol (COREG) tablet 25 mg  25 mg Oral BID WC Kristi Bianchi PA-C        [Held by provider] digoxin (LANOXIN) tablet 125 mcg  125 mcg Oral Daily Kristi Bianchi PA-C        [Held by provider] spironolactone (ALDACTONE) tablet 25 mg  25 mg Oral Daily Kristi Bianchi PA-C        [Held by provider] cloNIDine (CATAPRES) tablet 0.2 mg  0.2 mg Oral TID Kristi Bianchi PA-C        cefTRIAXone (ROCEPHIN) 1,000 mg in sterile water 10 mL IV syringe  1,000 mg IntraVENous Q24H Linda Self PA-C   1,000 mg at 04/11/25 0938    glucose chewable tablet 16 g  4 tablet Oral PRN Niyah Trevizo PA-C        insulin lispro (HUMALOG,ADMELOG) injection vial 0-4 Units  0-4 Units SubCUTAneous 4x Daily AC & HS Niyah Trevizo PA-C        lactulose (CHRONULAC) 10 GM/15ML solution 20 g  20 g Oral TID Kristi Bianchi PA-C   20 g at 04/11/25 0939    0.9 % sodium chloride infusion   IntraVENous PRN Kristi Bianchi PA-C        diphenhydrAMINE (BENADRYL) injection 25 mg  25 mg IntraVENous See Admin Instructions Kristi Bianchi PA-C        epoetin  emily-epbx (RETACRIT) 5,000 Units combo injection  5,000 Units SubCUTAneous Once per day on Tuesday Thursday Saturday Evens Talavera MD   5,000 Units at 04/10/25 1817    dextrose bolus 10% 125 mL  125 mL IntraVENous PRN Dayron Covington DO        Or    dextrose bolus 10% 250 mL  250 mL IntraVENous PRN Dayron Covington DO        glucagon injection 1 mg  1 mg SubCUTAneous PRN Dayron Covington DO        dextrose 10 % infusion   IntraVENous Continuous PRN Dayron Covington DO   Stopped at 04/08/25 0630    sodium chloride flush 0.9 % injection 5-40 mL  5-40 mL IntraVENous 2 times per day Artie Bustamante DO   10 mL at 04/11/25 0939    sodium chloride flush 0.9 % injection 5-40 mL  5-40 mL IntraVENous PRN Artie Bustamante DO        0.9 % sodium chloride infusion   IntraVENous PRN Artie Bustamante DO        ondansetron (ZOFRAN-ODT) disintegrating tablet 4 mg  4 mg Oral Q8H PRN Artie Bustamante DO        Or    ondansetron (ZOFRAN) injection 4 mg  4 mg IntraVENous Q6H PRN Pete-Artie Joseph DO        polyethylene glycol (GLYCOLAX) packet 17 g  17 g Oral Daily PRN Artie Bustamante DO        acetaminophen (TYLENOL) tablet 650 mg  650 mg Oral Q6H PRN Artie Bustamante DO        Or    acetaminophen (TYLENOL) suppository 650 mg  650 mg Rectal Q6H PRN Artie Bustamante DO        heparin (porcine) injection 5,000 Units  5,000 Units SubCUTAneous BID Artie Bustamante DO   5,000 Units at 04/10/25 2043        Physical Exam:     Physical Exam:   General:  Alert, cooperative, no distress, appears stated age.   Eyes:  Conjunctivae/corneas clear. PERRL, EOMs intact.    Mouth/Throat: Lips, mucosa, and tongue normal. Teeth and gums normal.   Neck: Supple, symmetrical, trachea midline, no adenopathy, thyroid: no enlargement/tenderness/nodules, no carotid bruit and no JVD.   Lungs:   Clear to auscultation bilaterally.

## 2025-04-11 NOTE — PROGRESS NOTES
TRANSFER - OUT REPORT:    Verbal report given to BLAZE Padilla (name) on Gumaro Khan being transferred to ICU (unit) for routine progression of patient care       Report consisted of patient's Situation, Background, Assessment and   Recommendations(SBAR).     Information from the following report(s) Nurse Handoff Report was reviewed with the receiving nurse.    Opportunity for questions and clarification was provided.

## 2025-04-11 NOTE — PLAN OF CARE
Problem: Chronic Conditions and Co-morbidities  Goal: Patient's chronic conditions and co-morbidity symptoms are monitored and maintained or improved  Outcome: Progressing     Problem: Discharge Planning  Goal: Discharge to home or other facility with appropriate resources  Outcome: Progressing     Problem: Pain  Goal: Verbalizes/displays adequate comfort level or baseline comfort level  Outcome: Progressing     Problem: Safety - Adult  Goal: Free from fall injury  Outcome: Progressing     Problem: Respiratory - Adult  Goal: Achieves optimal ventilation and oxygenation  Outcome: Progressing     Problem: Cardiovascular - Adult  Goal: Maintains optimal cardiac output and hemodynamic stability  Outcome: Progressing  Goal: Absence of cardiac dysrhythmias or at baseline  Outcome: Progressing     Problem: Skin/Tissue Integrity - Adult  Goal: Skin integrity remains intact  Outcome: Progressing  Goal: Incisions, wounds, or drain sites healing without S/S of infection  Outcome: Progressing     Problem: Musculoskeletal - Adult  Goal: Return mobility to safest level of function  Outcome: Progressing  Goal: Return ADL status to a safe level of function  Outcome: Progressing     Problem: Gastrointestinal - Adult  Goal: Maintains adequate nutritional intake  Outcome: Progressing     Problem: Genitourinary - Adult  Goal: Urinary catheter remains patent  Outcome: Progressing     Problem: Infection - Adult  Goal: Absence of infection at discharge  Outcome: Progressing     Problem: Metabolic/Fluid and Electrolytes - Adult  Goal: Electrolytes maintained within normal limits  Outcome: Progressing  Goal: Hemodynamic stability and optimal renal function maintained  Outcome: Progressing  Goal: Glucose maintained within prescribed range  Outcome: Progressing     Problem: Hematologic - Adult  Goal: Maintains hematologic stability  Outcome: Progressing

## 2025-04-11 NOTE — PROGRESS NOTES
[facility] note  R  Cardiology consultation request from Dr. Suresh for evaluation and management/treatment of nonischemic cardiomyopathy history    Date of  Admission: 4/7/2025  6:45 PM   Primary Care Physician:  Artis Shell MD    Attending Cardiologist: Dr. Adame       Assessment:     - Hypervolemic shock, resolved with IV fluids. Briefly required pressors  - Severe ascites. new onset in the last ~2 weeks per patient with unknown etiology at present. No history of cirrhosis documented, no history of prior ascites. S/p paracentesis with 2L out. Negative cytology for malignancy  - Persistent A fib: on coreg and digoxin as outpatient. Currently held due to shock and bradycardia. Not on AC due to anemia.  Patient in atrial fibrillation was rate in the 80s this a.m.  - Chronic systolic heart failure. EF 45-50% on Echo this admission. Appears compensated on exam.   - Nonischemic cardiomyopathy:   Echo 4/8/25 EF 45-50%, septal motion, mild global hypokinesis, grade III DD. BORIS RV dilated with reduced RV function. EF has been as low as 25%  S/p [employer] single AICD with gen change in 5/2018  GDMT with ARB, aldactone, coreg as outpatient. Aldactone and ARB held due to hyperkalemia  - Mild to moderate AI on Echo 4/2025  - Mild to moderate MR with multiple jets 4/2025  - Moderate to severe TR on echo 4/2025  - PAH Who group 5  - Hx of elevated Kappa/Lambda chains. Bone marrow biopsy pending.    - SHAHNAZ on CKD stage 4  - T2DM   - HLD  - History of right thigh sarcoma s/p resection    - History of GIB/hx of angioectasia s/p endoclipping 1/25/24   - ETOH abuse      Primary cardiologist: Dr. Velasquez      Plan:     .  Would restart anticoagulation in a.m.  Bone marrow completed today  - Volume management per nephrology   - Tele reviewed, patient in rate controlled AF. Recommend restarting coreg at lower dosage if rate control needed.   - Bone marrow biopsy per Heme/Onc     History of Present

## 2025-04-11 NOTE — PROGRESS NOTES
1105: Holding PT treatment d/t patient leaving floor with transport. On bedrest for 2 hours following procedure. Will follow up.

## 2025-04-11 NOTE — PROGRESS NOTES
OT attempted. Pt leaving for bone biopsy w/sedation.    2nd attempt, pt sleeping soundly.    Will continue to follow.

## 2025-04-11 NOTE — PROGRESS NOTES
[phone]    Gastroenterology follow up-Progress note    Impression:  Ascites: SAAG of 0.6. Differentials favor malignancy vs nephrotic syndrome. Though cytology was negative, malignancy is not ruled out because this test has very low sensitivity (high specificity). Unlikely cirrhosis or heart failure.   s/p paracentesis 4/8/25, only 2L removed due to hypotension. Pt with known CHF  Findings: T protein 4.6, LDH 92, polys 18, cytology neg, no organisms on gram stain. SAAG 0.6   receiving Albumin q 12 hrs  Platelet ct 159  ammonia mildly elevated   on admission  AST/ALT low to normal  T bili 1.7  Ethanol <3  Chronic Anemia  HH on admission: 7.5, dropped to 6.9 on 4/8, received 1 unit PRBCs, stable now Hgb 7.3.  Iron %sat 60, iron 127, ferritin 499  EGD 1/25/24   Gastric body one actively oozing angioectasia treated with with endoclip  There were some erosions  in the duodenal sweep, without stigmata of recent bleed.  Colonoscopy 6/15/20   Multiple polyps, mild diverticulosis, grade 1 internal hemorrhoids.   Further colonoscopy is not recommended for CRCS screening   Acute renal failure, stage 3 CHD - Creat 4.21 on admission, currently 2.9  Hyperkalemia - likely medication induced per nephrology. Initiated on HD due to hemodynamic instability and hyperkalemia. Potassium 8 on admission, currently 5.3  Systolic congestive heart failure - echocardiogram pending  Pt has pacemaker, home meds include Aldactone, Digoxin and Bumex      Plan:     Appreciate heme/onc input for possible malignant ascites  No plans for endoscopic intervention at this time without signs of active bleeding. Pt poses and above-average risk of complications with procedures and anesthesia.   Recommend continuing iron supplementation and monitor for bleeding.   Continue monitor H/H, transfuse for Hgb < 7.  Medical management per primary team      Chief Complaint: Ascites      Subjective:  Pt seen resting in bed, NAD, denies pain. ABD

## 2025-04-12 LAB
ANION GAP SERPL CALC-SCNC: 7 MMOL/L (ref 3–18)
BASOPHILS # BLD: 0.02 K/UL (ref 0–0.1)
BASOPHILS NFR BLD: 0.2 % (ref 0–2)
BUN SERPL-MCNC: 43 MG/DL (ref 7–18)
BUN/CREAT SERPL: 21 (ref 12–20)
CALCIUM SERPL-MCNC: 8.7 MG/DL (ref 8.5–10.1)
CHLORIDE SERPL-SCNC: 104 MMOL/L (ref 100–111)
CO2 SERPL-SCNC: 26 MMOL/L (ref 21–32)
CREAT SERPL-MCNC: 2.04 MG/DL (ref 0.6–1.3)
DIFFERENTIAL METHOD BLD: ABNORMAL
DIGOXIN SERPL-MCNC: 0.6 NG/ML (ref 0.9–2)
EOSINOPHIL # BLD: 0.35 K/UL (ref 0–0.4)
EOSINOPHIL NFR BLD: 3.3 % (ref 0–5)
ERYTHROCYTE [DISTWIDTH] IN BLOOD BY AUTOMATED COUNT: 21.1 % (ref 11.6–14.5)
GLUCOSE BLD STRIP.AUTO-MCNC: 117 MG/DL (ref 70–110)
GLUCOSE BLD STRIP.AUTO-MCNC: 128 MG/DL (ref 70–110)
GLUCOSE BLD STRIP.AUTO-MCNC: 153 MG/DL (ref 70–110)
GLUCOSE BLD STRIP.AUTO-MCNC: 95 MG/DL (ref 70–110)
GLUCOSE SERPL-MCNC: 138 MG/DL (ref 74–99)
HCT VFR BLD AUTO: 23.6 % (ref 36–48)
HGB BLD-MCNC: 7.5 G/DL (ref 13–16)
IMM GRANULOCYTES # BLD AUTO: 0.07 K/UL (ref 0–0.04)
IMM GRANULOCYTES NFR BLD AUTO: 0.7 % (ref 0–0.5)
LYMPHOCYTES # BLD: 1.4 K/UL (ref 0.9–3.6)
LYMPHOCYTES NFR BLD: 13.1 % (ref 21–52)
MAGNESIUM SERPL-MCNC: 2.2 MG/DL (ref 1.6–2.6)
MCH RBC QN AUTO: 28.7 PG (ref 24–34)
MCHC RBC AUTO-ENTMCNC: 31.8 G/DL (ref 31–37)
MCV RBC AUTO: 90.4 FL (ref 78–100)
MONOCYTES # BLD: 1.31 K/UL (ref 0.05–1.2)
MONOCYTES NFR BLD: 12.2 % (ref 3–10)
NEUTS SEG # BLD: 7.55 K/UL (ref 1.8–8)
NEUTS SEG NFR BLD: 70.5 % (ref 40–73)
NRBC # BLD: 0.08 K/UL (ref 0–0.01)
NRBC BLD-RTO: 0.7 PER 100 WBC
PHOSPHATE SERPL-MCNC: 2.8 MG/DL (ref 2.5–4.9)
PLATELET # BLD AUTO: 196 K/UL (ref 135–420)
PMV BLD AUTO: 11.5 FL (ref 9.2–11.8)
POTASSIUM SERPL-SCNC: 4.2 MMOL/L (ref 3.5–5.5)
RBC # BLD AUTO: 2.61 M/UL (ref 4.35–5.65)
SODIUM SERPL-SCNC: 137 MMOL/L (ref 136–145)
WBC # BLD AUTO: 10.7 K/UL (ref 4.6–13.2)

## 2025-04-12 PROCEDURE — 85025 COMPLETE CBC W/AUTO DIFF WBC: CPT

## 2025-04-12 PROCEDURE — 6360000002 HC RX W HCPCS: Performed by: INTERNAL MEDICINE

## 2025-04-12 PROCEDURE — 80162 ASSAY OF DIGOXIN TOTAL: CPT

## 2025-04-12 PROCEDURE — 83735 ASSAY OF MAGNESIUM: CPT

## 2025-04-12 PROCEDURE — 2500000003 HC RX 250 WO HCPCS: Performed by: INTERNAL MEDICINE

## 2025-04-12 PROCEDURE — 99232 SBSQ HOSP IP/OBS MODERATE 35: CPT | Performed by: INTERNAL MEDICINE

## 2025-04-12 PROCEDURE — 99232 SBSQ HOSP IP/OBS MODERATE 35: CPT | Performed by: FAMILY MEDICINE

## 2025-04-12 PROCEDURE — 84166 PROTEIN E-PHORESIS/URINE/CSF: CPT

## 2025-04-12 PROCEDURE — 80048 BASIC METABOLIC PNL TOTAL CA: CPT

## 2025-04-12 PROCEDURE — 84100 ASSAY OF PHOSPHORUS: CPT

## 2025-04-12 PROCEDURE — 2500000003 HC RX 250 WO HCPCS

## 2025-04-12 PROCEDURE — 6370000000 HC RX 637 (ALT 250 FOR IP)

## 2025-04-12 PROCEDURE — 83521 IG LIGHT CHAINS FREE EACH: CPT

## 2025-04-12 PROCEDURE — 6360000002 HC RX W HCPCS

## 2025-04-12 PROCEDURE — 84156 ASSAY OF PROTEIN URINE: CPT

## 2025-04-12 PROCEDURE — 2140000001 HC CVICU INTERMEDIATE R&B

## 2025-04-12 PROCEDURE — 86335 IMMUNFIX E-PHORSIS/URINE/CSF: CPT

## 2025-04-12 PROCEDURE — 94761 N-INVAS EAR/PLS OXIMETRY MLT: CPT

## 2025-04-12 PROCEDURE — 82962 GLUCOSE BLOOD TEST: CPT

## 2025-04-12 RX ADMIN — SODIUM CHLORIDE, PRESERVATIVE FREE 10 ML: 5 INJECTION INTRAVENOUS at 08:13

## 2025-04-12 RX ADMIN — HEPARIN SODIUM 5000 UNITS: 5000 INJECTION INTRAVENOUS; SUBCUTANEOUS at 08:13

## 2025-04-12 RX ADMIN — LACTULOSE 20 G: 10 SOLUTION ORAL at 08:11

## 2025-04-12 RX ADMIN — CEFTRIAXONE 1000 MG: 1 INJECTION, POWDER, FOR SOLUTION INTRAMUSCULAR; INTRAVENOUS at 08:12

## 2025-04-12 RX ADMIN — EPOETIN ALFA-EPBX 5000 UNITS: 3000 INJECTION, SOLUTION INTRAVENOUS; SUBCUTANEOUS at 17:01

## 2025-04-12 RX ADMIN — LACTULOSE 20 G: 10 SOLUTION ORAL at 20:53

## 2025-04-12 RX ADMIN — LACTULOSE 20 G: 10 SOLUTION ORAL at 15:11

## 2025-04-12 RX ADMIN — SODIUM CHLORIDE, PRESERVATIVE FREE 10 ML: 5 INJECTION INTRAVENOUS at 20:56

## 2025-04-12 RX ADMIN — HEPARIN SODIUM 5000 UNITS: 5000 INJECTION INTRAVENOUS; SUBCUTANEOUS at 20:53

## 2025-04-12 ASSESSMENT — PAIN SCALES - GENERAL
PAINLEVEL_OUTOF10: 0

## 2025-04-12 NOTE — PROGRESS NOTES
[facility] - Progress Note    Admit Date: 4/7/2025  Attending Cardiologist: Dr. Lidia Jang, DO    F/u AFib    Assessment:     Patient Active Problem List    Diagnosis Date Noted    Shortness of breath 04/10/2025    Ascites 04/08/2025    Systolic congestive heart failure (HCC) 04/08/2025    Acute renal failure 04/07/2025    Acute renal injury 04/07/2025    Chronic systolic (congestive) heart failure 07/08/2024    Hypercalcemia 05/26/2024    Arrhythmia 05/25/2024    Hyperkalemia 05/24/2024    Anemia 05/24/2024    Recurrent falls 05/24/2024    ATN (acute tubular necrosis) 01/26/2024    Systolic dysfunction 01/26/2024    Acute decompensated heart failure (HCC) 01/25/2024    Hyperglycemia 01/25/2024    Supratherapeutic INR 01/25/2024    General weakness 01/25/2024    GI bleed 01/24/2024    Shock (HCC) 01/24/2024    Acute renal failure superimposed on chronic kidney disease 01/24/2024    CKD stage 3 secondary to diabetes ([facility]) 07/11/2023    Hypercholesterolemia 12/31/2018    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    Malignant neoplasm of right lower extremity 08/20/2018    AICD at end of battery life 05/07/2018    Type 2 diabetes mellitus with hypercholesterolemia ([facility]) 04/30/2018    UTI (urinary tract infection) 07/27/2016    NSTEMI (non-ST elevated myocardial infarction) ([facility]) 01/22/2016    Weight loss, abnormal 01/05/2016    Poor appetite 01/05/2016    Cancer associated pain 01/05/2016    Antineoplastic chemotherapy induced anemia 11/24/2015    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

## 2025-04-12 NOTE — PLAN OF CARE
Problem: Chronic Conditions and Co-morbidities  Goal: Patient's chronic conditions and co-morbidity symptoms are monitored and maintained or improved  4/12/2025 0822 by Mikayla Garcia RN  Outcome: Progressing  4/11/2025 2245 by Dee Almanzar RN  Outcome: Progressing  4/11/2025 2245 by Dee Almanzar RN  Outcome: Progressing  Flowsheets (Taken 4/11/2025 2245)  Care Plan - Patient's Chronic Conditions and Co-Morbidity Symptoms are Monitored and Maintained or Improved: Monitor and assess patient's chronic conditions and comorbid symptoms for stability, deterioration, or improvement  4/11/2025 1904 by Marisol Selby RN  Outcome: Progressing     Problem: Discharge Planning  Goal: Discharge to home or other facility with appropriate resources  4/12/2025 0822 by Mikayla Garcia RN  Outcome: Progressing  4/11/2025 2245 by Dee Almanzar RN  Outcome: Progressing  Flowsheets (Taken 4/11/2025 2245)  Discharge to home or other facility with appropriate resources:   Identify barriers to discharge with patient and caregiver   Arrange for needed discharge resources and transportation as appropriate   Identify discharge learning needs (meds, wound care, etc)   Refer to discharge planning if patient needs post-hospital services based on physician order or complex needs related to functional status, cognitive ability or social support system  4/11/2025 1904 by Marisol Selby RN  Outcome: Progressing     Problem: Pain  Goal: Verbalizes/displays adequate comfort level or baseline comfort level  4/12/2025 0822 by Mikayla Garcia RN  Outcome: Progressing  4/11/2025 2245 by Dee Almanzar RN  Outcome: Progressing  Flowsheets (Taken 4/11/2025 2245)  Verbalizes/displays adequate comfort level or baseline comfort level:   Encourage patient to monitor pain and request assistance   Assess pain using appropriate pain scale   Administer analgesics based on type and severity of pain and evaluate response   Implement non-pharmacological

## 2025-04-12 NOTE — PROGRESS NOTES
[facility]  Progress Note    Patient: Gumaro Khan Age: 79 y.o. : 1945 MR#: [id number] SSN: [id number]      Subjective/24-hour events:     Resting comfortably currently, wife present at bedside.  No acute complaints to speak of.    Assessment:   SHAHNAZ on CKD 3  Ascites w/low SAAG, s/p paracentesis  Acute on chronic anemia s/p 1 unit PRBCs  Systolic CHF - EF 25-30% by echo 2024  Paroxysmal atrial fibrillation  DM2  Severe hyperkalemia requiring emergent hemodialysis, resolved  Acute metabolic encephalopathy, improved  History of polyclonal gammopathy - elevated kappa/lambda chains  Alcohol abuse history    Plan:   Anticoagulation - will resume pending further d/w cardiology and GI.  Does not appear to have been on coumadin since October of last year.  Volume management per nephrology.  Has been receiving intermittent HD but noted no plan for dialysis this weekend.  BMB completed yesterday.  Results pending  Continue to monitor hemoglobin, transfuse as necessary.  Continue iron supplementation.  Continue PT/OT.  SNF disposition anticipated at discharge.  Updated patient and spouse on plan of care and all current questions answered.    Case discussed with:  [x]Patient  [x]Family  [x] Nursing  []Case Management  DVT Prophylaxis:  []Lovenox  [x]Hep SQ  []SCDs  []Coumadin   []On Heparin gtt []PO anticoagulant    Objective:   VS: /68   Pulse 65   Temp 97.6 °F (36.4 °C) (Oral)   Resp 26   Ht 1.854 m (6' 1\")   Wt 88 kg (194 lb 0.1 oz)   SpO2 100%   BMI 25.60 kg/m²      Tmax/24hrs: Temp (24hrs), Av.4 °F (36.9 °C), Min:97.6 °F (36.4 °C), Max:99.3 °F (37.4 °C)    Intake/Output Summary (Last 24 hours) at 2025 0914  Last data filed at 2025 1900  Gross per 24 hour   Intake 480 ml   Output 250 ml   Net 230 ml       Gen:  Chronically ill-appearing.  In NAD.  Nontoxic appearing.  Lungs: Clear, no wheezes  Effort nonlabored.  CV: irregularly

## 2025-04-12 NOTE — PLAN OF CARE
Problem: Chronic Conditions and Co-morbidities  Goal: Patient's chronic conditions and co-morbidity symptoms are monitored and maintained or improved  4/11/2025 2245 by Dee Almanzar RN  Outcome: Progressing  4/11/2025 2245 by Dee Almanzar RN  Outcome: Progressing  Flowsheets (Taken 4/11/2025 2245)  Care Plan - Patient's Chronic Conditions and Co-Morbidity Symptoms are Monitored and Maintained or Improved: Monitor and assess patient's chronic conditions and comorbid symptoms for stability, deterioration, or improvement  4/11/2025 1904 by Marisol Selby RN  Outcome: Progressing  4/11/2025 1158 by Marisol Selby RN  Outcome: Progressing     Problem: Discharge Planning  Goal: Discharge to home or other facility with appropriate resources  4/11/2025 2245 by Dee Almanzar RN  Outcome: Progressing  Flowsheets (Taken 4/11/2025 2245)  Discharge to home or other facility with appropriate resources:   Identify barriers to discharge with patient and caregiver   Arrange for needed discharge resources and transportation as appropriate   Identify discharge learning needs (meds, wound care, etc)   Refer to discharge planning if patient needs post-hospital services based on physician order or complex needs related to functional status, cognitive ability or social support system  4/11/2025 1904 by Marisol Selby RN  Outcome: Progressing  4/11/2025 1158 by Marisol Sleby RN  Outcome: Progressing     Problem: Pain  Goal: Verbalizes/displays adequate comfort level or baseline comfort level  4/11/2025 2245 by Dee Almanzar RN  Outcome: Progressing  Flowsheets (Taken 4/11/2025 2245)  Verbalizes/displays adequate comfort level or baseline comfort level:   Encourage patient to monitor pain and request assistance   Assess pain using appropriate pain scale   Administer analgesics based on type and severity of pain and evaluate response   Implement non-pharmacological measures as appropriate and evaluate response  4/11/2025 1904 by

## 2025-04-12 NOTE — PROGRESS NOTES
Progress Note    Gumaro Khan  79 y.o.      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)    Mass of right thigh    Weight loss, abnormal    NSTEMI (non-ST elevated myocardial infarction) (HCC)    MR (mitral regurgitation)    Nonischemic cardiomyopathy (HCC)    Type 2 diabetes mellitus with hypercholesterolemia (HCC)    Anxiety disorder    Presence of implantable cardioverter-defibrillator (ICD)    UTI (urinary tract infection)    Poor appetite    Iron deficiency anemia    Atrial fibrillation (HCC)    Follow-up examination    MGUS (monoclonal gammopathy of unknown significance)    Long term current use of anticoagulant therapy    Antineoplastic chemotherapy induced anemia    Cancer associated pain    Pulmonary hypertension, moderate to severe (HCC)    Chronic gout with tophus    Hypercholesterolemia    Leukocytosis    Malignant neoplasm of right lower extremity    AICD at end of battery life    Hypertension associated with diabetes    GI bleed    Shock (HCC)    Acute renal failure superimposed on chronic kidney disease    Acute decompensated heart failure (HCC)    Hyperglycemia    Supratherapeutic INR    General weakness    ATN (acute tubular necrosis)    Systolic dysfunction    Hyperkalemia    Anemia    Recurrent falls    Arrhythmia    Hypercalcemia    CKD stage 3 secondary to diabetes (HCC)    Chronic systolic (congestive) heart failure    Acute renal failure    Acute renal injury    Ascites    Systolic congestive heart failure (HCC)    Shortness of breath           Subjective:     Patient feels good no new complaint completed urine collection for light chain disease workup.  Undergone bone marrow biopsy yesterday.  Appetite is better fully alert awake and oriented not on any pressors hemodynamically stable.       A comprehensive review of systems was negative except for that written in the History of Present Illness.    Objective:     BP  124/68   Pulse 65   Temp 97.6 °F (36.4 °C) (Oral)   Resp 26   Ht 1.854 m (6' 1\")   Wt 88 kg (194 lb 0.1 oz)   SpO2 100%   BMI 25.60 kg/m²       Intake/Output Summary (Last 24 hours) at 4/12/2025 1047  Last data filed at 4/11/2025 1900  Gross per 24 hour   Intake 480 ml   Output 250 ml   Net 230 ml       Current Facility-Administered Medications   Medication Dose Route Frequency Provider Last Rate Last Admin    [Held by provider] carvedilol (COREG) tablet 25 mg  25 mg Oral BID WC Kristi Bianchi PA-C        [Held by provider] digoxin (LANOXIN) tablet 125 mcg  125 mcg Oral Daily Kristi Bianchi PA-C        [Held by provider] spironolactone (ALDACTONE) tablet 25 mg  25 mg Oral Daily Kristi Bianchi PA-C        [Held by provider] cloNIDine (CATAPRES) tablet 0.2 mg  0.2 mg Oral TID Kristi Bianchi PA-C        cefTRIAXone (ROCEPHIN) 1,000 mg in sterile water 10 mL IV syringe  1,000 mg IntraVENous Q24H Linda Self PA-C   1,000 mg at 04/12/25 0812    glucose chewable tablet 16 g  4 tablet Oral PRN Niyah Trevizo PA-C        insulin lispro (HUMALOG,ADMELOG) injection vial 0-4 Units  0-4 Units SubCUTAneous 4x Daily AC & HS Niyah Trevizo PA-C        lactulose (CHRONULAC) 10 GM/15ML solution 20 g  20 g Oral TID Kristi Bianchi PA-C   20 g at 04/12/25 0811    0.9 % sodium chloride infusion   IntraVENous PRN Kristi Bianchi PA-C        diphenhydrAMINE (BENADRYL) injection 25 mg  25 mg IntraVENous See Admin Instructions Kristi Bianchi PA-C        epoetin emily-epbx (RETACRIT) 5,000 Units combo injection  5,000 Units SubCUTAneous Once per day on Tuesday Thursday Saturday Evens Talavera MD   5,000 Units at 04/10/25 1817    dextrose bolus 10% 125 mL  125 mL IntraVENous PRN Dayron Covington,         Or    dextrose bolus 10% 250 mL  250 mL IntraVENous PRN Dayron Covington DO        glucagon injection 1 mg  1 mg SubCUTAneous PRN Dayron Covington DO        dextrose 10 % infusion   IntraVENous

## 2025-04-12 NOTE — PROGRESS NOTES
[phone]    Gastroenterology follow up-Progress note    Impression:  Ascites: SAAG of 0.6. Differentials favor malignancy vs nephrotic syndrome. Though cytology was negative, malignancy is not ruled out because this test has very low sensitivity (high specificity). Unlikely cirrhosis or heart failure.   s/p paracentesis 4/8/25, only 2L removed due to hypotension. Pt with known CHF  Findings: T protein 4.6, LDH 92, polys 18, cytology neg, no organisms on gram stain. SAAG 0.6   receiving Albumin q 12 hrs  Platelet ct 159  S/p bone marrow biopsy 4/11/2025  Chronic Anemia  HH on admission: 7.5, dropped to 6.9 on 4/8, received 1 unit PRBCs, stable now Hgb 7.3.  Iron %sat 60, iron 127, ferritin 499  EGD 1/25/24   Gastric body one actively oozing angioectasia treated with with endoclip  There were some erosions  in the duodenal sweep, without stigmata of recent bleed.  Colonoscopy 6/15/20   Multiple polyps, mild diverticulosis, grade 1 internal hemorrhoids.   Further colonoscopy is not recommended for CRCS screening   Acute renal failure, stage 3 CHD - Creat 4.21 on admission, currently 2.9  Hyperkalemia - likely medication induced per nephrology. Initiated on HD due to hemodynamic instability and hyperkalemia. Potassium 8 on admission, currently 5.3  Systolic congestive heart failure - echocardiogram pending  Pt has pacemaker, home meds include Aldactone, Digoxin and Bumex      Plan:     Appreciate heme/onc input for possible malignant ascites  No plans for endoscopic intervention at this time without signs of active bleeding. Pt poses and above-average risk of complications with procedures and anesthesia.   Recommend continuing iron supplementation and monitor for bleeding.   Continue monitor H/H, transfuse for Hgb < 7.  Medical management per primary team      Chief Complaint: Ascites      Subjective:  Pt seen resting in bed, NAD, denies pain. ABD remains distended, but not painful.     ROS: Denies any fevers,  chills, rash.       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)    Mass of right thigh    Weight loss, abnormal    NSTEMI (non-ST elevated myocardial infarction) (HCC)    MR (mitral regurgitation)    Nonischemic cardiomyopathy (HCC)    Type 2 diabetes mellitus with hypercholesterolemia (HCC)    Anxiety disorder    Presence of implantable cardioverter-defibrillator (ICD)    UTI (urinary tract infection)    Poor appetite    Iron deficiency anemia    Atrial fibrillation (HCC)    Follow-up examination    MGUS (monoclonal gammopathy of unknown significance)    Long term current use of anticoagulant therapy    Antineoplastic chemotherapy induced anemia    Cancer associated pain    Pulmonary hypertension, moderate to severe (HCC)    Chronic gout with tophus    Hypercholesterolemia    Leukocytosis    Malignant neoplasm of right lower extremity    AICD at end of battery life    Hypertension associated with diabetes    GI bleed    Shock (HCC)    Acute renal failure superimposed on chronic kidney disease    Acute decompensated heart failure (HCC)    Hyperglycemia    Supratherapeutic INR    General weakness    ATN (acute tubular necrosis)    Systolic dysfunction    Hyperkalemia    Anemia    Recurrent falls    Arrhythmia    Hypercalcemia    CKD stage 3 secondary to diabetes (HCC)    Chronic systolic (congestive) heart failure    Acute renal failure    Acute renal injury    Ascites    Systolic congestive heart failure (HCC)    Shortness of breath         /68   Pulse 65   Temp 97.6 °F (36.4 °C) (Oral)   Resp 26   Ht 1.854 m (6' 1\")   Wt 88 kg (194 lb 0.1 oz)   SpO2 100%   BMI 25.60 kg/m²       constitutional: well developed, well nourished, normal habitus, no deformities, in no acute distress. Frail, elderly appearace  skin: no rashes, ulcers, icterus or other lesions  eyes: normal conjunctivae and lids; no jaundice pupils: normal  HEENT: normocephalic, atraumatic  respiratory:

## 2025-04-13 LAB
ALP BONE CFR SERPL: 21 % (ref 12–68)
ALP INTEST CFR SERPL: 0 % (ref 0–18)
ALP LIVER CFR SERPL: 79 % (ref 13–88)
ALP SERPL-CCNC: 232 IU/L (ref 44–121)
ANION GAP SERPL CALC-SCNC: 9 MMOL/L (ref 3–18)
BASOPHILS # BLD: 0.03 K/UL (ref 0–0.1)
BASOPHILS NFR BLD: 0.3 % (ref 0–2)
BUN SERPL-MCNC: 40 MG/DL (ref 7–18)
BUN/CREAT SERPL: 18 (ref 12–20)
CALCIUM SERPL-MCNC: 8.9 MG/DL (ref 8.5–10.1)
CHLORIDE SERPL-SCNC: 104 MMOL/L (ref 100–111)
CO2 SERPL-SCNC: 25 MMOL/L (ref 21–32)
CREAT SERPL-MCNC: 2.2 MG/DL (ref 0.6–1.3)
DIFFERENTIAL METHOD BLD: ABNORMAL
EOSINOPHIL # BLD: 0.4 K/UL (ref 0–0.4)
EOSINOPHIL NFR BLD: 3.6 % (ref 0–5)
ERYTHROCYTE [DISTWIDTH] IN BLOOD BY AUTOMATED COUNT: 21.1 % (ref 11.6–14.5)
GLUCOSE BLD STRIP.AUTO-MCNC: 107 MG/DL (ref 70–110)
GLUCOSE BLD STRIP.AUTO-MCNC: 125 MG/DL (ref 70–110)
GLUCOSE BLD STRIP.AUTO-MCNC: 128 MG/DL (ref 70–110)
GLUCOSE BLD STRIP.AUTO-MCNC: 134 MG/DL (ref 70–110)
GLUCOSE SERPL-MCNC: 112 MG/DL (ref 74–99)
HCT VFR BLD AUTO: 25.5 % (ref 36–48)
HGB BLD-MCNC: 8.1 G/DL (ref 13–16)
IMM GRANULOCYTES # BLD AUTO: 0.09 K/UL (ref 0–0.04)
IMM GRANULOCYTES NFR BLD AUTO: 0.8 % (ref 0–0.5)
LYMPHOCYTES # BLD: 1.31 K/UL (ref 0.9–3.6)
LYMPHOCYTES NFR BLD: 11.7 % (ref 21–52)
MAGNESIUM SERPL-MCNC: 2.2 MG/DL (ref 1.6–2.6)
MCH RBC QN AUTO: 29.1 PG (ref 24–34)
MCHC RBC AUTO-ENTMCNC: 31.8 G/DL (ref 31–37)
MCV RBC AUTO: 91.7 FL (ref 78–100)
MONOCYTES # BLD: 1.21 K/UL (ref 0.05–1.2)
MONOCYTES NFR BLD: 10.8 % (ref 3–10)
NEUTS SEG # BLD: 8.13 K/UL (ref 1.8–8)
NEUTS SEG NFR BLD: 72.8 % (ref 40–73)
NRBC # BLD: 0.03 K/UL (ref 0–0.01)
NRBC BLD-RTO: 0.3 PER 100 WBC
PHOSPHATE SERPL-MCNC: 3.1 MG/DL (ref 2.5–4.9)
PLATELET # BLD AUTO: 186 K/UL (ref 135–420)
PMV BLD AUTO: 11.3 FL (ref 9.2–11.8)
POTASSIUM SERPL-SCNC: 4.2 MMOL/L (ref 3.5–5.5)
RBC # BLD AUTO: 2.78 M/UL (ref 4.35–5.65)
SODIUM SERPL-SCNC: 138 MMOL/L (ref 136–145)
WBC # BLD AUTO: 11.2 K/UL (ref 4.6–13.2)

## 2025-04-13 PROCEDURE — 2500000003 HC RX 250 WO HCPCS: Performed by: INTERNAL MEDICINE

## 2025-04-13 PROCEDURE — 97530 THERAPEUTIC ACTIVITIES: CPT

## 2025-04-13 PROCEDURE — 82962 GLUCOSE BLOOD TEST: CPT

## 2025-04-13 PROCEDURE — 2500000003 HC RX 250 WO HCPCS

## 2025-04-13 PROCEDURE — 6360000002 HC RX W HCPCS: Performed by: INTERNAL MEDICINE

## 2025-04-13 PROCEDURE — 83735 ASSAY OF MAGNESIUM: CPT

## 2025-04-13 PROCEDURE — 99233 SBSQ HOSP IP/OBS HIGH 50: CPT | Performed by: FAMILY MEDICINE

## 2025-04-13 PROCEDURE — 84100 ASSAY OF PHOSPHORUS: CPT

## 2025-04-13 PROCEDURE — 94761 N-INVAS EAR/PLS OXIMETRY MLT: CPT

## 2025-04-13 PROCEDURE — 36556 INSERT NON-TUNNEL CV CATH: CPT

## 2025-04-13 PROCEDURE — 2140000001 HC CVICU INTERMEDIATE R&B

## 2025-04-13 PROCEDURE — 80048 BASIC METABOLIC PNL TOTAL CA: CPT

## 2025-04-13 PROCEDURE — 85025 COMPLETE CBC W/AUTO DIFF WBC: CPT

## 2025-04-13 PROCEDURE — 36415 COLL VENOUS BLD VENIPUNCTURE: CPT

## 2025-04-13 PROCEDURE — 6360000002 HC RX W HCPCS

## 2025-04-13 PROCEDURE — 6370000000 HC RX 637 (ALT 250 FOR IP)

## 2025-04-13 RX ORDER — FUROSEMIDE 10 MG/ML
20 INJECTION INTRAMUSCULAR; INTRAVENOUS ONCE
Status: COMPLETED | OUTPATIENT
Start: 2025-04-13 | End: 2025-04-13

## 2025-04-13 RX ADMIN — FUROSEMIDE 20 MG: 10 INJECTION, SOLUTION INTRAMUSCULAR; INTRAVENOUS at 11:58

## 2025-04-13 RX ADMIN — SODIUM CHLORIDE, PRESERVATIVE FREE 10 ML: 5 INJECTION INTRAVENOUS at 09:20

## 2025-04-13 RX ADMIN — CEFTRIAXONE 1000 MG: 1 INJECTION, POWDER, FOR SOLUTION INTRAMUSCULAR; INTRAVENOUS at 09:17

## 2025-04-13 RX ADMIN — HEPARIN SODIUM 5000 UNITS: 5000 INJECTION INTRAVENOUS; SUBCUTANEOUS at 09:17

## 2025-04-13 RX ADMIN — SODIUM CHLORIDE, PRESERVATIVE FREE 10 ML: 5 INJECTION INTRAVENOUS at 21:00

## 2025-04-13 RX ADMIN — LACTULOSE 20 G: 10 SOLUTION ORAL at 09:17

## 2025-04-13 RX ADMIN — LACTULOSE 20 G: 10 SOLUTION ORAL at 14:53

## 2025-04-13 RX ADMIN — HEPARIN SODIUM 5000 UNITS: 5000 INJECTION INTRAVENOUS; SUBCUTANEOUS at 20:57

## 2025-04-13 ASSESSMENT — PAIN SCALES - GENERAL
PAINLEVEL_OUTOF10: 0

## 2025-04-13 NOTE — PROGRESS NOTES
Progress Note    Gumaro Khan  79 y.o.      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)    Mass of right thigh    Weight loss, abnormal    NSTEMI (non-ST elevated myocardial infarction) (HCC)    MR (mitral regurgitation)    Nonischemic cardiomyopathy (HCC)    Type 2 diabetes mellitus with hypercholesterolemia (HCC)    Anxiety disorder    Presence of implantable cardioverter-defibrillator (ICD)    UTI (urinary tract infection)    Poor appetite    Iron deficiency anemia    Atrial fibrillation (HCC)    Follow-up examination    MGUS (monoclonal gammopathy of unknown significance)    Long term current use of anticoagulant therapy    Antineoplastic chemotherapy induced anemia    Cancer associated pain    Pulmonary hypertension, moderate to severe (HCC)    Chronic gout with tophus    Hypercholesterolemia    Leukocytosis    Malignant neoplasm of right lower extremity    AICD at end of battery life    Hypertension associated with diabetes    GI bleed    Shock (HCC)    Acute renal failure superimposed on chronic kidney disease    Acute decompensated heart failure (HCC)    Hyperglycemia    Supratherapeutic INR    General weakness    ATN (acute tubular necrosis)    Systolic dysfunction    Hyperkalemia    Anemia    Recurrent falls    Arrhythmia    Hypercalcemia    CKD stage 3 secondary to diabetes (HCC)    Chronic systolic (congestive) heart failure    Acute renal failure    Acute renal injury    Ascites    Systolic congestive heart failure (HCC)    Shortness of breath           Subjective:     Patient is now out of ICU.bed.  Sleeping and breathing fast with the mouth open with mild abdominal distention.  Nurse reports patient is extremely shortness of breath with movement in bed from side-to-side Required dialysis initially at the time of admission.  Still has a dialysis catheter in the left provide.       A comprehensive review of systems was negative

## 2025-04-13 NOTE — PROGRESS NOTES
[facility]  Progress Note    Patient: Gumaro Khan Age: 79 y.o. : 1945 MR#: [id number] SSN: [id number]      Subjective/24-hour events:     Denies feeling short of breath and has no increased oxygen requirements  Continue to do not abdominal pain, no nausea or vomiting reported.    Assessment:   SHAHNAZ on CKD 3  Ascites w/low SAAG, s/p paracentesis  Acute on chronic anemia s/p 1 unit PRBCs  Systolic CHF - EF 25-30% by echo 2024  Paroxysmal atrial fibrillation  DM2  Severe hyperkalemia requiring emergent hemodialysis, resolved  Acute metabolic encephalopathy, improved  History of polyclonal gammopathy - elevated kappa/lambda chains  Alcohol abuse history    Plan:   Noted to be \"belly breathing\" to a degree this morning on visit.  Abdomen is soft and nontender but distended.  Will obtain chest x-ray to assure no evidence of pulmonary congestion but suspect that recurrent ascites is causing aforementioned symptoms.  Patient is in no distress and will continue to monitor.  Discussed with nursing.  No need for further dialysis at this point per nephrology.  Continuing to maintain close watch on volume status and renal indices.  Await results of BMB.  Trend Hgb, transfuse further as needed.  Aggressive PT/OT.  Anticipate SNF placement at discharge.  Would benefit from goals of care discussion while hospitalized.  Will broach the subject with spouse tomorrow.  Updated patient's spouse via phone with all current questions being answered.    Case discussed with:  [x]Patient  [x]Family  [x] Nursing  []Case Management  DVT Prophylaxis:  []Lovenox  [x]Hep SQ  []SCDs  []Coumadin   []On Heparin gtt []PO anticoagulant    Objective:   VS: /79   Pulse 93   Temp 99 °F (37.2 °C) (Oral)   Resp 24   Ht 1.854 m (6' 1\")   Wt 87.9 kg (193 lb 12.6 oz)   SpO2 100%   BMI 25.57 kg/m²      Tmax/24hrs: Temp (24hrs), Av.6 °F (37 °C), Min:98.1 °F (36.7 °C), Max:99 °F  mmol/L    Anion Gap 9 3.0 - 18 mmol/L    Glucose 112 (H) 74 - 99 mg/dL    BUN 40 (H) 7.0 - 18 MG/DL    Creatinine 2.20 (H) 0.6 - 1.3 MG/DL    BUN/Creatinine Ratio 18 12 - 20      Est, Glom Filt Rate 30 (L) >60 ml/min/1.73m2    Calcium 8.9 8.5 - 10.1 MG/DL   Magnesium    Collection Time: 04/13/25  3:37 AM   Result Value Ref Range    Magnesium 2.2 1.6 - 2.6 mg/dL   Phosphorus    Collection Time: 04/13/25  3:37 AM   Result Value Ref Range    Phosphorus 3.1 2.5 - 4.9 MG/DL   POCT Glucose    Collection Time: 04/13/25  7:18 AM   Result Value Ref Range    POC Glucose 107 70 - 110 mg/dL         Signed By: Ed Suresh MD

## 2025-04-13 NOTE — PLAN OF CARE
Problem: Physical Therapy - Adult  Goal: By Discharge: Performs mobility at highest level of function for planned discharge setting.  See evaluation for individualized goals.  Description: Physical Therapy Goals  Initiated 4/9/2025 and to be accomplished within 7 day(s)  1.  Patient will move from supine to sit and sit to supine  in bed with supervision/set-up in preparation for seated tasks.    2.  Patient will transfer from bed to chair and chair to bed with supervision/set-up using the least restrictive device in preparation for out of bed.  3.  Patient will perform sit to stand with supervision/set-up in preparation for out of bed tasks.  4.  Patient will ambulate with supervision/set-up for 25 feet with the least restrictive device in preparation for home setting.     PLOF: Lives with wife on first floor of two story home. No steps to enter. Amb with walker; has wheelchair and bedside commode. History of falls.   Outcome: Progressing     PHYSICAL THERAPY TREATMENT    Patient: Gumaro Khan (79 y.o. male)  Date: 4/13/2025  Diagnosis: Shortness of breath [R06.02]  Hyperkalemia [E87.5]  Respiratory acidosis [E87.29]  Acute renal failure [N17.9]  Acute renal injury [N17.9]  Ascites due to alcoholic cirrhosis (HCC) [K70.31]  Acute renal failure superimposed on chronic kidney disease, unspecified acute renal failure type, unspecified CKD stage [N17.9, N18.9] Acute renal failure      Precautions: Fall Risk,  ,  ,  ,  ,  ,  ,      ASSESSMENT: pt sleeping upon arrival yet willing to attempt mobility. Pt completed supine to sit with CGA. Pt only tolerated sitting EOB x 1 minute before requesting to return supine due to fatigue. Pt completed seated therex prior to returning supine, required min A for BLE to return to bed. Pt repositioned with (B) heels offloaded, no further questions/concerns.    Recommend for next PT session:  Sit to stand transfers    Progression toward goals:   []      Improving  Slides   [] [] [] []    Straight Leg Raises   [] [] [] []    Hip Add   [] [] [] [] Hold for 5 secs, w/ pillow squeeze   Long Arc Quads 1 10 [x] [] [] [] Through partial range (R) only   Seated Marching   [] [] [] []    Standing Marching   [] [] [] []       [] [] [] []        Pain:  Intensity Pre-treatment: 0/10   Intensity Post-treatment: 0/10  Scale: Numeric Rating Scale    Activity Tolerance:   Activity Tolerance: Patient tolerated evaluation without incident  Please refer to the flowsheet for vital signs taken during this treatment.  After treatment:   [] Patient left in no apparent distress sitting up in chair  [x] Patient left in no apparent distress in bed  [x] Call bell left within reach  [x] Nursing notified  [] Caregiver present  [x] Bed/chair alarm activated  [] No alarm  [] SCDs applied      COMMUNICATION/EDUCATION:   Patient Education  Education Given To: Patient  Education Provided: Role of Therapy;Plan of Care  Education Method: Demonstration;Verbal;Teach Back  Barriers to Learning: Cognition  Education Outcome: Verbalized understanding;Demonstrated understanding;Continued education needed      Kristi Greene, PT  Minutes: 8

## 2025-04-13 NOTE — PROGRESS NOTES
[phone]    Gastroenterology follow up-Progress note    Impression:  Ascites: SAAG of 0.6. Differentials favor malignancy vs nephrotic syndrome. Though cytology was negative, malignancy is not ruled out because this test has very low sensitivity (high specificity). Unlikely cirrhosis or heart failure.   s/p paracentesis 4/8/25, only 2L removed due to hypotension. Pt with known CHF  Findings: T protein 4.6, LDH 92, polys 18, cytology neg, no organisms on gram stain. SAAG 0.6   receiving Albumin q 12 hrs  Platelet ct 159  S/p bone marrow biopsy 4/11/2025  Chronic Anemia  HH on admission: 7.5, dropped to 6.9 on 4/8, received 1 unit PRBCs, stable now Hgb 7.3.  Iron %sat 60, iron 127, ferritin 499  EGD 1/25/24   Gastric body one actively oozing angioectasia treated with with endoclip  There were some erosions  in the duodenal sweep, without stigmata of recent bleed.  Colonoscopy 6/15/20   Multiple polyps, mild diverticulosis, grade 1 internal hemorrhoids.   Further colonoscopy is not recommended for CRCS screening   Acute renal failure, stage 3 CHD - Creat 4.21 on admission, currently 2.9  Hyperkalemia - likely medication induced per nephrology. Initiated on HD due to hemodynamic instability and hyperkalemia. Potassium 8 on admission, currently 5.3  Systolic congestive heart failure - echocardiogram pending  Pt has pacemaker, home meds include Aldactone, Digoxin and Bumex      Plan:     Appreciate heme/onc input for possible malignant ascites  No plans for endoscopic intervention at this time without signs of active bleeding. Pt poses and above-average risk of complications with procedures and anesthesia.   Recommend continuing iron supplementation and monitor for bleeding.   Continue monitor H/H, transfuse for Hgb < 7.  Medical management per primary team      Chief Complaint: Ascites      Subjective:  Pt seen resting in bed-sleepy, but rousable, NAD, denies pain. ABD remains distended, but not painful.

## 2025-04-13 NOTE — PLAN OF CARE
Problem: Chronic Conditions and Co-morbidities  Goal: Patient's chronic conditions and co-morbidity symptoms are monitored and maintained or improved  4/13/2025 1129 by Edwige Farias RN  Outcome: Progressing  Flowsheets (Taken 4/12/2025 2353 by Josiane Yuan RN)  Care Plan - Patient's Chronic Conditions and Co-Morbidity Symptoms are Monitored and Maintained or Improved:   Monitor and assess patient's chronic conditions and comorbid symptoms for stability, deterioration, or improvement   Collaborate with multidisciplinary team to address chronic and comorbid conditions and prevent exacerbation or deterioration   Update acute care plan with appropriate goals if chronic or comorbid symptoms are exacerbated and prevent overall improvement and discharge     Problem: Pain  Goal: Verbalizes/displays adequate comfort level or baseline comfort level  4/13/2025 1129 by Edwige Farias RN  Outcome: Progressing  Flowsheets  Taken 4/13/2025 1129 by Edwige Farias RN  Verbalizes/displays adequate comfort level or baseline comfort level:   Encourage patient to monitor pain and request assistance   Assess pain using appropriate pain scale   Administer analgesics based on type and severity of pain and evaluate response  Taken 4/13/2025 0405 by Josiane Yuan RN  Verbalizes/displays adequate comfort level or baseline comfort level:   Assess pain using appropriate pain scale   Encourage patient to monitor pain and request assistance     Problem: Safety - Adult  Goal: Free from fall injury  4/13/2025 1129 by Edwige Farias RN  Outcome: Progressing  Flowsheets (Taken 4/12/2025 2353 by Josiane Yuan RN)  Free From Fall Injury: Instruct family/caregiver on patient safety     Problem: Skin/Tissue Integrity - Adult  Goal: Skin integrity remains intact  Description: 1.  Monitor for areas of redness and/or skin breakdown2.  Assess vascular access sites hourly3.  Every 4-6 hours minimum:  Change oxygen saturation  probe site4.  Every 4-6 hours:  If on nasal continuous positive airway pressure, respiratory therapy assess nares and determine need for appliance change or resting period  Outcome: Progressing  Flowsheets (Taken 4/11/2025 2245 by Dee Almanzar, RN)  Skin Integrity Remains Intact:   Monitor for areas of redness and/or skin breakdown   Assess vascular access sites hourly   Every 4-6 hours minimum:  Change oxygen saturation probe site   Turn and reposition as indicated     Problem: Musculoskeletal - Adult  Goal: Return mobility to safest level of function  Outcome: Progressing  Flowsheets (Taken 4/10/2025 2000 by Kiersten White, RN)  Return Mobility to Safest Level of Function:   Assess patient stability and activity tolerance for standing, transferring and ambulating with or without assistive devices   Assist with transfers and ambulation using safe patient handling equipment as needed   Obtain physical therapy/occupational therapy consults as needed     Problem: Skin/Tissue Integrity  Goal: Skin integrity remains intact  Description: 1.  Monitor for areas of redness and/or skin breakdown2.  Assess vascular access sites hourly3.  Every 4-6 hours minimum:  Change oxygen saturation probe site4.  Every 4-6 hours:  If on nasal continuous positive airway pressure, respiratory therapy assess nares and determine need for appliance change or resting period  Outcome: Progressing  Flowsheets (Taken 4/11/2025 2245 by Dee Almanzar, RN)  Skin Integrity Remains Intact:   Monitor for areas of redness and/or skin breakdown   Assess vascular access sites hourly   Every 4-6 hours minimum:  Change oxygen saturation probe site   Turn and reposition as indicated

## 2025-04-13 NOTE — PLAN OF CARE
Problem: Safety - Adult  Goal: Free from fall injury  Outcome: Progressing  Flowsheets (Taken 4/12/2025 2353)  Free From Fall Injury: Instruct family/caregiver on patient safety     Problem: Pain  Goal: Verbalizes/displays adequate comfort level or baseline comfort level  Outcome: Progressing  Flowsheets  Taken 4/12/2025 2353  Verbalizes/displays adequate comfort level or baseline comfort level:   Encourage patient to monitor pain and request assistance   Assess pain using appropriate pain scale   Administer analgesics based on type and severity of pain and evaluate response   Implement non-pharmacological measures as appropriate and evaluate response     Problem: Discharge Planning  Goal: Discharge to home or other facility with appropriate resources  Outcome: Progressing  Flowsheets (Taken 4/12/2025 2353)  Discharge to home or other facility with appropriate resources: Identify barriers to discharge with patient and caregiver     Problem: Chronic Conditions and Co-morbidities  Goal: Patient's chronic conditions and co-morbidity symptoms are monitored and maintained or improved  Outcome: Progressing  Flowsheets (Taken 4/12/2025 2353)  Care Plan - Patient's Chronic Conditions and Co-Morbidity Symptoms are Monitored and Maintained or Improved:   Monitor and assess patient's chronic conditions and comorbid symptoms for stability, deterioration, or improvement   Collaborate with multidisciplinary team to address chronic and comorbid conditions and prevent exacerbation or deterioration   Update acute care plan with appropriate goals if chronic or comorbid symptoms are exacerbated and prevent overall improvement and discharge

## 2025-04-14 ENCOUNTER — APPOINTMENT (OUTPATIENT)
Facility: HOSPITAL | Age: 80
DRG: 432 | End: 2025-04-14
Payer: MEDICARE

## 2025-04-14 LAB
ALBUMIN SERPL ELPH-MCNC: 3.1 G/DL (ref 2.9–4.4)
ALBUMIN SERPL ELPH-MCNC: 3.1 G/DL (ref 2.9–4.4)
ALBUMIN/GLOB SERPL: 0.9 (ref 0.7–1.7)
ALBUMIN/GLOB SERPL: 0.9 (ref 0.7–1.7)
ALPHA1 GLOB SERPL ELPH-MCNC: 0.3 G/DL (ref 0–0.4)
ALPHA1 GLOB SERPL ELPH-MCNC: 0.4 G/DL (ref 0–0.4)
ALPHA2 GLOB SERPL ELPH-MCNC: 0.7 G/DL (ref 0.4–1)
ALPHA2 GLOB SERPL ELPH-MCNC: 0.7 G/DL (ref 0.4–1)
ANION GAP SERPL CALC-SCNC: 8 MMOL/L (ref 3–18)
B-GLOBULIN SERPL ELPH-MCNC: 0.9 G/DL (ref 0.7–1.3)
B-GLOBULIN SERPL ELPH-MCNC: 1 G/DL (ref 0.7–1.3)
BACTERIA SPEC CULT: NORMAL
BASOPHILS # BLD: 0.03 K/UL (ref 0–0.1)
BASOPHILS NFR BLD: 0.3 % (ref 0–2)
BUN SERPL-MCNC: 51 MG/DL (ref 7–18)
BUN/CREAT SERPL: 25 (ref 12–20)
CALCIUM SERPL-MCNC: 8.9 MG/DL (ref 8.5–10.1)
CHLORIDE SERPL-SCNC: 104 MMOL/L (ref 100–111)
CO2 SERPL-SCNC: 23 MMOL/L (ref 21–32)
COLLECT DURATION TIME UR: 24 HR
CREAT SERPL-MCNC: 2.08 MG/DL (ref 0.6–1.3)
DIFFERENTIAL METHOD BLD: ABNORMAL
EOSINOPHIL # BLD: 0.32 K/UL (ref 0–0.4)
EOSINOPHIL NFR BLD: 3 % (ref 0–5)
ERYTHROCYTE [DISTWIDTH] IN BLOOD BY AUTOMATED COUNT: 21.2 % (ref 11.6–14.5)
GAMMA GLOB SERPL ELPH-MCNC: 1.5 G/DL (ref 0.4–1.8)
GAMMA GLOB SERPL ELPH-MCNC: 1.7 G/DL (ref 0.4–1.8)
GLOBULIN SER-MCNC: 3.5 G/DL (ref 2.2–3.9)
GLOBULIN SER-MCNC: 3.7 G/DL (ref 2.2–3.9)
GLUCOSE BLD STRIP.AUTO-MCNC: 101 MG/DL (ref 70–110)
GLUCOSE BLD STRIP.AUTO-MCNC: 128 MG/DL (ref 70–110)
GLUCOSE BLD STRIP.AUTO-MCNC: 128 MG/DL (ref 70–110)
GLUCOSE BLD STRIP.AUTO-MCNC: 141 MG/DL (ref 70–110)
GLUCOSE SERPL-MCNC: 119 MG/DL (ref 74–99)
GRAM STN SPEC: NORMAL
GRAM STN SPEC: NORMAL
HCT VFR BLD AUTO: 24.9 % (ref 36–48)
HGB BLD-MCNC: 8 G/DL (ref 13–16)
IGA SERPL-MCNC: 266 MG/DL (ref 61–437)
IGA SERPL-MCNC: 274 MG/DL (ref 61–437)
IGG SERPL-MCNC: 1833 MG/DL (ref 603–1613)
IGG SERPL-MCNC: 1903 MG/DL (ref 603–1613)
IGM SERPL-MCNC: 174 MG/DL (ref 15–143)
IGM SERPL-MCNC: 177 MG/DL (ref 15–143)
IMM GRANULOCYTES # BLD AUTO: 0.1 K/UL (ref 0–0.04)
IMM GRANULOCYTES NFR BLD AUTO: 0.9 % (ref 0–0.5)
INTERPRETATION SERPL IEP-IMP: ABNORMAL
INTERPRETATION SERPL IEP-IMP: ABNORMAL
KAPPA LC FREE SER-MCNC: 118.9 MG/L (ref 3.3–19.4)
KAPPA LC FREE SER-MCNC: 127.4 MG/L (ref 3.3–19.4)
KAPPA LC FREE UR-MCNC: 408.69 MG/L (ref 1.17–86.46)
KAPPA LC FREE/LAMBDA FREE SER: 1.43 (ref 0.26–1.65)
KAPPA LC FREE/LAMBDA FREE SER: 1.46 (ref 0.26–1.65)
KAPPA LC FREE/LAMBDA FREE UR: 3.55 (ref 1.83–14.26)
LAMBDA LC FREE SERPL-MCNC: 81.2 MG/L (ref 5.7–26.3)
LAMBDA LC FREE SERPL-MCNC: 89.3 MG/L (ref 5.7–26.3)
LAMBDA LC FREE UR-MCNC: 115.18 MG/L (ref 0.27–15.21)
LYMPHOCYTES # BLD: 1.2 K/UL (ref 0.9–3.6)
LYMPHOCYTES NFR BLD: 11.1 % (ref 21–52)
M PROTEIN SERPL ELPH-MCNC: ABNORMAL G/DL
M PROTEIN SERPL ELPH-MCNC: ABNORMAL G/DL
MAGNESIUM SERPL-MCNC: 2.2 MG/DL (ref 1.6–2.6)
MCH RBC QN AUTO: 29.4 PG (ref 24–34)
MCHC RBC AUTO-ENTMCNC: 32.1 G/DL (ref 31–37)
MCV RBC AUTO: 91.5 FL (ref 78–100)
MONOCYTES # BLD: 1.14 K/UL (ref 0.05–1.2)
MONOCYTES NFR BLD: 10.5 % (ref 3–10)
NEUTS SEG # BLD: 8.05 K/UL (ref 1.8–8)
NEUTS SEG NFR BLD: 74.2 % (ref 40–73)
NRBC # BLD: 0.02 K/UL (ref 0–0.01)
NRBC BLD-RTO: 0.2 PER 100 WBC
PHOSPHATE SERPL-MCNC: 3.7 MG/DL (ref 2.5–4.9)
PLATELET # BLD AUTO: 175 K/UL (ref 135–420)
PMV BLD AUTO: 11.3 FL (ref 9.2–11.8)
POTASSIUM SERPL-SCNC: 4 MMOL/L (ref 3.5–5.5)
PROT SERPL-MCNC: 6.6 G/DL (ref 6–8.5)
PROT SERPL-MCNC: 6.8 G/DL (ref 6–8.5)
RBC # BLD AUTO: 2.72 M/UL (ref 4.35–5.65)
SERVICE CMNT-IMP: NORMAL
SODIUM SERPL-SCNC: 135 MMOL/L (ref 136–145)
SPECIMEN VOL ?TM UR: 400 ML
WBC # BLD AUTO: 10.8 K/UL (ref 4.6–13.2)

## 2025-04-14 PROCEDURE — 94761 N-INVAS EAR/PLS OXIMETRY MLT: CPT

## 2025-04-14 PROCEDURE — 76705 ECHO EXAM OF ABDOMEN: CPT

## 2025-04-14 PROCEDURE — 97535 SELF CARE MNGMENT TRAINING: CPT

## 2025-04-14 PROCEDURE — 83735 ASSAY OF MAGNESIUM: CPT

## 2025-04-14 PROCEDURE — 82962 GLUCOSE BLOOD TEST: CPT

## 2025-04-14 PROCEDURE — 6360000002 HC RX W HCPCS

## 2025-04-14 PROCEDURE — 2500000003 HC RX 250 WO HCPCS: Performed by: INTERNAL MEDICINE

## 2025-04-14 PROCEDURE — 85025 COMPLETE CBC W/AUTO DIFF WBC: CPT

## 2025-04-14 PROCEDURE — 99232 SBSQ HOSP IP/OBS MODERATE 35: CPT | Performed by: FAMILY MEDICINE

## 2025-04-14 PROCEDURE — 36415 COLL VENOUS BLD VENIPUNCTURE: CPT

## 2025-04-14 PROCEDURE — 84100 ASSAY OF PHOSPHORUS: CPT

## 2025-04-14 PROCEDURE — 6360000002 HC RX W HCPCS: Performed by: INTERNAL MEDICINE

## 2025-04-14 PROCEDURE — 6370000000 HC RX 637 (ALT 250 FOR IP)

## 2025-04-14 PROCEDURE — 97530 THERAPEUTIC ACTIVITIES: CPT

## 2025-04-14 PROCEDURE — 2500000003 HC RX 250 WO HCPCS

## 2025-04-14 PROCEDURE — 2140000001 HC CVICU INTERMEDIATE R&B

## 2025-04-14 PROCEDURE — 80048 BASIC METABOLIC PNL TOTAL CA: CPT

## 2025-04-14 PROCEDURE — 99232 SBSQ HOSP IP/OBS MODERATE 35: CPT | Performed by: INTERNAL MEDICINE

## 2025-04-14 RX ADMIN — CEFTRIAXONE 1000 MG: 1 INJECTION, POWDER, FOR SOLUTION INTRAMUSCULAR; INTRAVENOUS at 08:57

## 2025-04-14 RX ADMIN — LACTULOSE 20 G: 10 SOLUTION ORAL at 14:51

## 2025-04-14 RX ADMIN — SODIUM CHLORIDE, PRESERVATIVE FREE 10 ML: 5 INJECTION INTRAVENOUS at 20:53

## 2025-04-14 RX ADMIN — LACTULOSE 20 G: 10 SOLUTION ORAL at 08:57

## 2025-04-14 RX ADMIN — HEPARIN SODIUM 5000 UNITS: 5000 INJECTION INTRAVENOUS; SUBCUTANEOUS at 20:49

## 2025-04-14 RX ADMIN — SODIUM CHLORIDE, PRESERVATIVE FREE 10 ML: 5 INJECTION INTRAVENOUS at 08:58

## 2025-04-14 RX ADMIN — HEPARIN SODIUM 5000 UNITS: 5000 INJECTION INTRAVENOUS; SUBCUTANEOUS at 08:57

## 2025-04-14 ASSESSMENT — PAIN SCALES - GENERAL
PAINLEVEL_OUTOF10: 0
PAINLEVEL_OUTOF10: 0

## 2025-04-14 NOTE — PROGRESS NOTES
Bedside and Verbal shift change report given to Lisandro Zuniga RN (oncoming nurse) by BLAZE Gibbons (offgoing nurse). Report included the following information Nurse Handoff Report, Intake/Output, MAR, Recent Results, and Cardiac Rhythm Afib without RVR .

## 2025-04-14 NOTE — PROGRESS NOTES
0715- Bedside and Verbal shift change report given to Rachell RN (oncoming nurse) by Lisandro RN (offgoing nurse). Report included the following information SBAR, Intake/Output, MAR, Recent Results, and Cardiac Rhythm AFIB .     1455- Patient transferred off unit via bed to ultrasound.    1600- Patient transferred back to unit via bed from ultrasound.    1925- Bedside and Verbal shift change report given to Lisandro RN (oncoming nurse) by Rachell RN (offgoing nurse). Report included the following information SBAR, Intake/Output, MAR, Recent Results, and Cardiac Rhythm AFIB.

## 2025-04-14 NOTE — CARE COORDINATION
Chart reviewed.  Left a message for pt's spouse Kristi Khan [phone] to call CM back to discuss transitional care.          MARGAUX CraigN RN  Care Management

## 2025-04-14 NOTE — PROGRESS NOTES
Damien Santacruz [facility] Hospitalist Group  Progress Note    Patient: Gumaro Khan Age: 79 y.o. : 1945 MR#: [id number] SSN: [id number]      Subjective/24-hour events:     Still without any new respiratory issues.  Oxygen saturations remain normal.    Assessment:   SHAHNAZ on CKD 3  Ascites w/low SAAG, s/p paracentesis  Acute on chronic anemia s/p 1 unit PRBCs  Systolic CHF - EF 25-30% by echo 2024  Paroxysmal atrial fibrillation  DM2  Severe hyperkalemia requiring emergent hemodialysis, resolved  Acute metabolic encephalopathy, improved  History of polyclonal gammopathy - elevated kappa/lambda chains  Alcohol abuse history    Plan:   Obtain abdominal ultrasound to reevaluate status of ascites.  Will obtain repeat paracentesis if moderate to large volume noted.  Continue to trend hemoglobin.  BMP results still pending.  Continue PT/OT as tolerated.  Supportive care otherwise.  Follow    Anticipated discharge, -    Case discussed with:  [x]Patient  []Family  [x] Nursing  [x]Case Management  DVT Prophylaxis:  []Lovenox  [x]Hep SQ  []SCDs  []Coumadin   []On Heparin gtt []PO anticoagulant    Objective:   VS: /72   Pulse 80   Temp 98.4 °F (36.9 °C) (Oral)   Resp 19   Ht 1.854 m (6' 1\")   Wt 88 kg (194 lb)   SpO2 97%   BMI 25.60 kg/m²      Tmax/24hrs: Temp (24hrs), Av.4 °F (36.9 °C), Min:98.1 °F (36.7 °C), Max:98.8 °F (37.1 °C)    Intake/Output Summary (Last 24 hours) at 2025 1539  Last data filed at 2025 1254  Gross per 24 hour   Intake 490 ml   Output 325 ml   Net 165 ml       Gen:  Chronically ill-appearing.  In NAD.  Nontoxic appearing.  Lungs: Clear, no wheezes   CV: irregularly irregular, rate WNL.  Abdomen: Markedly distended but soft and nontender.  Extremities: Warm, no pitting edema or ischemia.  Neuro:  Awake and alert, moves extremities spontaneously.      Current Facility-Administered Medications   Medication Dose Route Frequency    [Held by

## 2025-04-14 NOTE — PLAN OF CARE
Problem: Occupational Therapy - Adult  Goal: By Discharge: Performs self-care activities at highest level of function for planned discharge setting.  See evaluation for individualized goals.  Description: Occupational Therapy Goals:  Initiated 4/9/2025 to be met within 7-10 days.    1.  Patient will perform bed mobility in preparation for ADLs with supervision/set-up.   2.  Patient will perform grooming > 5 minutes sitting EOB with modified independence, G balance.  3.  Patient will perform upper body dressing with modified independence.  4.  Patient will perform toilet transfers with minimal assistance  5.  Patient will perform all aspects of toileting with minimal assistance.  6.  Patient will participate in upper extremity therapeutic exercise/activities with supervision/set-up for 8-10 minutes to increase strength/endurance for ADLs.    7.  Patient will utilize energy conservation techniques during functional activities with verbal cues.      PLOF: Pt lives with spouse in 2SH, first floor setup and needed assist with LB self-care. Pt with step over tub at home, performs basin bath for safety.   Outcome: Progressing   OCCUPATIONAL THERAPY TREATMENT    Patient: Gumaro Khan (79 y.o. male)  Date: 4/14/2025  Diagnosis: Shortness of breath [R06.02]  Hyperkalemia [E87.5]  Respiratory acidosis [E87.29]  Acute renal failure [N17.9]  Acute renal injury [N17.9]  Ascites due to alcoholic cirrhosis (HCC) [K70.31]  Acute renal failure superimposed on chronic kidney disease, unspecified acute renal failure type, unspecified CKD stage [N17.9, N18.9] Acute renal failure      Precautions: Fall Risk,  ,  ,  ,  ,  ,  ,      Chart, occupational therapy assessment, plan of care, and goals were reviewed.  ASSESSMENT:  Pt presented supine in bed upon entry and agreeable. He was assisted to EOB MOD A for R LE mgmt. Once sitting, he demo G balance and was educated on importance of performing PLB technique to prevent SOB and  Impaired  Standing - Static: Fair  Standing - Dynamic: Fair (-)    Pain:  Intensity Pre-treatment: 5/10   Intensity Post-treatment: 5/10  Scale: Numeric Rating Scale  Location: Right Knee  Quality: Aching  Intervention(s): Nurse notified and Repositioning     Activity Tolerance:    Activity Tolerance: Patient limited by fatigue;Patient limited by endurance  Please refer to the flowsheet for vital signs taken during this treatment.  After treatment:   []  Patient left in no apparent distress sitting up in chair  [x]  Patient left in no apparent distress in bed  [x]  Call bell left within reach  [x]  Nursing notified  []  Caregiver present  [x]  Bed/chair alarm activated  []  No alarm    COMMUNICATION/EDUCATION:   Patient Education  Education Given To: Patient  Education Provided: Plan of Care;ADL Adaptive Strategies;Transfer Training;Fall Prevention Strategies;Energy Conservation  Education Method: Teach Back;Verbal;Demonstration  Barriers to Learning: None  Education Outcome: Verbalized understanding;Continued education needed      Thank you for this referral.  HAYDEE Owens  Minutes: 23

## 2025-04-14 NOTE — PROGRESS NOTES
[facility]  -  Progress Note      Patient: Gumaro Khan MRN: [id number]  SSN: [id number]    YOB: 1945  Age: 79 y.o.  Sex: male      Admit Date: 4/7/2025    Assessment:     - Hypervolemic shock, resolved with IV fluids. Briefly required pressors  - Severe ascites. new onset in the last ~2 weeks per patient with unknown etiology at present. No history of cirrhosis documented, no history of prior ascites. S/p paracentesis with 2L out. Negative cytology for malignancy  - Persistent A fib: on coreg and digoxin as outpatient. Currently held due to shock and bradycardia. Not on AC due to anemia.  Patient in atrial fibrillation with rate controlled.  - Chronic systolic heart failure. EF 45-50% on Echo this admission. Appears compensated on exam.   - Nonischemic cardiomyopathy:   Echo 4/8/25 EF 45-50%, septal motion, mild global hypokinesis, grade III DD. BORIS RV dilated with reduced RV function. EF has been as low as 25%  S/p [employer] single AICD with gen change in 5/2018  GDMT with ARB, aldactone, coreg as outpatient. Aldactone and ARB held due to hyperkalemia  - Mild to moderate AI on Echo 4/2025  - Mild to moderate MR with multiple jets 4/2025  - Moderate to severe TR on echo 4/2025  - PAH Who group 5  - Hx of elevated Kappa/Lambda chains. Bone marrow biopsy pending.    - SHAHNAZ on CKD stage 4  - T2DM   - HLD  - History of right thigh sarcoma s/p resection    - History of GIB/hx of angioectasia s/p endoclipping 1/25/24   - ETOH abuse with evidence of cirrhosis     Primary cardiologist: Dr. Velasquez     Plan:     Patient remains rate controlled.  He appears quite comfortable.  He still complains of fullness in his abdomen.  He denies any shortness of breath.  Currently undergoing evaluation for anemia.  Bone marrow biopsy (4/9/25) results pending.    Subjective:     No new complaints.     Objective:      Patient Vitals for the past 8 hrs:   Temp Pulse Resp BP SpO2   04/14/25 0737

## 2025-04-14 NOTE — PROGRESS NOTES
Progress Note    Gumaro Khan  79 y.o.      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)    Mass of right thigh    Weight loss, abnormal    NSTEMI (non-ST elevated myocardial infarction) (HCC)    MR (mitral regurgitation)    Nonischemic cardiomyopathy (HCC)    Type 2 diabetes mellitus with hypercholesterolemia (HCC)    Anxiety disorder    Presence of implantable cardioverter-defibrillator (ICD)    UTI (urinary tract infection)    Poor appetite    Iron deficiency anemia    Atrial fibrillation (HCC)    Follow-up examination    MGUS (monoclonal gammopathy of unknown significance)    Long term current use of anticoagulant therapy    Antineoplastic chemotherapy induced anemia    Cancer associated pain    Pulmonary hypertension, moderate to severe (HCC)    Chronic gout with tophus    Hypercholesterolemia    Leukocytosis    Malignant neoplasm of right lower extremity    AICD at end of battery life    Hypertension associated with diabetes    GI bleed    Shock (HCC)    Acute renal failure superimposed on chronic kidney disease    Acute decompensated heart failure (HCC)    Hyperglycemia    Supratherapeutic INR    General weakness    ATN (acute tubular necrosis)    Systolic dysfunction    Hyperkalemia    Anemia    Recurrent falls    Arrhythmia    Hypercalcemia    CKD stage 3 secondary to diabetes (HCC)    Chronic systolic (congestive) heart failure    Acute renal failure    Acute renal injury    Ascites    Systolic congestive heart failure (HCC)    Shortness of breath           Subjective:     Patient is comfortable.  Spouse is at bedside.  Patient complains of fullness in the abdomen otherwise doing great.  He still has a dialysis catheter in the left groin.  Not requiring dialysis after first 2 days of admission.  Renal functions remained stable.  Maintains good urine output.  Abdomen looks mildly distended ,has evidence of ascites.   Received low-dose

## 2025-04-14 NOTE — NURSE NAVIGATOR
Met with patient provided education on living with heart failure, reinforcing low sodium diet, fluid restriction, heart failure zones, will continue to follow.  HF Navigator discussed cardiac university information with pt:    [    ] Pt does plan on attnding via   [   ] inperson          [  x ] zoom  [    ] Pt does not wish to attend    Belén Ayoub RN  4/14/2025

## 2025-04-14 NOTE — PROGRESS NOTES
Bedside and Verbal shift change report given to Lisandro Zuniga RN (oncoming nurse) by BLAZE Lovett (offgoing nurse). Report included the following information Nurse Handoff Report, Intake/Output, MAR, Recent Results, and Cardiac Rhythm / Sinus tach .

## 2025-04-14 NOTE — PROGRESS NOTES
[email or web address] Licai  [phone]    Gastroenterology follow up-Progress note    Impression:  Ascites: SAAG of 0.6. Differentials favor malignancy vs nephrotic syndrome. Though cytology was negative, malignancy is not ruled out because this test has very low sensitivity (high specificity). Unlikely cirrhosis or heart failure.   s/p paracentesis 4/8/25, only 2L removed due to hypotension. Pt with known CHF  Findings: T protein 4.6, LDH 92, polys 18, cytology neg, no organisms on gram stain. SAAG 0.6   receiving Albumin q 12 hrs  Platelet ct 159  S/p bone marrow biopsy 4/11/2025  Chronic Anemia  HH on admission: 7.5, dropped to 6.9 on 4/8, received 1 unit PRBCs, stable now Hgb 7.3.  Iron %sat 60, iron 127, ferritin 499  EGD 1/25/24   Gastric body one actively oozing angioectasia treated with with endoclip  There were some erosions  in the duodenal sweep, without stigmata of recent bleed.  Colonoscopy 6/15/20   Multiple polyps, mild diverticulosis, grade 1 internal hemorrhoids.   Further colonoscopy is not recommended for CRCS screening   Acute renal failure, stage 3 CHD - Creat 4.21 on admission, currently 2.9  Hyperkalemia - likely medication induced per nephrology. Initiated on HD due to hemodynamic instability and hyperkalemia. Potassium 8 on admission, currently 5.3  Systolic congestive heart failure - echocardiogram pending  Pt has pacemaker, home meds include Aldactone, Digoxin and Bumex    Plan:  Appreciate heme/onc input for possible malignant ascites  No plans for endoscopic intervention at this time without signs of active bleeding. Pt poses and above-average risk of complications with procedures and anesthesia.   Recommend continuing iron supplementation and monitor for bleeding.   Continue monitor H/H, transfuse for Hgb < 7.  Medical management per primary team    Chief Complaint: ascites      Subjective:  PT seen, sleepy but alert/responsive to voice.     ROS: Denies any fevers, chills, rash.          Intake/Output Summary (Last 24 hours) at 4/14/2025 1210  Last data filed at 4/14/2025 1134  Gross per 24 hour   Intake 360 ml   Output 375 ml   Net -15 ml       CBC w/Diff    Lab Results   Component Value Date/Time    WBC 10.8 04/14/2025 04:59 AM    RBC 2.72 (L) 04/14/2025 04:59 AM    HGB 8.0 (L) 04/14/2025 04:59 AM    HCT 24.9 (L) 04/14/2025 04:59 AM    MCV 91.5 04/14/2025 04:59 AM    MCH 29.4 04/14/2025 04:59 AM    MCHC 32.1 04/14/2025 04:59 AM    RDW 21.2 (H) 04/14/2025 04:59 AM     04/14/2025 04:59 AM    MPV 11.3 04/14/2025 04:59 AM    No results found for: \"MONO\", \"BANDS\", \"BASOS\", \"METAS\", \"PRO\"   Basic Metabolic Profile   Recent Labs     04/14/25  0459   *   K 4.0      CO2 23   BUN 51*   GLUCOSE 119*   MG 2.2   PHOS 3.7        Hepatic Function    No results found for: \"TP\" No components found for: \"SGOT\", \"GPT\", \"DBILI\", \"TBIL\"       Coags   No results for input(s): \"INR\", \"APTT\" in the last 72 hours.    Invalid input(s): \"PTP\"            FREDERICK Ahn  4/14/2025, 12:10 PM   [facility].  [email or web address]  Office: [phone]  [facility] Cell: [phone] (M-F 7:30-4:30)

## 2025-04-14 NOTE — PLAN OF CARE
Problem: Physical Therapy - Adult  Goal: By Discharge: Performs mobility at highest level of function for planned discharge setting.  See evaluation for individualized goals.  Description: Physical Therapy Goals  Initiated 4/9/2025 and to be accomplished within 7 day(s)  1.  Patient will move from supine to sit and sit to supine  in bed with supervision/set-up in preparation for seated tasks.    2.  Patient will transfer from bed to chair and chair to bed with supervision/set-up using the least restrictive device in preparation for out of bed.  3.  Patient will perform sit to stand with supervision/set-up in preparation for out of bed tasks.  4.  Patient will ambulate with supervision/set-up for 25 feet with the least restrictive device in preparation for home setting.     PLOF: Lives with wife on first floor of two story home. No steps to enter. Amb with walker; has wheelchair and bedside commode. History of falls.   Outcome: Progressing     PHYSICAL THERAPY TREATMENT    Patient: Gumaro Khan (79 y.o. male)  Date: 4/14/2025  Diagnosis: Shortness of breath [R06.02]  Hyperkalemia [E87.5]  Respiratory acidosis [E87.29]  Acute renal failure [N17.9]  Acute renal injury [N17.9]  Ascites due to alcoholic cirrhosis (HCC) [K70.31]  Acute renal failure superimposed on chronic kidney disease, unspecified acute renal failure type, unspecified CKD stage [N17.9, N18.9] Acute renal failure      Precautions: Fall Risk, General Precautions      ASSESSMENT:  Pt received in bed and agreeable. Motivated to stand this date. First trial pt stood to RW with max A x 2. Returned to sitting for recovery. Second trial pt stood with min  x2. Pt reports not feeling comfortable with taking steps yet. Returned to supine and left with all needs met and call bell within reach. Pt with SOB during session and increased WOB however vitals remained WFL. Continues to be limited by generalized weakness, impaired balance and gait, decreased  activity tolerance.     Recommend for next PT session:  Bed <> chair, progression of gait    Progression toward goals:   [x]      Improving appropriately and progressing toward goals  []      Improving slowly and progressing toward goals  []      Not making progress toward goals and plan of care will be adjusted     PLAN:  Patient continues to benefit from skilled intervention to address the above impairments.  Continue treatment per established plan of care.    Further Equipment Recommendations for Discharge: JASMYN SLOAN    AMPAC: AM-PAC Inpatient Mobility Raw Score : 12      Current research shows that an AM-PAC score of 17 (13 without stairs) or less is not associated with a discharge to the patient's home setting.    This AMPAC score should be considered in conjunction with interdisciplinary team recommendations to determine the most appropriate discharge setting. Patient's social support, diagnosis, medical stability, and prior level of function should also be taken into consideration.     SUBJECTIVE:   Patient stated, \"I want to stand.\"    OBJECTIVE DATA SUMMARY:   Critical Behavior:  Orientation  Overall Orientation Status: Within Functional Limits  Orientation Level: Oriented to place;Oriented to person       Functional Mobility Training:  Bed Mobility:  Bed Mobility Training  Bed Mobility Training: Yes  Supine to Sit: Partial/Moderate assistance  Sit to Supine: Partial/Moderate assistance  Scooting: Contact guard assistance  Transfers:  Transfer Training  Transfer Training: Yes  Sit to Stand: 2 Person assistance;Substantial/Maximal assistance;Minimal assistance  Balance:  Balance  Sitting: Impaired  Sitting - Static: Good (unsupported)  Sitting - Dynamic: Fair (occasional)  Standing: Impaired  Standing - Static: Fair  Standing - Dynamic: Fair (-)     Ambulation/Gait Training:     Gait  Gait Training: No    Pain:  Intensity Pre-treatment: 0/10   Intensity Post-treatment: 0/10    Activity Tolerance:   Activity  Tolerance: Patient limited by fatigue;Patient limited by endurance  Please refer to the flowsheet for vital signs taken during this treatment.  After treatment:   [] Patient left in no apparent distress sitting up in chair  [x] Patient left in no apparent distress in bed  [x] Call bell left within reach  [x] Nursing notified  [] Caregiver present  [x] Bed/chair alarm activated  [] No alarm  [] SCDs applied      COMMUNICATION/EDUCATION:   Patient Education  Education Given To: Patient  Education Provided: Role of Therapy;Plan of Care  Education Method: Demonstration;Verbal;Teach Back  Barriers to Learning: Cognition  Education Outcome: Verbalized understanding;Demonstrated understanding;Continued education needed      Jennifer Heart, PT  Minutes: 23

## 2025-04-14 NOTE — PLAN OF CARE
Problem: Chronic Conditions and Co-morbidities  Goal: Patient's chronic conditions and co-morbidity symptoms are monitored and maintained or improved  Outcome: Progressing  Flowsheets (Taken 4/14/2025 0855)  Care Plan - Patient's Chronic Conditions and Co-Morbidity Symptoms are Monitored and Maintained or Improved:   Monitor and assess patient's chronic conditions and comorbid symptoms for stability, deterioration, or improvement   Collaborate with multidisciplinary team to address chronic and comorbid conditions and prevent exacerbation or deterioration   Update acute care plan with appropriate goals if chronic or comorbid symptoms are exacerbated and prevent overall improvement and discharge     Problem: Discharge Planning  Goal: Discharge to home or other facility with appropriate resources  Outcome: Progressing  Flowsheets (Taken 4/14/2025 0855)  Discharge to home or other facility with appropriate resources: Identify barriers to discharge with patient and caregiver     Problem: Pain  Goal: Verbalizes/displays adequate comfort level or baseline comfort level  Outcome: Progressing  Flowsheets (Taken 4/13/2025 1129 by Edwige Farias, RN)  Verbalizes/displays adequate comfort level or baseline comfort level:   Encourage patient to monitor pain and request assistance   Assess pain using appropriate pain scale   Administer analgesics based on type and severity of pain and evaluate response     Problem: Safety - Adult  Goal: Free from fall injury  Outcome: Progressing  Flowsheets (Taken 4/12/2025 4331 by Josiane Yuan RN)  Free From Fall Injury: Instruct family/caregiver on patient safety     Problem: Respiratory - Adult  Goal: Achieves optimal ventilation and oxygenation  Outcome: Progressing  Flowsheets (Taken 4/14/2025 0855)  Achieves optimal ventilation and oxygenation:   Assess for changes in respiratory status   Assess for changes in mentation and behavior   Position to facilitate oxygenation and  integrity     Problem: Musculoskeletal - Adult  Goal: Return mobility to safest level of function  Outcome: Progressing  Flowsheets (Taken 4/14/2025 0855)  Return Mobility to Safest Level of Function:   Assess patient stability and activity tolerance for standing, transferring and ambulating with or without assistive devices   Assist with transfers and ambulation using safe patient handling equipment as needed  Goal: Return ADL status to a safe level of function  Outcome: Progressing  Flowsheets (Taken 4/14/2025 0855)  Return ADL Status to a Safe Level of Function:   Administer medication as ordered   Assess activities of daily living deficits and provide assistive devices as needed     Problem: Gastrointestinal - Adult  Goal: Maintains adequate nutritional intake  Outcome: Progressing  Flowsheets (Taken 4/14/2025 0855)  Maintains adequate nutritional intake:   Monitor percentage of each meal consumed   Identify factors contributing to decreased intake, treat as appropriate   Assist with meals as needed     Problem: Infection - Adult  Goal: Absence of infection at discharge  Outcome: Progressing  Flowsheets (Taken 4/14/2025 0855)  Absence of infection at discharge:   Assess and monitor for signs and symptoms of infection   Monitor lab/diagnostic results   Monitor all insertion sites i.e., indwelling lines, tubes and drains     Problem: Metabolic/Fluid and Electrolytes - Adult  Goal: Electrolytes maintained within normal limits  Outcome: Progressing  Flowsheets (Taken 4/14/2025 0855)  Electrolytes maintained within normal limits:   Monitor labs and assess patient for signs and symptoms of electrolyte imbalances   Monitor response to electrolyte replacements, including repeat lab results as appropriate   Administer electrolyte replacement as ordered  Goal: Hemodynamic stability and optimal renal function maintained  Outcome: Progressing  Flowsheets (Taken 4/14/2025 0855)  Hemodynamic stability and optimal renal

## 2025-04-15 ENCOUNTER — APPOINTMENT (OUTPATIENT)
Facility: HOSPITAL | Age: 80
DRG: 432 | End: 2025-04-15
Payer: MEDICARE

## 2025-04-15 LAB
ANION GAP SERPL CALC-SCNC: 9 MMOL/L (ref 3–18)
BASOPHILS # BLD: 0.02 K/UL (ref 0–0.1)
BASOPHILS NFR BLD: 0.2 % (ref 0–2)
BUN SERPL-MCNC: 45 MG/DL (ref 7–18)
BUN/CREAT SERPL: 23 (ref 12–20)
CALCIUM SERPL-MCNC: 8.5 MG/DL (ref 8.5–10.1)
CHLORIDE SERPL-SCNC: 105 MMOL/L (ref 100–111)
CO2 SERPL-SCNC: 22 MMOL/L (ref 21–32)
CREAT SERPL-MCNC: 1.92 MG/DL (ref 0.6–1.3)
DIFFERENTIAL METHOD BLD: ABNORMAL
EOSINOPHIL # BLD: 0.24 K/UL (ref 0–0.4)
EOSINOPHIL NFR BLD: 2.3 % (ref 0–5)
ERYTHROCYTE [DISTWIDTH] IN BLOOD BY AUTOMATED COUNT: 21.8 % (ref 11.6–14.5)
GLUCOSE BLD STRIP.AUTO-MCNC: 100 MG/DL (ref 70–110)
GLUCOSE BLD STRIP.AUTO-MCNC: 102 MG/DL (ref 70–110)
GLUCOSE BLD STRIP.AUTO-MCNC: 106 MG/DL (ref 70–110)
GLUCOSE BLD STRIP.AUTO-MCNC: 130 MG/DL (ref 70–110)
GLUCOSE BLD STRIP.AUTO-MCNC: 92 MG/DL (ref 70–110)
GLUCOSE SERPL-MCNC: 98 MG/DL (ref 74–99)
HCT VFR BLD AUTO: 26.5 % (ref 36–48)
HGB BLD-MCNC: 8.1 G/DL (ref 13–16)
IMM GRANULOCYTES # BLD AUTO: 0.06 K/UL (ref 0–0.04)
IMM GRANULOCYTES NFR BLD AUTO: 0.6 % (ref 0–0.5)
LYMPHOCYTES # BLD: 1.33 K/UL (ref 0.9–3.6)
LYMPHOCYTES NFR BLD: 12.8 % (ref 21–52)
MAGNESIUM SERPL-MCNC: 2.1 MG/DL (ref 1.6–2.6)
MCH RBC QN AUTO: 27.8 PG (ref 24–34)
MCHC RBC AUTO-ENTMCNC: 30.6 G/DL (ref 31–37)
MCV RBC AUTO: 91.1 FL (ref 78–100)
MONOCYTES # BLD: 1.27 K/UL (ref 0.05–1.2)
MONOCYTES NFR BLD: 12.2 % (ref 3–10)
NEUTS SEG # BLD: 7.5 K/UL (ref 1.8–8)
NEUTS SEG NFR BLD: 71.9 % (ref 40–73)
NRBC # BLD: 0.02 K/UL (ref 0–0.01)
NRBC BLD-RTO: 0.2 PER 100 WBC
PHOSPHATE SERPL-MCNC: 3.7 MG/DL (ref 2.5–4.9)
PLATELET # BLD AUTO: 201 K/UL (ref 135–420)
PMV BLD AUTO: 10.7 FL (ref 9.2–11.8)
POTASSIUM SERPL-SCNC: 3.9 MMOL/L (ref 3.5–5.5)
RBC # BLD AUTO: 2.91 M/UL (ref 4.35–5.65)
SODIUM SERPL-SCNC: 136 MMOL/L (ref 136–145)
WBC # BLD AUTO: 10.4 K/UL (ref 4.6–13.2)

## 2025-04-15 PROCEDURE — 80048 BASIC METABOLIC PNL TOTAL CA: CPT

## 2025-04-15 PROCEDURE — 85025 COMPLETE CBC W/AUTO DIFF WBC: CPT

## 2025-04-15 PROCEDURE — 2500000003 HC RX 250 WO HCPCS

## 2025-04-15 PROCEDURE — 2709999900 US GUIDED PARACENTESIS

## 2025-04-15 PROCEDURE — 83735 ASSAY OF MAGNESIUM: CPT

## 2025-04-15 PROCEDURE — 84100 ASSAY OF PHOSPHORUS: CPT

## 2025-04-15 PROCEDURE — 99232 SBSQ HOSP IP/OBS MODERATE 35: CPT | Performed by: INTERNAL MEDICINE

## 2025-04-15 PROCEDURE — 6360000002 HC RX W HCPCS: Performed by: INTERNAL MEDICINE

## 2025-04-15 PROCEDURE — 6370000000 HC RX 637 (ALT 250 FOR IP): Performed by: INTERNAL MEDICINE

## 2025-04-15 PROCEDURE — 2500000003 HC RX 250 WO HCPCS: Performed by: INTERNAL MEDICINE

## 2025-04-15 PROCEDURE — 6370000000 HC RX 637 (ALT 250 FOR IP): Performed by: FAMILY MEDICINE

## 2025-04-15 PROCEDURE — 2140000001 HC CVICU INTERMEDIATE R&B

## 2025-04-15 PROCEDURE — 82962 GLUCOSE BLOOD TEST: CPT

## 2025-04-15 PROCEDURE — 6360000002 HC RX W HCPCS

## 2025-04-15 PROCEDURE — 0W9G3ZZ DRAINAGE OF PERITONEAL CAVITY, PERCUTANEOUS APPROACH: ICD-10-PCS | Performed by: PHYSICIAN ASSISTANT

## 2025-04-15 PROCEDURE — 94761 N-INVAS EAR/PLS OXIMETRY MLT: CPT

## 2025-04-15 PROCEDURE — 99233 SBSQ HOSP IP/OBS HIGH 50: CPT | Performed by: FAMILY MEDICINE

## 2025-04-15 PROCEDURE — 6370000000 HC RX 637 (ALT 250 FOR IP)

## 2025-04-15 PROCEDURE — 36415 COLL VENOUS BLD VENIPUNCTURE: CPT

## 2025-04-15 RX ORDER — FUROSEMIDE 20 MG/1
20 TABLET ORAL DAILY
Status: DISCONTINUED | OUTPATIENT
Start: 2025-04-15 | End: 2025-04-16

## 2025-04-15 RX ORDER — CARVEDILOL 3.12 MG/1
3.12 TABLET ORAL 2 TIMES DAILY WITH MEALS
Status: DISCONTINUED | OUTPATIENT
Start: 2025-04-15 | End: 2025-04-18 | Stop reason: HOSPADM

## 2025-04-15 RX ORDER — NEOMYCIN/BACITRACIN/POLYMYXINB 3.5-400-5K
OINTMENT (GRAM) TOPICAL 2 TIMES DAILY
Status: DISCONTINUED | OUTPATIENT
Start: 2025-04-15 | End: 2025-04-18 | Stop reason: HOSPADM

## 2025-04-15 RX ADMIN — LACTULOSE 20 G: 10 SOLUTION ORAL at 15:39

## 2025-04-15 RX ADMIN — HEPARIN SODIUM 5000 UNITS: 5000 INJECTION INTRAVENOUS; SUBCUTANEOUS at 08:54

## 2025-04-15 RX ADMIN — LACTULOSE 20 G: 10 SOLUTION ORAL at 08:54

## 2025-04-15 RX ADMIN — SODIUM CHLORIDE, PRESERVATIVE FREE 10 ML: 5 INJECTION INTRAVENOUS at 21:01

## 2025-04-15 RX ADMIN — LACTULOSE 20 G: 10 SOLUTION ORAL at 20:58

## 2025-04-15 RX ADMIN — BACITRACIN ZINC, NEOMYCIN, POLYMYXIN B: 400; 3.5; 5 OINTMENT TOPICAL at 17:55

## 2025-04-15 RX ADMIN — EPOETIN ALFA-EPBX 5000 UNITS: 3000 INJECTION, SOLUTION INTRAVENOUS; SUBCUTANEOUS at 17:59

## 2025-04-15 RX ADMIN — SODIUM CHLORIDE, PRESERVATIVE FREE 10 ML: 5 INJECTION INTRAVENOUS at 09:04

## 2025-04-15 RX ADMIN — CEFTRIAXONE 1000 MG: 1 INJECTION, POWDER, FOR SOLUTION INTRAMUSCULAR; INTRAVENOUS at 08:54

## 2025-04-15 RX ADMIN — FUROSEMIDE 20 MG: 20 TABLET ORAL at 11:50

## 2025-04-15 RX ADMIN — HEPARIN SODIUM 5000 UNITS: 5000 INJECTION INTRAVENOUS; SUBCUTANEOUS at 20:58

## 2025-04-15 ASSESSMENT — PAIN SCALES - GENERAL
PAINLEVEL_OUTOF10: 0

## 2025-04-15 NOTE — PROGRESS NOTES
Progress Note    Gumaro Khan  79 y.o.      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)    Mass of right thigh    Weight loss, abnormal    NSTEMI (non-ST elevated myocardial infarction) (HCC)    MR (mitral regurgitation)    Nonischemic cardiomyopathy (HCC)    Type 2 diabetes mellitus with hypercholesterolemia (HCC)    Anxiety disorder    Presence of implantable cardioverter-defibrillator (ICD)    UTI (urinary tract infection)    Poor appetite    Iron deficiency anemia    Atrial fibrillation (HCC)    Follow-up examination    MGUS (monoclonal gammopathy of unknown significance)    Long term current use of anticoagulant therapy    Antineoplastic chemotherapy induced anemia    Cancer associated pain    Pulmonary hypertension, moderate to severe (HCC)    Chronic gout with tophus    Hypercholesterolemia    Leukocytosis    Malignant neoplasm of right lower extremity    AICD at end of battery life    Hypertension associated with diabetes    GI bleed    Shock (HCC)    Acute renal failure superimposed on chronic kidney disease    Acute decompensated heart failure (HCC)    Hyperglycemia    Supratherapeutic INR    General weakness    ATN (acute tubular necrosis)    Systolic dysfunction    Hyperkalemia    Anemia    Recurrent falls    Arrhythmia    Hypercalcemia    CKD stage 3 secondary to diabetes (HCC)    Chronic systolic (congestive) heart failure    Acute renal failure    Acute renal injury    Ascites    Systolic congestive heart failure (HCC)    Shortness of breath           Subjective:     Patient remains on bed.  No shortness of breath with breathing with open mouth resulting distention of the abdomen.       A comprehensive review of systems was negative except for that written in the History of Present Illness.    Objective:     /86   Pulse 77   Temp 98.6 °F (37 °C) (Oral)   Resp 28   Ht 1.854 m (6' 1\")   Wt 87.5 kg (192 lb 14.4 oz)

## 2025-04-15 NOTE — PROGRESS NOTES
0715- Bedside and Verbal shift change report given to Rachell RN (oncoming nurse) by Lisandro RN (offgoing nurse). Report included the following information SBAR, Intake/Output, MAR, Recent Results, and Cardiac Rhythm AFIB .     1422- Patient transported off unit via bed to IR.    1520- Patient transported on unit via bed from IR.    1545- Temporary dialysis catheter removed per Dr. Suresh. Pressure and dressing applied.    1940- Bedside and Verbal shift change report given to Ken THAKUR (oncoming nurse) by Rachell RN (offgoing nurse). Report included the following information SBAR, Intake/Output, MAR, Recent Results, and Cardiac Rhythm AFIB .

## 2025-04-15 NOTE — PROCEDURES
PROCEDURE NOTE  Date: 4/15/2025   Name: Gumaro Khan  YOB: 1945    Procedures            RADIOLOGY POST PARACENTESIS NOTE     April 15, 2025       2:51 PM     Preoperative Diagnosis:   Ascites    Postoperative Diagnosis:  Same.    :  Tiffany Velazquez PA-C    Attending: Jenaro Owens MD     Assistant:  None.    Type of Anesthesia: 1% plain lidocaine    Procedure/Description:  US-Guided paracentesis    Findings:  Using ultrasound guidance the largest pocket of peritoneal fluid was localized and marked at the right lower quadrant.   The patient was prepped and draped in the usual fashion.  1% Lidocaine was infiltrated locally. A 5 Maori over the needle catheter was advanced into the peritoneal cavity and clear, yellow fluid was aspirated. Once fluid was easily aspirated, the needle was removed leaving the catheter in place. The catheter was connected to vacuum containers and ascitic fluid was removed.    Estimated blood Loss:  Minimal    Specimen Removed:  No     Complications: None    Condition: Stable    Impression: Successful paracentesis. 5 L ascites removed with residual ascites post procedure. 5 L limit    Discharge Plan:  continue present therapy    FREDERICK Mejia

## 2025-04-15 NOTE — PROGRESS NOTES
[facility] Hospitalist Group  Progress Note    Patient: Gumaro Khan Age: 79 y.o. : 1945 MR#: [id number] SSN: [id number]      Subjective/24-hour events:     Resting pretty comfortably overall.  No acute shortness of breath but still exhibits some belly breathing.  Wife present at bedside    Assessment:   SHAHNAZ on CKD 3  Ascites w/low SAAG, s/p paracentesis  Acute on chronic anemia s/p 1 unit PRBCs  Systolic CHF - EF 25-30% by echo 2024  Paroxysmal atrial fibrillation  DM2  Severe hyperkalemia requiring emergent hemodialysis, resolved  Acute metabolic encephalopathy, improved  History of polyclonal gammopathy - elevated kappa/lambda chains  Alcohol abuse history    Plan:   Ultrasound done yesterday shows extensive ascites that is recurrent.  Have placed order for ultrasound-guided paracentesis and discussed case with interventional radiology.  Timing of procedure pending their availability.  Assistance appreciated in advance.  Continue to await bone marrow biopsy results.  Trend hemoglobin, transfuse as necessary.  Aggressive PT/OT.  Had discussion with patient and spouse at bedside, strongly recommended SNF at discharge.  Patient is high risk to return to the hospital regardless of disposition arrangements.  Would benefit from goals of care discussion.  Awaiting pending testing results prior to initiating this, however.    Anticipated discharge, -    Case discussed with:  [x]Patient  [x]Family  [x] Nursing  [x]Case Management  DVT Prophylaxis:  []Lovenox  [x]Hep SQ  []SCDs  []Coumadin   []On Heparin gtt []PO anticoagulant    Objective:   VS: /86   Pulse 77   Temp 98.6 °F (37 °C) (Oral)   Resp 28   Ht 1.854 m (6' 0.99\")   Wt 87.5 kg (192 lb 14.4 oz)   SpO2 100%   BMI 25.46 kg/m²      Tmax/24hrs: Temp (24hrs), Av.3 °F (36.8 °C), Min:97.7 °F (36.5 °C), Max:98.6 °F (37 °C)    Intake/Output Summary (Last 24 hours) at 4/15/2025 1352  Last data filed at

## 2025-04-15 NOTE — PROGRESS NOTES
Physical Therapy  Pt not seen for skilled PT due to:    AM attempt: Patient presenting with difficulty breathing. RN at bedside. Not appropriate for PT interventions at this time.     PM attempt: Actively leaving floor with transport.    Thank you.  YOAN OrnelasT

## 2025-04-15 NOTE — PROGRESS NOTES
[facility] - Progress Note    Admit Date: 4/7/2025  Attending Cardiologist: Dr. Adame    Assessment:     - Hypervolemic shock, resolved with IV fluids. Briefly required pressors  - Severe ascites. new onset in the last ~2 weeks per patient with unknown etiology at present. No history of cirrhosis documented, no history of prior ascites. S/p paracentesis with 2L out. Negative cytology for malignancy. S/p bone marrow biopsy 4/11/2025   - Persistent A fib: on coreg and digoxin as outpatient. Currently held due to shock and bradycardia. Not on AC due to anemia.  Patient in atrial fibrillation with rate controlled.  - Chronic systolic heart failure. EF 45-50% on Echo this admission. Appears compensated on exam.   - Nonischemic cardiomyopathy:   Echo 4/8/25 EF 45-50%, septal motion, mild global hypokinesis, grade III DD. BORIS RV dilated with reduced RV function. EF has been as low as 25%  S/p [employer] single AICD with gen change in 5/2018  GDMT with ARB, aldactone, coreg as outpatient. Aldactone and ARB held due to hyperkalemia  - Mild to moderate AI on Echo 4/2025  - Mild to moderate MR with multiple jets 4/2025  - Moderate to severe TR on echo 4/2025  - PAH Who group 5  - Hx of elevated Kappa/Lambda chains. Bone marrow biopsy pending.    - SHAHNAZ on CKD stage 4  - T2DM   - HLD  - History of right thigh sarcoma s/p resection    - History of GIB/hx of angioectasia s/p endoclipping 1/25/24   - ETOH abuse with evidence of cirrhosis     Primary cardiologist: Dr. Velasquez     Plan:   -  Patient seen and examined independently.  The patient has no specific complaints.  Cardiac situation unchanged.  Will sign off and be available.  Agree with note of Jennifer Vences NP.  NORTH Adame MD  - Telemetry reviewed, patient in rate controlled AF. Will consider adding back BB at lower dose if patient has rapid rates.   - Volume management per nephrology  - Continue supportive care.      Subjective:     No new complaints.

## 2025-04-15 NOTE — PLAN OF CARE
Problem: Chronic Conditions and Co-morbidities  Goal: Patient's chronic conditions and co-morbidity symptoms are monitored and maintained or improved  Outcome: Progressing  Flowsheets (Taken 4/15/2025 0850)  Care Plan - Patient's Chronic Conditions and Co-Morbidity Symptoms are Monitored and Maintained or Improved:   Monitor and assess patient's chronic conditions and comorbid symptoms for stability, deterioration, or improvement   Update acute care plan with appropriate goals if chronic or comorbid symptoms are exacerbated and prevent overall improvement and discharge   Collaborate with multidisciplinary team to address chronic and comorbid conditions and prevent exacerbation or deterioration     Problem: Discharge Planning  Goal: Discharge to home or other facility with appropriate resources  4/15/2025 1131 by Rachell Batres, RN  Outcome: Progressing  Flowsheets (Taken 4/15/2025 0850)  Discharge to home or other facility with appropriate resources: Identify barriers to discharge with patient and caregiver  4/14/2025 2304 by Lisandro Zuniga, RN  Outcome: Progressing  Flowsheets (Taken 4/14/2025 2304)  Discharge to home or other facility with appropriate resources:   Identify barriers to discharge with patient and caregiver   Arrange for needed discharge resources and transportation as appropriate     Problem: Pain  Goal: Verbalizes/displays adequate comfort level or baseline comfort level  Outcome: Progressing  Flowsheets (Taken 4/13/2025 1129 by Edwige Farias, RN)  Verbalizes/displays adequate comfort level or baseline comfort level:   Encourage patient to monitor pain and request assistance   Assess pain using appropriate pain scale   Administer analgesics based on type and severity of pain and evaluate response     Problem: Safety - Adult  Goal: Free from fall injury  Outcome: Progressing  Flowsheets (Taken 4/12/2025 2353 by Josiane Yuan RN)  Free From Fall Injury: Instruct family/caregiver on patient  Administer blood products/factors as ordered     Problem: Skin/Tissue Integrity  Goal: Skin integrity remains intact  Description: 1.  Monitor for areas of redness and/or skin breakdown2.  Assess vascular access sites hourly3.  Every 4-6 hours minimum:  Change oxygen saturation probe site4.  Every 4-6 hours:  If on nasal continuous positive airway pressure, respiratory therapy assess nares and determine need for appliance change or resting period  4/15/2025 1131 by Rachell Batres, RN  Outcome: Progressing  Flowsheets (Taken 4/15/2025 0850)  Skin Integrity Remains Intact:   Monitor for areas of redness and/or skin breakdown   Assess vascular access sites hourly  4/14/2025 2304 by Lisandro Zuniga, RN  Outcome: Progressing  Flowsheets (Taken 4/14/2025 2304)  Skin Integrity Remains Intact:   Monitor for areas of redness and/or skin breakdown   Assess vascular access sites hourly

## 2025-04-15 NOTE — CARE COORDINATION
Met with pt and his wife Kristi Khan at pt's bedside earlier today and discussed transitional care.  Pt declined SNF placement stating he had a bad experience in the past.  He also declined home health and his wife was ok with his decision.      Met with pt's wife later outside of pt's room.  She stated she will like for pt to go home with home health to help him get stronger and she will override his decision if he declines again.  She stated \"I am his POA.\"      Pt's clinicals uploaded in [facility].          Yokasta Cornejo, MARGAUXN RN  Care Management

## 2025-04-15 NOTE — PROGRESS NOTES
[email or web address]  [phone]    Gastroenterology follow up-Progress note    Impression:  Ascites: SAAG of 0.6. Differentials favor malignancy vs nephrotic syndrome. Though cytology was negative, malignancy is not ruled out because this test has very low sensitivity (high specificity). Unlikely cirrhosis or heart failure.   s/p paracentesis 4/8/25, only 2L removed due to hypotension. Pt with known CHF  Findings: T protein 4.6, LDH 92, polys 18, cytology neg, no organisms on gram stain. SAAG 0.6   receiving Albumin q 12 hrs  Platelet ct 159  S/p bone marrow biopsy 4/11/2025  Chronic Anemia  HH on admission: 7.5, dropped to 6.9 on 4/8, received 1 unit PRBCs, stable now Hgb 7.3.  Iron %sat 60, iron 127, ferritin 499  EGD 1/25/24   Gastric body one actively oozing angioectasia treated with with endoclip  There were some erosions  in the duodenal sweep, without stigmata of recent bleed.  Colonoscopy 6/15/20   Multiple polyps, mild diverticulosis, grade 1 internal hemorrhoids.   Further colonoscopy is not recommended for CRCS screening   Acute renal failure, stage 3 CHD - Creat 4.21 on admission, currently 2.9  Hyperkalemia - likely medication induced per nephrology. Initiated on HD due to hemodynamic instability and hyperkalemia. Potassium 8 on admission, currently 5.3  Systolic congestive heart failure - echocardiogram pending  Pt has pacemaker, home meds include Aldactone, Digoxin and Bumex    Plan:  Appreciate heme/onc input for possible malignant ascites  No plans for endoscopic intervention at this time without signs of active bleeding. Pt poses and above-average risk of complications with procedures and anesthesia.   Recommend continuing iron supplementation and monitor for bleeding.   Continue monitor H/H, transfuse for Hgb < 7.  Medical management per primary team    Thank you for this consultation and the opportunity to participate in the care of this patient. Please do not hesitate to call with any  systolic (congestive) heart failure    Acute renal failure    Acute renal injury    Ascites    Systolic congestive heart failure (HCC)    Shortness of breath         /81   Pulse 81   Temp 97.7 °F (36.5 °C) (Oral)   Resp 28   Ht 1.854 m (6' 1\")   Wt 87.5 kg (192 lb 14.4 oz)   SpO2 99%   BMI 25.45 kg/m²         Intake/Output Summary (Last 24 hours) at 4/15/2025 1040  Last data filed at 4/15/2025 1016  Gross per 24 hour   Intake 610 ml   Output 975 ml   Net -365 ml       CBC w/Diff    Lab Results   Component Value Date/Time    WBC 10.4 04/15/2025 03:43 AM    RBC 2.91 (L) 04/15/2025 03:43 AM    HGB 8.1 (L) 04/15/2025 03:43 AM    HCT 26.5 (L) 04/15/2025 03:43 AM    MCV 91.1 04/15/2025 03:43 AM    MCH 27.8 04/15/2025 03:43 AM    MCHC 30.6 (L) 04/15/2025 03:43 AM    RDW 21.8 (H) 04/15/2025 03:43 AM     04/15/2025 03:43 AM    MPV 10.7 04/15/2025 03:43 AM    No results found for: \"MONO\", \"BANDS\", \"BASOS\", \"METAS\", \"PRO\"   Basic Metabolic Profile   Recent Labs     04/15/25  0343      K 3.9      CO2 22   BUN 45*   GLUCOSE 98   MG 2.1   PHOS 3.7        Hepatic Function    No results found for: \"TP\" No components found for: \"SGOT\", \"GPT\", \"DBILI\", \"TBIL\"       Coags   No results for input(s): \"INR\", \"APTT\" in the last 72 hours.    Invalid input(s): \"PTP\"            FREDERICK Ahn  4/15/2025, 10:40 AM   [facility].  [email or web address]  Office: [phone]  [facility] Cell: [phone] (M-F 7:30-4:30)

## 2025-04-15 NOTE — PLAN OF CARE
Problem: Discharge Planning  Goal: Discharge to home or other facility with appropriate resources  4/14/2025 2304 by Lisandro Zuniga, RN  Outcome: Progressing  Flowsheets (Taken 4/14/2025 2304)  Discharge to home or other facility with appropriate resources:   Identify barriers to discharge with patient and caregiver   Arrange for needed discharge resources and transportation as appropriate     Problem: Respiratory - Adult  Goal: Achieves optimal ventilation and oxygenation  4/14/2025 2304 by Lisandro Zuniga RN  Outcome: Progressing  Flowsheets (Taken 4/14/2025 2304)  Achieves optimal ventilation and oxygenation:   Assess for changes in respiratory status   Assess for changes in mentation and behavior   Position to facilitate oxygenation and minimize respiratory effort   Oxygen supplementation based on oxygen saturation or arterial blood gases   Encourage broncho-pulmonary hygiene including cough, deep breathe, incentive spirometry   Assess the need for suctioning and aspirate as needed     Problem: Skin/Tissue Integrity - Adult  Goal: Skin integrity remains intact  Description: 1.  Monitor for areas of redness and/or skin breakdown2.  Assess vascular access sites hourly3.  Every 4-6 hours minimum:  Change oxygen saturation probe site4.  Every 4-6 hours:  If on nasal continuous positive airway pressure, respiratory therapy assess nares and determine need for appliance change or resting period  4/14/2025 2304 by Lisandro Zuniga, RN  Outcome: Progressing  Flowsheets (Taken 4/14/2025 2304)  Skin Integrity Remains Intact:   Monitor for areas of redness and/or skin breakdown   Assess vascular access sites hourly

## 2025-04-15 NOTE — CARE COORDINATION
04/15/25 1228   IMM Letter   IMM Letter given to Patient/Family/Significant other/Guardian/POA/by: Yokasta Cornejo   IMM Letter date given: 04/15/25   IMM Letter time given: 1119       Important Message from Medicare\" reviewed and explained with the patient and/or representative pt's spouse Kristi at bedside and signature was obtained. A signed copy provided to patient/representative. Original signed document placed in patient's chart.       MEREDITH Craig RN  Care Management

## 2025-04-15 NOTE — PROGRESS NOTES
Comprehensive Nutrition Assessment    Type and Reason for Visit:  Initial, LOS    Nutrition Recommendations/Plan:   Add Nepro once daily due to variable po intake  Encourage intake of meals/supplements     Malnutrition Assessment:  Malnutrition Status:  Insufficient data at this time    Nutrition History and Allergies:   CKD stage 4, GI bleed, chronic gastric AVMs, pulmonary HTN, etoh abuse, Melanoma, CHF, DM type 2    Past Medical History:   Diagnosis Date    Abnormal myocardial perfusion study 11/02/2015    At most mild LVE.  EF 45-46%.      Abnormal nuclear cardiac imaging test 02/03/2009    Mild basal/mid inferior, inferoapical, inferoseptal infarct w/mild ischemia in RCA (possibly partially artifact).  Anterior, anteroseptal, anterapical ischemia w/o infarct.  (Mid & distal LAD.)  LVE.  EF 29%.  Mod global hyk.      AICD (automatic cardioverter/defibrillator) present     Anemia     Atrial fibrillation ([facility]) 6/8/2010    Cancer ([facility])     carcinoma in thigh melanoma in ankle    Cardiomyopathy, nonischemic ([facility])     3/11 echo EF 25%    CHF (congestive heart failure) ([facility])     Diabetes ([facility])     DVT (deep venous thrombosis) ([facility]) 10/04/2016    No DVT bilaterally.    DVT (deep venous thrombosis) ([facility]) 09/29/2016    Tech difficult.  No DVT bilaterally.    History of echocardiogram 09/29/2016    LVE.  EF 15% at most.  Indeterminate LV diastolic fx.  RVE.  Reduced RV systolic fx.  RVSP 80-85 mmHg.  LAE.  DAYSI.  Mod-severe MR.  Mild AI.  Severe TR.      HTN (hypertension) 6/8/2010    Hypertensive cardiovascular disease     MGUS (monoclonal gammopathy of unknown significance)     MR (mitral regurgitation)     Pulmonary hypertension, moderate to severe ([facility]) 6/8/2010    90-100mmHg; repeat echo in 3/11 showed 70mmHg    Renal artery stenosis 06/07/2010    No evidence of renal artery stenosis >60%.  Patent SMA and celiac artery.    S/P cardiac cath 02/11/2009    Patent coronary arteries.  LVEDP 28.  EF 35%.  Global hyk.  Mod  Glucose 101 70 - 110 mg/dL   POCT Glucose    Collection Time: 04/14/25 12:57 PM   Result Value Ref Range    POC Glucose 141 (H) 70 - 110 mg/dL   POCT Glucose    Collection Time: 04/14/25  4:21 PM   Result Value Ref Range    POC Glucose 128 (H) 70 - 110 mg/dL   POCT Glucose    Collection Time: 04/14/25  8:12 PM   Result Value Ref Range    POC Glucose 128 (H) 70 - 110 mg/dL   CBC with Auto Differential    Collection Time: 04/15/25  3:43 AM   Result Value Ref Range    WBC 10.4 4.6 - 13.2 K/uL    RBC 2.91 (L) 4.35 - 5.65 M/uL    Hemoglobin 8.1 (L) 13.0 - 16.0 g/dL    Hematocrit 26.5 (L) 36.0 - 48.0 %    MCV 91.1 78.0 - 100.0 FL    MCH 27.8 24.0 - 34.0 PG    MCHC 30.6 (L) 31.0 - 37.0 g/dL    RDW 21.8 (H) 11.6 - 14.5 %    Platelets 201 135 - 420 K/uL    MPV 10.7 9.2 - 11.8 FL    Nucleated RBCs 0.2 (H) 0  WBC    nRBC 0.02 (H) 0.00 - 0.01 K/uL    Neutrophils % 71.9 40.0 - 73.0 %    Lymphocytes % 12.8 (L) 21.0 - 52.0 %    Monocytes % 12.2 (H) 3.0 - 10.0 %    Eosinophils % 2.3 0.0 - 5.0 %    Basophils % 0.2 0.0 - 2.0 %    Immature Granulocytes % 0.6 (H) 0.0 - 0.5 %    Neutrophils Absolute 7.50 1.80 - 8.00 K/UL    Lymphocytes Absolute 1.33 0.90 - 3.60 K/UL    Monocytes Absolute 1.27 (H) 0.05 - 1.20 K/UL    Eosinophils Absolute 0.24 0.00 - 0.40 K/UL    Basophils Absolute 0.02 0.00 - 0.10 K/UL    Immature Granulocytes Absolute 0.06 (H) 0.00 - 0.04 K/UL    Differential Type AUTOMATED     Magnesium    Collection Time: 04/15/25  3:43 AM   Result Value Ref Range    Magnesium 2.1 1.6 - 2.6 mg/dL   Phosphorus    Collection Time: 04/15/25  3:43 AM   Result Value Ref Range    Phosphorus 3.7 2.5 - 4.9 MG/DL   Basic Metabolic Panel    Collection Time: 04/15/25  3:43 AM   Result Value Ref Range    Sodium 136 136 - 145 mmol/L    Potassium 3.9 3.5 - 5.5 mmol/L    Chloride 105 100 - 111 mmol/L    CO2 22 21 - 32 mmol/L    Anion Gap 9 3.0 - 18 mmol/L    Glucose 98 74 - 99 mg/dL    BUN 45 (H) 7.0 - 18 MG/DL    Creatinine 1.92 (H) 0.6 -    Behavioral-Environmental Outcomes: None Identified  Food/Nutrient Intake Outcomes: Supplement Intake, Food and Nutrient Intake  Physical Signs/Symptoms Outcomes: Biochemical Data, GI Status, Fluid Status or Edema, Weight, Skin    Discharge Planning:    Continue current diet     Tracey Melgar RD  Contact: [phone]

## 2025-04-16 LAB
ALBUMIN 24H MFR UR ELPH: 33.9 %
ALPHA1 GLOB 24H MFR UR ELPH: 8.7 %
ALPHA2 GLOB 24H MFR UR ELPH: 12.4 %
ANION GAP SERPL CALC-SCNC: 7 MMOL/L (ref 3–18)
B-GLOBULIN MFR UR ELPH: 28.8 %
BASOPHILS # BLD: 0.05 K/UL (ref 0–0.1)
BASOPHILS NFR BLD: 0.5 % (ref 0–2)
BUN SERPL-MCNC: 47 MG/DL (ref 7–18)
BUN/CREAT SERPL: 25 (ref 12–20)
CALCIUM SERPL-MCNC: 8.4 MG/DL (ref 8.5–10.1)
CHLORIDE SERPL-SCNC: 106 MMOL/L (ref 100–111)
CO2 SERPL-SCNC: 23 MMOL/L (ref 21–32)
COLLECT DURATION TIME UR: 24 HR
CREAT SERPL-MCNC: 1.91 MG/DL (ref 0.6–1.3)
DIFFERENTIAL METHOD BLD: ABNORMAL
EOSINOPHIL # BLD: 0.24 K/UL (ref 0–0.4)
EOSINOPHIL NFR BLD: 2.3 % (ref 0–5)
ERYTHROCYTE [DISTWIDTH] IN BLOOD BY AUTOMATED COUNT: 21.8 % (ref 11.6–14.5)
GAMMA GLOB 24H MFR UR ELPH: 16.3 %
GLUCOSE BLD STRIP.AUTO-MCNC: 133 MG/DL (ref 70–110)
GLUCOSE BLD STRIP.AUTO-MCNC: 138 MG/DL (ref 70–110)
GLUCOSE BLD STRIP.AUTO-MCNC: 140 MG/DL (ref 70–110)
GLUCOSE BLD STRIP.AUTO-MCNC: 99 MG/DL (ref 70–110)
GLUCOSE SERPL-MCNC: 109 MG/DL (ref 74–99)
HCT VFR BLD AUTO: 25.4 % (ref 36–48)
HGB BLD-MCNC: 8.2 G/DL (ref 13–16)
IMM GRANULOCYTES # BLD AUTO: 0.09 K/UL (ref 0–0.04)
IMM GRANULOCYTES NFR BLD AUTO: 0.8 % (ref 0–0.5)
INTERPRETATION UR IFE-IMP: ABNORMAL
LYMPHOCYTES # BLD: 1.15 K/UL (ref 0.9–3.6)
LYMPHOCYTES NFR BLD: 10.8 % (ref 21–52)
Lab: ABNORMAL
M PROTEIN 24H MFR UR ELPH: ABNORMAL %
MAGNESIUM SERPL-MCNC: 2.1 MG/DL (ref 1.6–2.6)
MCH RBC QN AUTO: 29.3 PG (ref 24–34)
MCHC RBC AUTO-ENTMCNC: 32.3 G/DL (ref 31–37)
MCV RBC AUTO: 90.7 FL (ref 78–100)
MONOCYTES # BLD: 1.19 K/UL (ref 0.05–1.2)
MONOCYTES NFR BLD: 11.2 % (ref 3–10)
NEUTS SEG # BLD: 7.91 K/UL (ref 1.8–8)
NEUTS SEG NFR BLD: 74.4 % (ref 40–73)
NRBC # BLD: 0.02 K/UL (ref 0–0.01)
NRBC BLD-RTO: 0.2 PER 100 WBC
PHOSPHATE SERPL-MCNC: 3.4 MG/DL (ref 2.5–4.9)
PLATELET # BLD AUTO: 193 K/UL (ref 135–420)
PMV BLD AUTO: 10.5 FL (ref 9.2–11.8)
POTASSIUM SERPL-SCNC: 4 MMOL/L (ref 3.5–5.5)
PROT 24H UR-MRATE: 351 MG/24 HR (ref 30–150)
PROT UR-MCNC: 87.7 MG/DL
RBC # BLD AUTO: 2.8 M/UL (ref 4.35–5.65)
SODIUM SERPL-SCNC: 136 MMOL/L (ref 136–145)
SPECIMEN VOL ?TM UR: 400 ML
WBC # BLD AUTO: 10.6 K/UL (ref 4.6–13.2)

## 2025-04-16 PROCEDURE — 97110 THERAPEUTIC EXERCISES: CPT

## 2025-04-16 PROCEDURE — 6360000002 HC RX W HCPCS: Performed by: INTERNAL MEDICINE

## 2025-04-16 PROCEDURE — 97530 THERAPEUTIC ACTIVITIES: CPT

## 2025-04-16 PROCEDURE — 85025 COMPLETE CBC W/AUTO DIFF WBC: CPT

## 2025-04-16 PROCEDURE — 6370000000 HC RX 637 (ALT 250 FOR IP)

## 2025-04-16 PROCEDURE — 99232 SBSQ HOSP IP/OBS MODERATE 35: CPT | Performed by: FAMILY MEDICINE

## 2025-04-16 PROCEDURE — 84100 ASSAY OF PHOSPHORUS: CPT

## 2025-04-16 PROCEDURE — 97116 GAIT TRAINING THERAPY: CPT

## 2025-04-16 PROCEDURE — 2140000001 HC CVICU INTERMEDIATE R&B

## 2025-04-16 PROCEDURE — 6370000000 HC RX 637 (ALT 250 FOR IP): Performed by: INTERNAL MEDICINE

## 2025-04-16 PROCEDURE — 2500000003 HC RX 250 WO HCPCS: Performed by: INTERNAL MEDICINE

## 2025-04-16 PROCEDURE — 99233 SBSQ HOSP IP/OBS HIGH 50: CPT | Performed by: INTERNAL MEDICINE

## 2025-04-16 PROCEDURE — 36415 COLL VENOUS BLD VENIPUNCTURE: CPT

## 2025-04-16 PROCEDURE — 97535 SELF CARE MNGMENT TRAINING: CPT

## 2025-04-16 PROCEDURE — 80048 BASIC METABOLIC PNL TOTAL CA: CPT

## 2025-04-16 PROCEDURE — 83735 ASSAY OF MAGNESIUM: CPT

## 2025-04-16 PROCEDURE — 82962 GLUCOSE BLOOD TEST: CPT

## 2025-04-16 PROCEDURE — 94761 N-INVAS EAR/PLS OXIMETRY MLT: CPT

## 2025-04-16 RX ORDER — FUROSEMIDE 40 MG/1
40 TABLET ORAL DAILY
Status: DISCONTINUED | OUTPATIENT
Start: 2025-04-17 | End: 2025-04-18 | Stop reason: HOSPADM

## 2025-04-16 RX ADMIN — HEPARIN SODIUM 5000 UNITS: 5000 INJECTION INTRAVENOUS; SUBCUTANEOUS at 09:47

## 2025-04-16 RX ADMIN — SODIUM CHLORIDE, PRESERVATIVE FREE 10 ML: 5 INJECTION INTRAVENOUS at 09:50

## 2025-04-16 RX ADMIN — LACTULOSE 20 G: 10 SOLUTION ORAL at 09:47

## 2025-04-16 RX ADMIN — HEPARIN SODIUM 5000 UNITS: 5000 INJECTION INTRAVENOUS; SUBCUTANEOUS at 20:27

## 2025-04-16 RX ADMIN — LACTULOSE 20 G: 10 SOLUTION ORAL at 20:27

## 2025-04-16 RX ADMIN — LACTULOSE 20 G: 10 SOLUTION ORAL at 14:34

## 2025-04-16 RX ADMIN — SODIUM CHLORIDE, PRESERVATIVE FREE 10 ML: 5 INJECTION INTRAVENOUS at 21:14

## 2025-04-16 RX ADMIN — BACITRACIN ZINC, NEOMYCIN, POLYMYXIN B: 400; 3.5; 5 OINTMENT TOPICAL at 09:53

## 2025-04-16 RX ADMIN — FUROSEMIDE 20 MG: 20 TABLET ORAL at 09:47

## 2025-04-16 ASSESSMENT — PAIN SCALES - GENERAL
PAINLEVEL_OUTOF10: 0
PAINLEVEL_OUTOF10: 0

## 2025-04-16 NOTE — PROGRESS NOTES
[facility] - Progress Note    Admit Date: 4/7/2025  Attending Cardiologist: Dr. Clifton    Assessment:     - Hypovolemic shock, resolved with IV fluids. Briefly required pressors  - Severe ascites. new onset in the last ~2 weeks per patient with unknown etiology at present. No history of cirrhosis documented, no history of prior ascites. S/p paracentesis x2, first with 2L out, 5 L out 4/16/25. Negative cytology for malignancy. S/p bone marrow biopsy 4/11/2025   - Persistent A fib: on coreg and digoxin as outpatient. Currently held due to shock and bradycardia. Not on AC due to anemia.  Patient in atrial fibrillation with rate controlled.  - Chronic systolic heart failure. EF 45-50% on Echo this admission. Appears compensated on exam.   - Nonischemic cardiomyopathy:   Echo 4/8/25 EF 45-50%, septal motion, mild global hypokinesis, grade III DD. BORIS RV dilated with reduced RV function. EF has been as low as 25%  S/p [employer] single AICD with gen change in 5/2018  GDMT with ARB, aldactone, coreg as outpatient. Aldactone and ARB held due to hyperkalemia  - Mild to moderate AI on Echo 4/2025  - Mild to moderate MR with multiple jets 4/2025  - Moderate to severe TR on echo 4/2025  - PAH Who group 5  - Hx of elevated Kappa/Lambda chains. Bone marrow biopsy pending.    - SHAHNAZ on CKD stage 4  - T2DM   - HLD  - History of right thigh sarcoma s/p resection    - History of GIB/hx of angioectasia s/p endoclipping 1/25/24   - ETOH abuse with evidence of cirrhosis     Primary cardiologist: Dr. Velasquez     Plan:     - Telemetry reviewed, patient in rate controlled AF. Will consider adding back BB at lower dose if patient has rapid rates.   - Volume management per nephrology  - Continue supportive care.    Subjective:     No new complaints.     Objective:      Patient Vitals for the past 8 hrs:   Temp Pulse Resp BP SpO2   04/16/25 0723 98.8 °F (37.1 °C) 66 20 128/71 97 %   04/16/25 0318 97.6 °F (36.4 °C) 80 19 118/73  99 %         Patient Vitals for the past 96 hrs:   Weight   04/16/25 0544 87.5 kg (192 lb 14.4 oz)   04/15/25 0520 87.5 kg (192 lb 14.4 oz)   04/14/25 0600 88 kg (194 lb)   04/13/25 0615 87.9 kg (193 lb 12.6 oz)       TELE: AFIB               Current Facility-Administered Medications   Medication Dose Route Frequency    [Held by provider] carvedilol (COREG) tablet 3.125 mg  3.125 mg Oral BID WC    furosemide (LASIX) tablet 20 mg  20 mg Oral Daily    neomycin-bacitracin-polymyxin (NEOSPORIN) ointment   Topical BID    [Held by provider] digoxin (LANOXIN) tablet 125 mcg  125 mcg Oral Daily    [Held by provider] cloNIDine (CATAPRES) tablet 0.2 mg  0.2 mg Oral TID    glucose chewable tablet 16 g  4 tablet Oral PRN    insulin lispro (HUMALOG,ADMELOG) injection vial 0-4 Units  0-4 Units SubCUTAneous 4x Daily AC & HS    lactulose (CHRONULAC) 10 GM/15ML solution 20 g  20 g Oral TID    0.9 % sodium chloride infusion   IntraVENous PRN    diphenhydrAMINE (BENADRYL) injection 25 mg  25 mg IntraVENous See Admin Instructions    epoetin emily-epbx (RETACRIT) 5,000 Units combo injection  5,000 Units SubCUTAneous Once per day on Tuesday Thursday Saturday    dextrose bolus 10% 125 mL  125 mL IntraVENous PRN    Or    dextrose bolus 10% 250 mL  250 mL IntraVENous PRN    glucagon injection 1 mg  1 mg SubCUTAneous PRN    dextrose 10 % infusion   IntraVENous Continuous PRN    sodium chloride flush 0.9 % injection 5-40 mL  5-40 mL IntraVENous 2 times per day    sodium chloride flush 0.9 % injection 5-40 mL  5-40 mL IntraVENous PRN    0.9 % sodium chloride infusion   IntraVENous PRN    ondansetron (ZOFRAN-ODT) disintegrating tablet 4 mg  4 mg Oral Q8H PRN    Or    ondansetron (ZOFRAN) injection 4 mg  4 mg IntraVENous Q6H PRN    polyethylene glycol (GLYCOLAX) packet 17 g  17 g Oral Daily PRN    acetaminophen (TYLENOL) tablet 650 mg  650 mg Oral Q6H PRN    Or    acetaminophen (TYLENOL) suppository 650 mg  650 mg Rectal Q6H PRN    heparin  (porcine) injection 5,000 Units  5,000 Units SubCUTAneous BID         Intake/Output Summary (Last 24 hours) at 4/16/2025 0945  Last data filed at 4/16/2025 0728  Gross per 24 hour   Intake 680 ml   Output 490 ml   Net 190 ml       Physical Exam:  General:  alert, appears stated age, and cooperative  Neck:  no JVD  Lungs:  clear to auscultation bilaterally  Heart:  irregularly irregular rhythm  Abdomen:  abdomen is soft without significant tenderness, masses, organomegaly or guarding  Extremities:  extremities normal, atraumatic, no cyanosis or edema    Visit Vitals  /71   Pulse 66   Temp 98.8 °F (37.1 °C) (Oral)   Resp 20   Ht 1.854 m (6' 0.99\")   Wt 87.5 kg (192 lb 14.4 oz)   SpO2 97%   BMI 25.46 kg/m²       Data Review:     Labs: Results:       Chemistry Recent Labs     04/14/25  0459 04/15/25  0343 04/16/25  0415   * 136 136   K 4.0 3.9 4.0    105 106   CO2 23 22 23   BUN 51* 45* 47*   MG 2.2 2.1 2.1   PHOS 3.7 3.7 3.4      CBC w/Diff Recent Labs     04/14/25  0459 04/15/25  0343 04/16/25  0415   WBC 10.8 10.4 10.6   RBC 2.72* 2.91* 2.80*   HGB 8.0* 8.1* 8.2*   HCT 24.9* 26.5* 25.4*    201 193      Cardiac Enzymes Lab Results   Component Value Date/Time    TROPHS 61 04/08/2025 02:59 AM      Coagulation No results for input(s): \"INR\", \"APTT\" in the last 72 hours.    Lipid Panel No results found for: \"CHOL\", \"CHOLPOCT\", \"CHLST\", \"CHOLV\", \"907091\", \"HDL\", \"HDLC\", \"LDL\", \"LDLC\", \"VLDLC\", \"VLDL\"   BNP Lab Results   Component Value Date/Time    BNP 4,114 12/19/2019 11:00 AM       Lab Results   Component Value Date    BNP 4,114 (H) 12/19/2019      Liver Enzymes Lab Results   Component Value Date    ALT 11 (L) 04/11/2025    AST 16 04/11/2025    ALKPHOS 232 (H) 04/11/2025    BILITOT 1.6 (H) 04/11/2025      Thyroid Studies Lab Results   Component Value Date/Time    TSH 3.84 04/07/2025 06:50 PM          Signed By: HORACE Lugo - MARKUS     April 16, 2025

## 2025-04-16 NOTE — PLAN OF CARE
Problem: Chronic Conditions and Co-morbidities  Goal: Patient's chronic conditions and co-morbidity symptoms are monitored and maintained or improved  4/15/2025 1131 by Rachell Batres RN  Outcome: Progressing  Flowsheets (Taken 4/15/2025 0850)  Care Plan - Patient's Chronic Conditions and Co-Morbidity Symptoms are Monitored and Maintained or Improved:   Monitor and assess patient's chronic conditions and comorbid symptoms for stability, deterioration, or improvement   Update acute care plan with appropriate goals if chronic or comorbid symptoms are exacerbated and prevent overall improvement and discharge   Collaborate with multidisciplinary team to address chronic and comorbid conditions and prevent exacerbation or deterioration     Problem: Discharge Planning  Goal: Discharge to home or other facility with appropriate resources  4/15/2025 1131 by Rachell Batres RN  Outcome: Progressing  Flowsheets (Taken 4/15/2025 0850)  Discharge to home or other facility with appropriate resources: Identify barriers to discharge with patient and caregiver     Problem: Pain  Goal: Verbalizes/displays adequate comfort level or baseline comfort level  4/15/2025 2218 by Ken Bloom, RN  Outcome: Progressing  4/15/2025 1131 by Rachell Batres RN  Outcome: Progressing  Flowsheets (Taken 4/13/2025 1129 by Edwige Farias, RN)  Verbalizes/displays adequate comfort level or baseline comfort level:   Encourage patient to monitor pain and request assistance   Assess pain using appropriate pain scale   Administer analgesics based on type and severity of pain and evaluate response     Problem: Safety - Adult  Goal: Free from fall injury  4/15/2025 2218 by Ken Bloom RN  Outcome: Progressing  4/15/2025 1131 by Rachell Batres RN  Outcome: Progressing  Flowsheets (Taken 4/12/2025 2353 by Josiane Yuan RN)  Free From Fall Injury: Instruct family/caregiver on patient safety     Problem: Respiratory - Adult  Goal: Achieves optimal  discharge:   Assess and monitor for signs and symptoms of infection   Monitor lab/diagnostic results   Monitor all insertion sites i.e., indwelling lines, tubes and drains     Problem: Metabolic/Fluid and Electrolytes - Adult  Goal: Electrolytes maintained within normal limits  4/15/2025 1131 by Rachell Batres RN  Outcome: Progressing  Flowsheets (Taken 4/15/2025 0850)  Electrolytes maintained within normal limits:   Monitor labs and assess patient for signs and symptoms of electrolyte imbalances   Administer electrolyte replacement as ordered  Goal: Hemodynamic stability and optimal renal function maintained  4/15/2025 1131 by Rachell Batres RN  Outcome: Progressing  Flowsheets (Taken 4/15/2025 0850)  Hemodynamic stability and optimal renal function maintained:   Monitor labs and assess for signs and symptoms of volume excess or deficit   Monitor intake, output and patient weight   Monitor urine specific gravity, serum osmolarity and serum sodium as indicated or ordered  Goal: Glucose maintained within prescribed range  4/15/2025 1131 by Rachell Batres, RN  Outcome: Progressing  Flowsheets (Taken 4/15/2025 0850)  Glucose maintained within prescribed range:   Monitor blood glucose as ordered   Administer ordered medications to maintain glucose within target range   Assess for signs and symptoms of hyperglycemia and hypoglycemia     Problem: Hematologic - Adult  Goal: Maintains hematologic stability  4/15/2025 1131 by Rachell Batres, RN  Outcome: Progressing  Flowsheets (Taken 4/15/2025 0850)  Maintains hematologic stability:   Assess for signs and symptoms of bleeding or hemorrhage   Monitor labs for bleeding or clotting disorders   Administer blood products/factors as ordered     Problem: Skin/Tissue Integrity  Goal: Skin integrity remains intact  Description: 1.  Monitor for areas of redness and/or skin breakdown2.  Assess vascular access sites hourly3.  Every 4-6 hours minimum:  Change oxygen saturation probe site4.

## 2025-04-16 NOTE — PLAN OF CARE
Problem: Occupational Therapy - Adult  Goal: By Discharge: Performs self-care activities at highest level of function for planned discharge setting.  See evaluation for individualized goals.  Description: Occupational Therapy Goals:  Initiated 4/9/2025, re-assessed 4/16/2025, goals remain appropriate, continue with goals to be met within 7-10 days.    1.  Patient will perform bed mobility in preparation for ADLs with supervision/set-up.   2.  Patient will perform grooming > 5 minutes sitting EOB with modified independence, G balance.  3.  Patient will perform upper body dressing with modified independence.  4.  Patient will perform toilet transfers with minimal assistance  5.  Patient will perform all aspects of toileting with minimal assistance.  6.  Patient will participate in upper extremity therapeutic exercise/activities with supervision/set-up for 8-10 minutes to increase strength/endurance for ADLs.    7.  Patient will utilize energy conservation techniques during functional activities with verbal cues.    PLOF: Pt lives with spouse in [facility], first floor setup and needed assist with LB self-care. Pt with step over tub at home, performs basin bath for safety.   Outcome: Progressing  OCCUPATIONAL THERAPY WEEKLY REASSESSMENT    Patient: Gumaro Khan (79 y.o. male)  Date: 4/16/2025  Primary Diagnosis: Shortness of breath [R06.02]  Hyperkalemia [E87.5]  Respiratory acidosis [E87.29]  Acute renal failure [N17.9]  Acute renal injury [N17.9]  Ascites due to alcoholic cirrhosis (HCC) [K70.31]  Acute renal failure superimposed on chronic kidney disease, unspecified acute renal failure type, unspecified CKD stage [N17.9, N18.9]       Precautions: Fall Risk, General Precautions    ASSESSMENT :  Pt presents scooted ot lower part of the bed, agreeable to OT, however, declines to transition to EOB for ADLs due to getting up earlier this date with PT. Pt is educated on the importance of EOB/OOB activities to  tasks at bed level  CGA to change gown, cues for sequencing  Pain:  Intensity Pre-treatment: 0/10   Intensity Post-treatment: 0/10  Scale: Numeric Rating Scale  Activity Tolerance:   Activity Tolerance: Patient Tolerated treatment well  Please refer to the flowsheet for vital signs taken during this treatment.    After treatment:   [] Patient left in no apparent distress sitting up in chair  [x] Patient left in no apparent distress in bed  [x] Call bell left within reach  [x] Nursing notified  [x] Caregiver present  [x] Bed/chair alarm activated  [] No alarm    COMMUNICATION/EDUCATION:   Patient Education  Education Given To: Patient  Education Provided: Plan of Care;ADL Adaptive Strategies;Transfer Training;Fall Prevention Strategies;Energy Conservation  Education Method: Teach Back;Verbal;Demonstration  Barriers to Learning: None  Education Outcome: Verbalized understanding;Continued education needed    Thank you for this referral.  Dolores Martinez OTR/L  Minutes: 40

## 2025-04-16 NOTE — PROGRESS NOTES
0705- Bedside and Verbal shift change report given to Rachell RN (oncoming nurse) by Ken RN (offgoing nurse). Report included the following information SBAR, Intake/Output, MAR, Recent Results, and Cardiac Rhythm AFIB     1910- Bedside and Verbal shift change report given to Selin RN (oncoming nurse) by Rachell RN (offgoing nurse). Report included the following information SBAR, Intake/Output, MAR, Recent Results, and Cardiac Rhythm AFIB

## 2025-04-16 NOTE — PLAN OF CARE
Problem: Chronic Conditions and Co-morbidities  Goal: Patient's chronic conditions and co-morbidity symptoms are monitored and maintained or improved  Outcome: Progressing  Flowsheets (Taken 4/16/2025 0945)  Care Plan - Patient's Chronic Conditions and Co-Morbidity Symptoms are Monitored and Maintained or Improved:   Monitor and assess patient's chronic conditions and comorbid symptoms for stability, deterioration, or improvement   Update acute care plan with appropriate goals if chronic or comorbid symptoms are exacerbated and prevent overall improvement and discharge   Collaborate with multidisciplinary team to address chronic and comorbid conditions and prevent exacerbation or deterioration     Problem: Discharge Planning  Goal: Discharge to home or other facility with appropriate resources  Outcome: Progressing  Flowsheets (Taken 4/16/2025 0945)  Discharge to home or other facility with appropriate resources: Identify barriers to discharge with patient and caregiver     Problem: Pain  Goal: Verbalizes/displays adequate comfort level or baseline comfort level  4/16/2025 1126 by Rachell Batres RN  Outcome: Progressing  Flowsheets (Taken 4/13/2025 1129 by Edwige Farias, RN)  Verbalizes/displays adequate comfort level or baseline comfort level:   Encourage patient to monitor pain and request assistance   Assess pain using appropriate pain scale   Administer analgesics based on type and severity of pain and evaluate response  4/15/2025 2218 by Ken Bloom, RN  Outcome: Progressing     Problem: Safety - Adult  Goal: Free from fall injury  4/16/2025 1126 by Rachell Batres RN  Outcome: Progressing  Flowsheets (Taken 4/12/2025 2353 by Josiane Yuan, RN)  Free From Fall Injury: Instruct family/caregiver on patient safety  4/15/2025 2218 by Ken Bloom, RN  Outcome: Progressing     Problem: Respiratory - Adult  Goal: Achieves optimal ventilation and oxygenation  Outcome: Progressing  Flowsheets (Taken 4/16/2025  breakdown  4/15/2025 2218 by Ken Bloom, RN  Outcome: Progressing

## 2025-04-16 NOTE — PROGRESS NOTES
Bedside and Verbal shift change report given to Selin Mejía RN (oncoming nurse) by Rachell Batres RN (offgoing nurse). Report included the following information Nurse Handoff Report, Adult Overview, and Cardiac Rhythm controlled a fib .

## 2025-04-16 NOTE — PROGRESS NOTES
Hematology / Oncology Progress Note      Patient: Gumaro Khan  Gender: male   MRN: [id number]    YOB: 1945 Age: 79 y.o.   CSN: [id number]    LOS:  LOS: 9 days   Admit Date: 4/7/2025     PCP: Artis Shell MD    Date of evaluation: 4/16/2025     Assessment:       ICD-10-CM    1. Hyperkalemia  E87.5       2. Shortness of breath  R06.02       3. Ascites due to alcoholic cirrhosis (HCC)  K70.31       4. Respiratory acidosis  E87.29       5. Acute renal failure superimposed on chronic kidney disease, unspecified acute renal failure type, unspecified CKD stage  N17.9     N18.9       6. Shock (HCC)  R57.9 Echo (TTE) complete (PRN contrast/bubble/strain/3D)     Echo (TTE) complete (PRN contrast/bubble/strain/3D)     CANCELED: Echo (TTE) complete (PRN contrast/bubble/strain/3D)     CANCELED: Echo (TTE) complete (PRN contrast/bubble/strain/3D)           SHAHNAZ on CKD 3  Ascites w/low SAAG, s/p paracentesis  Acute on chronic anemia s/p 1 unit PRBCs  Systolic CHF - EF 25-30% by echo 1/2024  Paroxysmal atrial fibrillation  DM2  Severe hyperkalemia requiring emergent hemodialysis, resolved  Acute metabolic encephalopathy, improved  History of polyclonal gammopathy - elevated kappa/lambda chains  Alcohol abuse history  H/o sarcoma of right thigh > 10 yers ago s/p surgery in apparent complete remission.    Plan:   - Since SAAG < 1.1 makes transudative effusion less likely, cytology is negative for malignant cells on 4/8/2025.   Consider obtaining ascitic fluid cytology for  total 3 times to rule out malignancy as a cause for recurrent massive ascites.  -  4/12/2025 urine kappa lambda ratio 3.55 [normal]  - 4/11/2025 serum kappa lambda ratio 1.46 [normal] SPEP/SIFE showed no M spike.  Polyclonal increase detected in 1 or more immunoglobulins.    AL amyloidosis as a cause of recurrent ascites is less likely.  -Bone marrow asp and biopsy   Performed on 4/11/2025.  Results pending.  -Nephrology note noted.  significance) 10/27/2015    Mass of right thigh 10/06/2015    Iron deficiency anemia 10/06/2015    Leukocytosis 10/06/2015    Anxiety disorder 09/06/2013    Presence of implantable cardioverter-defibrillator (ICD) 04/14/2011    MR (mitral regurgitation) 03/29/2011    Long term current use of anticoagulant therapy 03/28/2011    Follow-up examination     Nonischemic cardiomyopathy (HCC) 06/08/2010    Atrial fibrillation (HCC) 06/08/2010    Pulmonary hypertension, moderate to severe (HCC) 06/08/2010    Hypertension associated with diabetes 06/08/2010             Review of Systems:   Reviewed and negative.    Physical Assessment:   /69   Pulse 76   Temp 99.1 °F (37.3 °C) (Oral)   Resp 22   Ht 1.854 m (6' 0.99\")   Wt 87.5 kg (192 lb 14.4 oz)   SpO2 98%   BMI 25.46 kg/m²     GENERAL: Alert and oriented times three. No acute distress.  Chronically ill-appearing male.  HEENT: Normocephalic; atraumatic. Oropharynx clear. Tongue normal.   EYES: Conjunctivae clear and sclerae without icterus. Pupils reactive and equal.   NECK: Supple without masses or thyromegaly. No jugular venous distension.   LYMPHATICS: No cervical, axillary or inguinal adenopathy.   RESPIRATORY: Lungs are clear to auscultation without rhonchi or wheezing.   CARDIOVASCULAR: Regular rate and rhythm of heart without murmurs, gallops or rubs.   ABDOMEN: Non-tender, distended, no masses or hepatosplenomegaly. Good bowel sounds.  Free fluid in abdomen noted.  EXTREMITIES: No visible deformities, no edema.   INTEGUMENTARY: No rashes, or lesions.   NEUROLOGIC: Moves extremities x4, sensations grossly normal. cranial nerves intact.   PSYCHIATRY: Euthymic affect and good eye contact.     Diagnostic test:     Basic Metabolic Profile   Recent Labs     04/14/25  0459 04/15/25  0343 04/16/25  0415   * 136 136   K 4.0 3.9 4.0    105 106   CO2 23 22 23   BUN 51* 45* 47*   MG 2.2 2.1 2.1   PHOS 3.7 3.7 3.4        CBC w/Diff    Recent Labs      04/14/25  0459 04/15/25  0343 04/16/25  0415   WBC 10.8 10.4 10.6   HGB 8.0* 8.1* 8.2*   HCT 24.9* 26.5* 25.4*    201 193    No results for input(s): \"BANDS\", \"LYMPHS\" in the last 72 hours.    Invalid input(s): \"GRANS\"     CHEMISTRY   Lab Results   Component Value Date    ALT 11 (L) 04/11/2025         Coagulation   Lab Results   Component Value Date/Time    INR 1.6 04/09/2025 12:52 AM    INR 1.6 04/08/2025 03:20 PM    INR 1.5 04/07/2025 06:50 PM    INR 2.9 06/30/2020 11:52 AM    INR 2.3 05/08/2020 07:58 AM    INR 3.1 04/07/2020 08:40 AM    APTT 46.5 04/09/2025 12:52 AM    APTT 44.0 04/08/2025 03:20 PM    APTT 81.1 01/23/2024 11:30 PM          US ABDOMEN LIMITED  Result Date: 4/15/2025  Extensive ascites. Electronically signed by Gregorio Veliz       Current Medications:     Current Facility-Administered Medications:     [START ON 4/17/2025] furosemide (LASIX) tablet 40 mg, 40 mg, Oral, Daily, Rinku Clifton MD    [Held by provider] carvedilol (COREG) tablet 3.125 mg, 3.125 mg, Oral, BID WC, Evens Talavera MD    neomycin-bacitracin-polymyxin (NEOSPORIN) ointment, , Topical, BID, Ed Suresh MD, Given at 04/16/25 0953    [Held by provider] digoxin (LANOXIN) tablet 125 mcg, 125 mcg, Oral, Daily, Kristi Bianchi PA-C    [Held by provider] cloNIDine (CATAPRES) tablet 0.2 mg, 0.2 mg, Oral, TID, Kristi Bianchi PA-C    glucose chewable tablet 16 g, 4 tablet, Oral, PRN, Niyah Trevizo PA-C    insulin lispro (HUMALOG,ADMELOG) injection vial 0-4 Units, 0-4 Units, SubCUTAneous, 4x Daily AC & HS, Niyah Trevizo PA-C    lactulose (CHRONULAC) 10 GM/15ML solution 20 g, 20 g, Oral, TID, Kristi Bianchi PA-C, 20 g at 04/16/25 0947    0.9 % sodium chloride infusion, , IntraVENous, PRN, Kristi Bianchi PA-C    diphenhydrAMINE (BENADRYL) injection 25 mg, 25 mg, IntraVENous, See Admin Instructions, Kristi Bianchi PA-C    epoetin emily-epbx (RETACRIT) 5,000 Units combo injection, 5,000 Units,

## 2025-04-16 NOTE — PLAN OF CARE
Problem: Physical Therapy - Adult  Goal: By Discharge: Performs mobility at highest level of function for planned discharge setting.  See evaluation for individualized goals.  Description: Physical Therapy Goals  Initiated 4/9/2025 and to be accomplished within 7 day(s)  Reassessment completed 4/16/25- goals downgraded to reflect patient's current functional performance.     1.  Patient will move from supine to sit and sit to supine  in bed with Collins in preparation for seated tasks.    2.  Patient will transfer from bed to chair and chair to bed with Collins using the least restrictive device in preparation for out of bed.  3.  Patient will perform sit to stand with Collins in preparation for out of bed tasks.  4.  Patient will ambulate with Collins for 25 feet with the least restrictive device in preparation for home setting.     PLOF: Lives with wife on first floor of two story home. No steps to enter. Amb with walker; has wheelchair and bedside commode. History of falls.   4/16/2025 1244 by Benita Roberts, PT  Outcome: Progressing     PHYSICAL THERAPY WEEKLY REASSESSMENT    Patient: Gumaro Khan (79 y.o. male)  Date: 4/16/2025  Primary Diagnosis: Shortness of breath [R06.02]  Hyperkalemia [E87.5]  Respiratory acidosis [E87.29]  Acute renal failure [N17.9]  Acute renal injury [N17.9]  Ascites due to alcoholic cirrhosis (HCC) [K70.31]  Acute renal failure superimposed on chronic kidney disease, unspecified acute renal failure type, unspecified CKD stage [N17.9, N18.9]       Precautions: Fall Risk, General Precautions,  ,  ,  ,  ,  ,  ,      ASSESSMENT :  Patient seen for PT weekly reassessment. Received supine; agreeable, motivated to participate in EOB/OOB activity. Remains limited by generalized weakness as well as chronic limitations in R knee flexion, impacting his ability to complete transitional tasks. Despite this, min-modA provided for bed mobility, modA provided for repeat sit to stand transfers, and  placement, forward weight shift, RLE positioning)  Sit to Stand: Partial/Moderate assistance  Stand to Sit: Partial/Moderate assistance  Balance:      Balance  Sitting: Impaired  Sitting - Static: Good (unsupported)  Sitting - Dynamic: Fair (occasional)  Standing: Impaired  Standing - Static: Fair    Ambulation/Gait Training:     Gait  Gait Training: Yes (sidestepping R/L along EOB)  Overall Level of Assistance: Minimal assistance  Distance (ft): 5 Feet (x3 trials)  Assistive Device: Walker, rolling  Speed/Ilsa: Pace decreased (< 100 feet/min)  Step Length: Left shortened;Right shortened  Gait Abnormalities: Decreased step clearance (forward flexed)     Pain:  Intensity Pre-treatment: 0/10   Intensity Post-treatment: 0/10  Scale: Numeric Rating Scale      Activity Tolerance:   Activity Tolerance: Patient tolerated treatment well  Please refer to the flowsheet for vital signs taken during this treatment.    After treatment:   []         Patient left in no apparent distress sitting up in chair  [x]         Patient left in no apparent distress in bed  [x]         Call bell left within reach  [x]         Nursing notified  [x]         Caregiver present  [x]         Bed/chair alarm activated  []         No alarm  []         SCDs applied    COMMUNICATION/EDUCATION:   Patient Education  Education Given To: Patient;Family  Education Provided: Plan of Care;Mobility Training;Transfer Training  Education Method: Verbal;Teach Back;Demonstration  Barriers to Learning: Cognition  Education Outcome: Verbalized understanding;Demonstrated understanding;Continued education needed    Thank you for this referral.  Benita Roberts, PT  Minutes: 28

## 2025-04-16 NOTE — PROGRESS NOTES
[facility] Hospitalist Group  Progress Note    Patient: Gumaro Khan Age: 79 y.o. : 1945 MR#: [id number] SSN: [id number]      Subjective/24-hour events:     Patient resting comfortably on visit today.  No pain or other complaints.  Labored breathing improved following paracentesis yesterday  5 L of fluid removed.    Assessment:   SHAHNAZ on CKD 3  Ascites w/low SAAG, s/p paracentesis  Acute on chronic anemia s/p 1 unit PRBCs  Systolic CHF - EF 25-30% by echo 2024  Paroxysmal atrial fibrillation  DM2  Severe hyperkalemia requiring emergent hemodialysis, resolved  Acute metabolic encephalopathy, improved  History of polyclonal gammopathy - elevated kappa/lambda chains  Alcohol abuse history    Plan:   Still no bone marrow biopsy results to this point.  Continue to await results.  Continue to trend hemoglobin, transfuse further as necessary.  Anticipate that patient may need serial paracentesis procedures given evidence for significant/recurrent ascites.  Will discuss GI and IR prior to discharge to see how best to arrange this.  Needs aggressive PT/OT.  Followed up with patient again today and he is adamant that he does not want to consider nursing placement even though he realizes that this is what is recommended.  Care management aware and home health arrangements are being made in preparation for discharge.    Anticipated discharge,     Case discussed with:  [x]Patient  [x]Family  [x] Nursing  [x]Case Management  DVT Prophylaxis:  []Lovenox  [x]Hep SQ  []SCDs  []Coumadin   []On Heparin gtt []PO anticoagulant    Objective:   VS: /70   Pulse 77   Temp 98.8 °F (37.1 °C) (Oral)   Resp 20   Ht 1.854 m (6' 0.99\")   Wt 87.5 kg (192 lb 14.4 oz)   SpO2 98%   BMI 25.46 kg/m²      Tmax/24hrs: Temp (24hrs), Av.5 °F (36.9 °C), Min:97.6 °F (36.4 °C), Max:99.1 °F (37.3 °C)    Intake/Output Summary (Last 24 hours) at 2025 [id number]  Last data filed at 2025

## 2025-04-16 NOTE — PROGRESS NOTES
Progress Note    Gumaro Khan  79 y.o.      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)    Mass of right thigh    Weight loss, abnormal    NSTEMI (non-ST elevated myocardial infarction) (HCC)    MR (mitral regurgitation)    Nonischemic cardiomyopathy (HCC)    Type 2 diabetes mellitus with hypercholesterolemia (HCC)    Anxiety disorder    Presence of implantable cardioverter-defibrillator (ICD)    UTI (urinary tract infection)    Poor appetite    Iron deficiency anemia    Atrial fibrillation (HCC)    Follow-up examination    MGUS (monoclonal gammopathy of unknown significance)    Long term current use of anticoagulant therapy    Antineoplastic chemotherapy induced anemia    Cancer associated pain    Pulmonary hypertension, moderate to severe (HCC)    Chronic gout with tophus    Hypercholesterolemia    Leukocytosis    Malignant neoplasm of right lower extremity    AICD at end of battery life    Hypertension associated with diabetes    GI bleed    Shock (HCC)    Acute renal failure superimposed on chronic kidney disease    Acute decompensated heart failure (HCC)    Hyperglycemia    Supratherapeutic INR    General weakness    ATN (acute tubular necrosis)    Systolic dysfunction    Hyperkalemia    Anemia    Recurrent falls    Arrhythmia    Hypercalcemia    CKD stage 3 secondary to diabetes (HCC)    Chronic systolic (congestive) heart failure    Acute renal failure    Acute renal injury    Ascites    Systolic congestive heart failure (HCC)    Shortness of breath           Subjective:     Patient remains on bed.  No shortness of breath with breathing with open mouth resulting distention of the abdomen.       A comprehensive review of systems was negative except for that written in the History of Present Illness.    Objective:     /66   Pulse 66   Temp 98.8 °F (37.1 °C) (Oral)   Resp 20   Ht 1.854 m (6' 0.99\")   Wt 87.5 kg (192 lb 14.4 oz)   2.91* 2.80*   HGB 8.0* 8.1* 8.2*   HCT 24.9* 26.5* 25.4*    201 193         Latest Reference Range & Units 04/10/25 09:15 04/11/25 04:30 04/12/25 09:00   IgA 61 - 437 mg/dL 266 274    IgG, Serum 603 - 1,613 mg/dL 1,833 (H) 1,903 (H)    IgM 15 - 143 mg/dL 174 (H) 177 (H)    Alpha 1 Globulin 0.0 - 0.4 g/dL 0.3 0.4    Alpha 2 Globulin 0.4 - 1.0 g/dL 0.7 0.7    Beta-Globulin 0.7 - 1.3 g/dL 1.0 0.9    Gamma-Globulin 0.4 - 1.8 g/dL 1.5 1.7    Free Kappa Lt Chains,Ur 1.17 - 86.46 mg/L   408.69 (H)   Free Lambda Lt Chains,Ur 0.27 - 15.21 mg/L   115.18 (H)   Kappa/Lambda Ratio,U 1.83 - 14.26     3.55   (H): Data is abnormally high        Imaging:     Procedures/imaging: see electronic medical records for all procedures, Xrays and details which were not copied into this note but were reviewed prior to   taking my decision.  Impression:       Active Hospital Problems    Diagnosis Date Noted    Shortness of breath 04/10/2025    Ascites 04/08/2025    Systolic congestive heart failure (HCC) 04/08/2025    Acute renal failure 04/07/2025    Acute renal injury 04/07/2025    Hyperkalemia 05/24/2024    Acute renal failure superimposed on chronic kidney disease 01/24/2024      Patient renal status is much stable and continues to improve.  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  Normal study results still pending.  Because of the ascites remain uncertain. Hs trace edema      Plan:     Will give low-dose of Lasix 20 mg daily..  Recommend to arrange removal of the right groin temporary hemodialysis catheter.  Continue current Retacrit..  Renal wise may discharge provided other providers also cleared him to be discharged.  Answered patient as well as his wife's concern and discharge plan as above.      Bria Gonzales MD

## 2025-04-16 NOTE — NURSE NAVIGATOR
Followed up with patient reinforced low sodium diet, fluid restriction, heart failure zones, will continue to follow    Belén Ayoub RN  4/16/2025

## 2025-04-17 LAB
ANION GAP SERPL CALC-SCNC: 6 MMOL/L (ref 3–18)
BASOPHILS # BLD: 0.04 K/UL (ref 0–0.1)
BASOPHILS NFR BLD: 0.4 % (ref 0–2)
BUN SERPL-MCNC: 52 MG/DL (ref 7–18)
BUN/CREAT SERPL: 28 (ref 12–20)
CALCIUM SERPL-MCNC: 8.5 MG/DL (ref 8.5–10.1)
CHLORIDE SERPL-SCNC: 107 MMOL/L (ref 100–111)
CO2 SERPL-SCNC: 25 MMOL/L (ref 21–32)
CREAT SERPL-MCNC: 1.88 MG/DL (ref 0.6–1.3)
DIFFERENTIAL METHOD BLD: ABNORMAL
EOSINOPHIL # BLD: 0.28 K/UL (ref 0–0.4)
EOSINOPHIL NFR BLD: 2.5 % (ref 0–5)
ERYTHROCYTE [DISTWIDTH] IN BLOOD BY AUTOMATED COUNT: 21.9 % (ref 11.6–14.5)
GLUCOSE BLD STRIP.AUTO-MCNC: 108 MG/DL (ref 70–110)
GLUCOSE BLD STRIP.AUTO-MCNC: 124 MG/DL (ref 70–110)
GLUCOSE BLD STRIP.AUTO-MCNC: 132 MG/DL (ref 70–110)
GLUCOSE BLD STRIP.AUTO-MCNC: 138 MG/DL (ref 70–110)
GLUCOSE SERPL-MCNC: 110 MG/DL (ref 74–99)
HCT VFR BLD AUTO: 24.9 % (ref 36–48)
HGB BLD-MCNC: 8 G/DL (ref 13–16)
IMM GRANULOCYTES # BLD AUTO: 0.04 K/UL (ref 0–0.04)
IMM GRANULOCYTES NFR BLD AUTO: 0.4 % (ref 0–0.5)
LYMPHOCYTES # BLD: 1.4 K/UL (ref 0.9–3.6)
LYMPHOCYTES NFR BLD: 12.4 % (ref 21–52)
MAGNESIUM SERPL-MCNC: 2.1 MG/DL (ref 1.6–2.6)
MCH RBC QN AUTO: 29.4 PG (ref 24–34)
MCHC RBC AUTO-ENTMCNC: 32.1 G/DL (ref 31–37)
MCV RBC AUTO: 91.5 FL (ref 78–100)
MONOCYTES # BLD: 1.37 K/UL (ref 0.05–1.2)
MONOCYTES NFR BLD: 12.1 % (ref 3–10)
NEUTS SEG # BLD: 8.17 K/UL (ref 1.8–8)
NEUTS SEG NFR BLD: 72.2 % (ref 40–73)
NRBC # BLD: 0 K/UL (ref 0–0.01)
NRBC BLD-RTO: 0 PER 100 WBC
PHOSPHATE SERPL-MCNC: 3.2 MG/DL (ref 2.5–4.9)
PLATELET # BLD AUTO: 201 K/UL (ref 135–420)
PMV BLD AUTO: 10.8 FL (ref 9.2–11.8)
POTASSIUM SERPL-SCNC: 3.7 MMOL/L (ref 3.5–5.5)
RBC # BLD AUTO: 2.72 M/UL (ref 4.35–5.65)
SODIUM SERPL-SCNC: 138 MMOL/L (ref 136–145)
WBC # BLD AUTO: 11.3 K/UL (ref 4.6–13.2)

## 2025-04-17 PROCEDURE — 97530 THERAPEUTIC ACTIVITIES: CPT

## 2025-04-17 PROCEDURE — 2500000003 HC RX 250 WO HCPCS: Performed by: INTERNAL MEDICINE

## 2025-04-17 PROCEDURE — 6360000002 HC RX W HCPCS: Performed by: INTERNAL MEDICINE

## 2025-04-17 PROCEDURE — 82962 GLUCOSE BLOOD TEST: CPT

## 2025-04-17 PROCEDURE — 94761 N-INVAS EAR/PLS OXIMETRY MLT: CPT

## 2025-04-17 PROCEDURE — 6370000000 HC RX 637 (ALT 250 FOR IP): Performed by: INTERNAL MEDICINE

## 2025-04-17 PROCEDURE — 6370000000 HC RX 637 (ALT 250 FOR IP)

## 2025-04-17 PROCEDURE — 2140000001 HC CVICU INTERMEDIATE R&B

## 2025-04-17 PROCEDURE — 99232 SBSQ HOSP IP/OBS MODERATE 35: CPT | Performed by: INTERNAL MEDICINE

## 2025-04-17 PROCEDURE — 85025 COMPLETE CBC W/AUTO DIFF WBC: CPT

## 2025-04-17 PROCEDURE — 36415 COLL VENOUS BLD VENIPUNCTURE: CPT

## 2025-04-17 PROCEDURE — 84100 ASSAY OF PHOSPHORUS: CPT

## 2025-04-17 PROCEDURE — 97110 THERAPEUTIC EXERCISES: CPT

## 2025-04-17 PROCEDURE — 80048 BASIC METABOLIC PNL TOTAL CA: CPT

## 2025-04-17 PROCEDURE — 83735 ASSAY OF MAGNESIUM: CPT

## 2025-04-17 RX ADMIN — LACTULOSE 20 G: 10 SOLUTION ORAL at 21:06

## 2025-04-17 RX ADMIN — BACITRACIN ZINC, NEOMYCIN, POLYMYXIN B: 400; 3.5; 5 OINTMENT TOPICAL at 10:37

## 2025-04-17 RX ADMIN — LACTULOSE 20 G: 10 SOLUTION ORAL at 10:36

## 2025-04-17 RX ADMIN — HEPARIN SODIUM 5000 UNITS: 5000 INJECTION INTRAVENOUS; SUBCUTANEOUS at 10:36

## 2025-04-17 RX ADMIN — HEPARIN SODIUM 5000 UNITS: 5000 INJECTION INTRAVENOUS; SUBCUTANEOUS at 21:06

## 2025-04-17 RX ADMIN — BACITRACIN ZINC, NEOMYCIN, POLYMYXIN B: 400; 3.5; 5 OINTMENT TOPICAL at 21:05

## 2025-04-17 RX ADMIN — FUROSEMIDE 40 MG: 40 TABLET ORAL at 10:36

## 2025-04-17 RX ADMIN — SODIUM CHLORIDE, PRESERVATIVE FREE 10 ML: 5 INJECTION INTRAVENOUS at 10:38

## 2025-04-17 RX ADMIN — EPOETIN ALFA-EPBX 5000 UNITS: 3000 INJECTION, SOLUTION INTRAVENOUS; SUBCUTANEOUS at 17:01

## 2025-04-17 RX ADMIN — BACITRACIN ZINC, NEOMYCIN, POLYMYXIN B: 400; 3.5; 5 OINTMENT TOPICAL at 06:49

## 2025-04-17 RX ADMIN — LACTULOSE 20 G: 10 SOLUTION ORAL at 15:08

## 2025-04-17 RX ADMIN — SODIUM CHLORIDE, PRESERVATIVE FREE 10 ML: 5 INJECTION INTRAVENOUS at 21:06

## 2025-04-17 NOTE — PLAN OF CARE
Problem: Occupational Therapy - Adult  Goal: By Discharge: Performs self-care activities at highest level of function for planned discharge setting.  See evaluation for individualized goals.  Description: Occupational Therapy Goals:  Initiated 4/9/2025, re-assessed 4/16/2025, goals remain appropriate, continue with goals to be met within 7-10 days.    1.  Patient will perform bed mobility in preparation for ADLs with supervision/set-up.   2.  Patient will perform grooming > 5 minutes sitting EOB with modified independence, G balance.  3.  Patient will perform upper body dressing with modified independence.  4.  Patient will perform toilet transfers with minimal assistance  5.  Patient will perform all aspects of toileting with minimal assistance.  6.  Patient will participate in upper extremity therapeutic exercise/activities with supervision/set-up for 8-10 minutes to increase strength/endurance for ADLs.    7.  Patient will utilize energy conservation techniques during functional activities with verbal cues.    PLOF: Pt lives with spouse in 2SH, first floor setup and needed assist with LB self-care. Pt with step over tub at home, performs basin bath for safety.   Outcome: Progressing   OCCUPATIONAL THERAPY TREATMENT    Patient: Gumaro Khan (79 y.o. male)  Date: 4/17/2025  Diagnosis: Shortness of breath [R06.02]  Hyperkalemia [E87.5]  Respiratory acidosis [E87.29]  Acute renal failure [N17.9]  Acute renal injury [N17.9]  Ascites due to alcoholic cirrhosis (HCC) [K70.31]  Acute renal failure superimposed on chronic kidney disease, unspecified acute renal failure type, unspecified CKD stage [N17.9, N18.9] Acute renal failure      Precautions: Fall Risk, General Precautions,  ,  ,  ,  ,  ,  ,      Chart, occupational therapy assessment, plan of care, and goals were reviewed.  ASSESSMENT:  Pt presented supine in bed upon entry and agreeable. He was assisted to EOB MOD A in prep for functional tasks. Once

## 2025-04-17 NOTE — PROGRESS NOTES
[facility] Hospitalist Group  Progress Note    Patient: Gumaro Khan Age: 79 y.o. : 1945 MR#: [id number] SSN: [id number]      Subjective/24-hour events:     Resting comfortably, no new complaints overnight.  No shortness of breath or labored breathing reported.  Denies abdominal pain, no nausea/vomiting.    Assessment:   SHAHNAZ on CKD 3  Ascites w/low SAAG, s/p paracentesis  Acute on chronic anemia s/p 1 unit PRBCs  Systolic CHF - EF 25-30% by echo 2024  Paroxysmal atrial fibrillation  DM2  Severe hyperkalemia requiring emergent hemodialysis, resolved  Acute metabolic encephalopathy, improved  History of polyclonal gammopathy - elevated kappa/lambda chains  Alcohol abuse history    Plan:   Quite stable clinically.  Care primarily supportive at this point.  Bone marrow biopsy reviewed and without any evidence for any infiltrative or myelodysplastic process.  Noted oncology recommendation to obtain Congo red stain if possible.  Will see if any additional hematologic workup is necessary this hospitalization.  If not, anticipate that patient may be able to be discharged home tomorrow if remains stable.  Anticipated need for standing paracentesis discussed with GI today and this has been arranged.  Assistance with these arrangements is greatly appreciated.    SNF placement is recommended but patient has declined this disposition option.  Care management updated on unit today.    Anticipated discharge,     Case discussed with:  [x]Patient  []Family  [x] Nursing  [x]Case Management  DVT Prophylaxis:  []Lovenox  [x]Hep SQ  []SCDs  []Coumadin   []On Heparin gtt []PO anticoagulant    Objective:   VS: /60   Pulse 79   Temp 98.6 °F (37 °C) (Oral)   Resp 20   Ht 1.854 m (6' 0.99\")   Wt 87.5 kg (192 lb 14.4 oz)   SpO2 100%   BMI 25.46 kg/m²      Tmax/24hrs: Temp (24hrs), Av.9 °F (37.2 °C), Min:98.6 °F (37 °C), Max:99.1 °F (37.3 °C)    Intake/Output Summary (Last 24  Value Ref Range    Sodium 138 136 - 145 mmol/L    Potassium 3.7 3.5 - 5.5 mmol/L    Chloride 107 100 - 111 mmol/L    CO2 25 21 - 32 mmol/L    Anion Gap 6 3.0 - 18 mmol/L    Glucose 110 (H) 74 - 99 mg/dL    BUN 52 (H) 7.0 - 18 MG/DL    Creatinine 1.88 (H) 0.6 - 1.3 MG/DL    BUN/Creatinine Ratio 28 (H) 12 - 20      Est, Glom Filt Rate 36 (L) >60 ml/min/1.73m2    Calcium 8.5 8.5 - 10.1 MG/DL   POCT Glucose    Collection Time: 04/17/25  7:18 AM   Result Value Ref Range    POC Glucose 108 70 - 110 mg/dL         Signed By: Ed Suresh MD

## 2025-04-17 NOTE — PROGRESS NOTES
Progress Note    Gumaro Hooker Erin  79 y.o.      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)    Mass of right thigh    Weight loss, abnormal    NSTEMI (non-ST elevated myocardial infarction) (HCC)    MR (mitral regurgitation)    Nonischemic cardiomyopathy (HCC)    Type 2 diabetes mellitus with hypercholesterolemia (HCC)    Anxiety disorder    Presence of implantable cardioverter-defibrillator (ICD)    UTI (urinary tract infection)    Poor appetite    Iron deficiency anemia    Atrial fibrillation (HCC)    Follow-up examination    MGUS (monoclonal gammopathy of unknown significance)    Long term current use of anticoagulant therapy    Antineoplastic chemotherapy induced anemia    Cancer associated pain    Pulmonary hypertension, moderate to severe (HCC)    Chronic gout with tophus    Hypercholesterolemia    Leukocytosis    Malignant neoplasm of right lower extremity    AICD at end of battery life    Hypertension associated with diabetes    GI bleed    Shock (HCC)    Acute renal failure superimposed on chronic kidney disease    Acute decompensated heart failure (HCC)    Hyperglycemia    Supratherapeutic INR    General weakness    ATN (acute tubular necrosis)    Systolic dysfunction    Hyperkalemia    Anemia    Recurrent falls    Arrhythmia    Hypercalcemia    CKD stage 3 secondary to diabetes (HCC)    Chronic systolic (congestive) heart failure    Acute renal failure    Acute renal injury    Ascites    Systolic congestive heart failure (HCC)    Shortness of breath           Subjective:     Patient was seen earlier this morning..  Quite comfortable no distress temporary hemodialysis catheter is taken out from the right groin..  Oncologist note reviewed.  Has cirrhosis and ascites due to cirrhosis bone marrow study report also reviewed does not have any amyloidosis.  Has acute on chronic CKD acute renal failure resolved CKD is due to polyclonal  04/15/25  0343 04/16/25  0415 04/17/25  0441   GLUCOSE 98 109* 110*    136 138   K 3.9 4.0 3.7    106 107   CO2 22 23 25   BUN 45* 47* 52*   CREATININE 1.92* 1.91* 1.88*      CBC w/Diff Recent Labs     04/15/25  0343 04/16/25  0415 04/17/25  0441   WBC 10.4 10.6 11.3   RBC 2.91* 2.80* 2.72*   HGB 8.1* 8.2* 8.0*   HCT 26.5* 25.4* 24.9*    193 201                Imaging:     Procedures/imaging: see electronic medical records for all procedures, Xrays and details which were not copied into this note but were reviewed prior to   taking my decision.  Impression:       Active Hospital Problems    Diagnosis Date Noted    Shortness of breath 04/10/2025    Ascites 04/08/2025    Systolic congestive heart failure (HCC) 04/08/2025    Acute renal failure 04/07/2025    Acute renal injury 04/07/2025    Hyperkalemia 05/24/2024    Acute renal failure superimposed on chronic kidney disease 01/24/2024            Plan:     Renal wise no further workup is needed but continue to monitor renal functions and electrolytes and avoid nephrotoxins.  Follow oncology's recommendation.  Has cirrhosis and will have and will have ascites done today which will require paracentesis.  From renal point of view patient can be discharged and follow as an outpatient.

## 2025-04-17 NOTE — PROGRESS NOTES
[facility] - Progress Note    Admit Date: 4/7/2025  Attending Cardiologist: Dr. Velasquez    Assessment:     - Severe ascites, new onset. Unknown etiology at present. Possibly due to decompenstated biventricular HF; however, still awaiting bone marrow biopsy results (4/9/25) .  S/p paracentesis x2, first with 2L out, 5 L out 4/16/25. Negative cytology for malignancy.   - Elevated Kappa/Lambda chains. Bone marrow biopsy pending.    - Acute on chronic biventricular heart failure. EF 45-50% on Echo this admission. Now on PO lasix 40mg  - Nonischemic cardiomyopathy:   Echo 4/8/25 EF 45-50%, septal motion, mild global hypokinesis, grade III DD. BORIS RV dilated with reduced RV function. EF has been as low as 25%  S/p [employer] single AICD with gen change in 5/2018  GDMT with ARB, aldactone, coreg as outpatient. Aldactone and ARB held this admission  due to severe hyperkalemia requiring emergent HD this admission  - Persistent A fib: on coreg and digoxin as outpatient. Currently held due to shock and bradycardia. Not on AC due to anemia.  Patient in atrial fibrillation with rate controlled.  - Mild to moderate AI on Echo 4/2025  - Mild to moderate MR with multiple jets 4/2025  - Moderate to severe TR on echo 4/2025  - PAH Who group 5  - SHAHNAZ on CKD stage 3  - Acute on chronic anemia s/p 1 unit of PRBC  - T2DM   - HLD  - History of right thigh sarcoma s/p resection    - History of GIB/hx of angioectasia s/p endoclipping 1/25/24   - ETOH use hx    Primary cardiologist: Dr. Velasquez        Plan:     Addendum: Independently seen and evaluated.  Agree with below.  Appreciate volume management per nephrology.  He is ascites likely multifactorial in etiology.  He appears quite malnourished in addition to his other issues.  He may benefit from RD evaluation and input.  Would continue current cardiac medication regimen as tolerated.    - Telemetry reviewed, patient in rate controlled AF. Not on OAC due to hx of GIB   -

## 2025-04-17 NOTE — PLAN OF CARE
Problem: Chronic Conditions and Co-morbidities  Goal: Patient's chronic conditions and co-morbidity symptoms are monitored and maintained or improved  4/17/2025 1140 by Akanksha Meeks RN  Outcome: Progressing  4/17/2025 0428 by Selin Mejía RN  Outcome: Progressing     Problem: Discharge Planning  Goal: Discharge to home or other facility with appropriate resources  4/17/2025 1140 by Akanksha Meeks RN  Outcome: Progressing  4/17/2025 0428 by Selin Mejía RN  Outcome: Progressing     Problem: Pain  Goal: Verbalizes/displays adequate comfort level or baseline comfort level  4/17/2025 1140 by Akanksha Meeks RN  Outcome: Progressing  4/17/2025 0428 by Selin Mejía RN  Outcome: Progressing     Problem: Safety - Adult  Goal: Free from fall injury  4/17/2025 1140 by Akanksha Meeks RN  Outcome: Progressing  4/17/2025 0428 by Selin Mejía RN  Outcome: Progressing     Problem: Respiratory - Adult  Goal: Achieves optimal ventilation and oxygenation  4/17/2025 1140 by Akanksha Meeks RN  Outcome: Progressing  4/17/2025 0428 by Selin Mejía RN  Outcome: Progressing     Problem: Cardiovascular - Adult  Goal: Maintains optimal cardiac output and hemodynamic stability  4/17/2025 1140 by Akanksha Meeks RN  Outcome: Progressing  4/17/2025 0428 by Selin Mejía RN  Outcome: Progressing  Goal: Absence of cardiac dysrhythmias or at baseline  4/17/2025 1140 by Akanksha Meeks RN  Outcome: Progressing  4/17/2025 0428 by Selin Mejía RN  Outcome: Progressing     Problem: Skin/Tissue Integrity - Adult  Goal: Skin integrity remains intact  Description: 1.  Monitor for areas of redness and/or skin breakdown2.  Assess vascular access sites hourly3.  Every 4-6 hours minimum:  Change oxygen saturation probe site4.  Every 4-6 hours:  If on nasal continuous positive airway pressure, respiratory therapy assess nares and determine need for appliance change or resting period  4/17/2025 1140 by Akanksha Meeks RN  Outcome:  Progressing  4/17/2025 0428 by Selin Mejía RN  Outcome: Progressing  Goal: Incisions, wounds, or drain sites healing without S/S of infection  4/17/2025 1140 by Akanksha Meeks RN  Outcome: Progressing  4/17/2025 0428 by Selin Mejía RN  Outcome: Progressing     Problem: Musculoskeletal - Adult  Goal: Return mobility to safest level of function  4/17/2025 1140 by Akanksha Meeks RN  Outcome: Progressing  4/17/2025 0428 by Selin Mejía RN  Outcome: Progressing  Goal: Return ADL status to a safe level of function  4/17/2025 1140 by Akanksha Meeks RN  Outcome: Progressing  4/17/2025 0428 by Selin Mejía RN  Outcome: Progressing     Problem: Gastrointestinal - Adult  Goal: Maintains adequate nutritional intake  4/17/2025 1140 by Akanksha Meeks RN  Outcome: Progressing  4/17/2025 0428 by Selin Mejía RN  Outcome: Progressing     Problem: Infection - Adult  Goal: Absence of infection at discharge  4/17/2025 1140 by Akanksha Meeks RN  Outcome: Progressing  4/17/2025 0428 by Selin Mejía RN  Outcome: Progressing     Problem: Metabolic/Fluid and Electrolytes - Adult  Goal: Electrolytes maintained within normal limits  4/17/2025 1140 by Akanksha Meeks RN  Outcome: Progressing  4/17/2025 0428 by Selin Mejía RN  Outcome: Progressing  Goal: Hemodynamic stability and optimal renal function maintained  4/17/2025 1140 by Akanksha Meeks RN  Outcome: Progressing  4/17/2025 0428 by Selin Mejía RN  Outcome: Progressing  Goal: Glucose maintained within prescribed range  4/17/2025 1140 by Akanksha Meeks RN  Outcome: Progressing  4/17/2025 0428 by Selin Mejía RN  Outcome: Progressing     Problem: Hematologic - Adult  Goal: Maintains hematologic stability  4/17/2025 1140 by Akanksha Meeks RN  Outcome: Progressing  4/17/2025 0428 by Selin Mejía RN  Outcome: Progressing     Problem: Skin/Tissue Integrity  Goal: Skin integrity remains intact  Description: 1.  Monitor for areas of redness and/or skin breakdown2.

## 2025-04-17 NOTE — PLAN OF CARE
Problem: Skin/Tissue Integrity  Goal: Skin integrity remains intact  Description: 1.  Monitor for areas of redness and/or skin breakdown2.  Assess vascular access sites hourly3.  Every 4-6 hours minimum:  Change oxygen saturation probe site4.  Every 4-6 hours:  If on nasal continuous positive airway pressure, respiratory therapy assess nares and determine need for appliance change or resting period  Outcome: Progressing     Problem: Occupational Therapy - Adult  Goal: By Discharge: Performs self-care activities at highest level of function for planned discharge setting.  See evaluation for individualized goals.  Description: Occupational Therapy Goals:  Initiated 4/9/2025, re-assessed 4/16/2025, goals remain appropriate, continue with goals to be met within 7-10 days.    1.  Patient will perform bed mobility in preparation for ADLs with supervision/set-up.   2.  Patient will perform grooming > 5 minutes sitting EOB with modified independence, G balance.  3.  Patient will perform upper body dressing with modified independence.  4.  Patient will perform toilet transfers with minimal assistance  5.  Patient will perform all aspects of toileting with minimal assistance.  6.  Patient will participate in upper extremity therapeutic exercise/activities with supervision/set-up for 8-10 minutes to increase strength/endurance for ADLs.    7.  Patient will utilize energy conservation techniques during functional activities with verbal cues.    PLOF: Pt lives with spouse in 2SH, first floor setup and needed assist with LB self-care. Pt with step over tub at home, performs basin bath for safety.   4/16/2025 1523 by Dolores Martinez, OTR/L  Outcome: Progressing     Problem: Hematologic - Adult  Goal: Maintains hematologic stability  Outcome: Progressing     Problem: Metabolic/Fluid and Electrolytes - Adult  Goal: Electrolytes maintained within normal limits  Outcome: Progressing  Goal: Hemodynamic stability and optimal

## 2025-04-17 NOTE — PROGRESS NOTES
[email or web address]  [phone]    Gastroenterology follow up-Progress note    Impression:  Ascites: SAAG of 0.6. Differentials favor malignancy vs nephrotic syndrome. Though cytology was negative, malignancy is not ruled out because this test has very low sensitivity (high specificity). Unlikely cirrhosis or heart failure.   s/p paracentesis 4/8/25, only 2L removed due to hypotension. Pt with known CHF  Findings: T protein 4.6, LDH 92, polys 18, cytology neg, no organisms on gram stain. SAAG 0.6   receiving Albumin q 12 hrs  Platelet ct 159  S/p bone marrow biopsy 4/11/2025  Chronic Anemia  HH on admission: 7.5, dropped to 6.9 on 4/8, received 1 unit PRBCs, stable now Hgb 7.3.  Iron %sat 60, iron 127, ferritin 499  EGD 1/25/24   Gastric body one actively oozing angioectasia treated with with endoclip  There were some erosions  in the duodenal sweep, without stigmata of recent bleed.  Colonoscopy 6/15/20   Multiple polyps, mild diverticulosis, grade 1 internal hemorrhoids.   Further colonoscopy is not recommended for CRCS screening   Acute renal failure, stage 3 CHD - Creat 4.21 on admission, currently 2.9  Hyperkalemia - likely medication induced per nephrology. Initiated on HD due to hemodynamic instability and hyperkalemia. Potassium 8 on admission, currently 5.3  Systolic congestive heart failure - echocardiogram pending  Pt has pacemaker, home meds include Aldactone, Digoxin and Bumex    Plan:  Order created for weekly paracentesis  To be checked 3x for cytology to help r/o malignancy  Pt to follow up w/ GI as outpatient.     Chief Complaint: ascites      Subjective:  Pt not seen, discussed w/ Dr. Suresh need for recurrent paracentesis. Order created for weekly paracentesis as needed - created in [employer] office EMR and will fax to Damien Morales.      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    Invalid input(s): \"PTP\"            FREDERICK Ahn  4/17/2025, 1:43 PM   [facility].  [email or web address]  Office: [phone]  [facility] Cell: [phone] (M-F 7:30-4:30)

## 2025-04-17 NOTE — PROGRESS NOTES
Hematology / Oncology Progress Note      Patient: Gumaro Khan  Gender: male   MRN: [id number]    YOB: 1945 Age: 79 y.o.   CSN: [id number]    LOS:  LOS: 10 days   Admit Date: 4/7/2025     PCP: Artis Shell MD    Date of evaluation: 4/17/2025     Assessment:       ICD-10-CM    1. Hyperkalemia  E87.5       2. Shortness of breath  R06.02       3. Ascites due to alcoholic cirrhosis (HCC)  K70.31       4. Respiratory acidosis  E87.29       5. Acute renal failure superimposed on chronic kidney disease, unspecified acute renal failure type, unspecified CKD stage  N17.9     N18.9       6. Shock (HCC)  R57.9 Echo (TTE) complete (PRN contrast/bubble/strain/3D)     Echo (TTE) complete (PRN contrast/bubble/strain/3D)     CANCELED: Echo (TTE) complete (PRN contrast/bubble/strain/3D)     CANCELED: Echo (TTE) complete (PRN contrast/bubble/strain/3D)           SHAHNAZ on CKD 3  Ascites w/low SAAG, s/p paracentesis  Acute on chronic anemia s/p 1 unit PRBCs  Systolic CHF - EF 25-30% by echo 1/2024  Paroxysmal atrial fibrillation  DM2  Severe hyperkalemia requiring emergent hemodialysis, resolved  Acute metabolic encephalopathy, improved  History of polyclonal gammopathy - elevated kappa/lambda chains  Alcohol abuse history  H/o sarcoma of right thigh > 10 yers ago s/p surgery in apparent complete remission.    Plan:   - Since SAAG < 1.1 makes transudative effusion less likely, cytology is negative for malignant cells on 4/8/2025.     -GI ordered weekly paracentesis, will plan to obtain serial ascitic fluid cytology x 3 to rule out malignancy as a cause for recurrent massive ascites.  -Testing is not consistent with AL amyloidosis, as there is no evidence of monoclonal protein:  - 4/12/2025 urine kappa lambda ratio 3.55 [normal]  - 4/11/2025 serum kappa lambda ratio 1.46 [normal] SPEP/SIFE showed no M spike.  Polyclonal increase detected in 1 or more immunoglobulins.     -Bone marrow asp and biopsy on  MD, 5,000 Units at 04/17/25 1701    dextrose bolus 10% 125 mL, 125 mL, IntraVENous, PRN **OR** dextrose bolus 10% 250 mL, 250 mL, IntraVENous, PRN, Dayron Covington DO    glucagon injection 1 mg, 1 mg, SubCUTAneous, PRN, Dayron Covington DO    dextrose 10 % infusion, , IntraVENous, Continuous PRN, Dayron Covington DO, Stopped at 04/08/25 0630    sodium chloride flush 0.9 % injection 5-40 mL, 5-40 mL, IntraVENous, 2 times per day, Artie Bustamante DO, 10 mL at 04/17/25 1038    sodium chloride flush 0.9 % injection 5-40 mL, 5-40 mL, IntraVENous, PRN, Artie Bustamante DO    0.9 % sodium chloride infusion, , IntraVENous, PRN, Artie Bustamante DO    ondansetron (ZOFRAN-ODT) disintegrating tablet 4 mg, 4 mg, Oral, Q8H PRN **OR** ondansetron (ZOFRAN) injection 4 mg, 4 mg, IntraVENous, Q6H PRN, Artie Bustamante DO    polyethylene glycol (GLYCOLAX) packet 17 g, 17 g, Oral, Daily PRN, Artie Bustamante DO    acetaminophen (TYLENOL) tablet 650 mg, 650 mg, Oral, Q6H PRN **OR** acetaminophen (TYLENOL) suppository 650 mg, 650 mg, Rectal, Q6H PRN, Artie Bustamante DO    heparin (porcine) injection 5,000 Units, 5,000 Units, SubCUTAneous, BID, Artie Bustamante DO, 5,000 Units at 04/17/25 1036          Daniel Bergeron MD  [facility]  Office  [phone]    This report has been produced using speech recognition software and may contain errors related to that system (errors in grammar, punctuation, spelling and possibly words and phrases that may be inappropriate). If there are any questions or concerns please feel free to contact the dictating provider for clarification.

## 2025-04-18 ENCOUNTER — TELEPHONE (OUTPATIENT)
Age: 80
End: 2025-04-18

## 2025-04-18 VITALS
BODY MASS INDEX: 23.32 KG/M2 | HEIGHT: 73 IN | DIASTOLIC BLOOD PRESSURE: 88 MMHG | HEART RATE: 58 BPM | WEIGHT: 175.93 LBS | OXYGEN SATURATION: 100 % | TEMPERATURE: 98.8 F | RESPIRATION RATE: 19 BRPM | SYSTOLIC BLOOD PRESSURE: 120 MMHG

## 2025-04-18 LAB
ANION GAP SERPL CALC-SCNC: 7 MMOL/L (ref 3–18)
BASOPHILS # BLD: 0.03 K/UL (ref 0–0.1)
BASOPHILS NFR BLD: 0.3 % (ref 0–2)
BUN SERPL-MCNC: 52 MG/DL (ref 7–18)
BUN/CREAT SERPL: 28 (ref 12–20)
CALCIUM SERPL-MCNC: 8.6 MG/DL (ref 8.5–10.1)
CHLORIDE SERPL-SCNC: 106 MMOL/L (ref 100–111)
CO2 SERPL-SCNC: 23 MMOL/L (ref 21–32)
CREAT SERPL-MCNC: 1.87 MG/DL (ref 0.6–1.3)
DIFFERENTIAL METHOD BLD: ABNORMAL
EOSINOPHIL # BLD: 0.25 K/UL (ref 0–0.4)
EOSINOPHIL NFR BLD: 2.3 % (ref 0–5)
ERYTHROCYTE [DISTWIDTH] IN BLOOD BY AUTOMATED COUNT: 21.8 % (ref 11.6–14.5)
GLUCOSE BLD STRIP.AUTO-MCNC: 109 MG/DL (ref 70–110)
GLUCOSE BLD STRIP.AUTO-MCNC: 145 MG/DL (ref 70–110)
GLUCOSE SERPL-MCNC: 120 MG/DL (ref 74–99)
HCT VFR BLD AUTO: 25.2 % (ref 36–48)
HGB BLD-MCNC: 8.1 G/DL (ref 13–16)
IMM GRANULOCYTES # BLD AUTO: 0.06 K/UL (ref 0–0.04)
IMM GRANULOCYTES NFR BLD AUTO: 0.6 % (ref 0–0.5)
LYMPHOCYTES # BLD: 1.46 K/UL (ref 0.9–3.6)
LYMPHOCYTES NFR BLD: 13.6 % (ref 21–52)
MAGNESIUM SERPL-MCNC: 2.1 MG/DL (ref 1.6–2.6)
MCH RBC QN AUTO: 29.3 PG (ref 24–34)
MCHC RBC AUTO-ENTMCNC: 32.1 G/DL (ref 31–37)
MCV RBC AUTO: 91.3 FL (ref 78–100)
MONOCYTES # BLD: 1.17 K/UL (ref 0.05–1.2)
MONOCYTES NFR BLD: 10.9 % (ref 3–10)
NEUTS SEG # BLD: 7.8 K/UL (ref 1.8–8)
NEUTS SEG NFR BLD: 72.3 % (ref 40–73)
NRBC # BLD: 0 K/UL (ref 0–0.01)
NRBC BLD-RTO: 0 PER 100 WBC
PHOSPHATE SERPL-MCNC: 2.7 MG/DL (ref 2.5–4.9)
PLATELET # BLD AUTO: 218 K/UL (ref 135–420)
PMV BLD AUTO: 11.2 FL (ref 9.2–11.8)
POTASSIUM SERPL-SCNC: 4.3 MMOL/L (ref 3.5–5.5)
RBC # BLD AUTO: 2.76 M/UL (ref 4.35–5.65)
SODIUM SERPL-SCNC: 136 MMOL/L (ref 136–145)
WBC # BLD AUTO: 10.8 K/UL (ref 4.6–13.2)

## 2025-04-18 PROCEDURE — 6370000000 HC RX 637 (ALT 250 FOR IP): Performed by: INTERNAL MEDICINE

## 2025-04-18 PROCEDURE — 99232 SBSQ HOSP IP/OBS MODERATE 35: CPT | Performed by: INTERNAL MEDICINE

## 2025-04-18 PROCEDURE — 6370000000 HC RX 637 (ALT 250 FOR IP)

## 2025-04-18 PROCEDURE — 99239 HOSP IP/OBS DSCHRG MGMT >30: CPT | Performed by: FAMILY MEDICINE

## 2025-04-18 PROCEDURE — 80048 BASIC METABOLIC PNL TOTAL CA: CPT

## 2025-04-18 PROCEDURE — 6360000002 HC RX W HCPCS: Performed by: INTERNAL MEDICINE

## 2025-04-18 PROCEDURE — 94761 N-INVAS EAR/PLS OXIMETRY MLT: CPT

## 2025-04-18 PROCEDURE — 36415 COLL VENOUS BLD VENIPUNCTURE: CPT

## 2025-04-18 PROCEDURE — 85025 COMPLETE CBC W/AUTO DIFF WBC: CPT

## 2025-04-18 PROCEDURE — 82962 GLUCOSE BLOOD TEST: CPT

## 2025-04-18 PROCEDURE — 2500000003 HC RX 250 WO HCPCS: Performed by: INTERNAL MEDICINE

## 2025-04-18 PROCEDURE — 84100 ASSAY OF PHOSPHORUS: CPT

## 2025-04-18 PROCEDURE — 83735 ASSAY OF MAGNESIUM: CPT

## 2025-04-18 RX ORDER — LACTULOSE 10 G/15ML
20 SOLUTION ORAL 3 TIMES DAILY
Qty: 3000 ML | Refills: 1 | Status: SHIPPED | OUTPATIENT
Start: 2025-04-18

## 2025-04-18 RX ORDER — FUROSEMIDE 40 MG/1
40 TABLET ORAL DAILY
Qty: 60 TABLET | Refills: 0 | Status: SHIPPED | OUTPATIENT
Start: 2025-04-19

## 2025-04-18 RX ORDER — NEOMYCIN/BACITRACIN/POLYMYXINB 3.5-400-5K
OINTMENT (GRAM) TOPICAL
Qty: 45 G | Refills: 0 | Status: SHIPPED | OUTPATIENT
Start: 2025-04-18

## 2025-04-18 RX ADMIN — FUROSEMIDE 40 MG: 40 TABLET ORAL at 09:56

## 2025-04-18 RX ADMIN — HEPARIN SODIUM 5000 UNITS: 5000 INJECTION INTRAVENOUS; SUBCUTANEOUS at 09:56

## 2025-04-18 RX ADMIN — BACITRACIN ZINC, NEOMYCIN, POLYMYXIN B: 400; 3.5; 5 OINTMENT TOPICAL at 09:59

## 2025-04-18 RX ADMIN — LACTULOSE 20 G: 10 SOLUTION ORAL at 09:56

## 2025-04-18 RX ADMIN — SODIUM CHLORIDE, PRESERVATIVE FREE 10 ML: 5 INJECTION INTRAVENOUS at 09:57

## 2025-04-18 ASSESSMENT — PAIN SCALES - GENERAL: PAINLEVEL_OUTOF10: 0

## 2025-04-18 NOTE — CARE COORDINATION
Requested Case Management specialist to assist with transportation to home  Address is [address] [place] [address], [place] [address] and phone number is [phone]  Any steps at the residence? Yes  1  Patient will require BLS transport.   Pt requires Stretcher If stretcher, reason: shortness of breath, acute metabolic encephalopathy, decreased functional mobility, decreased safety awareness,   Patient is currently requiring oxygen No   Height:6' 0\"   Weight: 175 Ib  Pt is on isolation: No   Is the pt ready now? yes  Requested time: next available  PCS Faxed: No  Insurance verified on face sheet: Yes  Auth needed for transport: No  CM completed PCS/ Envelope and placed on chart.         MARGAUX CraigN RN  Care Management

## 2025-04-18 NOTE — CARE COORDINATION
[phone] and spoke with Anabell to schedule BLS stretcher transport to patient's home. The address is [address] [place] [address] and phone number is [phone]. The pickup is scheduled for 3 PM and the trip number is [phone]. The phone number for the nursing station [phone] was provided to transport company for updates in arrival time.

## 2025-04-18 NOTE — PLAN OF CARE
Problem: Chronic Conditions and Co-morbidities  Goal: Patient's chronic conditions and co-morbidity symptoms are monitored and maintained or improved  4/18/2025 1136 by Andrea Agudelo RN  Outcome: Adequate for Discharge  4/18/2025 0326 by Kathryn Lama RN  Outcome: Progressing  Flowsheets (Taken 4/18/2025 0326)  Care Plan - Patient's Chronic Conditions and Co-Morbidity Symptoms are Monitored and Maintained or Improved:   Monitor and assess patient's chronic conditions and comorbid symptoms for stability, deterioration, or improvement   Collaborate with multidisciplinary team to address chronic and comorbid conditions and prevent exacerbation or deterioration   Update acute care plan with appropriate goals if chronic or comorbid symptoms are exacerbated and prevent overall improvement and discharge     Problem: Discharge Planning  Goal: Discharge to home or other facility with appropriate resources  4/18/2025 1136 by Andrea Agudelo RN  Outcome: Adequate for Discharge  4/18/2025 0326 by Kathryn Lama RN  Outcome: Progressing  Flowsheets (Taken 4/18/2025 0326)  Discharge to home or other facility with appropriate resources:   Identify barriers to discharge with patient and caregiver   Identify discharge learning needs (meds, wound care, etc)   Refer to discharge planning if patient needs post-hospital services based on physician order or complex needs related to functional status, cognitive ability or social support system     Problem: Pain  Goal: Verbalizes/displays adequate comfort level or baseline comfort level  4/18/2025 1136 by Andrea Agudelo RN  Outcome: Adequate for Discharge  4/18/2025 0326 by Kathryn Lama RN  Outcome: Progressing     Problem: Safety - Adult  Goal: Free from fall injury  4/18/2025 1136 by Andrea Agudelo RN  Outcome: Adequate for Discharge  4/18/2025 0326 by Kathryn Lama RN  Outcome: Progressing     Problem: Respiratory - Adult  Goal: Achieves optimal ventilation and

## 2025-04-18 NOTE — CARE COORDINATION
Discharge orders noted.  Met with pt and his spouse at bedside.  Pt's wife stated she cannot transport pt home and will need medical transport.  Discussed PTOT recommendations of hospital bed and both pt and his wife declined.  They stated no space for hospital bed.              Yokasta Cornejo, MARGAUXN RN  Care Management

## 2025-04-18 NOTE — PROGRESS NOTES
Physical Therapy  Pt not seen for skilled PT due to:    Patient refusing any/all mobility this date, stating, \"not today.\" Education and encouragement provided on benefits of participation, but patient adamantly declining.     Thank you.  YOAN OrnelasT

## 2025-04-18 NOTE — TELEPHONE ENCOUNTER
----- Message from Mable Day, BLAZE Navigator sent at 04/18/25 11:05 AM EST-----    needs post hospital appt, thanks

## 2025-04-18 NOTE — CARE COORDINATION
Discharge order noted for today. Pt has been accepted to [facility]. Met with patient and his wife and are agreeable to the transition plan today. Transport has been arranged through . Patient's discharge summary and home health  orders have been forwarded to [facility] via [facility]. Updated bedside RN, Andrea,  to the transition plan.  Discharge information has been documented on the AVS.       MEREDITH Craig RN  Care Management

## 2025-04-18 NOTE — PROGRESS NOTES
Progress Note    Gumaro Hooker Edmonds  79 y.o.      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)    Mass of right thigh    Weight loss, abnormal    NSTEMI (non-ST elevated myocardial infarction) (HCC)    MR (mitral regurgitation)    Nonischemic cardiomyopathy (HCC)    Type 2 diabetes mellitus with hypercholesterolemia (HCC)    Anxiety disorder    Presence of implantable cardioverter-defibrillator (ICD)    UTI (urinary tract infection)    Poor appetite    Iron deficiency anemia    Atrial fibrillation (HCC)    Follow-up examination    MGUS (monoclonal gammopathy of unknown significance)    Long term current use of anticoagulant therapy    Antineoplastic chemotherapy induced anemia    Cancer associated pain    Pulmonary hypertension, moderate to severe (HCC)    Chronic gout with tophus    Hypercholesterolemia    Leukocytosis    Malignant neoplasm of right lower extremity    AICD at end of battery life    Hypertension associated with diabetes    GI bleed    Shock (HCC)    Acute renal failure superimposed on chronic kidney disease    Acute decompensated heart failure (HCC)    Hyperglycemia    Supratherapeutic INR    General weakness    ATN (acute tubular necrosis)    Systolic dysfunction    Hyperkalemia    Anemia    Recurrent falls    Arrhythmia    Hypercalcemia    CKD stage 3 secondary to diabetes (HCC)    Chronic systolic (congestive) heart failure    Acute renal failure    Acute renal injury    Ascites    Systolic congestive heart failure (HCC)    Shortness of breath           Subjective:     Patient will remain on his maintenance shortness of breath.  Undergoing paracentesis day before yesterday and again accumulating ascites.   Temporary hemodialysis catheter removal  site appears clear.   Stable renal function.  Not requiring any further dialysis after first 2 days of dialysis since admission      A comprehensive review of systems was negative except      dextrose 10 % infusion   IntraVENous Continuous PRN Dayron Covington DO   Stopped at 04/08/25 0630    sodium chloride flush 0.9 % injection 5-40 mL  5-40 mL IntraVENous 2 times per day Artie Bustamante DO   10 mL at 04/18/25 0957    sodium chloride flush 0.9 % injection 5-40 mL  5-40 mL IntraVENous PRN Artie Bustamante DO        0.9 % sodium chloride infusion   IntraVENous PRN Artie Bustamante DO        ondansetron (ZOFRAN-ODT) disintegrating tablet 4 mg  4 mg Oral Q8H PRN Artie Bustamante DO        Or    ondansetron (ZOFRAN) injection 4 mg  4 mg IntraVENous Q6H PRN Artie Bustamante DO        polyethylene glycol (GLYCOLAX) packet 17 g  17 g Oral Daily PRN Artie Bustamante DO        acetaminophen (TYLENOL) tablet 650 mg  650 mg Oral Q6H PRN Artie Bustamante DO        Or    acetaminophen (TYLENOL) suppository 650 mg  650 mg Rectal Q6H PRN Artie Bustamante DO        heparin (porcine) injection 5,000 Units  5,000 Units SubCUTAneous BID Artie Bustamante DO   5,000 Units at 04/18/25 0956        Physical Exam:     Physical Exam:   General:  Alert, cooperative, no distress, appears stated age.   Eyes:  Conjunctivae/corneas clear. PERRL, EOMs intact.    Mouth/Throat: Lips, mucosa, and tongue normal. Teeth and gums normal.   Neck: Supple, symmetrical, trachea midline, no adenopathy, thyroid: no enlargement/tenderness/nodules, no carotid bruit and no JVD.   Lungs:   Clear to auscultation bilaterally.   Heart:  Regular rate and rhythm, S1, S2 normal, no murmur, click, rub or gallop.   Abdomen:   Soft, non-tender. Bowel sounds normal. No masses,   Has ascites no organomegaly.   Extremities: Extremities normal, atraumatic, no cyanosis or edema.                         Data Review:    Labs: Results:   Chemistry Recent Labs     04/16/25  0415 04/17/25  0441 04/18/25  0220   GLUCOSE 109* 110* 120*   NA  136 138 136   K 4.0 3.7 4.3    107 106   CO2 23 25 23   BUN 47* 52* 52*   CREATININE 1.91* 1.88* 1.87*      CBC w/Diff Recent Labs     04/16/25  0415 04/17/25  0441 04/18/25  0220   WBC 10.6 11.3 10.8   RBC 2.80* 2.72* 2.76*   HGB 8.2* 8.0* 8.1*   HCT 25.4* 24.9* 25.2*    201 218                Imaging:     Procedures/imaging: see electronic medical records for all procedures, Xrays and details which were not copied into this note but were reviewed prior to   taking my decision.  Impression:       Active Hospital Problems    Diagnosis Date Noted    Shortness of breath 04/10/2025    Ascites 04/08/2025    Systolic congestive heart failure (HCC) 04/08/2025    Acute renal failure 04/07/2025    Acute renal injury 04/07/2025    Hyperkalemia 05/24/2024    Acute renal failure superimposed on chronic kidney disease 01/24/2024            Plan:     Renal wise patient remains stable stable renal function stage III CKD.  Had acute on chronic kidney disease acute problem resolved.  Renal wise patient can be discharged as a mention.  Discussed with the attending of the patient and also patient's wife.  Advised them to come to see my office in a month.  Patient should come to the hospital for weekly paracentesis      JERRY CATHERINE MD

## 2025-04-18 NOTE — PROGRESS NOTES
[facility] - Progress Note    Admit Date: 4/7/2025  Attending Cardiologist: Dr. Velasquez    Assessment:     - Severe ascites, new onset. Unknown etiology at present. Possibly due to decompenstated biventricular HF .  S/p paracentesis x2, first with 2L out, 5 L out 4/16/25. Negative cytology for malignancy. Scheduled for weekly outpatient paracenteses.  - Polyclonal gammopathy. Elevated Kappa/Lambda chains. Bone marrow biopsy without immunophenotypic evidence of a lymphoproliferative disorder, acute leukemia, increase in blasts, or plasma cell neoplasm is identified. .    - Acute on chronic biventricular heart failure. EF 45-50% on Echo this admission. Now on PO lasix 40mg  - Nonischemic cardiomyopathy:   Echo 4/8/25 EF 45-50%, septal motion, mild global hypokinesis, grade III DD. BORIS RV dilated with reduced RV function. EF has been as low as 25%  S/p [employer] single AICD with gen change in 5/2018  GDMT with ARB, aldactone, coreg as outpatient. Aldactone and ARB held this admission  due to severe hyperkalemia requiring emergent HD this admission  - Persistent A fib: on coreg and digoxin as outpatient. Currently held due to shock and bradycardia. Not on AC due to anemia.  Patient in atrial fibrillation with rate controlled.  - Mild to moderate AI on Echo 4/2025  - Mild to moderate MR with multiple jets 4/2025  - Moderate to severe TR on echo 4/2025  - PAH Who group 5  - SHAHNAZ on CKD stage 3  - Acute on chronic anemia s/p 1 unit of PRBC  - T2DM   - HLD  - History of right thigh sarcoma s/p resection    - History of GIB/hx of angioectasia s/p endoclipping 1/25/24   - ETOH use hx     Primary cardiologist: Dr. Velasquez     Plan:     Addendum: Independently seen and evaluated.  Agree with below.  Encourage patient lifestyle changes.  Also encouraged increased nutritional intake.  Continue current cardiac medication regimen.  Will see him back as outpatient.    - Telemetry reviewed, patient in AF with controlled

## 2025-04-21 NOTE — TELEPHONE ENCOUNTER
Spoke with patient and wife. Confirmed name and . Made patient aware of upcoming post-hospital appointment with MARKUS Nam. Patient concerned as he has a drain placed and is supposed to have his stomach fluid emptied every week but has no appointment for that. I reviewed patients chart and see plans for weekly outpatient paracenteses, as well as home health orders with [facility]. Patients wife states they have not heard from home health yet, but they are supposed to call today. I advised to contact tomorrow if they haven't heard from them yet, as the paracentesis may be done at home with home health since drain is placed.

## 2025-04-22 NOTE — PROGRESS NOTES
Physician Progress Note      PATIENT:               MICHELE OROZCO  CSN #:                  [id number]  :                       1945  ADMIT DATE:       2025 6:45 PM  DISCH DATE:        2025 4:08 PM  RESPONDING  PROVIDER #:        Thiago Velasquez MD          QUERY TEXT:    Acute systolic HF is documented in the medical record H&P and Acute on chronic   biventricular heart failure in  M. Link NP note. Please provide   additional clinical indicators supportive of acute heart failure   documentation. Or please document if the diagnosis of acute systolic heart   failure has been ruled out after study.    The clinical indicators include:  25 Chest XR: Low lung volumes limit assessment. Possible pulmonary   vascular congestion.    25 CT abdomen/pelvis: Trace bilateral pleural effusions. Severe   atherosclerotic disease.    25 Chest XR: Right IJ central venous catheter with tip in the distal   superior vena cava. No pneumothorax.  Suboptimal inspiration; elevation right hemidiaphragm. here are no   consolidations but the retrodiaphragmatic retrocardiac lung bases are not well  seen. Possible pulmonary vascular congestion.    no rales (ED,  B Pedlowska PA,4/10 FITO Bianchi PA)  No orthopnea (H&P, 4/10 Dr NEYMAR Cordoba)  no JVD ( Dr Sadaf Brizuela,  cardio Dr Adame,  Dr Gonzales)  +JVD ( . [facility] NP cardio)    4/15 Dr Adame cardio: \"Chronic systolic heart failure. EF 45-50% on Echo   this admission. Appears compensated on exam.\"    MAR: Lasix IV x1 on , Lasix po 4/15 -   Options provided:  -- acute systolic heart failure was present POA as evidenced by, Please   document evidence of acute HF  -- acute heart failure was ruled out and chronic systolic heart failure only   confirmed  -- Other - I will add my own diagnosis  -- Disagree - Not applicable / Not valid  -- Disagree - Clinically unable to determine / Unknown  -- Refer to Clinical Documentation Reviewer    PROVIDER

## 2025-04-26 NOTE — PROGRESS NOTES
Gumaro Khan presents today for a post-hospital follow-up.  He was hospitalized from 4/7/25 through 4/18/25 after presenting to the ER with complaints of increasing shortness of breath and lower extremity edema. His BUN was 124, Creatinine 4, potassium level was 8.  Mg was greater than 3 and ammonia level was elevated as well.  A CT scan of the abdomen showed large volume ascites possibly related to liver failure.  He had an echo done on 4/10/25 and it showed an EF of 45-50%, septal motion consistent with BBB, mild global hypokinesis, grade II diastolic dysfunction. Moderate to severe TR, estimated RVSP 56 mmHg, mild to moderate aortic regurgitation, mild to moderate MR.     He underwent paracentesis x 2 during his hospital stay.  5 liters were removed during the second paracentesis.  Cytology was negative for malignancy. He required emergent hemodialysis due to hyperkalemia of potassium 8.0.  Many of his medications were held upon discharged.  He saw his PCP (Ricky) and his medications were reconciled.  His last labs were done just prior to discharge on 4/18/25.     Since discharge, he has been feeling \"okay.\"  His wife states that he is scheduled to see Dr. Evens Talavera on 5/15/25 and plan for next paracentesis to be discussed.  He denies chest pain, palpitations.  He admits to mild LAURENT.  He is more comfortable sleeping sitting up or reclined.     He is a 79 year old male with history of atrial fibrillation, pulmonary hypertension, hypertension, and non-ischemic cardiomyopathy (s/p AICD for primary prevention of sudden cardiac death).  Device generator change was done on May 7, 2018.  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  It was successfully removed in April 2016 and his understanding is that the edges were clear.  His last echo was done on 1/24/24 and it showed an EF 25-30%, severe global hypokinesis, moderate to severe MR, moderate TR, mild pulmonary

## 2025-04-28 NOTE — DISCHARGE SUMMARY
Discharge Summary    Patient: Gumaro Khan MRN: [id number]  CSN: [id number]    YOB: 1945  Age: 79 y.o.  Sex: male    DOA: 4/7/2025 LOS:  LOS: 11 days   Discharge Date: 4/18/2025     Admission Diagnoses: Shortness of breath [R06.02]  Hyperkalemia [E87.5]  Respiratory acidosis [E87.29]  Acute renal failure [N17.9]  Acute renal injury [N17.9]  Ascites due to alcoholic cirrhosis (HCC) [K70.31]  Acute renal failure superimposed on chronic kidney disease, unspecified acute renal failure type, unspecified CKD stage [N17.9, N18.9]    Discharge Diagnoses:    SHAHNAZ on CKD 3  Ascites w/low SAAG, s/p paracentesis  Acute on chronic anemia s/p 1 unit PRBCs  Systolic CHF - EF 25-30% by echo 1/2024  Paroxysmal atrial fibrillation  DM2  Severe hyperkalemia requiring emergent hemodialysis, resolved  Acute metabolic encephalopathy, improved  History of polyclonal gammopathy - elevated kappa/lambda chains  Alcohol abuse history    Discharge Condition: Stable    PHYSICAL EXAM  Visit Vitals  /88   Pulse 58   Temp 98.8 °F (37.1 °C) (Oral)   Resp 19   Ht 1.854 m (6' 0.99\")   Wt 79.8 kg (175 lb 14.8 oz)   SpO2 100%   BMI 23.22 kg/m²       General: In NAD.  Chronically ill but nontoxic-appearing.  HEENT: NCAT.  Sclerae anicteric, EOMI.    Lungs:  Clear, no wheezes.  No accessory muscle use.  Heart:  RRR.  Abdomen: Distended but soft.  Extremities: Warm, no edema or ischemia.  Psych:   Mood normal.  Neurologic:  Awake and alert, moves extremities spontaneously.  Motor grossly nonfocal.    Hospital Course:   See admission H&P for full details of HPI.  Patient was admitted to the hospital after presenting to the emergency department for evaluation of shortness of breath/difficulty breathing.  Initial workup showed multiple lab abnormalities including a creatinine of 4 with a potassium of 8.  He required admission to ICU for acute management of shock which was thought to be multifactorial in setting of acute kidney  tolerated the procedure well.     IMPRESSION:  1. Successful paracentesis of 5 liters of ascites with some residual ascites  post procedure 2 to 5 L limit        Electronically signed by Jenaro Owens        Exam Ended: 04/15/25 15:13 EDT Last Resulted: 04/18/25 09:19 EDT             TTE:  Interpretation Summary  Show Result Comparison     Image quality is adequate. Procedure performed with the patient in a supine position.    Left Ventricle: Mildly reduced left ventricular systolic function with a visually estimated EF of 45 - 50%. Left ventricle size is normal. Normal wall thickness. Septal motion is consistent with bundle branch block. Mild global hypokinesis present. Grade III diastolic dysfunction with increased LAP.    Left Atrium: Left atrium is severely dilated. Left atrial volume index is severely increased (>48 mL/m2) mL/m2. LA Vol Index A/L is 60 mL/m2 mL/m2.    Right Ventricle: Right ventricle is severely dilated. Lead present in the right ventricle. Reduced systolic function. TAPSE is abnormal. TAPSE is 0.9 cm. RV Peak S' is 5.8 cm/s. TDI systolic excursion is abnormal.    Right Atrium: Lead present in the right atrium. Right atrium is severely dilated.    Tricuspid Valve: Moderate to severe regurgitation. RVSP may be underestimated in the setting of equalization of pressure. The estimated RVSP is 56 mmHg.    Aortic Valve: Mild to moderate regurgitation with a posterolateral eccentrically directed jet and may underestimate severity. AV PHT is 233.7 ms.    Mitral Valve: Mild to moderate regurgitation with multiple jets.    Aorta: Dilated aortic root. Ao root diameter is 3.9 cm. Ao Root Index is 1.92 cm/m2. Dilated ascending aorta. Ao ascending diameter is 4.4 cm. Ao Ascending Index is 2.17 cm/m2.        Comparison Study Information    Prior Study    There is a prior study available for comparison. Prior study date: 1/24/2024.           Discharge Medications:     Discharge Medication List as of

## 2025-04-29 NOTE — PROGRESS NOTES
Physician Progress Note      PATIENT:               MICHELE OROZCO  CSN #:                  [id number]  :                       1945  ADMIT DATE:       2025 6:45 PM  DISCH DATE:        2025 4:08 PM  RESPONDING  PROVIDER #:        Carlos Blackwell MD          QUERY TEXT:    The attending physician is required to clarify conflicting documentation in   the medical record.    Noted documentation of \"He does not appear to have liver cirrhosis.\"   as well   as \"ETOH abuse?with evidence of cirrhosis\" in  Jennifer Jovel NP cardiology   note.    The clinical indicators include:  LAB:  --ALT 11-12.  AST 27 --> 16.  Total Bilirubin 1.2 -> 1.7 -> 1.6  --parancentesis 25: Rare WBCs seen, no organisms seen, no growth in 6   days  --CT scan 25: \"Liver: Unremarkable.\"     0944 am Jennifer Jovel NP Cardiology note:  -- \"He does not appear to have liver cirrhosis.\"   Attestation signed by   Rinku Clifton MD at 2025  --\"No history of cirrhosis documented, no history of prior ascites\" (   Jennifer Jovel NP)  --ETOH abuse?with evidence of cirrhosis ( Jennifer Jovel NP)     Oncology Dr Bergeron: \"Ascites due to alcoholic cirrhosis (HCC)\"     GI ROMARIO Cox PA:  \"Ascites: SAAG of 0.6. Differentials favor malignancy vs nephrotic syndrome.   Though cytology was negative, malignancy is not ruled out because this test   has very low sensitivity (high specificity). Unlikely cirrhosis or heart   failure. s/p paracentesis 25, only 2L removed due to hypotension.\"  Dr Blackwell  Will arrange for paracentesis as OP.     Dr Talavera Nephrology:  \"Oncologist note reviewed.  Has cirrhosis and   ascites due to cirrhosis bone marrow study report also reviewed does not have   any amyloidosis.\".    \"Has cirrhosis and will have and will have ascites done   today which will require paracentesis.\"     Dr Suresh: \"Ascites w/low SAAG, s/p paracentesis\"  Options provided:  -- ascites was due to Alcoholic

## 2025-05-05 ENCOUNTER — OFFICE VISIT (OUTPATIENT)
Age: 80
End: 2025-05-05
Payer: MEDICARE

## 2025-05-05 VITALS
OXYGEN SATURATION: 99 % | DIASTOLIC BLOOD PRESSURE: 80 MMHG | BODY MASS INDEX: 23.7 KG/M2 | SYSTOLIC BLOOD PRESSURE: 132 MMHG | HEART RATE: 119 BPM | HEIGHT: 72 IN | WEIGHT: 175 LBS

## 2025-05-05 DIAGNOSIS — I48.19 PERSISTENT ATRIAL FIBRILLATION (HCC): Primary | ICD-10-CM

## 2025-05-05 DIAGNOSIS — I50.22 CHRONIC SYSTOLIC (CONGESTIVE) HEART FAILURE (HCC): ICD-10-CM

## 2025-05-05 DIAGNOSIS — I42.8 NONISCHEMIC CARDIOMYOPATHY (HCC): ICD-10-CM

## 2025-05-05 DIAGNOSIS — I27.20 PULMONARY HYPERTENSION, MODERATE TO SEVERE (HCC): ICD-10-CM

## 2025-05-05 DIAGNOSIS — Z95.810 PRESENCE OF AUTOMATIC (IMPLANTABLE) CARDIAC DEFIBRILLATOR: ICD-10-CM

## 2025-05-05 PROCEDURE — 1111F DSCHRG MED/CURRENT MED MERGE: CPT | Performed by: NURSE PRACTITIONER

## 2025-05-05 PROCEDURE — 3075F SYST BP GE 130 - 139MM HG: CPT | Performed by: NURSE PRACTITIONER

## 2025-05-05 PROCEDURE — G8420 CALC BMI NORM PARAMETERS: HCPCS | Performed by: NURSE PRACTITIONER

## 2025-05-05 PROCEDURE — 1036F TOBACCO NON-USER: CPT | Performed by: NURSE PRACTITIONER

## 2025-05-05 PROCEDURE — 99214 OFFICE O/P EST MOD 30 MIN: CPT | Performed by: NURSE PRACTITIONER

## 2025-05-05 PROCEDURE — 3079F DIAST BP 80-89 MM HG: CPT | Performed by: NURSE PRACTITIONER

## 2025-05-05 PROCEDURE — 1123F ACP DISCUSS/DSCN MKR DOCD: CPT | Performed by: NURSE PRACTITIONER

## 2025-05-05 PROCEDURE — 1160F RVW MEDS BY RX/DR IN RCRD: CPT | Performed by: NURSE PRACTITIONER

## 2025-05-05 PROCEDURE — 1126F AMNT PAIN NOTED NONE PRSNT: CPT | Performed by: NURSE PRACTITIONER

## 2025-05-05 PROCEDURE — G8427 DOCREV CUR MEDS BY ELIG CLIN: HCPCS | Performed by: NURSE PRACTITIONER

## 2025-05-05 PROCEDURE — 1159F MED LIST DOCD IN RCRD: CPT | Performed by: NURSE PRACTITIONER

## 2025-05-05 ASSESSMENT — PATIENT HEALTH QUESTIONNAIRE - PHQ9
2. FEELING DOWN, DEPRESSED OR HOPELESS: NOT AT ALL
SUM OF ALL RESPONSES TO PHQ QUESTIONS 1-9: 0
1. LITTLE INTEREST OR PLEASURE IN DOING THINGS: NOT AT ALL
SUM OF ALL RESPONSES TO PHQ QUESTIONS 1-9: 0

## 2025-05-05 NOTE — PATIENT INSTRUCTIONS
Restart carvedilol 25mg twice a day  All other medications to remain the same  DO NOT TAKE ANY MEDICATIONS THAT ARE \"PAUSED\" UNTIL INSTRUCTED TO DO SO  Follow-up with Dr. Velasquez as scheduled and as needed  [facility] as scheduled

## 2025-05-05 NOTE — PROGRESS NOTES
Gumaro Khan presents today for   Chief Complaint   Patient presents with    Follow-Up from Hospital     Hosp f/u 4/7-4/18        Gumaro Khan preferred language for health care discussion is english/other.    Is someone accompanying this pt? yes    Is the patient using any DME equipment during OV? yes    Depression Screening:  Depression: Not at risk (5/5/2025)    PHQ-2     PHQ-2 Score: 0        Learning Assessment:  Who is the primary learner? Patient    What is the preferred language for health care of the primary learner? ENGLISH    How does the primary learner prefer to learn new concepts? DEMONSTRATION    Answered By patient    Relationship to Learner SELF           Pt currently taking Anticoagulant therapy? no    Pt currently taking Antiplatelet therapy ? Plavix 75 mg QD       Coordination of Care:  1. Have you been to the ER, urgent care clinic since your last visit? Hospitalized since your last visit? YES    2. Have you seen or consulted any other health care providers outside of the [facility] since your last visit? Include any pap smears or colon screening. no

## 2025-05-13 NOTE — ED PROVIDER NOTES
[facility] EMERGENCY DEPARTMENT  EMERGENCY DEPARTMENT ENCOUNTER    Patient Name: Gumaro Khan  MRN: [id number]  YOB: 1945  Provider: Wade Menjivar DO  PCP: Artis Shell MD   Time/Date of evaluation: 10:58 AM EDT on 5/13/25    History of Presenting Illness     History Provided by: Family member  History is limited by: Acuity of Condition     HISTORY:   Gumaro Khan is a 79 y.o. male, presented to the emergency department in full cardiopulmonary arrest. According to the patient's daughter, Mr. Khan had a low blood sugar of 68 this morning around 7:00 AM. He had consumed orange juice during the night, possibly around 2:00 or 3:00 AM. The daughter noticed his sugar was still low in the morning and encouraged him to drink more juice, but he reported not feeling well.  Mr. Khan was able to get out of bed this morning to wash up, though he took his time. He has been experiencing chronic shortness of breath, which his daughter attributes to fluid retention. The patient was found unresponsive in the bathroom after an unusually long time (typically no more than 10 minutes).  Past Medical History:  Shortness of breath  Hyperkalemia  Respiratory acidosis  Acute renal failure  Alcoholic cirrhosis with ascites  Chronic kidney disease stage 3  Systolic congestive heart failure (EF 25-30% on echo from January 2024)  Paroxysmal atrial fibrillation  Polyclonal gammopathy with elevated kappa and lambda chains  Anemia  Recent Hospitalization:  Admitted on April 7, 2025, for shortness of breath  Received paracentesis  Transfused 1 unit of packed RBCs  Required emergent dialysis for severe hyperkalemia  Experienced acute metabolic encephalopathy  Medications:  Not specified in the transcript  Allergies:  Not specified in the transcript  Social History:  Lives at home with assistance from daughter  Recently declined skilled nursing rehab  Receiving home health services for

## 2025-05-13 NOTE — ED NOTES
Code Blue:     1022 - pt arrives on stretcher via ems intubated with 7.5 ett and on nixon machine.     1023 -  no pulse/HR asystole    1025 - TOD called by Dr. Menjivar DO

## 2025-05-13 NOTE — PROGRESS NOTES
responded to Death of  Gumaro Khan, who was a 79 y.o.,male,     The  provided the following Interventions:  Provided crisis pastoral care, pastoral support and grief interventions with family of Gumaro Khan.   Offered prayers on behalf of the patient with the family.   They will call [facility]  home.     Chay Mcleod   Board Certified    Spirit[facility]   [phone]

## 2025-05-13 NOTE — ED NOTES
[facility] notified. [facility] to accept patient for tissue and eye donation. Per Kalyn at [facility], patient to go to  [facility] and facility to hold on calling the   home at this time.

## 2025-05-13 NOTE — ED TRIAGE NOTES
Pt arrives to ed via ems as cardiac arrest. According to EMS and per family pt was found unresponsive on bathroom floor by family member around 10am. Upon ems arrival pt was found with no pulse and was placed on nixon machine for compressions. Bg was 46. D10 was given by ems along with 6 1mg epi and 300 amniodarone. Pt was shocked at 360j after amniodarone administration.     Pt arrived to ed in asystole at 10:22am.     Pt intubated with 7.5 tube     TOD 1025 called by Dr. Menjivar in ED.

## (undated) DEVICE — CATHETER SUCT TR FL TIP 14FR W/ O CTRL

## (undated) DEVICE — MEDI-VAC NON-CONDUCTIVE SUCTION TUBING: Brand: CARDINAL HEALTH

## (undated) DEVICE — SNARE VASC L240CM LOOP W10MM SHTH DIA2.4MM RND STIFF CLD

## (undated) DEVICE — FORCEPS BX L240CM JAW DIA2.8MM L CAP W/ NDL MIC MESH TOOTH

## (undated) DEVICE — CANNULA NSL AD TBNG L14FT STD PVC O2 CRV CONN NONFLARED NSL

## (undated) DEVICE — BASIN EMSIS 16OZ GRAPHITE PLAS KID SHP MOLD GRAD FOR ORAL

## (undated) DEVICE — CANNULA ORIG TL CLR W FOAM CUSHIONS AND 14FT SUPL TB 3 CHN

## (undated) DEVICE — AIRLIFE™ NASAL OXYGEN CANNULA CURVED, NONFLARED TIP WITH 14 FOOT (4.3 M) CRUSH-RESISTANT TUBING, OVER-THE-EAR STYLE: Brand: AIRLIFE™

## (undated) DEVICE — ENDOSCOPY PUMP TUBING/ CAP SET: Brand: ERBE

## (undated) DEVICE — BITE BLOCK ENDOSCP UNIV AD 6 TO 9.4 MM

## (undated) DEVICE — YANKAUER,SMOOTH HANDLE,HIGH CAPACITY: Brand: MEDLINE INDUSTRIES, INC.

## (undated) DEVICE — SNARE POLYP M W27MMXL240CM OVL STIFF DISP CAPTIVATOR

## (undated) DEVICE — SYRINGE MED 50ML LUERSLIP TIP

## (undated) DEVICE — GOWN ISOL IMPERV UNIV, DISP, OPEN BACK, BLUE --

## (undated) DEVICE — STERILE POLYISOPRENE POWDER-FREE SURGICAL GLOVES: Brand: PROTEXIS

## (undated) DEVICE — FLUFF AND POLYMER UNDERPAD,EXTRA HEAVY: Brand: WINGS

## (undated) DEVICE — MEDI-VAC SUCTION HIGH CAPACITY: Brand: CARDINAL HEALTH

## (undated) DEVICE — SYR 20ML LL STRL LF --

## (undated) DEVICE — SYRINGE MED 25GA 3ML L5/8IN SUBQ PLAS W/ DETACH NDL SFTY

## (undated) DEVICE — SYR 10ML LUER LOK 1/5ML GRAD --

## (undated) DEVICE — SOLUTION IRRIG 1000ML H2O STRL BLT

## (undated) DEVICE — TRAP SPEC COLL POLYP POLYSTYR --

## (undated) DEVICE — GAUZE,SPONGE,4"X4",16PLY,STRL,LF,10/TRAY: Brand: MEDLINE

## (undated) DEVICE — UNDERPAD INCONT W23XL36IN STD BLU POLYPR BK FLUF SFT

## (undated) DEVICE — SYR 50ML SLIP TIP NSAF LF STRL --

## (undated) DEVICE — LINER SUCT CANSTR 3000CC PLAS SFT PRE ASSEMB W/OUT TBNG W/

## (undated) DEVICE — FORCEPS BX L240CM JAW DIA2.4MM ORNG L CAP W/ NDL DISP RAD

## (undated) DEVICE — FLEX ADVANTAGE 3000CC: Brand: FLEX ADVANTAGE